# Patient Record
Sex: MALE | Race: WHITE | NOT HISPANIC OR LATINO | Employment: FULL TIME | URBAN - METROPOLITAN AREA
[De-identification: names, ages, dates, MRNs, and addresses within clinical notes are randomized per-mention and may not be internally consistent; named-entity substitution may affect disease eponyms.]

---

## 2018-10-03 ENCOUNTER — HOSPITAL ENCOUNTER (OUTPATIENT)
Dept: OTHER | Facility: HOSPITAL | Age: 48
Setting detail: SPECIMEN
Discharge: HOME | End: 2018-10-03
Attending: UROLOGY | Admitting: UROLOGY

## 2018-10-04 LAB
SPECIMEN SOURCE: NORMAL
STONE ANALYSIS-IMP: NORMAL
STONE COMMENT: NORMAL

## 2019-12-10 ENCOUNTER — LAB (OUTPATIENT)
Dept: LAB | Facility: HOSPITAL | Age: 49
End: 2019-12-10

## 2019-12-10 ENCOUNTER — TRANSCRIBE ORDERS (OUTPATIENT)
Dept: ADMINISTRATIVE | Facility: HOSPITAL | Age: 49
End: 2019-12-10

## 2019-12-10 ENCOUNTER — HOSPITAL ENCOUNTER (OUTPATIENT)
Dept: CARDIOLOGY | Facility: HOSPITAL | Age: 49
Discharge: HOME | End: 2019-12-10
Admitting: UROLOGY

## 2019-12-10 DIAGNOSIS — N20.1 LEFT URETERAL CALCULUS: ICD-10-CM

## 2019-12-10 DIAGNOSIS — N20.1 LEFT URETERAL CALCULUS: Primary | ICD-10-CM

## 2019-12-10 LAB
ANION GAP SERPL CALCULATED.3IONS-SCNC: 11.2 MMOL/L (ref 5–15)
BASOPHILS # BLD AUTO: 0.07 10*3/MM3 (ref 0–0.2)
BASOPHILS NFR BLD AUTO: 0.8 % (ref 0–1.5)
BUN BLD-MCNC: 12 MG/DL (ref 6–20)
BUN/CREAT SERPL: 14 (ref 7–25)
CALCIUM SPEC-SCNC: 9.6 MG/DL (ref 8.6–10.5)
CHLORIDE SERPL-SCNC: 104 MMOL/L (ref 98–107)
CO2 SERPL-SCNC: 25.8 MMOL/L (ref 22–29)
CREAT BLD-MCNC: 0.86 MG/DL (ref 0.76–1.27)
DEPRECATED RDW RBC AUTO: 44.1 FL (ref 37–54)
EOSINOPHIL # BLD AUTO: 0.26 10*3/MM3 (ref 0–0.4)
EOSINOPHIL NFR BLD AUTO: 3.1 % (ref 0.3–6.2)
ERYTHROCYTE [DISTWIDTH] IN BLOOD BY AUTOMATED COUNT: 12.5 % (ref 12.3–15.4)
GFR SERPL CREATININE-BSD FRML MDRD: 95 ML/MIN/1.73
GLUCOSE BLD-MCNC: 87 MG/DL (ref 65–99)
HCT VFR BLD AUTO: 47 % (ref 37.5–51)
HGB BLD-MCNC: 16.8 G/DL (ref 13–17.7)
IMM GRANULOCYTES # BLD AUTO: 0.02 10*3/MM3 (ref 0–0.05)
IMM GRANULOCYTES NFR BLD AUTO: 0.2 % (ref 0–0.5)
LYMPHOCYTES # BLD AUTO: 3.11 10*3/MM3 (ref 0.7–3.1)
LYMPHOCYTES NFR BLD AUTO: 36.8 % (ref 19.6–45.3)
MCH RBC QN AUTO: 34.4 PG (ref 26.6–33)
MCHC RBC AUTO-ENTMCNC: 35.7 G/DL (ref 31.5–35.7)
MCV RBC AUTO: 96.3 FL (ref 79–97)
MONOCYTES # BLD AUTO: 0.73 10*3/MM3 (ref 0.1–0.9)
MONOCYTES NFR BLD AUTO: 8.6 % (ref 5–12)
NEUTROPHILS # BLD AUTO: 4.26 10*3/MM3 (ref 1.7–7)
NEUTROPHILS NFR BLD AUTO: 50.5 % (ref 42.7–76)
NRBC BLD AUTO-RTO: 0 /100 WBC (ref 0–0.2)
PLATELET # BLD AUTO: 283 10*3/MM3 (ref 140–450)
PMV BLD AUTO: 10.5 FL (ref 6–12)
POTASSIUM BLD-SCNC: 4.4 MMOL/L (ref 3.5–5.2)
RBC # BLD AUTO: 4.88 10*6/MM3 (ref 4.14–5.8)
SODIUM BLD-SCNC: 141 MMOL/L (ref 136–145)
WBC NRBC COR # BLD: 8.45 10*3/MM3 (ref 3.4–10.8)

## 2019-12-10 PROCEDURE — 85025 COMPLETE CBC W/AUTO DIFF WBC: CPT

## 2019-12-10 PROCEDURE — 36415 COLL VENOUS BLD VENIPUNCTURE: CPT

## 2019-12-10 PROCEDURE — 80048 BASIC METABOLIC PNL TOTAL CA: CPT

## 2019-12-10 PROCEDURE — 93005 ELECTROCARDIOGRAM TRACING: CPT | Performed by: UROLOGY

## 2019-12-11 ENCOUNTER — LAB REQUISITION (OUTPATIENT)
Dept: LAB | Facility: HOSPITAL | Age: 49
End: 2019-12-11

## 2019-12-11 DIAGNOSIS — N20.0 CALCULUS OF KIDNEY: ICD-10-CM

## 2019-12-11 PROCEDURE — 82360 CALCULUS ASSAY QUANT: CPT

## 2019-12-15 PROCEDURE — 93010 ELECTROCARDIOGRAM REPORT: CPT | Performed by: INTERNAL MEDICINE

## 2019-12-20 LAB
CA PHOS CRY STONE QL IR: 15 %
COD CRY STONE QL IR: 30 %
COLOR STONE: NORMAL
COM CRY STONE QL IR: 55 %
COMPN STONE: NORMAL
Lab: NORMAL
Lab: NORMAL
NIDUS STONE QL: NORMAL
PATH REPORT.COMMENTS IMP SPEC: NORMAL
SIZE STONE: NORMAL MM
SURFACE CRYSTALS: NORMAL
WT STONE: 9 MG

## 2020-02-03 ENCOUNTER — TRANSCRIBE ORDERS (OUTPATIENT)
Dept: ADMINISTRATIVE | Facility: HOSPITAL | Age: 50
End: 2020-02-03

## 2020-02-03 ENCOUNTER — HOSPITAL ENCOUNTER (OUTPATIENT)
Dept: GENERAL RADIOLOGY | Facility: HOSPITAL | Age: 50
Discharge: HOME | End: 2020-02-03
Admitting: UROLOGY

## 2020-02-03 DIAGNOSIS — N20.1 CALCULUS OF URETER: ICD-10-CM

## 2020-02-03 DIAGNOSIS — N20.1 CALCULUS OF URETER: Primary | ICD-10-CM

## 2020-02-03 PROCEDURE — 74018 RADEX ABDOMEN 1 VIEW: CPT

## 2020-02-05 PROCEDURE — 82365 CALCULUS SPECTROSCOPY: CPT

## 2020-02-06 ENCOUNTER — LAB REQUISITION (OUTPATIENT)
Dept: LAB | Facility: HOSPITAL | Age: 50
End: 2020-02-06

## 2020-02-06 DIAGNOSIS — N20.0 CALCULUS OF KIDNEY: ICD-10-CM

## 2020-02-20 LAB
COD CRY STONE QL IR: 20 %
COLOR STONE: NORMAL
COM CRY STONE QL IR: 70 %
COMPN STONE: NORMAL
HYDROXYAPATITE: 10 %
LABORATORY COMMENT REPORT: NORMAL
Lab: NORMAL
Lab: NORMAL
PHOTO: NORMAL
SIZE STONE: NORMAL MM
SPECIMEN SOURCE: NORMAL
WT STONE: 37.7 MG

## 2020-06-09 ENCOUNTER — OFFICE VISIT (OUTPATIENT)
Dept: FAMILY MEDICINE CLINIC | Facility: CLINIC | Age: 50
End: 2020-06-09

## 2020-06-09 VITALS
WEIGHT: 190.6 LBS | SYSTOLIC BLOOD PRESSURE: 121 MMHG | BODY MASS INDEX: 27.29 KG/M2 | OXYGEN SATURATION: 98 % | DIASTOLIC BLOOD PRESSURE: 82 MMHG | HEART RATE: 74 BPM | HEIGHT: 70 IN | TEMPERATURE: 98.6 F | RESPIRATION RATE: 16 BRPM

## 2020-06-09 DIAGNOSIS — Z71.6 ENCOUNTER FOR SMOKING CESSATION COUNSELING: ICD-10-CM

## 2020-06-09 DIAGNOSIS — E78.5 HYPERLIPIDEMIA, UNSPECIFIED HYPERLIPIDEMIA TYPE: ICD-10-CM

## 2020-06-09 DIAGNOSIS — Z86.73 STATUS POST CVA: Primary | ICD-10-CM

## 2020-06-09 PROCEDURE — 99203 OFFICE O/P NEW LOW 30 MIN: CPT | Performed by: NURSE PRACTITIONER

## 2020-06-09 RX ORDER — ASPIRIN 325 MG
325 TABLET ORAL DAILY
COMMUNITY
Start: 2020-05-31 | End: 2020-10-28

## 2020-06-09 RX ORDER — FLUTICASONE PROPIONATE 50 MCG
1 SPRAY, SUSPENSION (ML) NASAL DAILY
COMMUNITY
Start: 2020-06-01

## 2020-06-09 RX ORDER — ATORVASTATIN CALCIUM 80 MG/1
80 TABLET, FILM COATED ORAL NIGHTLY
COMMUNITY
Start: 2020-06-02 | End: 2020-09-03

## 2020-06-09 NOTE — PROGRESS NOTES
"Subjective   Jourdan Lebron is a 50 y.o. male presents for   Chief Complaint   Patient presents with   • Annual Exam   • Cerebrovascular Accident     recent        Health Maintenance Due   Topic Date Due   • TDAP/TD VACCINES (1 - Tdap) 04/21/1983   • PNEUMOCOCCAL VACCINE (19-64 MEDIUM RISK) (1 of 1 - PPSV23) 04/21/1991   • HEPATITIS C SCREENING  06/09/2020   • LIPID PANEL  06/09/2020   • COLONOSCOPY  06/09/2020       History of Present Illness   Pt present for annual exam, and to reestablish care.  Pt treated at [facility] for cva 5/31/20.   Pt returned to hospital 6/8/20 due to feeling lightheaded and was kept overnight for loop recorder implant.  He has appt to /u Monday with stroke team to determine possible cause of clot and further treatment plan.  Pt currently trying to quit smoking, but reports he feels very stressed at this time as he is awaiting results of tests.  Pt also currently off work.  Vitals:    06/09/20 1413 06/09/20 1422   BP: 129/80 121/82   BP Location: Right arm Left arm   Patient Position: Sitting Sitting   Cuff Size: Adult Adult   Pulse: 70 74   Resp: 16    Temp: 98.6 °F (37 °C)    TempSrc: Infrared    SpO2: 98%    Weight: 86.5 kg (190 lb 9.6 oz)    Height: 179 cm (70.47\")      Body mass index is 26.98 kg/m².    Current Outpatient Medications on File Prior to Visit   Medication Sig Dispense Refill   • aspirin 325 MG tablet Take 325 mg by mouth Daily.     • atorvastatin (LIPITOR) 80 MG tablet Take 80 mg by mouth Every Night.     • cyanocobalamin (VITAMIN B-12) 1000 MCG tablet Take 1,000 mcg by mouth Daily.     • fluticasone (Flonase Allergy Relief) 50 MCG/ACT nasal spray 1 spray into the nostril(s) as directed by provider Daily.     • [DISCONTINUED] Carbinoxamine Maleate 6 MG tablet Take 1 tablet by mouth Every 6 (Six) Hours As Needed.  5   • [DISCONTINUED] fluticasone (FLONASE) 50 MCG/ACT nasal spray 2 sprays by Each Nare route Daily.  12   • [DISCONTINUED] methylPREDNISolone (MEDROL, " TAVIA) 4 MG tablet Take as directed on package instructions. 21 each 0     No current facility-administered medications on file prior to visit.        The following portions of the patient's history were reviewed and updated as appropriate: allergies, current medications, past family history, past medical history, past social history, past surgical history and problem list.    Review of Systems   Constitutional: Negative for chills and fever.   HENT: Negative for sinus pressure and sore throat.    Eyes: Negative for blurred vision.   Respiratory: Negative for cough and shortness of breath.    Cardiovascular: Negative for chest pain.        Loop recorder placed 6/8/20   Gastrointestinal: Negative for abdominal pain.   Endocrine: Negative.    Genitourinary: Negative.    Musculoskeletal: Negative for arthralgias and joint swelling.   Skin: Negative for color change.   Allergic/Immunologic: Negative.    Neurological: Negative for dizziness.   Psychiatric/Behavioral: Negative for behavioral problems.       Objective   Physical Exam   Constitutional: He is oriented to person, place, and time. He appears well-developed and well-nourished.   HENT:   Head: Normocephalic and atraumatic.   Right Ear: External ear normal.   Left Ear: External ear normal.   Nose: Nose normal.   Mouth/Throat: Oropharynx is clear and moist.   Eyes: Pupils are equal, round, and reactive to light. Conjunctivae and EOM are normal.   Neck: Normal range of motion. Neck supple. No thyromegaly present.   Cardiovascular: Normal rate, regular rhythm, normal heart sounds and intact distal pulses.   Pulmonary/Chest: Effort normal and breath sounds normal.   Abdominal: Soft. Bowel sounds are normal.   Musculoskeletal: Normal range of motion.   Lymphadenopathy:     He has no cervical adenopathy.   Neurological: He is alert and oriented to person, place, and time. No sensory deficit. Coordination normal.   Skin: Skin is warm and dry.   Psychiatric: He has a  normal mood and affect. His behavior is normal. Judgment and thought content normal.   Nursing note and vitals reviewed.    PHQ-9 Total Score: 1    Assessment/Plan   Jourdan was seen today for annual exam and cerebrovascular accident.    Diagnoses and all orders for this visit:    Status post CVA  Comments:  continue daily asa and lipitor    Encounter for smoking cessation counseling  Comments:  discussed avaialble options including patches, gum, and chantix. pt has patches at this time, but is not using. he will continue to cut down method at this time    Hyperlipidemia, unspecified hyperlipidemia type  Comments:  continue lipitor        There are no Patient Instructions on file for this visit.

## 2020-06-30 ENCOUNTER — TRANSITIONAL CARE MANAGEMENT TELEPHONE ENCOUNTER (OUTPATIENT)
Dept: FAMILY MEDICINE CLINIC | Facility: CLINIC | Age: 50
End: 2020-06-30

## 2020-09-03 ENCOUNTER — OFFICE VISIT (OUTPATIENT)
Dept: FAMILY MEDICINE CLINIC | Facility: CLINIC | Age: 50
End: 2020-09-03

## 2020-09-03 VITALS
BODY MASS INDEX: 23.7 KG/M2 | HEIGHT: 73 IN | RESPIRATION RATE: 18 BRPM | WEIGHT: 178.8 LBS | DIASTOLIC BLOOD PRESSURE: 73 MMHG | HEART RATE: 62 BPM | SYSTOLIC BLOOD PRESSURE: 110 MMHG | OXYGEN SATURATION: 96 % | TEMPERATURE: 98.4 F

## 2020-09-03 DIAGNOSIS — Z12.11 SCREENING FOR COLON CANCER: ICD-10-CM

## 2020-09-03 DIAGNOSIS — Z11.59 ENCOUNTER FOR HEPATITIS C VIRUS SCREENING TEST FOR HIGH RISK PATIENT: ICD-10-CM

## 2020-09-03 DIAGNOSIS — Z00.01 ENCOUNTER FOR GENERAL ADULT MEDICAL EXAMINATION WITH ABNORMAL FINDINGS: Primary | ICD-10-CM

## 2020-09-03 DIAGNOSIS — Z86.73 STATUS POST CVA: ICD-10-CM

## 2020-09-03 DIAGNOSIS — R53.82 CHRONIC FATIGUE: ICD-10-CM

## 2020-09-03 DIAGNOSIS — Z91.89 ENCOUNTER FOR HEPATITIS C VIRUS SCREENING TEST FOR HIGH RISK PATIENT: ICD-10-CM

## 2020-09-03 DIAGNOSIS — R63.4 WEIGHT LOSS, UNINTENTIONAL: ICD-10-CM

## 2020-09-03 DIAGNOSIS — F32.A DEPRESSION, UNSPECIFIED DEPRESSION TYPE: ICD-10-CM

## 2020-09-03 LAB
25(OH)D3 SERPL-MCNC: 38.9 NG/ML (ref 30–100)
ALBUMIN SERPL-MCNC: 4.2 G/DL (ref 3.5–5.2)
ALBUMIN/GLOB SERPL: 1.7 G/DL
ALP SERPL-CCNC: 72 U/L (ref 39–117)
ALT SERPL W P-5'-P-CCNC: 21 U/L (ref 1–41)
ANION GAP SERPL CALCULATED.3IONS-SCNC: 9.7 MMOL/L (ref 5–15)
AST SERPL-CCNC: 17 U/L (ref 1–40)
BASOPHILS # BLD AUTO: 0.05 10*3/MM3 (ref 0–0.2)
BASOPHILS NFR BLD AUTO: 0.7 % (ref 0–1.5)
BILIRUB SERPL-MCNC: 0.4 MG/DL (ref 0–1.2)
BUN SERPL-MCNC: 8 MG/DL (ref 6–20)
BUN/CREAT SERPL: 9.4 (ref 7–25)
CALCIUM SPEC-SCNC: 9.4 MG/DL (ref 8.6–10.5)
CHLORIDE SERPL-SCNC: 105 MMOL/L (ref 98–107)
CHOLEST SERPL-MCNC: 202 MG/DL (ref 0–200)
CO2 SERPL-SCNC: 25.3 MMOL/L (ref 22–29)
CREAT SERPL-MCNC: 0.85 MG/DL (ref 0.76–1.27)
DEPRECATED RDW RBC AUTO: 46.8 FL (ref 37–54)
EOSINOPHIL # BLD AUTO: 0.19 10*3/MM3 (ref 0–0.4)
EOSINOPHIL NFR BLD AUTO: 2.8 % (ref 0.3–6.2)
ERYTHROCYTE [DISTWIDTH] IN BLOOD BY AUTOMATED COUNT: 13.1 % (ref 12.3–15.4)
ERYTHROCYTE [SEDIMENTATION RATE] IN BLOOD: 2 MM/HR (ref 0–15)
GFR SERPL CREATININE-BSD FRML MDRD: 96 ML/MIN/1.73
GLOBULIN UR ELPH-MCNC: 2.5 GM/DL
GLUCOSE SERPL-MCNC: 88 MG/DL (ref 65–99)
HCT VFR BLD AUTO: 48.9 % (ref 37.5–51)
HCV AB SER DONR QL: NORMAL
HDLC SERPL-MCNC: 51 MG/DL (ref 40–60)
HGB BLD-MCNC: 16.9 G/DL (ref 13–17.7)
IMM GRANULOCYTES # BLD AUTO: 0.01 10*3/MM3 (ref 0–0.05)
IMM GRANULOCYTES NFR BLD AUTO: 0.1 % (ref 0–0.5)
LDLC SERPL CALC-MCNC: 123 MG/DL (ref 0–100)
LDLC/HDLC SERPL: 2.41 {RATIO}
LYMPHOCYTES # BLD AUTO: 1.94 10*3/MM3 (ref 0.7–3.1)
LYMPHOCYTES NFR BLD AUTO: 28.9 % (ref 19.6–45.3)
MCH RBC QN AUTO: 33.5 PG (ref 26.6–33)
MCHC RBC AUTO-ENTMCNC: 34.6 G/DL (ref 31.5–35.7)
MCV RBC AUTO: 97 FL (ref 79–97)
MONOCYTES # BLD AUTO: 0.59 10*3/MM3 (ref 0.1–0.9)
MONOCYTES NFR BLD AUTO: 8.8 % (ref 5–12)
NEUTROPHILS NFR BLD AUTO: 3.93 10*3/MM3 (ref 1.7–7)
NEUTROPHILS NFR BLD AUTO: 58.7 % (ref 42.7–76)
NRBC BLD AUTO-RTO: 0 /100 WBC (ref 0–0.2)
PLATELET # BLD AUTO: 286 10*3/MM3 (ref 140–450)
PMV BLD AUTO: 10.2 FL (ref 6–12)
POTASSIUM SERPL-SCNC: 4.1 MMOL/L (ref 3.5–5.2)
PROT SERPL-MCNC: 6.7 G/DL (ref 6–8.5)
RBC # BLD AUTO: 5.04 10*6/MM3 (ref 4.14–5.8)
SODIUM SERPL-SCNC: 140 MMOL/L (ref 136–145)
TRIGL SERPL-MCNC: 140 MG/DL (ref 0–150)
TSH SERPL DL<=0.05 MIU/L-ACNC: 1.66 UIU/ML (ref 0.27–4.2)
VIT B12 BLD-MCNC: 1223 PG/ML (ref 211–946)
VLDLC SERPL-MCNC: 28 MG/DL (ref 5–40)
WBC # BLD AUTO: 6.71 10*3/MM3 (ref 3.4–10.8)

## 2020-09-03 PROCEDURE — 82607 VITAMIN B-12: CPT | Performed by: NURSE PRACTITIONER

## 2020-09-03 PROCEDURE — 85025 COMPLETE CBC W/AUTO DIFF WBC: CPT | Performed by: NURSE PRACTITIONER

## 2020-09-03 PROCEDURE — 82306 VITAMIN D 25 HYDROXY: CPT | Performed by: NURSE PRACTITIONER

## 2020-09-03 PROCEDURE — 80053 COMPREHEN METABOLIC PANEL: CPT | Performed by: NURSE PRACTITIONER

## 2020-09-03 PROCEDURE — 86803 HEPATITIS C AB TEST: CPT | Performed by: NURSE PRACTITIONER

## 2020-09-03 PROCEDURE — 80061 LIPID PANEL: CPT | Performed by: NURSE PRACTITIONER

## 2020-09-03 PROCEDURE — 85652 RBC SED RATE AUTOMATED: CPT | Performed by: NURSE PRACTITIONER

## 2020-09-03 PROCEDURE — 84443 ASSAY THYROID STIM HORMONE: CPT | Performed by: NURSE PRACTITIONER

## 2020-09-03 PROCEDURE — 99214 OFFICE O/P EST MOD 30 MIN: CPT | Performed by: NURSE PRACTITIONER

## 2020-09-03 RX ORDER — CLOPIDOGREL BISULFATE 75 MG/1
75 TABLET ORAL DAILY
COMMUNITY
Start: 2020-05-31 | End: 2020-09-14 | Stop reason: SDUPTHER

## 2020-09-03 NOTE — PROGRESS NOTES
"Subjective   Jourdan Lebron is a 50 y.o. male presents for   Chief Complaint   Patient presents with   • Hyperlipidemia     pt is fasting       Health Maintenance Due   Topic Date Due   • TDAP/TD VACCINES (1 - Tdap) 04/21/1983   • PNEUMOCOCCAL VACCINE (19-64 MEDIUM RISK) (1 of 1 - PPSV23) 04/21/1991   • HEPATITIS C SCREENING  06/09/2020   • LIPID PANEL  06/09/2020   • COLONOSCOPY  06/09/2020   • INFLUENZA VACCINE  08/01/2020       History of Present Illness   Pt present to follow up hyperlipidemia.  He reports he has taken lipitor in the past and developed muscle aches, and cardiology switched to zetia.  He states muscle aches have continued and he stopped it approximately 1 weeks ago.  He also reports weight loss of approximately 20 lbs, no energy, muscle weakness, and depression.  Neurology prescribed lexapro, but he felt it increased his anxiety.  Depressed due to ongoing fatigue and achiness, and inability to be as active as he had been in the past. He reports he is easily angered and that is typically not his personality.  He states he lost sense of taste last week and reported sx to his neurologist and tested negative for COVID-19 last week. He reports intermittent nausea and a sensation of lump in his throat at times, but  attributed it to sinus drainage.  He states his bowel habits has also changed but feels that is related to lack of appetite/intake.    Vitals:    09/03/20 0811 09/03/20 0812   BP: 108/73 110/73   BP Location: Left arm Right arm   Patient Position: Sitting Sitting   Cuff Size: Adult Adult   Pulse: 65 62   Resp: 18    Temp: 98.4 °F (36.9 °C)    TempSrc: Temporal    SpO2: 96%    Weight: 81.1 kg (178 lb 12.8 oz)    Height: 185.4 cm (73\")      Body mass index is 23.59 kg/m².    Current Outpatient Medications on File Prior to Visit   Medication Sig Dispense Refill   • aspirin 325 MG tablet Take 325 mg by mouth Daily.     • clopidogrel (PLAVIX) 75 MG tablet Take 75 mg by mouth Daily.     • " cyanocobalamin (VITAMIN B-12) 1000 MCG tablet Take 1,000 mcg by mouth Daily.     • fluticasone (Flonase Allergy Relief) 50 MCG/ACT nasal spray 1 spray into the nostril(s) as directed by provider Daily.     • [DISCONTINUED] atorvastatin (LIPITOR) 80 MG tablet Take 80 mg by mouth Every Night.       No current facility-administered medications on file prior to visit.        The following portions of the patient's history were reviewed and updated as appropriate: allergies, current medications, past family history, past medical history, past social history, past surgical history and problem list.    Review of Systems   Constitutional: Positive for activity change, fatigue and unexpected weight loss.   HENT: Positive for postnasal drip.         Loss of taste, negative COVID-19 test last week   Eyes: Negative.    Respiratory: Negative.    Cardiovascular: Negative for leg swelling.   Gastrointestinal: Positive for nausea (intermittent).        No appetite, sensation of lump in throat occassionally   Endocrine: Positive for cold intolerance.   Genitourinary: Negative.    Musculoskeletal: Positive for myalgias and neck pain.   Skin: Negative.    Allergic/Immunologic: Negative.    Neurological: Positive for dizziness and weakness.   Hematological: Negative.    Psychiatric/Behavioral: Negative.        Objective   Physical Exam   Constitutional: He is oriented to person, place, and time. He appears well-developed and well-nourished.   HENT:   Head: Normocephalic and atraumatic.   Right Ear: External ear normal.   Left Ear: External ear normal.   Nose: Nose normal.   Mouth/Throat: Oropharynx is clear and moist.   Eyes: Pupils are equal, round, and reactive to light. Conjunctivae and EOM are normal.   Neck: Normal range of motion. Neck supple. No thyromegaly present.   Cardiovascular: Normal rate, regular rhythm, normal heart sounds and intact distal pulses.   Pulmonary/Chest: Effort normal and breath sounds normal.   Abdominal:  Soft. Bowel sounds are normal.   Musculoskeletal: Normal range of motion.   Lymphadenopathy:     He has no cervical adenopathy.   Neurological: He is alert and oriented to person, place, and time. He displays normal reflexes. Coordination normal.   Skin: Skin is warm and dry.   Psychiatric: He has a normal mood and affect. His behavior is normal. Judgment and thought content normal.   Nursing note and vitals reviewed.    PHQ-9 Total Score:      Assessment/Plan   Jourdan was seen today for hyperlipidemia.    Diagnoses and all orders for this visit:    Encounter for general adult medical examination with abnormal findings  -     CBC Auto Differential  -     Comprehensive Metabolic Panel  -     Lipid Panel  -     TSH  -     Vitamin B12  -     Vitamin D 25 Hydroxy    Weight loss, unintentional  Comments:  will evaluate labs, colonoscopy encouraged.     Chronic fatigue    Screening for colon cancer  -     Ambulatory Referral For Screening Colonoscopy    Encounter for hepatitis C virus screening test for high risk patient  -     Hepatitis C Antibody    Depression, unspecified depression type  Comments:  likely r/t cva and ongoing weakness/fatigue.  did not tolerate lexapro. counseling encouraged.  TSH ordered        Patient Instructions   Colorectal Cancer Screening    Colorectal cancer screening is a group of tests that are used to check for colorectal cancer before symptoms develop. Colorectal refers to the colon and rectum. The colon and rectum are located at the end of the digestive tract and carry bowel movements out of the body.  Who should have screening?  All adults starting at age 50 until age 75 should have screening. Your health care provider may recommend screening at age 45. You will have tests every 1-10 years, depending on your results and the type of screening test.  You may have screening tests starting at an earlier age, or more frequently than other people, if you have any of the following risk  factors:  · A personal or family history of colorectal cancer or abnormal growths (polyps).  · Inflammatory bowel disease, such as ulcerative colitis or Crohn's disease.  · A history of having radiation treatment to the abdomen or pelvic area for cancer.  · Colorectal cancer symptoms, such as changes in bowel habits or blood in your stool.  · A type of colon cancer syndrome that is passed from parent to child (hereditary), such as:  ? Sandhu syndrome.  ? Familial adenomatous polyposis.  ? Turcot syndrome.  ? Peutz-Jeghers syndrome.  Screening recommendations for adults who are 75-85 years old vary depending on health.  How is screening done?  There are several types of colorectal screening tests. You may have one or more of the following:  · Guaiac-based fecal occult blood testing. For this test, a stool (feces) sample is checked for hidden (occult) blood, which could be a sign of colorectal cancer.  · Fecal immunochemical test (FIT). For this test, a stool sample is checked for blood, which could be a sign of colorectal cancer.  · Stool DNA test. For this test, a stool sample is checked for blood and changes in DNA that could lead to colorectal cancer.  · Sigmoidoscopy. During this test, a thin, flexible tube with a camera on the end (sigmoidoscope) is used to examine the rectum and the lower colon.  · Colonoscopy. During this test, a long, flexible tube with a camera on the end (colonoscope) is used to examine the entire colon and rectum. With a colonoscopy, it is possible to take a sample of tissue (biopsy) and remove small polyps during the test.  · Virtual colonoscopy. Instead of a colonoscope, this type of colonoscopy uses X-rays (CT scan) and computers to produce images of the colon and rectum.  What are the benefits of screening?  Screening reduces your risk for colorectal cancer and can help identify cancer at an early stage, when the cancer can be removed or treated more easily. It is common for polyps to  form in the lining of the colon, especially as you age. These polyps may be cancerous or become cancerous over time. Screening can identify these polyps.  What are the risks of screening?  Each screening test may have different risks.  · Stool sample tests have fewer risks than other types of screening tests. However, you may need more tests to confirm results from a stool sample test.  · Screening tests that involve X-rays expose you to low levels of radiation, which may slightly increase your cancer risk. The benefit of detecting cancer outweighs the slight increase in risk.  · Screening tests such as sigmoidoscopy and colonoscopy may place you at risk for bleeding, intestinal damage, infection, or a reaction to medicines given during the exam.  Talk with your health care provider to understand your risk for colorectal cancer and to make a screening plan that is right for you.  Questions to ask your health care provider  · When should I start colorectal cancer screening?  · What is my risk for colorectal cancer?  · How often do I need screening?  · Which screening tests do I need?  · How do I get my test results?  · What do my results mean?  Where to find more information  Learn more about colorectal cancer screening from:  · The American Cancer Society: www.cancer.org  · The National Cancer [place]: www.cancer.gov  Summary  · Colorectal cancer screening is a group of tests used to check for colorectal cancer before symptoms develop.  · Screening reduces your risk for colorectal cancer and can help identify cancer at an early stage, when the cancer can be removed or treated more easily.  · All adults starting at age 50 until age 75 should have screening. Your health care provider may recommend screening at age 45.  · You may have screening tests starting at an earlier age, or more frequently than other people, if you have certain risk factors.  · Talk with your health care provider to understand your risk for  colorectal cancer and to make a screening plan that is right for you.  This information is not intended to replace advice given to you by your health care provider. Make sure you discuss any questions you have with your health care provider.  Document Released: 06/07/2011 Document Revised: 04/08/2020 Document Reviewed: 09/19/2018  Elsevier Patient Education © 2020 [employer]           Answers for HPI/ROS submitted by the patient on 8/27/2020   What is the primary reason for your visit?: Other  Please describe your symptoms.: Follow up on stroke and cholesterol  Have you had these symptoms before?: No  How long have you been having these symptoms?: Greater than 2 weeks    Answers for HPI/ROS submitted by the patient on 8/27/2020   What is the primary reason for your visit?: Other  Please describe your symptoms.: Follow up on stroke and cholesterol  Have you had these symptoms before?: No  How long have you been having these symptoms?: Greater than 2 weeks

## 2020-09-04 ENCOUNTER — TELEPHONE (OUTPATIENT)
Dept: FAMILY MEDICINE CLINIC | Facility: CLINIC | Age: 50
End: 2020-09-04

## 2020-09-04 NOTE — TELEPHONE ENCOUNTER
Spoke with pt regarding his labs. Stated understood and will fill out green packet for colonoscopy.

## 2020-09-04 NOTE — PROGRESS NOTES
He recently stopped statins due to muscle pain and fatigue.  Has also had 20lb unintentional weight loss.  Labs don't really point to a cause for that, but I encouraged a colonoscopy and provided paperwork at his appt yesterday.

## 2020-09-04 NOTE — PROGRESS NOTES
B12 is elevated so he can decrease otc dose.  Also cholesterol and bad cholesterol are both elevated so advise he watch sat fats and increase walking and we'll recheck with next labs and if isn't bringing down he should consider a statin to lower cardiovascular risks

## 2020-09-10 ENCOUNTER — TELEPHONE (OUTPATIENT)
Dept: FAMILY MEDICINE CLINIC | Facility: CLINIC | Age: 50
End: 2020-09-10

## 2020-09-10 NOTE — TELEPHONE ENCOUNTER
Spoke to Lisa. She will call pt, cancel todays appt and give him phone number for [place].  only does these at the [place] Location

## 2020-09-10 NOTE — TELEPHONE ENCOUNTER
PT CALLED AND IS ASKING IF THIS IS FOR DISABILITY? FUNCTION TEST    [id number] - Ambulatory Referral to Physical Therapy Evaluate and treat ([facility]     PHONE [phone]

## 2020-09-14 RX ORDER — CLOPIDOGREL BISULFATE 75 MG/1
75 TABLET ORAL DAILY
Qty: 90 TABLET | Refills: 1 | Status: SHIPPED | OUTPATIENT
Start: 2020-09-14 | End: 2020-12-13

## 2020-09-23 ENCOUNTER — OFFICE VISIT (OUTPATIENT)
Dept: FAMILY MEDICINE CLINIC | Facility: CLINIC | Age: 50
End: 2020-09-23

## 2020-09-23 VITALS
DIASTOLIC BLOOD PRESSURE: 75 MMHG | WEIGHT: 179 LBS | BODY MASS INDEX: 23.72 KG/M2 | TEMPERATURE: 98.3 F | HEART RATE: 82 BPM | HEIGHT: 73 IN | OXYGEN SATURATION: 97 % | SYSTOLIC BLOOD PRESSURE: 116 MMHG | RESPIRATION RATE: 18 BRPM

## 2020-09-23 DIAGNOSIS — R53.1 WEAKNESS: ICD-10-CM

## 2020-09-23 DIAGNOSIS — F41.9 ANXIETY: Primary | ICD-10-CM

## 2020-09-23 PROCEDURE — 99213 OFFICE O/P EST LOW 20 MIN: CPT | Performed by: NURSE PRACTITIONER

## 2020-09-23 RX ORDER — BUSPIRONE HYDROCHLORIDE 5 MG/1
5 TABLET ORAL 3 TIMES DAILY
Qty: 90 TABLET | Refills: 3 | Status: SHIPPED | OUTPATIENT
Start: 2020-09-23 | End: 2021-03-16

## 2020-09-23 NOTE — PROGRESS NOTES
"Subjective  Answers for HPI/ROS submitted by the patient on 9/21/2020   What is the primary reason for your visit?: Other  Please describe your symptoms.: Need to discuss coordination of care  Have you had these symptoms before?: No  How long have you been having these symptoms?: 1-4 days    Jourdan Lebron is a 50 y.o. male presents for   Chief Complaint   Patient presents with   • Anxiety   • Hyperlipidemia     discuss chol meds.       Health Maintenance Due   Topic Date Due   • COLONOSCOPY  1972   • Pneumococcal Vaccine 0-64 (1 of 1 - PPSV23) 04/21/1978   • TDAP/TD VACCINES (1 - Tdap) 04/21/1991   • INFLUENZA VACCINE  08/01/2020       History of Present Illness   Pt present to discuss uncontrolled anxiety, return to work status and medications to control elevated cholesterol.  He states he has tried multiple statins and zetia and unable to tolerate any of them due to muscle pain.  Pt also reports anxiety level is very high at this time, increased irritability and mild depression.  He denies SI or HI. He states he has taken lexapro in the past but experienced side effects.  He has tried to manage symptoms on his own for the past few months, but states he would like to try a different medication at this time.  Pt denies counseling in the past.  He also started physical therapy this week at [facility] and reports increased muscle pain due to deconditioning over the past 4 months since his stroke.    Vitals:    09/23/20 1010 09/23/20 1011   BP: 116/76 116/75   BP Location: Right arm Left arm   Patient Position: Sitting Sitting   Cuff Size: Adult Adult   Pulse: 78 82   Resp: 18    Temp: 98.3 °F (36.8 °C)    TempSrc: Temporal    SpO2: 97%    Weight: 81.2 kg (179 lb)    Height: 185.4 cm (73\")      Body mass index is 23.62 kg/m².    Current Outpatient Medications on File Prior to Visit   Medication Sig Dispense Refill   • aspirin 325 MG tablet Take 325 mg by mouth Daily.     • clopidogrel (PLAVIX) 75 MG tablet Take 1 tablet " by mouth Daily for 90 days. 90 tablet 1   • cyanocobalamin (VITAMIN B-12) 1000 MCG tablet Take 500 mcg by mouth Every Other Day.     • fluticasone (Flonase Allergy Relief) 50 MCG/ACT nasal spray 1 spray into the nostril(s) as directed by provider Daily.       No current facility-administered medications on file prior to visit.        The following portions of the patient's history were reviewed and updated as appropriate: allergies, current medications, past family history, past medical history, past social history, past surgical history and problem list.    Review of Systems   Constitutional: Negative for chills and fever.   HENT: Negative for sinus pressure and sore throat.    Eyes: Negative for blurred vision.   Respiratory: Negative for cough and shortness of breath.    Cardiovascular: Negative for chest pain.   Gastrointestinal: Negative for abdominal pain.   Endocrine: Negative.    Genitourinary: Negative.    Musculoskeletal: Positive for myalgias. Negative for arthralgias and joint swelling.        Muscle weakness   Skin: Negative for color change.   Allergic/Immunologic: Negative.    Neurological: Negative for dizziness.   Psychiatric/Behavioral: Positive for depressed mood and stress. Negative for behavioral problems. The patient is nervous/anxious.        Objective   Physical Exam  Vitals signs and nursing note reviewed. Exam conducted with a chaperone present.   Constitutional:       Appearance: Normal appearance. He is well-developed and normal weight.   HENT:      Head: Normocephalic and atraumatic.      Right Ear: External ear normal.      Left Ear: External ear normal.      Nose: Nose normal.   Eyes:      Extraocular Movements: Extraocular movements intact.      Conjunctiva/sclera: Conjunctivae normal.      Pupils: Pupils are equal, round, and reactive to light.   Neck:      Musculoskeletal: Normal range of motion.   Cardiovascular:      Rate and Rhythm: Normal rate.   Pulmonary:      Effort:  Pulmonary effort is normal.   Abdominal:      General: Abdomen is flat.   Musculoskeletal: Normal range of motion.   Skin:     General: Skin is warm and dry.   Neurological:      General: No focal deficit present.      Mental Status: He is alert and oriented to person, place, and time.   Psychiatric:         Attention and Perception: Attention normal.         Mood and Affect: Mood is anxious (slightly).         Speech: Speech normal.         Behavior: Behavior normal. Behavior is cooperative.         Thought Content: Thought content normal.         Judgment: Judgment normal.       PHQ-9 Total Score:      Assessment/Plan   Jourdan was seen today for anxiety and hyperlipidemia.    Diagnoses and all orders for this visit:    Anxiety  Comments:  list of counselors given and counseling encouraged.  call for worsening of sx, SI or HI  Orders:  -     busPIRone (BUSPAR) 5 MG tablet; Take 1 tablet by mouth 3 (Three) Times a Day for 30 days.    Weakness  Comments:  note to remain off work until completion of PT due to nature of job and high risk for injury if not in adequate physical condition        Patient Instructions   Managing Anxiety, Adult  After being diagnosed with an anxiety disorder, you may be relieved to know why you have felt or behaved a certain way. You may also feel overwhelmed about the treatment ahead and what it will mean for your life. With care and support, you can manage this condition and recover from it.  How to manage lifestyle changes  Managing stress and anxiety    Stress is your body's reaction to life changes and events, both good and bad. Most stress will last just a few hours, but stress can be ongoing and can lead to more than just stress. Although stress can play a major role in anxiety, it is not the same as anxiety. Stress is usually caused by something external, such as a deadline, test, or competition. Stress normally passes after the triggering event has ended.   Anxiety is caused by  something internal, such as imagining a terrible outcome or worrying that something will go wrong that will devastate you. Anxiety often does not go away even after the triggering event is over, and it can become long-term (chronic) worry. It is important to understand the differences between stress and anxiety and to manage your stress effectively so that it does not lead to an anxious response.  Talk with your health care provider or a counselor to learn more about reducing anxiety and stress. He or she may suggest tension reduction techniques, such as:  · Music therapy. This can include creating or listening to music that you enjoy and that inspires you.  · Mindfulness-based meditation. This involves being aware of your normal breaths while not trying to control your breathing. It can be done while sitting or walking.  · Centering prayer. This involves focusing on a word, phrase, or sacred image that means something to you and brings you peace.  · Deep breathing. To do this, expand your stomach and inhale slowly through your nose. Hold your breath for 3-5 seconds. Then exhale slowly, letting your stomach muscles relax.  · Self-talk. This involves identifying thought patterns that lead to anxiety reactions and changing those patterns.  · Muscle relaxation. This involves tensing muscles and then relaxing them.  Choose a tension reduction technique that suits your lifestyle and personality. These techniques take time and practice. Set aside 5-15 minutes a day to do them. Therapists can offer counseling and training in these techniques. The training to help with anxiety may be covered by some insurance plans. Other things you can do to manage stress and anxiety include:  · Keeping a stress/anxiety diary. This can help you learn what triggers your reaction and then learn ways to manage your response.  · Thinking about how you react to certain situations. You may not be able to control everything, but you can control  your response.  · Making time for activities that help you relax and not feeling guilty about spending your time in this way.  · Visual imagery and yoga can help you stay calm and relax.    Medicines  Medicines can help ease symptoms. Medicines for anxiety include:  · Anti-anxiety drugs.  · Antidepressants.  Medicines are often used as a primary treatment for anxiety disorder. Medicines will be prescribed by a health care provider. When used together, medicines, psychotherapy, and tension reduction techniques may be the most effective treatment.  Relationships  Relationships can play a big part in helping you recover. Try to spend more time connecting with trusted friends and family members. Consider going to couples counseling, taking family education classes, or going to family therapy. Therapy can help you and others better understand your condition.  How to recognize changes in your anxiety  Everyone responds differently to treatment for anxiety. Recovery from anxiety happens when symptoms decrease and stop interfering with your daily activities at home or work. This may mean that you will start to:  · Have better concentration and focus. Worry will interfere less in your daily thinking.  · Sleep better.  · Be less irritable.  · Have more energy.  · Have improved memory.  It is important to recognize when your condition is getting worse. Contact your health care provider if your symptoms interfere with home or work and you feel like your condition is not improving.  Follow these instructions at home:  Activity  · Exercise. Most adults should do the following:  ? Exercise for at least 150 minutes each week. The exercise should increase your heart rate and make you sweat (moderate-intensity exercise).  ? Strengthening exercises at least twice a week.  · Get the right amount and quality of sleep. Most adults need 7-9 hours of sleep each night.  Lifestyle    · Eat a healthy diet that includes plenty of vegetables,  fruits, whole grains, low-fat dairy products, and lean protein. Do not eat a lot of foods that are high in solid fats, added sugars, or salt.  · Make choices that simplify your life.  · Do not use any products that contain nicotine or tobacco, such as cigarettes, e-cigarettes, and chewing tobacco. If you need help quitting, ask your health care provider.  · Avoid caffeine, alcohol, and certain over-the-counter cold medicines. These may make you feel worse. Ask your pharmacist which medicines to avoid.  General instructions  · Take over-the-counter and prescription medicines only as told by your health care provider.  · Keep all follow-up visits as told by your health care provider. This is important.  Where to find support  You can get help and support from these sources:  · Self-help groups.  · Online and community organizations.  · A trusted spiritual leader.  · Couples counseling.  · Family education classes.  · Family therapy.  Where to find more information  You may find that joining a support group helps you deal with your anxiety. The following sources can help you locate counselors or support groups near you:  · Mental Health [place]: www.mentalhealth[place]ica.net  · Anxiety and Depression Association of [place] (ADAA): www.adaa.org  · National [place] [employer] (SHANDA): www.shanda.org  Contact a health care provider if you:  · Have a hard time staying focused or finishing daily tasks.  · Spend many hours a day feeling worried about everyday life.  · Become exhausted by worry.  · Start to have headaches, feel tense, or have nausea.  · Urinate more than normal.  · Have diarrhea.  Get help right away if you have:  · A racing heart and shortness of breath.  · Thoughts of hurting yourself or others.  If you ever feel like you may hurt yourself or others, or have thoughts about taking your own life, get help right away. You can go to your nearest emergency department or call:  · Your local emergency services  (911 in the [place]  · A suicide crisis helpline, such as the National Suicide Prevention Lifeline at 1-219.376.5777. This is open 24 hours a day.  Summary  · Taking steps to learn and use tension reduction techniques can help calm you and help prevent triggering an anxiety reaction.  · When used together, medicines, psychotherapy, and tension reduction techniques may be the most effective treatment.  · Family, friends, and partners can play a big part in helping you recover from an anxiety disorder.  This information is not intended to replace advice given to you by your health care provider. Make sure you discuss any questions you have with your health care provider.  Document Released: 12/12/2017 Document Revised: 05/19/2020 Document Reviewed: 05/19/2020  Elsevier Patient Education © 2020 [employer]

## 2020-09-23 NOTE — PATIENT INSTRUCTIONS

## 2020-09-29 ENCOUNTER — HOSPITAL ENCOUNTER (EMERGENCY)
Facility: HOSPITAL | Age: 50
Discharge: HOME | End: 2020-09-29
Attending: EMERGENCY MEDICINE | Admitting: EMERGENCY MEDICINE

## 2020-09-29 ENCOUNTER — APPOINTMENT (OUTPATIENT)
Dept: MRI IMAGING | Facility: HOSPITAL | Age: 50
End: 2020-09-29

## 2020-09-29 ENCOUNTER — TELEPHONE (OUTPATIENT)
Dept: FAMILY MEDICINE CLINIC | Facility: CLINIC | Age: 50
End: 2020-09-29

## 2020-09-29 VITALS
HEIGHT: 71 IN | RESPIRATION RATE: 18 BRPM | TEMPERATURE: 98.1 F | BODY MASS INDEX: 24.85 KG/M2 | HEART RATE: 70 BPM | SYSTOLIC BLOOD PRESSURE: 117 MMHG | DIASTOLIC BLOOD PRESSURE: 70 MMHG | WEIGHT: 177.47 LBS | OXYGEN SATURATION: 100 %

## 2020-09-29 DIAGNOSIS — R42 DIZZY: Primary | ICD-10-CM

## 2020-09-29 LAB
ALBUMIN SERPL-MCNC: 4.2 G/DL (ref 3.5–5.2)
ALBUMIN/GLOB SERPL: 1.8 G/DL
ALP SERPL-CCNC: 74 U/L (ref 39–117)
ALT SERPL W P-5'-P-CCNC: 15 U/L (ref 1–41)
ANION GAP SERPL CALCULATED.3IONS-SCNC: 8 MMOL/L (ref 5–15)
AST SERPL-CCNC: 12 U/L (ref 1–40)
BASOPHILS # BLD AUTO: 0 10*3/MM3 (ref 0–0.2)
BASOPHILS NFR BLD AUTO: 0.1 % (ref 0–1.5)
BILIRUB SERPL-MCNC: 0.2 MG/DL (ref 0–1.2)
BUN SERPL-MCNC: 7 MG/DL (ref 6–20)
BUN SERPL-MCNC: NORMAL MG/DL
BUN/CREAT SERPL: NORMAL
CALCIUM SPEC-SCNC: 9.1 MG/DL (ref 8.6–10.5)
CHLORIDE SERPL-SCNC: 104 MMOL/L (ref 98–107)
CO2 SERPL-SCNC: 28 MMOL/L (ref 22–29)
CREAT SERPL-MCNC: 1 MG/DL (ref 0.76–1.27)
DEPRECATED RDW RBC AUTO: 48.6 FL (ref 37–54)
EOSINOPHIL # BLD AUTO: 0.3 10*3/MM3 (ref 0–0.4)
EOSINOPHIL NFR BLD AUTO: 3.1 % (ref 0.3–6.2)
ERYTHROCYTE [DISTWIDTH] IN BLOOD BY AUTOMATED COUNT: 14.1 % (ref 12.3–15.4)
GFR SERPL CREATININE-BSD FRML MDRD: 80 ML/MIN/1.73
GLOBULIN UR ELPH-MCNC: 2.4 GM/DL
GLUCOSE SERPL-MCNC: 88 MG/DL (ref 65–99)
HCT VFR BLD AUTO: 47.7 % (ref 37.5–51)
HGB BLD-MCNC: 16.2 G/DL (ref 13–17.7)
LYMPHOCYTES # BLD AUTO: 2.4 10*3/MM3 (ref 0.7–3.1)
LYMPHOCYTES NFR BLD AUTO: 28.7 % (ref 19.6–45.3)
MCH RBC QN AUTO: 33.7 PG (ref 26.6–33)
MCHC RBC AUTO-ENTMCNC: 33.9 G/DL (ref 31.5–35.7)
MCV RBC AUTO: 99.4 FL (ref 79–97)
MONOCYTES # BLD AUTO: 0.6 10*3/MM3 (ref 0.1–0.9)
MONOCYTES NFR BLD AUTO: 7.7 % (ref 5–12)
NEUTROPHILS NFR BLD AUTO: 5 10*3/MM3 (ref 1.7–7)
NEUTROPHILS NFR BLD AUTO: 60.4 % (ref 42.7–76)
NRBC BLD AUTO-RTO: 0.1 /100 WBC (ref 0–0.2)
PLATELET # BLD AUTO: 277 10*3/MM3 (ref 140–450)
PMV BLD AUTO: 7.8 FL (ref 6–12)
POTASSIUM SERPL-SCNC: 4.3 MMOL/L (ref 3.5–5.2)
PROT SERPL-MCNC: 6.6 G/DL (ref 6–8.5)
RBC # BLD AUTO: 4.8 10*6/MM3 (ref 4.14–5.8)
SODIUM SERPL-SCNC: 140 MMOL/L (ref 136–145)
TROPONIN T SERPL-MCNC: <0.01 NG/ML (ref 0–0.03)
WBC # BLD AUTO: 8.3 10*3/MM3 (ref 3.4–10.8)
WHOLE BLOOD HOLD SPECIMEN: NORMAL

## 2020-09-29 PROCEDURE — 84484 ASSAY OF TROPONIN QUANT: CPT | Performed by: EMERGENCY MEDICINE

## 2020-09-29 PROCEDURE — 70551 MRI BRAIN STEM W/O DYE: CPT

## 2020-09-29 PROCEDURE — 93005 ELECTROCARDIOGRAM TRACING: CPT | Performed by: EMERGENCY MEDICINE

## 2020-09-29 PROCEDURE — 85025 COMPLETE CBC W/AUTO DIFF WBC: CPT | Performed by: EMERGENCY MEDICINE

## 2020-09-29 PROCEDURE — 99284 EMERGENCY DEPT VISIT MOD MDM: CPT

## 2020-09-29 PROCEDURE — 80053 COMPREHEN METABOLIC PANEL: CPT | Performed by: EMERGENCY MEDICINE

## 2020-09-29 RX ORDER — SODIUM CHLORIDE 0.9 % (FLUSH) 0.9 %
10 SYRINGE (ML) INJECTION AS NEEDED
Status: DISCONTINUED | OUTPATIENT
Start: 2020-09-29 | End: 2020-09-29 | Stop reason: HOSPADM

## 2020-09-29 RX ADMIN — SODIUM CHLORIDE 1000 ML: 0.9 INJECTION, SOLUTION INTRAVENOUS at 15:26

## 2020-09-29 NOTE — TELEPHONE ENCOUNTER
Jayme with [facility] called stated pt was having light headedness and neck mobility issues with the stretches. Thinks it might be a possible blood flow restriction. Informed dr. gray states pt should go to hospital to get testing instead of coming here and waiting for approval through ins. Informed jayme and she will tell pt.

## 2020-09-29 NOTE — TELEPHONE ENCOUNTER
ALFREDA CALLED BACK ASKING WHAT TESTING HE IS TO ASK FOR.   I SPOKE TO TRISHA ABOUT THIS PHONE NOTE AND HE IS TO GO TO ER TO BE EVALUATED BY THE ER TO SEE WHAT STEPS TO TAKE NEXT.

## 2020-10-28 ENCOUNTER — OFFICE VISIT (OUTPATIENT)
Dept: FAMILY MEDICINE CLINIC | Facility: CLINIC | Age: 50
End: 2020-10-28

## 2020-10-28 VITALS
WEIGHT: 179 LBS | RESPIRATION RATE: 16 BRPM | TEMPERATURE: 98 F | HEIGHT: 71 IN | HEART RATE: 75 BPM | BODY MASS INDEX: 25.06 KG/M2 | OXYGEN SATURATION: 94 % | DIASTOLIC BLOOD PRESSURE: 74 MMHG | SYSTOLIC BLOOD PRESSURE: 108 MMHG

## 2020-10-28 DIAGNOSIS — Z86.73 STATUS POST CVA: ICD-10-CM

## 2020-10-28 DIAGNOSIS — F41.9 ANXIETY: Primary | ICD-10-CM

## 2020-10-28 DIAGNOSIS — Z23 NEED FOR TDAP VACCINATION: ICD-10-CM

## 2020-10-28 PROCEDURE — 90471 IMMUNIZATION ADMIN: CPT | Performed by: NURSE PRACTITIONER

## 2020-10-28 PROCEDURE — 90715 TDAP VACCINE 7 YRS/> IM: CPT | Performed by: NURSE PRACTITIONER

## 2020-10-28 PROCEDURE — 99213 OFFICE O/P EST LOW 20 MIN: CPT | Performed by: NURSE PRACTITIONER

## 2020-10-28 RX ORDER — ASPIRIN 81 MG/1
81 TABLET, CHEWABLE ORAL DAILY
COMMUNITY

## 2020-10-28 NOTE — PROGRESS NOTES
"Subjective  Answers for HPI/ROS submitted by the patient on 10/27/2020   What is the primary reason for your visit?: Other  Please describe your symptoms.: Follow up on physical therapy  Have you had these symptoms before?: Yes  How long have you been having these symptoms?: Greater than 2 weeks    Jourdan Lebron is a 50 y.o. male presents for   Chief Complaint   Patient presents with   • Anxiety       Health Maintenance Due   Topic Date Due   • COLONOSCOPY  1972   • Pneumococcal Vaccine 0-64 (1 of 1 - PPSV23) 04/21/1978       History of Present Illness   Pt present to follow up for anxiety.  He states he is feeling better since starting physical therapy and taking buspar prn.  He states he is occasionally experiencing dizzy spells, but are improving.  He states he continues to have limited taste and appetite is poor but is drinking meal replacement drinks to keep protein intake up.    Vitals:    10/28/20 0807 10/28/20 0811   BP: 117/79 108/74   BP Location: Right arm Left arm   Patient Position: Sitting Sitting   Cuff Size: Adult Adult   Pulse: 78 75   Resp: 16    Temp: 98 °F (36.7 °C)    TempSrc: Infrared    SpO2: 94%    Weight: 81.2 kg (179 lb)    Height: 180.3 cm (71\")      Body mass index is 24.97 kg/m².    Current Outpatient Medications on File Prior to Visit   Medication Sig Dispense Refill   • aspirin 81 MG chewable tablet Chew 81 mg Daily.     • busPIRone (BUSPAR) 5 MG tablet Take 1 tablet by mouth 3 (Three) Times a Day for 30 days. 90 tablet 3   • clopidogrel (PLAVIX) 75 MG tablet Take 1 tablet by mouth Daily for 90 days. 90 tablet 1   • cyanocobalamin (VITAMIN B-12) 1000 MCG tablet Take 500 mcg by mouth Every Other Day.     • fluticasone (Flonase Allergy Relief) 50 MCG/ACT nasal spray 1 spray into the nostril(s) as directed by provider Daily.     • [DISCONTINUED] aspirin 325 MG tablet Take 325 mg by mouth Daily.       No current facility-administered medications on file prior to visit.        The " following portions of the patient's history were reviewed and updated as appropriate: allergies, current medications, past family history, past medical history, past social history, past surgical history and problem list.    Review of Systems   Constitutional: Negative for chills and fever.   HENT: Negative for sinus pressure and sore throat.    Eyes: Negative for blurred vision.   Respiratory: Negative for cough and shortness of breath.    Cardiovascular: Negative for chest pain.   Gastrointestinal: Negative for abdominal pain.   Endocrine: Negative.    Genitourinary: Negative.    Musculoskeletal: Negative for arthralgias and joint swelling.   Skin: Negative for color change.   Allergic/Immunologic: Negative.    Neurological: Positive for dizziness.   Hematological: Negative.    Psychiatric/Behavioral: Negative for behavioral problems.       Objective   Physical Exam  Vitals signs and nursing note reviewed.   Constitutional:       Appearance: Normal appearance. He is well-developed.   HENT:      Head: Normocephalic and atraumatic.      Right Ear: Tympanic membrane, ear canal and external ear normal.      Left Ear: Tympanic membrane, ear canal and external ear normal.      Nose: Nose normal.   Eyes:      Extraocular Movements: Extraocular movements intact.      Pupils: Pupils are equal, round, and reactive to light.   Neck:      Musculoskeletal: Normal range of motion and neck supple.   Cardiovascular:      Rate and Rhythm: Normal rate and regular rhythm.      Heart sounds: Normal heart sounds.   Pulmonary:      Effort: Pulmonary effort is normal.      Breath sounds: Normal breath sounds.   Abdominal:      General: Bowel sounds are normal.      Palpations: Abdomen is soft.   Musculoskeletal: Normal range of motion.   Skin:     General: Skin is warm and dry.   Neurological:      General: No focal deficit present.      Mental Status: He is alert and oriented to person, place, and time.   Psychiatric:         Mood and  Affect: Mood normal.         Behavior: Behavior normal.         Thought Content: Thought content normal.       PHQ-9 Total Score:      Assessment/Plan   Diagnoses and all orders for this visit:    1. Anxiety (Primary)  Comments:  continue buspar, call for worsening or any problems.     2. Need for Tdap vaccination  -     Tdap Vaccine Greater Than or Equal To 6yo IM    3. Status post CVA  Comments:  continue to follow up with neurology for ongoing dizziness or new symptoms        There are no Patient Instructions on file for this visit.

## 2020-11-05 ENCOUNTER — HOSPITAL ENCOUNTER (EMERGENCY)
Facility: HOSPITAL | Age: 50
Discharge: HOME | End: 2020-11-05
Attending: EMERGENCY MEDICINE | Admitting: EMERGENCY MEDICINE

## 2020-11-05 VITALS
BODY MASS INDEX: 24.84 KG/M2 | HEIGHT: 70 IN | SYSTOLIC BLOOD PRESSURE: 106 MMHG | RESPIRATION RATE: 16 BRPM | DIASTOLIC BLOOD PRESSURE: 72 MMHG | WEIGHT: 173.5 LBS | TEMPERATURE: 98.1 F | OXYGEN SATURATION: 99 % | HEART RATE: 76 BPM

## 2020-11-05 DIAGNOSIS — R55 SYNCOPE, UNSPECIFIED SYNCOPE TYPE: Primary | ICD-10-CM

## 2020-11-05 LAB
ALBUMIN SERPL-MCNC: 4.4 G/DL (ref 3.5–5.2)
ALBUMIN/GLOB SERPL: 2.4 G/DL
ALP SERPL-CCNC: 69 U/L (ref 39–117)
ALT SERPL W P-5'-P-CCNC: 14 U/L (ref 1–41)
ANION GAP SERPL CALCULATED.3IONS-SCNC: 10 MMOL/L (ref 5–15)
AST SERPL-CCNC: 11 U/L (ref 1–40)
BASOPHILS # BLD AUTO: 0 10*3/MM3 (ref 0–0.2)
BASOPHILS NFR BLD AUTO: 0.5 % (ref 0–1.5)
BILIRUB SERPL-MCNC: 0.2 MG/DL (ref 0–1.2)
BILIRUB UR QL STRIP: NEGATIVE
BUN SERPL-MCNC: 10 MG/DL (ref 6–20)
BUN/CREAT SERPL: 11.5 (ref 7–25)
CALCIUM SPEC-SCNC: 9.5 MG/DL (ref 8.6–10.5)
CHLORIDE SERPL-SCNC: 103 MMOL/L (ref 98–107)
CLARITY UR: CLEAR
CO2 SERPL-SCNC: 27 MMOL/L (ref 22–29)
COLOR UR: YELLOW
CREAT SERPL-MCNC: 0.87 MG/DL (ref 0.76–1.27)
DEPRECATED RDW RBC AUTO: 49 FL (ref 37–54)
EOSINOPHIL # BLD AUTO: 0.2 10*3/MM3 (ref 0–0.4)
EOSINOPHIL NFR BLD AUTO: 1.5 % (ref 0.3–6.2)
ERYTHROCYTE [DISTWIDTH] IN BLOOD BY AUTOMATED COUNT: 14.3 % (ref 12.3–15.4)
GFR SERPL CREATININE-BSD FRML MDRD: 94 ML/MIN/1.73
GLOBULIN UR ELPH-MCNC: 1.8 GM/DL
GLUCOSE SERPL-MCNC: 87 MG/DL (ref 65–99)
GLUCOSE UR STRIP-MCNC: NEGATIVE MG/DL
HCT VFR BLD AUTO: 50 % (ref 37.5–51)
HGB BLD-MCNC: 17 G/DL (ref 13–17.7)
HGB UR QL STRIP.AUTO: NEGATIVE
HOLD SPECIMEN: NORMAL
KETONES UR QL STRIP: ABNORMAL
LEUKOCYTE ESTERASE UR QL STRIP.AUTO: NEGATIVE
LYMPHOCYTES # BLD AUTO: 1.7 10*3/MM3 (ref 0.7–3.1)
LYMPHOCYTES NFR BLD AUTO: 16.7 % (ref 19.6–45.3)
MAGNESIUM SERPL-MCNC: 2.1 MG/DL (ref 1.6–2.6)
MCH RBC QN AUTO: 33.4 PG (ref 26.6–33)
MCHC RBC AUTO-ENTMCNC: 34 G/DL (ref 31.5–35.7)
MCV RBC AUTO: 98.2 FL (ref 79–97)
MONOCYTES # BLD AUTO: 0.7 10*3/MM3 (ref 0.1–0.9)
MONOCYTES NFR BLD AUTO: 7 % (ref 5–12)
NEUTROPHILS NFR BLD AUTO: 7.7 10*3/MM3 (ref 1.7–7)
NEUTROPHILS NFR BLD AUTO: 74.3 % (ref 42.7–76)
NITRITE UR QL STRIP: NEGATIVE
NRBC BLD AUTO-RTO: 0 /100 WBC (ref 0–0.2)
PH UR STRIP.AUTO: 6.5 [PH] (ref 5–8)
PLATELET # BLD AUTO: 269 10*3/MM3 (ref 140–450)
PMV BLD AUTO: 7.7 FL (ref 6–12)
POTASSIUM SERPL-SCNC: 4.3 MMOL/L (ref 3.5–5.2)
PROT SERPL-MCNC: 6.2 G/DL (ref 6–8.5)
PROT UR QL STRIP: NEGATIVE
RBC # BLD AUTO: 5.09 10*6/MM3 (ref 4.14–5.8)
SARS-COV-2 RNA PNL SPEC NAA+PROBE: NOT DETECTED
SODIUM SERPL-SCNC: 140 MMOL/L (ref 136–145)
SP GR UR STRIP: 1.01 (ref 1–1.03)
TROPONIN T SERPL-MCNC: <0.01 NG/ML (ref 0–0.03)
TSH SERPL DL<=0.05 MIU/L-ACNC: 1.46 UIU/ML (ref 0.27–4.2)
UROBILINOGEN UR QL STRIP: ABNORMAL
WBC # BLD AUTO: 10.4 10*3/MM3 (ref 3.4–10.8)
WHOLE BLOOD HOLD SPECIMEN: NORMAL

## 2020-11-05 PROCEDURE — 85025 COMPLETE CBC W/AUTO DIFF WBC: CPT | Performed by: EMERGENCY MEDICINE

## 2020-11-05 PROCEDURE — 84484 ASSAY OF TROPONIN QUANT: CPT | Performed by: EMERGENCY MEDICINE

## 2020-11-05 PROCEDURE — C9803 HOPD COVID-19 SPEC COLLECT: HCPCS

## 2020-11-05 PROCEDURE — 93005 ELECTROCARDIOGRAM TRACING: CPT | Performed by: EMERGENCY MEDICINE

## 2020-11-05 PROCEDURE — 83735 ASSAY OF MAGNESIUM: CPT | Performed by: EMERGENCY MEDICINE

## 2020-11-05 PROCEDURE — 87635 SARS-COV-2 COVID-19 AMP PRB: CPT | Performed by: EMERGENCY MEDICINE

## 2020-11-05 PROCEDURE — 99283 EMERGENCY DEPT VISIT LOW MDM: CPT

## 2020-11-05 PROCEDURE — 80053 COMPREHEN METABOLIC PANEL: CPT | Performed by: EMERGENCY MEDICINE

## 2020-11-05 PROCEDURE — 81003 URINALYSIS AUTO W/O SCOPE: CPT | Performed by: EMERGENCY MEDICINE

## 2020-11-05 PROCEDURE — 84443 ASSAY THYROID STIM HORMONE: CPT | Performed by: EMERGENCY MEDICINE

## 2020-11-05 RX ORDER — SODIUM CHLORIDE 0.9 % (FLUSH) 0.9 %
10 SYRINGE (ML) INJECTION AS NEEDED
Status: DISCONTINUED | OUTPATIENT
Start: 2020-11-05 | End: 2020-11-05 | Stop reason: HOSPADM

## 2020-11-05 RX ADMIN — SODIUM CHLORIDE 1000 ML: 0.9 INJECTION, SOLUTION INTRAVENOUS at 10:27

## 2020-11-05 NOTE — DISCHARGE INSTRUCTIONS
Return for recurrence of symptoms chest pain neck arm jaw pain shortness of breath fever vomiting increasing bleeding vomiting blood severe pain or any other new or worse problems or concerns.  Please follow-up with the GI doctors for endoscopy as directed within the next few weeks.

## 2020-11-05 NOTE — ED PROVIDER NOTES
Subjective   Complaint lightheaded passed out dark stool    History of present illness 50-year-old male who has had a previous stroke with TPA and intervention several months ago who states that over the last week he has been having some lightheaded spells he states that actually he passed out briefly on Monday there was no seizure activity no injury but he states that he feels somewhat lightheaded there is no dizzy spinning sensation no headache or vision change speech difficulty or paralysis.  No chest pain neck arm or jaw pain.  The patient states he had a little bit of abdominal cramping and has had some dark stool as well.  He denies any recent change in medications no foreign travels.  Normal appetite.  No one at home with similar illness no recent long car ride pain or immobilization or foreign travels.  Symptoms come and go nothing makes it better or worse moderate degree over the last week          Review of Systems   Constitutional: Negative for chills and fever.   HENT: Negative for congestion and sinus pressure.    Eyes: Negative for photophobia and visual disturbance.   Respiratory: Negative for chest tightness and shortness of breath.    Cardiovascular: Negative for chest pain and leg swelling.   Gastrointestinal: Negative for abdominal pain and vomiting.   Endocrine: Negative for cold intolerance and heat intolerance.   Genitourinary: Negative for difficulty urinating and dysuria.   Musculoskeletal: Positive for arthralgias. Negative for back pain.   Skin: Negative for color change and pallor.   Neurological: Positive for light-headedness. Negative for dizziness, facial asymmetry and speech difficulty.   Psychiatric/Behavioral: Negative for agitation and behavioral problems.       Past Medical History:   Diagnosis Date   • Hyperlipidemia    • Kidney stones    • Stroke (CMS/[facility])        Allergies   Allergen Reactions   • Cephalosporins Anaphylaxis     Throat swelling   • Dye  [Contrast Dye] Hives   •  Sulfa Antibiotics Hives       Past Surgical History:   Procedure Laterality Date   • KIDNEY STONE SURGERY         Family History   Problem Relation Age of Onset   • Arthritis Mother    • Hypertension Mother    • Cancer Father         abdomen   • Arthritis Father    • Hypertension Father        Social History     Socioeconomic History   • Marital status:      Spouse name: Not on file   • Number of children: Not on file   • Years of education: Not on file   • Highest education level: Not on file   Tobacco Use   • Smoking status: Current Some Day Smoker     Types: Cigars   • Smokeless tobacco: Never Used   Substance and Sexual Activity   • Alcohol use: Yes     Frequency: Monthly or less     Drinks per session: 5 or 6     Binge frequency: Monthly     Comment: weekends   • Drug use: Never   • Sexual activity: Yes     Partners: Female     Birth control/protection: None     Prior to Admission medications    Medication Sig Start Date End Date Taking? Authorizing Provider   aspirin 81 MG chewable tablet Chew 81 mg Daily.    Vinnie Jim MD   busPIRone (BUSPAR) 5 MG tablet Take 1 tablet by mouth 3 (Three) Times a Day for 30 days. 9/23/20 10/28/20  Gisele Bowers APRN   clopidogrel (PLAVIX) 75 MG tablet Take 1 tablet by mouth Daily for 90 days. 9/14/20 12/13/20  Gisele Bowers APRN   cyanocobalamin (VITAMIN B-12) 1000 MCG tablet Take 500 mcg by mouth Every Other Day. 5/31/20   Vinnie Jim MD   fluticasone (Flonase Allergy Relief) 50 MCG/ACT nasal spray 1 spray into the nostril(s) as directed by provider Daily. 6/1/20   Vinnie Jim MD           Objective   Physical Exam  50-year-old male awake alert no acute distress HEENT extraocular muscles intact pupils equal round reactive mouth is clear neck is supple no adenopathy no JVD no bruits no photophobia discs are sharp lungs clear no retractions heart regular without murmur abdomen soft nontender good bowel sounds no pulsatile masses or  bruits rectal exam no masses or tenderness brown stool heme-negative extremities pulses are equal throughout upper extremities no edema cords or Homans' sign or evidence of DVT.  Patient is awake alert orientated x4 no facial asymmetry normal speech no drift the arms or legs normal finger-to-nose there is no focal deficits.  Procedures           ED Course      Results for orders placed or performed during the hospital encounter of 11/05/20   COVID-19, ABBOTT IN-HOUSE,NP Swab (NO TRANSPORT MEDIA) 2 HR TAT - Swab, Nasopharynx    Specimen: Nasopharynx; Swab   Result Value Ref Range    COVID19 Not Detected Not Detected - Ref. Range   Comprehensive Metabolic Panel    Specimen: Blood   Result Value Ref Range    Glucose 87 65 - 99 mg/dL    BUN 10 6 - 20 mg/dL    Creatinine 0.87 0.76 - 1.27 mg/dL    Sodium 140 136 - 145 mmol/L    Potassium 4.3 3.5 - 5.2 mmol/L    Chloride 103 98 - 107 mmol/L    CO2 27.0 22.0 - 29.0 mmol/L    Calcium 9.5 8.6 - 10.5 mg/dL    Total Protein 6.2 6.0 - 8.5 g/dL    Albumin 4.40 3.50 - 5.20 g/dL    ALT (SGPT) 14 1 - 41 U/L    AST (SGOT) 11 1 - 40 U/L    Alkaline Phosphatase 69 39 - 117 U/L    Total Bilirubin 0.2 0.0 - 1.2 mg/dL    eGFR Non African Amer 94 >60 mL/min/1.73    Globulin 1.8 gm/dL    A/G Ratio 2.4 g/dL    BUN/Creatinine Ratio 11.5 7.0 - 25.0    Anion Gap 10.0 5.0 - 15.0 mmol/L   Urinalysis With Culture If Indicated - Urine, Clean Catch    Specimen: Urine, Clean Catch   Result Value Ref Range    Color, UA Yellow Yellow, Straw    Appearance, UA Clear Clear    pH, UA 6.5 5.0 - 8.0    Specific Gravity, UA 1.007 1.005 - 1.030    Glucose, UA Negative Negative    Ketones, UA Trace (A) Negative    Bilirubin, UA Negative Negative    Blood, UA Negative Negative    Protein, UA Negative Negative    Leuk Esterase, UA Negative Negative    Nitrite, UA Negative Negative    Urobilinogen, UA 0.2 E.U./dL 0.2 - 1.0 E.U./dL   Troponin    Specimen: Blood   Result Value Ref Range    Troponin T <0.010 0.000 -  0.030 ng/mL   Magnesium    Specimen: Blood   Result Value Ref Range    Magnesium 2.1 1.6 - 2.6 mg/dL   TSH    Specimen: Blood   Result Value Ref Range    TSH 1.460 0.270 - 4.200 uIU/mL   CBC Auto Differential    Specimen: Blood   Result Value Ref Range    WBC 10.40 3.40 - 10.80 10*3/mm3    RBC 5.09 4.14 - 5.80 10*6/mm3    Hemoglobin 17.0 13.0 - 17.7 g/dL    Hematocrit 50.0 37.5 - 51.0 %    MCV 98.2 (H) 79.0 - 97.0 fL    MCH 33.4 (H) 26.6 - 33.0 pg    MCHC 34.0 31.5 - 35.7 g/dL    RDW 14.3 12.3 - 15.4 %    RDW-SD 49.0 37.0 - 54.0 fl    MPV 7.7 6.0 - 12.0 fL    Platelets 269 140 - 450 10*3/mm3    Neutrophil % 74.3 42.7 - 76.0 %    Lymphocyte % 16.7 (L) 19.6 - 45.3 %    Monocyte % 7.0 5.0 - 12.0 %    Eosinophil % 1.5 0.3 - 6.2 %    Basophil % 0.5 0.0 - 1.5 %    Neutrophils, Absolute 7.70 (H) 1.70 - 7.00 10*3/mm3    Lymphocytes, Absolute 1.70 0.70 - 3.10 10*3/mm3    Monocytes, Absolute 0.70 0.10 - 0.90 10*3/mm3    Eosinophils, Absolute 0.20 0.00 - 0.40 10*3/mm3    Basophils, Absolute 0.00 0.00 - 0.20 10*3/mm3    nRBC 0.0 0.0 - 0.2 /100 WBC   ECG 12 Lead   Result Value Ref Range    QT Interval 411 ms   Light Blue Top   Result Value Ref Range    Extra Tube hold for add-on    Gold Top - SST   Result Value Ref Range    Extra Tube Hold for add-ons.      No radiology results for the last day  Medications   sodium chloride 0.9 % bolus 1,000 mL (0 mL Intravenous Stopped 11/5/20 1241)     EKG my interpretation normal sinus rhythm rate of 65 right bundle branch block QTC of 423 no change from previous EKG abnormal                                       MDM  Number of Diagnoses or Management Options  Syncope, unspecified syncope type:   Diagnosis management comments: Medical decision making.  Patient had IV established given a liter saline placed on the monitor and had the above exam evaluation.  EKG showed no acute findings COVID-19 was negative CBC electrolytes unremarkable troponin negative troponin was negative magnesium TSH  within normal limits.  The patient had a rectal exam here which showed brown stool and it was heme-negative at this point is been stable I see no evidence of an acute stroke no evidence of cardiac ischemia DVT or pulmonary embolism.  We talked about those findings.  We talked about what to return for and he will be discharged home for outpatient management and stressed importance of follow-up also stressed the importance to go get a colonoscopy and those referrals have been made for him in the past and he has the paperwork at home.  Verbal and written instructions provided       Amount and/or Complexity of Data Reviewed  Clinical lab tests: reviewed  Tests in the medicine section of CPT®: reviewed        Final diagnoses:   Syncope, unspecified syncope type            Geovany Browne MD  11/05/20 1537

## 2020-11-06 LAB — QT INTERVAL: 411 MS

## 2020-12-02 ENCOUNTER — OFFICE VISIT (OUTPATIENT)
Dept: FAMILY MEDICINE CLINIC | Facility: CLINIC | Age: 50
End: 2020-12-02

## 2020-12-02 VITALS
TEMPERATURE: 97.8 F | HEART RATE: 78 BPM | RESPIRATION RATE: 16 BRPM | WEIGHT: 174.2 LBS | OXYGEN SATURATION: 96 % | BODY MASS INDEX: 24.94 KG/M2 | HEIGHT: 70 IN | DIASTOLIC BLOOD PRESSURE: 78 MMHG | SYSTOLIC BLOOD PRESSURE: 117 MMHG

## 2020-12-02 DIAGNOSIS — R53.1 WEAKNESS: ICD-10-CM

## 2020-12-02 DIAGNOSIS — Z86.73 STATUS POST CVA: Primary | ICD-10-CM

## 2020-12-02 PROCEDURE — 99213 OFFICE O/P EST LOW 20 MIN: CPT | Performed by: NURSE PRACTITIONER

## 2020-12-02 NOTE — PROGRESS NOTES
"Subjective  Answers for HPI/ROS submitted by the patient on 12/2/2020   What is the primary reason for your visit?: Other  Please describe your symptoms.: Paperwork  Have you had these symptoms before?: No  How long have you been having these symptoms?: Greater than 2 weeks    Jourdan Lebron is a 50 y.o. male presents for   Chief Complaint   Patient presents with   • Numbness     in face and fingers   • Fatigue     not fasting        Health Maintenance Due   Topic Date Due   • COLONOSCOPY  1972   • Pneumococcal Vaccine 0-64 (1 of 1 - PPSV23) 04/21/1978       History of Present Illness   Pt present for follow up fatigue and numbness left side of face and fingers.  He states the facial numbness has been intermittent x 1-2 weeks and denies other associated symptoms.  He states he was seen in the ER 11/5/20 for lightheadedness, but no problems were found.  He has been in physical therapy for several weeks and states numbness in fingers has been intermittent for a while.  Pt also has seen a neurosurgeon due to degeneration and narrowing of his cervical spine, but does not want to have surgery at this time.   He has also seen oncology at [facility] due to an adrenal mass and was recommended to have it removed in February after follow up with neurology in January.  He has recently returned to work and is doing well with current job restrictions.  He brought [facility] paperwork today for continued job restrictions until his full strength returns.    Vitals:    12/02/20 0839 12/02/20 0842   BP: 117/77 117/78   BP Location: Left arm Right arm   Patient Position: Sitting Sitting   Cuff Size: Adult Adult   Pulse: 81 78   Resp: 16    Temp: 97.8 °F (36.6 °C)    TempSrc: Infrared    SpO2: 96%    Weight: 79 kg (174 lb 3.2 oz)    Height: 177.8 cm (70\")      Body mass index is 25 kg/m².    Current Outpatient Medications on File Prior to Visit   Medication Sig Dispense Refill   • aspirin 81 MG chewable tablet Chew 81 mg Daily.     • " clopidogrel (PLAVIX) 75 MG tablet Take 1 tablet by mouth Daily for 90 days. 90 tablet 1   • cyanocobalamin (VITAMIN B-12) 1000 MCG tablet Take 500 mcg by mouth Every Other Day.     • fluticasone (Flonase Allergy Relief) 50 MCG/ACT nasal spray 1 spray into the nostril(s) as directed by provider Daily.     • busPIRone (BUSPAR) 5 MG tablet Take 1 tablet by mouth 3 (Three) Times a Day for 30 days. 90 tablet 3     No current facility-administered medications on file prior to visit.        The following portions of the patient's history were reviewed and updated as appropriate: allergies, current medications, past family history, past medical history, past social history, past surgical history, and problem list.    Review of Systems   Constitutional: Negative for chills and fever.   HENT: Negative for sinus pressure and sore throat.    Eyes: Negative for blurred vision.   Respiratory: Negative for cough and shortness of breath.    Cardiovascular: Negative for chest pain.   Gastrointestinal: Negative for abdominal pain.   Endocrine: Negative.    Genitourinary: Negative.    Musculoskeletal: Negative for arthralgias and joint swelling.   Skin: Negative for color change.   Allergic/Immunologic: Negative.    Neurological: Positive for numbness. Negative for dizziness.   Hematological: Negative.    Psychiatric/Behavioral: Negative for behavioral problems.       Objective   Physical Exam  Vitals signs and nursing note reviewed.   Constitutional:       Appearance: Normal appearance. He is well-developed and normal weight.   HENT:      Head: Normocephalic and atraumatic.      Right Ear: Tympanic membrane, ear canal and external ear normal.      Left Ear: Tympanic membrane, ear canal and external ear normal.      Nose: Nose normal.   Eyes:      Extraocular Movements: Extraocular movements intact.      Conjunctiva/sclera: Conjunctivae normal.      Pupils: Pupils are equal, round, and reactive to light.   Neck:      Musculoskeletal:  Normal range of motion and neck supple.   Cardiovascular:      Rate and Rhythm: Normal rate and regular rhythm.      Heart sounds: Normal heart sounds.   Pulmonary:      Effort: Pulmonary effort is normal.      Breath sounds: Normal breath sounds.   Abdominal:      General: Bowel sounds are normal.      Palpations: Abdomen is soft.   Musculoskeletal: Normal range of motion.   Skin:     General: Skin is warm and dry.   Neurological:      General: No focal deficit present.      Mental Status: He is alert and oriented to person, place, and time.   Psychiatric:         Mood and Affect: Mood normal.         Behavior: Behavior normal.         Thought Content: Thought content normal.         Judgment: Judgment normal.       PHQ-9 Total Score:      Assessment/Plan   Diagnoses and all orders for this visit:    1. Status post CVA (Primary)  Comments:  follow up with neurology for ongoing numbness    2. Weakness  Comments:  continue physical therapy as indicated, call if problems returning to work under current 25 lb restriction.          There are no Patient Instructions on file for this visit.

## 2021-01-26 PROBLEM — N20.0 CALCULUS OF KIDNEY: Status: ACTIVE | Noted: 2021-01-26

## 2021-01-26 PROBLEM — J30.2 SEASONAL ALLERGIES: Status: ACTIVE | Noted: 2021-01-26

## 2021-01-26 PROBLEM — I63.9 CEREBROVASCULAR ACCIDENT (CVA): Status: ACTIVE | Noted: 2021-01-26

## 2021-01-26 PROBLEM — E27.8 ADRENAL MASS: Status: ACTIVE | Noted: 2021-01-26

## 2021-01-26 NOTE — PATIENT INSTRUCTIONS
Health Maintenance Due   Topic Date Due   • COLONOSCOPY  1970   • Pneumococcal Vaccine 0-64 (1 of 1 - PPSV23) 04/21/1978     Call Dr. Velez and see if wants to repeat MRI since ventral cord impinged and still weak LUE or is it ok to return to Pt.     Staff here calling Dr. Cedeño to get appointment for you.    Patient to call Dr. Lennon explain hasn't seen Lemmnel yet and does he want 24 hour urine.    Not to return to [facility] yest.    Call if would consider behavioral health.

## 2021-01-28 ENCOUNTER — OFFICE VISIT (OUTPATIENT)
Dept: FAMILY MEDICINE CLINIC | Facility: CLINIC | Age: 51
End: 2021-01-28

## 2021-01-28 VITALS
WEIGHT: 176.8 LBS | SYSTOLIC BLOOD PRESSURE: 117 MMHG | TEMPERATURE: 98 F | DIASTOLIC BLOOD PRESSURE: 80 MMHG | BODY MASS INDEX: 25.31 KG/M2 | HEIGHT: 70 IN | RESPIRATION RATE: 16 BRPM | HEART RATE: 70 BPM | OXYGEN SATURATION: 98 %

## 2021-01-28 DIAGNOSIS — F41.1 ANXIETY STATE: ICD-10-CM

## 2021-01-28 DIAGNOSIS — F17.200 TOBACCO DEPENDENCE SYNDROME: ICD-10-CM

## 2021-01-28 DIAGNOSIS — E27.8 ADRENAL MASS (HCC): ICD-10-CM

## 2021-01-28 DIAGNOSIS — E78.5 HYPERLIPIDEMIA, UNSPECIFIED HYPERLIPIDEMIA TYPE: ICD-10-CM

## 2021-01-28 DIAGNOSIS — R94.31 ABNORMAL ELECTROCARDIOGRAM: ICD-10-CM

## 2021-01-28 DIAGNOSIS — N20.0 CALCULUS OF KIDNEY: ICD-10-CM

## 2021-01-28 DIAGNOSIS — I63.9 CEREBROVASCULAR ACCIDENT (CVA), UNSPECIFIED MECHANISM (HCC): ICD-10-CM

## 2021-01-28 DIAGNOSIS — Z12.11 SCREENING FOR COLON CANCER: Primary | ICD-10-CM

## 2021-01-28 DIAGNOSIS — R59.9 ENLARGED LYMPH NODES: ICD-10-CM

## 2021-01-28 DIAGNOSIS — M48.02 CERVICAL STENOSIS OF SPINAL CANAL: ICD-10-CM

## 2021-01-28 DIAGNOSIS — Z13.1 SCREENING FOR DIABETES MELLITUS: ICD-10-CM

## 2021-01-28 DIAGNOSIS — M54.12 CERVICAL RADICULOPATHY: ICD-10-CM

## 2021-01-28 PROCEDURE — 80061 LIPID PANEL: CPT | Performed by: PREVENTIVE MEDICINE

## 2021-01-28 PROCEDURE — 83735 ASSAY OF MAGNESIUM: CPT | Performed by: PREVENTIVE MEDICINE

## 2021-01-28 PROCEDURE — 82607 VITAMIN B-12: CPT | Performed by: PREVENTIVE MEDICINE

## 2021-01-28 PROCEDURE — 85025 COMPLETE CBC W/AUTO DIFF WBC: CPT | Performed by: PREVENTIVE MEDICINE

## 2021-01-28 PROCEDURE — 84550 ASSAY OF BLOOD/URIC ACID: CPT | Performed by: PREVENTIVE MEDICINE

## 2021-01-28 PROCEDURE — 80053 COMPREHEN METABOLIC PANEL: CPT | Performed by: PREVENTIVE MEDICINE

## 2021-01-28 PROCEDURE — 90732 PPSV23 VACC 2 YRS+ SUBQ/IM: CPT | Performed by: PREVENTIVE MEDICINE

## 2021-01-28 PROCEDURE — 84443 ASSAY THYROID STIM HORMONE: CPT | Performed by: PREVENTIVE MEDICINE

## 2021-01-28 PROCEDURE — 90471 IMMUNIZATION ADMIN: CPT | Performed by: PREVENTIVE MEDICINE

## 2021-01-28 PROCEDURE — 99214 OFFICE O/P EST MOD 30 MIN: CPT | Performed by: PREVENTIVE MEDICINE

## 2021-01-28 RX ORDER — CLOPIDOGREL BISULFATE 75 MG/1
75 TABLET ORAL DAILY
COMMUNITY
Start: 2021-01-11 | End: 2021-03-16

## 2021-01-28 NOTE — PROGRESS NOTES
"Subjective   Jourdan Lebron is a 50 y.o. male presents for   Chief Complaint   Patient presents with   • Hyperlipidemia     fasting    • Anxiety     Patient presents for follow-up on CVA, cervical stenosis, cervical radiculopathy left, and left adrenal adenoma.  Patient states that he has been doing restricted duty but has been working outside of restrictions and we have advised him that he needs to stick with a 20 pound weight lifting push pull restriction until he is followed up by the neurosurgeon.  He is still having neck pain and still having symptoms of weakness into his left upper extremity.  Patient sustained a stroke in May 2020 and was to get follow-up with Dr. Sánchez from [facility] in January.  He has been unable to get an appointment with her and has been unable to follow-up with the neurosurgeon.  Patient will have to be off the Plavix before the left adrenal mass can be biopsied or removed.  Patient has lost 25 pounds and he is uncertain if this is due to the adrenal tumor multiple labs and scans have revealed no other cause for his weight loss.  Patient has resumed smoking and we have cautioned him to stop.  We have advised that he does follow-up with the oncologist at the [facility] as well and that presently we cannot explain his weight loss.  Health Maintenance Due   Topic Date Due   • COLONOSCOPY  1970       History of Present Illness     Vitals:    01/28/21 0807 01/28/21 0812   BP: 119/80 117/80   BP Location: Right arm Left arm   Patient Position: Sitting Sitting   Cuff Size: Adult Adult   Pulse: 71 70   Resp: 16    Temp: 98 °F (36.7 °C)    TempSrc: Infrared    SpO2: 98%    Weight: 80.2 kg (176 lb 12.8 oz)    Height: 177.8 cm (70\")      Body mass index is 25.37 kg/m².    Current Outpatient Medications on File Prior to Visit   Medication Sig Dispense Refill   • aspirin 81 MG chewable tablet Chew 81 mg Daily.     • busPIRone (BUSPAR) 5 MG tablet Take 1 tablet by mouth 3 (Three) Times a Day for 30 " days. (Patient taking differently: Take 5 mg by mouth 3 (Three) Times a Day. As needed) 90 tablet 3   • clopidogrel (PLAVIX) 75 MG tablet Take 75 mg by mouth Daily.     • cyanocobalamin (VITAMIN B-12) 1000 MCG tablet Take 500 mcg by mouth Daily.     • fluticasone (Flonase Allergy Relief) 50 MCG/ACT nasal spray 1 spray into the nostril(s) as directed by provider Daily.       No current facility-administered medications on file prior to visit.        The following portions of the patient's history were reviewed and updated as appropriate: allergies, current medications, past family history, past medical history, past social history, past surgical history and problem list.    Review of Systems   Constitutional: Positive for unexpected weight loss.   HENT: Negative.  Negative for sinus pressure and sore throat.    Eyes: Negative.    Respiratory: Negative.  Negative for cough.    Cardiovascular: Negative.    Gastrointestinal: Negative.    Endocrine: Negative.    Genitourinary: Negative.    Musculoskeletal: Positive for arthralgias, neck pain and neck stiffness.   Skin: Negative.    Allergic/Immunologic: Positive for environmental allergies.   Neurological: Negative.    Hematological: Negative.    Psychiatric/Behavioral: The patient is nervous/anxious.        Objective   Physical Exam  Vitals signs reviewed.   Constitutional:       General: He is not in acute distress.     Appearance: Normal appearance. He is well-developed. He is not ill-appearing or toxic-appearing.   HENT:      Head: Normocephalic and atraumatic.      Right Ear: Tympanic membrane, ear canal and external ear normal.      Left Ear: Tympanic membrane, ear canal and external ear normal.      Nose: Nose normal.   Eyes:      Extraocular Movements: Extraocular movements intact.      Conjunctiva/sclera: Conjunctivae normal.      Pupils: Pupils are equal, round, and reactive to light.   Neck:      Musculoskeletal: Neck supple. Muscular tenderness present.       Comments: Pain at EROM but Spurling's negative  Cardiovascular:      Rate and Rhythm: Normal rate and regular rhythm.      Heart sounds: Normal heart sounds.   Pulmonary:      Effort: Pulmonary effort is normal.      Breath sounds: Normal breath sounds.   Abdominal:      General: Bowel sounds are normal. There is no distension.      Palpations: Abdomen is soft. There is no mass.      Tenderness: There is no abdominal tenderness.   Musculoskeletal: Normal range of motion.   Lymphadenopathy:      Cervical: No cervical adenopathy.   Skin:     General: Skin is warm.   Neurological:      Mental Status: He is alert and oriented to person, place, and time.      Motor: Weakness present.      Comments: LUE weaknessslight   Psychiatric:         Mood and Affect: Mood normal.         Behavior: Behavior normal.       PHQ-9 Total Score: 0    Assessment/Plan   Diagnoses and all orders for this visit:    1. Screening for colon cancer (Primary)  -     Cologuard - Stool, Per Rectum; Future    2. Tobacco dependence syndrome  Comments:  Advise to stop.  Will call when ready to stop    3. Hyperlipidemia, unspecified hyperlipidemia type  Comments:  trying to eat last sat fats  Orders:  -     Lipid Panel    4. Enlarged lymph nodes  Comments:  Lymph nodes have resolved.  They were anterior cervical and are gone presently  Orders:  -     CBC Auto Differential    5. Cerebrovascular accident (CVA), unspecified mechanism (CMS/HCC)  Comments:  Needs 6 month F/U Dr. Shearer    6. Calculus of kidney  Comments:  No recent stone  Orders:  -     Uric Acid    7. Adrenal mass (CMS/HCC)  Comments:  Needs biopsy.  Patient has to be off Plavix prior to biopsy.  L involved.  Permission is to come from Dr. Bills who is unreachable.    8. Abnormal electrocardiogram  Comments:  Cardiac work-up after the stroke revealed no problems.    9. Screening for diabetes mellitus  -     Comprehensive Metabolic Panel    10. Anxiety state  Comments:  Will decide if wants  to see behavioral health.  No SI or HI  Orders:  -     TSH  -     Vitamin B12  -     Magnesium    11. Cervical radiculopathy  Comments:  Still left shoulder pain and left upper extremity weakness.  Advised to follow-up with Dr. Boothe neurosurgeon.  Patient is to set his appointment up.    12. Cervical stenosis of spinal canal  Comments:  Patient was advised to follow-up with Dr. Boothe neurosurgeon.  I will start PT once I get the okay from the neurosurgeon.    Other orders  -     Cancel: Ambulatory Referral For Screening Colonoscopy  -     Pneumococcal Polysaccharide Vaccine 23-Valent Greater Than or Equal To 1yo Subcutaneous / IM        Patient Instructions     Health Maintenance Due   Topic Date Due   • COLONOSCOPY  1972   • Pneumococcal Vaccine 0-64 (1 of 1 - PPSV23) 04/21/1978     Call Dr. Velez and see if wants to repeat MRI since ventral cord impinged and still weak LUE or is it ok to return to Pt.     Staff here calling Dr. Cedeño to get appointment for you.    Patient to call Dr. Lennon explain hasn't seen Lemmnel yet and does he want 24 hour urine.    Not to return to [facility] yest.    Call if would consider behavioral health.

## 2021-01-29 ENCOUNTER — TELEPHONE (OUTPATIENT)
Dept: FAMILY MEDICINE CLINIC | Facility: CLINIC | Age: 51
End: 2021-01-29

## 2021-01-29 LAB
ALBUMIN SERPL-MCNC: 4.4 G/DL (ref 3.5–5.2)
ALBUMIN/GLOB SERPL: 1.8 G/DL
ALP SERPL-CCNC: 68 U/L (ref 39–117)
ALT SERPL W P-5'-P-CCNC: 17 U/L (ref 1–41)
ANION GAP SERPL CALCULATED.3IONS-SCNC: 10.7 MMOL/L (ref 5–15)
AST SERPL-CCNC: 23 U/L (ref 1–40)
BASOPHILS # BLD AUTO: 0.05 10*3/MM3 (ref 0–0.2)
BASOPHILS NFR BLD AUTO: 0.7 % (ref 0–1.5)
BILIRUB SERPL-MCNC: 0.4 MG/DL (ref 0–1.2)
BUN SERPL-MCNC: 7 MG/DL (ref 6–20)
BUN/CREAT SERPL: 7.7 (ref 7–25)
CALCIUM SPEC-SCNC: 9.8 MG/DL (ref 8.6–10.5)
CHLORIDE SERPL-SCNC: 102 MMOL/L (ref 98–107)
CHOLEST SERPL-MCNC: 209 MG/DL (ref 0–200)
CO2 SERPL-SCNC: 25.3 MMOL/L (ref 22–29)
CREAT SERPL-MCNC: 0.91 MG/DL (ref 0.76–1.27)
DEPRECATED RDW RBC AUTO: 46.6 FL (ref 37–54)
EOSINOPHIL # BLD AUTO: 0.18 10*3/MM3 (ref 0–0.4)
EOSINOPHIL NFR BLD AUTO: 2.4 % (ref 0.3–6.2)
ERYTHROCYTE [DISTWIDTH] IN BLOOD BY AUTOMATED COUNT: 13.1 % (ref 12.3–15.4)
GFR SERPL CREATININE-BSD FRML MDRD: 89 ML/MIN/1.73
GLOBULIN UR ELPH-MCNC: 2.5 GM/DL
GLUCOSE SERPL-MCNC: 82 MG/DL (ref 65–99)
HCT VFR BLD AUTO: 49.1 % (ref 37.5–51)
HDLC SERPL-MCNC: 55 MG/DL (ref 40–60)
HGB BLD-MCNC: 17.2 G/DL (ref 13–17.7)
IMM GRANULOCYTES # BLD AUTO: 0.03 10*3/MM3 (ref 0–0.05)
IMM GRANULOCYTES NFR BLD AUTO: 0.4 % (ref 0–0.5)
LDLC SERPL CALC-MCNC: 134 MG/DL (ref 0–100)
LDLC/HDLC SERPL: 2.4 {RATIO}
LYMPHOCYTES # BLD AUTO: 2.57 10*3/MM3 (ref 0.7–3.1)
LYMPHOCYTES NFR BLD AUTO: 33.8 % (ref 19.6–45.3)
MAGNESIUM SERPL-MCNC: 2.4 MG/DL (ref 1.6–2.6)
MCH RBC QN AUTO: 33.7 PG (ref 26.6–33)
MCHC RBC AUTO-ENTMCNC: 35 G/DL (ref 31.5–35.7)
MCV RBC AUTO: 96.3 FL (ref 79–97)
MONOCYTES # BLD AUTO: 0.59 10*3/MM3 (ref 0.1–0.9)
MONOCYTES NFR BLD AUTO: 7.8 % (ref 5–12)
NEUTROPHILS NFR BLD AUTO: 4.18 10*3/MM3 (ref 1.7–7)
NEUTROPHILS NFR BLD AUTO: 54.9 % (ref 42.7–76)
NRBC BLD AUTO-RTO: 0 /100 WBC (ref 0–0.2)
PLATELET # BLD AUTO: 303 10*3/MM3 (ref 140–450)
PMV BLD AUTO: 10.1 FL (ref 6–12)
POTASSIUM SERPL-SCNC: 4.3 MMOL/L (ref 3.5–5.2)
PROT SERPL-MCNC: 6.9 G/DL (ref 6–8.5)
RBC # BLD AUTO: 5.1 10*6/MM3 (ref 4.14–5.8)
SODIUM SERPL-SCNC: 138 MMOL/L (ref 136–145)
TRIGL SERPL-MCNC: 110 MG/DL (ref 0–150)
TSH SERPL DL<=0.05 MIU/L-ACNC: 1.42 UIU/ML (ref 0.27–4.2)
URATE SERPL-MCNC: 5.3 MG/DL (ref 3.4–7)
VIT B12 BLD-MCNC: 1387 PG/ML (ref 211–946)
VLDLC SERPL-MCNC: 20 MG/DL (ref 5–40)
WBC # BLD AUTO: 7.6 10*3/MM3 (ref 3.4–10.8)

## 2021-01-29 NOTE — TELEPHONE ENCOUNTER
HUB TO READ     MY CHART MESSAGE    Total and bad cholesterol are elevated and with history of CVA should consider statin-where should we sent?  Also watch sat fats and increase walking.  B12 is elevated so can cut otc dose a day or two/week

## 2021-01-29 NOTE — PROGRESS NOTES
Total and bad cholesterol are elevated and with history of CVA should consider statin-where should we sent?  Also watch sat fats and increase walking.  B12 is elevated so can cut otc dose a day or two/week

## 2021-02-01 ENCOUNTER — TELEPHONE (OUTPATIENT)
Dept: FAMILY MEDICINE CLINIC | Facility: CLINIC | Age: 51
End: 2021-02-01

## 2021-02-01 NOTE — TELEPHONE ENCOUNTER
Please call Dr. Cedeño at [facility] and get recheck appointment asap as he was to see her in january and needs to know if still needs Plavix and if can come off for short while for adrenal biopsy    Dr. Cedeño office called back. He is to stop Plavix 4 days prior to procedure and take a baby aspirin instead., then resume plavix and dc baby aspirin 1 day after. pt was notified.

## 2021-02-22 ENCOUNTER — TRANSITIONAL CARE MANAGEMENT TELEPHONE ENCOUNTER (OUTPATIENT)
Dept: FAMILY MEDICINE CLINIC | Facility: CLINIC | Age: 51
End: 2021-02-22

## 2021-02-26 ENCOUNTER — OFFICE VISIT (OUTPATIENT)
Dept: FAMILY MEDICINE CLINIC | Facility: CLINIC | Age: 51
End: 2021-02-26

## 2021-02-26 ENCOUNTER — TELEPHONE (OUTPATIENT)
Dept: FAMILY MEDICINE CLINIC | Facility: CLINIC | Age: 51
End: 2021-02-26

## 2021-02-26 VITALS
SYSTOLIC BLOOD PRESSURE: 111 MMHG | HEART RATE: 87 BPM | TEMPERATURE: 97.3 F | WEIGHT: 167 LBS | HEIGHT: 70 IN | BODY MASS INDEX: 23.91 KG/M2 | OXYGEN SATURATION: 96 % | DIASTOLIC BLOOD PRESSURE: 76 MMHG

## 2021-02-26 DIAGNOSIS — E78.5 HYPERLIPIDEMIA, UNSPECIFIED HYPERLIPIDEMIA TYPE: ICD-10-CM

## 2021-02-26 DIAGNOSIS — E27.8 ADRENAL MASS (HCC): Primary | ICD-10-CM

## 2021-02-26 DIAGNOSIS — M48.02 CERVICAL STENOSIS OF SPINAL CANAL: ICD-10-CM

## 2021-02-26 DIAGNOSIS — I63.9 CEREBROVASCULAR ACCIDENT (CVA), UNSPECIFIED MECHANISM (HCC): ICD-10-CM

## 2021-02-26 PROCEDURE — 99214 OFFICE O/P EST MOD 30 MIN: CPT | Performed by: PREVENTIVE MEDICINE

## 2021-02-26 RX ORDER — OXYCODONE HYDROCHLORIDE 5 MG/1
5 TABLET ORAL EVERY 6 HOURS PRN
COMMUNITY
Start: 2021-02-21 | End: 2021-03-10

## 2021-02-26 RX ORDER — GABAPENTIN 300 MG/1
300 CAPSULE ORAL EVERY 8 HOURS
COMMUNITY
Start: 2021-02-21 | End: 2021-03-10

## 2021-02-26 NOTE — PROGRESS NOTES
"Subjective   oJurdan Lebron is a 50 y.o. male presents for   Chief Complaint   Patient presents with   • Transitional Care Management     adrenal mass left       Health Maintenance Due   Topic Date Due   • COLONOSCOPY  Never done       50-year-old white male presents with his wife for follow-up of removal of the left adrenal mass at [facility].  Patient just found out that the mass was not malignant.  We will follow up with the oncologist on March 1.  Patient had a video visit with the neurosurgeons and additional x-ray imaging is being obtained.  Patient is unable to tolerate statins and would like us to see if we can get Repatha approved.  Patient is unable to contact Dr. Sandoval for follow-up.  Patient would like to be removed from Plavix and placed on if necessary a different blood thinner due to the fact that he feels as though this causes him brain fog.  We will try to contact Dr. Funez's office to see if she will be following her we need to have him follow-up with a different neurologist.  No fever or chills.  Patient is urinating and defecating okay eating well does seem to have some swelling on the left flank.  Patient is to follow-up with operating surgeon.  Finally-patient's Cholesterol is still not controlled-we talked with patient and wife for 5 minutes about injectable meds to lower cholesterol and they agreed so Repatha was ordered-insurance rejected but did approve Praulent-order placed       Vitals:    02/26/21 1249 02/26/21 1254   BP: 101/68 111/76   BP Location: Left arm Right arm   Patient Position: Sitting Sitting   Cuff Size: Adult Adult   Pulse: 87    Temp: 97.3 °F (36.3 °C)    TempSrc: Temporal    SpO2: 96%    Weight: 75.8 kg (167 lb)    Height: 177.8 cm (70\")      Body mass index is 23.96 kg/m².    Current Outpatient Medications on File Prior to Visit   Medication Sig Dispense Refill   • aspirin 81 MG chewable tablet Chew 81 mg Daily.     • fluticasone (Flonase Allergy Relief) 50 MCG/ACT nasal " spray 1 spray into the nostril(s) as directed by provider Daily.       No current facility-administered medications on file prior to visit.       The following portions of the patient's history were reviewed and updated as appropriate: allergies, current medications, past family history, past medical history, past social history, past surgical history and problem list.    Review of Systems   Genitourinary: Positive for flank pain.   Musculoskeletal: Positive for neck pain.   Neurological: Positive for memory problem.       Objective   Physical Exam  Pulmonary:      Effort: Pulmonary effort is normal.      Comments: Sounds decreased bilaterally  Abdominal:      General: Abdomen is flat. There is no distension.      Palpations: Abdomen is soft. There is no mass.      Tenderness: There is abdominal tenderness. There is no left CVA tenderness, guarding or rebound.   Skin:     Comments: Lap portals are healing without any obvious signs of infection there was some slight fullness in the left flank no mass or tenderness was palpated.       PHQ-9 Total Score:      Assessment/Plan   Diagnoses and all orders for this visit:    1. Adrenal mass (CMS/HCC) (Primary)  Comments:  Mass was removed from the left side and they just got worried that it was not felt to be malignant.    2. Cerebrovascular accident (CVA), unspecified mechanism (CMS/HCC)  Comments:  Patient feels as though the Plavix is causing him brain fog.  It does seem better when he was off the medicine and returned when he restarted neurology.    3. Cervical stenosis of spinal canal  Comments:  Video visit with the nurse practitioner for the neurosurgeon additional x-rays ordered.    4. Hyperlipidemia, unspecified hyperlipidemia type  Comments:  Patient is unable to tolerate statins.  We will see if insurance will approve Repatha.  Claudette was approved and prescribed    Other orders  -     Discontinue: Evolocumab (REPATHA) solution auto-injector SureClick injection;  "Inject 1 mL under the skin into the appropriate area as directed Every 14 (Fourteen) Days.  Dispense: 1 mL; Refill: 12  -     Discontinue: Evolocumab (REPATHA) solution prefilled syringe injection; Inject 1 mL under the skin into the appropriate area as directed Every 14 (Fourteen) Days.  Dispense: 1 mL; Refill: 12  -     Discontinue: Alirocumab 75 MG/ML solution prefilled syringe; Inject 75 mg under the skin into the appropriate area as directed Every 14 (Fourteen) Days.  Dispense: 2.24 mL; Refill: 6        Patient Instructions     Health Maintenance Due   Topic Date Due   • COLONOSCOPY  1970     Staff to callDr. Cedeño's office for guidance on Blood thinner and followup.  Does she want to see within next month or does she have suggestion for F/U re: blood thinner-which seems to cause \"brain Fog\" with patient.    Patient to call back one week re:blood thinner if we haave not called         "

## 2021-02-26 NOTE — TELEPHONE ENCOUNTER
Please call Dr. Cedeño's office and see if she will see him in followup or should i refer to another neurologist to handle problems he is having with brain fog caused by anticoagulation med. Would she consider switching to another and how long would she treat, since we're not sure what caused his stroke      I CALLED DR. HERNADEZ OFFICE -[phone]. THE MEDICAL ASSISTANT IS OUT TODAY. I SPOKE TO ANA. SHE WILL GET WITH DR. HERNADEZ AND CALL US BACK.

## 2021-02-26 NOTE — PATIENT INSTRUCTIONS
"Health Maintenance Due   Topic Date Due   • COLONOSCOPY  1970     Staff to callDr. Cedeño's office for guidance on Blood thinner and followup.  Does she want to see within next month or does she have suggestion for F/U re: blood thinner-which seems to cause \"brain Fog\" with patient.    Patient to call back one week re:blood thinner if we haave not called  "

## 2021-03-01 NOTE — TELEPHONE ENCOUNTER
[facility] was where we sent the Repatha and they sent a letter stating that they dont carry it and try the retail pharmacy.

## 2021-03-04 ENCOUNTER — TELEPHONE (OUTPATIENT)
Dept: FAMILY MEDICINE CLINIC | Facility: CLINIC | Age: 51
End: 2021-03-04

## 2021-03-04 NOTE — TELEPHONE ENCOUNTER
Patient has felt bad last two days so he didn't take plavix today and he feels good today.   Dr Bradford office called him and tried to do a telehealth the same day however he had another DRS appt that day and was able. They have not called him back to reschedule and address's the plavix. I called and left a message at Dr Bradford office.    I spoke to Roopa at Dr Bradford office. 3/5/21. They will contact pt today.    YES please call in the Plaluent

## 2021-03-06 ENCOUNTER — APPOINTMENT (OUTPATIENT)
Dept: CT IMAGING | Facility: HOSPITAL | Age: 51
End: 2021-03-06

## 2021-03-06 ENCOUNTER — HOSPITAL ENCOUNTER (EMERGENCY)
Facility: HOSPITAL | Age: 51
Discharge: HOME | End: 2021-03-06
Attending: EMERGENCY MEDICINE | Admitting: EMERGENCY MEDICINE

## 2021-03-06 VITALS
WEIGHT: 164.02 LBS | SYSTOLIC BLOOD PRESSURE: 112 MMHG | OXYGEN SATURATION: 99 % | DIASTOLIC BLOOD PRESSURE: 71 MMHG | RESPIRATION RATE: 16 BRPM | TEMPERATURE: 98 F | HEART RATE: 69 BPM | HEIGHT: 71 IN | BODY MASS INDEX: 22.96 KG/M2

## 2021-03-06 DIAGNOSIS — R53.1 WEAKNESS: ICD-10-CM

## 2021-03-06 DIAGNOSIS — R42 DIZZINESS: Primary | ICD-10-CM

## 2021-03-06 LAB
ANION GAP SERPL CALCULATED.3IONS-SCNC: 13 MMOL/L (ref 5–15)
BASOPHILS # BLD AUTO: 0.1 10*3/MM3 (ref 0–0.2)
BASOPHILS NFR BLD AUTO: 1.3 % (ref 0–1.5)
BUN SERPL-MCNC: 10 MG/DL (ref 6–20)
BUN/CREAT SERPL: 11.6 (ref 7–25)
CALCIUM SPEC-SCNC: 9.5 MG/DL (ref 8.6–10.5)
CHLORIDE SERPL-SCNC: 101 MMOL/L (ref 98–107)
CO2 SERPL-SCNC: 24 MMOL/L (ref 22–29)
CREAT SERPL-MCNC: 0.86 MG/DL (ref 0.76–1.27)
DEPRECATED RDW RBC AUTO: 46.4 FL (ref 37–54)
EOSINOPHIL # BLD AUTO: 0.4 10*3/MM3 (ref 0–0.4)
EOSINOPHIL NFR BLD AUTO: 3.5 % (ref 0.3–6.2)
ERYTHROCYTE [DISTWIDTH] IN BLOOD BY AUTOMATED COUNT: 13.7 % (ref 12.3–15.4)
GFR SERPL CREATININE-BSD FRML MDRD: 95 ML/MIN/1.73
GLUCOSE SERPL-MCNC: 89 MG/DL (ref 65–99)
HCT VFR BLD AUTO: 47.8 % (ref 37.5–51)
HGB BLD-MCNC: 17 G/DL (ref 13–17.7)
HOLD SPECIMEN: NORMAL
LYMPHOCYTES # BLD AUTO: 2.7 10*3/MM3 (ref 0.7–3.1)
LYMPHOCYTES NFR BLD AUTO: 24.5 % (ref 19.6–45.3)
MCH RBC QN AUTO: 34.3 PG (ref 26.6–33)
MCHC RBC AUTO-ENTMCNC: 35.5 G/DL (ref 31.5–35.7)
MCV RBC AUTO: 96.6 FL (ref 79–97)
MONOCYTES # BLD AUTO: 0.6 10*3/MM3 (ref 0.1–0.9)
MONOCYTES NFR BLD AUTO: 5.2 % (ref 5–12)
NEUTROPHILS NFR BLD AUTO: 65.5 % (ref 42.7–76)
NEUTROPHILS NFR BLD AUTO: 7.1 10*3/MM3 (ref 1.7–7)
NRBC BLD AUTO-RTO: 0.1 /100 WBC (ref 0–0.2)
PLATELET # BLD AUTO: 461 10*3/MM3 (ref 140–450)
PMV BLD AUTO: 6.9 FL (ref 6–12)
POTASSIUM SERPL-SCNC: 4.3 MMOL/L (ref 3.5–5.2)
RBC # BLD AUTO: 4.95 10*6/MM3 (ref 4.14–5.8)
SODIUM SERPL-SCNC: 138 MMOL/L (ref 136–145)
WBC # BLD AUTO: 10.9 10*3/MM3 (ref 3.4–10.8)

## 2021-03-06 PROCEDURE — 93005 ELECTROCARDIOGRAM TRACING: CPT | Performed by: EMERGENCY MEDICINE

## 2021-03-06 PROCEDURE — 80048 BASIC METABOLIC PNL TOTAL CA: CPT | Performed by: EMERGENCY MEDICINE

## 2021-03-06 PROCEDURE — 70450 CT HEAD/BRAIN W/O DYE: CPT

## 2021-03-06 PROCEDURE — 99283 EMERGENCY DEPT VISIT LOW MDM: CPT

## 2021-03-06 PROCEDURE — 85025 COMPLETE CBC W/AUTO DIFF WBC: CPT | Performed by: EMERGENCY MEDICINE

## 2021-03-06 RX ORDER — MECLIZINE HYDROCHLORIDE 25 MG/1
25 TABLET ORAL 3 TIMES DAILY PRN
Qty: 21 TABLET | Refills: 0 | Status: SHIPPED | OUTPATIENT
Start: 2021-03-06 | End: 2021-03-16

## 2021-03-06 RX ORDER — SODIUM CHLORIDE 0.9 % (FLUSH) 0.9 %
10 SYRINGE (ML) INJECTION AS NEEDED
Status: DISCONTINUED | OUTPATIENT
Start: 2021-03-06 | End: 2021-03-06 | Stop reason: HOSPADM

## 2021-03-06 NOTE — ED PROVIDER NOTES
Subjective   Is a 50-year-old male complaining of 1 week history of weakness and dizziness.  He states an adrenalectomy approximately 2 weeks ago and was doing well till 1 week ago.  He denies cough fever vomit diarrhea or other associated complaints.  The weakness and dizziness are persistent and mild to moderate in intensity.          Review of Systems  Negative for headache earache throat cough fever chest pain shortness of breath abdominal pain vomiting diarrhea dysuria is weight loss or other complaint.  Past Medical History:   Diagnosis Date   • Hyperlipidemia    • Kidney stone    • Stroke (CMS/HCC)        Allergies   Allergen Reactions   • Cephalosporins Anaphylaxis     Throat swelling   • Dye  [Contrast Dye] Hives   • Sulfa Antibiotics Hives   • Statins Myalgia     Myalgia and fatique       Past Surgical History:   Procedure Laterality Date   • KIDNEY STONE SURGERY         Family History   Problem Relation Age of Onset   • Arthritis Mother    • Hypertension Mother    • Cancer Father         abdomen   • Arthritis Father    • Hypertension Father        Social History     Socioeconomic History   • Marital status:      Spouse name: Not on file   • Number of children: Not on file   • Years of education: Not on file   • Highest education level: Not on file   Tobacco Use   • Smoking status: Current Some Day Smoker     Types: Cigars   • Smokeless tobacco: Never Used   Substance and Sexual Activity   • Alcohol use: Yes     Comment: weekends   • Drug use: Never   • Sexual activity: Yes     Partners: Female     Birth control/protection: None           Objective   Physical Exam  Neurologic exam is nonfocal.  HEENT exam shows TMs to be clear.  Oropharynx clear and moist but sclera nonicteric.  Neck has no adenopathy JVD or bruits.  Lungs are clear.  Heart has regular rate rhythm without murmur rub or gallop.  Chest is nontender.  Abdomen is soft nontender.  Extremity exam is no cyanosis or edema.  Procedures      My EKG interpretation shows normal sinus rhythm with right bundle branch block unchanged from previous tracing.      ED Course      Results for orders placed or performed during the hospital encounter of 03/06/21   Basic Metabolic Panel    Specimen: Blood   Result Value Ref Range    Glucose 89 65 - 99 mg/dL    BUN 10 6 - 20 mg/dL    Creatinine 0.86 0.76 - 1.27 mg/dL    Sodium 138 136 - 145 mmol/L    Potassium 4.3 3.5 - 5.2 mmol/L    Chloride 101 98 - 107 mmol/L    CO2 24.0 22.0 - 29.0 mmol/L    Calcium 9.5 8.6 - 10.5 mg/dL    eGFR Non African Amer 95 >60 mL/min/1.73    BUN/Creatinine Ratio 11.6 7.0 - 25.0    Anion Gap 13.0 5.0 - 15.0 mmol/L   CBC Auto Differential    Specimen: Blood   Result Value Ref Range    WBC 10.90 (H) 3.40 - 10.80 10*3/mm3    RBC 4.95 4.14 - 5.80 10*6/mm3    Hemoglobin 17.0 13.0 - 17.7 g/dL    Hematocrit 47.8 37.5 - 51.0 %    MCV 96.6 79.0 - 97.0 fL    MCH 34.3 (H) 26.6 - 33.0 pg    MCHC 35.5 31.5 - 35.7 g/dL    RDW 13.7 12.3 - 15.4 %    RDW-SD 46.4 37.0 - 54.0 fl    MPV 6.9 6.0 - 12.0 fL    Platelets 461 (H) 140 - 450 10*3/mm3    Neutrophil % 65.5 42.7 - 76.0 %    Lymphocyte % 24.5 19.6 - 45.3 %    Monocyte % 5.2 5.0 - 12.0 %    Eosinophil % 3.5 0.3 - 6.2 %    Basophil % 1.3 0.0 - 1.5 %    Neutrophils, Absolute 7.10 (H) 1.70 - 7.00 10*3/mm3    Lymphocytes, Absolute 2.70 0.70 - 3.10 10*3/mm3    Monocytes, Absolute 0.60 0.10 - 0.90 10*3/mm3    Eosinophils, Absolute 0.40 0.00 - 0.40 10*3/mm3    Basophils, Absolute 0.10 0.00 - 0.20 10*3/mm3    nRBC 0.1 0.0 - 0.2 /100 WBC   ECG 12 Lead   Result Value Ref Range    QT Interval 432 ms   Gold Top - SST   Result Value Ref Range    Extra Tube Hold for add-ons.      CT Head Without Contrast    Result Date: 3/6/2021  No acute intracranial abnormality.  Electronically Signed By-Arsalan Sullivan MD On:3/6/2021 1:35 PM This report was finalized on [id number] by  Arsalan Sullivan MD.                                         MDM  Number of Diagnoses or  Management Options  Diagnosis management comments: She has a benign physical exam.  There are no focal neurologic deficits.  There is no evidence of metabolic abnormality.  There is no intracranial abnormality as well.  Patient will be discharged with a prescription for Antivert.  He will follow with MD for further outpatient evaluation as needed.       Amount and/or Complexity of Data Reviewed  Clinical lab tests: reviewed  Tests in the radiology section of CPT®: reviewed  Tests in the medicine section of CPT®: reviewed    Risk of Complications, Morbidity, and/or Mortality  Presenting problems: high  Diagnostic procedures: high  Management options: high    Patient Progress  Patient progress: stable      Final diagnoses:   Dizziness   Weakness            Stanislav Gonzalez MD  03/06/21 9543

## 2021-03-07 ENCOUNTER — TELEPHONE (OUTPATIENT)
Dept: FAMILY MEDICINE CLINIC | Facility: CLINIC | Age: 51
End: 2021-03-07

## 2021-03-07 LAB — QT INTERVAL: 432 MS

## 2021-03-07 NOTE — TELEPHONE ENCOUNTER
Reviewed ER notes and advise that we recheck next week.  Any fever or dysuria?  Labs looked ok but WBC was elevated as were platelets.

## 2021-03-09 ENCOUNTER — TELEPHONE (OUTPATIENT)
Dept: FAMILY MEDICINE CLINIC | Facility: CLINIC | Age: 51
End: 2021-03-09

## 2021-03-10 ENCOUNTER — OFFICE VISIT (OUTPATIENT)
Dept: FAMILY MEDICINE CLINIC | Facility: CLINIC | Age: 51
End: 2021-03-10

## 2021-03-10 ENCOUNTER — TELEPHONE (OUTPATIENT)
Dept: FAMILY MEDICINE CLINIC | Facility: CLINIC | Age: 51
End: 2021-03-10

## 2021-03-10 VITALS
SYSTOLIC BLOOD PRESSURE: 109 MMHG | DIASTOLIC BLOOD PRESSURE: 76 MMHG | TEMPERATURE: 97.8 F | HEIGHT: 71 IN | BODY MASS INDEX: 22.48 KG/M2 | OXYGEN SATURATION: 95 % | HEART RATE: 102 BPM | WEIGHT: 160.6 LBS

## 2021-03-10 DIAGNOSIS — R74.01 ELEVATED TRANSAMINASE LEVEL: ICD-10-CM

## 2021-03-10 DIAGNOSIS — D35.02 BENIGN NEOPLASM OF CORTEX OF LEFT ADRENAL GLAND: ICD-10-CM

## 2021-03-10 DIAGNOSIS — R53.83 FATIGUE, UNSPECIFIED TYPE: Primary | ICD-10-CM

## 2021-03-10 DIAGNOSIS — R19.5 DARK STOOLS: ICD-10-CM

## 2021-03-10 DIAGNOSIS — R43.0 LOSS OF PERCEPTION FOR SMELL: ICD-10-CM

## 2021-03-10 DIAGNOSIS — I63.412 CEREBROVASCULAR ACCIDENT (CVA) DUE TO EMBOLISM OF LEFT MIDDLE CEREBRAL ARTERY (HCC): ICD-10-CM

## 2021-03-10 DIAGNOSIS — R63.4 WEIGHT LOSS: ICD-10-CM

## 2021-03-10 LAB
BASOPHILS # BLD AUTO: 0.08 10*3/MM3 (ref 0–0.2)
BASOPHILS NFR BLD AUTO: 0.7 % (ref 0–1.5)
BILIRUB BLD-MCNC: NEGATIVE MG/DL
CLARITY, POC: ABNORMAL
COLOR UR: YELLOW
CRP SERPL-MCNC: <0.3 MG/DL (ref 0–0.5)
DEPRECATED RDW RBC AUTO: 44.9 FL (ref 37–54)
EOSINOPHIL # BLD AUTO: 0.43 10*3/MM3 (ref 0–0.4)
EOSINOPHIL NFR BLD AUTO: 3.9 % (ref 0.3–6.2)
ERYTHROCYTE [DISTWIDTH] IN BLOOD BY AUTOMATED COUNT: 12.6 % (ref 12.3–15.4)
ERYTHROCYTE [SEDIMENTATION RATE] IN BLOOD: 35 MM/HR (ref 0–15)
GLUCOSE UR STRIP-MCNC: NEGATIVE MG/DL
HCT VFR BLD AUTO: 49.5 % (ref 37.5–51)
HGB BLD-MCNC: 16.9 G/DL (ref 13–17.7)
IMM GRANULOCYTES # BLD AUTO: 0.05 10*3/MM3 (ref 0–0.05)
IMM GRANULOCYTES NFR BLD AUTO: 0.4 % (ref 0–0.5)
KETONES UR QL: NEGATIVE
LEUKOCYTE EST, POC: NEGATIVE
LYMPHOCYTES # BLD AUTO: 3.11 10*3/MM3 (ref 0.7–3.1)
LYMPHOCYTES NFR BLD AUTO: 27.9 % (ref 19.6–45.3)
MCH RBC QN AUTO: 33.3 PG (ref 26.6–33)
MCHC RBC AUTO-ENTMCNC: 34.1 G/DL (ref 31.5–35.7)
MCV RBC AUTO: 97.6 FL (ref 79–97)
MONOCYTES # BLD AUTO: 0.77 10*3/MM3 (ref 0.1–0.9)
MONOCYTES NFR BLD AUTO: 6.9 % (ref 5–12)
NEUTROPHILS NFR BLD AUTO: 6.72 10*3/MM3 (ref 1.7–7)
NEUTROPHILS NFR BLD AUTO: 60.2 % (ref 42.7–76)
NITRITE UR-MCNC: NEGATIVE MG/ML
NRBC BLD AUTO-RTO: 0 /100 WBC (ref 0–0.2)
PH UR: 6 [PH] (ref 5–8)
PLATELET # BLD AUTO: 472 10*3/MM3 (ref 140–450)
PMV BLD AUTO: 9.4 FL (ref 6–12)
PROT UR STRIP-MCNC: ABNORMAL MG/DL
RBC # BLD AUTO: 5.07 10*6/MM3 (ref 4.14–5.8)
RBC # UR STRIP: NEGATIVE /UL
SP GR UR: 1.03 (ref 1–1.03)
TSH SERPL DL<=0.05 MIU/L-ACNC: 1.27 UIU/ML (ref 0.27–4.2)
UROBILINOGEN UR QL: NORMAL
VIT B12 BLD-MCNC: 1583 PG/ML (ref 211–946)
WBC # BLD AUTO: 11.16 10*3/MM3 (ref 3.4–10.8)

## 2021-03-10 PROCEDURE — U0003 INFECTIOUS AGENT DETECTION BY NUCLEIC ACID (DNA OR RNA); SEVERE ACUTE RESPIRATORY SYNDROME CORONAVIRUS 2 (SARS-COV-2) (CORONAVIRUS DISEASE [COVID-19]), AMPLIFIED PROBE TECHNIQUE, MAKING USE OF HIGH THROUGHPUT TECHNOLOGIES AS DESCRIBED BY CMS-2020-01-R: HCPCS | Performed by: PREVENTIVE MEDICINE

## 2021-03-10 PROCEDURE — 99214 OFFICE O/P EST MOD 30 MIN: CPT | Performed by: PREVENTIVE MEDICINE

## 2021-03-10 PROCEDURE — 86140 C-REACTIVE PROTEIN: CPT | Performed by: PREVENTIVE MEDICINE

## 2021-03-10 PROCEDURE — 86308 HETEROPHILE ANTIBODY SCREEN: CPT | Performed by: PREVENTIVE MEDICINE

## 2021-03-10 PROCEDURE — 81003 URINALYSIS AUTO W/O SCOPE: CPT | Performed by: PREVENTIVE MEDICINE

## 2021-03-10 PROCEDURE — 84443 ASSAY THYROID STIM HORMONE: CPT | Performed by: PREVENTIVE MEDICINE

## 2021-03-10 PROCEDURE — 85652 RBC SED RATE AUTOMATED: CPT | Performed by: PREVENTIVE MEDICINE

## 2021-03-10 PROCEDURE — 85025 COMPLETE CBC W/AUTO DIFF WBC: CPT | Performed by: PREVENTIVE MEDICINE

## 2021-03-10 PROCEDURE — 82607 VITAMIN B-12: CPT | Performed by: PREVENTIVE MEDICINE

## 2021-03-10 NOTE — TELEPHONE ENCOUNTER
HUB TO READ  Reviewed ER notes and advise that we recheck next week.  Any fever or dysuria?  Labs looked ok but WBC was elevated as were platelets.

## 2021-03-10 NOTE — TELEPHONE ENCOUNTER
Contacted Dr. Cedeño's office for Jourdan to try and get him an appointment. He states that he text her yesterday about an appt, but still no call today. I spoke to Lidia at [facility] office. She states Dr. Bradford assistant is no longer there and only Dr. Cedeño can make appts for her. I told her the patient text her yesterday, but hasn't heard anything. She said to just wait, if she told him she would take of it, she will contact them later.

## 2021-03-10 NOTE — PATIENT INSTRUCTIONS
Stop Plavix. Continue aspirin unless dark stool-then stop as well.    We will call you about Dr. Renee visit if we get.  Call us if you get.    Send labs from last week.    Stop Smoking    ER if dark stools again.    Increase fluids to decrease dizziness

## 2021-03-10 NOTE — PROGRESS NOTES
"Subjective   Jourdan Lebron is a 50 y.o. male presents for   Chief Complaint   Patient presents with   • Fatigue     muscle aches, dark stool       Health Maintenance Due   Topic Date Due   • COLONOSCOPY  Never done       50-year-old white male comes in today at the request of his wife who says that he is lethargic.  Patient sustained a left adrenal benign tumor removal 3 weeks ago.  After a week he restarted the Plavix and since then he has noticed an increase in weakness.  He states that the Plavix causes him to lose his sense of taste and smell and makes his muscles feel weak.  Went to the emergency room over the weekend for weakness normal labs normal CT of the head and electrocardiogram normal basic metabolic profile and urine.  Patient does not feel any better in approximately 40 minutes was spent with he and his wife to review his symptoms.  He is a smoker not ready to stop smoking did have a negative CT of his abdomen and chest in February 2021 has never had an EGD or colonoscopy complains of dark tarry stools twice since he has been in the emergency room.  Hemoglobin at that time was 17.  He also had some labs run at another physician's office last week with normal cortisol levels admits to no heartburn slight left-sided headaches intermittently no appetite.  No known Covid exposure but was in the hospital 3 weeks ago.  Some mild increase in sinus congestion that is clear in nature.  Mucous membranes were moist but he was somewhat hypovolemic and orthostatic.  Records were reviewed from [facility] from the emergency room at [facility] from his adrenalectomy surgery neurosurgery and stroke.       Vitals:    03/10/21 1426 03/10/21 1427   BP: 113/80 109/76   BP Location: Right arm Right arm   Patient Position: Sitting Sitting   Cuff Size: Adult Adult   Pulse: 102    Temp: 97.8 °F (36.6 °C)    SpO2: 95%    Weight: 72.8 kg (160 lb 9.6 oz)    Height: 180.3 cm (70.98\")      Body mass index is 22.41 kg/m².    Current " Outpatient Medications on File Prior to Visit   Medication Sig Dispense Refill   • Alirocumab 75 MG/ML solution prefilled syringe Inject 75 mg under the skin into the appropriate area as directed Every 14 (Fourteen) Days. 2.24 mL 6   • aspirin 81 MG chewable tablet Chew 81 mg Daily.     • fluticasone (Flonase Allergy Relief) 50 MCG/ACT nasal spray 1 spray into the nostril(s) as directed by provider Daily.       No current facility-administered medications on file prior to visit.       The following portions of the patient's history were reviewed and updated as appropriate: allergies, current medications, past family history, past medical history, past social history, past surgical history and problem list.    Review of Systems   Constitutional: Positive for activity change, appetite change, fatigue and unexpected weight loss. Negative for fever.   HENT: Positive for congestion, postnasal drip, rhinorrhea and sinus pressure. Negative for sore throat.         Loss of taste and smell   Eyes: Positive for blurred vision.        Blurred vision is intermittent since the stroke   Respiratory: Negative.  Negative for cough.    Cardiovascular: Negative.    Gastrointestinal: Positive for abdominal pain.        Dark stools no change in diet or ingestion of Pepto   Endocrine: Negative.    Genitourinary: Negative.  Negative for dysuria.   Musculoskeletal: Positive for neck pain and neck stiffness. Negative for arthralgias.   Skin: Negative.    Allergic/Immunologic: Positive for environmental allergies.   Neurological: Positive for dizziness, weakness and headache.   Hematological: Negative.    Psychiatric/Behavioral: Negative.        Objective   Physical Exam  Vitals reviewed.   Constitutional:       General: He is not in acute distress.     Appearance: Normal appearance. He is well-developed. He is not ill-appearing or toxic-appearing.   HENT:      Head: Normocephalic and atraumatic.      Nose: Nose normal.   Eyes:       Extraocular Movements: Extraocular movements intact.      Conjunctiva/sclera: Conjunctivae normal.      Pupils: Pupils are equal, round, and reactive to light.   Neck:      Comments: Spurling's negative mild pain at endrange  Cardiovascular:      Rate and Rhythm: Regular rhythm. Tachycardia present.   Pulmonary:      Effort: Pulmonary effort is normal.      Comments: Decreased bilateral breath sounds.  Abdominal:      General: Abdomen is flat. Bowel sounds are normal. There is no distension.      Palpations: Abdomen is soft. There is no mass.      Tenderness: There is abdominal tenderness. There is no right CVA tenderness, left CVA tenderness or guarding.      Comments: Mild left lower quadrant tenderness no masses guarding or rebounding   Musculoskeletal:         General: Tenderness present. No swelling, deformity or signs of injury. Normal range of motion.      Cervical back: Normal range of motion.      Right lower leg: No edema.      Left lower leg: No edema.   Lymphadenopathy:      Cervical: No cervical adenopathy.   Skin:     General: Skin is warm and dry.      Findings: No rash.   Neurological:      General: No focal deficit present.      Mental Status: He is alert and oriented to person, place, and time. Mental status is at baseline.      Cranial Nerves: No cranial nerve deficit.      Motor: No weakness.      Gait: Gait normal.      Comments: Tandem Romberg and drift were all normal   Psychiatric:         Mood and Affect: Mood normal.         Behavior: Behavior normal.       PHQ-9 Total Score:      Assessment/Plan   Diagnoses and all orders for this visit:    1. Fatigue, unspecified type (Primary)  Comments:  Fatigue started about a month after his stroke.  Continues to this day but worse the last 2 weeks.  Weakness upon standing and sitting straight.  Orders:  -     CBC Auto Differential  -     C-reactive Protein; Future  -     Sedimentation Rate; Future  -     TSH  -     Vitamin B12  -     C-reactive  Protein  -     Sedimentation Rate  -     POCT urinalysis dipstick, automated  -     Ambulatory Referral to Hematology / Oncology  -     COVID-19,[facility] ROUTINE, NP/OP SWAB IN TRANSPORT MEDIA OR ESWAB 72 HR TAT - Swab, Nasopharynx    2. 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  Benign.  Cortisol level normal at an outlying office discharge surgeon no fever chills much weight loss.  Orders:  -     Ambulatory Referral to Endocrinology    3. Dark stools  Comments:  Episode of dark stool 1 and 3 days ago.  No gross visible blood and no history of heartburn or stomach cramping.  Some left lower abdominal discomfort intermitt  Orders:  -     Ambulatory referral for Screening EGD  -     Ambulatory Referral For Screening Colonoscopy    4. Weight loss  Comments:  30 pound weight loss in the last 9 months.  Smoker with a negative CT of the chest and abdomen approximately a month ago  Orders:  -     Ambulatory Referral to Hematology / Oncology    5. Cerebrovascular accident (CVA) due to embolism of left middle cerebral artery (CMS/HCC)  Comments:  Embolic stroke to the left middle cerebral artery with extraction 5/2020 no cause for the embolism discovered loop recorder still implanted negative family hist  Orders:  -     Ambulatory Referral to Hematology / Oncology    6. Loss of perception for smell  Comments:  Loss while on Plavix-returns when off-since stroke    7. Elevated transaminase level  -     Mononucleosis Screen        Patient Instructions   Stop Plavix. Continue aspirin unless dark stool-then stop as well.    We will call you about Dr. Renee visit if we get.  Call us if you get.    Send labs from last week.    Stop Smoking    ER if dark stools again.    Increase fluids to decrease dizziness

## 2021-03-11 ENCOUNTER — TELEPHONE (OUTPATIENT)
Dept: FAMILY MEDICINE CLINIC | Facility: CLINIC | Age: 51
End: 2021-03-11

## 2021-03-11 DIAGNOSIS — R74.01 ELEVATED TRANSAMINASE LEVEL: Primary | ICD-10-CM

## 2021-03-11 LAB
HETEROPH AB SER QL LA: NEGATIVE
SARS-COV-2 RNA RESP QL NAA+PROBE: NOT DETECTED

## 2021-03-11 NOTE — TELEPHONE ENCOUNTER
I called Dr. Cedeño office again this morning. I left a VM that we are trying to get pt scheduled right away. I called the patient to let him know as well.

## 2021-03-11 NOTE — PROGRESS NOTES
WBC and platelets are [id number] and 472 respectively.  Sed rate is also up slightly at 35.  Keep an eye on fever and we'll let you know when Covid test comes back(although most with Covid have normal WBC).  B12 is high so if taking by mouth, decrease the dose.  Followup as we have planned unless get fever or other signs of infection-let me know. Also on the labs you sent and CMP I last did one of liver enzymes was elevated.  Limit Tylenol and NSAIDS and alcohol if drinks.  Will add acute hepatitis and mono tests to labs if I can and if not will repeat two weeks.

## 2021-03-11 NOTE — TELEPHONE ENCOUNTER
HUB TO READ  WBC and platelets are [id number] and 472 respectively.  Sed rate is also up slightly at 35.  Keep an eye on fever and we'll let you know when Covid test comes back(although most with Covid have normal WBC).  B12 is high so if taking by mouth, decrease the dose.  Followup as we have planned unless get fever or other signs of infection-let me know. Also on the labs you sent and CMP I last did one of liver enzymes was elevated.  Limit Tylenol and NSAIDS and alcohol if drinks.  Will add acute hepatitis and mono tests to labs if I can and if not will repeat two weeks.

## 2021-03-12 NOTE — TELEPHONE ENCOUNTER
Spoke to Britany -  at Dr. Cedeño office. She reached back out this afternoon and pt is scheduled to see Dr. Cedeño on Monday. She states Pt is aware.

## 2021-03-15 ENCOUNTER — TELEPHONE (OUTPATIENT)
Dept: FAMILY MEDICINE CLINIC | Facility: CLINIC | Age: 51
End: 2021-03-15

## 2021-03-15 NOTE — TELEPHONE ENCOUNTER
Caller: Jourdan Lebron    Relationship to patient: Self    Best call back number: [phone] ([phone]    Patient is needing: PATIENT CALLING IN REGARDS TO BEING REFERRED TO ENDOCRINOLOGIST PATIENT IS STATING OFFICE CALLED TO SCHEDULE APPOINTMENT AND DOES NOT HAVE ANYTHING UNTIL June PATIENT STATES THAT IS WAY TO LONG AND IS CALLING WANTING TO KNOW IF DR FLORES HAS ANY OTHER SUGGESTIONS        PLEASE ADVISE    PATIENT REQUESTING CALLBACK

## 2021-03-15 NOTE — TELEPHONE ENCOUNTER
Could staff please call yaron and see if he could be placed on cancellation list or worked in sooner?

## 2021-03-16 ENCOUNTER — APPOINTMENT (OUTPATIENT)
Dept: GENERAL RADIOLOGY | Facility: HOSPITAL | Age: 51
End: 2021-03-16

## 2021-03-16 ENCOUNTER — TELEPHONE (OUTPATIENT)
Dept: FAMILY MEDICINE CLINIC | Facility: CLINIC | Age: 51
End: 2021-03-16

## 2021-03-16 ENCOUNTER — HOSPITAL ENCOUNTER (OUTPATIENT)
Facility: HOSPITAL | Age: 51
Setting detail: OBSERVATION
Discharge: HOME | End: 2021-03-18
Attending: EMERGENCY MEDICINE | Admitting: EMERGENCY MEDICINE

## 2021-03-16 DIAGNOSIS — R55 NEAR SYNCOPE: Primary | ICD-10-CM

## 2021-03-16 PROBLEM — Z86.73 HISTORY OF CVA (CEREBROVASCULAR ACCIDENT): Chronic | Status: ACTIVE | Noted: 2021-01-26

## 2021-03-16 PROBLEM — E89.6 HISTORY OF TOTAL ADRENALECTOMY: Status: ACTIVE | Noted: 2021-02-19

## 2021-03-16 PROBLEM — R53.1 WEAKNESS: Status: ACTIVE | Noted: 2021-03-16

## 2021-03-16 LAB
ANION GAP SERPL CALCULATED.3IONS-SCNC: 9 MMOL/L (ref 5–15)
BASOPHILS # BLD AUTO: 0.1 10*3/MM3 (ref 0–0.2)
BASOPHILS NFR BLD AUTO: 1 % (ref 0–1.5)
BUN SERPL-MCNC: 10 MG/DL (ref 6–20)
BUN/CREAT SERPL: 8.4 (ref 7–25)
CALCIUM SPEC-SCNC: 9.3 MG/DL (ref 8.6–10.5)
CHLORIDE SERPL-SCNC: 103 MMOL/L (ref 98–107)
CO2 SERPL-SCNC: 25 MMOL/L (ref 22–29)
CREAT SERPL-MCNC: 1.19 MG/DL (ref 0.76–1.27)
DEPRECATED RDW RBC AUTO: 44.6 FL (ref 37–54)
EOSINOPHIL # BLD AUTO: 0.2 10*3/MM3 (ref 0–0.4)
EOSINOPHIL NFR BLD AUTO: 2 % (ref 0.3–6.2)
ERYTHROCYTE [DISTWIDTH] IN BLOOD BY AUTOMATED COUNT: 13.2 % (ref 12.3–15.4)
GFR SERPL CREATININE-BSD FRML MDRD: 65 ML/MIN/1.73
GLUCOSE SERPL-MCNC: 86 MG/DL (ref 65–99)
HCT VFR BLD AUTO: 45.8 % (ref 37.5–51)
HGB BLD-MCNC: 15.5 G/DL (ref 13–17.7)
LYMPHOCYTES # BLD AUTO: 2.3 10*3/MM3 (ref 0.7–3.1)
LYMPHOCYTES NFR BLD AUTO: 18.2 % (ref 19.6–45.3)
MCH RBC QN AUTO: 33.3 PG (ref 26.6–33)
MCHC RBC AUTO-ENTMCNC: 33.8 G/DL (ref 31.5–35.7)
MCV RBC AUTO: 98.5 FL (ref 79–97)
MONOCYTES # BLD AUTO: 0.8 10*3/MM3 (ref 0.1–0.9)
MONOCYTES NFR BLD AUTO: 6.5 % (ref 5–12)
NEUTROPHILS NFR BLD AUTO: 72.3 % (ref 42.7–76)
NEUTROPHILS NFR BLD AUTO: 9.1 10*3/MM3 (ref 1.7–7)
NRBC BLD AUTO-RTO: 0 /100 WBC (ref 0–0.2)
PLATELET # BLD AUTO: 311 10*3/MM3 (ref 140–450)
PMV BLD AUTO: 7.8 FL (ref 6–12)
POTASSIUM SERPL-SCNC: 4.4 MMOL/L (ref 3.5–5.2)
RBC # BLD AUTO: 4.65 10*6/MM3 (ref 4.14–5.8)
SODIUM SERPL-SCNC: 137 MMOL/L (ref 136–145)
TROPONIN T SERPL-MCNC: <0.01 NG/ML (ref 0–0.03)
WBC # BLD AUTO: 12.5 10*3/MM3 (ref 3.4–10.8)
WHOLE BLOOD HOLD SPECIMEN: NORMAL

## 2021-03-16 PROCEDURE — 71045 X-RAY EXAM CHEST 1 VIEW: CPT

## 2021-03-16 PROCEDURE — 80048 BASIC METABOLIC PNL TOTAL CA: CPT | Performed by: EMERGENCY MEDICINE

## 2021-03-16 PROCEDURE — 84484 ASSAY OF TROPONIN QUANT: CPT | Performed by: EMERGENCY MEDICINE

## 2021-03-16 PROCEDURE — 99284 EMERGENCY DEPT VISIT MOD MDM: CPT

## 2021-03-16 PROCEDURE — 93005 ELECTROCARDIOGRAM TRACING: CPT | Performed by: EMERGENCY MEDICINE

## 2021-03-16 PROCEDURE — G0378 HOSPITAL OBSERVATION PER HR: HCPCS

## 2021-03-16 PROCEDURE — C9803 HOPD COVID-19 SPEC COLLECT: HCPCS

## 2021-03-16 PROCEDURE — 85025 COMPLETE CBC W/AUTO DIFF WBC: CPT | Performed by: EMERGENCY MEDICINE

## 2021-03-16 PROCEDURE — U0004 COV-19 TEST NON-CDC HGH THRU: HCPCS | Performed by: EMERGENCY MEDICINE

## 2021-03-16 PROCEDURE — 99219 PR INITIAL OBSERVATION CARE/DAY 50 MINUTES: CPT | Performed by: NURSE PRACTITIONER

## 2021-03-16 RX ORDER — ACETAMINOPHEN 325 MG/1
650 TABLET ORAL EVERY 4 HOURS PRN
Status: DISCONTINUED | OUTPATIENT
Start: 2021-03-16 | End: 2021-03-18 | Stop reason: HOSPADM

## 2021-03-16 RX ORDER — ALUMINA, MAGNESIA, AND SIMETHICONE 2400; 2400; 240 MG/30ML; MG/30ML; MG/30ML
15 SUSPENSION ORAL EVERY 6 HOURS PRN
Status: DISCONTINUED | OUTPATIENT
Start: 2021-03-16 | End: 2021-03-18 | Stop reason: HOSPADM

## 2021-03-16 RX ORDER — SODIUM CHLORIDE 0.9 % (FLUSH) 0.9 %
10 SYRINGE (ML) INJECTION AS NEEDED
Status: DISCONTINUED | OUTPATIENT
Start: 2021-03-16 | End: 2021-03-18 | Stop reason: HOSPADM

## 2021-03-16 RX ORDER — ASPIRIN 81 MG/1
81 TABLET, CHEWABLE ORAL DAILY
Status: DISCONTINUED | OUTPATIENT
Start: 2021-03-16 | End: 2021-03-18 | Stop reason: HOSPADM

## 2021-03-16 RX ORDER — ACETAMINOPHEN 160 MG/5ML
650 SOLUTION ORAL EVERY 4 HOURS PRN
Status: DISCONTINUED | OUTPATIENT
Start: 2021-03-16 | End: 2021-03-18 | Stop reason: HOSPADM

## 2021-03-16 RX ORDER — ONDANSETRON 4 MG/1
4 TABLET, FILM COATED ORAL EVERY 6 HOURS PRN
Status: DISCONTINUED | OUTPATIENT
Start: 2021-03-16 | End: 2021-03-18 | Stop reason: HOSPADM

## 2021-03-16 RX ORDER — ONDANSETRON 2 MG/ML
4 INJECTION INTRAMUSCULAR; INTRAVENOUS EVERY 6 HOURS PRN
Status: DISCONTINUED | OUTPATIENT
Start: 2021-03-16 | End: 2021-03-18 | Stop reason: HOSPADM

## 2021-03-16 RX ORDER — CHOLECALCIFEROL (VITAMIN D3) 125 MCG
5 CAPSULE ORAL NIGHTLY PRN
Status: DISCONTINUED | OUTPATIENT
Start: 2021-03-16 | End: 2021-03-18 | Stop reason: HOSPADM

## 2021-03-16 RX ORDER — ACETAMINOPHEN 650 MG/1
650 SUPPOSITORY RECTAL EVERY 4 HOURS PRN
Status: DISCONTINUED | OUTPATIENT
Start: 2021-03-16 | End: 2021-03-18 | Stop reason: HOSPADM

## 2021-03-16 RX ORDER — SODIUM CHLORIDE 0.9 % (FLUSH) 0.9 %
10 SYRINGE (ML) INJECTION EVERY 12 HOURS SCHEDULED
Status: DISCONTINUED | OUTPATIENT
Start: 2021-03-16 | End: 2021-03-18 | Stop reason: HOSPADM

## 2021-03-16 RX ORDER — ASPIRIN 81 MG/1
81 TABLET, CHEWABLE ORAL DAILY
Status: DISCONTINUED | OUTPATIENT
Start: 2021-03-17 | End: 2021-03-16

## 2021-03-16 RX ADMIN — ASPIRIN 81 MG: 81 TABLET, CHEWABLE ORAL at 20:41

## 2021-03-16 NOTE — ED NOTES
Pt reports having an L adenomectomy on 2/19. Now pt reports he keeps feeling weak and lightheaded. Pt has not seen an endocrinologist yet he has an apt in June. Pt reports that his bp has been fluctuating. Pt reports his baseline bp is in the 120s but it has gone down to to 90s at times. Pt reports nausea and shaking. Pt reports eating and calling 911 to check sugar. Pt reports he no longer takes the Gabapentin and hydrocodone he was prescribed. Pt reports he has a loop recorder.     Lupe Field, RN  03/16/21 1326

## 2021-03-16 NOTE — TELEPHONE ENCOUNTER
PATIENTS WIFE WANTED TO LET YOU KNOW THAT PATIENT HAD AN EPISODE WHERE HE WAS SHAKING, SWEATING AND WAS NAUSEATED. SHE IS TAKING HIM TO ER AT [facility].

## 2021-03-16 NOTE — ED PROVIDER NOTES
Subjective   Chief complaint: Dizziness    50-year-old male presents with dizziness and fatigue.  Patient states he had an adrenalectomy on February 19.  He states he has not felt right since then.  He was seen here 10 days ago for similar symptoms.  Patient states today he had an episode where he became shaky and lightheaded and thought he might pass out.  By the time EMS got there he was feeling much better.  He states he is not currently feeling dizzy but he just feels weak and fatigued.  He denies any chest pain or shortness of breath.  He has had no numbness or weakness.  He denies any alleviating or exacerbating factors.      History provided by:  Patient      Review of Systems   Constitutional: Positive for fatigue. Negative for fever.   HENT: Negative for congestion and sore throat.    Eyes: Negative for redness.   Respiratory: Negative for cough and shortness of breath.    Cardiovascular: Negative for chest pain.   Gastrointestinal: Negative for abdominal pain, diarrhea and vomiting.   Genitourinary: Negative for dysuria.   Musculoskeletal: Negative for back pain.   Skin: Negative for rash.   Neurological: Positive for dizziness and weakness. Negative for headaches.   Psychiatric/Behavioral: Negative for confusion.       Past Medical History:   Diagnosis Date   • Hyperlipidemia    • Kidney stone    • Stroke (CMS/HCC)        Allergies   Allergen Reactions   • Cephalosporins Anaphylaxis     Throat swelling   • Dye  [Contrast Dye] Hives   • Sulfa Antibiotics Hives   • Statins Myalgia     Myalgia and fatique       Past Surgical History:   Procedure Laterality Date   • KIDNEY STONE SURGERY         Family History   Problem Relation Age of Onset   • Arthritis Mother    • Hypertension Mother    • Cancer Father         abdomen   • Arthritis Father    • Hypertension Father        Social History     Socioeconomic History   • Marital status:      Spouse name: Not on file   • Number of children: Not on file   •  "Years of education: Not on file   • Highest education level: Not on file   Tobacco Use   • Smoking status: Current Some Day Smoker     Packs/day: 4.00     Years: 30.00     Pack years: 120.00     Types: Cigars   • Smokeless tobacco: Never Used   Substance and Sexual Activity   • Alcohol use: Yes     Comment: weekends   • Drug use: Never   • Sexual activity: Yes     Partners: Female     Birth control/protection: None       /69   Pulse 74   Temp 97.8 °F (36.6 °C) (Oral)   Resp 20   Ht 177.8 cm (70\")   Wt 73.6 kg (162 lb 4.1 oz)   SpO2 100%   BMI 23.28 kg/m²       Objective   Physical Exam  Vitals and nursing note reviewed.   Constitutional:       Appearance: He is well-developed.   HENT:      Head: Normocephalic and atraumatic.   Eyes:      Extraocular Movements: Extraocular movements intact.      Pupils: Pupils are equal, round, and reactive to light.   Cardiovascular:      Rate and Rhythm: Normal rate and regular rhythm.      Heart sounds: Normal heart sounds.   Pulmonary:      Effort: Pulmonary effort is normal. No respiratory distress.      Breath sounds: Normal breath sounds.   Abdominal:      General: Abdomen is flat. Bowel sounds are normal.      Palpations: Abdomen is soft.      Tenderness: There is no abdominal tenderness.   Skin:     General: Skin is warm and dry.   Neurological:      General: No focal deficit present.      Mental Status: He is alert and oriented to person, place, and time.         Procedures           ED Course      My interpretation of EKG shows sinus bradycardia, rate of 55, right bundle branch block, unchanged from previous     Results for orders placed or performed during the hospital encounter of 03/16/21   Basic Metabolic Panel    Specimen: Blood   Result Value Ref Range    Glucose 86 65 - 99 mg/dL    BUN 10 6 - 20 mg/dL    Creatinine 1.19 0.76 - 1.27 mg/dL    Sodium 137 136 - 145 mmol/L    Potassium 4.4 3.5 - 5.2 mmol/L    Chloride 103 98 - 107 mmol/L    CO2 25.0 22.0 - " 29.0 mmol/L    Calcium 9.3 8.6 - 10.5 mg/dL    eGFR Non African Amer 65 >60 mL/min/1.73    BUN/Creatinine Ratio 8.4 7.0 - 25.0    Anion Gap 9.0 5.0 - 15.0 mmol/L   Troponin    Specimen: Blood   Result Value Ref Range    Troponin T <0.010 0.000 - 0.030 ng/mL   CBC Auto Differential    Specimen: Blood   Result Value Ref Range    WBC 12.50 (H) 3.40 - 10.80 10*3/mm3    RBC 4.65 4.14 - 5.80 10*6/mm3    Hemoglobin 15.5 13.0 - 17.7 g/dL    Hematocrit 45.8 37.5 - 51.0 %    MCV 98.5 (H) 79.0 - 97.0 fL    MCH 33.3 (H) 26.6 - 33.0 pg    MCHC 33.8 31.5 - 35.7 g/dL    RDW 13.2 12.3 - 15.4 %    RDW-SD 44.6 37.0 - 54.0 fl    MPV 7.8 6.0 - 12.0 fL    Platelets 311 140 - 450 10*3/mm3    Neutrophil % 72.3 42.7 - 76.0 %    Lymphocyte % 18.2 (L) 19.6 - 45.3 %    Monocyte % 6.5 5.0 - 12.0 %    Eosinophil % 2.0 0.3 - 6.2 %    Basophil % 1.0 0.0 - 1.5 %    Neutrophils, Absolute 9.10 (H) 1.70 - 7.00 10*3/mm3    Lymphocytes, Absolute 2.30 0.70 - 3.10 10*3/mm3    Monocytes, Absolute 0.80 0.10 - 0.90 10*3/mm3    Eosinophils, Absolute 0.20 0.00 - 0.40 10*3/mm3    Basophils, Absolute 0.10 0.00 - 0.20 10*3/mm3    nRBC 0.0 0.0 - 0.2 /100 WBC   ECG 12 Lead   Result Value Ref Range    QT Interval 424 ms   Light Blue Top   Result Value Ref Range    Extra Tube hold for add-on      XR Chest 1 View    Result Date: 3/16/2021  No acute cardiopulmonary process.  Electronically Signed By-Stanislav Howard MD On:3/16/2021 3:13 PM This report was finalized on [id number] by  Stanislav Howard MD.                                    MDM   Patient had the above evaluation.  Results were discussed with the patient.  Patient continues to feel poorly.  He states he still feels lightheaded and fatigued.  Chest x-ray shows no acute disease.  EKG shows no acute ischemia.  Troponin is negative.  CBC and BMP are fairly unremarkable.  Patient will be admitted for further evaluation of his near syncope.  I discussed with the nurse practitioner on-call for the hospitalist who  agreed to admit.      Final diagnoses:   Near syncope       ED Disposition  ED Disposition     ED Disposition Condition Comment    Decision to Admit  Level of Care: Telemetry [5]   Diagnosis: Near syncope [770469]   Admitting Physician: SHAHRAM STEPHENS [[id number]]            No follow-up provider specified.       Medication List      No changes were made to your prescriptions during this visit.          Lorenzo Perez MD  03/16/21 9557

## 2021-03-16 NOTE — H&P
[facility] Medicine Services      Patient Name: Jourdan Lebron  : 1970  MRN: [id number]  Primary Care Physician: Justa Castano MD  Date of admission: 3/16/2021    Patient Care Team:  Justa Castano MD as PCP - General (Family Medicine)  Luz Sepulveda PA-C as Physician Assistant (Neurosurgery)  Gonzalo Cruz MD as Consulting Physician (Surgical Oncology)          Subjective   History Present Illness     Chief Complaint:   Chief Complaint   Patient presents with   • Syncope     pt states he got shakey, felt like he was going to pass out, called 911. States felt better by the time they got there, pt had adrenalectomy on  and has not felt rigth since then.          Mr. Lebron is a 50 y.o. male with PMH of Left MCA stroke status post tPA and thrombectomy 2020 and benign neoplasm of left adrenal gland status post adrenalectomy 2021 by Dr. Lennon at [facility]. He stated he has become progressively weaker since his adrenalectomy. This morning he became dizzy, lightheaded, and started shaking. He tried to eat to see if his symptoms would improve. He stated he felt very weak, like his legs were heavy, nauseous, diaphoretic, and felt like he might pass out. He called EMS and by the time they arrived he felt a little better but still weak. His SBP was 120s. His wife states the patient's blood pressure has been 90s/60s since his surgery. He is lethargic every morning when he wakes up and then slowly wakes up throughout the day. His symptoms seem to be worse in the morning. He stated his neurologist approved he could stop taking plavix because he recently had black colored stool. His black stool has resolved but his weakness, dizziness, lethargy have been persistent. He denied any chest pain, palpitations, or shortness of breath. He decided to seek ER treatment 3/16/21. His PCP referred him to endocrinology but he stated the earliest appointment was . The  patient was seen at [facility], [place], operated by [facility] ER on 3/6/21 and had negative CT head at that time. Pt has no residual deficits from his prior stroke.     In the ER the patient had negative CXR, WBC 12.5. EKG showed sinus bradycardia rate 55. SBP ranging . He was admitted for further evaluation of near syncope and weakness.     Review of Systems   HENT: Negative.    Eyes: Negative.    Cardiovascular: Positive for near-syncope.   Respiratory: Negative.    Endocrine: Negative.    Hematologic/Lymphatic: Negative.    Skin: Negative.    Musculoskeletal: Negative.    Gastrointestinal: Negative.    Genitourinary: Negative.    Neurological: Positive for dizziness, light-headedness, tremors and weakness.   Psychiatric/Behavioral: Negative.    Allergic/Immunologic: Negative.    All other systems reviewed and are negative.          Personal History     Past Medical History:   Past Medical History:   Diagnosis Date   • Hyperlipidemia    • Kidney stone    • Stroke (CMS/HCC)        Surgical History:      Past Surgical History:   Procedure Laterality Date   • KIDNEY STONE SURGERY             Family History: family history includes Arthritis in his father and mother; Cancer in his father; Hypertension in his father and mother.     Social History:  reports that he has been smoking cigars. He has a 120.00 pack-year smoking history. He has never used smokeless tobacco. He reports current alcohol use. He reports that he does not use drugs.      Medications:  Prior to Admission medications    Medication Sig Start Date End Date Taking? Authorizing Provider   Alirocumab 75 MG/ML solution prefilled syringe Inject 75 mg under the skin into the appropriate area as directed Every 14 (Fourteen) Days. 3/6/21   Justa Castano MD   aspirin 81 MG chewable tablet Chew 81 mg Daily.    Provider, MD Vinnie   busPIRone (BUSPAR) 5 MG tablet Take 1 tablet by mouth 3 (Three) Times a Day for 30 days.  Patient taking differently: Take 5 mg by mouth 3  (Three) Times a Day. As needed 9/23/20 1/28/21  Gisele Bowers APRN   clopidogrel (PLAVIX) 75 MG tablet Take 75 mg by mouth Daily. 1/11/21   Vinnie Jim MD   cyanocobalamin (VITAMIN B-12) 1000 MCG tablet Take 500 mcg by mouth Every Other Day. 5/31/20   Vinnie Jim MD   fluticasone (Flonase Allergy Relief) 50 MCG/ACT nasal spray 1 spray into the nostril(s) as directed by provider Daily. 6/1/20   Vinnie Jim MD   meclizine (ANTIVERT) 25 MG tablet Take 1 tablet by mouth 3 (Three) Times a Day As Needed for Dizziness. 3/6/21   Stanislav Gonzalez MD       Allergies:    Allergies   Allergen Reactions   • Cephalosporins Anaphylaxis     Throat swelling   • Dye  [Contrast Dye] Hives   • Sulfa Antibiotics Hives   • Statins Myalgia     Myalgia and fatique       Objective   Objective     Vital Signs  Temp:  [97.8 °F (36.6 °C)] 97.8 °F (36.6 °C)  Heart Rate:  [57-84] 77  Resp:  [20] 20  BP: ()/(40-76) 109/72  SpO2:  [98 %-100 %] 99 %  on   ;      Body mass index is 23.28 kg/m².    Physical Exam  Vitals and nursing note reviewed.   Constitutional:       Appearance: Normal appearance. He is normal weight.   HENT:      Head: Normocephalic.   Eyes:      Pupils: Pupils are equal, round, and reactive to light.   Cardiovascular:      Rate and Rhythm: Normal rate and regular rhythm.      Pulses: Normal pulses.      Heart sounds: Normal heart sounds.   Pulmonary:      Effort: Pulmonary effort is normal.      Breath sounds: Normal breath sounds.   Abdominal:      General: Bowel sounds are normal.      Palpations: Abdomen is soft.   Musculoskeletal:         General: Normal range of motion.      Cervical back: Normal range of motion.   Skin:     General: Skin is warm.   Neurological:      Mental Status: He is alert and oriented to person, place, and time.      Cranial Nerves: No cranial nerve deficit.   Psychiatric:         Mood and Affect: Mood normal.         Behavior: Behavior normal.         Results  Review:  I have personally reviewed most recent cardiac tracings, lab results and radiology images and interpretations and agree with findings    Results from last 7 days   Lab Units 03/16/21  1349   WBC 10*3/mm3 12.50*   HEMOGLOBIN g/dL 15.5   HEMATOCRIT % 45.8   PLATELETS 10*3/mm3 311     Results from last 7 days   Lab Units 03/16/21  1349   SODIUM mmol/L 137   POTASSIUM mmol/L 4.4   CHLORIDE mmol/L 103   CO2 mmol/L 25.0   BUN mg/dL 10   CREATININE mg/dL 1.19   GLUCOSE mg/dL 86   CALCIUM mg/dL 9.3   TROPONIN T ng/mL <0.010     Estimated Creatinine Clearance: 79 mL/min (by C-G formula based on SCr of 1.19 mg/dL).  Brief Urine Lab Results  (Last result in the past 365 days)      Color   Clarity   Blood   Leuk Est   Nitrite   Protein   CREAT   Urine HCG        03/10/21 1626 Yellow Cloudy Negative Negative Negative 100 mg/dL               Microbiology Results (last 10 days)     Procedure Component Value - Date/Time    COVID-19,[facility] ROUTINE, NP/OP SWAB IN TRANSPORT MEDIA OR ESWAB 72 HR TAT - Swab, Nasopharynx [[id number]] Collected: 03/10/21 1614    Lab Status: Final result Specimen: Swab from Nasopharynx Updated: 03/11/21 1310     SARS-CoV-2, SOCO Not Detected     Comment: This nucleic acid amplification test was developed and its performance  characteristics determined by [facility]. Nucleic acid  amplification tests include RT-PCR and TMA. This test has not been  FDA cleared or approved. This test has been authorized by FDA under  an Emergency Use Authorization (EUA). This test is only authorized  for the duration of time the declaration that circumstances exist  justifying the authorization of the emergency use of in vitro  diagnostic tests for detection of SARS-CoV-2 virus and/or diagnosis  of COVID-19 infection under section 564(b)(1) of the Act, 21 U.S.C.  360bbb-3(b) (1), unless the authorization is terminated or revoked  sooner.  When diagnostic testing is negative, the possibility of a  false  negative result should be considered in the context of a patient's  recent exposures and the presence of clinical signs and symptoms  consistent with COVID-19. An individual without symptoms of COVID-19  and who is not shedding SARS-CoV-2 virus would expect to have a  negative (not detected) result in this assay.       Narrative:      Performed at:  01 - [facility]  [address] [facility] [place]  [id number]  : Briana Dejesus MD, Phone:  [phone]          ECG/EMG Results (most recent)     Procedure Component Value Units Date/Time    ECG 12 Lead [[id number]] Collected: 03/16/21 1327     Updated: 03/16/21 1329     QT Interval 424 ms     Narrative:      HEART RATE= 55  bpm  RR Interval= 1096  ms  AL Interval= 166  ms  P Horizontal Axis= -5  deg  P Front Axis= 58  deg  QRSD Interval= 148  ms  QT Interval= 424  ms  QRS Axis= 83  deg  T Wave Axis= 40  deg  - ABNORMAL ECG -  Sinus bradycardia  Right bundle branch block  Electronically Signed By:   Date and Time of Study: 2021-03-16 13:27:23                  XR Chest 1 View    Result Date: 3/16/2021  No acute cardiopulmonary process.  Electronically Signed By-Stanislav Howard MD On:3/16/2021 3:13 PM This report was finalized on [id number] by  Stanislav Howard MD.        Estimated Creatinine Clearance: 79 mL/min (by C-G formula based on SCr of 1.19 mg/dL).    Assessment/Plan   Assessment/Plan       Active Hospital Problems    Diagnosis  POA   • **Near syncope [R55]  Yes     Priority: High   • Weakness [R53.1]  Yes     Priority: High   • History of total adrenalectomy (CMS/HCC) [E89.6]  Not Applicable   • History of CVA (cerebrovascular accident) [Z86.73]  Not Applicable   • Adrenal mass (CMS/HCC) [E27.8]  Yes   • Hyperlipidemia [E78.5]  Yes      Resolved Hospital Problems   No resolved problems to display.       Near syncope, weakness  -recent negative CT head on 3/6/21. No lateralizing deficits on exam  -CXR negative  -WBC 12.5  -pt with  recent adrenalectomy 2/19/21. Progressive weakness that is worse in the morning   -check orthostatic vitals, ACTH, cortisol, TSH  -consult endocrinology    Benign neoplasm of left adrenal gland status post adrenalectomy 2/19/2021 by Dr. Lennon at [facility]    Left MCA stroke s/p tPA and thrombectomy 6/2/2020  -cont home aspirin, stopped plavix recently per patient's neurologist's recommendations  -intolerance to statins, supposed to start praluent injections      VTE Prophylaxis - SCDs    CODE STATUS:    Code Status and Medical Interventions:   Ordered at: 03/16/21 6888     Level Of Support Discussed With:    Patient     Code Status:    CPR     Medical Interventions (Level of Support Prior to Arrest):    Full       This patient has been examined wearing appropriate Personal Protective Equipment. 03/16/21      I discussed the patient's findings and my recommendations with patient.      Signature: Electronically signed by RUBINA Montesinos, 03/16/21, 6:15 PM EDT.    [facility] Hospitalist Team

## 2021-03-17 LAB
ANION GAP SERPL CALCULATED.3IONS-SCNC: 9 MMOL/L (ref 5–15)
BASOPHILS # BLD AUTO: 0.1 10*3/MM3 (ref 0–0.2)
BASOPHILS NFR BLD AUTO: 1.2 % (ref 0–1.5)
BUN SERPL-MCNC: 12 MG/DL (ref 6–20)
BUN/CREAT SERPL: 14.8 (ref 7–25)
CALCIUM SPEC-SCNC: 9.7 MG/DL (ref 8.6–10.5)
CHLORIDE SERPL-SCNC: 105 MMOL/L (ref 98–107)
CO2 SERPL-SCNC: 26 MMOL/L (ref 22–29)
CORTIS SERPL-MCNC: 12.49 MCG/DL
CREAT SERPL-MCNC: 0.81 MG/DL (ref 0.76–1.27)
DEPRECATED RDW RBC AUTO: 45.9 FL (ref 37–54)
EOSINOPHIL # BLD AUTO: 0.3 10*3/MM3 (ref 0–0.4)
EOSINOPHIL NFR BLD AUTO: 4.3 % (ref 0.3–6.2)
ERYTHROCYTE [DISTWIDTH] IN BLOOD BY AUTOMATED COUNT: 13.6 % (ref 12.3–15.4)
GFR SERPL CREATININE-BSD FRML MDRD: 102 ML/MIN/1.73
GLUCOSE BLDC GLUCOMTR-MCNC: 145 MG/DL (ref 70–105)
GLUCOSE SERPL-MCNC: 82 MG/DL (ref 65–99)
HCT VFR BLD AUTO: 43.2 % (ref 37.5–51)
HGB BLD-MCNC: 14.5 G/DL (ref 13–17.7)
LYMPHOCYTES # BLD AUTO: 2.9 10*3/MM3 (ref 0.7–3.1)
LYMPHOCYTES NFR BLD AUTO: 40.5 % (ref 19.6–45.3)
MCH RBC QN AUTO: 32.7 PG (ref 26.6–33)
MCHC RBC AUTO-ENTMCNC: 33.5 G/DL (ref 31.5–35.7)
MCV RBC AUTO: 97.5 FL (ref 79–97)
MONOCYTES # BLD AUTO: 0.7 10*3/MM3 (ref 0.1–0.9)
MONOCYTES NFR BLD AUTO: 9.3 % (ref 5–12)
NEUTROPHILS NFR BLD AUTO: 3.2 10*3/MM3 (ref 1.7–7)
NEUTROPHILS NFR BLD AUTO: 44.7 % (ref 42.7–76)
NRBC BLD AUTO-RTO: 0.1 /100 WBC (ref 0–0.2)
PLATELET # BLD AUTO: 289 10*3/MM3 (ref 140–450)
PMV BLD AUTO: 7.7 FL (ref 6–12)
POTASSIUM SERPL-SCNC: 4.3 MMOL/L (ref 3.5–5.2)
RBC # BLD AUTO: 4.43 10*6/MM3 (ref 4.14–5.8)
SARS-COV-2 ORF1AB RESP QL NAA+PROBE: NOT DETECTED
SODIUM SERPL-SCNC: 140 MMOL/L (ref 136–145)
TSH SERPL DL<=0.05 MIU/L-ACNC: 1.36 UIU/ML (ref 0.27–4.2)
WBC # BLD AUTO: 7.2 10*3/MM3 (ref 3.4–10.8)

## 2021-03-17 PROCEDURE — 82627 DEHYDROEPIANDROSTERONE: CPT | Performed by: INTERNAL MEDICINE

## 2021-03-17 PROCEDURE — 99244 OFF/OP CNSLTJ NEW/EST MOD 40: CPT | Performed by: INTERNAL MEDICINE

## 2021-03-17 PROCEDURE — 25010000002 ONDANSETRON PER 1 MG: Performed by: NURSE PRACTITIONER

## 2021-03-17 PROCEDURE — 82024 ASSAY OF ACTH: CPT | Performed by: INTERNAL MEDICINE

## 2021-03-17 PROCEDURE — 82533 TOTAL CORTISOL: CPT | Performed by: INTERNAL MEDICINE

## 2021-03-17 PROCEDURE — 99225 PR SBSQ OBSERVATION CARE/DAY 25 MINUTES: CPT | Performed by: INTERNAL MEDICINE

## 2021-03-17 PROCEDURE — 80048 BASIC METABOLIC PNL TOTAL CA: CPT | Performed by: NURSE PRACTITIONER

## 2021-03-17 PROCEDURE — 84443 ASSAY THYROID STIM HORMONE: CPT | Performed by: NURSE PRACTITIONER

## 2021-03-17 PROCEDURE — 85025 COMPLETE CBC W/AUTO DIFF WBC: CPT | Performed by: NURSE PRACTITIONER

## 2021-03-17 PROCEDURE — 96374 THER/PROPH/DIAG INJ IV PUSH: CPT

## 2021-03-17 PROCEDURE — 82962 GLUCOSE BLOOD TEST: CPT

## 2021-03-17 PROCEDURE — G0378 HOSPITAL OBSERVATION PER HR: HCPCS

## 2021-03-17 RX ORDER — COSYNTROPIN 0.25 MG/ML
0.25 INJECTION, POWDER, FOR SOLUTION INTRAMUSCULAR; INTRAVENOUS ONCE
Status: COMPLETED | OUTPATIENT
Start: 2021-03-18 | End: 2021-03-18

## 2021-03-17 RX ADMIN — Medication 10 ML: at 20:21

## 2021-03-17 RX ADMIN — ASPIRIN 81 MG: 81 TABLET, CHEWABLE ORAL at 20:21

## 2021-03-17 RX ADMIN — ONDANSETRON HYDROCHLORIDE 4 MG: 2 SOLUTION INTRAMUSCULAR; INTRAVENOUS at 09:45

## 2021-03-17 RX ADMIN — Medication 10 ML: at 08:09

## 2021-03-17 NOTE — PROGRESS NOTES
[facility] Medicine Services Daily Progress Note      Hospitalist Team  LOS 0 days      Patient Care Team:  Justa Castano MD as PCP - General (Family Medicine)  Luz Sepulveda PA-C as Physician Assistant (Neurosurgery)  Gonzalo Cruz MD as Consulting Physician (Surgical Oncology)    Patient Location: 110/1      Subjective   Subjective     Chief Complaint / Subjective  Chief Complaint   Patient presents with   • Syncope     pt states he got shakey, felt like he was going to pass out, called 911. States felt better by the time they got there, pt had adrenalectomy on 2/19 and has not felt rigth since then.          Brief Synopsis of Hospital Course/HPI    50 y.o. male with PMH of Left MCA stroke status post tPA and thrombectomy 6/2/2020 and benign neoplasm of left adrenal gland status post adrenalectomy 2/19/2021 by Dr. Lennon at [facility]. He stated he has become progressively weaker since his adrenalectomy. This morning he became dizzy, lightheaded, and started shaking. He tried to eat to see if his symptoms would improve. He stated he felt very weak, like his legs were heavy, nauseous, diaphoretic, and felt like he might pass out. He called EMS and by the time they arrived he felt a little better but still weak. His SBP was 120s. His wife states the patient's blood pressure has been 90s/60s since his surgery. He is lethargic every morning when he wakes up and then slowly wakes up throughout the day. His symptoms seem to be worse in the morning. He stated his neurologist approved he could stop taking plavix because he recently had black colored stool. His black stool has resolved but his weakness, dizziness, lethargy have been persistent. He denied any chest pain, palpitations, or shortness of breath. He decided to seek ER treatment 3/16/21. His PCP referred him to endocrinology but he stated the earliest appointment was June. The patient was seen at [facility] ER on 3/6/21  "and had negative CT head at that time. Pt has no residual deficits from his prior stroke.      In the ER the patient had negative CXR, WBC 12.5. EKG showed sinus bradycardia rate 55. SBP ranging . He was admitted for further evaluation of near syncope and weakness.     Date::    3/17/2021: Patient seen and examined this morning.  Still having generalized weakness and dizziness at times.  No syncope reported.  States his symptoms started around 4 to 5 days after his surgery for the left adrenalectomy.  He did follow-up with the surgeon 10 days postop and was doing fine at that time.  He did have melanotic stool when he restarted his aspirin and Plavix, this has resolved now.  He is only on aspirin now per his neurologist. He was to f/u with Endo but has not.     Denies any f/c/n/v/d, CP, SOB, dysuria, or palps.      Review of Systems   All other systems reviewed and are negative.        Objective   Objective      Vital Signs  Temp:  [97.8 °F (36.6 °C)-98.4 °F (36.9 °C)] 98.2 °F (36.8 °C)  Heart Rate:  [57-84] 61  Resp:  [14-20] 14  BP: ()/(40-76) 92/57  Oxygen Therapy  SpO2: 97 %  Device (Oxygen Therapy): room air  Flowsheet Rows      First Filed Value   Admission Height  177.8 cm (70\") Documented at 03/16/2021 1318   Admission Weight  73.6 kg (162 lb 4.1 oz) Documented at 03/16/2021 1318        Intake & Output (last 3 days)       03/14 0701 - 03/15 0700 03/15 0701 - 03/16 0700 03/16 0701 - 03/17 0700 03/17 0701 - 03/18 0700    P.O.    320    Total Intake(mL/kg)    320 (4.5)    Urine (mL/kg/hr)   225     Total Output   225     Net   -225 +320                Lines, Drains & Airways    Active LDAs     Name:   Placement date:   Placement time:   Site:   Days:    Peripheral IV 03/16/21 1348 Right Forearm   03/16/21    1348    Forearm   less than 1                  Physical Exam:    General: Awake, alert, NAD  Eyes: PERRL, EOMI, conjunctive are clear  Cardiovascular: Regular rate and rhythm, no " murmurs  Respiratory: Clear to auscultation bilaterally, no wheezing or rales, unlabored breathing  Abdomen: Soft, nontender, positive bowel sounds, no guarding  Neurologic: A&O, CN grossly intact, moves all extremities spontaneously  Musculoskeletal: Normal range of motion, no deformities  Skin: Warm, dry, intact           Wounds (last 24 hours)      LDA Wound     Row Name 03/17/21 0705             Wound 02/19/21 1200 Left lower abdomen Incision    Wound - Properties Group Placement Date: 02/19/21  -SC Placement Time: 1200  -SC Side: Left  -SC Orientation: lower  -SC Location: abdomen  -SC Primary Wound Type: Incision  -SC, 6 surgical laparoscopic entry points  Wound Outcome: Healed  -SC    Dressing Appearance  open to air  -CH      Closure  Liquid skin adhesive;Open to air  -CH      Base  blanchable;dry  -CH      Periwound  intact;dry;warm  -CH      Periwound Temperature  warm  -CH      Periwound Skin Turgor  firm  -CH      Drainage Amount  none  -CH      Dressing Care  open to air  -CH      Periwound Care  dry periwound area maintained  -CH      Retired Wound - Properties Group Date first assessed: 02/19/21  -SC Time first assessed: 1200  -SC Side: Left  -SC Location: abdomen  -SC Primary Wound Type: Incision  -SC, 6 surgical laparoscopic entry points  Wound Outcome: Healed  -SC      User Key  (r) = Recorded By, (t) = Taken By, (c) = Cosigned By    Initials Name Provider Type    Sean Livingston, RN Registered Nurse     Shae Herr RN Registered Nurse          Procedures:              Results Review:     I reviewed the patient's new clinical results.      Lab Results (last 24 hours)     Procedure Component Value Units Date/Time    POC Glucose Once [[id number]]  (Abnormal) Collected: 03/17/21 0924    Specimen: Blood Updated: 03/17/21 0925     Glucose 145 mg/dL      Comment: Serial Number: [id number]:  [id number]       TSH [[id number]]  (Normal) Collected: 03/17/21 0809    Specimen: Blood  Updated: 03/17/21 0850     TSH 1.360 uIU/mL     Basic Metabolic Panel [[id number]]  (Normal) Collected: 03/17/21 0809    Specimen: Blood Updated: 03/17/21 0843     Glucose 82 mg/dL      BUN 12 mg/dL      Creatinine 0.81 mg/dL      Sodium 140 mmol/L      Potassium 4.3 mmol/L      Chloride 105 mmol/L      CO2 26.0 mmol/L      Calcium 9.7 mg/dL      eGFR Non African Amer 102 mL/min/1.73      BUN/Creatinine Ratio 14.8     Anion Gap 9.0 mmol/L     Narrative:      GFR Normal >60  Chronic Kidney Disease <60  Kidney Failure <15      CBC Auto Differential [[id number]]  (Abnormal) Collected: 03/17/21 0809    Specimen: Blood Updated: 03/17/21 0822     WBC 7.20 10*3/mm3      RBC 4.43 10*6/mm3      Hemoglobin 14.5 g/dL      Hematocrit 43.2 %      MCV 97.5 fL      MCH 32.7 pg      MCHC 33.5 g/dL      RDW 13.6 %      RDW-SD 45.9 fl      MPV 7.7 fL      Platelets 289 10*3/mm3      Neutrophil % 44.7 %      Lymphocyte % 40.5 %      Monocyte % 9.3 %      Eosinophil % 4.3 %      Basophil % 1.2 %      Neutrophils, Absolute 3.20 10*3/mm3      Lymphocytes, Absolute 2.90 10*3/mm3      Monocytes, Absolute 0.70 10*3/mm3      Eosinophils, Absolute 0.30 10*3/mm3      Basophils, Absolute 0.10 10*3/mm3      nRBC 0.1 /100 WBC     ACTH [[id number]] Collected: 03/17/21 0809    Specimen: Blood Updated: 03/17/21 0817    Cortisol [[id number]] Collected: 03/17/21 0809    Specimen: Blood Updated: 03/17/21 0817    COVID PRE-OP / PRE-PROCEDURE SCREENING ORDER (NO ISOLATION) - Swab, Nasopharynx [[id number]]  (Normal) Collected: 03/16/21 1653    Specimen: Swab from Nasopharynx Updated: 03/17/21 0006    Narrative:      The following orders were created for panel order COVID PRE-OP / PRE-PROCEDURE SCREENING ORDER (NO ISOLATION) - Swab, Nasopharynx.  Procedure                               Abnormality         Status                     ---------                               -----------         ------                     COVID-19,MARTHA KELLOGG...[[id number]]   Normal              Final result                 Please view results for these tests on the individual orders.    COVID-19,APTIMA PANTHER,[facility] IN-HOUSE, NP/OP SWAB IN UTM/VTM/SALINE TRANSPORT MEDIA,24 HR TAT - Swab, Nasopharynx [[id number]]  (Normal) Collected: 03/16/21 1653    Specimen: Swab from Nasopharynx Updated: 03/17/21 0006     COVID19 Not Detected    Narrative:      Fact sheet for providers: https://www.fda.gov/media/019113/download     Fact sheet for patients: https://www.fda.gov/media/879913/download    Test performed by RT PCR.    Extra Tubes [[id number]] Collected: 03/16/21 1349    Specimen: Blood, Venous Line Updated: 03/16/21 1500    Narrative:      The following orders were created for panel order Extra Tubes.  Procedure                               Abnormality         Status                     ---------                               -----------         ------                     Light Blue Top[[id number]]                                   Final result                 Please view results for these tests on the individual orders.    Light Blue Top [[id number]] Collected: 03/16/21 1349    Specimen: Blood Updated: 03/16/21 1500     Extra Tube hold for add-on     Comment: Auto resulted       Basic Metabolic Panel [[id number]]  (Normal) Collected: 03/16/21 1349    Specimen: Blood Updated: 03/16/21 1418     Glucose 86 mg/dL      BUN 10 mg/dL      Creatinine 1.19 mg/dL      Sodium 137 mmol/L      Potassium 4.4 mmol/L      Comment: Slight hemolysis detected by analyzer. Results may be affected.        Chloride 103 mmol/L      CO2 25.0 mmol/L      Calcium 9.3 mg/dL      eGFR Non African Amer 65 mL/min/1.73      BUN/Creatinine Ratio 8.4     Anion Gap 9.0 mmol/L     Narrative:      GFR Normal >60  Chronic Kidney Disease <60  Kidney Failure <15      Troponin [[id number]]  (Normal) Collected: 03/16/21 1349    Specimen: Blood Updated: 03/16/21 1418     Troponin T <0.010 ng/mL     Narrative:      Troponin T Reference  Range:  <= 0.03 ng/mL-   Negative for AMI  >0.03 ng/mL-     Abnormal for myocardial necrosis.  Clinicians would have to utilize clinical acumen, EKG, Troponin and serial changes to determine if it is an Acute Myocardial Infarction or myocardial injury due to an underlying chronic condition.       Results may be falsely decreased if patient taking Biotin.          No results found for: HGBA1C            No results found for: LIPASE  Lab Results   Component Value Date    CHOL 209 (H) 01/28/2021    TRIG 110 01/28/2021    HDL 55 01/28/2021     (H) 01/28/2021       No results found for: INTRAOP, PREDX, FINALDX, COMDX    Microbiology Results (last 10 days)     Procedure Component Value - Date/Time    COVID PRE-OP / PRE-PROCEDURE SCREENING ORDER (NO ISOLATION) - Swab, Nasopharynx [[id number]]  (Normal) Collected: 03/16/21 1653    Lab Status: Final result Specimen: Swab from Nasopharynx Updated: 03/17/21 0006    Narrative:      The following orders were created for panel order COVID PRE-OP / PRE-PROCEDURE SCREENING ORDER (NO ISOLATION) - Swab, Nasopharynx.  Procedure                               Abnormality         Status                     ---------                               -----------         ------                     COVID-19,APTIMA PANTHER,...[[id number]]  Normal              Final result                 Please view results for these tests on the individual orders.    COVID-19,APTIMA PANTHER,[facility] IN-HOUSE, NP/OP SWAB IN UTM/VTM/SALINE TRANSPORT MEDIA,24 HR TAT - Swab, Nasopharynx [[id number]]  (Normal) Collected: 03/16/21 1653    Lab Status: Final result Specimen: Swab from Nasopharynx Updated: 03/17/21 0006     COVID19 Not Detected    Narrative:      Fact sheet for providers: https://www.fda.gov/media/863321/download     Fact sheet for patients: https://www.fda.gov/media/633287/download    Test performed by RT PCR.    COVID-19,[facility] ROUTINE, NP/OP SWAB IN TRANSPORT MEDIA OR ESWAB 72 HR TAT - Swab,  Nasopharynx [[id number]] Collected: 03/10/21 1614    Lab Status: Final result Specimen: Swab from Nasopharynx Updated: 03/11/21 1310     SARS-CoV-2, SOCO Not Detected     Comment: This nucleic acid amplification test was developed and its performance  characteristics determined by [employer]. Nucleic acid  amplification tests include RT-PCR and TMA. This test has not been  FDA cleared or approved. This test has been authorized by FDA under  an Emergency Use Authorization (EUA). This test is only authorized  for the duration of time the declaration that circumstances exist  justifying the authorization of the emergency use of in vitro  diagnostic tests for detection of SARS-CoV-2 virus and/or diagnosis  of COVID-19 infection under section 564(b)(1) of the Act, 21 U.S.C.  360bbb-3(b) (1), unless the authorization is terminated or revoked  sooner.  When diagnostic testing is negative, the possibility of a false  negative result should be considered in the context of a patient's  recent exposures and the presence of clinical signs and symptoms  consistent with COVID-19. An individual without symptoms of COVID-19  and who is not shedding SARS-CoV-2 virus would expect to have a  negative (not detected) result in this assay.       Narrative:      Performed at:  [address] [place] [address]  [address] [facility]  [id number]  : Briana Dejesus MD, Phone:  [phone]          ECG/EMG Results (most recent)     Procedure Component Value Units Date/Time    ECG 12 Lead [[id number]] Collected: 03/16/21 1327     Updated: 03/16/21 1329     QT Interval 424 ms     Narrative:      HEART RATE= 55  bpm  RR Interval= 1096  ms  CA Interval= 166  ms  P Horizontal Axis= -5  deg  P Front Axis= 58  deg  QRSD Interval= 148  ms  QT Interval= 424  ms  QRS Axis= 83  deg  T Wave Axis= 40  deg  - ABNORMAL ECG -  Sinus bradycardia  Right bundle branch block  Electronically Signed By:   Date and Time of Study: 2021-03-16  13:27:23                  XR Chest 1 View    Result Date: 3/16/2021  No acute cardiopulmonary process.  Electronically Signed By-Stanislav Howadr MD On:3/16/2021 3:13 PM This report was finalized on [id number] by  Stanislav Howard MD.          Xrays, labs reviewed personally by physician.    Medication Review:   I have reviewed the patient's current medication list      Scheduled Meds  aspirin, 81 mg, Oral, Daily  sodium chloride, 10 mL, Intravenous, Q12H        Meds Infusions       Meds PRN  •  acetaminophen **OR** acetaminophen **OR** acetaminophen  •  aluminum-magnesium hydroxide-simethicone  •  melatonin  •  ondansetron **OR** ondansetron  •  [COMPLETED] Insert peripheral IV **AND** sodium chloride  •  sodium chloride        Assessment/Plan   Assessment/Plan     Active Hospital Problems    Diagnosis  POA   • **Near syncope [R55]  Yes   • Weakness [R53.1]  Yes   • History of total adrenalectomy (CMS/[facility]) [E89.6]  Not Applicable   • History of CVA (cerebrovascular accident) [Z86.73]  Not Applicable   • Adrenal mass (CMS/[facility]) [E27.8]  Yes   • Hyperlipidemia [E78.5]  Yes      Resolved Hospital Problems   No resolved problems to display.       MEDICAL DECISION MAKING COMPLEXITY BY PROBLEM:     Near syncope, weakness with history of of recent left adrenalectomy  -s/p left adrenalectomy on 2/19 at [facility], has been symptomatic since then basically  -recent negative CT head on 3/6/21. No lateralizing deficits on exam  -Symptoms progressing, unlikely to have adrenal crisis at this time but could be leading to chronic adrenal insufficiency  -Baseline a.m. cortisol and ACTH ordered  -Consider starting hydrocortisone or prednisone  -Endocrine consulted     Left MCA stroke s/p tPA and thrombectomy 6/2/2020  -cont home aspirin, stopped plavix recently per patient's neurologist's recommendations  -intolerance to statins, supposed to start praluent injections        VTE Prophylaxis - SCDs    VTE Prophylaxis -   Mechanical Order  History:      Ordered        03/16/21 1753  Place Sequential Compression Device  Once         03/16/21 1753  Maintain Sequential Compression Device  Continuous                 Pharmalogical Order History:     None            Code Status -   Code Status and Medical Interventions:   Ordered at: 03/16/21 1753     Level Of Support Discussed With:    Patient     Code Status:    CPR     Medical Interventions (Level of Support Prior to Arrest):    Full       This patient has been examined wearing appropriate Personal Protective Equipment on 03/17/21        Discharge Planning  home        Electronically signed by Valerie Katz DO, 03/17/21, 10:14 EDT.  [facility] [place] Hospitalist Team

## 2021-03-17 NOTE — PLAN OF CARE
Problem: Adult Inpatient Plan of Care  Goal: Plan of Care Review  Outcome: Ongoing, Progressing  Flowsheets (Taken 3/17/2021 0148)  Progress: no change  Plan of Care Reviewed With: patient  Outcome Summary: Patient awaiting endocrine consult, VSS will continue to monitor   Goal Outcome Evaluation:  Plan of Care Reviewed With: patient  Progress: no change  Outcome Summary: Patient awaiting endocrine consult, VSS will continue to monitor

## 2021-03-17 NOTE — PROGRESS NOTES
Discharge Planning Assessment  [facility]     Patient Name: Jourdan Lebron  MRN: [id number]  Today's Date: 3/17/2021    Admit Date: 3/16/2021    Discharge Needs Assessment     Row Name 03/17/21 1348       Living Environment    Lives With  spouse    Current Living Arrangements  home/apartment/condo    Primary Care Provided by  self    Able to Return to Prior Arrangements  yes       Resource/Environmental Concerns    Resource/Environmental Concerns  none    Transportation Concerns  car, none       Transition Planning    Patient/Family Anticipates Transition to  home with family    Patient/Family Anticipated Services at Transition  none    Transportation Anticipated  car, drives self;family or friend will provide Spouse [place] transport to home at [place]       Discharge Needs Assessment    Equipment Currently Used at Home  none    Concerns to be Addressed  no discharge needs identified    Anticipated Changes Related to Illness  none    Equipment Needed After Discharge  none        Discharge Plan     Row Name 03/17/21 1350       Plan    Plan  Routine d/c to home      Plan Comments  Spoke to patient and spouse in room with mask and goggles maintaining 6 ft for less than 15min.  Patient is I with ADL's and drives. PCP is Eyad and pharmacy is [facility] in [place]. DC Barriers - Monitor B/P,  Leukocytosis,  Endocrinology Consult.        Continued Care and Services - Admitted Since 3/16/2021        Demographic Summary     Row Name 03/17/21 1347       General Information    Admission Type  observation    Arrived From  emergency department    Referral Source  admission list    Reason for Consult  discharge planning    Preferred Language  English        Functional Status     Row Name 03/17/21 1348       Functional Status, IADL    Medications  independent    Meal Preparation  independent    Housekeeping  independent    Laundry  independent    Shopping  independent       Mental Status    General Appearance WDL   WDL       Mental Status Summary    Recent Changes in Mental Status/Cognitive Functioning  no changes        Shira Ibrahim RN, CM  Office Phone [phone]  Cell [phone]

## 2021-03-17 NOTE — CONSULTS
Inpatient Endocrine Consult  Consult requested by hospitalist team for possible adrenal insufficiency  Patient Care Team:  Justa Castano MD as PCP - General (Family Medicine)  Luz Sepulveda PA-C as Physician Assistant (Neurosurgery)  Gonzalo Cruz MD as Consulting Physician (Surgical Oncology)    Chief Complaint: Possible adrenal insufficiency    HPI: 50-year-old male with previous diagnosis of for hyperlipidemia, kidney stone, stroke which was diagnosed in May 31 of 2020.  He was started on Plavix.  He subsequently had an incidental finding of left renal mass in June 2020.  It was about 6 cm in size.  He was referred to [facility], the rule out pheochromocytoma and he underwent surgery in February 19, 2021 by Dr. Contreras Lennon.  Patient tells me that he was losing weight even before the surgery but postop about 5 days postop he started feeling lethargic with low blood pressure appetite went down he has lost about 8 pounds since then.  Prior to surgery lost about 30 pounds of weight.  On the day of admission he started feeling shaking and nausea and could not take it anymore so he came to the emergency room for evaluation.  He has been having some dizziness and lightheadedness.  He was not started on any steroids post adrenalectomy.     Data reviewed: Serum cortisol was 12.4    Past Medical History:   Diagnosis Date   • Hyperlipidemia    • Kidney stone    • Stroke (CMS/[facility])        Social History     Socioeconomic History   • Marital status:      Spouse name: Not on file   • Number of children: Not on file   • Years of education: Not on file   • Highest education level: Not on file   Tobacco Use   • Smoking status: Current Some Day Smoker     Packs/day: 4.00     Years: 30.00     Pack years: 120.00     Types: Cigars   • Smokeless tobacco: Never Used   Substance and Sexual Activity   • Alcohol use: Yes     Comment: weekends   • Drug use: Never   • Sexual activity: Yes      Partners: Female     Birth control/protection: None       Family History   Problem Relation Age of Onset   • Arthritis Mother    • Hypertension Mother    • Cancer Father         abdomen   • Arthritis Father    • Hypertension Father        Allergies   Allergen Reactions   • Cephalosporins Anaphylaxis     Throat swelling   • Dye  [Contrast Dye] Hives   • Sulfa Antibiotics Hives   • Statins Myalgia     Myalgia and fatique       ROS:   Constitutional:  Admit fatigue, tiredness.  Admit weight loss, lightheadedness, dizziness  Eyes:  Denies change in visual acuity   HENT:  Denies nasal congestion or sore throat   Respiratory: denies cough, shortness of breath.   Cardiovascular:  denies chest pain, edema   GI:  Denies abdominal pain, nausea, vomiting.   :  Denies Polyuria and Polydipsia  Musculoskeletal:  Denies back pain or joint pain   Integument:  Denies dry skin, rash   Neurologic:  Denies headache, focal weakness or sensory changes   Endocrine:  Denies polyuria or polydipsia   Psychiatric:  Denies depression or anxiety      Vitals:    03/17/21 1811   BP: 116/76   Pulse: 71   Resp: 15   Temp: 98.1 °F (36.7 °C)   SpO2: 100%      Body mass index is 22.46 kg/m².     Physical Exam:  GEN: NAD, conversant  EYES: EOMI, PERRL, no conjunctival erythema  NECK: no thyromegaly, full ROM   CV: RRR, no murmurs/rubs/gallops, no peripheral edema  LUNG: CTAB, no wheezes/rales/ronchi  SKIN: no rashes, no acanthosis  MSK: no deformities, full ROM of all extremities  NEURO: no tremors, DTR normal  PSYCH: AOX3, appropriate mood, affect normal      Results Review:     I reviewed the patient's new clinical results.    Lab Results   Component Value Date    GLUCOSE 82 03/17/2021    BUN 12 03/17/2021    CREATININE 0.81 03/17/2021    EGFRIFNONA 102 03/17/2021    BCR 14.8 03/17/2021    K 4.3 03/17/2021    CO2 26.0 03/17/2021    CALCIUM 9.7 03/17/2021    ALBUMIN 4.40 01/28/2021    LABIL2 1.3 09/25/2018    AST 23 01/28/2021    ALT 17 01/28/2021        No results found for: HGBA1C  Lab Results   Component Value Date    CREATININE 0.81 03/17/2021     Results from last 7 days   Lab Units 03/17/21  0924   GLUCOSE mg/dL 145*       Medication Review: Reviewed.       Current Facility-Administered Medications:   •  acetaminophen (TYLENOL) tablet 650 mg, 650 mg, Oral, Q4H PRN **OR** acetaminophen (TYLENOL) 160 MG/5ML solution 650 mg, 650 mg, Oral, Q4H PRN **OR** acetaminophen (TYLENOL) suppository 650 mg, 650 mg, Rectal, Q4H PRN, AchterbergSukhwinderle, APRN  •  aluminum-magnesium hydroxide-simethicone (MAALOX MAX) 400-400-40 MG/5ML suspension 15 mL, 15 mL, Oral, Q6H PRN, AchterbergSukhwinderle, APRN  •  aspirin chewable tablet 81 mg, 81 mg, Oral, Daily, Katz, Jalaram, DO, 81 mg at 03/16/21 2041  •  melatonin tablet 5 mg, 5 mg, Oral, Nightly PRN, AchterSukhwinder alexle, APRN  •  ondansetron (ZOFRAN) tablet 4 mg, 4 mg, Oral, Q6H PRN **OR** ondansetron (ZOFRAN) injection 4 mg, 4 mg, Intravenous, Q6H PRN, NavneetterSukhwinder alexle, APRN, 4 mg at 03/17/21 0945  •  [COMPLETED] Insert peripheral IV, , , Once **AND** sodium chloride 0.9 % flush 10 mL, 10 mL, Intravenous, PRN, Lorenzo Perez MD  •  sodium chloride 0.9 % flush 10 mL, 10 mL, Intravenous, Q12H, Achterabi, Jyoti, APRN, 10 mL at 03/17/21 0809  •  sodium chloride 0.9 % flush 10 mL, 10 mL, Intravenous, PRN, NavneetterSukhwinder alexle, APRN    Assessment/Plan   Dizziness/weight loss: Rule out adrenal insufficiency, 50-year-old male with left adrenalectomy done on February 19, 2021.  5 days postop start having lethargy with low blood pressure and weight loss, will check ACTH stimulation test as well as aldosterone and renin level and DHEA-sulfate level.  We will check orthostatic vitals.  I explained this in detail to him and his wife.  All questions were answered.    Thank you very much for the consultation.             Julia Blake MD FACE.

## 2021-03-17 NOTE — PLAN OF CARE
Goal Outcome Evaluation:  Plan of Care Reviewed With: patient, spouse  Progress: no change   Await endocrine consult   EOAE (evoked otoacoustic emission)

## 2021-03-17 NOTE — NURSING NOTE
Dr Katz calls to ask about lab draw, I told him waiting on med for ACTH. He says no cosentropin needs to be given, need just a baseline test, not a stimulation test, phlebotomist paged to come draw labs

## 2021-03-18 ENCOUNTER — READMISSION MANAGEMENT (OUTPATIENT)
Dept: CALL CENTER | Facility: HOSPITAL | Age: 51
End: 2021-03-18

## 2021-03-18 VITALS
BODY MASS INDEX: 22.35 KG/M2 | TEMPERATURE: 98.5 F | RESPIRATION RATE: 16 BRPM | SYSTOLIC BLOOD PRESSURE: 106 MMHG | HEART RATE: 62 BPM | DIASTOLIC BLOOD PRESSURE: 68 MMHG | OXYGEN SATURATION: 99 % | HEIGHT: 70 IN | WEIGHT: 156.09 LBS

## 2021-03-18 LAB
ACTH PLAS-MCNC: 73.7 PG/ML (ref 7.2–63.3)
CORTIS SERPL-MCNC: 13.32 MCG/DL
CORTIS SERPL-MCNC: 15.49 MCG/DL
CORTIS SERPL-MCNC: 7.4 MCG/DL
QT INTERVAL: 424 MS

## 2021-03-18 PROCEDURE — 82088 ASSAY OF ALDOSTERONE: CPT | Performed by: INTERNAL MEDICINE

## 2021-03-18 PROCEDURE — 82024 ASSAY OF ACTH: CPT | Performed by: INTERNAL MEDICINE

## 2021-03-18 PROCEDURE — 84244 ASSAY OF RENIN: CPT | Performed by: INTERNAL MEDICINE

## 2021-03-18 PROCEDURE — 82533 TOTAL CORTISOL: CPT | Performed by: INTERNAL MEDICINE

## 2021-03-18 PROCEDURE — 96375 TX/PRO/DX INJ NEW DRUG ADDON: CPT

## 2021-03-18 PROCEDURE — 99217 PR OBSERVATION CARE DISCHARGE MANAGEMENT: CPT | Performed by: INTERNAL MEDICINE

## 2021-03-18 PROCEDURE — 25010000002 HYDROCORTISONE SODIUM SUCCINATE 100 MG RECONSTITUTED SOLUTION: Performed by: INTERNAL MEDICINE

## 2021-03-18 PROCEDURE — 99213 OFFICE O/P EST LOW 20 MIN: CPT | Performed by: INTERNAL MEDICINE

## 2021-03-18 PROCEDURE — G0378 HOSPITAL OBSERVATION PER HR: HCPCS

## 2021-03-18 PROCEDURE — 25010000002 COSYNTROPIN PER 0.25 MG: Performed by: INTERNAL MEDICINE

## 2021-03-18 RX ORDER — HYDROCORTISONE 10 MG/1
10 TABLET ORAL
Status: DISCONTINUED | OUTPATIENT
Start: 2021-03-18 | End: 2021-03-18 | Stop reason: HOSPADM

## 2021-03-18 RX ORDER — HYDROCORTISONE 10 MG/1
10 TABLET ORAL
Qty: 90 TABLET | Refills: 0 | Status: SHIPPED | OUTPATIENT
Start: 2021-03-18 | End: 2021-03-31 | Stop reason: SDUPTHER

## 2021-03-18 RX ADMIN — Medication 10 ML: at 08:12

## 2021-03-18 RX ADMIN — HYDROCORTISONE SODIUM SUCCINATE 50 MG: 100 INJECTION, POWDER, FOR SOLUTION INTRAMUSCULAR; INTRAVENOUS at 13:02

## 2021-03-18 RX ADMIN — COSYNTROPIN 0.25 MG: 0.25 INJECTION, POWDER, LYOPHILIZED, FOR SOLUTION INTRAMUSCULAR; INTRAVENOUS at 06:04

## 2021-03-18 NOTE — PROGRESS NOTES
Case Management Discharge Note                Selected Continued Care - Discharged on 3/18/2021 Admission date: 3/16/2021 - Discharge disposition: Home or Self Care     Final Discharge Disposition Code: 01 - home or self-care

## 2021-03-18 NOTE — DISCHARGE SUMMARY
[facility] Medicine Services  DISCHARGE SUMMARY        Prepared For PCP:  Justa Castano MD    Patient Name: Jourdan Lebron  : 1970  MRN: [id number]      Date of Admission:   3/16/2021    Date of Discharge:  3/18/2021    Length of stay:  LOS: 0 days     Hospital Course     Presenting Problem:   Near syncope [R55]      Active Hospital Problems    Diagnosis  POA   • **Near syncope [R55]  Yes   • Weakness [R53.1]  Yes   • History of total adrenalectomy (CMS/[facility]) [E89.6]  Not Applicable   • History of CVA (cerebrovascular accident) [Z86.73]  Not Applicable   • Adrenal mass (CMS/[facility]) [E27.8]  Yes   • Hyperlipidemia [E78.5]  Yes      Resolved Hospital Problems   No resolved problems to display.           Hospital Course:  Jourdan Lebron is a 50 y.o. male with PMH of Left MCA stroke status post tPA and thrombectomy 2020 and benign neoplasm of left adrenal gland status post adrenalectomy 2021 by Dr. Lennon at [facility]. He stated he has become progressively weaker since his adrenalectomy. He became dizzy, lightheaded, and started shaking. He tried to eat to see if his symptoms would improve. He stated he felt very weak, like his legs were heavy, nauseous, diaphoretic, and felt like he might pass out. He called EMS and by the time they arrived he felt a little better but still weak. His SBP was 120s. His wife states the patient's blood pressure has been 90s/60s since his surgery. He is lethargic every morning when he wakes up and then slowly wakes up throughout the day. His symptoms seem to be worse in the morning. He stated his neurologist approved he could stop taking plavix because he recently had black colored stool. His black stool has resolved but his weakness, dizziness, lethargy have been persistent.  Patient's vital signs are stable but blood pressure on lower side, orthostatics negative.  Endocrinology was consulted for concern for primary adrenal insufficiency.  Labs  consistent with possible adrenal insufficiency per endocrinology.  Patient started on hydrocortisone and he has clinically done well since admission.  He is okay to discharge per endocrinology.  Patient is stable to discharge home today with follow-up with PCP and endocrinology as an outpatient.          Recommendation for Outpatient Providers:       Follow-up with PCP and endocrinology      Reasons For Change In Medications and Indications for New Medications:    Hydrocortisone added    Day of Discharge     HPI:   Patient seen and examined the day of discharge.  He feels better today, his dizziness has significantly improved.  Labs showing concern for primary adrenal insufficiency, started on hydrocortisone per endocrinology.  Okay to discharge with outpatient follow-up per endocrinology.    Vital Signs:   Temp:  [97.8 °F (36.6 °C)-98.5 °F (36.9 °C)] 98.5 °F (36.9 °C)  Heart Rate:  [54-80] 62  Resp:  [15-16] 16  BP: ()/(63-76) 106/68     Physical Exam:  General: Awake, alert, NAD  Eyes: PERRL, EOMI, conjunctive are clear  Cardiovascular: Regular rate and rhythm, no murmurs  Respiratory: Clear to auscultation bilaterally, no wheezing or rales, unlabored breathing  Abdomen: Soft, nontender, positive bowel sounds, no guarding  Neurologic: A&O, CN grossly intact, moves all extremities spontaneously  Musculoskeletal: Normal range of motion, no deformities  Skin: Warm, dry, intact      Pertinent  and/or Most Recent Results     Results from last 7 days   Lab Units 03/17/21  0809 03/16/21  1349   WBC 10*3/mm3 7.20 12.50*   HEMOGLOBIN g/dL 14.5 15.5   HEMATOCRIT % 43.2 45.8   PLATELETS 10*3/mm3 289 311   SODIUM mmol/L 140 137   POTASSIUM mmol/L 4.3 4.4   CHLORIDE mmol/L 105 103   CO2 mmol/L 26.0 25.0   BUN mg/dL 12 10   CREATININE mg/dL 0.81 1.19   GLUCOSE mg/dL 82 86   CALCIUM mg/dL 9.7 9.3           Invalid input(s): PROT, LABALBU        Invalid input(s): TG, LDLCALC, LDLREALC  Results from last 7 days   Lab Units  03/18/21  0703 03/18/21  0635 03/18/21  0601 03/17/21  0809 03/16/21  1349   TSH uIU/mL  --   --   --  1.360  --    CORTISOL mcg/dL 15.49 13.32 7.40 12.49  --    TROPONIN T ng/mL  --   --   --   --  <0.010       Brief Urine Lab Results  (Last result in the past 365 days)      Color   Clarity   Blood   Leuk Est   Nitrite   Protein   CREAT   Urine HCG        03/10/21 1626 Yellow Cloudy Negative Negative Negative 100 mg/dL               Microbiology Results Abnormal     Procedure Component Value - Date/Time    COVID PRE-OP / PRE-PROCEDURE SCREENING ORDER (NO ISOLATION) - Swab, Nasopharynx [[id number]]  (Normal) Collected: 03/16/21 1653    Lab Status: Final result Specimen: Swab from Nasopharynx Updated: 03/17/21 0006    Narrative:      The following orders were created for panel order COVID PRE-OP / PRE-PROCEDURE SCREENING ORDER (NO ISOLATION) - Swab, Nasopharynx.  Procedure                               Abnormality         Status                     ---------                               -----------         ------                     COVID-19,APTIMA PANTHER,...[[id number]]  Normal              Final result                 Please view results for these tests on the individual orders.    COVID-19,APTIMA PANTHER,[facility] IN-HOUSE, NP/OP SWAB IN UTM/VTM/SALINE TRANSPORT MEDIA,24 HR TAT - Swab, Nasopharynx [[id number]]  (Normal) Collected: 03/16/21 1653    Lab Status: Final result Specimen: Swab from Nasopharynx Updated: 03/17/21 0006     COVID19 Not Detected    Narrative:      Fact sheet for providers: https://www.fda.gov/media/040825/download     Fact sheet for patients: https://www.fda.gov/media/772476/download    Test performed by RT PCR.          CT Head Without Contrast    Result Date: 3/6/2021  Impression: No acute intracranial abnormality.  Electronically Signed By-Arsalan Sullivan MD On:3/6/2021 1:35 PM This report was finalized on [id number] by  Arsalan Sullivan MD.    XR Chest 1 View    Result Date:  3/16/2021  Impression: No acute cardiopulmonary process.  Electronically Signed By-Stanislav Howard MD On:3/16/2021 3:13 PM This report was finalized on [id number] by  Stanislav Howard MD.                          Test Results Pending at Discharge  Pending Labs     Order Current Status    ACTH In process    ACTH In process    DHEA-Sulfate In process    Renin Activity & Aldosterone In process            Procedures Performed           Consults:   Consults     Date and Time Order Name Status Description    3/16/2021  3:59 PM Inpatient Endocrinology Consult      3/16/2021  3:40 PM Hospitalist (on-call MD unless specified) Completed             Discharge Details        Discharge Medications      New Medications      Instructions Start Date   hydrocortisone 10 MG tablet  Commonly known as: CORTEF   10 mg, Oral, 3 Times Daily With Meals         Continue These Medications      Instructions Start Date   Alirocumab 75 MG/ML solution prefilled syringe   75 mg, Subcutaneous, Every 14 Days      aspirin 81 MG chewable tablet   81 mg, Oral, Daily      Flonase Allergy Relief 50 MCG/ACT nasal spray  Generic drug: fluticasone   1 spray, Nasal, Daily             Allergies   Allergen Reactions   • Cephalosporins Anaphylaxis     Throat swelling   • Dye  [Contrast Dye] Hives   • Sulfa Antibiotics Hives   • Statins Myalgia     Myalgia and fatique         Discharge Disposition:  Home or Self Care    Diet:  Hospital:  Diet Order   Procedures   • Diet Regular         Discharge Activity:         CODE STATUS:    Code Status and Medical Interventions:   Ordered at: 03/16/21 1360     Level Of Support Discussed With:    Patient     Code Status:    CPR     Medical Interventions (Level of Support Prior to Arrest):    Full         Follow-up Appointments  Future Appointments   Date Time Provider Department Center   4/29/2021  8:15 AM Justa Castano MD [facility]             Condition on Discharge:      Stable      This patient has been  examined wearing appropriate Personal Protective Equipment on 03/18/21      Electronically signed by Valerie Katz DO, 03/18/21, 12:48 PM EDT.      Time: I spent  31  minutes on this discharge activity which included face-to-face encounter with the patient/reviewing the data in the system/coordination of the care with the nursing staff as well as consultants/documentation/entering orders.

## 2021-03-18 NOTE — PROGRESS NOTES
Daily Progress Note    Patient Care Team:  Justa Castano MD as PCP - General (Family Medicine)  Luz Sepulveda PA-C as Physician Assistant (Neurosurgery)  Gonzalo Cruz MD as Consulting Physician (Surgical Oncology)    Chief Complaint: Follow-up dizziness  HPI: Patient seen and examined today.  Underwent left adrenalectomy few weeks ago.  Postop has developed lethargy fatigue weight loss.  ACTH stimulation test this morning done to rule out adrenal insufficiency, peak cortisol was around 15 which did confirm that he does have cortisol deficiency.  Will be started on hydrocortisone.    ROS:   Constitutional:   Admit fatigue, tiredness.    Respiratory: denies cough, shortness of breath.   Cardiovascular:  denies chest pain, edema   GI:  Denies abdominal pain, nausea, vomiting.    Neurologic:  Denies headache, focal weakness or sensory changes   Endocrine:  Denies polyuria or polydipsia   Psychiatric:  Denies depression or anxiety       Vitals:    03/18/21 1027   BP: 106/68   Pulse: 62   Resp: 16   Temp: 98.5 °F (36.9 °C)   SpO2: 99%     Body mass index is 22.4 kg/m².    Physical Exam:  GEN: NAD, conversant  EYES: EOMI, PERRL, no conjunctival erythema  NECK: no thyromegaly, full ROM   CV: RRR, no murmurs/rubs/gallops, no peripheral edema  LUNG: CTAB, no wheezes/rales/ronchi  SKIN: no rashes, no acanthosis  MSK: no deformities, full ROM of all extremities  NEURO: no tremors, DTR normal  PSYCH: AOX3, appropriate mood, affect normal      Results Review:     I reviewed the patient's new clinical results.    Glucose   Date Value Ref Range Status   03/17/2021 82 65 - 99 mg/dL Final     Sodium   Date Value Ref Range Status   03/17/2021 140 136 - 145 mmol/L Final     Potassium   Date Value Ref Range Status   03/17/2021 4.3 3.5 - 5.2 mmol/L Final     CO2   Date Value Ref Range Status   03/17/2021 26.0 22.0 - 29.0 mmol/L Final     Chloride   Date Value Ref Range Status   03/17/2021 105 98 - 107 mmol/L Final      Anion Gap   Date Value Ref Range Status   03/17/2021 9.0 5.0 - 15.0 mmol/L Final     Creatinine   Date Value Ref Range Status   03/17/2021 0.81 0.76 - 1.27 mg/dL Final     BUN   Date Value Ref Range Status   03/17/2021 12 6 - 20 mg/dL Final     BUN/Creatinine Ratio   Date Value Ref Range Status   03/17/2021 14.8 7.0 - 25.0 Final     Calcium   Date Value Ref Range Status   03/17/2021 9.7 8.6 - 10.5 mg/dL Final     eGFR Non  Amer   Date Value Ref Range Status   03/17/2021 102 >60 mL/min/1.73 Final     No results found for: HGBA1C  No results found for: GLUF, MICROALBUR  Results from last 7 days   Lab Units 03/17/21  0924   GLUCOSE mg/dL 145*     Results for ALFREDA EVANS (MRN [id number]) as of 3/18/2021 12:48   Ref. Range 3/10/2021 15:34 3/17/2021 08:09 3/18/2021 06:01 3/18/2021 06:35 3/18/2021 07:03   Cortisol Latest Ref Range:   mcg/dL  12.49 7.40 13.32 15.49   TSH Baseline Latest Ref Range: 0.270 - 4.200 uIU/mL 1.270 1.360              Medication Review: Reviewed.     aspirin, 81 mg, Oral, Daily  hydrocortisone, 10 mg, Oral, TID With Meals  hydrocortisone sodium succinate, 50 mg, Intravenous, Once  sodium chloride, 10 mL, Intravenous, Q12H          Assessment/Plan   Glucocorticoid deficiency: His peak cortisol was around 15, and his clinical picture is consistent with a cortisol deficiency.  I explained this to him and his wife in detail.  He will benefit from hydrocortisone replacement.  I will give him 1 dose of 50 mg IV x1 and followed by hydrocortisone 10 mg p.o. 3 times daily which he can be discharged on.  I explained to him and his wife in detail the importance of hydrocortisone replacement as well as sick day rules with regards to acute sickness.  He verbalized understanding.  His aldosterone, renin and DHEA-sulfate are pending.  Recommend follow-up in the office in about 2 to 4 weeks.    Okay to be discharged from endocrine perspective after hydrocortisone infusion is complete and make sure he  gets a prescription of hydrocortisone 10 mg p.o. 3 times daily.             Julia Blake MD. FACE

## 2021-03-18 NOTE — PLAN OF CARE
Problem: Adult Inpatient Plan of Care  Goal: Plan of Care Review  Outcome: Ongoing, Progressing  Flowsheets (Taken 3/18/2021 0412)  Plan of Care Reviewed With: patient  Outcome Summary: monitoring vital signs. will be doing cortisol test this am, resting quietly, nocomplaints  Goal: Patient-Specific Goal (Individualized)  Outcome: Ongoing, Progressing  Goal: Absence of Hospital-Acquired Illness or Injury  Outcome: Ongoing, Progressing  Intervention: Identify and Manage Fall Risk  Recent Flowsheet Documentation  Taken 3/18/2021 0100 by Kenia Baron RN  Safety Promotion/Fall Prevention:   safety round/check completed   assistive device/personal items within reach  Goal: Optimal Comfort and Wellbeing  Outcome: Ongoing, Progressing  Goal: Readiness for Transition of Care  Outcome: Ongoing, Progressing   Goal Outcome Evaluation:  Plan of Care Reviewed With: patient     Outcome Summary: monitoring vital signs. will be doing cortisol test this am, resting quietly, nocomplaints

## 2021-03-19 ENCOUNTER — TRANSITIONAL CARE MANAGEMENT TELEPHONE ENCOUNTER (OUTPATIENT)
Dept: CALL CENTER | Facility: HOSPITAL | Age: 51
End: 2021-03-19

## 2021-03-19 LAB
ACTH PLAS-MCNC: 32.1 PG/ML (ref 7.2–63.3)
DHEA-S SERPL-MCNC: 83.7 UG/DL (ref 71.6–375.4)

## 2021-03-19 NOTE — OUTREACH NOTE
Call Center TCM Note      Responses   [facility] patient discharged from?  [facility]   Does the patient have one of the following disease processes/diagnoses(primary or secondary)?  Other   TCM attempt successful?  Yes   Call start time  1456   Call end time  1500   Discharge diagnosis  Near syncope   Is patient permission given to speak with other caregiver?  No   Meds reviewed with patient/caregiver?  Yes   Is the patient having any side effects they believe may be caused by any medication additions or changes?  No   Does the patient have all medications ordered at discharge?  Yes   Is the patient taking all medications as directed (includes completed medication regime)?  Yes   Does the patient have a primary care provider?   Yes   Does the patient have an appointment with their PCP within 7 days of discharge?  Greater than 7 days   Comments regarding PCP  PCP Dr Justa Castano. Hospital follow up appt in place for 3/26/21  1130am   Nursing Interventions  Verified appointment date/time/provider   Has the patient kept scheduled appointments due by today?  N/A   Comments  Patient also has appt in place for Dr Julia Blake for 3/23/21   Has home health visited the patient within 72 hours of discharge?  N/A   Psychosocial issues?  No   Did the patient receive a copy of their discharge instructions?  Yes   Nursing interventions  Reviewed instructions with patient   What is the patient's perception of their health status since discharge?  Improving   Is the patient/caregiver able to teach back signs and symptoms related to disease process for when to call PCP?  Yes   Is the patient/caregiver able to teach back the hierarchy of who to call/visit for symptoms/problems? PCP, Specialist, Home health nurse, Urgent Care, ED, 911  Yes   TCM call completed?  Yes          Lisa Waite RN    3/19/2021, 15:00 EDT

## 2021-03-19 NOTE — OUTREACH NOTE
Prep Survey      Responses   [facility] facility patient discharged from?  [facility]   Is LACE score < 7 ?  Yes   Emergency Room discharge w/ pulse ox?  No   Eligibility  TCM   Hospital  [facility]   Date of Admission  03/16/21   Date of Discharge  03/18/21   Discharge Disposition  Home or Self Care   Discharge diagnosis  Near syncope   Does the patient have one of the following disease processes/diagnoses(primary or secondary)?  Other   Does the patient have Home health ordered?  No   Is there a DME ordered?  No   Prep survey completed?  Yes          Bev Segura RN

## 2021-03-22 ENCOUNTER — HOSPITAL ENCOUNTER (EMERGENCY)
Facility: HOSPITAL | Age: 51
Discharge: HOME | End: 2021-03-23

## 2021-03-22 DIAGNOSIS — R31.9 HEMATURIA, UNSPECIFIED TYPE: ICD-10-CM

## 2021-03-22 DIAGNOSIS — R10.84 GENERALIZED ABDOMINAL PAIN: Primary | ICD-10-CM

## 2021-03-22 LAB
ALBUMIN SERPL-MCNC: 3.6 G/DL (ref 3.5–5.2)
ALBUMIN/GLOB SERPL: 1.3 G/DL
ALP SERPL-CCNC: 63 U/L (ref 39–117)
ALT SERPL W P-5'-P-CCNC: 18 U/L (ref 1–41)
ANION GAP SERPL CALCULATED.3IONS-SCNC: 10 MMOL/L (ref 5–15)
AST SERPL-CCNC: 13 U/L (ref 1–40)
BACTERIA UR QL AUTO: ABNORMAL /HPF
BASOPHILS # BLD AUTO: 0.2 10*3/MM3 (ref 0–0.2)
BASOPHILS NFR BLD AUTO: 1.3 % (ref 0–1.5)
BILIRUB SERPL-MCNC: <0.2 MG/DL (ref 0–1.2)
BILIRUB UR QL STRIP: NEGATIVE
BUN SERPL-MCNC: 14 MG/DL (ref 6–20)
BUN/CREAT SERPL: 17.7 (ref 7–25)
CALCIUM SPEC-SCNC: 9.5 MG/DL (ref 8.6–10.5)
CHLORIDE SERPL-SCNC: 103 MMOL/L (ref 98–107)
CLARITY UR: CLEAR
CO2 SERPL-SCNC: 25 MMOL/L (ref 22–29)
COLOR UR: YELLOW
CREAT SERPL-MCNC: 0.79 MG/DL (ref 0.76–1.27)
DEPRECATED RDW RBC AUTO: 45.9 FL (ref 37–54)
EOSINOPHIL # BLD AUTO: 0.3 10*3/MM3 (ref 0–0.4)
EOSINOPHIL NFR BLD AUTO: 2.9 % (ref 0.3–6.2)
ERYTHROCYTE [DISTWIDTH] IN BLOOD BY AUTOMATED COUNT: 13.5 % (ref 12.3–15.4)
GFR SERPL CREATININE-BSD FRML MDRD: 105 ML/MIN/1.73
GLOBULIN UR ELPH-MCNC: 2.7 GM/DL
GLUCOSE SERPL-MCNC: 90 MG/DL (ref 65–99)
GLUCOSE UR STRIP-MCNC: NEGATIVE MG/DL
HCT VFR BLD AUTO: 41.1 % (ref 37.5–51)
HGB BLD-MCNC: 14.2 G/DL (ref 13–17.7)
HGB UR QL STRIP.AUTO: ABNORMAL
HYALINE CASTS UR QL AUTO: ABNORMAL /LPF
KETONES UR QL STRIP: NEGATIVE
LEUKOCYTE ESTERASE UR QL STRIP.AUTO: NEGATIVE
LIPASE SERPL-CCNC: 28 U/L (ref 13–60)
LYMPHOCYTES # BLD AUTO: 3.6 10*3/MM3 (ref 0.7–3.1)
LYMPHOCYTES NFR BLD AUTO: 29.8 % (ref 19.6–45.3)
MCH RBC QN AUTO: 33.8 PG (ref 26.6–33)
MCHC RBC AUTO-ENTMCNC: 34.6 G/DL (ref 31.5–35.7)
MCV RBC AUTO: 97.8 FL (ref 79–97)
MONOCYTES # BLD AUTO: 0.8 10*3/MM3 (ref 0.1–0.9)
MONOCYTES NFR BLD AUTO: 6.8 % (ref 5–12)
NEUTROPHILS NFR BLD AUTO: 59.2 % (ref 42.7–76)
NEUTROPHILS NFR BLD AUTO: 7.1 10*3/MM3 (ref 1.7–7)
NITRITE UR QL STRIP: NEGATIVE
NRBC BLD AUTO-RTO: 0.1 /100 WBC (ref 0–0.2)
PH UR STRIP.AUTO: 7 [PH] (ref 5–8)
PLATELET # BLD AUTO: 224 10*3/MM3 (ref 140–450)
PMV BLD AUTO: 7.9 FL (ref 6–12)
POTASSIUM SERPL-SCNC: 4.2 MMOL/L (ref 3.5–5.2)
PROT SERPL-MCNC: 6.3 G/DL (ref 6–8.5)
PROT UR QL STRIP: NEGATIVE
RBC # BLD AUTO: 4.2 10*6/MM3 (ref 4.14–5.8)
RBC # UR: ABNORMAL /HPF
REF LAB TEST METHOD: ABNORMAL
SODIUM SERPL-SCNC: 138 MMOL/L (ref 136–145)
SP GR UR STRIP: 1.01 (ref 1–1.03)
SQUAMOUS #/AREA URNS HPF: ABNORMAL /HPF
UROBILINOGEN UR QL STRIP: ABNORMAL
WBC # BLD AUTO: 11.9 10*3/MM3 (ref 3.4–10.8)
WBC UR QL AUTO: ABNORMAL /HPF

## 2021-03-22 PROCEDURE — 81001 URINALYSIS AUTO W/SCOPE: CPT | Performed by: NURSE PRACTITIONER

## 2021-03-22 PROCEDURE — 96361 HYDRATE IV INFUSION ADD-ON: CPT

## 2021-03-22 PROCEDURE — 96374 THER/PROPH/DIAG INJ IV PUSH: CPT

## 2021-03-22 PROCEDURE — 83690 ASSAY OF LIPASE: CPT | Performed by: NURSE PRACTITIONER

## 2021-03-22 PROCEDURE — 80053 COMPREHEN METABOLIC PANEL: CPT | Performed by: NURSE PRACTITIONER

## 2021-03-22 PROCEDURE — 25010000002 KETOROLAC TROMETHAMINE PER 15 MG: Performed by: NURSE PRACTITIONER

## 2021-03-22 PROCEDURE — 85025 COMPLETE CBC W/AUTO DIFF WBC: CPT | Performed by: NURSE PRACTITIONER

## 2021-03-22 PROCEDURE — 99284 EMERGENCY DEPT VISIT MOD MDM: CPT

## 2021-03-22 RX ORDER — SODIUM CHLORIDE 0.9 % (FLUSH) 0.9 %
10 SYRINGE (ML) INJECTION AS NEEDED
Status: DISCONTINUED | OUTPATIENT
Start: 2021-03-22 | End: 2021-03-23 | Stop reason: HOSPADM

## 2021-03-22 RX ORDER — MORPHINE SULFATE 4 MG/ML
4 INJECTION, SOLUTION INTRAMUSCULAR; INTRAVENOUS ONCE
Status: DISCONTINUED | OUTPATIENT
Start: 2021-03-22 | End: 2021-03-23 | Stop reason: HOSPADM

## 2021-03-22 RX ORDER — ONDANSETRON 2 MG/ML
4 INJECTION INTRAMUSCULAR; INTRAVENOUS ONCE
Status: DISCONTINUED | OUTPATIENT
Start: 2021-03-22 | End: 2021-03-23 | Stop reason: HOSPADM

## 2021-03-22 RX ORDER — KETOROLAC TROMETHAMINE 30 MG/ML
30 INJECTION, SOLUTION INTRAMUSCULAR; INTRAVENOUS ONCE
Status: COMPLETED | OUTPATIENT
Start: 2021-03-22 | End: 2021-03-22

## 2021-03-22 RX ADMIN — SODIUM CHLORIDE 1000 ML: 9 INJECTION, SOLUTION INTRAVENOUS at 22:54

## 2021-03-22 RX ADMIN — KETOROLAC TROMETHAMINE 30 MG: 30 INJECTION, SOLUTION INTRAMUSCULAR at 22:51

## 2021-03-23 ENCOUNTER — LAB (OUTPATIENT)
Dept: LAB | Facility: HOSPITAL | Age: 51
End: 2021-03-23

## 2021-03-23 ENCOUNTER — OFFICE VISIT (OUTPATIENT)
Dept: ENDOCRINOLOGY | Facility: CLINIC | Age: 51
End: 2021-03-23

## 2021-03-23 ENCOUNTER — APPOINTMENT (OUTPATIENT)
Dept: CT IMAGING | Facility: HOSPITAL | Age: 51
End: 2021-03-23

## 2021-03-23 VITALS
HEART RATE: 64 BPM | HEIGHT: 70 IN | DIASTOLIC BLOOD PRESSURE: 69 MMHG | WEIGHT: 163.6 LBS | RESPIRATION RATE: 16 BRPM | TEMPERATURE: 97.8 F | BODY MASS INDEX: 23.42 KG/M2 | OXYGEN SATURATION: 99 % | SYSTOLIC BLOOD PRESSURE: 104 MMHG

## 2021-03-23 VITALS
BODY MASS INDEX: 24.34 KG/M2 | SYSTOLIC BLOOD PRESSURE: 105 MMHG | HEIGHT: 70 IN | OXYGEN SATURATION: 98 % | WEIGHT: 170 LBS | HEART RATE: 71 BPM | DIASTOLIC BLOOD PRESSURE: 68 MMHG | TEMPERATURE: 97.3 F

## 2021-03-23 DIAGNOSIS — E27.49 GLUCOCORTICOID DEFICIENCY (HCC): Primary | ICD-10-CM

## 2021-03-23 DIAGNOSIS — E27.49 GLUCOCORTICOID DEFICIENCY (HCC): ICD-10-CM

## 2021-03-23 DIAGNOSIS — R74.01 ELEVATED TRANSAMINASE LEVEL: ICD-10-CM

## 2021-03-23 DIAGNOSIS — R68.82 LOW LIBIDO: ICD-10-CM

## 2021-03-23 PROBLEM — M54.2 NECK PAIN: Status: ACTIVE | Noted: 2020-07-29

## 2021-03-23 PROBLEM — D49.7 NEOPLASM OF ADRENAL GLAND: Status: ACTIVE | Noted: 2020-08-25

## 2021-03-23 PROBLEM — I63.9 ISCHEMIC STROKE: Status: ACTIVE | Noted: 2020-06-15

## 2021-03-23 PROBLEM — M47.812 ARTHROPATHY OF CERVICAL FACET JOINT: Status: ACTIVE | Noted: 2021-02-22

## 2021-03-23 PROBLEM — M54.6 THORACIC BACK PAIN: Status: ACTIVE | Noted: 2021-02-22

## 2021-03-23 PROBLEM — E53.8 VITAMIN B12 DEFICIENCY: Status: ACTIVE | Noted: 2020-06-15

## 2021-03-23 PROBLEM — N20.0 RENAL STONE: Status: ACTIVE | Noted: 2020-08-25

## 2021-03-23 PROBLEM — M48.02 SPINAL STENOSIS OF CERVICAL REGION: Status: ACTIVE | Noted: 2021-01-28

## 2021-03-23 PROBLEM — I63.9 EMBOLIC STROKE: Status: ACTIVE | Noted: 2020-06-15

## 2021-03-23 PROBLEM — M25.519 SHOULDER PAIN: Status: ACTIVE | Noted: 2020-07-29

## 2021-03-23 PROBLEM — J30.2 SEASONAL ALLERGIES: Status: ACTIVE | Noted: 2021-01-26

## 2021-03-23 PROBLEM — J30.2 SEASONAL ALLERGIC RHINITIS: Status: ACTIVE | Noted: 2020-08-25

## 2021-03-23 PROBLEM — E78.00 HYPERCHOLESTEROLEMIA: Status: ACTIVE | Noted: 2020-08-25

## 2021-03-23 PROBLEM — F41.9 ANXIETY: Status: ACTIVE | Noted: 2020-06-15

## 2021-03-23 PROBLEM — I63.50 CEREBRAL ARTERY OCCLUSION WITH CEREBRAL INFARCTION: Status: ACTIVE | Noted: 2020-06-15

## 2021-03-23 PROBLEM — M79.602 PAIN IN LEFT ARM: Status: ACTIVE | Noted: 2020-07-29

## 2021-03-23 LAB
ALBUMIN SERPL-MCNC: 4 G/DL (ref 3.5–5.2)
ALBUMIN/GLOB SERPL: 1.8 G/DL
ALP SERPL-CCNC: 61 U/L (ref 39–117)
ALT SERPL W P-5'-P-CCNC: 21 U/L (ref 1–41)
ANION GAP SERPL CALCULATED.3IONS-SCNC: 8.9 MMOL/L (ref 5–15)
AST SERPL-CCNC: 16 U/L (ref 1–40)
BASOPHILS # BLD AUTO: 0.04 10*3/MM3 (ref 0–0.2)
BASOPHILS NFR BLD AUTO: 0.4 % (ref 0–1.5)
BILIRUB SERPL-MCNC: 0.2 MG/DL (ref 0–1.2)
BUN SERPL-MCNC: 11 MG/DL (ref 6–20)
BUN/CREAT SERPL: 12.8 (ref 7–25)
CALCIUM SPEC-SCNC: 9.2 MG/DL (ref 8.6–10.5)
CHLORIDE SERPL-SCNC: 104 MMOL/L (ref 98–107)
CO2 SERPL-SCNC: 27.1 MMOL/L (ref 22–29)
CREAT SERPL-MCNC: 0.86 MG/DL (ref 0.76–1.27)
DEPRECATED RDW RBC AUTO: 44.7 FL (ref 37–54)
EOSINOPHIL # BLD AUTO: 0.35 10*3/MM3 (ref 0–0.4)
EOSINOPHIL NFR BLD AUTO: 3.8 % (ref 0.3–6.2)
ERYTHROCYTE [DISTWIDTH] IN BLOOD BY AUTOMATED COUNT: 12.4 % (ref 12.3–15.4)
ERYTHROCYTE [SEDIMENTATION RATE] IN BLOOD: 10 MM/HR (ref 0–15)
FSH SERPL-ACNC: 3.68 MIU/ML
GFR SERPL CREATININE-BSD FRML MDRD: 95 ML/MIN/1.73
GLOBULIN UR ELPH-MCNC: 2.2 GM/DL
GLUCOSE SERPL-MCNC: 78 MG/DL (ref 65–99)
HAV IGM SERPL QL IA: NORMAL
HBV CORE IGM SERPL QL IA: NORMAL
HBV SURFACE AG SERPL QL IA: NORMAL
HCT VFR BLD AUTO: 42.3 % (ref 37.5–51)
HCV AB SER DONR QL: NORMAL
HGB BLD-MCNC: 14.8 G/DL (ref 13–17.7)
IMM GRANULOCYTES # BLD AUTO: 0.01 10*3/MM3 (ref 0–0.05)
IMM GRANULOCYTES NFR BLD AUTO: 0.1 % (ref 0–0.5)
LH SERPL-ACNC: 4.2 MIU/ML
LYMPHOCYTES # BLD AUTO: 2.58 10*3/MM3 (ref 0.7–3.1)
LYMPHOCYTES NFR BLD AUTO: 28.1 % (ref 19.6–45.3)
MCH RBC QN AUTO: 34.1 PG (ref 26.6–33)
MCHC RBC AUTO-ENTMCNC: 35 G/DL (ref 31.5–35.7)
MCV RBC AUTO: 97.5 FL (ref 79–97)
MONOCYTES # BLD AUTO: 0.9 10*3/MM3 (ref 0.1–0.9)
MONOCYTES NFR BLD AUTO: 9.8 % (ref 5–12)
NEUTROPHILS NFR BLD AUTO: 5.29 10*3/MM3 (ref 1.7–7)
NEUTROPHILS NFR BLD AUTO: 57.8 % (ref 42.7–76)
NRBC BLD AUTO-RTO: 0 /100 WBC (ref 0–0.2)
PLATELET # BLD AUTO: 247 10*3/MM3 (ref 140–450)
PMV BLD AUTO: 10.4 FL (ref 6–12)
POTASSIUM SERPL-SCNC: 4.3 MMOL/L (ref 3.5–5.2)
PROT SERPL-MCNC: 6.2 G/DL (ref 6–8.5)
RBC # BLD AUTO: 4.34 10*6/MM3 (ref 4.14–5.8)
SODIUM SERPL-SCNC: 140 MMOL/L (ref 136–145)
WBC # BLD AUTO: 9.17 10*3/MM3 (ref 3.4–10.8)

## 2021-03-23 PROCEDURE — 85652 RBC SED RATE AUTOMATED: CPT

## 2021-03-23 PROCEDURE — 84402 ASSAY OF FREE TESTOSTERONE: CPT

## 2021-03-23 PROCEDURE — 99214 OFFICE O/P EST MOD 30 MIN: CPT | Performed by: INTERNAL MEDICINE

## 2021-03-23 PROCEDURE — 84403 ASSAY OF TOTAL TESTOSTERONE: CPT

## 2021-03-23 PROCEDURE — 80074 ACUTE HEPATITIS PANEL: CPT

## 2021-03-23 PROCEDURE — 80053 COMPREHEN METABOLIC PANEL: CPT

## 2021-03-23 PROCEDURE — 82088 ASSAY OF ALDOSTERONE: CPT

## 2021-03-23 PROCEDURE — 74176 CT ABD & PELVIS W/O CONTRAST: CPT

## 2021-03-23 PROCEDURE — 83002 ASSAY OF GONADOTROPIN (LH): CPT

## 2021-03-23 PROCEDURE — 83001 ASSAY OF GONADOTROPIN (FSH): CPT

## 2021-03-23 PROCEDURE — 36415 COLL VENOUS BLD VENIPUNCTURE: CPT

## 2021-03-23 PROCEDURE — 85025 COMPLETE CBC W/AUTO DIFF WBC: CPT

## 2021-03-23 PROCEDURE — 84244 ASSAY OF RENIN: CPT

## 2021-03-23 RX ORDER — OXYCODONE HYDROCHLORIDE 5 MG/1
5 TABLET ORAL EVERY 6 HOURS PRN
COMMUNITY
Start: 2021-02-21 | End: 2021-03-23

## 2021-03-23 RX ORDER — GABAPENTIN 300 MG/1
CAPSULE ORAL
COMMUNITY
Start: 2021-02-21 | End: 2021-03-23

## 2021-03-23 RX ORDER — EZETIMIBE 10 MG/1
TABLET ORAL
COMMUNITY
Start: 2020-11-17 | End: 2021-03-23

## 2021-03-23 RX ORDER — BUSPIRONE HYDROCHLORIDE 5 MG/1
TABLET ORAL
COMMUNITY
Start: 2020-09-24 | End: 2021-03-23

## 2021-03-23 RX ORDER — FLUDROCORTISONE ACETATE 0.1 MG/1
TABLET ORAL
Qty: 30 TABLET | Refills: 6 | Status: SHIPPED | OUTPATIENT
Start: 2021-03-23 | End: 2021-05-14 | Stop reason: SDUPTHER

## 2021-03-23 NOTE — ED PROVIDER NOTES
Subjective   Patient is a 50-year-old gentleman who comes in with a strong history of kidney stones he states that he has had left flank pain and hematuria as well as some fatigue over the last 24 to 48 hours.  He states the pain became increasingly worse today and brought him to the emergency room.  He states he is also had some fatigue and his wife reports that he has had some what she described as confusion today but was more slower to respond and hard to get his words out the patient reports that he was just in pain and found it uncomfortable to talk.  The patient does have a strong large medical history with a adrenal carcinoma and adrenal removal.-He is being followed by Dr. Blake endocrinologist.    Patient rates his pain a 7/10.  He states it is a dull aching pain that is constant in nature.  He states that it radiates into his left flank.  He does not have any testicular pain he has had no fever no chills no urinary symptoms.          Review of Systems   Constitutional: Negative for chills, fatigue and fever.   HENT: Negative for congestion, tinnitus and trouble swallowing.    Eyes: Negative for photophobia, discharge and redness.   Respiratory: Negative for cough and shortness of breath.    Cardiovascular: Negative for chest pain and palpitations.   Gastrointestinal: Negative for abdominal pain, diarrhea, nausea and vomiting.   Genitourinary: Positive for flank pain. Negative for dysuria, frequency and urgency.   Musculoskeletal: Negative for back pain, joint swelling and myalgias.   Skin: Negative for rash.   Neurological: Negative for dizziness and headaches.   Psychiatric/Behavioral: Positive for confusion.   All other systems reviewed and are negative.      Past Medical History:   Diagnosis Date   • Hyperlipidemia    • Kidney stone    • Stroke (CMS/HCC)        Allergies   Allergen Reactions   • Cephalosporins Anaphylaxis     Throat swelling   • Dye  [Contrast Dye] Hives   • Sulfa Antibiotics Hives   •  Statins Myalgia     Myalgia and fatique       Past Surgical History:   Procedure Laterality Date   • KIDNEY STONE SURGERY         Family History   Problem Relation Age of Onset   • Arthritis Mother    • Hypertension Mother    • Cancer Father         abdomen   • Arthritis Father    • Hypertension Father        Social History     Socioeconomic History   • Marital status:      Spouse name: Not on file   • Number of children: Not on file   • Years of education: Not on file   • Highest education level: Not on file   Tobacco Use   • Smoking status: Current Some Day Smoker     Packs/day: 4.00     Years: 30.00     Pack years: 120.00     Types: Cigars   • Smokeless tobacco: Never Used   Substance and Sexual Activity   • Alcohol use: Yes     Comment: weekends   • Drug use: Never   • Sexual activity: Yes     Partners: Female     Birth control/protection: None           Objective   Physical Exam  Vitals reviewed.   Constitutional:       Appearance: He is well-developed.   HENT:      Head: Normocephalic and atraumatic.      Mouth/Throat:      Mouth: Mucous membranes are moist.      Pharynx: Oropharynx is clear.   Eyes:      Conjunctiva/sclera: Conjunctivae normal.      Pupils: Pupils are equal, round, and reactive to light.   Cardiovascular:      Rate and Rhythm: Normal rate and regular rhythm.      Heart sounds: Normal heart sounds.   Pulmonary:      Effort: Pulmonary effort is normal.      Breath sounds: Normal breath sounds.   Abdominal:      General: Bowel sounds are normal.      Palpations: Abdomen is soft.      Tenderness: There is abdominal tenderness. There is right CVA tenderness.       Musculoskeletal:         General: Normal range of motion.      Cervical back: Normal range of motion and neck supple.   Skin:     General: Skin is warm and dry.      Capillary Refill: Capillary refill takes less than 2 seconds.   Neurological:      General: No focal deficit present.      Mental Status: He is alert and oriented to  "person, place, and time.      GCS: GCS eye subscore is 4. GCS verbal subscore is 5. GCS motor subscore is 6.      Cranial Nerves: Cranial nerves are intact.      Sensory: Sensation is intact.      Motor: Motor function is intact.   Psychiatric:         Attention and Perception: Attention normal.         Mood and Affect: Mood normal.         Speech: Speech normal.         Behavior: Behavior normal.         Thought Content: Thought content normal.         Cognition and Memory: Cognition and memory normal.         Judgment: Judgment normal.         Procedures           ED Course      BP 95/63   Pulse 61   Temp 97.8 °F (36.6 °C) (Oral)   Resp 16   Ht 177.8 cm (70\")   Wt 74.2 kg (163 lb 9.6 oz)   SpO2 99%   BMI 23.47 kg/m²   Labs Reviewed   URINALYSIS W/ MICROSCOPIC IF INDICATED (NO CULTURE) - Abnormal; Notable for the following components:       Result Value    Blood, UA Large (3+) (*)     All other components within normal limits   CBC WITH AUTO DIFFERENTIAL - Abnormal; Notable for the following components:    WBC 11.90 (*)     MCV 97.8 (*)     MCH 33.8 (*)     Neutrophils, Absolute 7.10 (*)     Lymphocytes, Absolute 3.60 (*)     All other components within normal limits   URINALYSIS, MICROSCOPIC ONLY - Abnormal; Notable for the following components:    RBC, UA Too Numerous to Count (*)     WBC, UA 0-2 (*)     All other components within normal limits   LIPASE - Normal   COMPREHENSIVE METABOLIC PANEL    Narrative:     GFR Normal >60  Chronic Kidney Disease <60  Kidney Failure <15     CBC AND DIFFERENTIAL    Narrative:     The following orders were created for panel order CBC & Differential.  Procedure                               Abnormality         Status                     ---------                               -----------         ------                     CBC Auto Differential[[id number]]        Abnormal            Final result                 Please view results for these tests on the individual orders. "     Medications   sodium chloride 0.9 % flush 10 mL (has no administration in time range)   Morphine sulfate (PF) injection 4 mg (4 mg Intravenous Not Given 3/22/21 2249)   ondansetron (ZOFRAN) injection 4 mg (4 mg Intravenous Not Given 3/22/21 2251)   sodium chloride 0.9 % bolus 1,000 mL (1,000 mL Intravenous New Bag 3/22/21 2254)   ketorolac (TORADOL) injection 30 mg (30 mg Intravenous Given 3/22/21 2251)     CT Abdomen Pelvis Without Contrast   Final Result   Impression:    1. Trace amount of edema and fluid in the left suprarenal space is compatible with recent left adrenal surgery. No significant collection to suggest hematoma or abscess. No residual mass.   2. Multiple nonobstructing bilateral intrarenal calculi. No obstructing stones or hydronephrosis.      Electronically signed by:  Ritesh Cao M.D.     3/22/2021 11:06 PM                                             MDM  Number of Diagnoses or Management Options  Generalized abdominal pain  Hematuria, unspecified type  Diagnosis management comments: Patient had IV established and blood work was obtained.  Patient was found to have a large amount of blood in his urine and he was given pain medication and CT was obtained.  White count was found to be within normal limits.  Orthostatic blood pressures were performed and patient was found to not be orthostatic.    Patient CT found to have some mild fluid around the adrenal surgery site and there is no sign of hematoma or infection abscess.    Patient states his pain is much better at this time.  He states that he will follow-up with his primary care provider and has an appointment with endocrinologist at 8 AM in the morning.  He was told that he has several kidney stones still within the kidney itself.  And that he should follow-up with primary care or urology for further evaluation and management.         Amount and/or Complexity of Data Reviewed  Clinical lab tests: reviewed  Tests in the radiology section of  CPT®: reviewed    Risk of Complications, Morbidity, and/or Mortality  Presenting problems: minimal  Diagnostic procedures: low  Management options: low    Patient Progress  Patient progress: improved      Final diagnoses:   Generalized abdominal pain   Hematuria, unspecified type       ED Disposition  ED Disposition     ED Disposition Condition Comment    Discharge Stable           Justa Castano MD  [address] [place] [address] [place] [facility]  [phone]    In 3 days  As needed         Medication List      No changes were made to your prescriptions during this visit.          Dorothy Lion, APRN  03/23/21 0127

## 2021-03-23 NOTE — PATIENT INSTRUCTIONS
Please stop smoking  Please start fludrocortisone 0.1 mg tablets, take half a tablet twice a day  Continue hydrocortisone 10 mg p.o. 3 times daily  Check renin aldosterone level today  CMP before follow-up.

## 2021-03-23 NOTE — DISCHARGE INSTRUCTIONS
Push clear liquids    Continue home medications  Follow-up with Dr. Parra and Dr. Espinoza as scheduled    Return if worse

## 2021-03-23 NOTE — PROGRESS NOTES
Endocrine Progress Note Outpatient     Patient Care Team:  Justa Castano MD as PCP - General (Family Medicine)  Luz Sepulveda PA-C as Physician Assistant (Neurosurgery)  Gonzalo Cruz MD as Consulting Physician (Surgical Oncology)    Chief Complaint: Follow-up glucocorticoid deficiency          HPI: 50-year-old male with prior history of a stroke, kidney stones apparently was found to have a left adrenal incidentaloma last year around June 2020.  He subsequently underwent surgery in February 2021 at [facility].  Postop within 2 weeks he developed fatigue and tiredness and lethargy and weight loss was admitted to [facility] last week and was found to have cortisol deficiency with ACTH stimulation test.  He was started on hydrocortisone 10 mg 3 times daily.  He initially felt better but since then he feels like he still having some fatigue and tiredness.  Will have dizziness, his appetite has improved his weight has gone up by maybe couple pounds since he got out of the hospital last week still have some mild nausea but no vomiting.  He is accompanied today with his wife.    Data reviewed: His DHEA-sulfate level was normal at 83, TSH 1.36, his renin aldosterone is pending an ACTH stimulation test showed a peak cortisol of 15.  These were done in the hospital.    Past Medical History:   Diagnosis Date   • Hyperlipidemia    • Kidney stone    • Stroke (CMS/[facility])        Social History     Socioeconomic History   • Marital status:      Spouse name: Not on file   • Number of children: Not on file   • Years of education: Not on file   • Highest education level: Not on file   Tobacco Use   • Smoking status: Current Some Day Smoker     Packs/day: 4.00     Years: 30.00     Pack years: 120.00     Types: Cigars   • Smokeless tobacco: Never Used   Vaping Use   • Vaping Use: Never used   Substance and Sexual Activity   • Alcohol use: Yes     Comment: weekends   • Drug use: Never   •  Sexual activity: Yes     Partners: Female     Birth control/protection: None       Family History   Problem Relation Age of Onset   • Arthritis Mother    • Hypertension Mother    • Cancer Father         abdomen   • Arthritis Father    • Hypertension Father    • Cancer Brother         esophageal    • Heart disease Brother        Allergies   Allergen Reactions   • Cephalosporins Anaphylaxis     Throat swelling   • Dye  [Contrast Dye] Hives   • Sulfa Antibiotics Hives   • Iodinated Diagnostic Agents Unknown - Low Severity   • Statins Myalgia     Myalgia and fatique   • Sulfate Unknown - Low Severity       ROS:   Constitutional:  Admit fatigue, tiredness.    Eyes:  Denies change in visual acuity   HENT:  Denies nasal congestion or sore throat   Respiratory: denies cough, shortness of breath.   Cardiovascular:  denies chest pain, edema   GI:  Denies abdominal pain, admit nausea, no vomiting.   Musculoskeletal:  Denies back pain or joint pain   Integument:  Denies dry skin and rash   Neurologic:  Denies headache, focal weakness or sensory changes   Endocrine:  Denies polyuria or polydipsia   Psychiatric:  Denies depression,  admit  or anxiety      Vitals:    03/23/21 0811   BP: 105/68   Pulse: 71   Temp: 97.3 °F (36.3 °C)   SpO2: 98%   Orthostatic vitals:  Length: Pulse was 62/min with blood pressure 102/65  Sitting: Pulse was 71/min blood pressure 102/68  Standing: Pulse was 78/min with a blood pressure 98/65.    Body mass index is 24.39 kg/m².     Physical Exam:  GEN: NAD, conversant  EYES: EOMI, PERRL, no conjunctival erythema  NECK: no thyromegaly, full ROM   CV: RRR, no murmurs/rubs/gallops, no peripheral edema  LUNG: CTAB, no wheezes/rales/ronchi  SKIN: no rashes, no acanthosis  MSK: no deformities, full ROM of all extremities  NEURO: no tremors, DTR normal  PSYCH: AOX3, appropriate mood, affect normal      Results Review:     I reviewed the patient's new clinical results.      Lab Results   Component Value Date     GLUCOSE 90 03/22/2021    BUN 14 03/22/2021    CREATININE 0.79 03/22/2021    EGFRIFNONA 105 03/22/2021    BCR 17.7 03/22/2021    K 4.2 03/22/2021    CO2 25.0 03/22/2021    CALCIUM 9.5 03/22/2021    ALBUMIN 3.60 03/22/2021    LABIL2 1.3 09/25/2018    AST 13 03/22/2021    ALT 18 03/22/2021    CHOL 209 (H) 01/28/2021    TRIG 110 01/28/2021     (H) 01/28/2021    HDL 55 01/28/2021     Lab Results   Component Value Date    TSH 1.360 03/17/2021         Medication Review: Reviewed.       Current Outpatient Medications:   •  aspirin 81 MG chewable tablet, Chew 81 mg Daily., Disp: , Rfl:   •  fluticasone (Flonase Allergy Relief) 50 MCG/ACT nasal spray, 1 spray into the nostril(s) as directed by provider Daily., Disp: , Rfl:   •  hydrocortisone (CORTEF) 10 MG tablet, Take 1 tablet by mouth 3 (Three) Times a Day With Meals., Disp: 90 tablet, Rfl: 0  No current facility-administered medications for this visit.      Assessment/Plan   Glucocorticoid deficiency: Currently on hydrocortisone replacement with 10 mg 3 times daily.  It seems like it may have helped him a little bit but he still have some residual symptoms like fatigue and some dizziness.  His renal aldosterone is pending, I will go ahead and redraw renin aldosterone again today and then put him on fludrocortisone 0.05 mg twice a day.  His DHEA-sulfate was normal.  We will follow CMP.    Excessive sweating with fatigue: Does admit some low libido and sexual dysfunction: We will check total and free testosterone level with LH and FSH today.  Advised to take vitamin B12 supplementation.            Julia Blake MD FACE.

## 2021-03-23 NOTE — ED NOTES
Pt c/o generalized fatigue since Saturday as well as left sided flank pain and hematuria. Hx of kidney stones. Pt had adrenal gland taken out about a month ago, supposed to follow up with endocrinologist this week.     Claribel Ramirez, RN  03/22/21 3960

## 2021-03-24 ENCOUNTER — TELEPHONE (OUTPATIENT)
Dept: FAMILY MEDICINE CLINIC | Facility: CLINIC | Age: 51
End: 2021-03-24

## 2021-03-25 PROBLEM — E27.8 ADRENAL MASS: Status: RESOLVED | Noted: 2021-01-26 | Resolved: 2021-03-25

## 2021-03-25 PROBLEM — E27.49 GLUCOCORTICOID DEFICIENCY: Status: ACTIVE | Noted: 2021-03-25

## 2021-03-26 ENCOUNTER — OFFICE VISIT (OUTPATIENT)
Dept: FAMILY MEDICINE CLINIC | Facility: CLINIC | Age: 51
End: 2021-03-26

## 2021-03-26 VITALS
HEART RATE: 78 BPM | TEMPERATURE: 98.2 F | BODY MASS INDEX: 23.39 KG/M2 | SYSTOLIC BLOOD PRESSURE: 107 MMHG | OXYGEN SATURATION: 97 % | HEIGHT: 70 IN | WEIGHT: 163.4 LBS | DIASTOLIC BLOOD PRESSURE: 71 MMHG

## 2021-03-26 DIAGNOSIS — E27.49 GLUCOCORTICOID DEFICIENCY (HCC): ICD-10-CM

## 2021-03-26 DIAGNOSIS — Z86.73 HISTORY OF CVA (CEREBROVASCULAR ACCIDENT): Chronic | ICD-10-CM

## 2021-03-26 DIAGNOSIS — N20.0 CALCULUS OF KIDNEY: Primary | ICD-10-CM

## 2021-03-26 PROCEDURE — 99213 OFFICE O/P EST LOW 20 MIN: CPT | Performed by: PREVENTIVE MEDICINE

## 2021-03-26 NOTE — PROGRESS NOTES
"Subjective   Jourdan Lebron is a 50 y.o. male presents for   Chief Complaint   Patient presents with   • Adrenal Mass     Hospital F/U- adrenal insuff.       Health Maintenance Due   Topic Date Due   • COLONOSCOPY  Never done       Follow-up from ER visit for 50-year-old white male who felt as though he was having flank pain and did seem to pass his kidney stone he has not noticed any more blood in his urine.  Is still having dizziness lightheadedness and fatigue from his from his glucocorticoid low level.  Patient did stop his Plavix at the direction of his neurologist and is finally starting to get his appetite back.  Patient will wait till he has been on his new glucocorticoids for 1 week before he reconsult Endo to see if he needs any further change in medication.       Vitals:    03/26/21 1132 03/26/21 1133 03/26/21 1134   BP: 108/72 120/75 107/71   BP Location: Left arm Right arm Right arm   Patient Position: Sitting Sitting Standing   Cuff Size: Adult Adult Adult   Pulse: 78     Temp: 98.2 °F (36.8 °C)     TempSrc: Temporal     SpO2: 97%     Weight: 74.1 kg (163 lb 6.4 oz)     Height: 177.8 cm (70\")       Body mass index is 23.45 kg/m².    Current Outpatient Medications on File Prior to Visit   Medication Sig Dispense Refill   • aspirin 81 MG chewable tablet Chew 81 mg Daily.     • fludrocortisone 0.1 MG tablet Take half a tablet twice a day. 30 tablet 6   • fluticasone (Flonase Allergy Relief) 50 MCG/ACT nasal spray 1 spray into the nostril(s) as directed by provider Daily.     • hydrocortisone (CORTEF) 10 MG tablet Take 1 tablet by mouth 3 (Three) Times a Day With Meals. 90 tablet 0     No current facility-administered medications on file prior to visit.       The following portions of the patient's history were reviewed and updated as appropriate: allergies, current medications, past family history, past medical history, past social history, past surgical history and problem list.    Review of Systems "   Constitutional: Positive for activity change, appetite change and fatigue. Negative for fever.   Gastrointestinal: Positive for diarrhea.   Genitourinary: Positive for flank pain and hematuria.   Neurological: Positive for light-headedness and memory problem.   Psychiatric/Behavioral: Positive for sleep disturbance.       Objective   Physical Exam  Vitals reviewed.   Constitutional:       Appearance: Normal appearance.   HENT:      Head: Normocephalic and atraumatic.   Eyes:      Pupils: Pupils are equal, round, and reactive to light.   Cardiovascular:      Rate and Rhythm: Normal rate and regular rhythm.   Pulmonary:      Effort: Pulmonary effort is normal.      Comments: Decreased breath sounds bilaterally  Abdominal:      General: Abdomen is flat. Bowel sounds are normal.      Tenderness: There is abdominal tenderness. There is no guarding or rebound.      Hernia: No hernia is present.   Neurological:      General: No focal deficit present.      Mental Status: He is alert and oriented to person, place, and time.   Psychiatric:         Mood and Affect: Mood normal.         Behavior: Behavior normal.       PHQ-9 Total Score:      Assessment/Plan   Diagnoses and all orders for this visit:    1. Calculus of kidney (Primary)  Comments:  ER visit for left flank pain thought to have kidney stones has passed the stone he believes.  If returns will follow up with urology.    2. Glucocorticoid deficiency (CMS/HCC)  Comments:  Still having difficulty with dizziness lightheadedness and fatigue will wait till after has been on medicines for a week at that consult endocrinologist    3. History of CVA (cerebrovascular accident)  Comments:  Neurologist advised that patient is okay to come off of Plavix.  He is beginning to get his appetite back.        Patient Instructions   Patient to call  and see how soon he should be improved.

## 2021-03-27 LAB
TESTOST FREE SERPL-MCNC: 2.8 PG/ML (ref 6.8–21.5)
TESTOST SERPL-MCNC: 481 NG/DL (ref 264–916)

## 2021-03-29 ENCOUNTER — TELEPHONE (OUTPATIENT)
Dept: FAMILY MEDICINE CLINIC | Facility: CLINIC | Age: 51
End: 2021-03-29

## 2021-03-29 LAB
ALDOST SERPL-MCNC: 4.1 NG/DL (ref 0–30)
RENIN PLAS-CCNC: 1.64 NG/ML/HR (ref 0.17–5.38)

## 2021-03-29 NOTE — TELEPHONE ENCOUNTER
----- Message from Justa Castano MD sent at 3/29/2021  9:17 AM EDT -----  Free testosterone low-total normal-cn followup with Dr. Blake or I can refer to urology-let me know

## 2021-03-30 DIAGNOSIS — R61 GENERALIZED HYPERHIDROSIS: ICD-10-CM

## 2021-03-30 DIAGNOSIS — R68.82 LOW LIBIDO: Primary | ICD-10-CM

## 2021-03-30 LAB — RENIN PLAS-CCNC: 1.04 NG/ML/HR (ref 0.17–5.38)

## 2021-03-31 ENCOUNTER — LAB (OUTPATIENT)
Dept: LAB | Facility: HOSPITAL | Age: 51
End: 2021-03-31

## 2021-03-31 DIAGNOSIS — R61 GENERALIZED HYPERHIDROSIS: ICD-10-CM

## 2021-03-31 DIAGNOSIS — R68.82 LOW LIBIDO: ICD-10-CM

## 2021-03-31 LAB
ALDOST SERPL-MCNC: 1.2 NG/DL (ref 0–30)
PROLACTIN SERPL-MCNC: 11.7 NG/ML (ref 4.04–15.2)

## 2021-03-31 PROCEDURE — 84402 ASSAY OF FREE TESTOSTERONE: CPT

## 2021-03-31 PROCEDURE — 36415 COLL VENOUS BLD VENIPUNCTURE: CPT

## 2021-03-31 PROCEDURE — 84146 ASSAY OF PROLACTIN: CPT

## 2021-03-31 PROCEDURE — 84270 ASSAY OF SEX HORMONE GLOBUL: CPT

## 2021-03-31 PROCEDURE — 84403 ASSAY OF TOTAL TESTOSTERONE: CPT

## 2021-03-31 RX ORDER — HYDROCORTISONE 10 MG/1
10 TABLET ORAL
Qty: 270 TABLET | Refills: 2 | Status: SHIPPED | OUTPATIENT
Start: 2021-03-31 | End: 2021-10-14

## 2021-04-01 LAB — SHBG SERPL-SCNC: 54.8 NMOL/L (ref 16.5–55.9)

## 2021-04-03 LAB
TESTOST FREE SERPL-MCNC: 4.9 PG/ML (ref 6.8–21.5)
TESTOST SERPL-MCNC: 555 NG/DL (ref 264–916)

## 2021-04-05 DIAGNOSIS — E23.0 HYPOGONADOTROPIC HYPOGONADISM (HCC): Primary | ICD-10-CM

## 2021-04-05 DIAGNOSIS — Z12.5 SPECIAL SCREENING FOR MALIGNANT NEOPLASM OF PROSTATE: ICD-10-CM

## 2021-04-06 ENCOUNTER — LAB (OUTPATIENT)
Dept: LAB | Facility: HOSPITAL | Age: 51
End: 2021-04-06

## 2021-04-06 DIAGNOSIS — E23.0 HYPOGONADOTROPIC HYPOGONADISM (HCC): ICD-10-CM

## 2021-04-06 DIAGNOSIS — Z12.5 SPECIAL SCREENING FOR MALIGNANT NEOPLASM OF PROSTATE: ICD-10-CM

## 2021-04-06 LAB — PSA SERPL-MCNC: 0.16 NG/ML (ref 0–4)

## 2021-04-06 PROCEDURE — G0103 PSA SCREENING: HCPCS

## 2021-04-06 PROCEDURE — 36415 COLL VENOUS BLD VENIPUNCTURE: CPT

## 2021-04-15 NOTE — PATIENT INSTRUCTIONS
Health Maintenance Due   Topic Date Due   • COLONOSCOPY  Never done   • COVID-19 Vaccine (1) Never done     Ask neurologist about cognition rehab and MRI or Pituitary with and without contrast.

## 2021-04-16 ENCOUNTER — OFFICE VISIT (OUTPATIENT)
Dept: FAMILY MEDICINE CLINIC | Facility: CLINIC | Age: 51
End: 2021-04-16

## 2021-04-16 ENCOUNTER — TELEPHONE (OUTPATIENT)
Dept: FAMILY MEDICINE CLINIC | Facility: CLINIC | Age: 51
End: 2021-04-16

## 2021-04-16 VITALS
TEMPERATURE: 99.5 F | SYSTOLIC BLOOD PRESSURE: 102 MMHG | DIASTOLIC BLOOD PRESSURE: 71 MMHG | HEIGHT: 70 IN | BODY MASS INDEX: 24.77 KG/M2 | HEART RATE: 73 BPM | WEIGHT: 173 LBS | OXYGEN SATURATION: 95 %

## 2021-04-16 DIAGNOSIS — R53.1 WEAKNESS: ICD-10-CM

## 2021-04-16 DIAGNOSIS — F17.200 TOBACCO DEPENDENCE SYNDROME: ICD-10-CM

## 2021-04-16 DIAGNOSIS — E27.49 GLUCOCORTICOID DEFICIENCY (HCC): ICD-10-CM

## 2021-04-16 DIAGNOSIS — E78.5 HYPERLIPIDEMIA, UNSPECIFIED HYPERLIPIDEMIA TYPE: ICD-10-CM

## 2021-04-16 DIAGNOSIS — R59.9 ENLARGED LYMPH NODES: ICD-10-CM

## 2021-04-16 DIAGNOSIS — R94.31 ABNORMAL ELECTROCARDIOGRAM: ICD-10-CM

## 2021-04-16 DIAGNOSIS — Z86.73 HISTORY OF CVA (CEREBROVASCULAR ACCIDENT): Primary | Chronic | ICD-10-CM

## 2021-04-16 LAB
CHOLEST SERPL-MCNC: 188 MG/DL (ref 0–200)
CRP SERPL-MCNC: <0.3 MG/DL (ref 0–0.5)
ERYTHROCYTE [SEDIMENTATION RATE] IN BLOOD: 11 MM/HR (ref 0–15)
HDLC SERPL-MCNC: 60 MG/DL (ref 40–60)
LDLC SERPL CALC-MCNC: 115 MG/DL (ref 0–100)
LDLC/HDLC SERPL: 1.9 {RATIO}
TRIGL SERPL-MCNC: 70 MG/DL (ref 0–150)
VIT B12 BLD-MCNC: 1083 PG/ML (ref 211–946)
VLDLC SERPL-MCNC: 13 MG/DL (ref 5–40)

## 2021-04-16 PROCEDURE — 99213 OFFICE O/P EST LOW 20 MIN: CPT | Performed by: PREVENTIVE MEDICINE

## 2021-04-16 PROCEDURE — 86140 C-REACTIVE PROTEIN: CPT | Performed by: PREVENTIVE MEDICINE

## 2021-04-16 PROCEDURE — 82607 VITAMIN B-12: CPT | Performed by: PREVENTIVE MEDICINE

## 2021-04-16 PROCEDURE — 80061 LIPID PANEL: CPT | Performed by: PREVENTIVE MEDICINE

## 2021-04-16 PROCEDURE — 85652 RBC SED RATE AUTOMATED: CPT | Performed by: PREVENTIVE MEDICINE

## 2021-04-16 NOTE — PROGRESS NOTES
"Subjective   Jourdan Lebron is a 50 y.o. male presents for   Chief Complaint   Patient presents with   • Flank Pain     Follow up       Health Maintenance Due   Topic Date Due   • COLONOSCOPY  Never done   • COVID-19 Vaccine (1) Never done       50-year-old white male presents today for 2-week follow-up on glucocorticoid replacement therapy prior CVA and feelings of weakness.  He has been trying to get back on a regular schedule eating at certain times and exercising and does feel as though he has made improvements he will have 1 or 2 good days and then has 3 or 4 bad days.  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  Endocrinologist is trying to order an MRI with and without contrast for the pituitary.  This is so far not been approved by the insurance.  We have advised that he speak to the neurologist when he sees her about helping to get this approved.  Patient states that he will not be allowed to return to work with restrictions are on a shortened day.  He is still not resting well at night may have a day or 2 where it is better but then 3 or 4 days where it is not.  We again offered physical therapy and presently he feels as though he can rehabilitate on his own doing yard work and other household chores.  Concern is that he may  Covid if he goes to rehab.    Flank Pain  Associated symptoms include weakness. Pertinent negatives include no chest pain.        Vitals:    04/16/21 1017 04/16/21 1018 04/16/21 1019   BP: 100/67 92/62 102/71   BP Location: Left arm Right arm Right arm   Patient Position: Sitting Sitting Standing   Cuff Size: Adult Adult Adult   Pulse: 73     Temp: 99.5 °F (37.5 °C)     TempSrc: Temporal     SpO2: 95%     Weight: 78.5 kg (173 lb)     Height: 177.8 cm (70\")       Body mass index is 24.82 kg/m².    Current Outpatient Medications on File Prior to Visit   Medication Sig Dispense Refill   • " aspirin 81 MG chewable tablet Chew 81 mg Daily.     • fludrocortisone 0.1 MG tablet Take half a tablet twice a day. 30 tablet 6   • fluticasone (Flonase Allergy Relief) 50 MCG/ACT nasal spray 1 spray into the nostril(s) as directed by provider Daily.     • hydrocortisone (CORTEF) 10 MG tablet Take 1 tablet by mouth 3 (Three) Times a Day With Meals. 270 tablet 2     No current facility-administered medications on file prior to visit.       The following portions of the patient's history were reviewed and updated as appropriate: allergies, current medications, past family history, past medical history, past social history, past surgical history and problem list.    Review of Systems   Constitutional: Positive for chills and fatigue.        Appetite and weight are improving   Respiratory: Negative for shortness of breath.    Cardiovascular: Negative for chest pain.   Genitourinary: Positive for flank pain.   Musculoskeletal: Positive for arthralgias and myalgias.   Neurological: Positive for weakness, headache, memory problem and confusion.   Psychiatric/Behavioral: Positive for sleep disturbance, depressed mood and stress.       Objective   Physical Exam  Vitals reviewed.   Constitutional:       General: He is not in acute distress.     Appearance: Normal appearance. He is well-developed. He is not ill-appearing or toxic-appearing.   HENT:      Head: Normocephalic and atraumatic.      Right Ear: Tympanic membrane, ear canal and external ear normal.      Left Ear: Tympanic membrane, ear canal and external ear normal.      Nose: Nose normal.   Eyes:      Extraocular Movements: Extraocular movements intact.      Conjunctiva/sclera: Conjunctivae normal.      Pupils: Pupils are equal, round, and reactive to light.   Cardiovascular:      Rate and Rhythm: Normal rate and regular rhythm.      Heart sounds: Normal heart sounds.   Pulmonary:      Effort: Pulmonary effort is normal.      Comments: Decreased breath sounds  bilaterally  Abdominal:      General: Bowel sounds are normal. There is no distension.      Palpations: Abdomen is soft. There is no mass.      Tenderness: There is no abdominal tenderness.   Musculoskeletal:         General: Normal range of motion.      Cervical back: Neck supple.   Skin:     General: Skin is warm.   Neurological:      General: No focal deficit present.      Mental Status: He is alert and oriented to person, place, and time.   Psychiatric:         Mood and Affect: Mood normal.         Behavior: Behavior normal.       PHQ-9 Total Score:      Assessment/Plan   Diagnoses and all orders for this visit:    1. History of CVA (cerebrovascular accident) (Primary)  Comments:  Patient states that he is still having trouble with memory and mental processing.  He will see neurologist May 10.    2. Abnormal electrocardiogram  Comments:  Patient will be following up with his cardiologist in June loop recorder has not sent any messages of malfunction of his heart    3. Tobacco dependence syndrome  Comments:  Patient has been advised to continue off cigarettes    4. Enlarged lymph nodes  -     Sedimentation Rate; Future  -     C-reactive Protein; Future  -     Sedimentation Rate  -     C-reactive Protein    5. Glucocorticoid deficiency (CMS/HCC)  Comments:  Patient still has some days that he shakes and feels poorly.    6. Hyperlipidemia, unspecified hyperlipidemia type  Comments:  Recheck lipids today  Orders:  -     Lipid Panel    7. Weakness  Comments:  Patient will be following up with Dr. Blake in June.  Orders:  -     Vitamin B12        Patient Instructions     Health Maintenance Due   Topic Date Due   • COLONOSCOPY  Never done   • COVID-19 Vaccine (1) Never done     Ask neurologist about cognition rehab and MRI or Pituitary with and without contrast.

## 2021-04-18 NOTE — PROGRESS NOTES
Cholesterol is 115 putting him at excess risk for heart attack new stroke or peripheral vascular disease I would advise that he consider starting his cholesterol medicine now.  B12 is still high so I would cut out taking any additional another day per week.  So the labs look good

## 2021-04-19 ENCOUNTER — TELEPHONE (OUTPATIENT)
Dept: FAMILY MEDICINE CLINIC | Facility: CLINIC | Age: 51
End: 2021-04-19

## 2021-04-19 NOTE — TELEPHONE ENCOUNTER
HUB TO READ  Cholesterol is 115 putting him at excess risk for heart attack new stroke or peripheral vascular disease I would advise that he consider starting his cholesterol medicine now.  B12 is still high so I would cut out taking any additional another day per week.  So the labs look good

## 2021-04-20 ENCOUNTER — TELEPHONE (OUTPATIENT)
Dept: ENDOCRINOLOGY | Facility: CLINIC | Age: 51
End: 2021-04-20

## 2021-04-20 NOTE — TELEPHONE ENCOUNTER
Called pt with appt date and time for MRI Pituitary scheduled 4/30 @ 12:30 at [facility].Pt is to arrive at 1200 for registration and take ins cards, ID and a current med list with dosages. Pt is to be NPO for 2 hour before appt. Order faxed and facility address and contact info given.   PA approved. Auth# [id number].   Valid 4/7/21-5/621      Pt states that he has contrast dye allergy and has had to be premedicated with prednisone and ibuprofen for previous imaging. Advised pt that CT and MRI contrast are different but pt cannot recall if he is allergic to one or both. Please advise.

## 2021-04-26 ENCOUNTER — TELEPHONE (OUTPATIENT)
Dept: ENDOCRINOLOGY | Facility: CLINIC | Age: 51
End: 2021-04-26

## 2021-04-26 NOTE — TELEPHONE ENCOUNTER
Pt is scheduled for MRI with and without contrast on 4/30/21. Pt told [facility] he reacts to contrast medium. Per Epic, patient does have that listed as an allergy. Should we change the MRI to only without contrast or pre-medicate the patient? According to our records, he does get hives from contrast.

## 2021-04-27 NOTE — TELEPHONE ENCOUNTER
Pt had already spoken to [facility], he will be pre-medicated per their protocol in order to obtain MRI with contrast.

## 2021-04-27 NOTE — TELEPHONE ENCOUNTER
[facility] should have a protocol.  I am okay with MRI without contrast but I think he will need contrast for pituitary.  I will let the radiologist decide on that.  Please find out from the [facility] if there protocol for this kind of allergy.  If not then we can do MRI without contrast.

## 2021-05-04 DIAGNOSIS — E78.2 MIXED HYPERLIPIDEMIA: Primary | ICD-10-CM

## 2021-05-04 DIAGNOSIS — N20.0 CALCULUS OF KIDNEY: ICD-10-CM

## 2021-05-04 DIAGNOSIS — E27.49 GLUCOCORTICOID DEFICIENCY (HCC): ICD-10-CM

## 2021-05-04 DIAGNOSIS — E89.6 HISTORY OF TOTAL ADRENALECTOMY (HCC): ICD-10-CM

## 2021-05-04 DIAGNOSIS — E53.8 VITAMIN B12 DEFICIENCY: ICD-10-CM

## 2021-05-07 ENCOUNTER — LAB (OUTPATIENT)
Dept: LAB | Facility: HOSPITAL | Age: 51
End: 2021-05-07

## 2021-05-07 DIAGNOSIS — E53.8 VITAMIN B12 DEFICIENCY: ICD-10-CM

## 2021-05-07 DIAGNOSIS — E78.2 MIXED HYPERLIPIDEMIA: ICD-10-CM

## 2021-05-07 DIAGNOSIS — E27.49 GLUCOCORTICOID DEFICIENCY (HCC): ICD-10-CM

## 2021-05-07 DIAGNOSIS — E89.6 HISTORY OF TOTAL ADRENALECTOMY (HCC): ICD-10-CM

## 2021-05-07 DIAGNOSIS — N20.0 CALCULUS OF KIDNEY: ICD-10-CM

## 2021-05-07 LAB
ALBUMIN SERPL-MCNC: 4.1 G/DL (ref 3.5–5.2)
ALBUMIN/GLOB SERPL: 1.9 G/DL
ALP SERPL-CCNC: 64 U/L (ref 39–117)
ALT SERPL W P-5'-P-CCNC: 11 U/L (ref 1–41)
ANION GAP SERPL CALCULATED.3IONS-SCNC: 8.2 MMOL/L (ref 5–15)
AST SERPL-CCNC: 13 U/L (ref 1–40)
BILIRUB SERPL-MCNC: 0.2 MG/DL (ref 0–1.2)
BUN SERPL-MCNC: 11 MG/DL (ref 6–20)
BUN/CREAT SERPL: 12 (ref 7–25)
CALCIUM SPEC-SCNC: 9.3 MG/DL (ref 8.6–10.5)
CHLORIDE SERPL-SCNC: 105 MMOL/L (ref 98–107)
CO2 SERPL-SCNC: 28.8 MMOL/L (ref 22–29)
CREAT SERPL-MCNC: 0.92 MG/DL (ref 0.76–1.27)
GFR SERPL CREATININE-BSD FRML MDRD: 87 ML/MIN/1.73
GLOBULIN UR ELPH-MCNC: 2.2 GM/DL
GLUCOSE SERPL-MCNC: 79 MG/DL (ref 65–99)
POTASSIUM SERPL-SCNC: 4.1 MMOL/L (ref 3.5–5.2)
PROT SERPL-MCNC: 6.3 G/DL (ref 6–8.5)
SODIUM SERPL-SCNC: 142 MMOL/L (ref 136–145)

## 2021-05-07 PROCEDURE — 80053 COMPREHEN METABOLIC PANEL: CPT

## 2021-05-07 PROCEDURE — 36415 COLL VENOUS BLD VENIPUNCTURE: CPT

## 2021-05-13 NOTE — PATIENT INSTRUCTIONS
Health Maintenance Due   Topic Date Due   • COLORECTAL CANCER SCREENING  Never done   • COVID-19 Vaccine (1) Never done

## 2021-05-14 ENCOUNTER — OFFICE VISIT (OUTPATIENT)
Dept: ENDOCRINOLOGY | Facility: CLINIC | Age: 51
End: 2021-05-14

## 2021-05-14 ENCOUNTER — OFFICE VISIT (OUTPATIENT)
Dept: FAMILY MEDICINE CLINIC | Facility: CLINIC | Age: 51
End: 2021-05-14

## 2021-05-14 VITALS
DIASTOLIC BLOOD PRESSURE: 70 MMHG | BODY MASS INDEX: 24.34 KG/M2 | TEMPERATURE: 97.1 F | WEIGHT: 170 LBS | HEIGHT: 70 IN | HEART RATE: 78 BPM | OXYGEN SATURATION: 98 % | SYSTOLIC BLOOD PRESSURE: 125 MMHG

## 2021-05-14 VITALS
HEART RATE: 73 BPM | DIASTOLIC BLOOD PRESSURE: 76 MMHG | SYSTOLIC BLOOD PRESSURE: 123 MMHG | TEMPERATURE: 98.1 F | HEIGHT: 70 IN | BODY MASS INDEX: 24.17 KG/M2 | WEIGHT: 168.8 LBS | OXYGEN SATURATION: 96 %

## 2021-05-14 DIAGNOSIS — Z12.11 SCREENING FOR COLON CANCER: ICD-10-CM

## 2021-05-14 DIAGNOSIS — F41.1 ANXIETY STATE: ICD-10-CM

## 2021-05-14 DIAGNOSIS — Z86.73 HISTORY OF CVA (CEREBROVASCULAR ACCIDENT): Primary | Chronic | ICD-10-CM

## 2021-05-14 DIAGNOSIS — E27.49 GLUCOCORTICOID DEFICIENCY (HCC): Primary | ICD-10-CM

## 2021-05-14 DIAGNOSIS — E78.5 HYPERLIPIDEMIA, UNSPECIFIED HYPERLIPIDEMIA TYPE: ICD-10-CM

## 2021-05-14 DIAGNOSIS — E23.0 HYPOGONADOTROPIC HYPOGONADISM (HCC): ICD-10-CM

## 2021-05-14 DIAGNOSIS — E27.49 GLUCOCORTICOID DEFICIENCY (HCC): ICD-10-CM

## 2021-05-14 PROBLEM — Z98.890 OTHER SPECIFIED POSTPROCEDURAL STATES: Status: ACTIVE | Noted: 2021-02-19

## 2021-05-14 PROCEDURE — 99213 OFFICE O/P EST LOW 20 MIN: CPT | Performed by: PREVENTIVE MEDICINE

## 2021-05-14 PROCEDURE — 99214 OFFICE O/P EST MOD 30 MIN: CPT | Performed by: INTERNAL MEDICINE

## 2021-05-14 RX ORDER — BUSPIRONE HYDROCHLORIDE 5 MG/1
5 TABLET ORAL 3 TIMES DAILY
COMMUNITY
Start: 2021-05-13 | End: 2021-07-08

## 2021-05-14 RX ORDER — FLUDROCORTISONE ACETATE 0.1 MG/1
TABLET ORAL
Qty: 30 TABLET | Refills: 6 | Status: SHIPPED | OUTPATIENT
Start: 2021-05-14 | End: 2021-07-08 | Stop reason: SDUPTHER

## 2021-05-14 NOTE — PATIENT INSTRUCTIONS
Please stop smoking  Continue current management  Follow-up in 6 months with CMP  If you decide to do testosterone replacement therapy after consultation with your neurologist and if she agrees with that and let me know.

## 2021-05-14 NOTE — PROGRESS NOTES
Subjective   Jourdan Lebron is a 51 y.o. male presents for   Chief Complaint   Patient presents with   • Fatigue     4 week f/u- fasting   • Cerebrovascular Accident       Health Maintenance Due   Topic Date Due   • COLORECTAL CANCER SCREENING  Never done   • COVID-19 Vaccine (1) Never done       51-year-old white male presents today for follow-up on stroke adrenalectomy memory problems and problems with mood.  Patient states that he seems to be getting worse rather than better with the steroid treatment.  He saw his neurologist at [facility] and she feels as though he is worse as well.  She is arranging for him to have an EEG to rule out any sorts of seizure disorder that is causing him lack of ability to concentrate.  I have suggested that he follow-up with a behavioral health as well and he has agreed to do so.  He is seen the endocrinologist later on this afternoon to decide if hormonal replacement is right for him.  He has not returned to work and does not feel as though it is safe to do so because he does have to drive at work and does have to at times work at [employer].  Patient is still having trouble with tremors feeling hot and cold fatigue and still having difficulty with erectile dysfunction.  Patient is concerned that there is something really wrong with him and that we are just not being able to find it.  He is concerned that if he goes to a supercenter like [place] or [facility] that he must have something really wrong but we have encouraged him to do so if he still was not feeling right.  Weight has stabilized.  Does at times have headaches.  No change in his vision.  Has not had the Covid vaccination as of yet due to the fact that he does not want to aggravate any of his symptoms which are pretty well uncontrolled presently.    Fatigue  Associated symptoms include chills, fatigue and weakness.   Cerebrovascular Accident  Associated symptoms include chills, fatigue and weakness.        Vitals:    05/14/21  "1015 05/14/21 1016   BP: 125/81 123/76   BP Location: Left arm Right arm   Patient Position: Sitting Sitting   Cuff Size: Adult Adult   Pulse: 73    Temp: 98.1 °F (36.7 °C)    TempSrc: Oral    SpO2: 96%    Weight: 76.6 kg (168 lb 12.8 oz)    Height: 177.8 cm (70\")      Body mass index is 24.22 kg/m².    Current Outpatient Medications on File Prior to Visit   Medication Sig Dispense Refill   • aspirin 81 MG chewable tablet Chew 81 mg Daily.     • fluticasone (Flonase Allergy Relief) 50 MCG/ACT nasal spray 1 spray into the nostril(s) as directed by provider Daily.     • hydrocortisone (CORTEF) 10 MG tablet Take 1 tablet by mouth 3 (Three) Times a Day With Meals. 270 tablet 2   • [DISCONTINUED] fludrocortisone 0.1 MG tablet Take half a tablet twice a day. 30 tablet 6     No current facility-administered medications on file prior to visit.       The following portions of the patient's history were reviewed and updated as appropriate: allergies, current medications, past family history, past medical history, past social history, past surgical history and problem list.    Review of Systems   Constitutional: Positive for activity change, chills and fatigue.   Genitourinary: Positive for erectile dysfunction.   Neurological: Positive for speech difficulty, weakness, light-headedness, headache, memory problem and confusion.   Psychiatric/Behavioral: Positive for agitation, dysphoric mood, sleep disturbance and stress. Negative for hallucinations. The patient is nervous/anxious.        Objective   Physical Exam  Vitals reviewed.   Constitutional:       General: He is not in acute distress.     Appearance: Normal appearance. He is well-developed. He is not ill-appearing or toxic-appearing.   HENT:      Head: Normocephalic and atraumatic.      Right Ear: Tympanic membrane, ear canal and external ear normal.      Left Ear: Tympanic membrane, ear canal and external ear normal.      Nose: Nose normal.   Eyes:      Extraocular " Movements: Extraocular movements intact.      Conjunctiva/sclera: Conjunctivae normal.      Pupils: Pupils are equal, round, and reactive to light.   Cardiovascular:      Rate and Rhythm: Normal rate and regular rhythm.      Heart sounds: Normal heart sounds.   Pulmonary:      Effort: Pulmonary effort is normal.      Comments: Decreased breath sounds bilaterally  Abdominal:      General: Bowel sounds are normal. There is no distension.      Palpations: Abdomen is soft. There is no mass.      Tenderness: There is no abdominal tenderness.   Musculoskeletal:         General: Normal range of motion.      Cervical back: Neck supple.   Skin:     General: Skin is warm.   Neurological:      General: No focal deficit present.      Mental Status: He is alert and oriented to person, place, and time.   Psychiatric:         Mood and Affect: Mood normal.         Behavior: Behavior normal.       PHQ-9 Total Score:      Assessment/Plan   Diagnoses and all orders for this visit:    1. History of CVA (cerebrovascular accident) (Primary)  Comments:  Patient saw neurologist earlier in the week.  She is suggesting possible seizure disorder.  She is also arranging for neuropsychiatric evaluation.    2. Screening for colon cancer  Comments:  Again had advised to do Cologuard or should get colonoscopy.    3. Glucocorticoid deficiency (CMS/HCC)  Comments:  Following up with endocrinologist later on today.  Patient still has episodes of feeling shaky, angry, and memory loss.    4. Anxiety state  Comments:  Patient feels as though he is anxious due to the fact that no one can tell him what is going on with his medical condition.  No HI or SI  Orders:  -     Ambulatory Referral to Behavioral Health    5. Hyperlipidemia, unspecified hyperlipidemia type  Comments:  Repeat 2 weeks.  Patient trying to eat less saturated fats.  Orders:  -     Lipid Panel; Future        Patient Instructions     Health Maintenance Due   Topic Date Due   • COLORECTAL  CANCER SCREENING  Never done   • COVID-19 Vaccine (1) Never done

## 2021-05-14 NOTE — PROGRESS NOTES
Endocrine Progress Note Outpatient     Patient Care Team:  Justa Castano MD as PCP - General (Family Medicine)  Luz Sepulveda PA-C as Physician Assistant (Neurosurgery)  Gonzalo Cruz MD as Consulting Physician (Surgical Oncology)    Chief Complaint: Follow-up glucocorticoid deficiency    HPI: 51-year-old male with prior history of a stroke, kidney stones apparently was found to have a left adrenal incidentaloma last year around June 2020.  He subsequently underwent surgery in February 2021 at [facility].  Postop within 2 weeks he developed fatigue and tiredness and lethargy and weight loss was admitted to [facility] last week and was found to have cortisol deficiency with ACTH stimulation test.  He was started on hydrocortisone 10 mg 3 times daily.  He felt better initially but then still continued to have issues with some dizziness so we put him on Florinef 0.05 mg twice a day which has helped him and his blood pressure is fine now but he still having some fatigue and tiredness with some mood swings and sweating and hot and cold feelings.    He is following with a neurologist at [facility] and there is some thought that he may be having some microseizures and he is going to be worked up on that.        Past Medical History:   Diagnosis Date   • Brain fog    • Hyperlipidemia    • Tiredness        Social History     Socioeconomic History   • Marital status:      Spouse name: Not on file   • Number of children: Not on file   • Years of education: Not on file   • Highest education level: Not on file   Tobacco Use   • Smoking status: Current Some Day Smoker     Packs/day: 2.00     Years: 30.00     Pack years: 60.00     Types: Cigars   • Smokeless tobacco: Never Used   • Tobacco comment: 2 packs= 4 cigars a day   Vaping Use   • Vaping Use: Never used   Substance and Sexual Activity   • Alcohol use: Not Currently     Comment: not drank in months   • Drug use:  Never   • Sexual activity: Yes     Partners: Female     Birth control/protection: None       Family History   Problem Relation Age of Onset   • Arthritis Mother    • Hypertension Mother    • Cancer Father         abdomen   • Arthritis Father    • Hypertension Father    • Cancer Brother         esophageal    • Heart disease Brother        Allergies   Allergen Reactions   • Cephalosporins Anaphylaxis     Throat swelling   • Dye  [Contrast Dye] Hives   • Sulfa Antibiotics Hives   • Iodinated Diagnostic Agents Unknown - Low Severity   • Statins Myalgia     Myalgia and fatique   • Sulfate Unknown - Low Severity       ROS:   Constitutional:  Admit fatigue, tiredness.    Eyes:  Denies change in visual acuity   HENT:  Denies nasal congestion or sore throat   Respiratory: denies cough, shortness of breath.   Cardiovascular:  denies chest pain, edema   GI:  Denies abdominal pain, admit nausea, no vomiting.   Musculoskeletal:  Denies back pain or joint pain   Integument:  Denies dry skin and rash   Neurologic:  Denies headache, focal weakness or sensory changes   Endocrine:  Denies polyuria or polydipsia   Psychiatric:  Denies depression,  admit  or anxiety      Vitals:    05/14/21 1305   BP: 125/70   Pulse: 78   Temp: 97.1 °F (36.2 °C)   SpO2: 98%   Orthostatic vitals:  Length: Pulse was 62/min with blood pressure 102/65  Sitting: Pulse was 71/min blood pressure 102/68  Standing: Pulse was 78/min with a blood pressure 98/65.    Body mass index is 24.39 kg/m².     Physical Exam:  GEN: NAD, conversant  EYES: EOMI, PERRL, no conjunctival erythema  NECK: no thyromegaly, full ROM   CV: RRR, no murmurs/rubs/gallops, no peripheral edema  LUNG: CTAB, no wheezes/rales/ronchi  SKIN: no rashes, no acanthosis  MSK: no deformities, full ROM of all extremities  NEURO: no tremors, DTR normal  PSYCH: AOX3, appropriate mood, affect normal      Results Review:     I reviewed the patient's new clinical results.      Lab Results   Component  Value Date    GLUCOSE 79 05/07/2021    BUN 11 05/07/2021    CREATININE 0.92 05/07/2021    EGFRIFNONA 87 05/07/2021    BCR 12.0 05/07/2021    K 4.1 05/07/2021    CO2 28.8 05/07/2021    CALCIUM 9.3 05/07/2021    ALBUMIN 4.10 05/07/2021    LABIL2 1.3 09/25/2018    AST 13 05/07/2021    ALT 11 05/07/2021    CHOL 188 04/16/2021    TRIG 70 04/16/2021     (H) 04/16/2021    HDL 60 04/16/2021     Lab Results   Component Value Date    TSH 1.360 03/17/2021     Renin Direct Assay (03/23/2021 09:16)   Aldosterone (03/23/2021 09:16)   Testosterone, Free, Total (03/23/2021 09:16)   Follicle Stimulating Hormone (03/23/2021 09:16)   Luteinizing Hormone (03/23/2021 09:16)   Testosterone, Free, Total (03/31/2021 07:45)   Prolactin (03/31/2021 07:45)   Sex Horm Binding Globulin (03/31/2021 07:45)   DHEA-Sulfate (03/17/2021 21:36)   SCANNED - IMAGING (04/30/2021)         Medication Review: Reviewed.       Current Outpatient Medications:   •  aspirin 81 MG chewable tablet, Chew 81 mg Daily., Disp: , Rfl:   •  busPIRone (BUSPAR) 5 MG tablet, Take 5 mg by mouth 3 (Three) Times a Day. PRN, Disp: , Rfl:   •  fluticasone (Flonase Allergy Relief) 50 MCG/ACT nasal spray, 1 spray into the nostril(s) as directed by provider Daily., Disp: , Rfl:   •  hydrocortisone (CORTEF) 10 MG tablet, Take 1 tablet by mouth 3 (Three) Times a Day With Meals., Disp: 270 tablet, Rfl: 2      Assessment/Plan   Glucocorticoid deficiency: Currently on hydrocortisone replacement with 10 mg 3 times daily and Florief 0.05 mg po BID. we will continue current medications with regards to hydrocortisone and Florinef as it has helped his blood pressure.    Hypogonadotropic hypogonadism: He does have low free testosterone on 2 different occasion with normal total testosterone with normal LH and FSH.  Pituitary MRI did not show any pituitary tumor.  We did talk about testosterone replacement therapy with side effects including but not limited to increased risk for CAD,  stroke, DVT, PE as well as BPH.  He does have history of a stroke.  I spoke with his neurologist on the phone while  patient and his wife was present and because of the increased risk for stroke she was hesitant to start testosterone replacement therapy so we decided to hold off on that and she is going to do an EEG and then make further recommendations.  Patient and wife agrees with the plan.            Julia Blake MD FACE.

## 2021-05-20 ENCOUNTER — OFFICE VISIT (OUTPATIENT)
Dept: PSYCHIATRY | Facility: CLINIC | Age: 51
End: 2021-05-20

## 2021-05-20 DIAGNOSIS — F06.30 MOOD DISORDER DUE TO A GENERAL MEDICAL CONDITION: Primary | Chronic | ICD-10-CM

## 2021-05-20 DIAGNOSIS — F06.4 ANXIETY DISORDER DUE TO GENERAL MEDICAL CONDITION: Chronic | ICD-10-CM

## 2021-05-20 PROCEDURE — 90792 PSYCH DIAG EVAL W/MED SRVCS: CPT | Performed by: PSYCHIATRY & NEUROLOGY

## 2021-05-20 RX ORDER — ALPRAZOLAM 0.25 MG/1
0.25 TABLET ORAL 3 TIMES DAILY PRN
Qty: 90 TABLET | Refills: 1 | Status: SHIPPED | OUTPATIENT
Start: 2021-05-20 | End: 2021-07-23 | Stop reason: SDUPTHER

## 2021-05-20 NOTE — PATIENT INSTRUCTIONS
"[email or web address]   Generalized Anxiety Disorder, Adult  Generalized anxiety disorder (JOSE MARIA) is a mental health condition. Unlike normal worries, anxiety related to JOSE MARIA is not triggered by a specific event. These worries do not fade or get better with time. JOSE MARIA interferes with relationships, work, and school.  JOSE MARIA symptoms can vary from mild to severe. People with severe JOSE MARIA can have intense waves of anxiety with physical symptoms that are similar to panic attacks.  What are the causes?  The exact cause of JOSE MARIA is not known, but the following are believed to have an impact:  · Differences in natural brain chemicals.  · Genes passed down from parents to children.  · Differences in the way threats are perceived.  · Development during childhood.  · Personality.  What increases the risk?  The following factors may make you more likely to develop this condition:  · Being female.  · Having a family history of anxiety disorders.  · Being very shy.  · Experiencing very stressful life events, such as the death of a loved one.  · Having a very stressful family environment.  What are the signs or symptoms?  People with JOSE MARIA often worry excessively about many things in their lives, such as their health and family. Symptoms may also include:  · Mental and emotional symptoms:  ? Worrying excessively about natural disasters.  ? Fear of being late.  ? Difficulty concentrating.  ? Fears that others are judging your performance.  · Physical symptoms:  ? Fatigue.  ? Headaches, muscle tension, muscle twitches, trembling, or feeling shaky.  ? Feeling like your heart is pounding or beating very fast.  ? Feeling out of breath or like you cannot take a deep breath.  ? Having trouble falling asleep or staying asleep, or experiencing restlessness.  ? Sweating.  ? Nausea, diarrhea, or irritable bowel syndrome (IBS).  · Behavioral symptoms:  ? Experiencing erratic moods or irritability.  ? Avoidance of new situations.  ? Avoidance of " people.  ? Extreme difficulty making decisions.  How is this diagnosed?  This condition is diagnosed based on your symptoms and medical history. You will also have a physical exam. Your health care provider may perform tests to rule out other possible causes of your symptoms.  To be diagnosed with JOSE MARIA, a person must have anxiety that:  · Is out of his or her control.  · Affects several different aspects of his or her life, such as work and relationships.  · Causes distress that makes him or her unable to take part in normal activities.  · Includes at least three symptoms of JOSE MARIA, such as restlessness, fatigue, trouble concentrating, irritability, muscle tension, or sleep problems.  Before your health care provider can confirm a diagnosis of JOSE MARIA, these symptoms must be present more days than they are not, and they must last for 6 months or longer.  How is this treated?  This condition may be treated with:  · Medicine. Antidepressant medicine is usually prescribed for long-term daily control. Anti-anxiety medicines may be added in severe cases, especially when panic attacks occur.  · Talk therapy (psychotherapy). Certain types of talk therapy can be helpful in treating JOSE MARIA by providing support, education, and guidance. Options include:  ? Cognitive behavioral therapy (CBT). People learn coping skills and self-calming techniques to ease their physical symptoms. They learn to identify unrealistic thoughts and behaviors and to replace them with more appropriate thoughts and behaviors.  ? Acceptance and commitment therapy (ACT). This treatment teaches people how to be mindful as a way to cope with unwanted thoughts and feelings.  ? Biofeedback. This process trains you to manage your body's response (physiological response) through breathing techniques and relaxation methods. You will work with a therapist while machines are used to monitor your physical symptoms.  · Stress management techniques. These include yoga,  meditation, and exercise.  A mental health specialist can help determine which treatment is best for you. Some people see improvement with one type of therapy. However, other people require a combination of therapies.  Follow these instructions at home:  Lifestyle  · Maintain a consistent routine and schedule.  · Anticipate stressful situations. Create a plan, and allow extra time to work with your plan.  · Practice stress management or self-calming techniques that you have learned from your therapist or your health care provider.  General instructions  · Take over-the-counter and prescription medicines only as told by your health care provider.  · Understand that you are likely to have setbacks. Accept this and be kind to yourself as you persist to take better care of yourself.  · Recognize and accept your accomplishments, even if you  them as small.  · Keep all follow-up visits as told by your health care provider. This is important.  Contact a health care provider if:  · Your symptoms do not get better.  · Your symptoms get worse.  · You have signs of depression, such as:  ? A persistently sad or irritable mood.  ? Loss of enjoyment in activities that used to bring you candie.  ? Change in weight or eating.  ? Changes in sleeping habits.  ? Avoiding friends or family members.  ? Loss of energy for normal tasks.  ? Feelings of guilt or worthlessness.  Get help right away if:  · You have serious thoughts about hurting yourself or others.  If you ever feel like you may hurt yourself or others, or have thoughts about taking your own life, get help right away. Go to your nearest emergency department or:  · Call your local emergency services (531 in the [place]  · Call a suicide crisis helpline, such as the National Suicide Prevention Lifeline at 1-592.964.5538. This is open 24 hours a day in the [place]  · Text the Crisis Text Line at 918804 (in the [place]  Summary  · Generalized anxiety disorder (JOSE MARIA) is a mental  health condition that involves worry that is not triggered by a specific event.  · People with JOSE MARIA often worry excessively about many things in their lives, such as their health and family.  · JOSE MARIA may cause symptoms such as restlessness, trouble concentrating, sleep problems, frequent sweating, nausea, diarrhea, headaches, and trembling or muscle twitching.  · A mental health specialist can help determine which treatment is best for you. Some people see improvement with one type of therapy. However, other people require a combination of therapies.  This information is not intended to replace advice given to you by your health care provider. Make sure you discuss any questions you have with your health care provider.  Document Revised: 10/07/2020 Document Reviewed: 10/07/2020  [employer] Patient Education © 2021 [employer]

## 2021-05-20 NOTE — PROGRESS NOTES
"Subjective   Jourdan Lebron is a 51 y.o. 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, left middle cerebral artery , the pt is R handed    he had troubles talking , had muscle weakness   Oct 2020 - went back to work light duty , still had issues completing his tasks   Feb 2021- adrenal gland removed and that caused more issues, more medical w/u and ER visits, weight loss   Now on steroid replacement which is also not completely controlled     Today the pt c/o decreased concentration, increased irritability , mood is unpredictable now, anxious, depression is rated as 8/10, denied SI/HI,more days when depressed, sometimes waking up having no energy, the pt has different life style - completely changed, can not do what he used to enjoy in the past .  His life is revolving medical appts   Sleep - poor , not restorative , hydrocortisone makes everything worse  Main concern is memory and concentration - much worse after adrenalectomy   Denied AVH/SI/HI     The following portions of the patient's history were reviewed and updated as appropriate: allergies, current medications, past family history, past medical history, past social history, past surgical history and problem list.    PAST PSYCHIATRIC HISTORY  Axis I  Affective/Bipolar Disorder, Anxiety/Panic Disorder  Axis II  Denied     PAST OUTPATIENT TREATMENT  Diagnosis treated:  Anxiety, depression   Treatment Type:  Medication Management  Prior Psychiatric Medications:  lexapro - not effective  Buspirone - side effects   Support Groups:  None   Sequelae Of Mental Disorder:  job disruption, emotional distress          Interval History  Deteriorated    Side Effects  On no psych meds now       Past Medical History:  Past Medical History:   Diagnosis Date   • Brain fog    • " Hyperlipidemia    • Tiredness        Social History:  Social History     Socioeconomic History   • Marital status:      Spouse name: Not on file   • Number of children: Not on file   • Years of education: Not on file   • Highest education level: Not on file   Tobacco Use   • Smoking status: Current Some Day Smoker     Packs/day: 2.00     Years: 30.00     Pack years: 60.00     Types: Cigars   • Smokeless tobacco: Never Used   • Tobacco comment: 2 packs= 4 cigars a day   Vaping Use   • Vaping Use: Never used   Substance and Sexual Activity   • Alcohol use: Not Currently     Comment: not drank in months   • Drug use: Never   • Sexual activity: Yes     Partners: Female     Birth control/protection: None       Family History:  Family History   Problem Relation Age of Onset   • Arthritis Mother    • Hypertension Mother    • Cancer Father         abdomen   • Arthritis Father    • Hypertension Father    • Cancer Brother         esophageal    • Heart disease Brother        Past Surgical History:  Past Surgical History:   Procedure Laterality Date   • ADRENALECTOMY     • KIDNEY STONE SURGERY     • THROMBECTOMY         Problem List:  Patient Active Problem List   Diagnosis   • Abnormal electrocardiogram   • Anxiety state   • Calculus of kidney   • History of cerebrovascular accident   • Enlarged lymph nodes   • Family history of malignant neoplasm   • Generalized hyperhidrosis   • Hyperlipidemia   • Seasonal allergies   • Tobacco dependence syndrome   • Cervical radiculopathy   • Cervical stenosis of spinal canal   • Near syncope   • Weakness   • History of total adrenalectomy (CMS/HCC)   • Arthropathy of cervical facet joint   • Neck pain   • Pain in left arm   • Shoulder pain   • Seasonal allergic rhinitis   • Thoracic back pain   • Vitamin B12 deficiency   • Electrocardiogram abnormal   • Spinal stenosis of cervical region   • Reduced libido   • Glucocorticoid deficiency (CMS/HCC)   • Other specified postprocedural  states   • Hypogonadotropic hypogonadism (CMS/HCC)   • Mood disorder due to a general medical condition   • Anxiety disorder due to general medical condition       Allergy:   Allergies   Allergen Reactions   • Cephalosporins Anaphylaxis     Throat swelling   • Dye  [Contrast Dye] Hives   • Sulfa Antibiotics Hives   • Iodinated Diagnostic Agents Unknown - Low Severity   • Statins Myalgia     Myalgia and fatique   • Sulfate Unknown - Low Severity        Discontinued Medications:  There are no discontinued medications.    Current Medications:   Current Outpatient Medications   Medication Sig Dispense Refill   • ALPRAZolam (Xanax) 0.25 MG tablet Take 1 tablet by mouth 3 (Three) Times a Day As Needed for Anxiety. 90 tablet 1   • aspirin 81 MG chewable tablet Chew 81 mg Daily.     • busPIRone (BUSPAR) 5 MG tablet Take 5 mg by mouth 3 (Three) Times a Day. PRN     • fludrocortisone 0.1 MG tablet Take half a tablet twice a day. 30 tablet 6   • fluticasone (Flonase Allergy Relief) 50 MCG/ACT nasal spray 1 spray into the nostril(s) as directed by provider Daily.     • hydrocortisone (CORTEF) 10 MG tablet Take 1 tablet by mouth 3 (Three) Times a Day With Meals. 270 tablet 2     No current facility-administered medications for this visit.         Psychological ROS: positive for - anxiety, concentration difficulties, depression, irritability and memory difficulties      Physical Exam:   There were no vitals taken for this visit.    Mental Status Exam:   Hygiene:   good  Cooperation:  Cooperative  Eye Contact:  Good  Psychomotor Behavior:  Appropriate  Affect:  Appropriate  Mood: fluctates  Hopelessness: Denies  Speech:  Normal  Thought Process:  Goal directed and Linear  Thought Content:  Normal and Mood congruent  Suicidal:  None  Homicidal:  None  Hallucinations:  None  Delusion:  None  Memory:  Deficits  Orientation:  Person, Place, Time and Situation  Reliability:  good  Insight:  Good  Judgement:  Good  Impulse Control:   Fair  Physical/Medical Issues:  Yes         PHQ-9 Depression Screening  Little interest or pleasure in doing things? 2   Feeling down, depressed, or hopeless? 2   Trouble falling or staying asleep, or sleeping too much? 3   Feeling tired or having little energy? 2   Poor appetite or overeating? 1   Feeling bad about yourself - or that you are a failure or have let yourself or your family down? 3   Trouble concentrating on things, such as reading the newspaper or watching television? 3   Moving or speaking so slowly that other people could have noticed? Or the opposite - being so fidgety or restless that you have been moving around a lot more than usual? 0   Thoughts that you would be better off dead, or of hurting yourself in some way? 0   PHQ-9 Total Score 16   If you checked off any problems, how difficult have these problems made it for you to do your work, take care of things at home, or get along with other people? Extremely dIfficult           Current every day smoker 3-10 mintues spent counseling Has reduced Tobbacos use    I advised Jourdan of the risks of tobacco use.     Lab Results:   Lab on 05/07/2021   Component Date Value Ref Range Status   • Glucose 05/07/2021 79  65 - 99 mg/dL Final   • BUN 05/07/2021 11  6 - 20 mg/dL Final   • Creatinine 05/07/2021 0.92  0.76 - 1.27 mg/dL Final   • Sodium 05/07/2021 142  136 - 145 mmol/L Final   • Potassium 05/07/2021 4.1  3.5 - 5.2 mmol/L Final   • Chloride 05/07/2021 105  98 - 107 mmol/L Final   • CO2 05/07/2021 28.8  22.0 - 29.0 mmol/L Final   • Calcium 05/07/2021 9.3  8.6 - 10.5 mg/dL Final   • Total Protein 05/07/2021 6.3  6.0 - 8.5 g/dL Final   • Albumin 05/07/2021 4.10  3.50 - 5.20 g/dL Final   • ALT (SGPT) 05/07/2021 11  1 - 41 U/L Final   • AST (SGOT) 05/07/2021 13  1 - 40 U/L Final   • Alkaline Phosphatase 05/07/2021 64  39 - 117 U/L Final   • Total Bilirubin 05/07/2021 0.2  0.0 - 1.2 mg/dL Final   • eGFR Non  Amer 05/07/2021 87  >60 mL/min/1.73 Final    • Globulin 05/07/2021 2.2  gm/dL Final   • A/G Ratio 05/07/2021 1.9  g/dL Final   • BUN/Creatinine Ratio 05/07/2021 12.0  7.0 - 25.0 Final   • Anion Gap 05/07/2021 8.2  5.0 - 15.0 mmol/L Final   Office Visit on 04/16/2021   Component Date Value Ref Range Status   • Total Cholesterol 04/16/2021 188  0 - 200 mg/dL Final   • Triglycerides 04/16/2021 70  0 - 150 mg/dL Final   • HDL Cholesterol 04/16/2021 60  40 - 60 mg/dL Final   • LDL Cholesterol  04/16/2021 115* 0 - 100 mg/dL Final   • VLDL Cholesterol 04/16/2021 13  5 - 40 mg/dL Final   • LDL/HDL Ratio 04/16/2021 1.90   Final   • Vitamin B-12 04/16/2021 1,083* 211 - 946 pg/mL Final   • Sed Rate 04/16/2021 11  0 - 15 mm/hr Final   • C-Reactive Protein 04/16/2021 <0.30  0.00 - 0.50 mg/dL Final   Lab on 04/06/2021   Component Date Value Ref Range Status   • PSA 04/06/2021 0.163  0.000 - 4.000 ng/mL Final   Lab on 03/31/2021   Component Date Value Ref Range Status   • Testosterone, Total 03/31/2021 555  264 - 916 ng/dL Final    Adult male reference interval is based on a population of  healthy nonobese males (BMI <30) between 19 and 39 years old.  Rachel et.al. JCEM 2017,102;5091-0245. PMID: [id number].   • Testosterone, Free 03/31/2021 4.9* 6.8 - 21.5 pg/mL Final   • Prolactin 03/31/2021 11.70  4.04 - 15.20 ng/mL Final   • Sex Hormone Binding Globulin 03/31/2021 54.8  16.5 - 55.9 nmol/L Final   Lab on 03/23/2021   Component Date Value Ref Range Status   • Hepatitis B Surface Ag 03/23/2021 Non-Reactive  Non-Reactive Final   • Hep A IgM 03/23/2021 Non-Reactive  Non-Reactive Final   • Hep B C IgM 03/23/2021 Non-Reactive  Non-Reactive Final   • Hepatitis C Ab 03/23/2021 Non-Reactive  Non-Reactive Final   • Glucose 03/23/2021 78  65 - 99 mg/dL Final   • BUN 03/23/2021 11  6 - 20 mg/dL Final   • Creatinine 03/23/2021 0.86  0.76 - 1.27 mg/dL Final   • Sodium 03/23/2021 140  136 - 145 mmol/L Final   • Potassium 03/23/2021 4.3  3.5 - 5.2 mmol/L Final   • Chloride 03/23/2021  104  98 - 107 mmol/L Final   • CO2 03/23/2021 27.1  22.0 - 29.0 mmol/L Final   • Calcium 03/23/2021 9.2  8.6 - 10.5 mg/dL Final   • Total Protein 03/23/2021 6.2  6.0 - 8.5 g/dL Final   • Albumin 03/23/2021 4.00  3.50 - 5.20 g/dL Final   • ALT (SGPT) 03/23/2021 21  1 - 41 U/L Final   • AST (SGOT) 03/23/2021 16  1 - 40 U/L Final   • Alkaline Phosphatase 03/23/2021 61  39 - 117 U/L Final   • Total Bilirubin 03/23/2021 0.2  0.0 - 1.2 mg/dL Final   • eGFR Non African Amer 03/23/2021 95  >60 mL/min/1.73 Final   • Globulin 03/23/2021 2.2  gm/dL Final   • A/G Ratio 03/23/2021 1.8  g/dL Final   • BUN/Creatinine Ratio 03/23/2021 12.8  7.0 - 25.0 Final   • Anion Gap 03/23/2021 8.9  5.0 - 15.0 mmol/L Final   • WBC 03/23/2021 9.17  3.40 - 10.80 10*3/mm3 Final   • RBC 03/23/2021 4.34  4.14 - 5.80 10*6/mm3 Final   • Hemoglobin 03/23/2021 14.8  13.0 - 17.7 g/dL Final   • Hematocrit 03/23/2021 42.3  37.5 - 51.0 % Final   • MCV 03/23/2021 97.5* 79.0 - 97.0 fL Final   • MCH 03/23/2021 34.1* 26.6 - 33.0 pg Final   • MCHC 03/23/2021 35.0  31.5 - 35.7 g/dL Final   • RDW 03/23/2021 12.4  12.3 - 15.4 % Final   • RDW-SD 03/23/2021 44.7  37.0 - 54.0 fl Final   • MPV 03/23/2021 10.4  6.0 - 12.0 fL Final   • Platelets 03/23/2021 247  140 - 450 10*3/mm3 Final   • Neutrophil % 03/23/2021 57.8  42.7 - 76.0 % Final   • Lymphocyte % 03/23/2021 28.1  19.6 - 45.3 % Final   • Monocyte % 03/23/2021 9.8  5.0 - 12.0 % Final   • Eosinophil % 03/23/2021 3.8  0.3 - 6.2 % Final   • Basophil % 03/23/2021 0.4  0.0 - 1.5 % Final   • Immature Grans % 03/23/2021 0.1  0.0 - 0.5 % Final   • Neutrophils, Absolute 03/23/2021 5.29  1.70 - 7.00 10*3/mm3 Final   • Lymphocytes, Absolute 03/23/2021 2.58  0.70 - 3.10 10*3/mm3 Final   • Monocytes, Absolute 03/23/2021 0.90  0.10 - 0.90 10*3/mm3 Final   • Eosinophils, Absolute 03/23/2021 0.35  0.00 - 0.40 10*3/mm3 Final   • Basophils, Absolute 03/23/2021 0.04  0.00 - 0.20 10*3/mm3 Final   • Immature Grans, Absolute 03/23/2021  0.01  0.00 - 0.05 10*3/mm3 Final   • nRBC 03/23/2021 0.0  0.0 - 0.2 /100 WBC Final   • Sed Rate 03/23/2021 10  0 - 15 mm/hr Final   • Renin Activity 03/23/2021 1.044  0.167 - 5.380 ng/mL/hr Final   • Aldosterone 03/23/2021 1.2  0.0 - 30.0 ng/dL Final   • FSH 03/23/2021 3.68  mIU/mL Final   • Testosterone, Total 03/23/2021 481  264 - 916 ng/dL Final    Adult male reference interval is based on a population of  healthy nonobese males (BMI <30) between 19 and 39 years old.  Rachel et.al. JCEM 2017,102;1276-3895. PMID: [id number].   • Testosterone, Free 03/23/2021 2.8* 6.8 - 21.5 pg/mL Final   • LH 03/23/2021 4.20  mIU/mL Final   Admission on 03/22/2021, Discharged on 03/23/2021   Component Date Value Ref Range Status   • Glucose 03/22/2021 90  65 - 99 mg/dL Final   • BUN 03/22/2021 14  6 - 20 mg/dL Final   • Creatinine 03/22/2021 0.79  0.76 - 1.27 mg/dL Final   • Sodium 03/22/2021 138  136 - 145 mmol/L Final   • Potassium 03/22/2021 4.2  3.5 - 5.2 mmol/L Final   • Chloride 03/22/2021 103  98 - 107 mmol/L Final   • CO2 03/22/2021 25.0  22.0 - 29.0 mmol/L Final   • Calcium 03/22/2021 9.5  8.6 - 10.5 mg/dL Final   • Total Protein 03/22/2021 6.3  6.0 - 8.5 g/dL Final   • Albumin 03/22/2021 3.60  3.50 - 5.20 g/dL Final   • ALT (SGPT) 03/22/2021 18  1 - 41 U/L Final   • AST (SGOT) 03/22/2021 13  1 - 40 U/L Final   • Alkaline Phosphatase 03/22/2021 63  39 - 117 U/L Final   • Total Bilirubin 03/22/2021 <0.2  0.0 - 1.2 mg/dL Final   • eGFR Non African Amer 03/22/2021 105  >60 mL/min/1.73 Final   • Globulin 03/22/2021 2.7  gm/dL Final   • A/G Ratio 03/22/2021 1.3  g/dL Final   • BUN/Creatinine Ratio 03/22/2021 17.7  7.0 - 25.0 Final   • Anion Gap 03/22/2021 10.0  5.0 - 15.0 mmol/L Final   • Lipase 03/22/2021 28  13 - 60 U/L Final   • Color, UA 03/22/2021 Yellow  Yellow, Straw Final   • Appearance, UA 03/22/2021 Clear  Clear Final   • pH, UA 03/22/2021 7.0  5.0 - 8.0 Final   • Specific Gravity, UA 03/22/2021 1.007  1.005 - 1.030  Final   • Glucose, UA 03/22/2021 Negative  Negative Final   • Ketones, UA 03/22/2021 Negative  Negative Final   • Bilirubin, UA 03/22/2021 Negative  Negative Final   • Blood, UA 03/22/2021 Large (3+)* Negative Final   • Protein, UA 03/22/2021 Negative  Negative Final   • Leuk Esterase, UA 03/22/2021 Negative  Negative Final   • Nitrite, UA 03/22/2021 Negative  Negative Final   • Urobilinogen, UA 03/22/2021 0.2 E.U./dL  0.2 - 1.0 E.U./dL Final   • WBC 03/22/2021 11.90* 3.40 - 10.80 10*3/mm3 Final   • RBC 03/22/2021 4.20  4.14 - 5.80 10*6/mm3 Final   • Hemoglobin 03/22/2021 14.2  13.0 - 17.7 g/dL Final   • Hematocrit 03/22/2021 41.1  37.5 - 51.0 % Final   • MCV 03/22/2021 97.8* 79.0 - 97.0 fL Final   • MCH 03/22/2021 33.8* 26.6 - 33.0 pg Final   • MCHC 03/22/2021 34.6  31.5 - 35.7 g/dL Final   • RDW 03/22/2021 13.5  12.3 - 15.4 % Final   • RDW-SD 03/22/2021 45.9  37.0 - 54.0 fl Final   • MPV 03/22/2021 7.9  6.0 - 12.0 fL Final   • Platelets 03/22/2021 224  140 - 450 10*3/mm3 Final   • Neutrophil % 03/22/2021 59.2  42.7 - 76.0 % Final   • Lymphocyte % 03/22/2021 29.8  19.6 - 45.3 % Final   • Monocyte % 03/22/2021 6.8  5.0 - 12.0 % Final   • Eosinophil % 03/22/2021 2.9  0.3 - 6.2 % Final   • Basophil % 03/22/2021 1.3  0.0 - 1.5 % Final   • Neutrophils, Absolute 03/22/2021 7.10* 1.70 - 7.00 10*3/mm3 Final   • Lymphocytes, Absolute 03/22/2021 3.60* 0.70 - 3.10 10*3/mm3 Final   • Monocytes, Absolute 03/22/2021 0.80  0.10 - 0.90 10*3/mm3 Final   • Eosinophils, Absolute 03/22/2021 0.30  0.00 - 0.40 10*3/mm3 Final   • Basophils, Absolute 03/22/2021 0.20  0.00 - 0.20 10*3/mm3 Final   • nRBC 03/22/2021 0.1  0.0 - 0.2 /100 WBC Final   • RBC, UA 03/22/2021 Too Numerous to Count* None Seen /HPF Final   • WBC, UA 03/22/2021 0-2* None Seen /HPF Final   • Bacteria, UA 03/22/2021 None Seen  None Seen /HPF Final   • Squamous Epithelial Cells, UA 03/22/2021 None Seen  None Seen, 0-2 /HPF Final   • Hyaline Casts, UA 03/22/2021 3-6  None  Seen /LPF Final   • Methodology 03/22/2021 Automated Microscopy   Final   Admission on 03/16/2021, Discharged on 03/18/2021   Component Date Value Ref Range Status   • QT Interval 03/16/2021 424  ms Final   • Glucose 03/16/2021 86  65 - 99 mg/dL Final   • BUN 03/16/2021 10  6 - 20 mg/dL Final   • Creatinine 03/16/2021 1.19  0.76 - 1.27 mg/dL Final   • Sodium 03/16/2021 137  136 - 145 mmol/L Final   • Potassium 03/16/2021 4.4  3.5 - 5.2 mmol/L Final    Slight hemolysis detected by analyzer. Results may be affected.   • Chloride 03/16/2021 103  98 - 107 mmol/L Final   • CO2 03/16/2021 25.0  22.0 - 29.0 mmol/L Final   • Calcium 03/16/2021 9.3  8.6 - 10.5 mg/dL Final   • eGFR Non  Amer 03/16/2021 65  >60 mL/min/1.73 Final   • BUN/Creatinine Ratio 03/16/2021 8.4  7.0 - 25.0 Final   • Anion Gap 03/16/2021 9.0  5.0 - 15.0 mmol/L Final   • Troponin T 03/16/2021 <0.010  0.000 - 0.030 ng/mL Final   • WBC 03/16/2021 12.50* 3.40 - 10.80 10*3/mm3 Final   • RBC 03/16/2021 4.65  4.14 - 5.80 10*6/mm3 Final   • Hemoglobin 03/16/2021 15.5  13.0 - 17.7 g/dL Final   • Hematocrit 03/16/2021 45.8  37.5 - 51.0 % Final   • MCV 03/16/2021 98.5* 79.0 - 97.0 fL Final   • MCH 03/16/2021 33.3* 26.6 - 33.0 pg Final   • MCHC 03/16/2021 33.8  31.5 - 35.7 g/dL Final   • RDW 03/16/2021 13.2  12.3 - 15.4 % Final   • RDW-SD 03/16/2021 44.6  37.0 - 54.0 fl Final   • MPV 03/16/2021 7.8  6.0 - 12.0 fL Final   • Platelets 03/16/2021 311  140 - 450 10*3/mm3 Final   • Neutrophil % 03/16/2021 72.3  42.7 - 76.0 % Final   • Lymphocyte % 03/16/2021 18.2* 19.6 - 45.3 % Final   • Monocyte % 03/16/2021 6.5  5.0 - 12.0 % Final   • Eosinophil % 03/16/2021 2.0  0.3 - 6.2 % Final   • Basophil % 03/16/2021 1.0  0.0 - 1.5 % Final   • Neutrophils, Absolute 03/16/2021 9.10* 1.70 - 7.00 10*3/mm3 Final   • Lymphocytes, Absolute 03/16/2021 2.30  0.70 - 3.10 10*3/mm3 Final   • Monocytes, Absolute 03/16/2021 0.80  0.10 - 0.90 10*3/mm3 Final   • Eosinophils, Absolute  03/16/2021 0.20  0.00 - 0.40 10*3/mm3 Final   • Basophils, Absolute 03/16/2021 0.10  0.00 - 0.20 10*3/mm3 Final   • nRBC 03/16/2021 0.0  0.0 - 0.2 /100 WBC Final   • Extra Tube 03/16/2021 hold for add-on   Final    Auto resulted   • COVID19 03/16/2021 Not Detected  Not Detected - Ref. Range Final   • Cortisol 03/17/2021 12.49    mcg/dL Final   • ACTH 03/17/2021 73.7* 7.2 - 63.3 pg/mL Final    ACTH reference interval for samples collected between 7 and 10 AM.   • Glucose 03/17/2021 82  65 - 99 mg/dL Final   • BUN 03/17/2021 12  6 - 20 mg/dL Final   • Creatinine 03/17/2021 0.81  0.76 - 1.27 mg/dL Final   • Sodium 03/17/2021 140  136 - 145 mmol/L Final   • Potassium 03/17/2021 4.3  3.5 - 5.2 mmol/L Final   • Chloride 03/17/2021 105  98 - 107 mmol/L Final   • CO2 03/17/2021 26.0  22.0 - 29.0 mmol/L Final   • Calcium 03/17/2021 9.7  8.6 - 10.5 mg/dL Final   • eGFR Non African Amer 03/17/2021 102  >60 mL/min/1.73 Final   • BUN/Creatinine Ratio 03/17/2021 14.8  7.0 - 25.0 Final   • Anion Gap 03/17/2021 9.0  5.0 - 15.0 mmol/L Final   • WBC 03/17/2021 7.20  3.40 - 10.80 10*3/mm3 Final   • RBC 03/17/2021 4.43  4.14 - 5.80 10*6/mm3 Final   • Hemoglobin 03/17/2021 14.5  13.0 - 17.7 g/dL Final   • Hematocrit 03/17/2021 43.2  37.5 - 51.0 % Final   • MCV 03/17/2021 97.5* 79.0 - 97.0 fL Final   • MCH 03/17/2021 32.7  26.6 - 33.0 pg Final   • MCHC 03/17/2021 33.5  31.5 - 35.7 g/dL Final   • RDW 03/17/2021 13.6  12.3 - 15.4 % Final   • RDW-SD 03/17/2021 45.9  37.0 - 54.0 fl Final   • MPV 03/17/2021 7.7  6.0 - 12.0 fL Final   • Platelets 03/17/2021 289  140 - 450 10*3/mm3 Final   • Neutrophil % 03/17/2021 44.7  42.7 - 76.0 % Final   • Lymphocyte % 03/17/2021 40.5  19.6 - 45.3 % Final   • Monocyte % 03/17/2021 9.3  5.0 - 12.0 % Final   • Eosinophil % 03/17/2021 4.3  0.3 - 6.2 % Final   • Basophil % 03/17/2021 1.2  0.0 - 1.5 % Final   • Neutrophils, Absolute 03/17/2021 3.20  1.70 - 7.00 10*3/mm3 Final   • Lymphocytes, Absolute  03/17/2021 2.90  0.70 - 3.10 10*3/mm3 Final   • Monocytes, Absolute 03/17/2021 0.70  0.10 - 0.90 10*3/mm3 Final   • Eosinophils, Absolute 03/17/2021 0.30  0.00 - 0.40 10*3/mm3 Final   • Basophils, Absolute 03/17/2021 0.10  0.00 - 0.20 10*3/mm3 Final   • nRBC 03/17/2021 0.1  0.0 - 0.2 /100 WBC Final   • TSH 03/17/2021 1.360  0.270 - 4.200 uIU/mL Final   • Glucose 03/17/2021 145* 70 - 105 mg/dL Final    Serial Number: [id number]:  [id number]   • Renin Activity 03/18/2021 1.639  0.167 - 5.380 ng/mL/hr Final   • Aldosterone 03/18/2021 4.1  0.0 - 30.0 ng/dL Final    **Effective April 5, 2021, [id number] Renin Activity and Aldosterone**    will be made non-orderable. [facility] offers [id number] Aldosterone/    Renin Ratio.   • DHEA-Sulfate 03/17/2021 83.7  71.6 - 375.4 ug/dL Final   • ACTH 03/18/2021 32.1  7.2 - 63.3 pg/mL Final    ACTH reference interval for samples collected between 7 and 10 AM.   • Cortisol 03/18/2021 7.40    mcg/dL Final   • Cortisol 03/18/2021 15.49    mcg/dL Final   • Cortisol 03/18/2021 13.32    mcg/dL Final   Office Visit on 03/10/2021   Component Date Value Ref Range Status   • WBC 03/10/2021 11.16* 3.40 - 10.80 10*3/mm3 Final   • RBC 03/10/2021 5.07  4.14 - 5.80 10*6/mm3 Final   • Hemoglobin 03/10/2021 16.9  13.0 - 17.7 g/dL Final   • Hematocrit 03/10/2021 49.5  37.5 - 51.0 % Final   • MCV 03/10/2021 97.6* 79.0 - 97.0 fL Final   • MCH 03/10/2021 33.3* 26.6 - 33.0 pg Final   • MCHC 03/10/2021 34.1  31.5 - 35.7 g/dL Final   • RDW 03/10/2021 12.6  12.3 - 15.4 % Final   • RDW-SD 03/10/2021 44.9  37.0 - 54.0 fl Final   • MPV 03/10/2021 9.4  6.0 - 12.0 fL Final   • Platelets 03/10/2021 472* 140 - 450 10*3/mm3 Final   • Neutrophil % 03/10/2021 60.2  42.7 - 76.0 % Final   • Lymphocyte % 03/10/2021 27.9  19.6 - 45.3 % Final   • Monocyte % 03/10/2021 6.9  5.0 - 12.0 % Final   • Eosinophil % 03/10/2021 3.9  0.3 - 6.2 % Final   • Basophil % 03/10/2021 0.7  0.0 - 1.5 % Final   • Immature Grans % 03/10/2021 0.4   0.0 - 0.5 % Final   • Neutrophils, Absolute 03/10/2021 6.72  1.70 - 7.00 10*3/mm3 Final   • Lymphocytes, Absolute 03/10/2021 3.11* 0.70 - 3.10 10*3/mm3 Final   • Monocytes, Absolute 03/10/2021 0.77  0.10 - 0.90 10*3/mm3 Final   • Eosinophils, Absolute 03/10/2021 0.43* 0.00 - 0.40 10*3/mm3 Final   • Basophils, Absolute 03/10/2021 0.08  0.00 - 0.20 10*3/mm3 Final   • Immature Grans, Absolute 03/10/2021 0.05  0.00 - 0.05 10*3/mm3 Final   • nRBC 03/10/2021 0.0  0.0 - 0.2 /100 WBC Final   • TSH 03/10/2021 1.270  0.270 - 4.200 uIU/mL Final   • Vitamin B-12 03/10/2021 1,583* 211 - 946 pg/mL Final   • C-Reactive Protein 03/10/2021 <0.30  0.00 - 0.50 mg/dL Final   • Sed Rate 03/10/2021 35* 0 - 15 mm/hr Final   • Color 03/10/2021 Yellow  Yellow, Straw, Dark Yellow, Nely Final   • Clarity, UA 03/10/2021 Cloudy* Clear Final   • Specific Gravity  03/10/2021 1.030  1.005 - 1.030 Final   • pH, Urine 03/10/2021 6.0  5.0 - 8.0 Final   • Leukocytes 03/10/2021 Negative  Negative Final   • Nitrite, UA 03/10/2021 Negative  Negative Final   • Protein, POC 03/10/2021 100 mg/dL* Negative mg/dL Final   • Glucose, UA 03/10/2021 Negative  Negative, 1000 mg/dL (3+) mg/dL Final   • Ketones, UA 03/10/2021 Negative  Negative Final   • Urobilinogen, UA 03/10/2021 Normal  Normal Final   • Bilirubin 03/10/2021 Negative  Negative Final   • Blood, UA 03/10/2021 Negative  Negative Final   • SARS-CoV-2, [facility] 03/10/2021 Not Detected  Not Detected Final    Comment: This nucleic acid amplification test was developed and its performance  characteristics determined by [facility]. Nucleic acid  amplification tests include RT-PCR and TMA. This test has not been  FDA cleared or approved. This test has been authorized by FDA under  an Emergency Use Authorization (EUA). This test is only authorized  for the duration of time the declaration that circumstances exist  justifying the authorization of the emergency use of in vitro  diagnostic tests for  detection of SARS-CoV-2 virus and/or diagnosis  of COVID-19 infection under section 564(b)(1) of the Act, 21 U.S.C.  360bbb-3(b) (1), unless the authorization is terminated or revoked  sooner.  When diagnostic testing is negative, the possibility of a false  negative result should be considered in the context of a patient's  recent exposures and the presence of clinical signs and symptoms  consistent with COVID-19. An individual without symptoms of COVID-19  and who is not shedding SARS-CoV-2 virus                            would expect to have a  negative (not detected) result in this assay.   • [employer] 03/10/2021 Negative  Negative Final   Admission on 03/06/2021, Discharged on 03/06/2021   Component Date Value Ref Range Status   • Glucose 03/06/2021 89  65 - 99 mg/dL Final   • BUN 03/06/2021 10  6 - 20 mg/dL Final   • Creatinine 03/06/2021 0.86  0.76 - 1.27 mg/dL Final   • Sodium 03/06/2021 138  136 - 145 mmol/L Final   • Potassium 03/06/2021 4.3  3.5 - 5.2 mmol/L Final   • Chloride 03/06/2021 101  98 - 107 mmol/L Final   • CO2 03/06/2021 24.0  22.0 - 29.0 mmol/L Final   • Calcium 03/06/2021 9.5  8.6 - 10.5 mg/dL Final   • eGFR Non African Amer 03/06/2021 95  >60 mL/min/1.73 Final   • BUN/Creatinine Ratio 03/06/2021 11.6  7.0 - 25.0 Final   • Anion Gap 03/06/2021 13.0  5.0 - 15.0 mmol/L Final   • QT Interval 03/06/2021 432  ms Final   • WBC 03/06/2021 10.90* 3.40 - 10.80 10*3/mm3 Final   • RBC 03/06/2021 4.95  4.14 - 5.80 10*6/mm3 Final   • Hemoglobin 03/06/2021 17.0  13.0 - 17.7 g/dL Final   • Hematocrit 03/06/2021 47.8  37.5 - 51.0 % Final   • MCV 03/06/2021 96.6  79.0 - 97.0 fL Final   • MCH 03/06/2021 34.3* 26.6 - 33.0 pg Final   • MCHC 03/06/2021 35.5  31.5 - 35.7 g/dL Final   • RDW 03/06/2021 13.7  12.3 - 15.4 % Final   • RDW-SD 03/06/2021 46.4  37.0 - 54.0 fl Final   • MPV 03/06/2021 6.9  6.0 - 12.0 fL Final   • Platelets 03/06/2021 461* 140 - 450 10*3/mm3 Final   • Neutrophil % 03/06/2021 65.5  42.7 -  76.0 % Final   • Lymphocyte % 03/06/2021 24.5  19.6 - 45.3 % Final   • Monocyte % 03/06/2021 5.2  5.0 - 12.0 % Final   • Eosinophil % 03/06/2021 3.5  0.3 - 6.2 % Final   • Basophil % 03/06/2021 1.3  0.0 - 1.5 % Final   • Neutrophils, Absolute 03/06/2021 7.10* 1.70 - 7.00 10*3/mm3 Final   • Lymphocytes, Absolute 03/06/2021 2.70  0.70 - 3.10 10*3/mm3 Final   • Monocytes, Absolute 03/06/2021 0.60  0.10 - 0.90 10*3/mm3 Final   • Eosinophils, Absolute 03/06/2021 0.40  0.00 - 0.40 10*3/mm3 Final   • Basophils, Absolute 03/06/2021 0.10  0.00 - 0.20 10*3/mm3 Final   • nRBC 03/06/2021 0.1  0.0 - 0.2 /100 WBC Final   • Extra Tube 03/06/2021 Hold for add-ons.   Final    Auto resulted.       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 Mood disorder due to a general medical condition  F06.30 293.83   2. Anxiety disorder due to general medical condition  F06.4 293.84       TREATMENT PLAN/GOALS: Continue supportive psychotherapy efforts and medications as indicated. Treatment and medication options discussed during today's visit. Patient ackowledged and verbally consented to continue with current treatment plan and was educated on the importance of compliance with treatment and follow-up appointments.    MEDICATION ISSUES:  INSPECT reviewed as expected - gabapentin and hydrocodone     1.  Mood d/o 2ry to medical condition (CVA and glucocorticoid deficiency) - the  Pt has very complicated medical issues, still a lot of uncertainty about the causes , mood - not regulated, did not respond well to SSRI and Buspar, the pt has memory /concentration issues that make him feels more depressed with low self esteem, he will benefit from neuro cognitive rehab at [facility] or [place], he will ask  Dr Cedeño ( neurologist) if she has any concerns about stimulants or aricept/namenda     SSRI also can interfere with coagulation profile   The pt will benefit from psychotherapy to validate his emotions    2. Anxiety d/o 2ry to medical condition - start  Low dose of xanax 0. 25 mg po BID     PHQ scored 16 - moderately severe depression     Discussed medication options and treatment plan of prescribed medication as well as the risks, benefits, and side effects including potential falls, possible impaired driving and metabolic adversities among others. Patient is agreeable to call the office with any worsening of symptoms or onset of side effects. Patient is agreeable to call 911 or go to the nearest ER should he/she begin having SI/HI. No medication side effects or related complaints today.     MEDS ORDERED DURING VISIT:  New Medications Ordered This Visit   Medications   • ALPRAZolam (Xanax) 0.25 MG tablet     Sig: Take 1 tablet by mouth 3 (Three) Times a Day As Needed for Anxiety.     Dispense:  90 tablet     Refill:  1       Return in about 2 months (around 7/20/2021).         This document has been electronically signed by Clair Lyman MD  May 20, 2021 16:12 EDT    EMR Dragon transcription disclaimer:  Some of this encounter note is an electronic transcription translation of spoken language to printed text. The electronic translation of spoken language may permit erroneous, or at times, nonsensical words or phrases to be inadvertently transcribed; Although I have reviewed the note for such errors some may still  exist.

## 2021-06-01 ENCOUNTER — CLINICAL SUPPORT (OUTPATIENT)
Dept: FAMILY MEDICINE CLINIC | Facility: CLINIC | Age: 51
End: 2021-06-01

## 2021-06-01 DIAGNOSIS — E78.5 HYPERLIPIDEMIA, UNSPECIFIED HYPERLIPIDEMIA TYPE: ICD-10-CM

## 2021-06-01 LAB
CHOLEST SERPL-MCNC: 170 MG/DL (ref 0–200)
HDLC SERPL-MCNC: 54 MG/DL (ref 40–60)
LDLC SERPL CALC-MCNC: 103 MG/DL (ref 0–100)
LDLC/HDLC SERPL: 1.89 {RATIO}
TRIGL SERPL-MCNC: 70 MG/DL (ref 0–150)
VLDLC SERPL-MCNC: 13 MG/DL (ref 5–40)

## 2021-06-01 PROCEDURE — 36415 COLL VENOUS BLD VENIPUNCTURE: CPT | Performed by: PREVENTIVE MEDICINE

## 2021-06-01 PROCEDURE — 80061 LIPID PANEL: CPT | Performed by: PREVENTIVE MEDICINE

## 2021-06-01 NOTE — PROGRESS NOTES
Venipuncture Blood Specimen Collection  Venipuncture performed in left arm by Kenia Panchal MA with good hemostasis. Patient tolerated the procedure well without complications.   06/01/21   EVANS Olivas MD

## 2021-06-07 ENCOUNTER — TELEPHONE (OUTPATIENT)
Dept: ENDOCRINOLOGY | Facility: CLINIC | Age: 51
End: 2021-06-07

## 2021-06-07 NOTE — TELEPHONE ENCOUNTER
Dr Garcias Neurologist called and asked do you feel she can use Provigil for this pt, They use all the time for post stroke and fatigue pt.

## 2021-06-08 ENCOUNTER — TELEPHONE (OUTPATIENT)
Dept: ENDOCRINOLOGY | Facility: CLINIC | Age: 51
End: 2021-06-08

## 2021-06-11 ENCOUNTER — OFFICE VISIT (OUTPATIENT)
Dept: FAMILY MEDICINE CLINIC | Facility: CLINIC | Age: 51
End: 2021-06-11

## 2021-06-11 VITALS
SYSTOLIC BLOOD PRESSURE: 123 MMHG | WEIGHT: 169 LBS | DIASTOLIC BLOOD PRESSURE: 85 MMHG | BODY MASS INDEX: 24.2 KG/M2 | HEIGHT: 70 IN | TEMPERATURE: 98.9 F | HEART RATE: 72 BPM | OXYGEN SATURATION: 96 %

## 2021-06-11 DIAGNOSIS — Z00.01 ENCOUNTER FOR ANNUAL GENERAL MEDICAL EXAMINATION WITH ABNORMAL FINDINGS IN ADULT: Primary | ICD-10-CM

## 2021-06-11 DIAGNOSIS — E78.5 HYPERLIPIDEMIA, UNSPECIFIED HYPERLIPIDEMIA TYPE: ICD-10-CM

## 2021-06-11 DIAGNOSIS — R68.83 CHILLS (WITHOUT FEVER): ICD-10-CM

## 2021-06-11 DIAGNOSIS — Z86.73 HISTORY OF CEREBROVASCULAR ACCIDENT: ICD-10-CM

## 2021-06-11 DIAGNOSIS — R53.83 FATIGUE, UNSPECIFIED TYPE: ICD-10-CM

## 2021-06-11 DIAGNOSIS — D75.89 MACROCYTOSIS WITHOUT ANEMIA: ICD-10-CM

## 2021-06-11 DIAGNOSIS — E27.49 GLUCOCORTICOID DEFICIENCY (HCC): ICD-10-CM

## 2021-06-11 DIAGNOSIS — F06.30 MOOD DISORDER DUE TO A GENERAL MEDICAL CONDITION: Chronic | ICD-10-CM

## 2021-06-11 LAB
ACANTHOCYTES BLD QL SMEAR: NORMAL
ALBUMIN SERPL-MCNC: 4.3 G/DL (ref 3.5–5.2)
ALBUMIN/GLOB SERPL: 1.8 G/DL
ALP SERPL-CCNC: 63 U/L (ref 39–117)
ALT SERPL W P-5'-P-CCNC: 16 U/L (ref 1–41)
ANION GAP SERPL CALCULATED.3IONS-SCNC: 7.5 MMOL/L (ref 5–15)
AST SERPL-CCNC: 12 U/L (ref 1–40)
BASOPHILS # BLD MANUAL: 0.08 10*3/MM3 (ref 0–0.2)
BASOPHILS NFR BLD AUTO: 1 % (ref 0–1.5)
BILIRUB SERPL-MCNC: 0.2 MG/DL (ref 0–1.2)
BUN SERPL-MCNC: 13 MG/DL (ref 6–20)
BUN/CREAT SERPL: 14.3 (ref 7–25)
CALCIUM SPEC-SCNC: 9.4 MG/DL (ref 8.6–10.5)
CHLORIDE SERPL-SCNC: 105 MMOL/L (ref 98–107)
CO2 SERPL-SCNC: 27.5 MMOL/L (ref 22–29)
CREAT SERPL-MCNC: 0.91 MG/DL (ref 0.76–1.27)
DEPRECATED RDW RBC AUTO: 47.5 FL (ref 37–54)
EOSINOPHIL # BLD MANUAL: 0.23 10*3/MM3 (ref 0–0.4)
EOSINOPHIL NFR BLD MANUAL: 3 % (ref 0.3–6.2)
ERYTHROCYTE [DISTWIDTH] IN BLOOD BY AUTOMATED COUNT: 13.1 % (ref 12.3–15.4)
FOLATE SERPL-MCNC: 7.32 NG/ML (ref 4.78–24.2)
GFR SERPL CREATININE-BSD FRML MDRD: 89 ML/MIN/1.73
GGT SERPL-CCNC: 26 U/L (ref 8–61)
GLOBULIN UR ELPH-MCNC: 2.4 GM/DL
GLUCOSE SERPL-MCNC: 78 MG/DL (ref 65–99)
HCT VFR BLD AUTO: 51.3 % (ref 37.5–51)
HGB BLD-MCNC: 17.4 G/DL (ref 13–17.7)
LYMPHOCYTES # BLD MANUAL: 2.43 10*3/MM3 (ref 0.7–3.1)
LYMPHOCYTES NFR BLD MANUAL: 31.3 % (ref 19.6–45.3)
LYMPHOCYTES NFR BLD MANUAL: 8.1 % (ref 5–12)
MCH RBC QN AUTO: 33.5 PG (ref 26.6–33)
MCHC RBC AUTO-ENTMCNC: 33.9 G/DL (ref 31.5–35.7)
MCV RBC AUTO: 98.8 FL (ref 79–97)
MONOCYTES # BLD AUTO: 0.63 10*3/MM3 (ref 0.1–0.9)
NEUTROPHILS # BLD AUTO: 4.47 10*3/MM3 (ref 1.7–7)
NEUTROPHILS NFR BLD MANUAL: 57.6 % (ref 42.7–76)
PLAT MORPH BLD: NORMAL
PLATELET # BLD AUTO: 264 10*3/MM3 (ref 140–450)
PMV BLD AUTO: 11.2 FL (ref 6–12)
POIKILOCYTOSIS BLD QL SMEAR: NORMAL
POTASSIUM SERPL-SCNC: 4.3 MMOL/L (ref 3.5–5.2)
PROT SERPL-MCNC: 6.7 G/DL (ref 6–8.5)
RBC # BLD AUTO: 5.19 10*6/MM3 (ref 4.14–5.8)
SODIUM SERPL-SCNC: 140 MMOL/L (ref 136–145)
TSH SERPL DL<=0.05 MIU/L-ACNC: 1.84 UIU/ML (ref 0.27–4.2)
VIT B12 BLD-MCNC: 1487 PG/ML (ref 211–946)
WBC # BLD AUTO: 7.76 10*3/MM3 (ref 3.4–10.8)
WBC MORPH BLD: NORMAL

## 2021-06-11 PROCEDURE — 99396 PREV VISIT EST AGE 40-64: CPT | Performed by: PREVENTIVE MEDICINE

## 2021-06-11 PROCEDURE — 80053 COMPREHEN METABOLIC PANEL: CPT | Performed by: PREVENTIVE MEDICINE

## 2021-06-11 PROCEDURE — 85025 COMPLETE CBC W/AUTO DIFF WBC: CPT | Performed by: PREVENTIVE MEDICINE

## 2021-06-11 PROCEDURE — 82977 ASSAY OF GGT: CPT | Performed by: PREVENTIVE MEDICINE

## 2021-06-11 PROCEDURE — 84443 ASSAY THYROID STIM HORMONE: CPT | Performed by: PREVENTIVE MEDICINE

## 2021-06-11 PROCEDURE — 82746 ASSAY OF FOLIC ACID SERUM: CPT | Performed by: PREVENTIVE MEDICINE

## 2021-06-11 PROCEDURE — 85007 BL SMEAR W/DIFF WBC COUNT: CPT | Performed by: PREVENTIVE MEDICINE

## 2021-06-11 PROCEDURE — 82607 VITAMIN B-12: CPT | Performed by: PREVENTIVE MEDICINE

## 2021-06-11 PROCEDURE — 99213 OFFICE O/P EST LOW 20 MIN: CPT | Performed by: PREVENTIVE MEDICINE

## 2021-06-11 NOTE — PROGRESS NOTES
"Subjective   Jourdan Lebron is a 51 y.o. male presents for   Chief Complaint   Patient presents with   • Annual Exam       Health Maintenance Due   Topic Date Due   • COLORECTAL CANCER SCREENING  Never done   • COVID-19 Vaccine (1) Never done   • ANNUAL PHYSICAL  06/10/2021       51-year-old white male presents with his wife for his annual wellness exam.  He has been advised to wear sunscreen and seatbelt.  He has undergone EEG which was reported as normal and he is still awaiting cognitive testing.  He still feels fatigued and weak and does not feel like he can return to work at his job along with the brain fog that he has had.  We have advised that he should start some formal physical therapy to see if we can increase his stamina.  He has finally agreed to do so.  We will check some fatigue labs today as well.  Testosterone was not begun due to recent stroke.  Patient is seeing behavioral health was started on alprazolam which does seem to be helping his anxiety.  He is uncertain as the job will still be open for him so we will advise he does speak to somebody about disability.  Patient has not spoken with neurologist about the Covid vaccination he will do so today.       Vitals:    06/11/21 0846 06/11/21 0848   BP: 127/81 123/85   BP Location: Right arm Left arm   Patient Position: Sitting Sitting   Cuff Size: Adult Adult   Pulse: 72    Temp: 98.9 °F (37.2 °C)    SpO2: 96%    Weight: 76.7 kg (169 lb)    Height: 177.8 cm (70\")      Body mass index is 24.25 kg/m².    Current Outpatient Medications on File Prior to Visit   Medication Sig Dispense Refill   • ALPRAZolam (Xanax) 0.25 MG tablet Take 1 tablet by mouth 3 (Three) Times a Day As Needed for Anxiety. 90 tablet 1   • aspirin 81 MG chewable tablet Chew 81 mg Daily.     • fludrocortisone 0.1 MG tablet Take half a tablet twice a day. 30 tablet 6   • fluticasone (Flonase Allergy Relief) 50 MCG/ACT nasal spray 1 spray into the nostril(s) as directed by provider " Daily.     • hydrocortisone (CORTEF) 10 MG tablet Take 1 tablet by mouth 3 (Three) Times a Day With Meals. 270 tablet 2   • busPIRone (BUSPAR) 5 MG tablet Take 5 mg by mouth 3 (Three) Times a Day. PRN       No current facility-administered medications on file prior to visit.       The following portions of the patient's history were reviewed and updated as appropriate: allergies, current medications, past family history, past medical history, past social history, past surgical history and problem list.    Review of Systems   Constitutional: Positive for fatigue.   HENT: Negative.  Negative for sinus pressure and sore throat.    Eyes: Negative.    Respiratory: Positive for shortness of breath. Negative for cough.    Cardiovascular: Negative.    Gastrointestinal: Negative.    Endocrine: Positive for cold intolerance.   Genitourinary: Positive for erectile dysfunction.   Musculoskeletal: Negative.    Skin: Negative.    Allergic/Immunologic: Positive for environmental allergies.   Neurological: Positive for weakness, memory problem and confusion. Negative for seizures.   Hematological: Negative.    Psychiatric/Behavioral: Positive for depressed mood and stress. The patient is nervous/anxious.        Objective   Physical Exam  Vitals reviewed.   Constitutional:       General: He is not in acute distress.     Appearance: Normal appearance. He is well-developed. He is not ill-appearing or toxic-appearing.   HENT:      Head: Normocephalic and atraumatic.      Right Ear: Tympanic membrane, ear canal and external ear normal.      Left Ear: Tympanic membrane, ear canal and external ear normal.      Nose: Nose normal.   Eyes:      Extraocular Movements: Extraocular movements intact.      Conjunctiva/sclera: Conjunctivae normal.      Pupils: Pupils are equal, round, and reactive to light.   Cardiovascular:      Rate and Rhythm: Normal rate and regular rhythm.      Heart sounds: Normal heart sounds.   Pulmonary:      Effort:  Pulmonary effort is normal.      Comments: Decreased breath sounds bilaterally  Abdominal:      General: Bowel sounds are normal. There is no distension.      Palpations: Abdomen is soft. There is no mass.      Tenderness: There is no abdominal tenderness.   Musculoskeletal:         General: Normal range of motion.      Cervical back: Neck supple.   Skin:     General: Skin is warm.   Neurological:      General: No focal deficit present.      Mental Status: He is alert and oriented to person, place, and time.   Psychiatric:         Mood and Affect: Mood normal.         Behavior: Behavior normal.       PHQ-9 Total Score:      Assessment/Plan   Diagnoses and all orders for this visit:    1. Encounter for annual general medical examination with abnormal findings in adult (Primary)  Comments:  Patient has been advised to wear seatbelt and sunscreen.  Orders:  -     CBC Auto Differential    2. History of cerebrovascular accident  Comments:  Patient underwent EEG and there were no microseizures taking place.  He is awaiting neurocognitive assessment.    3. Glucocorticoid deficiency (CMS/[facility])  Comments:  Endocrinology did not change any medications it was agreed that he would not start testosterone due to stroke within a year.  Orders:  -     Comprehensive Metabolic Panel    4. Hyperlipidemia, unspecified hyperlipidemia type  Comments:  Trying to eat less saturated fats    5. Mood disorder due to a general medical condition  Comments:  Patient follows up with behavioral health and has been started on some Xanax.  This does seem to help his anxiety.    6. Macrocytosis without anemia  -     Vitamin B12  -     Folate  -     Gamma GT; Future  -     Gamma GT    7. Chills (without fever)  Comments:  Thyroid will be rechecked again today.  Orders:  -     TSH    8. Fatigue, unspecified type  Comments:  Sed rate C-reactive protein will be checked as well.  Orders:  -     Ambulatory Referral to Physical Therapy Evaluate and  treat        Patient Instructions     Health Maintenance Due   Topic Date Due   • COLORECTAL CANCER SCREENING  Never done   • COVID-19 Vaccine (1) Never done   • ANNUAL PHYSICAL  06/10/2021     Start PT.

## 2021-06-13 ENCOUNTER — TELEPHONE (OUTPATIENT)
Dept: FAMILY MEDICINE CLINIC | Facility: CLINIC | Age: 51
End: 2021-06-13

## 2021-06-13 NOTE — TELEPHONE ENCOUNTER
HUB TO READ  ----- Message from Justa Castano MD sent at 6/13/2021 11:12 AM EDT -----  RBC's remain slightly darker and larger but besides elevated B12-that is only abnormality discovered with labs.  Can decrease oral dose of B12 if he is taking and let us know how PT is doing and if gets neurocognitive evaluation-be sure they send to us as well.

## 2021-06-13 NOTE — PROGRESS NOTES
RBC's remain slightly darker and larger but besides elevated B12-that is only abnormality discovered with labs.  Can decrease oral dose of B12 if he is taking and let us know how PT is doing and if gets neurocognitive evaluation-be sure they send to us as well.

## 2021-06-22 ENCOUNTER — TELEPHONE (OUTPATIENT)
Dept: FAMILY MEDICINE CLINIC | Facility: CLINIC | Age: 51
End: 2021-06-22

## 2021-06-22 NOTE — TELEPHONE ENCOUNTER
Hub to read  ----- Message from Justa Castano MD sent at 6/21/2021  4:49 PM EDT -----  Make sure Jourdan followed up with Dr. Najera due to kidney stones.

## 2021-06-23 ENCOUNTER — TELEPHONE (OUTPATIENT)
Dept: ENDOCRINOLOGY | Facility: CLINIC | Age: 51
End: 2021-06-23

## 2021-06-23 NOTE — TELEPHONE ENCOUNTER
Pt experiencing weight loss, fatigue, frequent urination but feels dehydrated, trouble regulating body temp, tingling in both hands, blurry vision, pt also feels difficulty in swallowing. He wants a sooner appointment but we do not have any available. Pt aware that Dr. Blake is on vacation and he will receive a response next week. Pt acknowledged.

## 2021-07-01 ENCOUNTER — APPOINTMENT (OUTPATIENT)
Dept: GENERAL RADIOLOGY | Facility: HOSPITAL | Age: 51
End: 2021-07-01

## 2021-07-01 ENCOUNTER — HOSPITAL ENCOUNTER (EMERGENCY)
Facility: HOSPITAL | Age: 51
Discharge: HOME | End: 2021-07-02
Attending: EMERGENCY MEDICINE | Admitting: EMERGENCY MEDICINE

## 2021-07-01 DIAGNOSIS — K11.20 SIALOADENITIS: Primary | ICD-10-CM

## 2021-07-01 PROCEDURE — 70360 X-RAY EXAM OF NECK: CPT

## 2021-07-01 PROCEDURE — 99282 EMERGENCY DEPT VISIT SF MDM: CPT

## 2021-07-02 VITALS
RESPIRATION RATE: 14 BRPM | TEMPERATURE: 97.8 F | WEIGHT: 169.75 LBS | HEIGHT: 71 IN | SYSTOLIC BLOOD PRESSURE: 112 MMHG | OXYGEN SATURATION: 98 % | DIASTOLIC BLOOD PRESSURE: 76 MMHG | BODY MASS INDEX: 23.77 KG/M2 | HEART RATE: 62 BPM

## 2021-07-02 NOTE — DISCHARGE INSTRUCTIONS
If symptoms recur, try sucking on sour candy.  Return immediately for any significant increase in swelling, difficulty swallowing, pain, fever or other concerns.  Otherwise may follow-up with ENT.

## 2021-07-02 NOTE — ED PROVIDER NOTES
Subjective   51-year-old male complains of intermittent throat swelling and difficulty swallowing for the past 2 days.  Patient and wife report submandibular swelling moderate, earlier that has completely resolved.  Patient feels better.  Patient has intermittent mild painful swallowing but no cough congestion or fever or sick contacts.          Review of Systems   Constitutional: Negative.    HENT:        As per HPI   Respiratory: Negative for cough.        Past Medical History:   Diagnosis Date   • Brain fog    • Hyperlipidemia    • Tiredness        Allergies   Allergen Reactions   • Cephalosporins Anaphylaxis     Throat swelling   • Dye  [Contrast Dye] Hives   • Sulfa Antibiotics Hives   • Iodinated Diagnostic Agents Unknown - Low Severity   • Statins Myalgia     Myalgia and fatique   • Sulfate Unknown - Low Severity       Past Surgical History:   Procedure Laterality Date   • ADRENALECTOMY     • KIDNEY STONE SURGERY     • THROMBECTOMY         Family History   Problem Relation Age of Onset   • Arthritis Mother    • Hypertension Mother    • Cancer Father         abdomen   • Arthritis Father    • Hypertension Father    • Cancer Brother         esophageal    • Heart disease Brother        Social History     Socioeconomic History   • Marital status:      Spouse name: Not on file   • Number of children: Not on file   • Years of education: Not on file   • Highest education level: Not on file   Tobacco Use   • Smoking status: Current Some Day Smoker     Packs/day: 2.00     Years: 30.00     Pack years: 60.00     Types: Cigars   • Smokeless tobacco: Never Used   • Tobacco comment: 2 packs= 4 cigars a day   Vaping Use   • Vaping Use: Never used   Substance and Sexual Activity   • Alcohol use: Not Currently     Comment: not drank in months   • Drug use: Never   • Sexual activity: Yes     Partners: Female     Birth control/protection: None           Objective   Physical Exam  Constitutional:       Appearance: He is  well-developed.   HENT:      Head: Normocephalic and atraumatic.      Mouth/Throat:      Mouth: Mucous membranes are moist.      Pharynx: Oropharynx is clear. Uvula midline. No pharyngeal swelling or oropharyngeal exudate.      Comments: No significant erythema or lesions appreciated  Eyes:      Conjunctiva/sclera: Conjunctivae normal.      Pupils: Pupils are equal, round, and reactive to light.   Pulmonary:      Effort: Pulmonary effort is normal.   Musculoskeletal:      Cervical back: Normal range of motion and neck supple.   Lymphadenopathy:      Cervical: No cervical adenopathy.   Skin:     General: Skin is warm and dry.      Capillary Refill: Capillary refill takes less than 2 seconds.   Neurological:      General: No focal deficit present.      Mental Status: He is alert and oriented to person, place, and time.   Psychiatric:         Mood and Affect: Mood normal.         Behavior: Behavior normal.         Procedures           ED Course                                           MDM  Number of Diagnoses or Management Options  Sialoadenitis  Diagnosis management comments: Sialoadenitis is at the top of my differential.  Patient and wife understand it is difficult to confirm this, unremarkable soft tissue lateral neck.  Patient will follow up with ENT, is having no symptoms currently.       Amount and/or Complexity of Data Reviewed  Independent visualization of images, tracings, or specimens: yes        Final diagnoses:   Sialoadenitis       ED Disposition  ED Disposition     ED Disposition Condition Comment    Discharge Stable           [facility]  [address]  [facility] [address]  [phone]             Medication List      No changes were made to your prescriptions during this visit.          Geovany Gregg MD  07/02/21 0036

## 2021-07-02 NOTE — ED NOTES
Pt states that yesterday he noted some swelling around his neck. He states that he felt like he was having trouble swallowing. He states that he started to get anxious feeling because he felt like he couldn't breath.     Wife is bedside. No distress, SOA, or drooling noted at this time. Call light within reach. Will continue to monitor     Tania Owen LPN  07/01/21 [id number]

## 2021-07-08 ENCOUNTER — OFFICE VISIT (OUTPATIENT)
Dept: ENDOCRINOLOGY | Facility: CLINIC | Age: 51
End: 2021-07-08

## 2021-07-08 VITALS
DIASTOLIC BLOOD PRESSURE: 80 MMHG | HEART RATE: 76 BPM | BODY MASS INDEX: 23.38 KG/M2 | HEIGHT: 71 IN | WEIGHT: 167 LBS | OXYGEN SATURATION: 98 % | SYSTOLIC BLOOD PRESSURE: 130 MMHG

## 2021-07-08 DIAGNOSIS — R53.83 OTHER FATIGUE: ICD-10-CM

## 2021-07-08 DIAGNOSIS — E23.0 HYPOGONADOTROPIC HYPOGONADISM (HCC): ICD-10-CM

## 2021-07-08 DIAGNOSIS — D75.1 ERYTHROCYTOSIS: ICD-10-CM

## 2021-07-08 DIAGNOSIS — E27.49 GLUCOCORTICOID DEFICIENCY (HCC): Primary | ICD-10-CM

## 2021-07-08 DIAGNOSIS — D75.89 MACROCYTOSIS: ICD-10-CM

## 2021-07-08 PROCEDURE — 99214 OFFICE O/P EST MOD 30 MIN: CPT | Performed by: INTERNAL MEDICINE

## 2021-07-08 RX ORDER — FLUDROCORTISONE ACETATE 0.1 MG/1
TABLET ORAL
Qty: 60 TABLET | Refills: 6 | Status: SHIPPED | OUTPATIENT
Start: 2021-07-08 | End: 2022-01-14

## 2021-07-08 NOTE — PROGRESS NOTES
Endocrine Progress Note Outpatient     Patient Care Team:  Justa Castano MD as PCP - General (Family Medicine)  Luz Sepulveda PA-C as Physician Assistant (Neurosurgery)  Gonzalo Cruz MD as Consulting Physician (Surgical Oncology)    Chief Complaint: Follow-up glucocorticoid deficiency: Is accompanied with his wife today.    HPI: 51-year-old male with prior history of a stroke, kidney stones apparently was found to have a left adrenal incidentaloma last year around June 2020.  He subsequently underwent surgery in February 2021 at [facility].  Postop within 2 weeks he developed fatigue and tiredness and lethargy and weight loss was admitted to [facility] last week and was found to have cortisol deficiency with ACTH stimulation test.  He was started on hydrocortisone 10 mg 3 times daily.  His dizziness have improved but he continues to have issues with fatigue and tiredness he also have some tingling in the head he has some difficulty in temperature regulation.  His weight is a stable.  He does have low libido with erectile dysfunction he also have mood swings excessive sweating.  He denies any body odor.  Also have blurred vision.    He is following with a neurologist at [facility] and there is some thought that he may be having some microseizures and he is going to be worked up on that.  He has undergone EEG and it was normal.  There was a thought about trying Provigil but that has not been done yet.        Past Medical History:   Diagnosis Date   • Brain fog    • Hand tingling    • Hyperlipidemia    • Tiredness        Social History     Socioeconomic History   • Marital status:      Spouse name: Not on file   • Number of children: Not on file   • Years of education: Not on file   • Highest education level: Not on file   Tobacco Use   • Smoking status: Current Some Day Smoker     Packs/day: 2.00     Years: 30.00     Pack years: 60.00     Types: Cigars   •  Smokeless tobacco: Never Used   • Tobacco comment: 2 packs= 4 cigars a day   Vaping Use   • Vaping Use: Never used   Substance and Sexual Activity   • Alcohol use: Not Currently     Comment: not drank in months   • Drug use: Never   • Sexual activity: Yes     Partners: Female     Birth control/protection: None       Family History   Problem Relation Age of Onset   • Arthritis Mother    • Hypertension Mother    • Cancer Father         abdomen   • Arthritis Father    • Hypertension Father    • Cancer Brother         esophageal    • Heart disease Brother        Allergies   Allergen Reactions   • Cephalosporins Anaphylaxis     Throat swelling   • Dye  [Contrast Dye] Hives   • Sulfa Antibiotics Hives   • Iodinated Diagnostic Agents Unknown - Low Severity   • Statins Myalgia     Myalgia and fatique   • Sulfate Unknown - Low Severity       ROS:   Constitutional:  Admit fatigue, tiredness.    Eyes:  Denies change in visual acuity   HENT:  Denies nasal congestion or sore throat   Respiratory: denies cough, shortness of breath.   Cardiovascular:  denies chest pain, edema   GI:  Denies abdominal pain, admit nausea, no vomiting.   Musculoskeletal:  Denies back pain or joint pain   Integument:  Denies dry skin and rash   Neurologic:  Admit headache, denies focal weakness or sensory changes   Endocrine:   Admit polyuria  Psychiatric:  Denies depression,  admit  or anxiety      Vitals:    07/08/21 1425   BP: 130/80   Pulse: 76   SpO2: 98%   Orthostatic vitals:  Laying pulse was 64 per parent with blood pressure 100/62  Sitting pulse was 70/min with blood pressure 110/70  Standing pulse was 80/min with blood pressure 105/70.      Body mass index is 23.29 kg/m².     Physical Exam:  GEN: NAD, conversant  EYES: EOMI, PERRL, no conjunctival erythema  NECK: no thyromegaly, full ROM   CV: RRR, no murmurs/rubs/gallops, no peripheral edema  LUNG: CTAB, no wheezes/rales/ronchi  SKIN: no rashes, no acanthosis  MSK: no deformities, full ROM  of all extremities  NEURO: no tremors, DTR normal  PSYCH: AOX3, appropriate mood, affect normal      Results Review:     I reviewed the patient's new clinical results.      Lab Results   Component Value Date    GLUCOSE 78 06/11/2021    BUN 13 06/11/2021    CREATININE 0.91 06/11/2021    EGFRIFNONA 89 06/11/2021    BCR 14.3 06/11/2021    K 4.3 06/11/2021    CO2 27.5 06/11/2021    CALCIUM 9.4 06/11/2021    ALBUMIN 4.30 06/11/2021    LABIL2 1.3 09/25/2018    AST 12 06/11/2021    ALT 16 06/11/2021    CHOL 170 06/01/2021    TRIG 70 06/01/2021     (H) 06/01/2021    HDL 54 06/01/2021     Lab Results   Component Value Date    TSH 1.840 06/11/2021   Comprehensive Metabolic Panel (05/07/2021 08:32)   Lipid Panel (06/01/2021 08:04)   CBC Auto Differential (06/11/2021 09:23)   Vitamin B12 (06/11/2021 09:23)   Folate (06/11/2021 09:23)   TSH (06/11/2021 09:23)   Comprehensive Metabolic Panel (06/11/2021 09:23)     Renin Direct Assay (03/23/2021 09:16)   Aldosterone (03/23/2021 09:16)   Testosterone, Free, Total (03/23/2021 09:16)   Follicle Stimulating Hormone (03/23/2021 09:16)   Luteinizing Hormone (03/23/2021 09:16)   Testosterone, Free, Total (03/31/2021 07:45)   Prolactin (03/31/2021 07:45)   Sex Horm Binding Globulin (03/31/2021 07:45)   DHEA-Sulfate (03/17/2021 21:36)   SCANNED - IMAGING (04/30/2021)         Medication Review: Reviewed.       Current Outpatient Medications:   •  ALPRAZolam (Xanax) 0.25 MG tablet, Take 1 tablet by mouth 3 (Three) Times a Day As Needed for Anxiety., Disp: 90 tablet, Rfl: 1  •  aspirin 81 MG chewable tablet, Chew 81 mg Daily., Disp: , Rfl:   •  fludrocortisone 0.1 MG tablet, Take half a tablet twice a day., Disp: 30 tablet, Rfl: 6  •  fluticasone (Flonase Allergy Relief) 50 MCG/ACT nasal spray, 1 spray into the nostril(s) as directed by provider Daily., Disp: , Rfl:   •  hydrocortisone (CORTEF) 10 MG tablet, Take 1 tablet by mouth 3 (Three) Times a Day With Meals., Disp: 270 tablet,  Rfl: 2      Assessment/Plan   Glucocorticoid deficiency: Currently on hydrocortisone replacement with 10 mg 3 times daily and Florief 0.05 mg po BID. he continues to have fatigue and tiredness, at this time we have decided to change hydrocortisone to 15 mg in the morning, 10 mg in the afternoon and 5 in the evening as he struggles with energy in the morning but feels fairly well in the evening.  Also will check DHEA-sulfate and add DHEA 10 mg p.o. daily.  We will continue Florinef but will increase the dose to 0.1 mg twice a day.  Advised to watch for swelling in the legs and blood pressure and also decrease salt intake.    Excessive fatigue and tiredness: At this time I will recheck TSH with free T4, free T3, IGF-I level.  Also will check DHEA-sulfate level.  He has done neurocognitive evaluation and waiting for the final report and will be following up with his neurologist once done.    Erythrocytosis with macrocytosis: Recheck CBC, his folate and B12 level was normal.  Recommend hematology evaluation, he has seen Dr. Danielle at [facility] in the past, recommend to arrange a follow-up.    Hypogonadotropic hypogonadism: He does have low free testosterone on 2 different occasion with normal total testosterone with normal LH and FSH.  Pituitary MRI did not show any pituitary tumor.  We did talk about testosterone replacement therapy with side effects including but not limited to increased risk for CAD, stroke, DVT, PE as well as BPH.  He does have history of a stroke.  I spoke with his neurologist on the phone while  patient and his wife was present and because of the increased risk for stroke she was hesitant to start testosterone replacement therapy so we decided to hold off on that and she is going to do an EEG and then make further recommendations.  Patient and wife agrees with the plan.                Julia Blake MD FACE.

## 2021-07-08 NOTE — PATIENT INSTRUCTIONS
Please stop smoking  Please change hydrocortisone to 15 mg in the morning, 10 mg in the afternoon and 5 mg in the evening  Change fludrocortisone 2.1 mg p.o. twice a day  Start DHEA 10 mg once a day after getting the labs done  Get your labs done fasting tomorrow morning and make sure they are drawn before 10 AM  Please arrange follow-up with Dr. Danielle at [facility] to evaluate high hematocrit and a high MCV

## 2021-07-12 ENCOUNTER — LAB (OUTPATIENT)
Dept: LAB | Facility: HOSPITAL | Age: 51
End: 2021-07-12

## 2021-07-12 DIAGNOSIS — E23.0 HYPOGONADOTROPIC HYPOGONADISM (HCC): ICD-10-CM

## 2021-07-12 DIAGNOSIS — R53.83 OTHER FATIGUE: ICD-10-CM

## 2021-07-12 DIAGNOSIS — E27.49 GLUCOCORTICOID DEFICIENCY (HCC): ICD-10-CM

## 2021-07-12 DIAGNOSIS — D75.89 MACROCYTOSIS: ICD-10-CM

## 2021-07-12 LAB
BASOPHILS # BLD AUTO: 0.05 10*3/MM3 (ref 0–0.2)
BASOPHILS NFR BLD AUTO: 0.6 % (ref 0–1.5)
DEPRECATED RDW RBC AUTO: 44.3 FL (ref 37–54)
EOSINOPHIL # BLD AUTO: 0.23 10*3/MM3 (ref 0–0.4)
EOSINOPHIL NFR BLD AUTO: 2.7 % (ref 0.3–6.2)
ERYTHROCYTE [DISTWIDTH] IN BLOOD BY AUTOMATED COUNT: 12.4 % (ref 12.3–15.4)
FSH SERPL-ACNC: 6.77 MIU/ML
HCT VFR BLD AUTO: 50 % (ref 37.5–51)
HGB BLD-MCNC: 17.3 G/DL (ref 13–17.7)
IMM GRANULOCYTES # BLD AUTO: 0.02 10*3/MM3 (ref 0–0.05)
IMM GRANULOCYTES NFR BLD AUTO: 0.2 % (ref 0–0.5)
LH SERPL-ACNC: 10.3 MIU/ML
LYMPHOCYTES # BLD AUTO: 2.51 10*3/MM3 (ref 0.7–3.1)
LYMPHOCYTES NFR BLD AUTO: 30 % (ref 19.6–45.3)
MCH RBC QN AUTO: 33.8 PG (ref 26.6–33)
MCHC RBC AUTO-ENTMCNC: 34.6 G/DL (ref 31.5–35.7)
MCV RBC AUTO: 97.7 FL (ref 79–97)
MONOCYTES # BLD AUTO: 0.7 10*3/MM3 (ref 0.1–0.9)
MONOCYTES NFR BLD AUTO: 8.4 % (ref 5–12)
NEUTROPHILS NFR BLD AUTO: 4.86 10*3/MM3 (ref 1.7–7)
NEUTROPHILS NFR BLD AUTO: 58.1 % (ref 42.7–76)
NRBC BLD AUTO-RTO: 0 /100 WBC (ref 0–0.2)
PLATELET # BLD AUTO: 234 10*3/MM3 (ref 140–450)
PMV BLD AUTO: 10.7 FL (ref 6–12)
RBC # BLD AUTO: 5.12 10*6/MM3 (ref 4.14–5.8)
T3FREE SERPL-MCNC: 3.46 PG/ML (ref 2–4.4)
T4 FREE SERPL-MCNC: 1.32 NG/DL (ref 0.93–1.7)
TSH SERPL DL<=0.05 MIU/L-ACNC: 2.31 UIU/ML (ref 0.27–4.2)
WBC # BLD AUTO: 8.37 10*3/MM3 (ref 3.4–10.8)

## 2021-07-12 PROCEDURE — 84403 ASSAY OF TOTAL TESTOSTERONE: CPT

## 2021-07-12 PROCEDURE — 84402 ASSAY OF FREE TESTOSTERONE: CPT

## 2021-07-12 PROCEDURE — 36415 COLL VENOUS BLD VENIPUNCTURE: CPT

## 2021-07-12 PROCEDURE — 85025 COMPLETE CBC W/AUTO DIFF WBC: CPT

## 2021-07-12 PROCEDURE — 82627 DEHYDROEPIANDROSTERONE: CPT

## 2021-07-12 PROCEDURE — 84443 ASSAY THYROID STIM HORMONE: CPT

## 2021-07-12 PROCEDURE — 84481 FREE ASSAY (FT-3): CPT

## 2021-07-12 PROCEDURE — 84305 ASSAY OF SOMATOMEDIN: CPT

## 2021-07-12 PROCEDURE — 84439 ASSAY OF FREE THYROXINE: CPT

## 2021-07-12 PROCEDURE — 83002 ASSAY OF GONADOTROPIN (LH): CPT

## 2021-07-12 PROCEDURE — 83001 ASSAY OF GONADOTROPIN (FSH): CPT

## 2021-07-13 ENCOUNTER — TREATMENT (OUTPATIENT)
Dept: PHYSICAL THERAPY | Facility: CLINIC | Age: 51
End: 2021-07-13

## 2021-07-13 DIAGNOSIS — R53.83 OTHER FATIGUE: Primary | ICD-10-CM

## 2021-07-13 LAB — DHEA-S SERPL-MCNC: 93.8 UG/DL (ref 71.6–375.4)

## 2021-07-13 PROCEDURE — 97163 PT EVAL HIGH COMPLEX 45 MIN: CPT | Performed by: PHYSICAL THERAPIST

## 2021-07-13 NOTE — PROGRESS NOTES
Physical Therapy Initial Evaluation and Plan of Care    Patient: Jourdan Lebron   : 1970  Diagnosis/ICD-10 Code:  Other fatigue [R53.83]  Referring practitioner: Justa Castano MD    Subjective Evaluation    History of Present Illness  Mechanism of injury: History continued:  Reports felt better since beginning adrenal hormone replacement but still with persistent fatigue.     HR: 65 BPM at rest  O2 saturation: 95 %  BP: 116/80 mmHG    Subjective comment: Patient is a 51 year old male who presents with a diagnosis of fatigue.  He reports progressive fatigue with some prior to stroke in May 2020 with increased fatigue especially after adrenalectomy in 2021.  He reports no residual deficits with stroke that he knows of but has felt worse since adrenalectomy.  He reports fatigue with activity but often is not worse until 1-2 days later but can have sensation of increased temperature at times after activity.     Patient Occupation: farmhopping Quality of life: good    Pain  Current pain ratin  At best pain ratin  At worst pain ratin  Pain location: Muscular ache.             Objective          Functional Assessment     Comments   strength:  R - 85 lbs.   L - 80 lbs.       Five Time Sit to Stand: 16.14 seconds   Single leg balance: R 7 seconds L 10 seconds   3 minute walk test:  655 ft  - O2 98% HR 72 BPM          Assessment & Plan     Assessment  Impairments: abnormal or restricted ROM, activity intolerance, impaired balance, impaired physical strength and lacks appropriate home exercise program  Assessment details: Presents with limits in UE strength per  strength, functional LE strength per FTSST, balance, gait endurance.  Educated on slow progression of activity per fatigue with regression if fatigue is excessive or persists past 24 hrs.   He would benefit from skilled therapy to address the above and restore to the highest level of prior function.  Prognosis:  good  Functional Limitations: carrying objects, lifting, pulling and pushing  Goals  Plan Goals: ST. To tolerate progression of graded exercise w/o excessive fatigue post activity over 2 weeks.   2. Independent and compliant with HEP over 2 weeks.   3. To tolerate progression of aerobic activity on seated stepper or recumbent bike to 10 minutes consecutive or greater w/o excessive fatigue over 2 weeks.     LT. 3 minute walk test distance improved 15% or greater over 8 weeks or less without excessive fatigue.  2.  strength improved 10-15 lbs or more over 8 weeks to allow improved safety and ability for return to work.   3. Five Time Sit to Stand time improved to 10 seconds or less by discharge to demonstrate improved LE strength.     Plan  Therapy options: will be seen for skilled physical therapy services  Planned modality interventions: As appropriate   Planned therapy interventions: abdominal trunk stabilization, balance/weight-bearing training, neuromuscular re-education, functional ROM exercises, home exercise program, therapeutic activities and strengthening  Frequency: 2x week  Duration in visits: 16  Treatment plan discussed with: patient             Un-Timed:  High Eval                       45     Mins  [id number]      Timed Treatment:   0   mins   Total Treatment:     45   mins    PT SIGNATURE: Jay Pacheco PT, DPT       DATE TREATMENT INITIATED: 2021    Initial Certification  Certification Period: 10/11/2021  I certify that the therapy services are furnished while this patient is under my care.  The services outlined above are required by this patient, and will be reviewed every 90 days.     PHYSICIAN: Justa Castano MD      DATE:     Please sign and return via fax to [phone].. Thank you, [facility].

## 2021-07-14 LAB — IGF-I SERPL-MCNC: 243 NG/ML (ref 81–263)

## 2021-07-16 ENCOUNTER — APPOINTMENT (OUTPATIENT)
Dept: RESPIRATORY THERAPY | Facility: HOSPITAL | Age: 51
End: 2021-07-16

## 2021-07-16 ENCOUNTER — HOSPITAL ENCOUNTER (EMERGENCY)
Facility: HOSPITAL | Age: 51
Discharge: HOME | End: 2021-07-16
Admitting: EMERGENCY MEDICINE

## 2021-07-16 ENCOUNTER — APPOINTMENT (OUTPATIENT)
Dept: GENERAL RADIOLOGY | Facility: HOSPITAL | Age: 51
End: 2021-07-16

## 2021-07-16 ENCOUNTER — APPOINTMENT (OUTPATIENT)
Dept: CT IMAGING | Facility: HOSPITAL | Age: 51
End: 2021-07-16

## 2021-07-16 VITALS
RESPIRATION RATE: 14 BRPM | HEIGHT: 71 IN | WEIGHT: 167.33 LBS | TEMPERATURE: 97.9 F | BODY MASS INDEX: 23.43 KG/M2 | SYSTOLIC BLOOD PRESSURE: 120 MMHG | DIASTOLIC BLOOD PRESSURE: 70 MMHG | OXYGEN SATURATION: 98 % | HEART RATE: 63 BPM

## 2021-07-16 DIAGNOSIS — R55 NEAR SYNCOPE: Primary | ICD-10-CM

## 2021-07-16 DIAGNOSIS — R11.0 NAUSEA: ICD-10-CM

## 2021-07-16 LAB
ALBUMIN SERPL-MCNC: 3.7 G/DL (ref 3.5–5.2)
ALBUMIN/GLOB SERPL: 1.4 G/DL
ALP SERPL-CCNC: 63 U/L (ref 39–117)
ALT SERPL W P-5'-P-CCNC: 13 U/L (ref 1–41)
AMPHET+METHAMPHET UR QL: NEGATIVE
ANION GAP SERPL CALCULATED.3IONS-SCNC: 9 MMOL/L (ref 5–15)
AST SERPL-CCNC: 13 U/L (ref 1–40)
BARBITURATES UR QL SCN: NEGATIVE
BASOPHILS # BLD AUTO: 0.1 10*3/MM3 (ref 0–0.2)
BASOPHILS NFR BLD AUTO: 1.1 % (ref 0–1.5)
BENZODIAZ UR QL SCN: NEGATIVE
BILIRUB SERPL-MCNC: 0.3 MG/DL (ref 0–1.2)
BILIRUB UR QL STRIP: NEGATIVE
BUN SERPL-MCNC: 11 MG/DL (ref 6–20)
BUN/CREAT SERPL: 11 (ref 7–25)
CALCIUM SPEC-SCNC: 9 MG/DL (ref 8.6–10.5)
CANNABINOIDS SERPL QL: POSITIVE
CHLORIDE SERPL-SCNC: 104 MMOL/L (ref 98–107)
CLARITY UR: CLEAR
CO2 SERPL-SCNC: 27 MMOL/L (ref 22–29)
COCAINE UR QL: NEGATIVE
COLOR UR: YELLOW
CORTIS SERPL-MCNC: 11.99 MCG/DL
CREAT SERPL-MCNC: 1 MG/DL (ref 0.76–1.27)
DEPRECATED RDW RBC AUTO: 47.3 FL (ref 37–54)
EOSINOPHIL # BLD AUTO: 0.2 10*3/MM3 (ref 0–0.4)
EOSINOPHIL NFR BLD AUTO: 2.5 % (ref 0.3–6.2)
ERYTHROCYTE [DISTWIDTH] IN BLOOD BY AUTOMATED COUNT: 14 % (ref 12.3–15.4)
GFR SERPL CREATININE-BSD FRML MDRD: 79 ML/MIN/1.73
GLOBULIN UR ELPH-MCNC: 2.7 GM/DL
GLUCOSE SERPL-MCNC: 80 MG/DL (ref 65–99)
GLUCOSE UR STRIP-MCNC: NEGATIVE MG/DL
HCT VFR BLD AUTO: 48.8 % (ref 37.5–51)
HGB BLD-MCNC: 16.8 G/DL (ref 13–17.7)
HGB UR QL STRIP.AUTO: NEGATIVE
KETONES UR QL STRIP: NEGATIVE
LEUKOCYTE ESTERASE UR QL STRIP.AUTO: NEGATIVE
LYMPHOCYTES # BLD AUTO: 2.5 10*3/MM3 (ref 0.7–3.1)
LYMPHOCYTES NFR BLD AUTO: 30.3 % (ref 19.6–45.3)
MAGNESIUM SERPL-MCNC: 2.1 MG/DL (ref 1.6–2.6)
MCH RBC QN AUTO: 33.7 PG (ref 26.6–33)
MCHC RBC AUTO-ENTMCNC: 34.3 G/DL (ref 31.5–35.7)
MCV RBC AUTO: 98 FL (ref 79–97)
METHADONE UR QL SCN: NEGATIVE
MONOCYTES # BLD AUTO: 0.7 10*3/MM3 (ref 0.1–0.9)
MONOCYTES NFR BLD AUTO: 8.8 % (ref 5–12)
NEUTROPHILS NFR BLD AUTO: 4.8 10*3/MM3 (ref 1.7–7)
NEUTROPHILS NFR BLD AUTO: 57.3 % (ref 42.7–76)
NITRITE UR QL STRIP: NEGATIVE
NRBC BLD AUTO-RTO: 0 /100 WBC (ref 0–0.2)
NT-PROBNP SERPL-MCNC: 82.4 PG/ML (ref 0–450)
OPIATES UR QL: NEGATIVE
OXYCODONE UR QL SCN: NEGATIVE
PH UR STRIP.AUTO: 7 [PH] (ref 5–8)
PLATELET # BLD AUTO: 226 10*3/MM3 (ref 140–450)
PMV BLD AUTO: 8.1 FL (ref 6–12)
POTASSIUM SERPL-SCNC: 4.4 MMOL/L (ref 3.5–5.2)
PROT SERPL-MCNC: 6.4 G/DL (ref 6–8.5)
PROT UR QL STRIP: NEGATIVE
RBC # BLD AUTO: 4.98 10*6/MM3 (ref 4.14–5.8)
SODIUM SERPL-SCNC: 140 MMOL/L (ref 136–145)
SP GR UR STRIP: <=1.005 (ref 1–1.03)
TESTOST FREE SERPL-MCNC: 7.4 PG/ML (ref 6.8–21.5)
TESTOST SERPL-MCNC: 785 NG/DL (ref 264–916)
TROPONIN T SERPL-MCNC: <0.01 NG/ML (ref 0–0.03)
TSH SERPL DL<=0.05 MIU/L-ACNC: 1.02 UIU/ML (ref 0.27–4.2)
UROBILINOGEN UR QL STRIP: NORMAL
WBC # BLD AUTO: 8.3 10*3/MM3 (ref 3.4–10.8)

## 2021-07-16 PROCEDURE — 84443 ASSAY THYROID STIM HORMONE: CPT | Performed by: PHYSICIAN ASSISTANT

## 2021-07-16 PROCEDURE — 80053 COMPREHEN METABOLIC PANEL: CPT | Performed by: PHYSICIAN ASSISTANT

## 2021-07-16 PROCEDURE — 36415 COLL VENOUS BLD VENIPUNCTURE: CPT

## 2021-07-16 PROCEDURE — 81003 URINALYSIS AUTO W/O SCOPE: CPT | Performed by: PHYSICIAN ASSISTANT

## 2021-07-16 PROCEDURE — 70450 CT HEAD/BRAIN W/O DYE: CPT

## 2021-07-16 PROCEDURE — 93005 ELECTROCARDIOGRAM TRACING: CPT | Performed by: PHYSICIAN ASSISTANT

## 2021-07-16 PROCEDURE — 93225 XTRNL ECG REC<48 HRS REC: CPT

## 2021-07-16 PROCEDURE — 85025 COMPLETE CBC W/AUTO DIFF WBC: CPT | Performed by: PHYSICIAN ASSISTANT

## 2021-07-16 PROCEDURE — 80307 DRUG TEST PRSMV CHEM ANLYZR: CPT | Performed by: PHYSICIAN ASSISTANT

## 2021-07-16 PROCEDURE — 71045 X-RAY EXAM CHEST 1 VIEW: CPT

## 2021-07-16 PROCEDURE — 84484 ASSAY OF TROPONIN QUANT: CPT | Performed by: PHYSICIAN ASSISTANT

## 2021-07-16 PROCEDURE — 83880 ASSAY OF NATRIURETIC PEPTIDE: CPT | Performed by: PHYSICIAN ASSISTANT

## 2021-07-16 PROCEDURE — 82024 ASSAY OF ACTH: CPT | Performed by: PHYSICIAN ASSISTANT

## 2021-07-16 PROCEDURE — 83735 ASSAY OF MAGNESIUM: CPT | Performed by: PHYSICIAN ASSISTANT

## 2021-07-16 PROCEDURE — 82533 TOTAL CORTISOL: CPT | Performed by: PHYSICIAN ASSISTANT

## 2021-07-16 PROCEDURE — 99284 EMERGENCY DEPT VISIT MOD MDM: CPT

## 2021-07-16 RX ORDER — ONDANSETRON 4 MG/1
4 TABLET, ORALLY DISINTEGRATING ORAL EVERY 8 HOURS PRN
Qty: 20 TABLET | Refills: 0 | Status: SHIPPED | OUTPATIENT
Start: 2021-07-16 | End: 2022-01-14

## 2021-07-16 RX ORDER — SODIUM CHLORIDE 0.9 % (FLUSH) 0.9 %
10 SYRINGE (ML) INJECTION AS NEEDED
Status: DISCONTINUED | OUTPATIENT
Start: 2021-07-16 | End: 2021-07-16 | Stop reason: HOSPADM

## 2021-07-16 NOTE — ED PROVIDER NOTES
Subjective   Patient is a 51-year-old male who presents to the ED with complaints of generalized fatigue over the past several months.  Patient states this morning he felt very lethargic had chills and nausea he states he did have some lightheadedness like he was going to pass out with some chest tightness and shortness of breath he states that lasted for about 45 minutes and has since resolved.  He does report a generalized headache now denies thunderclap or worst headache of his life.  Patient states he has had these symptoms intermittently and February after he had  adrenalectomy on 2/19/21.  Patient states he has been hospitalized for similar symptoms back in March. Does currently see Dr Blake with endocrinology.  Patient denies any current chest tightness or shortness of breath.  Does report some associated nausea and intermittent chills at this time.  No one-sided numbness weakness slurred speech or facial droop.  Denies any fever, abdominal pain, vomiting, diarrhea constipation, urinary symptoms or dysuria or hematuria.  No recent travel or known sick contacts.  Patient does have a history of Left MCA stroke status post tPA and thrombectomy 6/2/2020 and benign neoplasm of left adrenal gland status post adrenalectomy 2/19/2021.           Review of Systems   Constitutional: Positive for chills, diaphoresis and fatigue. Negative for activity change, appetite change, fever and unexpected weight change.   Eyes: Negative for photophobia and visual disturbance.   Respiratory: Positive for chest tightness and shortness of breath. Negative for apnea, cough, choking, wheezing and stridor.    Cardiovascular: Negative.    Gastrointestinal: Positive for nausea. Negative for abdominal distention, abdominal pain, constipation, diarrhea and vomiting.   Genitourinary: Negative.    Musculoskeletal: Negative for back pain, neck pain and neck stiffness.   Skin: Negative.    Neurological: Positive for light-headedness and  headaches. Negative for dizziness, tremors, seizures, syncope, facial asymmetry, speech difficulty, weakness and numbness.   Hematological: Negative.        Past Medical History:   Diagnosis Date   • Brain fog    • Hand tingling    • Hyperlipidemia    • Tiredness        Allergies   Allergen Reactions   • Cephalosporins Anaphylaxis     Throat swelling   • Dye  [Contrast Dye] Hives   • Sulfa Antibiotics Hives   • Iodinated Diagnostic Agents Unknown - Low Severity   • Statins Myalgia     Myalgia and fatique   • Sulfate Unknown - Low Severity       Past Surgical History:   Procedure Laterality Date   • ADRENALECTOMY     • KIDNEY STONE SURGERY     • THROMBECTOMY         Family History   Problem Relation Age of Onset   • Arthritis Mother    • Hypertension Mother    • Cancer Father         abdomen   • Arthritis Father    • Hypertension Father    • Cancer Brother         esophageal    • Heart disease Brother        Social History     Socioeconomic History   • Marital status:      Spouse name: Not on file   • Number of children: Not on file   • Years of education: Not on file   • Highest education level: Not on file   Tobacco Use   • Smoking status: Current Some Day Smoker     Packs/day: 2.00     Years: 30.00     Pack years: 60.00     Types: Cigars   • Smokeless tobacco: Never Used   • Tobacco comment: 2 packs= 4 cigars a day   Vaping Use   • Vaping Use: Never used   Substance and Sexual Activity   • Alcohol use: Not Currently     Comment: not drank in months   • Drug use: Never   • Sexual activity: Yes     Partners: Female     Birth control/protection: None           Objective   Physical Exam  Vitals and nursing note reviewed.   Constitutional:       General: He is not in acute distress.     Appearance: He is well-developed. He is not ill-appearing, toxic-appearing or diaphoretic.   HENT:      Head: Normocephalic and atraumatic.      Mouth/Throat:      Mouth: Mucous membranes are moist.      Pharynx: Oropharynx is  "clear.   Eyes:      General: No scleral icterus.     Extraocular Movements: Extraocular movements intact.      Pupils: Pupils are equal, round, and reactive to light.   Cardiovascular:      Rate and Rhythm: Normal rate and regular rhythm.      Pulses: Normal pulses.      Heart sounds: No murmur heard.   No friction rub. No gallop.    Pulmonary:      Effort: Pulmonary effort is normal. No tachypnea or accessory muscle usage.      Breath sounds: Normal breath sounds. No stridor. No decreased breath sounds, wheezing, rhonchi or rales.   Chest:      Chest wall: No mass, deformity, tenderness or crepitus.   Abdominal:      General: Bowel sounds are normal. There is no distension.      Palpations: Abdomen is soft. There is no hepatomegaly, splenomegaly or mass.      Tenderness: There is no abdominal tenderness. There is no guarding or rebound.   Musculoskeletal:      Cervical back: Normal range of motion and neck supple.   Skin:     General: Skin is warm.      Capillary Refill: Capillary refill takes less than 2 seconds.      Findings: No rash.   Neurological:      Mental Status: He is alert and oriented to person, place, and time.      GCS: GCS eye subscore is 4. GCS verbal subscore is 5. GCS motor subscore is 6.   Psychiatric:         Mood and Affect: Mood normal.         Behavior: Behavior normal.         Procedures           ED Course      /70   Pulse 63   Temp 97.9 °F (36.6 °C)   Resp 14   Ht 180.3 cm (71\")   Wt 75.9 kg (167 lb 5.3 oz)   SpO2 98%   BMI 23.34 kg/m²   Medications   sodium chloride 0.9 % flush 10 mL (has no administration in time range)     Labs Reviewed   URINE DRUG SCREEN - Abnormal; Notable for the following components:       Result Value    THC, Screen, Urine Positive (*)     All other components within normal limits    Narrative:     Negative Thresholds Per Drugs Screened:    Amphetamines                 500 ng/ml  Barbiturates                 200 ng/ml  Benzodiazepines              " 100 ng/ml  Cocaine                      300 ng/ml  Methadone                    300 ng/ml  Opiates                      300 ng/ml  Oxycodone                    100 ng/ml  THC                           50 ng/ml    The Normal Value for all drugs tested is negative. This report includes final unconfirmed screening results to be used for medical treatment purposes only. Unconfirmed results must not be used for non-medical purposes such as employment or legal testing. Clinical consideration should be applied to any drug of abuse test, particularly when unconfirmed results are used.          All urine drugs of abuse requests without chain of custody are for medical screening purposes only.  False positives are possible.     CBC WITH AUTO DIFFERENTIAL - Abnormal; Notable for the following components:    MCV 98.0 (*)     MCH 33.7 (*)     All other components within normal limits   TSH - Normal   TROPONIN (IN-HOUSE) - Normal    Narrative:     Troponin T Reference Range:  <= 0.03 ng/mL-   Negative for AMI  >0.03 ng/mL-     Abnormal for myocardial necrosis.  Clinicians would have to utilize clinical acumen, EKG, Troponin and serial changes to determine if it is an Acute Myocardial Infarction or myocardial injury due to an underlying chronic condition.       Results may be falsely decreased if patient taking Biotin.     URINALYSIS W/ MICROSCOPIC IF INDICATED (NO CULTURE) - Normal    Narrative:     Urine microscopic not indicated.   MAGNESIUM - Normal   BNP (IN-HOUSE) - Normal    Narrative:     Among patients with dyspnea, NT-proBNP is highly sensitive for the detection of acute congestive heart failure. In addition NT-proBNP of <300 pg/ml effectively rules out acute congestive heart failure with 99% negative predictive value.    Results may be falsely decreased if patient taking Biotin.     CORTISOL    Narrative:     Cortisol Reference Ranges:    Cortisol 6AM - 10AM Range: 6.02-18.40 mcg/dl  Cortisol 4PM - 8PM Range: 2.68-10.50  mcg/dl      Results may be falsely increased if patient taking Biotin.     COMPREHENSIVE METABOLIC PANEL    Narrative:     GFR Normal >60  Chronic Kidney Disease <60  Kidney Failure <15     ACTH   CBC AND DIFFERENTIAL    Narrative:     The following orders were created for panel order CBC & Differential.  Procedure                               Abnormality         Status                     ---------                               -----------         ------                     CBC Auto Differential[[id number]]        Abnormal            Final result                 Please view results for these tests on the individual orders.     CT Head Without Contrast    Result Date: 7/16/2021  No acute intracranial abnormality. No change from previous study.  Electronically Signed By-Jayme Bardales DO On:7/16/2021 1:56 PM This report was finalized on [id number] by  Jayme Bardales DO.    XR Chest 1 View    Result Date: 7/16/2021  No acute chest finding.  Electronically Signed By-Eden Bundy MD On:7/16/2021 12:09 PM This report was finalized on [id number] by  Eden Bundy MD.                                         MDM  Number of Diagnoses or Management Options  Nausea  Near syncope  Diagnosis management comments: Chart Review:  Comorbidity: As per past medical history  ECG: Interpreted by myself shows sinus bradycardia rate 58 probable stage enlargement right bundle branch block previous EKG reviewed from 3/16/2021  Labs: Cortisol 11.99 TSH 1.02 proBNP 82.4 magnesium 2.1 ACTH pending upon discharge CMP unremarkable CBC shows WBC 8.3 hemoglobin 16.8 hematocrit 48.8 platelets 226 troponin within normal limits urine drug screen positive for THC otherwise unremarkable urinalysis unremarkable for UTI  Imaging: Was interpreted by physician and reviewed by myself:  CT Head Without Contrast  Result Date: 7/16/2021  No acute intracranial abnormality. No change from previous study.  Electronically Signed By-Jayme Bardales DO  On:7/16/2021 1:56 PM This report was finalized on [id number] by  Jayme Bardales DO.    XR Chest 1 View  Result Date: 7/16/2021  No acute chest finding.  Electronically Signed By-Eden Bundy MD On:7/16/2021 12:09 PM This report was finalized on [id number] by  Eden Bundy MD.    Disposition/Treatment:  Appropriate PPE was worn during exam and throughout all encounters with the patient.  IV was placed and labs were obtained patient was afebrile and appeared nontoxic showed no acute cranial focal neuro deficits EKG showed no signs of acute STEMI troponin was within normal limits.  Orthostatics were negative patient does have a loop recorder.  Chest x-ray showed no acute findings.  There are no signs of severe infection or electrolyte abnormality.  Cortisol level was within normal limits ACTH pending.  CT of head showed no acute abnormalities as above.  Patient's symptoms are chronic in nature as they have been ongoing since February.  Patient did recently have a hospitalization for similar symptoms back in March was cleared by neurology and endocrinology.  Did discuss case with on-call endocrinologist Dr. Rutherford who was in agreement that patient can be discharged from their standpoint follow-up with Dr Blake on an outpatient basis.  Patient was also discussed with attending who was in agreement for discharge.  Patient continues to deny any lightheadedness or dizziness throughout his ED stay along with any chest pain or shortness of breath lab results and findings were discussed with the patient and family at bedside who voiced understanding of discharge instructions along with signs and symptoms requiring return to the ED.  Upon discharged patient was in stable condition with followup for a revaluation.  Patient was given Zofran for his nausea.       Amount and/or Complexity of Data Reviewed  Clinical lab tests: reviewed  Tests in the radiology section of CPT®: reviewed  Tests in the medicine section  of CPT®: reviewed        Final diagnoses:   Near syncope   Nausea       ED Disposition  ED Disposition     ED Disposition Condition Comment    Discharge Stable           Justa Castano MD  [address] [facility] [place] [address] [phone]    Schedule an appointment as soon as possible for a visit in 3 days      [facility] EMERGENCY DEPARTMENT  [address] [facility] [address] [phone]  Go to   If symptoms worsen    Julia Blake MD  [address] [facility] [place] [id number] [phone]    Call   As needed         Medication List      New Prescriptions    ondansetron ODT 4 MG disintegrating tablet  Commonly known as: ZOFRAN-ODT  Place 1 tablet on the tongue Every 8 (Eight) Hours As Needed for Nausea.           Where to Get Your Medications      These medications were sent to [id number] - [place], [place] - [address] - [phone] PH - [phone] FX  [address] [place] [address]    Phone: [phone]   · ondansetron ODT 4 MG disintegrating tablet          Chadwick Alatorre PA  07/16/21 1606       Chadwick Alatorre PA  07/16/21 1609

## 2021-07-16 NOTE — DISCHARGE INSTRUCTIONS
Follow-up with your primary care provider in 3-5 days.  If you do not have a primary care provider call [phone] for help in finding one, or you may follow up with [facility] at [phone].    Follow-up with your endocrinologist and cardiologist for further evaluation and management of your numerous syncopal episodes.    Drink plenty of clear fluids.  Take Zofran as needed for nausea.    Return to ED for any new or worsening symptoms

## 2021-07-18 LAB — QT INTERVAL: 456 MS

## 2021-07-19 ENCOUNTER — TREATMENT (OUTPATIENT)
Dept: PHYSICAL THERAPY | Facility: CLINIC | Age: 51
End: 2021-07-19

## 2021-07-19 ENCOUNTER — TELEPHONE (OUTPATIENT)
Dept: FAMILY MEDICINE CLINIC | Facility: CLINIC | Age: 51
End: 2021-07-19

## 2021-07-19 DIAGNOSIS — R53.83 OTHER FATIGUE: Primary | ICD-10-CM

## 2021-07-19 LAB — ACTH PLAS-MCNC: 3.2 PG/ML (ref 7.2–63.3)

## 2021-07-19 PROCEDURE — 97110 THERAPEUTIC EXERCISES: CPT | Performed by: PHYSICAL THERAPIST

## 2021-07-19 NOTE — PROGRESS NOTES
Please let patient know that ACTH was low on spoit specimen and to followup with Dr. Blake.  Also Stephanie screen positive.

## 2021-07-19 NOTE — PROGRESS NOTES
Physical Therapy Daily Progress Note  Visit: 2    Jourdan Lebron reports: went to ER Friday per anxiety but feeling better today.     Subjective       Objective   See Exercise, Manual, and Modality Logs for complete treatment.       Assessment/Plan     Mild fatigue with aerobic and LE strength activity. Limited total exercise time per reports he often cannot tell if he has done excessive activity until several days later.     Plan:    Continue current         Timed:           Therapeutic Exercise:    28     mins  [id number];         Timed Treatment:   28   mins   Total Treatment:     28   mins  Jay Pacheco, PT, DPT  Physical Therapist

## 2021-07-19 NOTE — TELEPHONE ENCOUNTER
HUB TO READ  ----- Message from Justa Castano MD sent at 7/19/2021  1:00 PM EDT -----  Please let patient know that ACTH was low on spoit specimen and to followup with Dr. Blake.  Also Stephanie screen positive.

## 2021-07-20 ENCOUNTER — TELEPHONE (OUTPATIENT)
Dept: ENDOCRINOLOGY | Facility: CLINIC | Age: 51
End: 2021-07-20

## 2021-07-20 ENCOUNTER — PATIENT MESSAGE (OUTPATIENT)
Dept: ENDOCRINOLOGY | Facility: CLINIC | Age: 51
End: 2021-07-20

## 2021-07-20 NOTE — TELEPHONE ENCOUNTER
Pt went to the ER on 7/16/21, the ER physician ordered an ACTH level, as well as some other labs. The ACTH level was low, patient wants to know if that could have caused SOB and lethargy with cold chills.

## 2021-07-22 ENCOUNTER — TREATMENT (OUTPATIENT)
Dept: PHYSICAL THERAPY | Facility: CLINIC | Age: 51
End: 2021-07-22

## 2021-07-22 DIAGNOSIS — R53.83 OTHER FATIGUE: Primary | ICD-10-CM

## 2021-07-22 PROCEDURE — 97110 THERAPEUTIC EXERCISES: CPT | Performed by: PHYSICAL THERAPIST

## 2021-07-22 NOTE — PROGRESS NOTES
Physical Therapy Daily Progress Note  Visit: 3    Jourdan Lebron reports: did well last visit w/o excessive fatigue and has been slowly doing more at home to build his strength.     Subjective       Objective   See Exercise, Manual, and Modality Logs for complete treatment.       Assessment/Plan     Good tolerance to progression of LE strength/endurance exercise with mild fatigue at the end of visit.     Plan:    Continue current       Timed:             Therapeutic Exercise:    35     mins  [id number];         Timed Treatment:   35   mins   Total Treatment:     35   mins  Jay Pacheco, PT, DPT  Physical Therapist

## 2021-07-23 ENCOUNTER — OFFICE VISIT (OUTPATIENT)
Dept: PSYCHIATRY | Facility: CLINIC | Age: 51
End: 2021-07-23

## 2021-07-23 DIAGNOSIS — F06.4 ANXIETY DISORDER DUE TO GENERAL MEDICAL CONDITION: Chronic | ICD-10-CM

## 2021-07-23 DIAGNOSIS — F06.30 MOOD DISORDER DUE TO A GENERAL MEDICAL CONDITION: Primary | Chronic | ICD-10-CM

## 2021-07-23 PROCEDURE — 99214 OFFICE O/P EST MOD 30 MIN: CPT | Performed by: PSYCHIATRY & NEUROLOGY

## 2021-07-23 RX ORDER — SERTRALINE HYDROCHLORIDE 25 MG/1
25 TABLET, FILM COATED ORAL DAILY
Qty: 30 TABLET | Refills: 2 | Status: SHIPPED | OUTPATIENT
Start: 2021-07-23 | End: 2021-08-25 | Stop reason: SDUPTHER

## 2021-07-23 RX ORDER — ALPRAZOLAM 0.25 MG/1
0.25 TABLET ORAL 3 TIMES DAILY PRN
Qty: 90 TABLET | Refills: 2 | Status: SHIPPED | OUTPATIENT
Start: 2021-07-23 | End: 2021-08-25 | Stop reason: SDUPTHER

## 2021-07-23 NOTE — PATIENT INSTRUCTIONS
Managing Anxiety, Adult  After being diagnosed with an anxiety disorder, you may be relieved to know why you have felt or behaved a certain way. You may also feel overwhelmed about the treatment ahead and what it will mean for your life. With care and support, you can manage this condition and recover from it.  How to manage lifestyle changes  Managing stress and anxiety    Stress is your body's reaction to life changes and events, both good and bad. Most stress will last just a few hours, but stress can be ongoing and can lead to more than just stress. Although stress can play a major role in anxiety, it is not the same as anxiety. Stress is usually caused by something external, such as a deadline, test, or competition. Stress normally passes after the triggering event has ended.   Anxiety is caused by something internal, such as imagining a terrible outcome or worrying that something will go wrong that will devastate you. Anxiety often does not go away even after the triggering event is over, and it can become long-term (chronic) worry. It is important to understand the differences between stress and anxiety and to manage your stress effectively so that it does not lead to an anxious response.  Talk with your health care provider or a counselor to learn more about reducing anxiety and stress. He or she may suggest tension reduction techniques, such as:  · Music therapy. This can include creating or listening to music that you enjoy and that inspires you.  · Mindfulness-based meditation. This involves being aware of your normal breaths while not trying to control your breathing. It can be done while sitting or walking.  · Centering prayer. This involves focusing on a word, phrase, or sacred image that means something to you and brings you peace.  · Deep breathing. To do this, expand your stomach and inhale slowly through your nose. Hold your breath for 3-5 seconds. Then exhale slowly, letting your stomach muscles  relax.  · Self-talk. This involves identifying thought patterns that lead to anxiety reactions and changing those patterns.  · Muscle relaxation. This involves tensing muscles and then relaxing them.  Choose a tension reduction technique that suits your lifestyle and personality. These techniques take time and practice. Set aside 5-15 minutes a day to do them. Therapists can offer counseling and training in these techniques. The training to help with anxiety may be covered by some insurance plans. Other things you can do to manage stress and anxiety include:  · Keeping a stress/anxiety diary. This can help you learn what triggers your reaction and then learn ways to manage your response.  · Thinking about how you react to certain situations. You may not be able to control everything, but you can control your response.  · Making time for activities that help you relax and not feeling guilty about spending your time in this way.  · Visual imagery and yoga can help you stay calm and relax.    Medicines  Medicines can help ease symptoms. Medicines for anxiety include:  · Anti-anxiety drugs.  · Antidepressants.  Medicines are often used as a primary treatment for anxiety disorder. Medicines will be prescribed by a health care provider. When used together, medicines, psychotherapy, and tension reduction techniques may be the most effective treatment.  Relationships  Relationships can play a big part in helping you recover. Try to spend more time connecting with trusted friends and family members. Consider going to couples counseling, taking family education classes, or going to family therapy. Therapy can help you and others better understand your condition.  How to recognize changes in your anxiety  Everyone responds differently to treatment for anxiety. Recovery from anxiety happens when symptoms decrease and stop interfering with your daily activities at home or work. This may mean that you will start to:  · Have  better concentration and focus. Worry will interfere less in your daily thinking.  · Sleep better.  · Be less irritable.  · Have more energy.  · Have improved memory.  It is important to recognize when your condition is getting worse. Contact your health care provider if your symptoms interfere with home or work and you feel like your condition is not improving.  Follow these instructions at home:  Activity  · Exercise. Most adults should do the following:  ? Exercise for at least 150 minutes each week. The exercise should increase your heart rate and make you sweat (moderate-intensity exercise).  ? Strengthening exercises at least twice a week.  · Get the right amount and quality of sleep. Most adults need 7-9 hours of sleep each night.  Lifestyle    · Eat a healthy diet that includes plenty of vegetables, fruits, whole grains, low-fat dairy products, and lean protein. Do not eat a lot of foods that are high in solid fats, added sugars, or salt.  · Make choices that simplify your life.  · Do not use any products that contain nicotine or tobacco, such as cigarettes, e-cigarettes, and chewing tobacco. If you need help quitting, ask your health care provider.  · Avoid caffeine, alcohol, and certain over-the-counter cold medicines. These may make you feel worse. Ask your pharmacist which medicines to avoid.  General instructions  · Take over-the-counter and prescription medicines only as told by your health care provider.  · Keep all follow-up visits as told by your health care provider. This is important.  Where to find support  You can get help and support from these sources:  · Self-help groups.  · Online and community organizations.  · A trusted spiritual leader.  · Couples counseling.  · Family education classes.  · Family therapy.  Where to find more information  You may find that joining a support group helps you deal with your anxiety. The following sources can help you locate counselors or support groups near  you:  · Mental Health [place]: www.mentalhealthamerica.net  · Anxiety and Depression Association of [place] (ADAA): www.adaa.org  · National [place] [employer]ness (SHANDA): www.shanda.org  Contact a health care provider if you:  · Have a hard time staying focused or finishing daily tasks.  · Spend many hours a day feeling worried about everyday life.  · Become exhausted by worry.  · Start to have headaches, feel tense, or have nausea.  · Urinate more than normal.  · Have diarrhea.  Get help right away if you have:  · A racing heart and shortness of breath.  · Thoughts of hurting yourself or others.  If you ever feel like you may hurt yourself or others, or have thoughts about taking your own life, get help right away. You can go to your nearest emergency department or call:  · Your local emergency services (911 in the [place]  · A suicide crisis helpline, such as the National Suicide Prevention Lifeline at 1-737.212.2102. This is open 24 hours a day.  Summary  · Taking steps to learn and use tension reduction techniques can help calm you and help prevent triggering an anxiety reaction.  · When used together, medicines, psychotherapy, and tension reduction techniques may be the most effective treatment.  · Family, friends, and partners can play a big part in helping you recover from an anxiety disorder.  This information is not intended to replace advice given to you by your health care provider. Make sure you discuss any questions you have with your health care provider.  Document Revised: 05/19/2020 Document Reviewed: 05/19/2020  Elsevier Patient Education © 2021 [employer]

## 2021-07-23 NOTE — PROGRESS NOTES
Subjective   Jourdan Lebron is a 51 y.o. 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, left middle cerebral artery , the pt is R handed    he had troubles talking , had muscle weakness   Oct 2020 - went back to work light duty , still had issues completing his tasks   Feb 2021- adrenal gland removed and that caused more issues, more medical w/u and ER visits, weight loss   Now on steroid replacement which is also not completely controlled     Today the pt reported feeling depressed, he had neurocognitive test done, dsd with depression, anxiety   The pt c/o decreased concentration, increased irritability ,   mood is on low side, still  anxious, uncertainty about future, about his job and health   His life is revolving medical appts   Sleep - poor , not restorative , hydrocortisone makes everything worse  Main concern is memory and concentration - much worse after adrenalectomy   Denied AVH/SI/HI     The following portions of the patient's history were reviewed and updated as appropriate: allergies, current medications, past family history, past medical history, past social history, past surgical history and problem list.    PAST PSYCHIATRIC HISTORY  Axis I  Affective/Bipolar Disorder, Anxiety/Panic Disorder  Axis II  Denied     PAST OUTPATIENT TREATMENT  Diagnosis treated:  Anxiety, depression   Treatment Type:  Medication Management  Prior Psychiatric Medications:  lexapro - not effective  Buspirone - side effects   Support Groups:  None   Sequelae Of Mental Disorder:  job disruption, emotional distress          Interval History  Deteriorated    Side Effects  Denied       Past Medical History:  Past Medical History:   Diagnosis Date   • Brain fog    • Hand tingling    • Hyperlipidemia    • Tiredness        Social History:  Social History     Socioeconomic History   • Marital status:       Spouse name: Not on file   • Number of children: Not on file   • Years of education: Not on file   • Highest education level: Not on file   Tobacco Use   • Smoking status: Current Some Day Smoker     Packs/day: 2.00     Years: 30.00     Pack years: 60.00     Types: Cigars   • Smokeless tobacco: Never Used   • Tobacco comment: 2 packs= 4 cigars a day   Vaping Use   • Vaping Use: Never used   Substance and Sexual Activity   • Alcohol use: Not Currently     Comment: not drank in months   • Drug use: Never   • Sexual activity: Yes     Partners: Female     Birth control/protection: None       Family History:  Family History   Problem Relation Age of Onset   • Arthritis Mother    • Hypertension Mother    • Cancer Father         abdomen   • Arthritis Father    • Hypertension Father    • Cancer Brother         esophageal    • Heart disease Brother        Past Surgical History:  Past Surgical History:   Procedure Laterality Date   • ADRENALECTOMY     • KIDNEY STONE SURGERY     • THROMBECTOMY         Problem List:  Patient Active Problem List   Diagnosis   • Abnormal electrocardiogram   • Anxiety state   • Calculus of kidney   • History of cerebrovascular accident   • Family history of malignant neoplasm   • Generalized hyperhidrosis   • Hyperlipidemia   • Seasonal allergies   • Tobacco dependence syndrome   • Cervical radiculopathy   • Cervical stenosis of spinal canal   • Near syncope   • Other fatigue   • History of total adrenalectomy (CMS/HCC)   • Arthropathy of cervical facet joint   • Neck pain   • Pain in left arm   • Shoulder pain   • Seasonal allergic rhinitis   • Thoracic back pain   • Vitamin B12 deficiency   • Electrocardiogram abnormal   • Spinal stenosis of cervical region   • Reduced libido   • Glucocorticoid deficiency (CMS/HCC)   • Other specified postprocedural states   • Hypogonadotropic hypogonadism (CMS/HCC)   • Mood disorder due to a general medical condition   • Anxiety disorder due to  general medical condition   • Macrocytosis   • Erythrocytosis       Allergy:   Allergies   Allergen Reactions   • Cephalosporins Anaphylaxis     Throat swelling   • Dye  [Contrast Dye] Hives   • Sulfa Antibiotics Hives   • Iodinated Diagnostic Agents Unknown - Low Severity   • Statins Myalgia     Myalgia and fatique   • Sulfate Unknown - Low Severity        Discontinued Medications:  Medications Discontinued During This Encounter   Medication Reason   • ALPRAZolam (Xanax) 0.25 MG tablet Reorder       Current Medications:   Current Outpatient Medications   Medication Sig Dispense Refill   • ALPRAZolam (Xanax) 0.25 MG tablet Take 1 tablet by mouth 3 (Three) Times a Day As Needed for Anxiety. 90 tablet 2   • aspirin 81 MG chewable tablet Chew 81 mg Daily.     • DHEA 10 MG capsule Take 1 tablet p.o. daily. 90 capsule 4   • fludrocortisone 0.1 MG tablet Take 1 tablet twice a day. 60 tablet 6   • fluticasone (Flonase Allergy Relief) 50 MCG/ACT nasal spray 1 spray into the nostril(s) as directed by provider Daily.     • hydrocortisone (CORTEF) 10 MG tablet Take 1 tablet by mouth 3 (Three) Times a Day With Meals. 270 tablet 2   • ondansetron ODT (ZOFRAN-ODT) 4 MG disintegrating tablet Place 1 tablet on the tongue Every 8 (Eight) Hours As Needed for Nausea. 20 tablet 0   • sertraline (Zoloft) 25 MG tablet Take 1 tablet by mouth Daily. 30 tablet 2     No current facility-administered medications for this visit.         Psychological ROS: positive for - anxiety, concentration, depression,  irritability and memory difficulties  Negative for AVH/SI/HI, delusions       Physical Exam:   There were no vitals taken for this visit.    Mental Status Exam:   Hygiene:   good  Cooperation:  Cooperative  Eye Contact:  Good  Psychomotor Behavior:  Appropriate  Affect:  Appropriate  Mood: depressed and anxious  Hopelessness: Denies  Speech:  Normal  Thought Process:  Goal directed and Linear  Thought Content:  Normal and Mood  congruent  Suicidal:  None  Homicidal:  None  Hallucinations:  None  Delusion:  None  Memory:  Deficits  Orientation:  Person, Place, Time and Situation  Reliability:  good  Insight:  Good  Judgement:  Good  Impulse Control:  Fair  Physical/Medical Issues:  Yes     MSE from 5/20/2021 reviewed and accepted with changes     PHQ-9 Depression Screening  Little interest or pleasure in doing things? 2   Feeling down, depressed, or hopeless? 2   Trouble falling or staying asleep, or sleeping too much? 1   Feeling tired or having little energy? 2   Poor appetite or overeating? 0   Feeling bad about yourself - or that you are a failure or have let yourself or your family down? 3   Trouble concentrating on things, such as reading the newspaper or watching television? 3   Moving or speaking so slowly that other people could have noticed? Or the opposite - being so fidgety or restless that you have been moving around a lot more than usual? 0   Thoughts that you would be better off dead, or of hurting yourself in some way? 0   PHQ-9 Total Score 13   If you checked off any problems, how difficult have these problems made it for you to do your work, take care of things at home, or get along with other people? Very difficult           Current every day smoker 3-10 mintues spent counseling Has reduced Tobbacos use    I advised Jourdan of the risks of tobacco use.     Lab Results:   Admission on 07/16/2021, Discharged on 07/16/2021   Component Date Value Ref Range Status   • Cortisol 07/16/2021 11.99    mcg/dL Final   • ACTH 07/16/2021 3.2* 7.2 - 63.3 pg/mL Final    ACTH reference interval for samples collected between 7 and 10 AM.   • TSH 07/16/2021 1.020  0.270 - 4.200 uIU/mL Final   • Glucose 07/16/2021 80  65 - 99 mg/dL Final   • BUN 07/16/2021 11  6 - 20 mg/dL Final   • Creatinine 07/16/2021 1.00  0.76 - 1.27 mg/dL Final   • Sodium 07/16/2021 140  136 - 145 mmol/L Final   • Potassium 07/16/2021 4.4  3.5 - 5.2 mmol/L Final   •  Chloride 07/16/2021 104  98 - 107 mmol/L Final   • CO2 07/16/2021 27.0  22.0 - 29.0 mmol/L Final   • Calcium 07/16/2021 9.0  8.6 - 10.5 mg/dL Final   • Total Protein 07/16/2021 6.4  6.0 - 8.5 g/dL Final   • Albumin 07/16/2021 3.70  3.50 - 5.20 g/dL Final   • ALT (SGPT) 07/16/2021 13  1 - 41 U/L Final   • AST (SGOT) 07/16/2021 13  1 - 40 U/L Final   • Alkaline Phosphatase 07/16/2021 63  39 - 117 U/L Final   • Total Bilirubin 07/16/2021 0.3  0.0 - 1.2 mg/dL Final   • eGFR Non  Amer 07/16/2021 79  >60 mL/min/1.73 Final   • Globulin 07/16/2021 2.7  gm/dL Final   • A/G Ratio 07/16/2021 1.4  g/dL Final   • BUN/Creatinine Ratio 07/16/2021 11.0  7.0 - 25.0 Final   • Anion Gap 07/16/2021 9.0  5.0 - 15.0 mmol/L Final   • QT Interval 07/16/2021 456  ms Final   • Troponin T 07/16/2021 <0.010  0.000 - 0.030 ng/mL Final   • Color, UA 07/16/2021 Yellow  Yellow, Straw Final   • Appearance, UA 07/16/2021 Clear  Clear Final   • pH, UA 07/16/2021 7.0  5.0 - 8.0 Final   • Specific Gravity, UA 07/16/2021 <=1.005  1.005 - 1.030 Final   • Glucose, UA 07/16/2021 Negative  Negative Final   • Ketones, UA 07/16/2021 Negative  Negative Final   • Bilirubin, UA 07/16/2021 Negative  Negative Final   • Blood, UA 07/16/2021 Negative  Negative Final   • Protein, UA 07/16/2021 Negative  Negative Final   • Leuk Esterase, UA 07/16/2021 Negative  Negative Final   • Nitrite, UA 07/16/2021 Negative  Negative Final   • Urobilinogen, UA 07/16/2021 0.2 E.U./dL  0.2 - 1.0 E.U./dL Final   • Amphet/Methamphet, Screen 07/16/2021 Negative  Negative Final   • Barbiturates Screen, Urine 07/16/2021 Negative  Negative Final   • Benzodiazepine Screen, Urine 07/16/2021 Negative  Negative Final   • Cocaine Screen, Urine 07/16/2021 Negative  Negative Final   • Opiate Screen 07/16/2021 Negative  Negative Final   • THC, Screen, Urine 07/16/2021 Positive* Negative Final   • Methadone Screen, Urine 07/16/2021 Negative  Negative Final   • Oxycodone Screen, Urine  07/16/2021 Negative  Negative Final   • Magnesium 07/16/2021 2.1  1.6 - 2.6 mg/dL Final   • WBC 07/16/2021 8.30  3.40 - 10.80 10*3/mm3 Final   • RBC 07/16/2021 4.98  4.14 - 5.80 10*6/mm3 Final   • Hemoglobin 07/16/2021 16.8  13.0 - 17.7 g/dL Final   • Hematocrit 07/16/2021 48.8  37.5 - 51.0 % Final   • MCV 07/16/2021 98.0* 79.0 - 97.0 fL Final   • MCH 07/16/2021 33.7* 26.6 - 33.0 pg Final   • MCHC 07/16/2021 34.3  31.5 - 35.7 g/dL Final   • RDW 07/16/2021 14.0  12.3 - 15.4 % Final   • RDW-SD 07/16/2021 47.3  37.0 - 54.0 fl Final   • MPV 07/16/2021 8.1  6.0 - 12.0 fL Final   • Platelets 07/16/2021 226  140 - 450 10*3/mm3 Final   • Neutrophil % 07/16/2021 57.3  42.7 - 76.0 % Final   • Lymphocyte % 07/16/2021 30.3  19.6 - 45.3 % Final   • Monocyte % 07/16/2021 8.8  5.0 - 12.0 % Final   • Eosinophil % 07/16/2021 2.5  0.3 - 6.2 % Final   • Basophil % 07/16/2021 1.1  0.0 - 1.5 % Final   • Neutrophils, Absolute 07/16/2021 4.80  1.70 - 7.00 10*3/mm3 Final   • Lymphocytes, Absolute 07/16/2021 2.50  0.70 - 3.10 10*3/mm3 Final   • Monocytes, Absolute 07/16/2021 0.70  0.10 - 0.90 10*3/mm3 Final   • Eosinophils, Absolute 07/16/2021 0.20  0.00 - 0.40 10*3/mm3 Final   • Basophils, Absolute 07/16/2021 0.10  0.00 - 0.20 10*3/mm3 Final   • nRBC 07/16/2021 0.0  0.0 - 0.2 /100 WBC Final   • proBNP 07/16/2021 82.4  0.0 - 450.0 pg/mL Final   Lab on 07/12/2021   Component Date Value Ref Range Status   • Testosterone, Total 07/12/2021 785  264 - 916 ng/dL Final    Adult male reference interval is based on a population of  healthy nonobese males (BMI <30) between 19 and 39 years old.  Rachel et.al. JCEM 2017,102;[id number]. PMID: [id number].   • Testosterone, Free 07/12/2021 7.4  6.8 - 21.5 pg/mL Final   • FSH 07/12/2021 6.77  mIU/mL Final   • LH 07/12/2021 10.30  mIU/mL Final   • TSH 07/12/2021 2.310  0.270 - 4.200 uIU/mL Final   • Insulin-Like Growth Factor-1 07/12/2021 243  81 - 263 ng/mL Final   • Free T4 07/12/2021 1.32  0.93 - 1.70  ng/dL Final   • T3, Free 07/12/2021 3.46  2.00 - 4.40 pg/mL Final   • DHEA-Sulfate 07/12/2021 93.8  71.6 - 375.4 ug/dL Final   • WBC 07/12/2021 8.37  3.40 - 10.80 10*3/mm3 Final   • RBC 07/12/2021 5.12  4.14 - 5.80 10*6/mm3 Final   • Hemoglobin 07/12/2021 17.3  13.0 - 17.7 g/dL Final   • Hematocrit 07/12/2021 50.0  37.5 - 51.0 % Final   • MCV 07/12/2021 97.7* 79.0 - 97.0 fL Final   • MCH 07/12/2021 33.8* 26.6 - 33.0 pg Final   • MCHC 07/12/2021 34.6  31.5 - 35.7 g/dL Final   • RDW 07/12/2021 12.4  12.3 - 15.4 % Final   • RDW-SD 07/12/2021 44.3  37.0 - 54.0 fl Final   • MPV 07/12/2021 10.7  6.0 - 12.0 fL Final   • Platelets 07/12/2021 234  140 - 450 10*3/mm3 Final   • Neutrophil % 07/12/2021 58.1  42.7 - 76.0 % Final   • Lymphocyte % 07/12/2021 30.0  19.6 - 45.3 % Final   • Monocyte % 07/12/2021 8.4  5.0 - 12.0 % Final   • Eosinophil % 07/12/2021 2.7  0.3 - 6.2 % Final   • Basophil % 07/12/2021 0.6  0.0 - 1.5 % Final   • Immature Grans % 07/12/2021 0.2  0.0 - 0.5 % Final   • Neutrophils, Absolute 07/12/2021 4.86  1.70 - 7.00 10*3/mm3 Final   • Lymphocytes, Absolute 07/12/2021 2.51  0.70 - 3.10 10*3/mm3 Final   • Monocytes, Absolute 07/12/2021 0.70  0.10 - 0.90 10*3/mm3 Final   • Eosinophils, Absolute 07/12/2021 0.23  0.00 - 0.40 10*3/mm3 Final   • Basophils, Absolute 07/12/2021 0.05  0.00 - 0.20 10*3/mm3 Final   • Immature Grans, Absolute 07/12/2021 0.02  0.00 - 0.05 10*3/mm3 Final   • nRBC 07/12/2021 0.0  0.0 - 0.2 /100 WBC Final   Office Visit on 06/11/2021   Component Date Value Ref Range Status   • WBC 06/11/2021 7.76  3.40 - 10.80 10*3/mm3 Final   • RBC 06/11/2021 5.19  4.14 - 5.80 10*6/mm3 Final   • Hemoglobin 06/11/2021 17.4  13.0 - 17.7 g/dL Final   • Hematocrit 06/11/2021 51.3* 37.5 - 51.0 % Final   • MCV 06/11/2021 98.8* 79.0 - 97.0 fL Final   • MCH 06/11/2021 33.5* 26.6 - 33.0 pg Final   • MCHC 06/11/2021 33.9  31.5 - 35.7 g/dL Final   • RDW 06/11/2021 13.1  12.3 - 15.4 % Final   • RDW-SD 06/11/2021 47.5   37.0 - 54.0 fl Final   • MPV 06/11/2021 11.2  6.0 - 12.0 fL Final   • Platelets 06/11/2021 264  140 - 450 10*3/mm3 Final   • Vitamin B-12 06/11/2021 1,487* 211 - 946 pg/mL Final   • Folate 06/11/2021 7.32  4.78 - 24.20 ng/mL Final   • TSH 06/11/2021 1.840  0.270 - 4.200 uIU/mL Final   • Glucose 06/11/2021 78  65 - 99 mg/dL Final   • BUN 06/11/2021 13  6 - 20 mg/dL Final   • Creatinine 06/11/2021 0.91  0.76 - 1.27 mg/dL Final   • Sodium 06/11/2021 140  136 - 145 mmol/L Final   • Potassium 06/11/2021 4.3  3.5 - 5.2 mmol/L Final   • Chloride 06/11/2021 105  98 - 107 mmol/L Final   • CO2 06/11/2021 27.5  22.0 - 29.0 mmol/L Final   • Calcium 06/11/2021 9.4  8.6 - 10.5 mg/dL Final   • Total Protein 06/11/2021 6.7  6.0 - 8.5 g/dL Final   • Albumin 06/11/2021 4.30  3.50 - 5.20 g/dL Final   • ALT (SGPT) 06/11/2021 16  1 - 41 U/L Final   • AST (SGOT) 06/11/2021 12  1 - 40 U/L Final   • Alkaline Phosphatase 06/11/2021 63  39 - 117 U/L Final   • Total Bilirubin 06/11/2021 0.2  0.0 - 1.2 mg/dL Final   • eGFR Non  Amer 06/11/2021 89  >60 mL/min/1.73 Final   • Globulin 06/11/2021 2.4  gm/dL Final   • A/G Ratio 06/11/2021 1.8  g/dL Final   • BUN/Creatinine Ratio 06/11/2021 14.3  7.0 - 25.0 Final   • Anion Gap 06/11/2021 7.5  5.0 - 15.0 mmol/L Final   • GGT 06/11/2021 26  8 - 61 U/L Final   • Neutrophil % 06/11/2021 57.6  42.7 - 76.0 % Final   • Lymphocyte % 06/11/2021 31.3  19.6 - 45.3 % Final   • Monocyte % 06/11/2021 8.1  5.0 - 12.0 % Final   • Eosinophil % 06/11/2021 3.0  0.3 - 6.2 % Final   • Basophil % 06/11/2021 1.0  0.0 - 1.5 % Final   • Neutrophils Absolute 06/11/2021 4.47  1.70 - 7.00 10*3/mm3 Final   • Lymphocytes Absolute 06/11/2021 2.43  0.70 - 3.10 10*3/mm3 Final   • Monocytes Absolute 06/11/2021 0.63  0.10 - 0.90 10*3/mm3 Final   • Eosinophils Absolute 06/11/2021 0.23  0.00 - 0.40 10*3/mm3 Final   • Basophils Absolute 06/11/2021 0.08  0.00 - 0.20 10*3/mm3 Final   • Acanthocytes 06/11/2021 Slight/1+  None Seen  Final   • Poikilocytes 06/11/2021 Slight/1+  None Seen Final   • WBC Morphology 06/11/2021 Normal  Normal Final   • Platelet Morphology 06/11/2021 Normal  Normal Final   Clinical Support on 06/01/2021   Component Date Value Ref Range Status   • Total Cholesterol 06/01/2021 170  0 - 200 mg/dL Final   • Triglycerides 06/01/2021 70  0 - 150 mg/dL Final   • HDL Cholesterol 06/01/2021 54  40 - 60 mg/dL Final   • LDL Cholesterol  06/01/2021 103* 0 - 100 mg/dL Final   • VLDL Cholesterol 06/01/2021 13  5 - 40 mg/dL Final   • LDL/HDL Ratio 06/01/2021 1.89   Final   Lab on 05/07/2021   Component Date Value Ref Range Status   • Glucose 05/07/2021 79  65 - 99 mg/dL Final   • BUN 05/07/2021 11  6 - 20 mg/dL Final   • Creatinine 05/07/2021 0.92  0.76 - 1.27 mg/dL Final   • Sodium 05/07/2021 142  136 - 145 mmol/L Final   • Potassium 05/07/2021 4.1  3.5 - 5.2 mmol/L Final   • Chloride 05/07/2021 105  98 - 107 mmol/L Final   • CO2 05/07/2021 28.8  22.0 - 29.0 mmol/L Final   • Calcium 05/07/2021 9.3  8.6 - 10.5 mg/dL Final   • Total Protein 05/07/2021 6.3  6.0 - 8.5 g/dL Final   • Albumin 05/07/2021 4.10  3.50 - 5.20 g/dL Final   • ALT (SGPT) 05/07/2021 11  1 - 41 U/L Final   • AST (SGOT) 05/07/2021 13  1 - 40 U/L Final   • Alkaline Phosphatase 05/07/2021 64  39 - 117 U/L Final   • Total Bilirubin 05/07/2021 0.2  0.0 - 1.2 mg/dL Final   • eGFR Non  Amer 05/07/2021 87  >60 mL/min/1.73 Final   • Globulin 05/07/2021 2.2  gm/dL Final   • A/G Ratio 05/07/2021 1.9  g/dL Final   • BUN/Creatinine Ratio 05/07/2021 12.0  7.0 - 25.0 Final   • Anion Gap 05/07/2021 8.2  5.0 - 15.0 mmol/L Final       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)    Relevant Medications    sertraline (Zoloft) 25 MG tablet    ALPRAZolam (Xanax) 0.25 MG tablet    Anxiety disorder due to general medical condition (Chronic)    Relevant Medications    sertraline  (Zoloft) 25 MG tablet    ALPRAZolam (Xanax) 0.25 MG tablet      Diagnoses       Codes Comments    Mood disorder due to a general medical condition    -  Primary ICD-10-CM: F06.30  ICD-9-CM: 293.83     Anxiety disorder due to general medical condition     ICD-10-CM: F06.4  ICD-9-CM: 293.84       medical condition : glucocorticoid  deficiency , s/p CVA (Left Middle cerebral artery )     Visit Diagnoses:    ICD-10-CM ICD-9-CM   1. Mood disorder due to a general medical condition  F06.30 293.83   2. Anxiety disorder due to general medical condition  F06.4 293.84       TREATMENT PLAN/GOALS: Continue supportive psychotherapy efforts and medications as indicated. Treatment and medication options discussed during today's visit. Patient ackowledged and verbally consented to continue with current treatment plan and was educated on the importance of compliance with treatment and follow-up appointments.    MEDICATION ISSUES:  INSPECT reviewed as expected - gabapentin and hydrocodone , 6/28/2021 xanax     1. Mood d/o 2ry to medical condition (CVA and glucocorticoid deficiency) - the  Pt has very complicated medical issues, still a lot of uncertainty about the causes , mood - not regulated, did not respond well to SSRI and Buspar, the pt has memory /concentration issues that make him feels more depressed with low self esteem, the pt had neuropsych eval done at [facility] with depression and anxiety , he agreed to try another antidepressant , start zoloft , consider trintellix      SSRI also can interfere with coagulation profile - monitor   The pt will benefit from psychotherapy to validate his emotions    genesight was offered, the pt declined at present time     2. Anxiety d/o 2ry to medical condition -  Cont  Low dose of xanax 0. 25 mg po BID     PHQ scored 13 - moderate  depression     Discussed medication options and treatment plan of prescribed medication as well as the risks, benefits, and side effects including  potential falls, possible impaired driving and metabolic adversities among others. Patient is agreeable to call the office with any worsening of symptoms or onset of side effects. Patient is agreeable to call 911 or go to the nearest ER should he/she begin having SI/HI. No medication side effects or related complaints today.     MEDS ORDERED DURING VISIT:  New Medications Ordered This Visit   Medications   • sertraline (Zoloft) 25 MG tablet     Sig: Take 1 tablet by mouth Daily.     Dispense:  30 tablet     Refill:  2   • ALPRAZolam (Xanax) 0.25 MG tablet     Sig: Take 1 tablet by mouth 3 (Three) Times a Day As Needed for Anxiety.     Dispense:  90 tablet     Refill:  2       Return in about 3 months (around 10/23/2021).         This document has been electronically signed by Clair Lyman MD  July 23, 2021 09:41 EDT    EMR Dragon transcription disclaimer:  Some of this encounter note is an electronic transcription translation of spoken language to printed text. The electronic translation of spoken language may permit erroneous, or at times, nonsensical words or phrases to be inadvertently transcribed; Although I have reviewed the note for such errors some may still exist.

## 2021-07-27 ENCOUNTER — TREATMENT (OUTPATIENT)
Dept: PHYSICAL THERAPY | Facility: CLINIC | Age: 51
End: 2021-07-27

## 2021-07-27 DIAGNOSIS — R53.83 OTHER FATIGUE: Primary | ICD-10-CM

## 2021-07-27 PROCEDURE — 97110 THERAPEUTIC EXERCISES: CPT | Performed by: PHYSICAL THERAPIST

## 2021-07-27 NOTE — PROGRESS NOTES
Physical Therapy Daily Progress Note  Visit: 4    Jourdan Lebron reports: no new issues - did well after last visit.     Subjective       Objective     ST. To tolerate progression of graded exercise w/o excessive fatigue post activity over 2 weeks. - Met  2. Independent and compliant with HEP over 2 weeks. - Met  3. To tolerate progression of aerobic activity on seated stepper or recumbent bike to 10 minutes consecutive or greater w/o excessive fatigue over 2 weeks. - Met  See Exercise, Manual, and Modality Logs for complete treatment.       Assessment/Plan    Good tolerance to progression of UE/LE strengthening and light aerobic activity without undue fatigue.            Timed:             Therapeutic Exercise:    42     mins  [id number];         Timed Treatment:   42   mins   Total Treatment:     42   mins  Jay Pacheco, PT, DPT  Physical Therapist

## 2021-07-29 ENCOUNTER — TREATMENT (OUTPATIENT)
Dept: PHYSICAL THERAPY | Facility: CLINIC | Age: 51
End: 2021-07-29

## 2021-07-29 DIAGNOSIS — R53.83 OTHER FATIGUE: Primary | ICD-10-CM

## 2021-07-29 PROCEDURE — 97110 THERAPEUTIC EXERCISES: CPT | Performed by: PHYSICAL THERAPIST

## 2021-07-29 NOTE — PROGRESS NOTES
Physical Therapy Daily Progress Note  Visit: 5    Jourdan Lebron reports: felt good after last PT visit but overworked himself at home and was very fatigued.  Reports feeling better today.     Subjective       Objective   See Exercise, Manual, and Modality Logs for complete treatment.       Assessment/Plan     Gradual improvement in activity tolerance and improving LE/UE strength.  Educated on limit to total activity at home + rehab per tolerance to limits excessive fatigue.     Plan:    Continue current           Timed:             Therapeutic Exercise:    43     mins  [id number];         Timed Treatment:   43   mins   Total Treatment:     43   mins  Jay Pacheco, PT, DPT  Physical Therapist

## 2021-08-05 ENCOUNTER — TREATMENT (OUTPATIENT)
Dept: PHYSICAL THERAPY | Facility: CLINIC | Age: 51
End: 2021-08-05

## 2021-08-05 DIAGNOSIS — R53.83 OTHER FATIGUE: Primary | ICD-10-CM

## 2021-08-05 PROCEDURE — 97110 THERAPEUTIC EXERCISES: CPT | Performed by: PHYSICAL THERAPIST

## 2021-08-05 NOTE — PROGRESS NOTES
Physical Therapy Daily Progress Note      Patient: Jourdan Lebron   : 1970  Diagnosis/ICD-10 Code:  Other fatigue [R53.83]  Referring practitioner: Justa Castano MD  Date of Initial Visit: Type: THERAPY  Noted: 2021  Today's Date: 2021  Patient seen for 6 sessions             Subjective Tolerating exercises well    Objective   See Exercise, Manual, and Modality Logs for complete treatment.       Assessment/Plan  Good tolerance with progression of strengthening exercises.    Progress per Plan of Care           Timed:              Therapeutic Exercise:    45     mins  [id number];         Timed Treatment:   45   mins   Total Treatment:     45   mins    Ovidio Chang PTA  Physical Therapist Assistant

## 2021-08-09 ENCOUNTER — TREATMENT (OUTPATIENT)
Dept: PHYSICAL THERAPY | Facility: CLINIC | Age: 51
End: 2021-08-09

## 2021-08-09 DIAGNOSIS — R53.83 OTHER FATIGUE: Primary | ICD-10-CM

## 2021-08-09 PROCEDURE — 97110 THERAPEUTIC EXERCISES: CPT | Performed by: PHYSICAL THERAPIST

## 2021-08-09 NOTE — PROGRESS NOTES
Physical Therapy Daily Progress Note    VISIT#: 7    Subjective   Jourdan Lebron reports that he is tired this morning , but doing well overall      Objective     See Exercise, Manual, and Modality Logs for complete treatment.       Assessment/Plan   Pt continues to progress global strengthening and activity tolerance with decreased rest break required throughout the session    Plan  Progress per Plan of Care and Progress strengthening /stabilization /functional activity            Timed:         Manual Therapy:         mins  79437;     Therapeutic Exercise:    41     mins  66194;     Neuromuscular Tacos:        mins  67502;    Therapeutic Activity:          mins  06489;     Gait Training:           mins  34209;     Ultrasound:          mins  97944;    Ionto                                   mins   90153  Self Care                            mins   33729    Un-Timed:  Electrical Stimulation:         mins  27950 ( );  Dry Needling          mins self-pay  Traction         mins 29923  Low Eval          Mins  74521  Mod Eval          Mins  39067  High Eval                           Mins  80318  Re-Eval                               mins  57228    Timed Treatment:   41   mins   Total Treatment:     41   mins    Shelia Watkins PT, DPT  Physical Therapist

## 2021-08-11 ENCOUNTER — TREATMENT (OUTPATIENT)
Dept: PHYSICAL THERAPY | Facility: CLINIC | Age: 51
End: 2021-08-11

## 2021-08-11 DIAGNOSIS — R53.83 OTHER FATIGUE: Primary | ICD-10-CM

## 2021-08-11 PROCEDURE — 97110 THERAPEUTIC EXERCISES: CPT | Performed by: PHYSICAL THERAPIST

## 2021-08-11 PROCEDURE — 97530 THERAPEUTIC ACTIVITIES: CPT | Performed by: PHYSICAL THERAPIST

## 2021-08-11 NOTE — PROGRESS NOTES
Physical Therapy Daily Progress Note    VISIT#: 8    Subjective   Jourdan Lebron reports that he did well following his previous session with minimal fatigue noted today.       Objective     See Exercise, Manual, and Modality Logs for complete treatment.         Assessment/Plan   Pt continues to progress global strengthening and activity tolerance with minimal to no increase in pain.     Plan  Progress per Plan of Care and Progress strengthening /stabilization /functional activity            Timed:         Manual Therapy:         mins  42094;     Therapeutic Exercise:    15     mins  65559;     Neuromuscular Tacos:        mins  00137;    Therapeutic Activity:     30     mins  97159;     Gait Training:           mins  16555;     Ultrasound:          mins  69718;    Ionto                                   mins   02129  Self Care                            mins   80087    Un-Timed:  Electrical Stimulation:         mins  88937 ( );  Dry Needling          mins self-pay  Traction          mins 29337  Low Eval          Mins  01832  Mod Eval          Mins  86831  High Eval                            Mins  21157  Re-Eval                               mins  41991    Timed Treatment:   45   mins   Total Treatment:     45   mins    Shelia Watkins PT, DPT  Physical Therapist

## 2021-08-13 ENCOUNTER — OFFICE VISIT (OUTPATIENT)
Dept: FAMILY MEDICINE CLINIC | Facility: CLINIC | Age: 51
End: 2021-08-13

## 2021-08-13 VITALS
HEART RATE: 67 BPM | WEIGHT: 173 LBS | OXYGEN SATURATION: 97 % | HEIGHT: 71 IN | TEMPERATURE: 97.8 F | BODY MASS INDEX: 24.22 KG/M2 | DIASTOLIC BLOOD PRESSURE: 82 MMHG | SYSTOLIC BLOOD PRESSURE: 135 MMHG

## 2021-08-13 DIAGNOSIS — M48.02 CERVICAL STENOSIS OF SPINAL CANAL: ICD-10-CM

## 2021-08-13 DIAGNOSIS — E27.49 GLUCOCORTICOID DEFICIENCY (HCC): ICD-10-CM

## 2021-08-13 DIAGNOSIS — E89.6 HISTORY OF TOTAL ADRENALECTOMY (HCC): ICD-10-CM

## 2021-08-13 DIAGNOSIS — Z86.73 HISTORY OF CEREBROVASCULAR ACCIDENT: Primary | ICD-10-CM

## 2021-08-13 DIAGNOSIS — F17.200 TOBACCO DEPENDENCE SYNDROME: ICD-10-CM

## 2021-08-13 PROCEDURE — 99214 OFFICE O/P EST MOD 30 MIN: CPT | Performed by: PREVENTIVE MEDICINE

## 2021-08-13 NOTE — PROGRESS NOTES
Subjective   Jourdan Lebron is a 51 y.o. male presents for   Chief Complaint   Patient presents with   • Cerebrovascular Accident     2 mo f/u- non fasting     Patient is generally feeling improved.  He has had an ultimatum to return back to work by September 10 or face long-term disability.  Patient is trying to get over his anxiety and Dr. Faisal gracia and the neurologist from [facility] are helping make that possible.  He is still having some difficulty sleeping has not returned to smoking.  Strength is improving and he does feel as though there is a possibility he will be able to return to full duty work neuropsych evaluation was completed and reviewed by the psychiatrist and the neurologist.  He has been having some more difficulty with Dr. Blake and he is going to seek a second opinion as far as his glucocorticoid replacement goes and that will not be scheduled for several months.  According to the patient PT seems to be going very well.  Patient was not complaining of brain fog and does feel as though some of this may have actually just been anxiety and with the medicines he is taking from psychiatry this seems to be clearing.  Patient knows that he needs to be safe when he returns back to work. I spent 35 minutes caring for Jourdan on this date of service. This time includes time spent by me in the following activities: preparing for the visit, reviewing tests, performing a medically appropriate examination and/or evaluation, counseling and educating the patient/family/caregiver, documenting information in the medical record and care coordination      Health Maintenance Due   Topic Date Due   • COLORECTAL CANCER SCREENING  Never done   • COVID-19 Vaccine (1) Never done       History of Present Illness     Vitals:    08/13/21 0800 08/13/21 0802   BP: 135/82 135/82   BP Location: Right arm Left arm   Patient Position: Sitting Sitting   Cuff Size: Adult Adult   Pulse: 67    Temp: 97.8 °F (36.6 °C)    TempSrc: Temporal   "  SpO2: 97%    Weight: 78.5 kg (173 lb)    Height: 180.3 cm (70.98\")      Body mass index is 24.14 kg/m².    Current Outpatient Medications on File Prior to Visit   Medication Sig Dispense Refill   • ALPRAZolam (Xanax) 0.25 MG tablet Take 1 tablet by mouth 3 (Three) Times a Day As Needed for Anxiety. 90 tablet 2   • aspirin 81 MG chewable tablet Chew 81 mg Daily.     • DHEA 10 MG capsule Take 1 tablet p.o. daily. 90 capsule 4   • fludrocortisone 0.1 MG tablet Take 1 tablet twice a day. 60 tablet 6   • fluticasone (Flonase Allergy Relief) 50 MCG/ACT nasal spray 1 spray into the nostril(s) as directed by provider Daily.     • hydrocortisone (CORTEF) 10 MG tablet Take 1 tablet by mouth 3 (Three) Times a Day With Meals. 270 tablet 2   • sertraline (Zoloft) 25 MG tablet Take 1 tablet by mouth Daily. 30 tablet 2   • ondansetron ODT (ZOFRAN-ODT) 4 MG disintegrating tablet Place 1 tablet on the tongue Every 8 (Eight) Hours As Needed for Nausea. 20 tablet 0     No current facility-administered medications on file prior to visit.       The following portions of the patient's history were reviewed and updated as appropriate: allergies, current medications, past family history, past medical history, past social history, past surgical history and problem list.    Review of Systems   Constitutional: Positive for fatigue.   Eyes: Positive for visual disturbance.   Genitourinary: Positive for erectile dysfunction.   Musculoskeletal: Positive for arthralgias and myalgias.   Neurological: Positive for weakness, light-headedness, headache and memory problem.   Psychiatric/Behavioral: Positive for sleep disturbance and stress. Negative for suicidal ideas. The patient is nervous/anxious.        Objective   Physical Exam  Vitals reviewed.   Constitutional:       General: He is not in acute distress.     Appearance: Normal appearance. He is well-developed. He is not ill-appearing or toxic-appearing.   HENT:      Head: Normocephalic and " atraumatic.      Right Ear: Tympanic membrane, ear canal and external ear normal.      Left Ear: Tympanic membrane, ear canal and external ear normal.      Nose: Nose normal.   Eyes:      Extraocular Movements: Extraocular movements intact.      Conjunctiva/sclera: Conjunctivae normal.      Pupils: Pupils are equal, round, and reactive to light.   Cardiovascular:      Rate and Rhythm: Normal rate and regular rhythm.      Heart sounds: Normal heart sounds.   Pulmonary:      Effort: Pulmonary effort is normal.      Breath sounds: Normal breath sounds.   Abdominal:      General: Bowel sounds are normal. There is no distension.      Palpations: Abdomen is soft. There is no mass.      Tenderness: There is no abdominal tenderness.   Musculoskeletal:         General: Normal range of motion.      Cervical back: Neck supple.   Skin:     General: Skin is warm.   Neurological:      General: No focal deficit present.      Mental Status: He is alert and oriented to person, place, and time.   Psychiatric:         Mood and Affect: Mood normal.         Behavior: Behavior normal.       PHQ-9 Total Score:      Assessment/Plan   Diagnoses and all orders for this visit:    1. History of cerebrovascular accident (Primary)  Comments:  PT seems to be going well patient is still having some difficulty sleeping several more therapies approved increasing his stamina    2. Tobacco dependence syndrome  Comments:  Patient still not smoking    3. Cervical stenosis of spinal canal  Comments:  No increase in arm leg bowel or bladder symptoms    4. History of total adrenalectomy (CMS/HCC)  Comments:  Surgery site is healed well    5. Glucocorticoid deficiency (CMS/HCC)  Comments:  Patient getting a second opinion by endocrinologist.        Patient Instructions   Patient to check with endocrinology about Covid vaccination.

## 2021-08-16 ENCOUNTER — TREATMENT (OUTPATIENT)
Dept: PHYSICAL THERAPY | Facility: CLINIC | Age: 51
End: 2021-08-16

## 2021-08-16 DIAGNOSIS — R53.83 OTHER FATIGUE: Primary | ICD-10-CM

## 2021-08-16 PROCEDURE — 97110 THERAPEUTIC EXERCISES: CPT | Performed by: PHYSICAL THERAPIST

## 2021-08-16 PROCEDURE — 97530 THERAPEUTIC ACTIVITIES: CPT | Performed by: PHYSICAL THERAPIST

## 2021-08-16 NOTE — PROGRESS NOTES
Physical Therapy Daily Progress Note  Visit: 9    Jourdan Lebron reports: he is gradually able to do more activity without excessive fatigue.  Reports still has afternoon drop off in energy in afternoon.    Subjective       Objective    3 minute walk test - 780 FT      See Exercise, Manual, and Modality Logs for complete treatment.       Assessment/Plan     Improve endurance and tolerance to activity w/o undue fatigue.     Plan:    Continue current         Timed:           Therapeutic Exercise:    25     mins  93679;     Therapeutic Activity:     20     mins  48801;         Timed Treatment:   45   mins   Total Treatment:     45   mins  Jay Pacheco PT, DPT  Physical Therapist

## 2021-08-19 ENCOUNTER — TREATMENT (OUTPATIENT)
Dept: PHYSICAL THERAPY | Facility: CLINIC | Age: 51
End: 2021-08-19

## 2021-08-19 DIAGNOSIS — R53.83 OTHER FATIGUE: Primary | ICD-10-CM

## 2021-08-19 PROCEDURE — 97110 THERAPEUTIC EXERCISES: CPT | Performed by: PHYSICAL THERAPIST

## 2021-08-19 NOTE — PROGRESS NOTES
Re-Assessment / Progress Note    Patient: Jourdan Lebron   : 1970  Diagnosis/ICD-10 Code:  Other fatigue [R53.83]  Referring practitioner: Justa Castano MD  Date of Initial Visit: Episode Type: THERAPY  Noted: 2021    Today's Date: 2021  Patient seen for 10 sessions.      Subjective:   Jourdan Lebron reports: he can tell improvement in his strength/endurance from continued PT.  Reports still has significant fatigue around 2:30 each afternoon but is able to manage with rest.   Subjective Questionnaire: Functional tests used FTSST and 3 minute walk used to measure improvement.   Clinical Progress: improved  Home Program Compliance: Yes  Treatment has included: therapeutic exercise, neuromuscular re-education and therapeutic activity    Subjective   Objective          Functional Assessment     Comments  Five Time Sit to Stand - 11.11 seconds  Single leg balance - R/L 15 seconds + w/o UE support  3 minute walk test - 780 ft.       Assessment/Plan  Progress toward previous goals: All met or progressed towards    Goals    Short-term goals (STG):     1. To tolerate progression of graded exercise w/o excessive fatigue post activity over 2 weeks. - Met  2. Independent and compliant with HEP over 2 weeks. - Met  3. To tolerate progression of aerobic activity on seated stepper or recumbent bike to 10 minutes consecutive or greater w/o excessive fatigue over 2 weeks. - Met    Long-term goals (LTG):     1. 3 minute walk test distance improved 15% or greater over 8 weeks or less without excessive fatigue. - Met   2.  strength improved 10-15 lbs or more over 8 weeks to allow improved safety and ability for return to work. - Progressing towards  3. Five Time Sit to Stand time improved to 10 seconds or less by discharge to demonstrate improved LE strength. - Progressing towards        Recommendations: Continue with recommendations to continue current plan of care to progress towards current goals  Timeframe: 3  weeks  Prognosis to achieve goals: good    PT Signature: Jay Pacheco, PT, DPT          Based upon review of the patient's progress and continued therapy plan, it is my medical opinion that Jourdan Lebron should continue physical therapy treatment at [facility]  [address] [place] [address] [address] [place] [id number] [phone].        Timed:             Therapeutic Exercise:    43     mins  21896;         Timed Treatment:   43   mins   Total Treatment:     43   mins

## 2021-08-23 ENCOUNTER — TREATMENT (OUTPATIENT)
Dept: PHYSICAL THERAPY | Facility: CLINIC | Age: 51
End: 2021-08-23

## 2021-08-23 DIAGNOSIS — R53.83 OTHER FATIGUE: Primary | ICD-10-CM

## 2021-08-23 PROCEDURE — 97112 NEUROMUSCULAR REEDUCATION: CPT | Performed by: PHYSICAL THERAPIST

## 2021-08-23 PROCEDURE — 97110 THERAPEUTIC EXERCISES: CPT | Performed by: PHYSICAL THERAPIST

## 2021-08-23 NOTE — PROGRESS NOTES
Physical Therapy Daily Progress Note  Visit: 11    Jourdan Lebron reports: no new issues today.     Subjective       Objective   See Exercise, Manual, and Modality Logs for complete treatment.       Assessment/Plan     Improving UE/LE strength and activity tolerance w/o excessive fatigue either post session or later in the day.     Plan:    Continue current - progress towards prior level of function to allow return to work.            Timed:             Therapeutic Exercise:    31     mins  61593;     Therapeutic Activity:     12     mins  37659;            Timed Treatment:   43   mins   Total Treatment:     43   mins  Jay Pacheco PT, DPT  Physical Therapist

## 2021-08-25 ENCOUNTER — OFFICE VISIT (OUTPATIENT)
Dept: PSYCHIATRY | Facility: CLINIC | Age: 51
End: 2021-08-25

## 2021-08-25 DIAGNOSIS — F06.4 ANXIETY DISORDER DUE TO GENERAL MEDICAL CONDITION: Chronic | ICD-10-CM

## 2021-08-25 DIAGNOSIS — F06.30 MOOD DISORDER DUE TO A GENERAL MEDICAL CONDITION: Primary | Chronic | ICD-10-CM

## 2021-08-25 PROCEDURE — 99214 OFFICE O/P EST MOD 30 MIN: CPT | Performed by: PSYCHIATRY & NEUROLOGY

## 2021-08-25 RX ORDER — ALPRAZOLAM 0.25 MG/1
0.25 TABLET ORAL 3 TIMES DAILY PRN
Qty: 90 TABLET | Refills: 2 | Status: SHIPPED | OUTPATIENT
Start: 2021-08-25 | End: 2021-10-26 | Stop reason: SDUPTHER

## 2021-08-25 NOTE — PROGRESS NOTES
Subjective   Jourdan Lebron is a 51 y.o. 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, left middle cerebral artery , the pt is R handed    he had troubles talking , had muscle weakness   Oct 2020 - went back to work light duty , still had issues completing his tasks   Feb 2021- adrenal gland removed and that caused more issues, more medical w/u and ER visits, weight loss   Now on steroid replacement which is also not completely controlled     The pt was started on zoloft and xanax, noticed some improvement,   Today the pt reported still feeling depressed, but all his sxs are triggered by somatic sensations   The pt c/o decreased concentration, increased irritability ,   The pt has significant drop of E level in the middle of the day     Sleep - poor , not restorative , hydrocortisone makes everything worse,   Main concern is memory and concentration - much worse after adrenalectomy   Denied AVH/SI/HI     The following portions of the patient's history were reviewed and updated as appropriate: allergies, current medications, past family history, past medical history, past social history, past surgical history and problem list.    PAST PSYCHIATRIC HISTORY  Axis I  Affective/Bipolar Disorder, Anxiety/Panic Disorder  Axis II  Denied     PAST OUTPATIENT TREATMENT  Diagnosis treated:  Anxiety, depression   Treatment Type:  Medication Management  Prior Psychiatric Medications:  lexapro - not effective  Buspirone - side effects   Support Groups:  None   Sequelae Of Mental Disorder:  job disruption, emotional distress          Interval History  Some improvement     Side Effects  Denied       Past Medical History:  Past Medical History:   Diagnosis Date   • Brain fog    • Hand tingling    • Hyperlipidemia    • Tiredness        Social History:  Social History     Socioeconomic History    • Marital status:      Spouse name: Not on file   • Number of children: Not on file   • Years of education: Not on file   • Highest education level: Not on file   Tobacco Use   • Smoking status: Current Some Day Smoker     Packs/day: 2.00     Years: 30.00     Pack years: 60.00     Types: Cigars   • Smokeless tobacco: Never Used   • Tobacco comment: 2 packs= 4 cigars a day   Vaping Use   • Vaping Use: Never used   Substance and Sexual Activity   • Alcohol use: Not Currently     Comment: not drank in months   • Drug use: Never   • Sexual activity: Yes     Partners: Female     Birth control/protection: None       Family History:  Family History   Problem Relation Age of Onset   • Arthritis Mother    • Hypertension Mother    • Cancer Father         abdomen   • Arthritis Father    • Hypertension Father    • Cancer Brother         esophageal    • Heart disease Brother        Past Surgical History:  Past Surgical History:   Procedure Laterality Date   • ADRENALECTOMY     • KIDNEY STONE SURGERY     • THROMBECTOMY         Problem List:  Patient Active Problem List   Diagnosis   • Abnormal electrocardiogram   • Calculus of kidney   • History of cerebrovascular accident   • Family history of malignant neoplasm   • Generalized hyperhidrosis   • Hyperlipidemia   • Seasonal allergies   • Tobacco dependence syndrome   • Cervical radiculopathy   • Cervical stenosis of spinal canal   • Near syncope   • Other fatigue   • History of total adrenalectomy (CMS/HCC)   • Arthropathy of cervical facet joint   • Neck pain   • Pain in left arm   • Shoulder pain   • Seasonal allergic rhinitis   • Thoracic back pain   • Vitamin B12 deficiency   • Electrocardiogram abnormal   • Spinal stenosis of cervical region   • Reduced libido   • Glucocorticoid deficiency (CMS/HCC)   • Other specified postprocedural states   • Hypogonadotropic hypogonadism (CMS/HCC)   • Mood disorder due to a general medical condition   • Anxiety disorder due to  general medical condition   • Macrocytosis   • Erythrocytosis       Allergy:   Allergies   Allergen Reactions   • Cephalosporins Anaphylaxis     Throat swelling   • Dye  [Contrast Dye] Hives   • Sulfa Antibiotics Hives   • Iodinated Diagnostic Agents Unknown - Low Severity   • Statins Myalgia     Myalgia and fatique   • Sulfate Unknown - Low Severity        Discontinued Medications:  Medications Discontinued During This Encounter   Medication Reason   • sertraline (Zoloft) 25 MG tablet Reorder   • ALPRAZolam (Xanax) 0.25 MG tablet Reorder       Current Medications:   Current Outpatient Medications   Medication Sig Dispense Refill   • ALPRAZolam (Xanax) 0.25 MG tablet Take 1 tablet by mouth 3 (Three) Times a Day As Needed for Anxiety. 90 tablet 2   • aspirin 81 MG chewable tablet Chew 81 mg Daily.     • DHEA 10 MG capsule Take 1 tablet p.o. daily. 90 capsule 4   • fludrocortisone 0.1 MG tablet Take 1 tablet twice a day. 60 tablet 6   • fluticasone (Flonase Allergy Relief) 50 MCG/ACT nasal spray 1 spray into the nostril(s) as directed by provider Daily.     • hydrocortisone (CORTEF) 10 MG tablet Take 1 tablet by mouth 3 (Three) Times a Day With Meals. 270 tablet 2   • ondansetron ODT (ZOFRAN-ODT) 4 MG disintegrating tablet Place 1 tablet on the tongue Every 8 (Eight) Hours As Needed for Nausea. 20 tablet 0   • sertraline (Zoloft) 50 MG tablet Take 1 tablet by mouth Daily. 30 tablet 0     No current facility-administered medications for this visit.         Psychological ROS: positive for - anxiety, concentration, depression,  irritability and memory difficulties  Negative for AVH/SI/HI, delusions       Physical Exam:   There were no vitals taken for this visit.    Mental Status Exam:   Hygiene:   good  Cooperation:  Cooperative  Eye Contact:  Good  Psychomotor Behavior:  Appropriate  Affect:  Appropriate  Mood: anxious and fluctates  Hopelessness: Denies  Speech:  Normal  Thought Process:  Goal directed and  Linear  Thought Content:  Normal and Mood congruent  Suicidal:  None  Homicidal:  None  Hallucinations:  None  Delusion:  None  Memory:  Deficits  Orientation:  Person, Place, Time and Situation  Reliability:  good  Insight:  Good  Judgement:  Good  Impulse Control:  Fair  Physical/Medical Issues:  Yes         MSE from 7/23/2021 reviewed and accepted with changes     PHQ-9 Depression Screening  Little interest or pleasure in doing things? 2   Feeling down, depressed, or hopeless? 2   Trouble falling or staying asleep, or sleeping too much? 1   Feeling tired or having little energy? 3   Poor appetite or overeating? 1   Feeling bad about yourself - or that you are a failure or have let yourself or your family down? 2   Trouble concentrating on things, such as reading the newspaper or watching television? 1   Moving or speaking so slowly that other people could have noticed? Or the opposite - being so fidgety or restless that you have been moving around a lot more than usual? 0   Thoughts that you would be better off dead, or of hurting yourself in some way? 0   PHQ-9 Total Score 12   If you checked off any problems, how difficult have these problems made it for you to do your work, take care of things at home, or get along with other people? Very difficult           Current every day smoker 3-10 mintues spent counseling Has reduced Tobbacos use    I advised Jourdan of the risks of tobacco use.     Lab Results:   Admission on 07/16/2021, Discharged on 07/16/2021   Component Date Value Ref Range Status   • Cortisol 07/16/2021 11.99    mcg/dL Final   • ACTH 07/16/2021 3.2* 7.2 - 63.3 pg/mL Final    ACTH reference interval for samples collected between 7 and 10 AM.   • TSH 07/16/2021 1.020  0.270 - 4.200 uIU/mL Final   • Glucose 07/16/2021 80  65 - 99 mg/dL Final   • BUN 07/16/2021 11  6 - 20 mg/dL Final   • Creatinine 07/16/2021 1.00  0.76 - 1.27 mg/dL Final   • Sodium 07/16/2021 140  136 - 145 mmol/L Final   • Potassium  07/16/2021 4.4  3.5 - 5.2 mmol/L Final   • Chloride 07/16/2021 104  98 - 107 mmol/L Final   • CO2 07/16/2021 27.0  22.0 - 29.0 mmol/L Final   • Calcium 07/16/2021 9.0  8.6 - 10.5 mg/dL Final   • Total Protein 07/16/2021 6.4  6.0 - 8.5 g/dL Final   • Albumin 07/16/2021 3.70  3.50 - 5.20 g/dL Final   • ALT (SGPT) 07/16/2021 13  1 - 41 U/L Final   • AST (SGOT) 07/16/2021 13  1 - 40 U/L Final   • Alkaline Phosphatase 07/16/2021 63  39 - 117 U/L Final   • Total Bilirubin 07/16/2021 0.3  0.0 - 1.2 mg/dL Final   • eGFR Non  Amer 07/16/2021 79  >60 mL/min/1.73 Final   • Globulin 07/16/2021 2.7  gm/dL Final   • A/G Ratio 07/16/2021 1.4  g/dL Final   • BUN/Creatinine Ratio 07/16/2021 11.0  7.0 - 25.0 Final   • Anion Gap 07/16/2021 9.0  5.0 - 15.0 mmol/L Final   • QT Interval 07/16/2021 456  ms Final   • Troponin T 07/16/2021 <0.010  0.000 - 0.030 ng/mL Final   • Color, UA 07/16/2021 Yellow  Yellow, Straw Final   • Appearance, UA 07/16/2021 Clear  Clear Final   • pH, UA 07/16/2021 7.0  5.0 - 8.0 Final   • Specific Gravity, UA 07/16/2021 <=1.005  1.005 - 1.030 Final   • Glucose, UA 07/16/2021 Negative  Negative Final   • Ketones, UA 07/16/2021 Negative  Negative Final   • Bilirubin, UA 07/16/2021 Negative  Negative Final   • Blood, UA 07/16/2021 Negative  Negative Final   • Protein, UA 07/16/2021 Negative  Negative Final   • Leuk Esterase, UA 07/16/2021 Negative  Negative Final   • Nitrite, UA 07/16/2021 Negative  Negative Final   • Urobilinogen, UA 07/16/2021 0.2 E.U./dL  0.2 - 1.0 E.U./dL Final   • Amphet/Methamphet, Screen 07/16/2021 Negative  Negative Final   • Barbiturates Screen, Urine 07/16/2021 Negative  Negative Final   • Benzodiazepine Screen, Urine 07/16/2021 Negative  Negative Final   • Cocaine Screen, Urine 07/16/2021 Negative  Negative Final   • Opiate Screen 07/16/2021 Negative  Negative Final   • THC, Screen, Urine 07/16/2021 Positive* Negative Final   • Methadone Screen, Urine 07/16/2021 Negative   Negative Final   • Oxycodone Screen, Urine 07/16/2021 Negative  Negative Final   • Magnesium 07/16/2021 2.1  1.6 - 2.6 mg/dL Final   • WBC 07/16/2021 8.30  3.40 - 10.80 10*3/mm3 Final   • RBC 07/16/2021 4.98  4.14 - 5.80 10*6/mm3 Final   • Hemoglobin 07/16/2021 16.8  13.0 - 17.7 g/dL Final   • Hematocrit 07/16/2021 48.8  37.5 - 51.0 % Final   • MCV 07/16/2021 98.0* 79.0 - 97.0 fL Final   • MCH 07/16/2021 33.7* 26.6 - 33.0 pg Final   • MCHC 07/16/2021 34.3  31.5 - 35.7 g/dL Final   • RDW 07/16/2021 14.0  12.3 - 15.4 % Final   • RDW-SD 07/16/2021 47.3  37.0 - 54.0 fl Final   • MPV 07/16/2021 8.1  6.0 - 12.0 fL Final   • Platelets 07/16/2021 226  140 - 450 10*3/mm3 Final   • Neutrophil % 07/16/2021 57.3  42.7 - 76.0 % Final   • Lymphocyte % 07/16/2021 30.3  19.6 - 45.3 % Final   • Monocyte % 07/16/2021 8.8  5.0 - 12.0 % Final   • Eosinophil % 07/16/2021 2.5  0.3 - 6.2 % Final   • Basophil % 07/16/2021 1.1  0.0 - 1.5 % Final   • Neutrophils, Absolute 07/16/2021 4.80  1.70 - 7.00 10*3/mm3 Final   • Lymphocytes, Absolute 07/16/2021 2.50  0.70 - 3.10 10*3/mm3 Final   • Monocytes, Absolute 07/16/2021 0.70  0.10 - 0.90 10*3/mm3 Final   • Eosinophils, Absolute 07/16/2021 0.20  0.00 - 0.40 10*3/mm3 Final   • Basophils, Absolute 07/16/2021 0.10  0.00 - 0.20 10*3/mm3 Final   • nRBC 07/16/2021 0.0  0.0 - 0.2 /100 WBC Final   • proBNP 07/16/2021 82.4  0.0 - 450.0 pg/mL Final   Lab on 07/12/2021   Component Date Value Ref Range Status   • Testosterone, Total 07/12/2021 785  264 - 916 ng/dL Final    Adult male reference interval is based on a population of  healthy nonobese males (BMI <30) between 19 and 39 years old.  Rachel et.al. JCEM 2017,102;[id number]. PMID: [id number].   • Testosterone, Free 07/12/2021 7.4  6.8 - 21.5 pg/mL Final   • FSH 07/12/2021 6.77  mIU/mL Final   • LH 07/12/2021 10.30  mIU/mL Final   • TSH 07/12/2021 2.310  0.270 - 4.200 uIU/mL Final   • Insulin-Like Growth Factor-1 07/12/2021 243  81 - 263 ng/mL Final    • Free T4 07/12/2021 1.32  0.93 - 1.70 ng/dL Final   • T3, Free 07/12/2021 3.46  2.00 - 4.40 pg/mL Final   • DHEA-Sulfate 07/12/2021 93.8  71.6 - 375.4 ug/dL Final   • WBC 07/12/2021 8.37  3.40 - 10.80 10*3/mm3 Final   • RBC 07/12/2021 5.12  4.14 - 5.80 10*6/mm3 Final   • Hemoglobin 07/12/2021 17.3  13.0 - 17.7 g/dL Final   • Hematocrit 07/12/2021 50.0  37.5 - 51.0 % Final   • MCV 07/12/2021 97.7* 79.0 - 97.0 fL Final   • MCH 07/12/2021 33.8* 26.6 - 33.0 pg Final   • MCHC 07/12/2021 34.6  31.5 - 35.7 g/dL Final   • RDW 07/12/2021 12.4  12.3 - 15.4 % Final   • RDW-SD 07/12/2021 44.3  37.0 - 54.0 fl Final   • MPV 07/12/2021 10.7  6.0 - 12.0 fL Final   • Platelets 07/12/2021 234  140 - 450 10*3/mm3 Final   • Neutrophil % 07/12/2021 58.1  42.7 - 76.0 % Final   • Lymphocyte % 07/12/2021 30.0  19.6 - 45.3 % Final   • Monocyte % 07/12/2021 8.4  5.0 - 12.0 % Final   • Eosinophil % 07/12/2021 2.7  0.3 - 6.2 % Final   • Basophil % 07/12/2021 0.6  0.0 - 1.5 % Final   • Immature Grans % 07/12/2021 0.2  0.0 - 0.5 % Final   • Neutrophils, Absolute 07/12/2021 4.86  1.70 - 7.00 10*3/mm3 Final   • Lymphocytes, Absolute 07/12/2021 2.51  0.70 - 3.10 10*3/mm3 Final   • Monocytes, Absolute 07/12/2021 0.70  0.10 - 0.90 10*3/mm3 Final   • Eosinophils, Absolute 07/12/2021 0.23  0.00 - 0.40 10*3/mm3 Final   • Basophils, Absolute 07/12/2021 0.05  0.00 - 0.20 10*3/mm3 Final   • Immature Grans, Absolute 07/12/2021 0.02  0.00 - 0.05 10*3/mm3 Final   • nRBC 07/12/2021 0.0  0.0 - 0.2 /100 WBC Final   Office Visit on 06/11/2021   Component Date Value Ref Range Status   • WBC 06/11/2021 7.76  3.40 - 10.80 10*3/mm3 Final   • RBC 06/11/2021 5.19  4.14 - 5.80 10*6/mm3 Final   • Hemoglobin 06/11/2021 17.4  13.0 - 17.7 g/dL Final   • Hematocrit 06/11/2021 51.3* 37.5 - 51.0 % Final   • MCV 06/11/2021 98.8* 79.0 - 97.0 fL Final   • MCH 06/11/2021 33.5* 26.6 - 33.0 pg Final   • MCHC 06/11/2021 33.9  31.5 - 35.7 g/dL Final   • RDW 06/11/2021 13.1  12.3 -  15.4 % Final   • RDW-SD 06/11/2021 47.5  37.0 - 54.0 fl Final   • MPV 06/11/2021 11.2  6.0 - 12.0 fL Final   • Platelets 06/11/2021 264  140 - 450 10*3/mm3 Final   • Vitamin B-12 06/11/2021 1,487* 211 - 946 pg/mL Final   • Folate 06/11/2021 7.32  4.78 - 24.20 ng/mL Final   • TSH 06/11/2021 1.840  0.270 - 4.200 uIU/mL Final   • Glucose 06/11/2021 78  65 - 99 mg/dL Final   • BUN 06/11/2021 13  6 - 20 mg/dL Final   • Creatinine 06/11/2021 0.91  0.76 - 1.27 mg/dL Final   • Sodium 06/11/2021 140  136 - 145 mmol/L Final   • Potassium 06/11/2021 4.3  3.5 - 5.2 mmol/L Final   • Chloride 06/11/2021 105  98 - 107 mmol/L Final   • CO2 06/11/2021 27.5  22.0 - 29.0 mmol/L Final   • Calcium 06/11/2021 9.4  8.6 - 10.5 mg/dL Final   • Total Protein 06/11/2021 6.7  6.0 - 8.5 g/dL Final   • Albumin 06/11/2021 4.30  3.50 - 5.20 g/dL Final   • ALT (SGPT) 06/11/2021 16  1 - 41 U/L Final   • AST (SGOT) 06/11/2021 12  1 - 40 U/L Final   • Alkaline Phosphatase 06/11/2021 63  39 - 117 U/L Final   • Total Bilirubin 06/11/2021 0.2  0.0 - 1.2 mg/dL Final   • eGFR Non  Amer 06/11/2021 89  >60 mL/min/1.73 Final   • Globulin 06/11/2021 2.4  gm/dL Final   • A/G Ratio 06/11/2021 1.8  g/dL Final   • BUN/Creatinine Ratio 06/11/2021 14.3  7.0 - 25.0 Final   • Anion Gap 06/11/2021 7.5  5.0 - 15.0 mmol/L Final   • GGT 06/11/2021 26  8 - 61 U/L Final   • Neutrophil % 06/11/2021 57.6  42.7 - 76.0 % Final   • Lymphocyte % 06/11/2021 31.3  19.6 - 45.3 % Final   • Monocyte % 06/11/2021 8.1  5.0 - 12.0 % Final   • Eosinophil % 06/11/2021 3.0  0.3 - 6.2 % Final   • Basophil % 06/11/2021 1.0  0.0 - 1.5 % Final   • Neutrophils Absolute 06/11/2021 4.47  1.70 - 7.00 10*3/mm3 Final   • Lymphocytes Absolute 06/11/2021 2.43  0.70 - 3.10 10*3/mm3 Final   • Monocytes Absolute 06/11/2021 0.63  0.10 - 0.90 10*3/mm3 Final   • Eosinophils Absolute 06/11/2021 0.23  0.00 - 0.40 10*3/mm3 Final   • Basophils Absolute 06/11/2021 0.08  0.00 - 0.20 10*3/mm3 Final   •  Acanthocytes 06/11/2021 Slight/1+  None Seen Final   • Poikilocytes 06/11/2021 Slight/1+  None Seen Final   • WBC Morphology 06/11/2021 Normal  Normal Final   • Platelet Morphology 06/11/2021 Normal  Normal Final   Clinical Support on 06/01/2021   Component Date Value Ref Range Status   • Total Cholesterol 06/01/2021 170  0 - 200 mg/dL Final   • Triglycerides 06/01/2021 70  0 - 150 mg/dL Final   • HDL Cholesterol 06/01/2021 54  40 - 60 mg/dL Final   • LDL Cholesterol  06/01/2021 103* 0 - 100 mg/dL Final   • VLDL Cholesterol 06/01/2021 13  5 - 40 mg/dL Final   • LDL/HDL Ratio 06/01/2021 1.89   Final       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)    Relevant Medications    sertraline (Zoloft) 50 MG tablet    ALPRAZolam (Xanax) 0.25 MG tablet    Anxiety disorder due to general medical condition (Chronic)    Relevant Medications    sertraline (Zoloft) 50 MG tablet    ALPRAZolam (Xanax) 0.25 MG tablet      Diagnoses       Codes Comments    Mood disorder due to a general medical condition    -  Primary ICD-10-CM: F06.30  ICD-9-CM: 293.83     Anxiety disorder due to general medical condition     ICD-10-CM: F06.4  ICD-9-CM: 293.84       medical condition : glucocorticoid  deficiency , s/p CVA (Left Middle cerebral artery )     Visit Diagnoses:    ICD-10-CM ICD-9-CM   1. Mood disorder due to a general medical condition  F06.30 293.83   2. Anxiety disorder due to general medical condition  F06.4 293.84       TREATMENT PLAN/GOALS: Continue supportive psychotherapy efforts and medications as indicated. Treatment and medication options discussed during today's visit. Patient ackowledged and verbally consented to continue with current treatment plan and was educated on the importance of compliance with treatment and follow-up appointments.    MEDICATION ISSUES:  INSPECT reviewed as expected - gabapentin and hydrocodone , 7/27/2021  xanax     1. Mood d/o 2ry to medical condition (CVA and glucocorticoid deficiency) - the  Pt has very complicated medical issues, still a lot of uncertainty about the causes , mood - not regulated, did not respond well to SSRI and Buspar, the pt has memory /concentration issues that make him feels more depressed with low self esteem, the pt had neuropsych eval done at [facility] with depression and anxiety   Cont zoloft , increase to 50 mg , the pt was asking about prozac- his mom was on it and was doing well    tx options discussed, agreed to increase zoloft to 50- 100 mg in 1 month since he tolerates it well and zoloft has safe cardiovascular profile   If not effective - will change to prozac   SSRI also can interfere with coagulation profile - monitor   The pt will benefit from psychotherapy to validate his emotions    genesight was offered - still does not want      2. Anxiety d/o 2ry to medical condition -  Cont  Low dose of xanax 0. 25 mg po BID     PHQ scored 12 - moderate  depression     Discussed medication options and treatment plan of prescribed medication as well as the risks, benefits, and side effects including potential falls, possible impaired driving and metabolic adversities among others. Patient is agreeable to call the office with any worsening of symptoms or onset of side effects. Patient is agreeable to call 911 or go to the nearest ER should he/she begin having SI/HI. No medication side effects or related complaints today.     MEDS ORDERED DURING VISIT:  New Medications Ordered This Visit   Medications   • sertraline (Zoloft) 50 MG tablet     Sig: Take 1 tablet by mouth Daily.     Dispense:  30 tablet     Refill:  0   • ALPRAZolam (Xanax) 0.25 MG tablet     Sig: Take 1 tablet by mouth 3 (Three) Times a Day As Needed for Anxiety.     Dispense:  90 tablet     Refill:  2       Return in about 3 months (around 11/25/2021).         This document has been electronically signed by Clair Lyman  MD  August 25, 2021 09:46 EDT    EMR Dragon transcription disclaimer:  Some of this encounter note is an electronic transcription translation of spoken language to printed text. The electronic translation of spoken language may permit erroneous, or at times, nonsensical words or phrases to be inadvertently transcribed; Although I have reviewed the note for such errors some may still exist.

## 2021-08-25 NOTE — PATIENT INSTRUCTIONS
"http://APA.org/depression-guideline\"> https://clinicalkey.com\"> http://point-of-care.[employer]/skills/\"> http://point-of-care.[employer] [facility] [employer].com\">   Managing Depression, Adult  Depression is a mental health condition that affects your thoughts, feelings, and actions. Being diagnosed with depression can bring you relief if you did not know why you have felt or behaved a certain way. It could also leave you feeling overwhelmed with uncertainty about your future. Preparing yourself to manage your symptoms can help you feel more positive about your future.  How to manage lifestyle changes  Managing stress    Stress is your body's reaction to life changes and events, both good and bad. Stress can add to your feelings of depression. Learning to manage your stress can help lessen your feelings of depression.  Try some of the following approaches to reducing your stress (stress reduction techniques):  · Listen to music that you enjoy and that inspires you.  · Try using a meditation gerardo or take a meditation class.  · Develop a practice that helps you connect with your spiritual self. Walk in nature, pray, or go to a place of [place].  · Do some deep breathing. To do this, inhale slowly through your nose. Pause at the top of your inhale for a few seconds and then exhale slowly, letting your muscles relax.  · Practice yoga to help relax and work your muscles.  Choose a stress reduction technique that suits your lifestyle and personality. These techniques take time and practice to develop. Set aside 5-15 minutes a day to do them. Therapists can offer training in these techniques. Other things you can do to manage stress include:  · Keeping a stress diary.  · Knowing your limits and saying no when you think something is too much.  · Paying attention to how you react to certain situations. You may not be able to control everything, but you can change your reaction.  · Adding humor to your life by " watching funny films or TV shows.  · Making time for activities that you enjoy and that relax you.    Medicines  Medicines, such as antidepressants, are often a part of treatment for depression.  · Talk with your pharmacist or health care provider about all the medicines, supplements, and herbal products that you take, their possible side effects, and what medicines and other products are safe to take together.  · Make sure to report any side effects you may have to your health care provider.  Relationships  Your health care provider may suggest family therapy, couples therapy, or individual therapy as part of your treatment.  How to recognize changes  Everyone responds differently to treatment for depression. As you recover from depression, you may start to:  · Have more interest in doing activities.  · Feel less hopeless.  · Have more energy.  · Overeat less often, or have a better appetite.  · Have better mental focus.  It is important to recognize if your depression is not getting better or is getting worse. The symptoms you had in the beginning may return, such as:  · Tiredness (fatigue) or low energy.  · Eating too much or too little.  · Sleeping too much or too little.  · Feeling restless, agitated, or hopeless.  · Trouble focusing or making decisions.  · Unexplained physical complaints.  · Feeling irritable, angry, or aggressive.  If you or your family members notice these symptoms coming back, let your health care provider know right away.  Follow these instructions at home:  Activity    · Try to get some form of exercise each day, such as walking, biking, swimming, or lifting weights.  · Practice stress reduction techniques.  · Engage your mind by taking a class or doing some volunteer work.  Lifestyle  · Get the right amount and quality of sleep.  · Cut down on using caffeine, tobacco, alcohol, and other potentially harmful substances.  · Eat a healthy diet that includes plenty of vegetables, fruits,  whole grains, low-fat dairy products, and lean protein. Do not eat a lot of foods that are high in solid fats, added sugars, or salt (sodium).  General instructions  · Take over-the-counter and prescription medicines only as told by your health care provider.  · Keep all follow-up visits as told by your health care provider. This is important.  Where to find support  Talking to others    Friends and family members can be sources of support and guidance. Talk to trusted friends or family members about your condition. Explain your symptoms to them, and let them know that you are working with a health care provider to treat your depression. Tell friends and family members how they also can be helpful.  Finances  · Find appropriate mental health providers that fit with your financial situation.  · Talk with your health care provider about options to get reduced prices on your medicines.  Where to find more information  You can find support in your area from:  · Anxiety and Depression Association of [place] (ADAA): www.adaa.org  · Mental Health [place]: www.mentalhealth[place].net  · National [place] on Mental Illness: www.rafaela.org  Contact a health care provider if:  · You stop taking your antidepressant medicines, and you have any of these symptoms:  ? Nausea.  ? Headache.  ? Light-headedness.  ? Chills and body aches.  ? Not being able to sleep (insomnia).  · You or your friends and family think your depression is getting worse.  Get help right away if:  · You have thoughts of hurting yourself or others.  If you ever feel like you may hurt yourself or others, or have thoughts about taking your own life, get help right away. Go to your nearest emergency department or:  · Call your local emergency services (905 in the [place]  · Call a suicide crisis helpline, such as the National Suicide Prevention Lifeline at 1-374.812.9865. This is open 24 hours a day in the [place]  · Text the Crisis Text Line at 016934 (in the  [place]  Summary  · If you are diagnosed with depression, preparing yourself to manage your symptoms is a good way to feel positive about your future.  · Work with your health care provider on a management plan that includes stress reduction techniques, medicines (if applicable), therapy, and healthy lifestyle habits.  · Keep talking with your health care provider about how your treatment is working.  · If you have thoughts about taking your own life, call a suicide crisis helpline or text a crisis text line.  This information is not intended to replace advice given to you by your health care provider. Make sure you discuss any questions you have with your health care provider.  Document Revised: 10/28/2020 Document Reviewed: 10/28/2020  Elsevier Patient Education © 2021 Elsevier Inc.

## 2021-08-26 ENCOUNTER — TREATMENT (OUTPATIENT)
Dept: PHYSICAL THERAPY | Facility: CLINIC | Age: 51
End: 2021-08-26

## 2021-08-26 DIAGNOSIS — R53.83 OTHER FATIGUE: Primary | ICD-10-CM

## 2021-08-26 PROCEDURE — 97112 NEUROMUSCULAR REEDUCATION: CPT | Performed by: PHYSICAL THERAPIST

## 2021-08-26 PROCEDURE — 97110 THERAPEUTIC EXERCISES: CPT | Performed by: PHYSICAL THERAPIST

## 2021-08-26 NOTE — PROGRESS NOTES
Physical Therapy Daily Progress Note  Visit: 12    Jourdna Lebron reports: improving - feels he is gaining strength and will likely be able return to work soon.    Subjective       Objective       See Exercise, Manual, and Modality Logs for complete treatment.       Assessment/Plan     Progressing well with improving UE/LE and  strength and quick recovery between sets.     Plan:    Continue current         Timed:             Therapeutic Exercise:    31    mins  81861;     Therapeutic Activity:     13     mins  [id number];         Timed Treatment:   44   mins   Total Treatment:     44   mins  Jay Pacheco PT, DPT  Physical Therapist

## 2021-08-30 ENCOUNTER — TREATMENT (OUTPATIENT)
Dept: PHYSICAL THERAPY | Facility: CLINIC | Age: 51
End: 2021-08-30

## 2021-08-30 DIAGNOSIS — R53.83 OTHER FATIGUE: Primary | ICD-10-CM

## 2021-08-30 PROCEDURE — 97110 THERAPEUTIC EXERCISES: CPT | Performed by: PHYSICAL THERAPIST

## 2021-08-30 NOTE — PROGRESS NOTES
Physical Therapy Daily Progress Note  Visit: 13    Jourdan Lebron reports: fatigued over weekend after receiving COVID-19 vaccine.  Reports feeling better but still somewhat fatigued today.     Subjective       Objective   See Exercise, Manual, and Modality Logs for complete treatment.       Assessment/Plan     Limited activity progression today per fatigue.          Timed:         Therapeutic Exercise:    40     mins  [id number];         Timed Treatment:   40   mins   Total Treatment:     40   mins  Jay Pacheco, PT, DPT  Physical Therapist

## 2021-09-02 ENCOUNTER — TREATMENT (OUTPATIENT)
Dept: PHYSICAL THERAPY | Facility: CLINIC | Age: 51
End: 2021-09-02

## 2021-09-02 DIAGNOSIS — R53.83 OTHER FATIGUE: Primary | ICD-10-CM

## 2021-09-02 PROCEDURE — 97110 THERAPEUTIC EXERCISES: CPT | Performed by: PHYSICAL THERAPIST

## 2021-09-03 NOTE — PROGRESS NOTES
Physical Therapy Daily Progress Note  Visit: 14    Jourdan Lebron reports: less fatigued than at most recent visit after receiving vaccine.     Subjective       Objective   See Exercise, Manual, and Modality Logs for complete treatment.       Assessment/Plan     UE/LE strength and endurance improving w/o pain or excessive fatigue.  Progressing towards ability to return to work.     Plan:    Continue current         Timed:            Therapeutic Exercise:    40     mins  [id number];         Timed Treatment:   40   mins   Total Treatment:     40   mins  Jay Pacheco, PT, DPT  Physical Therapist

## 2021-09-06 NOTE — PATIENT INSTRUCTIONS
Health Maintenance Due   Topic Date Due   • COLORECTAL CANCER SCREENING  Never done   • COVID-19 Vaccine (1) Never done     Patient to ask Dr. sanchez about antidepressant change/switch.    Ask Dr. Blake about emergency doses of steroid.    Ask Dr. Cedeño about Provigil.    OK to give Back to Work note for 9/9/2021

## 2021-09-07 ENCOUNTER — OFFICE VISIT (OUTPATIENT)
Dept: FAMILY MEDICINE CLINIC | Facility: CLINIC | Age: 51
End: 2021-09-07

## 2021-09-07 ENCOUNTER — TELEPHONE (OUTPATIENT)
Dept: PSYCHIATRY | Facility: CLINIC | Age: 51
End: 2021-09-07

## 2021-09-07 VITALS
SYSTOLIC BLOOD PRESSURE: 143 MMHG | HEART RATE: 71 BPM | WEIGHT: 165 LBS | HEIGHT: 71 IN | DIASTOLIC BLOOD PRESSURE: 86 MMHG | OXYGEN SATURATION: 97 % | BODY MASS INDEX: 23.1 KG/M2 | TEMPERATURE: 98.3 F

## 2021-09-07 DIAGNOSIS — F17.200 TOBACCO DEPENDENCE SYNDROME: ICD-10-CM

## 2021-09-07 DIAGNOSIS — M48.02 CERVICAL STENOSIS OF SPINAL CANAL: ICD-10-CM

## 2021-09-07 DIAGNOSIS — N20.0 CALCULUS OF KIDNEY: ICD-10-CM

## 2021-09-07 DIAGNOSIS — E55.9 VITAMIN D DEFICIENCY: ICD-10-CM

## 2021-09-07 DIAGNOSIS — E78.5 HYPERLIPIDEMIA, UNSPECIFIED HYPERLIPIDEMIA TYPE: ICD-10-CM

## 2021-09-07 DIAGNOSIS — F06.30 MOOD DISORDER DUE TO A GENERAL MEDICAL CONDITION: Primary | ICD-10-CM

## 2021-09-07 DIAGNOSIS — Z12.11 SCREENING FOR COLON CANCER: ICD-10-CM

## 2021-09-07 DIAGNOSIS — Z86.73 HISTORY OF CEREBROVASCULAR ACCIDENT: Primary | ICD-10-CM

## 2021-09-07 DIAGNOSIS — F06.4 ANXIETY DISORDER DUE TO GENERAL MEDICAL CONDITION: ICD-10-CM

## 2021-09-07 DIAGNOSIS — E53.8 VITAMIN B12 DEFICIENCY: ICD-10-CM

## 2021-09-07 DIAGNOSIS — R94.31 ELECTROCARDIOGRAM ABNORMAL: ICD-10-CM

## 2021-09-07 DIAGNOSIS — R68.82 REDUCED LIBIDO: ICD-10-CM

## 2021-09-07 DIAGNOSIS — E27.49 GLUCOCORTICOID DEFICIENCY (HCC): ICD-10-CM

## 2021-09-07 LAB
25(OH)D3 SERPL-MCNC: 31.9 NG/ML
ALBUMIN SERPL-MCNC: 4.4 G/DL (ref 3.5–5.2)
ALBUMIN/GLOB SERPL: 1.8 G/DL
ALP SERPL-CCNC: 72 U/L (ref 39–117)
ALT SERPL W P-5'-P-CCNC: 19 U/L (ref 1–41)
ANION GAP SERPL CALCULATED.3IONS-SCNC: 12.3 MMOL/L (ref 5–15)
AST SERPL-CCNC: 15 U/L (ref 1–40)
BASOPHILS # BLD AUTO: 0.05 10*3/MM3 (ref 0–0.2)
BASOPHILS NFR BLD AUTO: 0.6 % (ref 0–1.5)
BILIRUB SERPL-MCNC: 0.4 MG/DL (ref 0–1.2)
BUN SERPL-MCNC: 10 MG/DL (ref 6–20)
BUN/CREAT SERPL: 11.2 (ref 7–25)
CALCIUM SPEC-SCNC: 9.9 MG/DL (ref 8.6–10.5)
CHLORIDE SERPL-SCNC: 105 MMOL/L (ref 98–107)
CHOLEST SERPL-MCNC: 190 MG/DL (ref 0–200)
CO2 SERPL-SCNC: 25.7 MMOL/L (ref 22–29)
CREAT SERPL-MCNC: 0.89 MG/DL (ref 0.76–1.27)
DEPRECATED RDW RBC AUTO: 48.4 FL (ref 37–54)
EOSINOPHIL # BLD AUTO: 0.18 10*3/MM3 (ref 0–0.4)
EOSINOPHIL NFR BLD AUTO: 2 % (ref 0.3–6.2)
ERYTHROCYTE [DISTWIDTH] IN BLOOD BY AUTOMATED COUNT: 13.5 % (ref 12.3–15.4)
GFR SERPL CREATININE-BSD FRML MDRD: 91 ML/MIN/1.73
GLOBULIN UR ELPH-MCNC: 2.5 GM/DL
GLUCOSE SERPL-MCNC: 88 MG/DL (ref 65–99)
HCT VFR BLD AUTO: 51.6 % (ref 37.5–51)
HDLC SERPL-MCNC: 60 MG/DL (ref 40–60)
HGB BLD-MCNC: 17.4 G/DL (ref 13–17.7)
IMM GRANULOCYTES # BLD AUTO: 0.03 10*3/MM3 (ref 0–0.05)
IMM GRANULOCYTES NFR BLD AUTO: 0.3 % (ref 0–0.5)
LDLC SERPL CALC-MCNC: 114 MG/DL (ref 0–100)
LDLC/HDLC SERPL: 1.87 {RATIO}
LYMPHOCYTES # BLD AUTO: 2.03 10*3/MM3 (ref 0.7–3.1)
LYMPHOCYTES NFR BLD AUTO: 23.1 % (ref 19.6–45.3)
MAGNESIUM SERPL-MCNC: 2.2 MG/DL (ref 1.6–2.6)
MCH RBC QN AUTO: 32.6 PG (ref 26.6–33)
MCHC RBC AUTO-ENTMCNC: 33.7 G/DL (ref 31.5–35.7)
MCV RBC AUTO: 96.6 FL (ref 79–97)
MONOCYTES # BLD AUTO: 0.81 10*3/MM3 (ref 0.1–0.9)
MONOCYTES NFR BLD AUTO: 9.2 % (ref 5–12)
NEUTROPHILS NFR BLD AUTO: 5.7 10*3/MM3 (ref 1.7–7)
NEUTROPHILS NFR BLD AUTO: 64.8 % (ref 42.7–76)
NRBC BLD AUTO-RTO: 0 /100 WBC (ref 0–0.2)
PLATELET # BLD AUTO: 209 10*3/MM3 (ref 140–450)
PMV BLD AUTO: 11.3 FL (ref 6–12)
POTASSIUM SERPL-SCNC: 3.5 MMOL/L (ref 3.5–5.2)
PROT SERPL-MCNC: 6.9 G/DL (ref 6–8.5)
RBC # BLD AUTO: 5.34 10*6/MM3 (ref 4.14–5.8)
SODIUM SERPL-SCNC: 143 MMOL/L (ref 136–145)
TRIGL SERPL-MCNC: 88 MG/DL (ref 0–150)
TSH SERPL DL<=0.05 MIU/L-ACNC: 1.34 UIU/ML (ref 0.27–4.2)
URATE SERPL-MCNC: 4.2 MG/DL (ref 3.4–7)
VIT B12 BLD-MCNC: 1080 PG/ML (ref 211–946)
VLDLC SERPL-MCNC: 16 MG/DL (ref 5–40)
WBC # BLD AUTO: 8.8 10*3/MM3 (ref 3.4–10.8)

## 2021-09-07 PROCEDURE — 82607 VITAMIN B-12: CPT | Performed by: PREVENTIVE MEDICINE

## 2021-09-07 PROCEDURE — 99214 OFFICE O/P EST MOD 30 MIN: CPT | Performed by: PREVENTIVE MEDICINE

## 2021-09-07 PROCEDURE — 82306 VITAMIN D 25 HYDROXY: CPT | Performed by: PREVENTIVE MEDICINE

## 2021-09-07 PROCEDURE — 80061 LIPID PANEL: CPT | Performed by: PREVENTIVE MEDICINE

## 2021-09-07 PROCEDURE — 84550 ASSAY OF BLOOD/URIC ACID: CPT | Performed by: PREVENTIVE MEDICINE

## 2021-09-07 PROCEDURE — 80053 COMPREHEN METABOLIC PANEL: CPT | Performed by: PREVENTIVE MEDICINE

## 2021-09-07 PROCEDURE — 83735 ASSAY OF MAGNESIUM: CPT | Performed by: PREVENTIVE MEDICINE

## 2021-09-07 PROCEDURE — 85025 COMPLETE CBC W/AUTO DIFF WBC: CPT | Performed by: PREVENTIVE MEDICINE

## 2021-09-07 PROCEDURE — 84443 ASSAY THYROID STIM HORMONE: CPT | Performed by: PREVENTIVE MEDICINE

## 2021-09-07 NOTE — PROGRESS NOTES
"Subjective   Jourdan Lebron is a 51 y.o. male presents for   Chief Complaint   Patient presents with   • Follow-up     follow up from [facility] would like to return to work    Patient is still having good days and bad days patient is still trying to increase his stamina.  Patient is lifting weights but has not noticed a change in his muscle mass.  It was discussed to start patient on Provigil and on testosterone but neither of these have been done.  Samira Morrow feels as though patient's problem problems could be endocrine.  Neuropsych exam feels as though he does have underlying depression and anxiety.  Patient is trying to get a second endocrinology opinion.  Patient is desirous to return back to work and that he does not feel as though he will be a danger at the job and does feel as though he is not ready to become disabled.  Patient has had his first Covid shot.  Patient does not feel as though the increase in sertraline did help his mood.  We have advised that he does discuss that with Dr. Faisal gracia    Health Maintenance Due   Topic Date Due   • COLORECTAL CANCER SCREENING  Never done       History of Present Illness     Vitals:    09/07/21 1027 09/07/21 1029   BP: 150/80 143/86   BP Location: Left arm Right arm   Patient Position: Sitting Sitting   Cuff Size: Adult Adult   Pulse: 71    Temp: 98.3 °F (36.8 °C)    TempSrc: Skin    SpO2: 97%    Weight: 74.8 kg (165 lb)    Height: 180.3 cm (71\")      Body mass index is 23.01 kg/m².    Current Outpatient Medications on File Prior to Visit   Medication Sig Dispense Refill   • ALPRAZolam (Xanax) 0.25 MG tablet Take 1 tablet by mouth 3 (Three) Times a Day As Needed for Anxiety. 90 tablet 2   • aspirin 81 MG chewable tablet Chew 81 mg Daily.     • DHEA 10 MG capsule Take 1 tablet p.o. daily. 90 capsule 4   • fludrocortisone 0.1 MG tablet Take 1 tablet twice a day. 60 tablet 6   • fluticasone (Flonase Allergy Relief) 50 MCG/ACT nasal spray 1 spray into the nostril(s) as directed " by provider Daily.     • hydrocortisone (CORTEF) 10 MG tablet Take 1 tablet by mouth 3 (Three) Times a Day With Meals. 270 tablet 2   • ondansetron ODT (ZOFRAN-ODT) 4 MG disintegrating tablet Place 1 tablet on the tongue Every 8 (Eight) Hours As Needed for Nausea. 20 tablet 0   • sertraline (Zoloft) 50 MG tablet Take 1 tablet by mouth Daily. 30 tablet 0     No current facility-administered medications on file prior to visit.       The following portions of the patient's history were reviewed and updated as appropriate: allergies, current medications, past family history, past medical history, past social history, past surgical history and problem list.    Review of Systems   Constitutional: Positive for fatigue.   Neurological: Positive for weakness, light-headedness and memory problem. Negative for seizures.   Psychiatric/Behavioral: Positive for dysphoric mood, sleep disturbance and stress. The patient is nervous/anxious.        Objective   Physical Exam  Vitals reviewed.   Constitutional:       General: He is not in acute distress.     Appearance: Normal appearance. He is well-developed. He is not ill-appearing or toxic-appearing.   HENT:      Head: Normocephalic and atraumatic.      Right Ear: Tympanic membrane, ear canal and external ear normal.      Left Ear: Tympanic membrane, ear canal and external ear normal.      Nose: Nose normal.   Eyes:      Extraocular Movements: Extraocular movements intact.      Conjunctiva/sclera: Conjunctivae normal.      Pupils: Pupils are equal, round, and reactive to light.   Cardiovascular:      Rate and Rhythm: Normal rate and regular rhythm.      Heart sounds: Normal heart sounds.   Pulmonary:      Effort: Pulmonary effort is normal.      Comments: Decreased breath sounds bilaterally  Abdominal:      General: Bowel sounds are normal. There is no distension.      Palpations: Abdomen is soft. There is no mass.      Tenderness: There is no abdominal tenderness.    Musculoskeletal:         General: Normal range of motion.      Cervical back: Neck supple.   Skin:     General: Skin is warm.   Neurological:      General: No focal deficit present.      Mental Status: He is alert and oriented to person, place, and time.   Psychiatric:         Mood and Affect: Mood normal.         Behavior: Behavior normal.       PHQ-9 Total Score:      Assessment/Plan   Diagnoses and all orders for this visit:    1. History of cerebrovascular accident (Primary)  Comments:  Recent evaluation by Dr. [facility] neurologist consideration for Provigil but still not has prescribed.  Orders:  -     CBC Auto Differential  -     Comprehensive Metabolic Panel  -     Magnesium  -     TSH    2. Screening for colon cancer  Comments:  Advised to get colonoscopy.    3. Glucocorticoid deficiency (CMS/HCC)  Comments:  Patient is seeking a second opinion from a different endocrinologist but this will not take place till November.    4. Vitamin B12 deficiency  Comments:  No longer taking vitamin D daily  Orders:  -     Vitamin B12    5. Cervical stenosis of spinal canal  Comments:  Present complaints of neck pain    6. Tobacco dependence syndrome  Comments:  Cessation advised    7. Hyperlipidemia, unspecified hyperlipidemia type  Comments:  Trying to eat less saturated fats  Orders:  -     Lipid Panel    8. Calculus of kidney  Comments:  No recent kidney stone  Orders:  -     Uric Acid    9. Reduced libido  Comments:  Testosterone has not been restarted due to possibly putting at risk for stroke.    10. Electrocardiogram abnormal  Comments:  No admission of chest pain    11. Vitamin D deficiency  Comments:  Not taking vitamin D regularly  Orders:  -     Vitamin D 25 Hydroxy        Patient Instructions     Health Maintenance Due   Topic Date Due   • COLORECTAL CANCER SCREENING  Never done   • COVID-19 Vaccine (1) Never done     Patient to ask Dr. sanchez about antidepressant change/switch.    Ask Dr. Blake about  emergency doses of steroid.    Ask Dr. Cedeño about Provigil.    OK to give Back to Work note for 9/9/2021

## 2021-09-07 NOTE — TELEPHONE ENCOUNTER
Does not think that he is doing any better with the dose increase on Zoloft to 50 mg. He states he is tired all day and feels more depressed .

## 2021-09-07 NOTE — PROGRESS NOTES
Venipuncture Blood Specimen Collection  Venipuncture performed in left by Susanna Blount MA with good hemostasis. Patient tolerated the procedure well without complications.   09/07/21   Susanna Blount MA

## 2021-09-08 ENCOUNTER — TELEPHONE (OUTPATIENT)
Dept: FAMILY MEDICINE CLINIC | Facility: CLINIC | Age: 51
End: 2021-09-08

## 2021-09-08 NOTE — TELEPHONE ENCOUNTER
HUB TO READ    ----- Message from Justa Castano MD sent at 9/8/2021  2:31 PM EDT -----    Bad cholesterol is slightly elevated still-Since can't take statin, watch saturated fats and increase exercise  and consider Zetia which helps to lower cholesterol rbut is not related to statin-let me know.  Vitamin B 12 is still slightly elevated-decrease dose one day/week.

## 2021-09-08 NOTE — PROGRESS NOTES
Bad cholesterol is slightly elevated still-Since can't take statin, watch saturated fats and increase exercise  and consider Zetia which helps to lower cholesterol rbut is not related to statin-let me know.  Vitamin B 12 is still slightly elevated-decrease dose one day/week.

## 2021-09-09 NOTE — TELEPHONE ENCOUNTER
When he was here last time he was thinking about prozac, does he want  To switch from zoloft to prozac ?

## 2021-09-09 NOTE — TELEPHONE ENCOUNTER
Alirocumab 75 MG/ML solution prefilled syringe [[id number]] (Order [id number])    Still has this medication in his refrigerate.  called out on 3/6/21 however patient has never tried yet.   He tried Zetia and made very tired.

## 2021-09-09 NOTE — TELEPHONE ENCOUNTER
Patient may want to consider repatha by injection every 2 weeks since he has had a stroke.  Let me know what he decides.

## 2021-09-09 NOTE — TELEPHONE ENCOUNTER
Hub to read  Patient may want to consider repatha by injection every 2 weeks since he has had a stroke.  Let me know what he decides.

## 2021-09-10 RX ORDER — FLUOXETINE HYDROCHLORIDE 20 MG/1
20 CAPSULE ORAL DAILY
Qty: 30 CAPSULE | Refills: 2 | Status: SHIPPED | OUTPATIENT
Start: 2021-09-10 | End: 2021-10-26

## 2021-10-14 ENCOUNTER — OFFICE VISIT (OUTPATIENT)
Dept: ENDOCRINOLOGY | Facility: CLINIC | Age: 51
End: 2021-10-14

## 2021-10-14 ENCOUNTER — PATIENT MESSAGE (OUTPATIENT)
Dept: ENDOCRINOLOGY | Facility: CLINIC | Age: 51
End: 2021-10-14

## 2021-10-14 VITALS
SYSTOLIC BLOOD PRESSURE: 122 MMHG | OXYGEN SATURATION: 98 % | BODY MASS INDEX: 23.52 KG/M2 | WEIGHT: 168 LBS | HEIGHT: 71 IN | DIASTOLIC BLOOD PRESSURE: 80 MMHG | HEART RATE: 69 BPM | TEMPERATURE: 97.5 F

## 2021-10-14 DIAGNOSIS — E27.49 GLUCOCORTICOID DEFICIENCY (HCC): Primary | ICD-10-CM

## 2021-10-14 DIAGNOSIS — E23.0 HYPOGONADOTROPIC HYPOGONADISM (HCC): ICD-10-CM

## 2021-10-14 PROCEDURE — 99214 OFFICE O/P EST MOD 30 MIN: CPT | Performed by: INTERNAL MEDICINE

## 2021-10-14 RX ORDER — HYDROCORTISONE 10 MG/1
10 TABLET ORAL 3 TIMES DAILY
Qty: 270 TABLET | Refills: 4 | Status: SHIPPED | OUTPATIENT
Start: 2021-10-14 | End: 2021-10-14 | Stop reason: SDUPTHER

## 2021-10-14 RX ORDER — HYDROCORTISONE 10 MG/1
10 TABLET ORAL 3 TIMES DAILY
Qty: 270 TABLET | Refills: 3 | Status: SHIPPED | OUTPATIENT
Start: 2021-10-14 | End: 2021-12-10

## 2021-10-14 NOTE — PROGRESS NOTES
Endocrine Progress Note Outpatient     Patient Care Team:  Justa Castano MD as PCP - General (Family Medicine)  Luz Sepulveda PA-C as Physician Assistant (Neurosurgery)  Gonzalo Cruz MD as Consulting Physician (Surgical Oncology)    Chief Complaint: Follow-up glucocorticoid deficiency: Is accompanied with his wife today.    HPI: 51-year-old male with prior history of a stroke, kidney stones apparently was found to have a left adrenal incidentaloma last year around June 2020.  He subsequently underwent surgery in February 2021 at [facility].  Postop within 2 weeks he developed fatigue and tiredness and lethargy and weight loss was admitted to [facility] last week and was found to have cortisol deficiency with ACTH stimulation test.  He was started on hydrocortisone 10 mg 3 times daily.  His dizziness have improved but he continues to have issues with fatigue and tiredness he also have some tingling in the head he has some difficulty in temperature regulation.  His weight is a stable.  He does have low libido with erectile dysfunction he also have mood swings excessive sweating.  He denies any body odor.  Also have blurred vision.    He is following with a neurologist at [facility] as well.     Past Medical History:   Diagnosis Date   • Brain fog    • Hand tingling    • Hyperlipidemia    • Tiredness        Social History     Socioeconomic History   • Marital status:    Tobacco Use   • Smoking status: Current Some Day Smoker     Packs/day: 2.00     Years: 30.00     Pack years: 60.00     Types: Cigars   • Smokeless tobacco: Never Used   • Tobacco comment: 2 packs= 4 cigars a day   Vaping Use   • Vaping Use: Never used   Substance and Sexual Activity   • Alcohol use: Not Currently     Comment: not drank in months   • Drug use: Never   • Sexual activity: Yes     Partners: Female     Birth control/protection: None       Family History   Problem Relation Age of  Onset   • Arthritis Mother    • Hypertension Mother    • Cancer Father         abdomen   • Arthritis Father    • Hypertension Father    • Cancer Brother         esophageal    • Heart disease Brother        Allergies   Allergen Reactions   • Cephalosporins Anaphylaxis     Throat swelling   • Dye  [Contrast Dye] Hives   • Sulfa Antibiotics Hives   • Zetia [Ezetimibe] Other (See Comments)     Made tired   • Iodinated Diagnostic Agents Unknown - Low Severity   • Statins Myalgia     Myalgia and fatique   • Sulfate Unknown - Low Severity       ROS:   Constitutional:  Admit fatigue, tiredness.    Eyes:  Denies change in visual acuity   HENT:  Denies nasal congestion or sore throat   Respiratory: denies cough, shortness of breath.   Cardiovascular:  denies chest pain, edema   GI:  Denies abdominal pain, admit nausea, no vomiting.   Musculoskeletal:  Denies back pain or joint pain   Integument:  Denies dry skin and rash   Neurologic:  Admit headache, denies focal weakness or sensory changes   Endocrine:   Admit polyuria  Psychiatric:  Denies depression,  admit  or anxiety      Vitals:    10/14/21 0914   BP: 122/80   Pulse: 69   Temp: 97.5 °F (36.4 °C)   SpO2: 98%         Body mass index is 23.43 kg/m².     Physical Exam:  GEN: NAD, conversant  EYES: EOMI, PERRL, no conjunctival erythema  NECK: no thyromegaly, full ROM   CV: RRR, no murmurs/rubs/gallops, no peripheral edema  LUNG: CTAB, no wheezes/rales/ronchi  SKIN: no rashes, no acanthosis  MSK: no deformities, full ROM of all extremities  NEURO: no tremors, DTR normal  PSYCH: AOX3, appropriate mood, affect normal      Results Review:     I reviewed the patient's new clinical results.      Lab Results   Component Value Date    GLUCOSE 88 09/07/2021    BUN 10 09/07/2021    CREATININE 0.89 09/07/2021    EGFRIFNONA 91 09/07/2021    BCR 11.2 09/07/2021    K 3.5 09/07/2021    CO2 25.7 09/07/2021    CALCIUM 9.9 09/07/2021    ALBUMIN 4.40 09/07/2021    LABIL2 1.3 09/25/2018    AST  15 09/07/2021    ALT 19 09/07/2021    CHOL 190 09/07/2021    TRIG 88 09/07/2021     (H) 09/07/2021    HDL 60 09/07/2021     Lab Results   Component Value Date    TSH 1.340 09/07/2021    FREET4 1.32 07/12/2021   Comprehensive Metabolic Panel (05/07/2021 08:32)   Lipid Panel (06/01/2021 08:04)   CBC Auto Differential (06/11/2021 09:23)   Vitamin B12 (06/11/2021 09:23)   Folate (06/11/2021 09:23)   TSH (06/11/2021 09:23)   Comprehensive Metabolic Panel (06/11/2021 09:23)     Renin Direct Assay (03/23/2021 09:16)   Aldosterone (03/23/2021 09:16)   Testosterone, Free, Total (03/23/2021 09:16)   Follicle Stimulating Hormone (03/23/2021 09:16)   Luteinizing Hormone (03/23/2021 09:16)   Testosterone, Free, Total (03/31/2021 07:45)   Prolactin (03/31/2021 07:45)   Sex Horm Binding Globulin (03/31/2021 07:45)   DHEA-Sulfate (03/17/2021 21:36)   SCANNED - IMAGING (04/30/2021)     Vitamin D 25 Hydroxy (09/07/2021 11:09)   CBC & Differential (07/12/2021 07:35)   DHEA-Sulfate (07/12/2021 07:35)   Testosterone, Free, Total (07/12/2021 07:35)   Follicle Stimulating Hormone (07/12/2021 07:35)   Luteinizing Hormone (07/12/2021 07:35)   TSH (07/12/2021 07:35)   Insulin-like Growth Factor (07/12/2021 07:35)   T4, Free (07/12/2021 07:35)   T3, Free (07/12/2021 07:35)       Medication Review: Reviewed.       Current Outpatient Medications:   •  ALPRAZolam (Xanax) 0.25 MG tablet, Take 1 tablet by mouth 3 (Three) Times a Day As Needed for Anxiety., Disp: 90 tablet, Rfl: 2  •  aspirin 81 MG chewable tablet, Chew 81 mg Daily., Disp: , Rfl:   •  DHEA 10 MG capsule, Take 1 tablet p.o. daily., Disp: 90 capsule, Rfl: 4  •  fludrocortisone 0.1 MG tablet, Take 1 tablet twice a day., Disp: 60 tablet, Rfl: 6  •  FLUoxetine (PROzac) 20 MG capsule, Take 1 capsule by mouth Daily., Disp: 30 capsule, Rfl: 2  •  fluticasone (Flonase Allergy Relief) 50 MCG/ACT nasal spray, 1 spray into the nostril(s) as directed by provider Daily., Disp: , Rfl:   •   hydrocortisone (CORTEF) 10 MG tablet, Take 1 tablet by mouth 3 (Three) Times a Day With Meals., Disp: 270 tablet, Rfl: 2  •  ondansetron ODT (ZOFRAN-ODT) 4 MG disintegrating tablet, Place 1 tablet on the tongue Every 8 (Eight) Hours As Needed for Nausea., Disp: 20 tablet, Rfl: 0      Assessment/Plan   Glucocorticoid deficiency: Currently on hydrocortisone 10 mg 3 times daily, he felt like when he switched for a short duration to brand name he noticed a difference so I will change his hydrocortisone to brand-name Cortef 10 mg p.o. 3 times daily and he is currently on Florinef 0.1 mg in the morning and 0.05 mg in the evening along with DHEA 10 mg once a day.  Advised to watch for swelling in the legs and blood pressure and also decrease salt intake.    Excessive fatigue and tiredness: Fair. Work up so far showed  Normal testo, TSH, CBC, CMP.       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    Hypoglycemia: He has noticed some low blood sugars, he is working on his diet, advised to send numbers.  Will follow A1c.                Julia Blake MD FACE.

## 2021-10-26 ENCOUNTER — OFFICE VISIT (OUTPATIENT)
Dept: PSYCHIATRY | Facility: CLINIC | Age: 51
End: 2021-10-26

## 2021-10-26 DIAGNOSIS — F06.4 ANXIETY DISORDER DUE TO GENERAL MEDICAL CONDITION: Chronic | ICD-10-CM

## 2021-10-26 DIAGNOSIS — F06.30 MOOD DISORDER DUE TO A GENERAL MEDICAL CONDITION: Primary | Chronic | ICD-10-CM

## 2021-10-26 PROCEDURE — 99214 OFFICE O/P EST MOD 30 MIN: CPT | Performed by: PSYCHIATRY & NEUROLOGY

## 2021-10-26 RX ORDER — FLUOXETINE HYDROCHLORIDE 40 MG/1
40 CAPSULE ORAL DAILY
Qty: 30 CAPSULE | Refills: 2 | Status: SHIPPED | OUTPATIENT
Start: 2021-10-26 | End: 2021-12-10 | Stop reason: DRUGHIGH

## 2021-10-26 RX ORDER — ALPRAZOLAM 0.25 MG/1
0.25 TABLET ORAL 3 TIMES DAILY PRN
Qty: 90 TABLET | Refills: 2 | Status: SHIPPED | OUTPATIENT
Start: 2021-10-26 | End: 2022-01-14 | Stop reason: SDUPTHER

## 2021-10-26 NOTE — PROGRESS NOTES
Subjective   Jourdan Lebron is a 51 y.o. 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, left middle cerebral artery , the pt is R handed    he had troubles talking , had muscle weakness   Oct 2020 - went back to work light duty , still had issues completing his tasks   Feb 2021- adrenal gland removed and that caused more issues, more medical w/u and ER visits, weight loss   Now on steroid replacement which is also not completely controlled     The pt was started on zoloft - no major changes, it had to be changed to prozac - tolerated well and started feeling less depressed, he is back to work now      The pt c/o decreased concentration, but less irritable   The pt has significant drop of E level after work in the evening      Sleep - poor , not restorative , hydrocortisone makes everything worse,   Main concern is memory and concentration - much worse after adrenalectomy   Denied AVH/SI/HI     The following portions of the patient's history were reviewed and updated as appropriate: allergies, current medications, past family history, past medical history, past social history, past surgical history and problem list.    PAST PSYCHIATRIC HISTORY  Axis I  Affective/Bipolar Disorder, Anxiety/Panic Disorder  Axis II  Denied     PAST OUTPATIENT TREATMENT  Diagnosis treated:  Anxiety, depression   Treatment Type:  Medication Management  Prior Psychiatric Medications:  lexapro - not effective  Buspirone - side effects   Support Groups:  None   Sequelae Of Mental Disorder:  job disruption, emotional distress          Interval History  Some improvement     Side Effects  Denied       Past Medical History:  Past Medical History:   Diagnosis Date   • Brain fog    • Hand tingling    • Hyperlipidemia    • Tiredness        Social  History:  Social History     Socioeconomic History   • Marital status:    Tobacco Use   • Smoking status: Current Some Day Smoker     Packs/day: 2.00     Years: 30.00     Pack years: 60.00     Types: Cigars   • Smokeless tobacco: Never Used   • Tobacco comment: 2 packs= 4 cigars a day   Vaping Use   • Vaping Use: Never used   Substance and Sexual Activity   • Alcohol use: Not Currently     Comment: not drank in months   • Drug use: Never   • Sexual activity: Yes     Partners: Female     Birth control/protection: None       Family History:  Family History   Problem Relation Age of Onset   • Arthritis Mother    • Hypertension Mother    • Cancer Father         abdomen   • Arthritis Father    • Hypertension Father    • Cancer Brother         esophageal    • Heart disease Brother        Past Surgical History:  Past Surgical History:   Procedure Laterality Date   • ADRENALECTOMY     • KIDNEY STONE SURGERY     • THROMBECTOMY         Problem List:  Patient Active Problem List   Diagnosis   • Abnormal electrocardiogram   • Calculus of kidney   • History of cerebrovascular accident   • Family history of malignant neoplasm   • Generalized hyperhidrosis   • Hyperlipidemia   • Seasonal allergies   • Tobacco dependence syndrome   • Cervical radiculopathy   • Cervical stenosis of spinal canal   • Near syncope   • Other fatigue   • History of total adrenalectomy (HCC)   • Arthropathy of cervical facet joint   • Neck pain   • Pain in left arm   • Shoulder pain   • Seasonal allergic rhinitis   • Thoracic back pain   • Vitamin B12 deficiency   • Electrocardiogram abnormal   • Spinal stenosis of cervical region   • Reduced libido   • Glucocorticoid deficiency (HCC)   • Other specified postprocedural states   • Hypogonadotropic hypogonadism (HCC)   • Mood disorder due to a general medical condition   • Anxiety disorder due to general medical condition   • Macrocytosis   • Erythrocytosis       Allergy:   Allergies   Allergen  Reactions   • Cephalosporins Anaphylaxis     Throat swelling   • Dye  [Contrast Dye] Hives   • Sulfa Antibiotics Hives   • Zetia [Ezetimibe] Other (See Comments)     Made tired   • Iodinated Diagnostic Agents Unknown - Low Severity   • Statins Myalgia     Myalgia and fatique   • Sulfate Unknown - Low Severity        Discontinued Medications:  Medications Discontinued During This Encounter   Medication Reason   • ALPRAZolam (Xanax) 0.25 MG tablet Reorder   • FLUoxetine (PROzac) 20 MG capsule        Current Medications:   Current Outpatient Medications   Medication Sig Dispense Refill   • ALPRAZolam (Xanax) 0.25 MG tablet Take 1 tablet by mouth 3 (Three) Times a Day As Needed for Anxiety. 90 tablet 2   • aspirin 81 MG chewable tablet Chew 81 mg Daily.     • Cortef 10 MG tablet Take 1 tablet by mouth 3 (Three) Times a Day. 270 tablet 3   • DHEA 10 MG capsule Take 1 tablet p.o. daily. 90 capsule 4   • fludrocortisone 0.1 MG tablet Take 1 tablet twice a day. 60 tablet 6   • FLUoxetine (PROzac) 40 MG capsule Take 1 capsule by mouth Daily. 30 capsule 2   • fluticasone (Flonase Allergy Relief) 50 MCG/ACT nasal spray 1 spray into the nostril(s) as directed by provider Daily.     • ondansetron ODT (ZOFRAN-ODT) 4 MG disintegrating tablet Place 1 tablet on the tongue Every 8 (Eight) Hours As Needed for Nausea. 20 tablet 0     No current facility-administered medications for this visit.         Psychological ROS: positive for - anxiety, concentration, depression,  irritability and memory difficulties  Negative for AVH/SI/HI, delusions       Physical Exam:   There were no vitals taken for this visit.    Mental Status Exam:   Hygiene:   good  Cooperation:  Cooperative  Eye Contact:  Good  Psychomotor Behavior:  Appropriate  Affect:  Full range  Mood: fluctates  Hopelessness: Denies  Speech:  Normal  Thought Process:  Goal directed and Linear  Thought Content:  Normal and Mood congruent  Suicidal:  None  Homicidal:   None  Hallucinations:  None  Delusion:  None  Memory:  Deficits  Orientation:  Person, Place, Time and Situation  Reliability:  good  Insight:  Good  Judgement:  Good  Impulse Control:  Fair  Physical/Medical Issues:  Yes         MSE from 8/25/2021 reviewed and accepted with changes     PHQ-9 Depression Screening  Little interest or pleasure in doing things? 2   Feeling down, depressed, or hopeless? 2   Trouble falling or staying asleep, or sleeping too much? 1   Feeling tired or having little energy? 2   Poor appetite or overeating? 0   Feeling bad about yourself - or that you are a failure or have let yourself or your family down? 2   Trouble concentrating on things, such as reading the newspaper or watching television? 1   Moving or speaking so slowly that other people could have noticed? Or the opposite - being so fidgety or restless that you have been moving around a lot more than usual? 0   Thoughts that you would be better off dead, or of hurting yourself in some way? 0   PHQ-9 Total Score 10   If you checked off any problems, how difficult have these problems made it for you to do your work, take care of things at home, or get along with other people? Very difficult           Current every day smoker 3-10 mintues spent counseling Has reduced Tobbacos use    I advised Jourdan of the risks of tobacco use.     Lab Results:   Office Visit on 09/07/2021   Component Date Value Ref Range Status   • WBC 09/07/2021 8.80  3.40 - 10.80 10*3/mm3 Final   • RBC 09/07/2021 5.34  4.14 - 5.80 10*6/mm3 Final   • Hemoglobin 09/07/2021 17.4  13.0 - 17.7 g/dL Final   • Hematocrit 09/07/2021 51.6* 37.5 - 51.0 % Final   • MCV 09/07/2021 96.6  79.0 - 97.0 fL Final   • MCH 09/07/2021 32.6  26.6 - 33.0 pg Final   • MCHC 09/07/2021 33.7  31.5 - 35.7 g/dL Final   • RDW 09/07/2021 13.5  12.3 - 15.4 % Final   • RDW-SD 09/07/2021 48.4  37.0 - 54.0 fl Final   • MPV 09/07/2021 11.3  6.0 - 12.0 fL Final   • Platelets 09/07/2021 209  140 - 450  10*3/mm3 Final   • Neutrophil % 09/07/2021 64.8  42.7 - 76.0 % Final   • Lymphocyte % 09/07/2021 23.1  19.6 - 45.3 % Final   • Monocyte % 09/07/2021 9.2  5.0 - 12.0 % Final   • Eosinophil % 09/07/2021 2.0  0.3 - 6.2 % Final   • Basophil % 09/07/2021 0.6  0.0 - 1.5 % Final   • Immature Grans % 09/07/2021 0.3  0.0 - 0.5 % Final   • Neutrophils, Absolute 09/07/2021 5.70  1.70 - 7.00 10*3/mm3 Final   • Lymphocytes, Absolute 09/07/2021 2.03  0.70 - 3.10 10*3/mm3 Final   • Monocytes, Absolute 09/07/2021 0.81  0.10 - 0.90 10*3/mm3 Final   • Eosinophils, Absolute 09/07/2021 0.18  0.00 - 0.40 10*3/mm3 Final   • Basophils, Absolute 09/07/2021 0.05  0.00 - 0.20 10*3/mm3 Final   • Immature Grans, Absolute 09/07/2021 0.03  0.00 - 0.05 10*3/mm3 Final   • nRBC 09/07/2021 0.0  0.0 - 0.2 /100 WBC Final   • Glucose 09/07/2021 88  65 - 99 mg/dL Final   • BUN 09/07/2021 10  6 - 20 mg/dL Final   • Creatinine 09/07/2021 0.89  0.76 - 1.27 mg/dL Final   • Sodium 09/07/2021 143  136 - 145 mmol/L Final   • Potassium 09/07/2021 3.5  3.5 - 5.2 mmol/L Final    Slight hemolysis detected by analyzer. Results may be affected.   • Chloride 09/07/2021 105  98 - 107 mmol/L Final   • CO2 09/07/2021 25.7  22.0 - 29.0 mmol/L Final   • Calcium 09/07/2021 9.9  8.6 - 10.5 mg/dL Final   • Total Protein 09/07/2021 6.9  6.0 - 8.5 g/dL Final   • Albumin 09/07/2021 4.40  3.50 - 5.20 g/dL Final   • ALT (SGPT) 09/07/2021 19  1 - 41 U/L Final   • AST (SGOT) 09/07/2021 15  1 - 40 U/L Final   • Alkaline Phosphatase 09/07/2021 72  39 - 117 U/L Final   • Total Bilirubin 09/07/2021 0.4  0.0 - 1.2 mg/dL Final   • eGFR Non African Amer 09/07/2021 91  >60 mL/min/1.73 Final   • Globulin 09/07/2021 2.5  gm/dL Final   • A/G Ratio 09/07/2021 1.8  g/dL Final   • BUN/Creatinine Ratio 09/07/2021 11.2  7.0 - 25.0 Final   • Anion Gap 09/07/2021 12.3  5.0 - 15.0 mmol/L Final   • Total Cholesterol 09/07/2021 190  0 - 200 mg/dL Final   • Triglycerides 09/07/2021 88  0 - 150 mg/dL  Final   • HDL Cholesterol 09/07/2021 60  40 - 60 mg/dL Final   • LDL Cholesterol  09/07/2021 114* 0 - 100 mg/dL Final   • VLDL Cholesterol 09/07/2021 16  5 - 40 mg/dL Final   • LDL/HDL Ratio 09/07/2021 1.87   Final   • Magnesium 09/07/2021 2.2  1.6 - 2.6 mg/dL Final   • TSH 09/07/2021 1.340  0.270 - 4.200 uIU/mL Final   • Uric Acid 09/07/2021 4.2  3.4 - 7.0 mg/dL Final   • Vitamin B-12 09/07/2021 1,080* 211 - 946 pg/mL Final   • 25 Hydroxy, Vitamin D 09/07/2021 31.9  ng/ml Final       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)    Relevant Medications    FLUoxetine (PROzac) 40 MG capsule    ALPRAZolam (Xanax) 0.25 MG tablet    Anxiety disorder due to general medical condition (Chronic)    Relevant Medications    FLUoxetine (PROzac) 40 MG capsule    ALPRAZolam (Xanax) 0.25 MG tablet      Diagnoses       Codes Comments    Mood disorder due to a general medical condition    -  Primary ICD-10-CM: F06.30  ICD-9-CM: 293.83     Anxiety disorder due to general medical condition     ICD-10-CM: F06.4  ICD-9-CM: 293.84       medical condition : glucocorticoid  deficiency , s/p CVA (Left Middle cerebral artery )     Visit Diagnoses:    ICD-10-CM ICD-9-CM   1. Mood disorder due to a general medical condition  F06.30 293.83   2. Anxiety disorder due to general medical condition  F06.4 293.84       TREATMENT PLAN/GOALS: Continue supportive psychotherapy efforts and medications as indicated. Treatment and medication options discussed during today's visit. Patient ackowledged and verbally consented to continue with current treatment plan and was educated on the importance of compliance with treatment and follow-up appointments.    MEDICATION ISSUES:  INSPECT reviewed as expected - gabapentin and hydrocodone , 9/29/2021 xanax #90    1. Mood d/o 2ry to medical condition (CVA and glucocorticoid deficiency) - the  Pt has very complicated  medical issues, still a lot of uncertainty about the causes , mood - not regulated, did not respond well to SSRI and Buspar, the pt has memory /concentration issues that make him feels more depressed with low self esteem, the pt had neuropsych eval done at [place] [facility] with depression and anxiety      zoloft was d/c - not effective,  Was started on prozac - some improvement , increase to 40 mg ,     The pt will benefit from psychotherapy to validate his emotions, but he started work and not much time left       genesight was offered - still does not want      2. Anxiety d/o 2ry to medical condition -  Cont  Low dose of xanax 0. 25 mg po TID   He has adrenal gland issues , on hydrocortisone that makes him shaky   PHQ scored 10 - moderate  depression     Discussed medication options and treatment plan of prescribed medication as well as the risks, benefits, and side effects including potential falls, possible impaired driving and metabolic adversities among others. Patient is agreeable to call the office with any worsening of symptoms or onset of side effects. Patient is agreeable to call 911 or go to the nearest ER should he/she begin having SI/HI. No medication side effects or related complaints today.     MEDS ORDERED DURING VISIT:  New Medications Ordered This Visit   Medications   • FLUoxetine (PROzac) 40 MG capsule     Sig: Take 1 capsule by mouth Daily.     Dispense:  30 capsule     Refill:  2   • ALPRAZolam (Xanax) 0.25 MG tablet     Sig: Take 1 tablet by mouth 3 (Three) Times a Day As Needed for Anxiety.     Dispense:  90 tablet     Refill:  2       Return in about 3 months (around 1/26/2022).         This document has been electronically signed by Clair Lyman MD  October 26, 2021 08:11 EDT    EMR Dragon transcription disclaimer:  Some of this encounter note is an electronic transcription translation of spoken language to printed text. The electronic translation of spoken language may permit erroneous,  or at times, nonsensical words or phrases to be inadvertently transcribed; Although I have reviewed the note for such errors some may still exist.

## 2021-10-26 NOTE — PATIENT INSTRUCTIONS
Weakness  Weakness is a lack of strength. You may feel weak all over your body (generalized), or you may feel weak in one part of your body (focal). There are many potential causes of weakness. Sometimes, the cause of your weakness may not be known. Some causes of weakness can be serious, so it is important to see your doctor.  Follow these instructions at home:  Activity  · Rest as needed.  · Try to get enough sleep. Most adults need 7-8 hours of sleep each night. Talk to your doctor about how much sleep you need each night.  · Do exercises, such as arm curls and leg raises, for 30 minutes at least 2 days a week or as told by your doctor.  · Think about working with a physical therapist or  to help you get stronger.  General instructions    · Take over-the-counter and prescription medicines only as told by your doctor.  · Eat a healthy, well-balanced diet. This includes:  ? Proteins to build muscles, such as lean meats and fish.  ? Fresh fruits and vegetables.  ? Carbohydrates to boost energy, such as whole grains.  · Drink enough fluid to keep your pee (urine) pale yellow.  · Keep all follow-up visits as told by your doctor. This is important.    Contact a doctor if:  · Your weakness does not get better or it gets worse.  · Your weakness affects your ability to:  ? Think clearly.  ? Do your normal daily activities.  Get help right away if you:  · Have sudden weakness on one side of your face or body.  · Have chest pain.  · Have trouble breathing or shortness of breath.  · Have problems with your vision.  · Have trouble talking or swallowing.  · Have trouble standing or walking.  · Are light-headed.  · Pass out (lose consciousness).  Summary  · Weakness is a lack of strength. You may feel weak all over your body or just in one part of your body.  · There are many potential causes of weakness. Sometimes, the cause of your weakness may not be known.  · Rest as needed, and try to get enough sleep. Most adults  need 7-8 hours of sleep each night.  · Eat a healthy, well-balanced diet.  This information is not intended to replace advice given to you by your health care provider. Make sure you discuss any questions you have with your health care provider.  Document Revised: 07/24/2019 Document Reviewed: 07/24/2019  [employer]vier Patient Education © 2021 [employer]

## 2021-10-29 ENCOUNTER — LAB (OUTPATIENT)
Dept: LAB | Facility: HOSPITAL | Age: 51
End: 2021-10-29

## 2021-10-29 DIAGNOSIS — E27.49 GLUCOCORTICOID DEFICIENCY (HCC): ICD-10-CM

## 2021-10-29 DIAGNOSIS — E23.0 HYPOGONADOTROPIC HYPOGONADISM (HCC): ICD-10-CM

## 2021-10-29 LAB
ALBUMIN SERPL-MCNC: 4.1 G/DL (ref 3.5–5.2)
ALBUMIN/GLOB SERPL: 1.7 G/DL
ALP SERPL-CCNC: 66 U/L (ref 39–117)
ALT SERPL W P-5'-P-CCNC: 12 U/L (ref 1–41)
ANION GAP SERPL CALCULATED.3IONS-SCNC: 8 MMOL/L (ref 5–15)
AST SERPL-CCNC: 11 U/L (ref 1–40)
BASOPHILS # BLD AUTO: 0.05 10*3/MM3 (ref 0–0.2)
BASOPHILS NFR BLD AUTO: 0.7 % (ref 0–1.5)
BILIRUB SERPL-MCNC: 0.3 MG/DL (ref 0–1.2)
BUN SERPL-MCNC: 9 MG/DL (ref 6–20)
BUN/CREAT SERPL: 8.6 (ref 7–25)
CALCIUM SPEC-SCNC: 9.1 MG/DL (ref 8.6–10.5)
CHLORIDE SERPL-SCNC: 105 MMOL/L (ref 98–107)
CO2 SERPL-SCNC: 29 MMOL/L (ref 22–29)
CREAT SERPL-MCNC: 1.05 MG/DL (ref 0.76–1.27)
DEPRECATED RDW RBC AUTO: 48.6 FL (ref 37–54)
EOSINOPHIL # BLD AUTO: 0.23 10*3/MM3 (ref 0–0.4)
EOSINOPHIL NFR BLD AUTO: 3 % (ref 0.3–6.2)
ERYTHROCYTE [DISTWIDTH] IN BLOOD BY AUTOMATED COUNT: 13 % (ref 12.3–15.4)
GFR SERPL CREATININE-BSD FRML MDRD: 75 ML/MIN/1.73
GLOBULIN UR ELPH-MCNC: 2.4 GM/DL
GLUCOSE SERPL-MCNC: 84 MG/DL (ref 65–99)
HBA1C MFR BLD: 5.3 % (ref 3.5–5.6)
HCT VFR BLD AUTO: 53.8 % (ref 37.5–51)
HGB BLD-MCNC: 17.4 G/DL (ref 13–17.7)
IMM GRANULOCYTES # BLD AUTO: 0.02 10*3/MM3 (ref 0–0.05)
IMM GRANULOCYTES NFR BLD AUTO: 0.3 % (ref 0–0.5)
LYMPHOCYTES # BLD AUTO: 1.7 10*3/MM3 (ref 0.7–3.1)
LYMPHOCYTES NFR BLD AUTO: 22.2 % (ref 19.6–45.3)
MCH RBC QN AUTO: 32.6 PG (ref 26.6–33)
MCHC RBC AUTO-ENTMCNC: 32.3 G/DL (ref 31.5–35.7)
MCV RBC AUTO: 100.7 FL (ref 79–97)
MONOCYTES # BLD AUTO: 0.73 10*3/MM3 (ref 0.1–0.9)
MONOCYTES NFR BLD AUTO: 9.5 % (ref 5–12)
NEUTROPHILS NFR BLD AUTO: 4.92 10*3/MM3 (ref 1.7–7)
NEUTROPHILS NFR BLD AUTO: 64.3 % (ref 42.7–76)
NRBC BLD AUTO-RTO: 0 /100 WBC (ref 0–0.2)
PLATELET # BLD AUTO: 222 10*3/MM3 (ref 140–450)
PMV BLD AUTO: 10.4 FL (ref 6–12)
POTASSIUM SERPL-SCNC: 3.6 MMOL/L (ref 3.5–5.2)
PROT SERPL-MCNC: 6.5 G/DL (ref 6–8.5)
RBC # BLD AUTO: 5.34 10*6/MM3 (ref 4.14–5.8)
SODIUM SERPL-SCNC: 142 MMOL/L (ref 136–145)
WBC # BLD AUTO: 7.65 10*3/MM3 (ref 3.4–10.8)

## 2021-10-29 PROCEDURE — 84681 ASSAY OF C-PEPTIDE: CPT

## 2021-10-29 PROCEDURE — 83036 HEMOGLOBIN GLYCOSYLATED A1C: CPT

## 2021-10-29 PROCEDURE — 85025 COMPLETE CBC W/AUTO DIFF WBC: CPT

## 2021-10-29 PROCEDURE — 84403 ASSAY OF TOTAL TESTOSTERONE: CPT

## 2021-10-29 PROCEDURE — 80053 COMPREHEN METABOLIC PANEL: CPT

## 2021-10-29 PROCEDURE — 36415 COLL VENOUS BLD VENIPUNCTURE: CPT

## 2021-10-29 PROCEDURE — 84402 ASSAY OF FREE TESTOSTERONE: CPT

## 2021-10-30 LAB — C PEPTIDE SERPL-MCNC: 2 NG/ML (ref 1.1–4.4)

## 2021-10-31 LAB
TESTOST FREE SERPL-MCNC: 7 PG/ML (ref 6.8–21.5)
TESTOST SERPL-MCNC: 671 NG/DL (ref 264–916)

## 2021-11-03 DIAGNOSIS — D75.1 ERYTHROCYTOSIS: Primary | ICD-10-CM

## 2021-11-11 NOTE — PROGRESS NOTES
"                     HEMATOLOGY ONCOLOGY OUTPATIENT CONSULTATION       Patient name: Jourdan Lebron  : 1970  MRN: [id number]  Primary Care Physician: Justa Castano MD  Referring Physician: Julia Blake MD  Reason For Consult:     No chief complaint on file.    HPI:   History of Present Illness:  Jourdan Lebron is 51 y.o. male who presented to our office on 2021 for consultation regarding elevated hematocrit  On 2021 Mr. Lebron was referred for the investigation of macrocytosis and elevated hematocrit.  He gave a history of a stroke in .  He also describes a long history of anxiety and \"panic attacks\".  Finally he had undergone an adrenalectomy, apparently because of an adrenal adenoma.  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  In spite of that he felt poorly, mainly because of fatigueAnd had several investigations.  For a long time, at least since , he had been noted to have an elevated MCV and MCH.  A clear cause for this had not been identified and generally speaking he was asymptomatic at that time.  In , September and 2021 his blood counts had reported a hematocrit of 51.3, 51.6 and 53.8% respectively.  This was in the setting of a normal high hemoglobin.  He gave a history of prolonged and intense, at times of up to 3 packs of cigarettes per day, cigarette smoking.  Close to the time of the initial visit, he was smoking only cigars but did admit to inhaling the smoke.  He, as well, admitted to dyspnea with some exertion.    Subjective:  • 2021.  Patient presents for initial consultation .  He reports not feeling perfectly well.  For the most part he is fatigued especially at the end of the day.  He continues to work and is able to perform all of his duties.  He has not been eating as well as his appetite is not as good.  However, whereas around the time of the previously described stroke he had been " losing weight, recently he has been stable.  He denies headaches or seizures.  He has not had nausea or vomiting.  He has not experienced oral pain, dysphagia or hematemesis.  He denies as well persistent cough or hemoptysis.  No abdominal pain, diarrhea, melena or hematochezia and no dysuria.  He does have mild peripheral edema.    The following portions of the patient's history were reviewed and updated as appropriate: allergies, current medications, past family history, past medical history, past social history, past surgical history and problem list.    Past Medical History:   Diagnosis Date   • Brain fog    • Hand tingling    • Hyperlipidemia    • Tiredness      Past Surgical History:   Procedure Laterality Date   • ADRENALECTOMY     • KIDNEY STONE SURGERY     • THROMBECTOMY         Current Outpatient Medications:   •  ALPRAZolam (Xanax) 0.25 MG tablet, Take 1 tablet by mouth 3 (Three) Times a Day As Needed for Anxiety., Disp: 90 tablet, Rfl: 2  •  aspirin 81 MG chewable tablet, Chew 81 mg Daily., Disp: , Rfl:   •  Cortef 10 MG tablet, Take 1 tablet by mouth 3 (Three) Times a Day., Disp: 270 tablet, Rfl: 3  •  DHEA 10 MG capsule, Take 1 tablet p.o. daily., Disp: 90 capsule, Rfl: 4  •  fludrocortisone 0.1 MG tablet, Take 1 tablet twice a day., Disp: 60 tablet, Rfl: 6  •  FLUoxetine (PROzac) 40 MG capsule, Take 1 capsule by mouth Daily., Disp: 30 capsule, Rfl: 2  •  fluticasone (Flonase Allergy Relief) 50 MCG/ACT nasal spray, 1 spray into the nostril(s) as directed by provider Daily., Disp: , Rfl:   •  ondansetron ODT (ZOFRAN-ODT) 4 MG disintegrating tablet, Place 1 tablet on the tongue Every 8 (Eight) Hours As Needed for Nausea., Disp: 20 tablet, Rfl: 0    Allergies   Allergen Reactions   • Cephalosporins Anaphylaxis     Throat swelling   • Dye  [Contrast Dye] Hives   • Sulfa Antibiotics Hives   • Zetia [Ezetimibe] Other (See Comments)     Made tired   • Iodinated Diagnostic Agents Unknown - Low Severity   •  Statins Myalgia     Myalgia and fatique   • Sulfate Unknown - Low Severity     Family History   Problem Relation Age of Onset   • Arthritis Mother    • Hypertension Mother    • Cancer Father         abdomen   • Arthritis Father    • Hypertension Father    • Cancer Brother         esophageal    • Heart disease Brother        Cancer-related family history includes Cancer in his brother and father.    Social History     Tobacco Use   • Smoking status: Current Some Day Smoker     Packs/day: 2.00     Years: 30.00     Pack years: 60.00     Types: Cigars   • Smokeless tobacco: Never Used   • Tobacco comment: 2 packs= 4 cigars a day   Vaping Use   • Vaping Use: Never used   Substance Use Topics   • Alcohol use: Not Currently     Comment: not drank in months   • Drug use: Never     Social History     Social History Narrative   • Not on file      ROS:     Review of Systems   Constitutional: Positive for fatigue. Negative for activity change, appetite change, chills, diaphoresis, fever and unexpected weight change.   HENT: Negative for congestion, dental problem, drooling, ear discharge, ear pain, facial swelling, hearing loss, mouth sores, nosebleeds, postnasal drip, rhinorrhea, sinus pressure, sinus pain, sneezing, sore throat, tinnitus, trouble swallowing and voice change.    Eyes: Negative for photophobia, pain, discharge, redness, itching and visual disturbance.   Respiratory: Negative for apnea, cough, choking, chest tightness, shortness of breath, wheezing and stridor.    Cardiovascular: Negative for chest pain, palpitations and leg swelling.   Gastrointestinal: Negative for abdominal distention, abdominal pain, anal bleeding, blood in stool, constipation, diarrhea, nausea, rectal pain and vomiting.   Endocrine: Negative for cold intolerance, heat intolerance, polydipsia and polyuria.   Genitourinary: Negative for decreased urine volume, difficulty urinating, dysuria, flank pain, frequency, genital sores, hematuria and  urgency.   Musculoskeletal: Negative for arthralgias, back pain, gait problem, joint swelling, myalgias, neck pain and neck stiffness.   Skin: Negative for color change, pallor and rash.   Neurological: Negative for dizziness, tremors, seizures, syncope, facial asymmetry, speech difficulty, weakness, light-headedness, numbness and headaches.   Hematological: Negative for adenopathy. Does not bruise/bleed easily.   Psychiatric/Behavioral: Negative for agitation, behavioral problems, confusion, decreased concentration, hallucinations, self-injury, sleep disturbance and suicidal ideas. The patient is nervous/anxious.      Objective:    There were no vitals filed for this visit.  There is no height or weight on file to calculate BMI.  ECOG  (0) Fully active, able to carry on all predisease performance without restriction    Physical Exam:     Physical Exam  Constitutional:       General: He is not in acute distress.     Appearance: Normal appearance. He is not ill-appearing, toxic-appearing or diaphoretic.      Comments: Slender man.  Appears older than the stated age.  No acute distress.  He is conversant and well-oriented.   HENT:      Head: Normocephalic and atraumatic.      Right Ear: External ear normal. There is no impacted cerumen.      Left Ear: External ear normal. There is no impacted cerumen.      Nose: Nose normal. No congestion or rhinorrhea.      Mouth/Throat:      Mouth: Mucous membranes are moist.      Pharynx: Oropharynx is clear. No oropharyngeal exudate or posterior oropharyngeal erythema.      Comments: Oral cavity reveals poor dentition and poor dental hygiene.  No mucosal ulcerations are present.  Eyes:      General: No scleral icterus.        Right eye: No discharge.         Left eye: No discharge.      Conjunctiva/sclera: Conjunctivae normal.      Pupils: Pupils are equal, round, and reactive to light.   Neck:      Vascular: No carotid bruit.   Cardiovascular:      Rate and Rhythm: Normal rate  and regular rhythm.      Pulses: Normal pulses.      Heart sounds: Normal heart sounds. No murmur heard.  No friction rub. No gallop.    Pulmonary:      Effort: No respiratory distress.      Breath sounds: No stridor. No wheezing, rhonchi or rales.      Comments: The breath sounds are significantly diminished, particularly toward the basis.  They are clear however.  Chest:      Chest wall: No tenderness.   Abdominal:      General: Abdomen is flat. Bowel sounds are normal. There is no distension.      Palpations: Abdomen is soft. There is no mass.      Tenderness: There is no abdominal tenderness. There is no right CVA tenderness, left CVA tenderness, guarding or rebound.   Musculoskeletal:         General: No swelling, tenderness, deformity or signs of injury.      Cervical back: No rigidity or tenderness.      Right lower leg: No edema.      Left lower leg: No edema.      Comments: There is clubbing of the fingers in both hands   Lymphadenopathy:      Cervical: No cervical adenopathy.   Skin:     General: Skin is warm and dry.      Coloration: Skin is not jaundiced or pale.      Findings: No bruising, erythema or rash.   Neurological:      General: No focal deficit present.      Mental Status: He is alert and oriented to person, place, and time.      Cranial Nerves: No cranial nerve deficit.      Motor: No weakness.      Coordination: Coordination normal.      Gait: Gait normal.   Psychiatric:         Mood and Affect: Mood normal.         Behavior: Behavior normal.         Thought Content: Thought content normal.         Judgment: Judgment normal.       Lab Results - Last 18 Months   Lab Units 10/29/21  0739 09/07/21  1109 07/16/21  1217   WBC 10*3/mm3 7.65 8.80 8.30   HEMOGLOBIN g/dL 17.4 17.4 16.8   HEMATOCRIT % 53.8* 51.6* 48.8   PLATELETS 10*3/mm3 222 209 226   MCV fL 100.7* 96.6 98.0*     Lab Results - Last 18 Months   Lab Units 10/29/21  0739 09/07/21  1109 07/16/21  1217   SODIUM mmol/L 142 143 140    POTASSIUM mmol/L 3.6 3.5 4.4   CHLORIDE mmol/L 105 105 104   CO2 mmol/L 29.0 25.7 27.0   BUN mg/dL 9 10 11   CREATININE mg/dL 1.05 0.89 1.00   CALCIUM mg/dL 9.1 9.9 9.0   BILIRUBIN mg/dL 0.3 0.4 0.3   ALK PHOS U/L 66 72 63   ALT (SGPT) U/L 12 19 13   AST (SGOT) U/L 11 15 13   GLUCOSE mg/dL 84 88 80     Lab Results   Component Value Date    GLUCOSE 84 10/29/2021    BUN 9 10/29/2021    CREATININE 1.05 10/29/2021    EGFRIFNONA 75 10/29/2021    BCR 8.6 10/29/2021    K 3.6 10/29/2021    CO2 29.0 10/29/2021    CALCIUM 9.1 10/29/2021    ALBUMIN 4.10 10/29/2021    LABIL2 1.3 09/25/2018    AST 11 10/29/2021    ALT 12 10/29/2021       Lab Results   Component Value Date    FOLATE 7.32 06/11/2021     Lab Results   Component Value Date    ZGLNCDDY05 1,080 (H) 09/07/2021     No results found for: SPEP, UPEP  Uric Acid   Date Value Ref Range Status   09/07/2021 4.2 3.4 - 7.0 mg/dL Final     Lab Results   Component Value Date    SEDRATE 11 04/16/2021     Lab Results   Component Value Date    PSA 0.163 04/06/2021     Assessment/Plan     Assessment:  1. I have reviewed all the medical records, including medical notes, laboratory exams and imaging studies.  2.  There is no erythrocytosis on today's blood counts and interestingly the MCV and MCH are both within normal limits.  3.  Mr. Linares complains of fatigue and weakness very likely are multifactorial.  I discussed with him the consequences of his stroke but, especially, the consequences of the very intense and prolonged cigarette smoking that he has had.  The clubbing of the fingers may be related to chronic lung disease.  I do believe that his intense smoking that leads to chronic carbon monoxide intoxication as well as his likely chronic lung disease are the reasons for his symptoms.  The previous stroke, the hypoadrenal is some and his anxiety probably participate as well in the process.  Perhaps the best intervention at this point would be to stop smoking.  I discussed this  with him at length and spent more than 10minutes explaining the consequences of smoking.  Discussed smoking cessation aids as well.  He is interested in stopping smoking and seemed ready to stop.  I will reevaluate the next time he returns.  4.  He is to see me in a few weeks with the results of laboratory exams including folic acid.  He has excess vitamin B12.  He takes a supplement, which I suggested, he could discontinue.  I discussed as well the use of multivitamins as being unnecessary.    Plan:  1. As above      Electronically signed by William Harp RN, 11/11/21, 9:15 AM LAVELL Mueller MD on November 12, 2021 at 12:54 PM

## 2021-11-12 ENCOUNTER — CONSULT (OUTPATIENT)
Dept: ONCOLOGY | Facility: CLINIC | Age: 51
End: 2021-11-12

## 2021-11-12 ENCOUNTER — LAB (OUTPATIENT)
Dept: LAB | Facility: HOSPITAL | Age: 51
End: 2021-11-12

## 2021-11-12 VITALS
WEIGHT: 177.2 LBS | BODY MASS INDEX: 24.81 KG/M2 | HEART RATE: 56 BPM | TEMPERATURE: 97.3 F | DIASTOLIC BLOOD PRESSURE: 84 MMHG | HEIGHT: 71 IN | OXYGEN SATURATION: 98 % | SYSTOLIC BLOOD PRESSURE: 153 MMHG

## 2021-11-12 DIAGNOSIS — D75.1 ERYTHROCYTOSIS: Primary | ICD-10-CM

## 2021-11-12 DIAGNOSIS — D75.1 ERYTHROCYTOSIS: ICD-10-CM

## 2021-11-12 LAB
ALBUMIN SERPL-MCNC: 3.7 G/DL (ref 3.5–5.2)
ALBUMIN/GLOB SERPL: 1.7 G/DL
ALP SERPL-CCNC: 68 U/L (ref 39–117)
ALT SERPL W P-5'-P-CCNC: 12 U/L (ref 1–41)
ANION GAP SERPL CALCULATED.3IONS-SCNC: 10 MMOL/L (ref 5–15)
AST SERPL-CCNC: 12 U/L (ref 1–40)
BASOPHILS # BLD AUTO: 0.11 10*3/MM3 (ref 0–0.2)
BASOPHILS NFR BLD AUTO: 1.1 % (ref 0–1.5)
BILIRUB SERPL-MCNC: 0.3 MG/DL (ref 0–1.2)
BUN SERPL-MCNC: 11 MG/DL (ref 6–20)
BUN/CREAT SERPL: 13.1 (ref 7–25)
CALCIUM SPEC-SCNC: 8.7 MG/DL (ref 8.6–10.5)
CHLORIDE SERPL-SCNC: 104 MMOL/L (ref 98–107)
CO2 SERPL-SCNC: 29 MMOL/L (ref 22–29)
CREAT SERPL-MCNC: 0.84 MG/DL (ref 0.76–1.27)
DEPRECATED RDW RBC AUTO: 53.3 FL (ref 37–54)
EOSINOPHIL # BLD AUTO: 0.31 10*3/MM3 (ref 0–0.4)
EOSINOPHIL NFR BLD AUTO: 3.1 % (ref 0.3–6.2)
ERYTHROCYTE [DISTWIDTH] IN BLOOD BY AUTOMATED COUNT: 15.4 % (ref 12.3–15.4)
FOLATE SERPL-MCNC: 4.11 NG/ML (ref 4.78–24.2)
GFR SERPL CREATININE-BSD FRML MDRD: 97 ML/MIN/1.73
GLOBULIN UR ELPH-MCNC: 2.2 GM/DL
GLUCOSE SERPL-MCNC: 47 MG/DL (ref 65–99)
HCT VFR BLD AUTO: 48.6 % (ref 37.5–51)
HGB BLD-MCNC: 17 G/DL (ref 13–17.7)
LYMPHOCYTES # BLD AUTO: 3.18 10*3/MM3 (ref 0.7–3.1)
LYMPHOCYTES NFR BLD AUTO: 32.1 % (ref 19.6–45.3)
MCH RBC QN AUTO: 33.7 PG (ref 26.6–33)
MCHC RBC AUTO-ENTMCNC: 35 G/DL (ref 31.5–35.7)
MCV RBC AUTO: 96.4 FL (ref 79–97)
MONOCYTES # BLD AUTO: 0.9 10*3/MM3 (ref 0.1–0.9)
MONOCYTES NFR BLD AUTO: 9.1 % (ref 5–12)
NEUTROPHILS NFR BLD AUTO: 5.42 10*3/MM3 (ref 1.7–7)
NEUTROPHILS NFR BLD AUTO: 54.6 % (ref 42.7–76)
PLATELET # BLD AUTO: 191 10*3/MM3 (ref 140–450)
PMV BLD AUTO: 10 FL (ref 6–12)
POTASSIUM SERPL-SCNC: 3.3 MMOL/L (ref 3.5–5.2)
PROT SERPL-MCNC: 5.9 G/DL (ref 6–8.5)
RBC # BLD AUTO: 5.04 10*6/MM3 (ref 4.14–5.8)
SODIUM SERPL-SCNC: 143 MMOL/L (ref 136–145)
WBC # BLD AUTO: 9.92 10*3/MM3 (ref 3.4–10.8)

## 2021-11-12 PROCEDURE — 85025 COMPLETE CBC W/AUTO DIFF WBC: CPT

## 2021-11-12 PROCEDURE — 99204 OFFICE O/P NEW MOD 45 MIN: CPT | Performed by: INTERNAL MEDICINE

## 2021-11-12 PROCEDURE — 82746 ASSAY OF FOLIC ACID SERUM: CPT | Performed by: INTERNAL MEDICINE

## 2021-11-12 PROCEDURE — 80053 COMPREHEN METABOLIC PANEL: CPT | Performed by: INTERNAL MEDICINE

## 2021-11-12 PROCEDURE — 36415 COLL VENOUS BLD VENIPUNCTURE: CPT | Performed by: INTERNAL MEDICINE

## 2021-11-12 NOTE — PROGRESS NOTES
Blood sugar and potassium both low-make sure followup with hematology poncology and if has no potassium at home can send some for a week-let me know

## 2021-11-13 NOTE — PROGRESS NOTES
Potassium wasslightly low-if doesn't have can send small amount.  Blood sugar was low-when is he seeing endocrinology?

## 2021-11-14 ENCOUNTER — TELEPHONE (OUTPATIENT)
Dept: FAMILY MEDICINE CLINIC | Facility: CLINIC | Age: 51
End: 2021-11-14

## 2021-11-14 NOTE — TELEPHONE ENCOUNTER
No, he is getting second opinion sometime thuis week from [facility] in [place]-just don't know when

## 2021-11-16 NOTE — TELEPHONE ENCOUNTER
11/19/21   Anabela Crenshaw MD   Endocrinology   NPI: [id number]   [address] [facility], [place] [id number]

## 2021-12-03 ENCOUNTER — LAB (OUTPATIENT)
Dept: LAB | Facility: HOSPITAL | Age: 51
End: 2021-12-03

## 2021-12-03 ENCOUNTER — TRANSCRIBE ORDERS (OUTPATIENT)
Dept: ADMINISTRATIVE | Facility: HOSPITAL | Age: 51
End: 2021-12-03

## 2021-12-03 DIAGNOSIS — E89.6 HISTORY OF TOTAL ADRENALECTOMY (HCC): ICD-10-CM

## 2021-12-03 DIAGNOSIS — E89.6 HISTORY OF TOTAL ADRENALECTOMY (HCC): Primary | ICD-10-CM

## 2021-12-03 LAB
ALBUMIN SERPL-MCNC: 4.2 G/DL (ref 3.5–5.2)
ALBUMIN/GLOB SERPL: 1.6 G/DL
ALP SERPL-CCNC: 71 U/L (ref 39–117)
ALT SERPL W P-5'-P-CCNC: 29 U/L (ref 1–41)
ANION GAP SERPL CALCULATED.3IONS-SCNC: 9.2 MMOL/L (ref 5–15)
AST SERPL-CCNC: 22 U/L (ref 1–40)
BILIRUB SERPL-MCNC: 0.3 MG/DL (ref 0–1.2)
BUN SERPL-MCNC: 11 MG/DL (ref 6–20)
BUN/CREAT SERPL: 11.3 (ref 7–25)
CALCIUM SPEC-SCNC: 9.5 MG/DL (ref 8.6–10.5)
CHLORIDE SERPL-SCNC: 100 MMOL/L (ref 98–107)
CO2 SERPL-SCNC: 27.8 MMOL/L (ref 22–29)
CREAT SERPL-MCNC: 0.97 MG/DL (ref 0.76–1.27)
GFR SERPL CREATININE-BSD FRML MDRD: 82 ML/MIN/1.73
GLOBULIN UR ELPH-MCNC: 2.7 GM/DL
GLUCOSE SERPL-MCNC: 72 MG/DL (ref 65–99)
POTASSIUM SERPL-SCNC: 4.4 MMOL/L (ref 3.5–5.2)
PROT SERPL-MCNC: 6.9 G/DL (ref 6–8.5)
SODIUM SERPL-SCNC: 137 MMOL/L (ref 136–145)

## 2021-12-03 PROCEDURE — 36415 COLL VENOUS BLD VENIPUNCTURE: CPT

## 2021-12-03 PROCEDURE — 80053 COMPREHEN METABOLIC PANEL: CPT

## 2021-12-10 ENCOUNTER — OFFICE VISIT (OUTPATIENT)
Dept: FAMILY MEDICINE CLINIC | Facility: CLINIC | Age: 51
End: 2021-12-10

## 2021-12-10 VITALS
TEMPERATURE: 98.2 F | DIASTOLIC BLOOD PRESSURE: 62 MMHG | HEART RATE: 71 BPM | WEIGHT: 172.2 LBS | SYSTOLIC BLOOD PRESSURE: 120 MMHG | HEIGHT: 71 IN | BODY MASS INDEX: 24.11 KG/M2 | OXYGEN SATURATION: 96 %

## 2021-12-10 DIAGNOSIS — Z86.73 HISTORY OF CEREBROVASCULAR ACCIDENT: ICD-10-CM

## 2021-12-10 DIAGNOSIS — F17.200 TOBACCO DEPENDENCE SYNDROME: ICD-10-CM

## 2021-12-10 DIAGNOSIS — E89.6: ICD-10-CM

## 2021-12-10 DIAGNOSIS — R68.82 REDUCED LIBIDO: ICD-10-CM

## 2021-12-10 DIAGNOSIS — Z12.11 SCREENING FOR COLON CANCER: ICD-10-CM

## 2021-12-10 DIAGNOSIS — F06.30 MOOD DISORDER DUE TO A GENERAL MEDICAL CONDITION: Chronic | ICD-10-CM

## 2021-12-10 DIAGNOSIS — R55 NEAR SYNCOPE: ICD-10-CM

## 2021-12-10 DIAGNOSIS — E27.49 GLUCOCORTICOID DEFICIENCY (HCC): Primary | ICD-10-CM

## 2021-12-10 DIAGNOSIS — E53.8 VITAMIN B12 DEFICIENCY: ICD-10-CM

## 2021-12-10 DIAGNOSIS — E78.5 HYPERLIPIDEMIA, UNSPECIFIED HYPERLIPIDEMIA TYPE: ICD-10-CM

## 2021-12-10 DIAGNOSIS — R53.83 OTHER FATIGUE: ICD-10-CM

## 2021-12-10 DIAGNOSIS — N20.0 CALCULUS OF KIDNEY: ICD-10-CM

## 2021-12-10 PROCEDURE — 99214 OFFICE O/P EST MOD 30 MIN: CPT | Performed by: PREVENTIVE MEDICINE

## 2021-12-10 RX ORDER — ALIROCUMAB 75 MG/ML
75 INJECTION, SOLUTION SUBCUTANEOUS
Qty: 2.24 ML | Refills: 4 | Status: SHIPPED | OUTPATIENT
Start: 2021-12-10 | End: 2022-05-16

## 2021-12-10 RX ORDER — HYDROCORTISONE 10 MG/1
TABLET ORAL
Qty: 270 TABLET | Refills: 3 | Status: SHIPPED | OUTPATIENT
Start: 2021-12-10 | End: 2021-12-10 | Stop reason: SDUPTHER

## 2021-12-10 RX ORDER — FLUOXETINE HYDROCHLORIDE 20 MG/1
20 CAPSULE ORAL DAILY
COMMUNITY
Start: 2021-12-06 | End: 2022-01-14 | Stop reason: SDUPTHER

## 2021-12-10 NOTE — PATIENT INSTRUCTIONS
Health Maintenance Due   Topic Date Due   • COLORECTAL CANCER SCREENING  Never done   • INFLUENZA VACCINE  08/01/2021     Patient to callneurology and see when they want to see him again.

## 2021-12-10 NOTE — PROGRESS NOTES
"Subjective   Jourdan Lebron is a 51 y.o. male presents for   Chief Complaint   Patient presents with   • Hypertension     f/u   Patient has been able to stay at work still having good days and bad days.  Did have some problems with blood pressure however blood pressure today has been under great control pulse was normal at 71 and patient has had no more syncopes.  Still has some days of fatigue especially around 2 or 3:00 in the afternoon.  Patient did have some issues with mood and we did increase his Prozac to 40 but he felt that that was making him too angry so he has dropped that back to 20 this seems to be controlling things as well.  Patient has had the Covid shots but has not had the booster and is afraid to do so we advised that he discuss that with his endocrinologist.    Health Maintenance Due   Topic Date Due   • COLORECTAL CANCER SCREENING  Never done   • INFLUENZA VACCINE  08/01/2021       History of Present Illness     Vitals:    12/10/21 1535 12/10/21 1537 12/10/21 1637   BP: 146/76 122/84 120/62   BP Location: Right arm Left arm Right leg   Patient Position: Sitting Sitting Sitting   Cuff Size: Adult Adult Adult   Pulse: 93  71   Temp: 98.2 °F (36.8 °C)     TempSrc: Temporal     SpO2: 96%     Weight: 78.1 kg (172 lb 3.2 oz)     Height: 180.3 cm (71\")       Body mass index is 24.02 kg/m².    Current Outpatient Medications on File Prior to Visit   Medication Sig Dispense Refill   • ALPRAZolam (Xanax) 0.25 MG tablet Take 1 tablet by mouth 3 (Three) Times a Day As Needed for Anxiety. 90 tablet 2   • aspirin 81 MG chewable tablet Chew 81 mg Daily.     • DHEA 10 MG capsule Take 1 tablet p.o. daily. 90 capsule 4   • fludrocortisone 0.1 MG tablet Take 1 tablet twice a day. 60 tablet 6   • FLUoxetine (PROzac) 20 MG capsule Take 20 mg by mouth Daily.     • fluticasone (Flonase Allergy Relief) 50 MCG/ACT nasal spray 1 spray into the nostril(s) as directed by provider Daily.     • hydrocortisone sodium succinate " (Solu-CORTEF) 100 MG injection 100 mg as needed for adrenal crises.     • ondansetron ODT (ZOFRAN-ODT) 4 MG disintegrating tablet Place 1 tablet on the tongue Every 8 (Eight) Hours As Needed for Nausea. 20 tablet 0   • [DISCONTINUED] Cortef 10 MG tablet Take 1 tablet by mouth 3 (Three) Times a Day. 270 tablet 3   • [DISCONTINUED] FLUoxetine (PROzac) 40 MG capsule Take 1 capsule by mouth Daily. 30 capsule 2     No current facility-administered medications on file prior to visit.       The following portions of the patient's history were reviewed and updated as appropriate: allergies, current medications, past family history, past medical history, past social history, past surgical history and problem list.    Review of Systems   Constitutional: Positive for fatigue.   Genitourinary: Positive for erectile dysfunction.   Neurological: Positive for weakness and headache.   Psychiatric/Behavioral: Positive for depressed mood.       Objective   Physical Exam  Vitals reviewed.   Constitutional:       General: He is not in acute distress.     Appearance: Normal appearance. He is well-developed. He is not ill-appearing or toxic-appearing.   HENT:      Head: Normocephalic and atraumatic.      Right Ear: Tympanic membrane, ear canal and external ear normal.      Left Ear: Tympanic membrane, ear canal and external ear normal.      Nose: Nose normal.   Eyes:      Extraocular Movements: Extraocular movements intact.      Conjunctiva/sclera: Conjunctivae normal.      Pupils: Pupils are equal, round, and reactive to light.   Cardiovascular:      Rate and Rhythm: Normal rate and regular rhythm.      Heart sounds: Normal heart sounds.   Pulmonary:      Effort: Pulmonary effort is normal.      Comments: Decreased breath sounds bilaterally  Abdominal:      General: Bowel sounds are normal. There is no distension.      Palpations: Abdomen is soft. There is no mass.      Tenderness: There is no abdominal tenderness.   Musculoskeletal:          General: Normal range of motion.      Cervical back: Neck supple.   Skin:     General: Skin is warm.   Neurological:      General: No focal deficit present.      Mental Status: He is alert and oriented to person, place, and time.   Psychiatric:         Mood and Affect: Mood normal.         Behavior: Behavior normal.       PHQ-9 Total Score:      Assessment/Plan   Diagnoses and all orders for this visit:    1. Glucocorticoid deficiency (HCC) (Primary)  Comments:  Patient is taking 20 in the morning 10 at lunchtime and 5 at night and this does seem to be helping to improve his stamina.    2. Screening for colon cancer  Comments:  Will send in papers    3. Vitamin B12 deficiency  Comments:  Takes daily.    4. Tobacco dependence syndrome  Comments:  1 cigar/week cessation advised.    5. Hyperlipidemia, unspecified hyperlipidemia type  Comments:  Trying to eat less sat fats  Orders:  -     Lipid Panel; Future  -     Comprehensive Metabolic Panel; Future    6. History of cerebrovascular accident  Comments:  Advise follow-up with neurology at their request patient will call    7. Calculus of kidney  Comments:  No recent kidney stones    8. Reduced libido  Comments:  Needs sleep study before adds testosterone    9. Postprocedural adrenocortical (-medullary) hypofunction (HCC)    10. Other fatigue  Comments:  Still has fatique-2/3 afternoon  Orders:  -     TSH; Future    11. Near syncope  Comments:  No more passsouts,    12. Mood disorder due to a general medical condition  Comments:  20 mgProzac controlling fairly    Other orders  -     Alirocumab (Praluent) 75 MG/ML solution auto-injector; Inject 1 mL under the skin into the appropriate area as directed Every 14 (Fourteen) Days.  Dispense: 2.24 mL; Refill: 4  -     Discontinue: Cortef 10 MG tablet; 20/15/10 Cortef daily  Dispense: 270 tablet; Refill: 3        Patient Instructions     Health Maintenance Due   Topic Date Due   • COLORECTAL CANCER SCREENING  Never done    • INFLUENZA VACCINE  08/01/2021     Patient to callneurology and see when they want to see him again.

## 2021-12-17 ENCOUNTER — LAB (OUTPATIENT)
Dept: LAB | Facility: HOSPITAL | Age: 51
End: 2021-12-17

## 2021-12-17 ENCOUNTER — TRANSCRIBE ORDERS (OUTPATIENT)
Dept: ADMINISTRATIVE | Facility: HOSPITAL | Age: 51
End: 2021-12-17

## 2021-12-17 DIAGNOSIS — E89.6 HISTORY OF TOTAL ADRENALECTOMY (HCC): ICD-10-CM

## 2021-12-17 DIAGNOSIS — E89.6 HISTORY OF TOTAL ADRENALECTOMY (HCC): Primary | ICD-10-CM

## 2021-12-17 LAB
ALBUMIN SERPL-MCNC: 4 G/DL (ref 3.5–5.2)
ALBUMIN/GLOB SERPL: 1.2 G/DL
ALP SERPL-CCNC: 66 U/L (ref 39–117)
ALT SERPL W P-5'-P-CCNC: 20 U/L (ref 1–41)
ANION GAP SERPL CALCULATED.3IONS-SCNC: 10.6 MMOL/L (ref 5–15)
AST SERPL-CCNC: 15 U/L (ref 1–40)
BILIRUB SERPL-MCNC: 0.3 MG/DL (ref 0–1.2)
BUN SERPL-MCNC: 15 MG/DL (ref 6–20)
BUN/CREAT SERPL: 13.9 (ref 7–25)
CALCIUM SPEC-SCNC: 9.8 MG/DL (ref 8.6–10.5)
CHLORIDE SERPL-SCNC: 104 MMOL/L (ref 98–107)
CO2 SERPL-SCNC: 24.4 MMOL/L (ref 22–29)
CREAT SERPL-MCNC: 1.08 MG/DL (ref 0.76–1.27)
GFR SERPL CREATININE-BSD FRML MDRD: 73 ML/MIN/1.73
GLOBULIN UR ELPH-MCNC: 3.3 GM/DL
GLUCOSE SERPL-MCNC: 78 MG/DL (ref 65–99)
POTASSIUM SERPL-SCNC: 4.4 MMOL/L (ref 3.5–5.2)
PROT SERPL-MCNC: 7.3 G/DL (ref 6–8.5)
SODIUM SERPL-SCNC: 139 MMOL/L (ref 136–145)

## 2021-12-17 PROCEDURE — 36415 COLL VENOUS BLD VENIPUNCTURE: CPT

## 2021-12-17 PROCEDURE — 80053 COMPREHEN METABOLIC PANEL: CPT

## 2022-01-10 NOTE — PROGRESS NOTES
"                     HEMATOLOGY ONCOLOGY OUTPATIENT FOLLOW UP       Patient name: Jourdan Lebron  : 1970  MRN: [id number]  Primary Care Physician: Justa Castano MD  Referring Physician: Justa Castano MD  Reason For Consult:     No chief complaint on file.    HPI:   History of Present Illness:  Jourdan Lebron is 51 y.o. male who presented to our office on 2021 for consultation regarding elevated hematocrit    On 2021 Mr. Lebron was referred for the investigation of macrocytosis and elevated hematocrit.  He gave a history of a stroke in .  He also described a long history of anxiety and \"panic attacks\".  Finally he had undergone an adrenalectomy, apparently because of an adrenal adenoma.  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  In spite of that he felt poorly, mainly because of fatigue and had several investigations.  For a long time, at least since , he had been noted to have an elevated MCV and MCH.  A clear cause for this had not been identified and generally speaking he was asymptomatic at that time.  In , September and 2021 his blood counts had reported a hematocrit of 51.3, 51.6 and 53.8% respectively.  This was in the setting of a normal high hemoglobin.  He gave a history of prolonged and intense, at times of up to 3 packs of cigarettes per day, cigarette smoking.  Close to the time of the initial visit, he was smoking only cigars but did admit to inhaling the smoke.  He, as well, admitted to dyspnea with some exertion.    2022 Mr. Lebron was back in the office for follow-up.  At the time of his initial evaluation his blood count had been found to be within normal limits.  His folic acid was found to be immediately below the normal range of normalcy.  He started taking folic acid replacement.    Subjective:  • 2022 Mr. Lebron is in the office for follow-up.  He reports he has been feeling about " the same as before.  He tends to be fatigued but not as much as he did before.  He is reasonably active and has been working without limitations.  He is smoking about the same amount as he did at the time of the initial visit, which is at least a couple of cigars during the day.  He has not been drinking any alcohol.  He has been eating well and has a good appetite.  His weight has been stable.  He has been afebrile.  He denies chest pains or cough.  He does have some dyspnea with exertion.  No abdominal pain or changes in bowel habits and no dysuria.  No peripheral edema.    The following portions of the patient's history were reviewed and updated as appropriate: allergies, current medications, past family history, past medical history, past social history, past surgical history and problem list.    Past Medical History:   Diagnosis Date   • Brain fog    • Hand tingling    • Hyperlipidemia    • Tiredness      Past Surgical History:   Procedure Laterality Date   • ADRENALECTOMY     • KIDNEY STONE SURGERY     • THROMBECTOMY         Current Outpatient Medications:   •  Alirocumab (Praluent) 75 MG/ML solution auto-injector, Inject 1 mL under the skin into the appropriate area as directed Every 14 (Fourteen) Days., Disp: 2.24 mL, Rfl: 4  •  ALPRAZolam (Xanax) 0.25 MG tablet, Take 1 tablet by mouth 3 (Three) Times a Day As Needed for Anxiety., Disp: 90 tablet, Rfl: 2  •  aspirin 81 MG chewable tablet, Chew 81 mg Daily., Disp: , Rfl:   •  DHEA 10 MG capsule, Take 1 tablet p.o. daily., Disp: 90 capsule, Rfl: 4  •  fludrocortisone 0.1 MG tablet, Take 1 tablet twice a day., Disp: 60 tablet, Rfl: 6  •  FLUoxetine (PROzac) 20 MG capsule, Take 20 mg by mouth Daily., Disp: , Rfl:   •  fluticasone (Flonase Allergy Relief) 50 MCG/ACT nasal spray, 1 spray into the nostril(s) as directed by provider Daily., Disp: , Rfl:   •  hydrocortisone sodium succinate (Solu-CORTEF) 100 MG injection, 100 mg as needed for adrenal crises., Disp: ,  Rfl:   •  ondansetron ODT (ZOFRAN-ODT) 4 MG disintegrating tablet, Place 1 tablet on the tongue Every 8 (Eight) Hours As Needed for Nausea., Disp: 20 tablet, Rfl: 0    Allergies   Allergen Reactions   • Cephalosporins Anaphylaxis     Throat swelling   • Dye  [Contrast Dye] Hives   • Sulfa Antibiotics Hives   • Zetia [Ezetimibe] Other (See Comments)     Made tired   • Iodinated Diagnostic Agents Unknown - Low Severity   • Statins Myalgia     Myalgia and fatique   • Sulfate Unknown - Low Severity     Family History   Problem Relation Age of Onset   • Arthritis Mother    • Hypertension Mother    • Heart failure Mother 73        Cause of death   • Arthritis Father    • Hypertension Father    • Kidney cancer Father 57        Cause of death. Was a smoker   • Heart disease Brother    • Esophageal cancer Brother 59        Cause of death. Has a heavy drinker       Cancer-related family history includes Esophageal cancer (age of onset: 59) in his brother; Kidney cancer (age of onset: 57) in his father.    Social History     Tobacco Use   • Smoking status: Current Every Day Smoker     Packs/day: 2.00     Years: 30.00     Pack years: 60.00     Types: Cigars, Cigarettes   • Smokeless tobacco: Never Used   • Tobacco comment: 2 packs= 4 cigars a day   Vaping Use   • Vaping Use: Never used   Substance Use Topics   • Alcohol use: Not Currently     Alcohol/week: 8.0 standard drinks     Types: 8 Cans of beer per week     Comment: Has now been abstinent for about one year   • Drug use: Never     Social History     Social History Narrative   • Not on file      ROS:     Review of Systems   Constitutional: Positive for fatigue. Negative for activity change, appetite change, chills, diaphoresis, fever and unexpected weight change.   HENT: Negative for congestion, dental problem, drooling, ear discharge, ear pain, facial swelling, hearing loss, mouth sores, nosebleeds, postnasal drip, rhinorrhea, sinus pressure, sinus pain, sneezing, sore  throat, tinnitus, trouble swallowing and voice change.    Eyes: Negative for photophobia, pain, discharge, redness, itching and visual disturbance.   Respiratory: Positive for shortness of breath. Negative for apnea, cough, choking, chest tightness, wheezing and stridor.    Cardiovascular: Negative for chest pain, palpitations and leg swelling.   Gastrointestinal: Negative for abdominal distention, abdominal pain, anal bleeding, blood in stool, constipation, diarrhea, nausea, rectal pain and vomiting.   Endocrine: Negative for cold intolerance, heat intolerance, polydipsia and polyuria.   Genitourinary: Negative for decreased urine volume, difficulty urinating, dysuria, flank pain, frequency, genital sores, hematuria and urgency.   Musculoskeletal: Negative for arthralgias, back pain, gait problem, joint swelling, myalgias, neck pain and neck stiffness.   Skin: Negative for color change, pallor and rash.   Neurological: Negative for dizziness, tremors, seizures, syncope, facial asymmetry, speech difficulty, weakness, light-headedness, numbness and headaches.   Hematological: Negative for adenopathy. Does not bruise/bleed easily.   Psychiatric/Behavioral: Negative for agitation, behavioral problems, confusion, decreased concentration, hallucinations, self-injury, sleep disturbance and suicidal ideas. The patient is nervous/anxious.      Objective:    There were no vitals filed for this visit.  There is no height or weight on file to calculate BMI.  ECOG  (0) Fully active, able to carry on all predisease performance without restriction    Physical Exam:     Physical Exam  Constitutional:       General: He is not in acute distress.     Appearance: Normal appearance. He is not ill-appearing, toxic-appearing or diaphoretic.      Comments: Well built and well oriented.  Appears older than the stated age.  No distress.   HENT:      Head: Normocephalic and atraumatic.      Right Ear: External ear normal. There is no  impacted cerumen.      Left Ear: External ear normal. There is no impacted cerumen.      Nose: Nose normal. No congestion or rhinorrhea.      Mouth/Throat:      Mouth: Mucous membranes are moist.      Pharynx: Oropharynx is clear. No oropharyngeal exudate or posterior oropharyngeal erythema.      Comments: Oral cavity reveals poor dentition and poor dental hygiene.  No mucosal ulcerations are present.  Eyes:      General: No scleral icterus.        Right eye: No discharge.         Left eye: No discharge.      Conjunctiva/sclera: Conjunctivae normal.      Pupils: Pupils are equal, round, and reactive to light.   Neck:      Vascular: No carotid bruit.   Cardiovascular:      Rate and Rhythm: Normal rate and regular rhythm.      Pulses: Normal pulses.      Heart sounds: Normal heart sounds. No murmur heard.  No friction rub. No gallop.    Pulmonary:      Effort: No respiratory distress.      Breath sounds: No stridor. No wheezing, rhonchi or rales.   Chest:      Chest wall: No tenderness.   Abdominal:      General: Abdomen is flat. Bowel sounds are normal. There is no distension.      Palpations: Abdomen is soft. There is no mass.      Tenderness: There is no abdominal tenderness. There is no right CVA tenderness, left CVA tenderness, guarding or rebound.   Musculoskeletal:         General: No swelling, tenderness, deformity or signs of injury.      Cervical back: No rigidity or tenderness.      Right lower leg: No edema.      Left lower leg: No edema.      Comments: There is clubbing of the fingers in both hands   Lymphadenopathy:      Cervical: No cervical adenopathy.   Skin:     General: Skin is warm and dry.      Coloration: Skin is not jaundiced or pale.      Findings: No bruising, erythema or rash.   Neurological:      General: No focal deficit present.      Mental Status: He is alert and oriented to person, place, and time.      Cranial Nerves: No cranial nerve deficit.      Motor: No weakness.      Coordination:  Coordination normal.      Gait: Gait normal.   Psychiatric:         Mood and Affect: Mood normal.         Behavior: Behavior normal.         Thought Content: Thought content normal.         Judgment: Judgment normal.       Lab Results - Last 18 Months   Lab Units 11/12/21  1018 10/29/21  0739 09/07/21  1109   WBC 10*3/mm3 9.92 7.65 8.80   HEMOGLOBIN g/dL 17.0 17.4 17.4   HEMATOCRIT % 48.6 53.8* 51.6*   PLATELETS 10*3/mm3 191 222 209   MCV fL 96.4 100.7* 96.6     Lab Results - Last 18 Months   Lab Units 12/17/21  0837 12/03/21  0843 11/12/21  1145   SODIUM mmol/L 139 137 143   POTASSIUM mmol/L 4.4 4.4 3.3*   CHLORIDE mmol/L 104 100 104   CO2 mmol/L 24.4 27.8 29.0   BUN mg/dL 15 11 11   CREATININE mg/dL 1.08 0.97 0.84   CALCIUM mg/dL 9.8 9.5 8.7   BILIRUBIN mg/dL 0.3 0.3 0.3   ALK PHOS U/L 66 71 68   ALT (SGPT) U/L 20 29 12   AST (SGOT) U/L 15 22 12   GLUCOSE mg/dL 78 72 47*     Lab Results   Component Value Date    GLUCOSE 78 12/17/2021    BUN 15 12/17/2021    CREATININE 1.08 12/17/2021    EGFRIFNONA 73 12/17/2021    BCR 13.9 12/17/2021    K 4.4 12/17/2021    CO2 24.4 12/17/2021    CALCIUM 9.8 12/17/2021    ALBUMIN 4.00 12/17/2021    LABIL2 1.3 09/25/2018    AST 15 12/17/2021    ALT 20 12/17/2021       Lab Results   Component Value Date    FOLATE 4.11 (L) 11/12/2021     Lab Results   Component Value Date    [id number] 1,080 (H) 09/07/2021     No results found for: SPEP, UPEP  Uric Acid   Date Value Ref Range Status   09/07/2021 4.2 3.4 - 7.0 mg/dL Final     Lab Results   Component Value Date    SEDRATE 11 04/16/2021     Lab Results   Component Value Date    PSA 0.163 04/06/2021     Assessment/Plan     Assessment:  1. Erythrocytosis: The problem seems to be resolved at this time.  He persists with macrocytosis at that time by itself does not have an obvious meeting.  Discussed with him the results of the blood counts.  2.  There is no erythrocytosis on today's blood counts and interestingly the MCV and MCH are both  within normal limits.  3.  Tobacco smoker discussed again with him at length.  I believe that this is the source of many of his symptoms.  He is interested in stopping but would like to continue to taper down as he has been doing.  Discussed with him the use of cessation aids but he is not interested at this time.  4.  He will see me in approximately 6 months with new laboratory exams.  If at that time there is no additional abnormality he will continue to follow only with his primary care physician.    Plan:  1. As above

## 2022-01-11 ENCOUNTER — LAB (OUTPATIENT)
Dept: LAB | Facility: HOSPITAL | Age: 52
End: 2022-01-11

## 2022-01-11 ENCOUNTER — TRANSCRIBE ORDERS (OUTPATIENT)
Dept: ADMINISTRATIVE | Facility: HOSPITAL | Age: 52
End: 2022-01-11

## 2022-01-11 DIAGNOSIS — E78.5 HYPERLIPIDEMIA, UNSPECIFIED HYPERLIPIDEMIA TYPE: ICD-10-CM

## 2022-01-11 DIAGNOSIS — E89.6: Primary | ICD-10-CM

## 2022-01-11 DIAGNOSIS — R53.83 OTHER FATIGUE: ICD-10-CM

## 2022-01-11 DIAGNOSIS — E89.6: ICD-10-CM

## 2022-01-11 LAB
ALBUMIN SERPL-MCNC: 4 G/DL (ref 3.5–5.2)
ALBUMIN/GLOB SERPL: 1.7 G/DL
ALP SERPL-CCNC: 60 U/L (ref 39–117)
ALT SERPL W P-5'-P-CCNC: 18 U/L (ref 1–41)
ANION GAP SERPL CALCULATED.3IONS-SCNC: 8.4 MMOL/L (ref 5–15)
AST SERPL-CCNC: 15 U/L (ref 1–40)
BILIRUB SERPL-MCNC: 0.3 MG/DL (ref 0–1.2)
BUN SERPL-MCNC: 13 MG/DL (ref 6–20)
BUN/CREAT SERPL: 12.4 (ref 7–25)
CALCIUM SPEC-SCNC: 9.3 MG/DL (ref 8.6–10.5)
CHLORIDE SERPL-SCNC: 100 MMOL/L (ref 98–107)
CHOLEST SERPL-MCNC: 134 MG/DL (ref 0–200)
CO2 SERPL-SCNC: 29.6 MMOL/L (ref 22–29)
CREAT SERPL-MCNC: 1.05 MG/DL (ref 0.76–1.27)
GFR SERPL CREATININE-BSD FRML MDRD: 75 ML/MIN/1.73
GLOBULIN UR ELPH-MCNC: 2.4 GM/DL
GLUCOSE SERPL-MCNC: 84 MG/DL (ref 65–99)
HDLC SERPL-MCNC: 61 MG/DL (ref 40–60)
LDLC SERPL CALC-MCNC: 56 MG/DL (ref 0–100)
LDLC/HDLC SERPL: 0.91 {RATIO}
POTASSIUM SERPL-SCNC: 4.6 MMOL/L (ref 3.5–5.2)
PROT SERPL-MCNC: 6.4 G/DL (ref 6–8.5)
SODIUM SERPL-SCNC: 138 MMOL/L (ref 136–145)
TRIGL SERPL-MCNC: 87 MG/DL (ref 0–150)
TSH SERPL DL<=0.05 MIU/L-ACNC: 2.51 UIU/ML (ref 0.27–4.2)
VLDLC SERPL-MCNC: 17 MG/DL (ref 5–40)

## 2022-01-11 PROCEDURE — 84443 ASSAY THYROID STIM HORMONE: CPT

## 2022-01-11 PROCEDURE — 80061 LIPID PANEL: CPT

## 2022-01-11 PROCEDURE — 80053 COMPREHEN METABOLIC PANEL: CPT

## 2022-01-14 ENCOUNTER — OFFICE VISIT (OUTPATIENT)
Dept: ONCOLOGY | Facility: CLINIC | Age: 52
End: 2022-01-14

## 2022-01-14 ENCOUNTER — TELEPHONE (OUTPATIENT)
Dept: FAMILY MEDICINE CLINIC | Facility: CLINIC | Age: 52
End: 2022-01-14

## 2022-01-14 ENCOUNTER — OFFICE VISIT (OUTPATIENT)
Dept: PSYCHIATRY | Facility: CLINIC | Age: 52
End: 2022-01-14

## 2022-01-14 ENCOUNTER — LAB (OUTPATIENT)
Dept: LAB | Facility: HOSPITAL | Age: 52
End: 2022-01-14

## 2022-01-14 VITALS
SYSTOLIC BLOOD PRESSURE: 107 MMHG | HEART RATE: 80 BPM | WEIGHT: 175 LBS | HEIGHT: 71 IN | BODY MASS INDEX: 24.5 KG/M2 | RESPIRATION RATE: 20 BRPM | DIASTOLIC BLOOD PRESSURE: 70 MMHG | TEMPERATURE: 96.9 F

## 2022-01-14 DIAGNOSIS — D75.1 ERYTHROCYTOSIS: Primary | ICD-10-CM

## 2022-01-14 DIAGNOSIS — D75.1 ERYTHROCYTOSIS: ICD-10-CM

## 2022-01-14 DIAGNOSIS — F06.30 MOOD DISORDER DUE TO A GENERAL MEDICAL CONDITION: Primary | Chronic | ICD-10-CM

## 2022-01-14 DIAGNOSIS — E78.5 HYPERLIPIDEMIA, UNSPECIFIED HYPERLIPIDEMIA TYPE: Primary | ICD-10-CM

## 2022-01-14 DIAGNOSIS — F06.4 ANXIETY DISORDER DUE TO GENERAL MEDICAL CONDITION: Chronic | ICD-10-CM

## 2022-01-14 LAB
BASOPHILS # BLD AUTO: 0.05 10*3/MM3 (ref 0–0.2)
BASOPHILS NFR BLD AUTO: 0.5 % (ref 0–1.5)
DEPRECATED RDW RBC AUTO: 53 FL (ref 37–54)
EOSINOPHIL # BLD AUTO: 0.32 10*3/MM3 (ref 0–0.4)
EOSINOPHIL NFR BLD AUTO: 3.3 % (ref 0.3–6.2)
ERYTHROCYTE [DISTWIDTH] IN BLOOD BY AUTOMATED COUNT: 15.3 % (ref 12.3–15.4)
HCT VFR BLD AUTO: 49.6 % (ref 37.5–51)
HGB BLD-MCNC: 17.1 G/DL (ref 13–17.7)
HOLD SPECIMEN: NORMAL
HOLD SPECIMEN: NORMAL
LYMPHOCYTES # BLD AUTO: 3.11 10*3/MM3 (ref 0.7–3.1)
LYMPHOCYTES NFR BLD AUTO: 32.2 % (ref 19.6–45.3)
MCH RBC QN AUTO: 33.5 PG (ref 26.6–33)
MCHC RBC AUTO-ENTMCNC: 34.5 G/DL (ref 31.5–35.7)
MCV RBC AUTO: 97.1 FL (ref 79–97)
MONOCYTES # BLD AUTO: 0.91 10*3/MM3 (ref 0.1–0.9)
MONOCYTES NFR BLD AUTO: 9.4 % (ref 5–12)
NEUTROPHILS NFR BLD AUTO: 5.28 10*3/MM3 (ref 1.7–7)
NEUTROPHILS NFR BLD AUTO: 54.6 % (ref 42.7–76)
PLATELET # BLD AUTO: 262 10*3/MM3 (ref 140–450)
PMV BLD AUTO: 9.2 FL (ref 6–12)
RBC # BLD AUTO: 5.11 10*6/MM3 (ref 4.14–5.8)
WBC NRBC COR # BLD: 9.67 10*3/MM3 (ref 3.4–10.8)

## 2022-01-14 PROCEDURE — 99213 OFFICE O/P EST LOW 20 MIN: CPT | Performed by: INTERNAL MEDICINE

## 2022-01-14 PROCEDURE — 85025 COMPLETE CBC W/AUTO DIFF WBC: CPT

## 2022-01-14 PROCEDURE — 36415 COLL VENOUS BLD VENIPUNCTURE: CPT

## 2022-01-14 PROCEDURE — 99214 OFFICE O/P EST MOD 30 MIN: CPT | Performed by: PSYCHIATRY & NEUROLOGY

## 2022-01-14 RX ORDER — HYDROCORTISONE 10 MG/1
TABLET ORAL
COMMUNITY
Start: 2021-12-16 | End: 2022-01-14 | Stop reason: SDUPTHER

## 2022-01-14 RX ORDER — FLUOXETINE HYDROCHLORIDE 20 MG/1
20 CAPSULE ORAL DAILY
Qty: 30 CAPSULE | Refills: 0 | Status: SHIPPED | OUTPATIENT
Start: 2022-01-14 | End: 2022-05-06 | Stop reason: SDUPTHER

## 2022-01-14 RX ORDER — ALPRAZOLAM 0.25 MG/1
0.25 TABLET ORAL 3 TIMES DAILY PRN
Qty: 90 TABLET | Refills: 3 | Status: SHIPPED | OUTPATIENT
Start: 2022-01-14 | End: 2022-05-06 | Stop reason: SDUPTHER

## 2022-01-14 RX ORDER — FLUOXETINE HYDROCHLORIDE 20 MG/1
20 CAPSULE ORAL DAILY
Qty: 90 CAPSULE | Refills: 1 | Status: SHIPPED | OUTPATIENT
Start: 2022-01-14 | End: 2022-01-14 | Stop reason: SDUPTHER

## 2022-01-14 NOTE — PROGRESS NOTES
Subjective   Jourdan Lebron is a 51 y.o. 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, left middle cerebral artery , the pt is R handed    he had troubles talking , had muscle weakness   Oct 2020 - went back to work light duty , still had issues completing his tasks   Feb 2021- adrenal gland removed and that caused more issues, more medical w/u and ER visits, weight loss   Now on steroid replacement which is also not completely controlled     The pt was started on zoloft - no major changes, it had to be changed to prozac - tolerated well and started feeling less depressed, he is back to work now , prozac was increased to 40 mg , however, he did not tolerate, reported increased anxiety      Today the pt reported feeling better , but he is back on prozac 20 mg , depression is still not well controlled, at the same time his hormone level is not controlled either   The pt c/o decreased concentration, decreased memory , still  irritable   The pt has significant drop of E level after work in the evening      Sleep - fragmented  , not restorative , hydrocortisone makes everything worse,   Denied AVH/SI/HI     The following portions of the patient's history were reviewed and updated as appropriate: allergies, current medications, past family history, past medical history, past social history, past surgical history and problem list.    PAST PSYCHIATRIC HISTORY  Axis I  Affective/Bipolar Disorder, Anxiety/Panic Disorder  Axis II  Denied     PAST OUTPATIENT TREATMENT  Diagnosis treated:  Anxiety, depression   Treatment Type:  Medication Management  Prior Psychiatric Medications:  lexapro - not effective  Buspirone - side effects   Support Groups:  None   Sequelae Of Mental Disorder:  job disruption, emotional distress          Interval  History  Some improvement     Side Effects  Denied       Past Medical History:  Past Medical History:   Diagnosis Date   • Brain fog    • Hand tingling    • Hyperlipidemia    • Tiredness        Social History:  Social History     Socioeconomic History   • Marital status:    Tobacco Use   • Smoking status: Current Every Day Smoker     Packs/day: 2.00     Years: 30.00     Pack years: 60.00     Types: Cigars, Cigarettes   • Smokeless tobacco: Never Used   • Tobacco comment: 2 packs= 4 cigars a day   Vaping Use   • Vaping Use: Never used   Substance and Sexual Activity   • Alcohol use: Not Currently     Alcohol/week: 8.0 standard drinks     Types: 8 Cans of beer per week     Comment: Has now been abstinent for about one year   • Drug use: Never   • Sexual activity: Yes     Partners: Female     Birth control/protection: None       Family History:  Family History   Problem Relation Age of Onset   • Arthritis Mother    • Hypertension Mother    • Heart failure Mother 73        Cause of death   • Arthritis Father    • Hypertension Father    • Kidney cancer Father 57        Cause of death. Was a smoker   • Heart disease Brother    • Esophageal cancer Brother 59        Cause of death. Has a heavy drinker       Past Surgical History:  Past Surgical History:   Procedure Laterality Date   • ADRENALECTOMY     • KIDNEY STONE SURGERY     • THROMBECTOMY         Problem List:  Patient Active Problem List   Diagnosis   • Abnormal electrocardiogram   • Calculus of kidney   • History of cerebrovascular accident   • Family history of malignant neoplasm   • Generalized hyperhidrosis   • Hyperlipidemia   • Seasonal allergies   • Tobacco dependence syndrome   • Cervical radiculopathy   • Cervical stenosis of spinal canal   • Near syncope   • Other fatigue   • History of total adrenalectomy (HCC)   • Arthropathy of cervical facet joint   • Neck pain   • Pain in left arm   • Shoulder pain   • Seasonal allergic rhinitis   • Thoracic back  pain   • Vitamin B12 deficiency   • Electrocardiogram abnormal   • Spinal stenosis of cervical region   • Reduced libido   • Hypogonadotropic hypogonadism (HCC)   • Mood disorder due to a general medical condition   • Anxiety disorder due to general medical condition   • Macrocytosis   • Erythrocytosis   • Postprocedural adrenocortical (-medullary) hypofunction (HCC)       Allergy:   Allergies   Allergen Reactions   • Cephalosporins Anaphylaxis     Throat swelling   • Dye  [Contrast Dye] Hives   • Sulfa Antibiotics Hives   • Zetia [Ezetimibe] Other (See Comments)     Made tired   • Iodinated Diagnostic Agents Unknown - Low Severity   • Statins Myalgia     Myalgia and fatique   • Sulfate Unknown - Low Severity        Discontinued Medications:  Medications Discontinued During This Encounter   Medication Reason   • FLUoxetine (PROzac) 20 MG capsule Reorder   • ALPRAZolam (Xanax) 0.25 MG tablet Reorder   • FLUoxetine (PROzac) 20 MG capsule Reorder       Current Medications:   Current Outpatient Medications   Medication Sig Dispense Refill   • Alirocumab (Praluent) 75 MG/ML solution auto-injector Inject 1 mL under the skin into the appropriate area as directed Every 14 (Fourteen) Days. 2.24 mL 4   • ALPRAZolam (Xanax) 0.25 MG tablet Take 1 tablet by mouth 3 (Three) Times a Day As Needed for Anxiety. 90 tablet 3   • aspirin 81 MG chewable tablet Chew 81 mg Daily.     • DHEA 10 MG capsule Take 1 tablet p.o. daily. 90 capsule 4   • fludrocortisone 0.1 MG tablet Take 1 tablet twice a day. 60 tablet 6   • FLUoxetine (PROzac) 20 MG capsule Take 1 capsule by mouth Daily. 30 capsule 0   • fluticasone (Flonase Allergy Relief) 50 MCG/ACT nasal spray 1 spray into the nostril(s) as directed by provider Daily.     • hydrocortisone sodium succinate (Solu-CORTEF) 100 MG injection 100 mg as needed for adrenal crises.     • ondansetron ODT (ZOFRAN-ODT) 4 MG disintegrating tablet Place 1 tablet on the tongue Every 8 (Eight) Hours As  Needed for Nausea. 20 tablet 0     No current facility-administered medications for this visit.         Psychological ROS: positive for - anxiety, concentration, depression,  irritability and memory difficulties  Negative for AVH/SI/HI, delusions       Physical Exam:   There were no vitals taken for this visit.    Mental Status Exam:   Hygiene:   good  Cooperation:  Cooperative  Eye Contact:  Good  Psychomotor Behavior:  Appropriate  Affect:  Full range  Mood: fluctates  Hopelessness: Denies  Speech:  Normal  Thought Process:  Goal directed and Linear  Thought Content:  Normal and Mood congruent  Suicidal:  None  Homicidal:  None  Hallucinations:  None  Delusion:  None  Memory:  Deficits  Orientation:  Person, Place, Time and Situation  Reliability:  good  Insight:  Good  Judgement:  Good  Impulse Control:  Fair  Physical/Medical Issues:  Yes         MSE from 10/26/2021 reviewed and accepted with changes     PHQ-9 Depression Screening  Little interest or pleasure in doing things? 1   Feeling down, depressed, or hopeless? 1   Trouble falling or staying asleep, or sleeping too much? 0   Feeling tired or having little energy? 1   Poor appetite or overeating? 0   Feeling bad about yourself - or that you are a failure or have let yourself or your family down? 1   Trouble concentrating on things, such as reading the newspaper or watching television? 1   Moving or speaking so slowly that other people could have noticed? Or the opposite - being so fidgety or restless that you have been moving around a lot more than usual? 0   Thoughts that you would be better off dead, or of hurting yourself in some way? 0   PHQ-9 Total Score 5   If you checked off any problems, how difficult have these problems made it for you to do your work, take care of things at home, or get along with other people? Very difficult           Current every day smoker 3-10 mintues spent counseling Has reduced Tobbacos use    I advised Jourdan of the risks of  tobacco use.     Lab Results:   Lab on 01/11/2022   Component Date Value Ref Range Status   • Total Cholesterol 01/11/2022 134  0 - 200 mg/dL Final   • Triglycerides 01/11/2022 87  0 - 150 mg/dL Final   • HDL Cholesterol 01/11/2022 61* 40 - 60 mg/dL Final   • LDL Cholesterol  01/11/2022 56  0 - 100 mg/dL Final   • VLDL Cholesterol 01/11/2022 17  5 - 40 mg/dL Final   • LDL/HDL Ratio 01/11/2022 0.91   Final   • Glucose 01/11/2022 84  65 - 99 mg/dL Final   • BUN 01/11/2022 13  6 - 20 mg/dL Final   • Creatinine 01/11/2022 1.05  0.76 - 1.27 mg/dL Final   • Sodium 01/11/2022 138  136 - 145 mmol/L Final   • Potassium 01/11/2022 4.6  3.5 - 5.2 mmol/L Final    Slight hemolysis detected by analyzer. Results may be affected.   • Chloride 01/11/2022 100  98 - 107 mmol/L Final   • CO2 01/11/2022 29.6* 22.0 - 29.0 mmol/L Final   • Calcium 01/11/2022 9.3  8.6 - 10.5 mg/dL Final   • Total Protein 01/11/2022 6.4  6.0 - 8.5 g/dL Final   • Albumin 01/11/2022 4.00  3.50 - 5.20 g/dL Final   • ALT (SGPT) 01/11/2022 18  1 - 41 U/L Final   • AST (SGOT) 01/11/2022 15  1 - 40 U/L Final   • Alkaline Phosphatase 01/11/2022 60  39 - 117 U/L Final   • Total Bilirubin 01/11/2022 0.3  0.0 - 1.2 mg/dL Final   • eGFR Non African Amer 01/11/2022 75  >60 mL/min/1.73 Final   • Globulin 01/11/2022 2.4  gm/dL Final   • A/G Ratio 01/11/2022 1.7  g/dL Final   • BUN/Creatinine Ratio 01/11/2022 12.4  7.0 - 25.0 Final   • Anion Gap 01/11/2022 8.4  5.0 - 15.0 mmol/L Final   • TSH 01/11/2022 2.510  0.270 - 4.200 uIU/mL Final   Lab on 12/17/2021   Component Date Value Ref Range Status   • Glucose 12/17/2021 78  65 - 99 mg/dL Final   • BUN 12/17/2021 15  6 - 20 mg/dL Final   • Creatinine 12/17/2021 1.08  0.76 - 1.27 mg/dL Final   • Sodium 12/17/2021 139  136 - 145 mmol/L Final   • Potassium 12/17/2021 4.4  3.5 - 5.2 mmol/L Final   • Chloride 12/17/2021 104  98 - 107 mmol/L Final   • CO2 12/17/2021 24.4  22.0 - 29.0 mmol/L Final   • Calcium 12/17/2021 9.8  8.6 -  10.5 mg/dL Final   • Total Protein 12/17/2021 7.3  6.0 - 8.5 g/dL Final   • Albumin 12/17/2021 4.00  3.50 - 5.20 g/dL Final   • ALT (SGPT) 12/17/2021 20  1 - 41 U/L Final   • AST (SGOT) 12/17/2021 15  1 - 40 U/L Final   • Alkaline Phosphatase 12/17/2021 66  39 - 117 U/L Final   • Total Bilirubin 12/17/2021 0.3  0.0 - 1.2 mg/dL Final   • eGFR Non  Amer 12/17/2021 73  >60 mL/min/1.73 Final   • Globulin 12/17/2021 3.3  gm/dL Final   • A/G Ratio 12/17/2021 1.2  g/dL Final   • BUN/Creatinine Ratio 12/17/2021 13.9  7.0 - 25.0 Final   • Anion Gap 12/17/2021 10.6  5.0 - 15.0 mmol/L Final   Lab on 12/03/2021   Component Date Value Ref Range Status   • Glucose 12/03/2021 72  65 - 99 mg/dL Final   • BUN 12/03/2021 11  6 - 20 mg/dL Final   • Creatinine 12/03/2021 0.97  0.76 - 1.27 mg/dL Final   • Sodium 12/03/2021 137  136 - 145 mmol/L Final   • Potassium 12/03/2021 4.4  3.5 - 5.2 mmol/L Final    Slight hemolysis detected by analyzer. Results may be affected.   • Chloride 12/03/2021 100  98 - 107 mmol/L Final   • CO2 12/03/2021 27.8  22.0 - 29.0 mmol/L Final   • Calcium 12/03/2021 9.5  8.6 - 10.5 mg/dL Final   • Total Protein 12/03/2021 6.9  6.0 - 8.5 g/dL Final   • Albumin 12/03/2021 4.20  3.50 - 5.20 g/dL Final   • ALT (SGPT) 12/03/2021 29  1 - 41 U/L Final   • AST (SGOT) 12/03/2021 22  1 - 40 U/L Final    Slight hemolysis detected by analyzer. Results may be affected.   • Alkaline Phosphatase 12/03/2021 71  39 - 117 U/L Final   • Total Bilirubin 12/03/2021 0.3  0.0 - 1.2 mg/dL Final   • eGFR Non African Amer 12/03/2021 82  >60 mL/min/1.73 Final   • Globulin 12/03/2021 2.7  gm/dL Final   • A/G Ratio 12/03/2021 1.6  g/dL Final   • BUN/Creatinine Ratio 12/03/2021 11.3  7.0 - 25.0 Final   • Anion Gap 12/03/2021 9.2  5.0 - 15.0 mmol/L Final   Consult on 11/12/2021   Component Date Value Ref Range Status   • Glucose 11/12/2021 47* 65 - 99 mg/dL Final   • BUN 11/12/2021 11  6 - 20 mg/dL Final   • Creatinine 11/12/2021 0.84   0.76 - 1.27 mg/dL Final   • Sodium 11/12/2021 143  136 - 145 mmol/L Final   • Potassium 11/12/2021 3.3* 3.5 - 5.2 mmol/L Final   • Chloride 11/12/2021 104  98 - 107 mmol/L Final   • CO2 11/12/2021 29.0  22.0 - 29.0 mmol/L Final   • Calcium 11/12/2021 8.7  8.6 - 10.5 mg/dL Final   • Total Protein 11/12/2021 5.9* 6.0 - 8.5 g/dL Final   • Albumin 11/12/2021 3.70  3.50 - 5.20 g/dL Final   • ALT (SGPT) 11/12/2021 12  1 - 41 U/L Final   • AST (SGOT) 11/12/2021 12  1 - 40 U/L Final   • Alkaline Phosphatase 11/12/2021 68  39 - 117 U/L Final   • Total Bilirubin 11/12/2021 0.3  0.0 - 1.2 mg/dL Final   • eGFR Non African Amer 11/12/2021 97  >60 mL/min/1.73 Final   • Globulin 11/12/2021 2.2  gm/dL Final   • A/G Ratio 11/12/2021 1.7  g/dL Final   • BUN/Creatinine Ratio 11/12/2021 13.1  7.0 - 25.0 Final   • Anion Gap 11/12/2021 10.0  5.0 - 15.0 mmol/L Final   • Folate 11/12/2021 4.11* 4.78 - 24.20 ng/mL Final   Lab on 11/12/2021   Component Date Value Ref Range Status   • WBC 11/12/2021 9.92  3.40 - 10.80 10*3/mm3 Final   • RBC 11/12/2021 5.04  4.14 - 5.80 10*6/mm3 Final   • Hemoglobin 11/12/2021 17.0  13.0 - 17.7 g/dL Final   • Hematocrit 11/12/2021 48.6  37.5 - 51.0 % Final   • MCV 11/12/2021 96.4  79.0 - 97.0 fL Final   • MCH 11/12/2021 33.7* 26.6 - 33.0 pg Final   • MCHC 11/12/2021 35.0  31.5 - 35.7 g/dL Final   • RDW 11/12/2021 15.4  12.3 - 15.4 % Final   • RDW-SD 11/12/2021 53.3  37.0 - 54.0 fl Final   • MPV 11/12/2021 10.0  6.0 - 12.0 fL Final   • Platelets 11/12/2021 191  140 - 450 10*3/mm3 Final   • Neutrophil % 11/12/2021 54.6  42.7 - 76.0 % Final   • Lymphocyte % 11/12/2021 32.1  19.6 - 45.3 % Final   • Monocyte % 11/12/2021 9.1  5.0 - 12.0 % Final   • Eosinophil % 11/12/2021 3.1  0.3 - 6.2 % Final   • Basophil % 11/12/2021 1.1  0.0 - 1.5 % Final   • Neutrophils, Absolute 11/12/2021 5.42  1.70 - 7.00 10*3/mm3 Final   • Lymphocytes, Absolute 11/12/2021 3.18* 0.70 - 3.10 10*3/mm3 Final   • Monocytes, Absolute 11/12/2021  0.90  0.10 - 0.90 10*3/mm3 Final   • Eosinophils, Absolute 11/12/2021 0.31  0.00 - 0.40 10*3/mm3 Final   • Basophils, Absolute 11/12/2021 0.11  0.00 - 0.20 10*3/mm3 Final   Lab on 10/29/2021   Component Date Value Ref Range Status   • Glucose 10/29/2021 84  65 - 99 mg/dL Final   • BUN 10/29/2021 9  6 - 20 mg/dL Final   • Creatinine 10/29/2021 1.05  0.76 - 1.27 mg/dL Final   • Sodium 10/29/2021 142  136 - 145 mmol/L Final   • Potassium 10/29/2021 3.6  3.5 - 5.2 mmol/L Final   • Chloride 10/29/2021 105  98 - 107 mmol/L Final   • CO2 10/29/2021 29.0  22.0 - 29.0 mmol/L Final   • Calcium 10/29/2021 9.1  8.6 - 10.5 mg/dL Final   • Total Protein 10/29/2021 6.5  6.0 - 8.5 g/dL Final   • Albumin 10/29/2021 4.10  3.50 - 5.20 g/dL Final   • ALT (SGPT) 10/29/2021 12  1 - 41 U/L Final   • AST (SGOT) 10/29/2021 11  1 - 40 U/L Final   • Alkaline Phosphatase 10/29/2021 66  39 - 117 U/L Final   • Total Bilirubin 10/29/2021 0.3  0.0 - 1.2 mg/dL Final   • eGFR Non  Amer 10/29/2021 75  >60 mL/min/1.73 Final   • Globulin 10/29/2021 2.4  gm/dL Final   • A/G Ratio 10/29/2021 1.7  g/dL Final   • BUN/Creatinine Ratio 10/29/2021 8.6  7.0 - 25.0 Final   • Anion Gap 10/29/2021 8.0  5.0 - 15.0 mmol/L Final   • WBC 10/29/2021 7.65  3.40 - 10.80 10*3/mm3 Final   • RBC 10/29/2021 5.34  4.14 - 5.80 10*6/mm3 Final   • Hemoglobin 10/29/2021 17.4  13.0 - 17.7 g/dL Final   • Hematocrit 10/29/2021 53.8* 37.5 - 51.0 % Final   • MCV 10/29/2021 100.7* 79.0 - 97.0 fL Final   • MCH 10/29/2021 32.6  26.6 - 33.0 pg Final   • MCHC 10/29/2021 32.3  31.5 - 35.7 g/dL Final   • RDW 10/29/2021 13.0  12.3 - 15.4 % Final   • RDW-SD 10/29/2021 48.6  37.0 - 54.0 fl Final   • MPV 10/29/2021 10.4  6.0 - 12.0 fL Final   • Platelets 10/29/2021 222  140 - 450 10*3/mm3 Final   • Neutrophil % 10/29/2021 64.3  42.7 - 76.0 % Final   • Lymphocyte % 10/29/2021 22.2  19.6 - 45.3 % Final   • Monocyte % 10/29/2021 9.5  5.0 - 12.0 % Final   • Eosinophil % 10/29/2021 3.0  0.3 -  6.2 % Final   • Basophil % 10/29/2021 0.7  0.0 - 1.5 % Final   • Immature Grans % 10/29/2021 0.3  0.0 - 0.5 % Final   • Neutrophils, Absolute 10/29/2021 4.92  1.70 - 7.00 10*3/mm3 Final   • Lymphocytes, Absolute 10/29/2021 1.70  0.70 - 3.10 10*3/mm3 Final   • Monocytes, Absolute 10/29/2021 0.73  0.10 - 0.90 10*3/mm3 Final   • Eosinophils, Absolute 10/29/2021 0.23  0.00 - 0.40 10*3/mm3 Final   • Basophils, Absolute 10/29/2021 0.05  0.00 - 0.20 10*3/mm3 Final   • Immature Grans, Absolute 10/29/2021 0.02  0.00 - 0.05 10*3/mm3 Final   • nRBC 10/29/2021 0.0  0.0 - 0.2 /100 WBC Final   • Testosterone, Total 10/29/2021 671  264 - 916 ng/dL Final    Adult male reference interval is based on a population of  healthy nonobese males (BMI <30) between 19 and 39 years old.  Rachel, et.al. JCEM 2017,102;0102-0815. PMID: [id number].   • Testosterone, Free 10/29/2021 7.0  6.8 - 21.5 pg/mL Final   • Hemoglobin A1C 10/29/2021 5.3  3.5 - 5.6 % Final   • C-Peptide 10/29/2021 2.0  1.1 - 4.4 ng/mL Final    C-Peptide reference interval is for fasting patients.       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)    Relevant Medications    ALPRAZolam (Xanax) 0.25 MG tablet    FLUoxetine (PROzac) 20 MG capsule    Anxiety disorder due to general medical condition (Chronic)    Relevant Medications    ALPRAZolam (Xanax) 0.25 MG tablet    FLUoxetine (PROzac) 20 MG capsule      Diagnoses       Codes Comments    Mood disorder due to a general medical condition    -  Primary ICD-10-CM: F06.30  ICD-9-CM: 293.83     Anxiety disorder due to general medical condition     ICD-10-CM: F06.4  ICD-9-CM: 293.84       medical condition : glucocorticoid  deficiency , s/p CVA (Left Middle cerebral artery )     Visit Diagnoses:    ICD-10-CM ICD-9-CM   1. Mood disorder due to a general medical condition  F06.30 293.83   2. Anxiety disorder due to general medical  condition  F06.4 293.84       TREATMENT PLAN/GOALS: Continue supportive psychotherapy efforts and medications as indicated. Treatment and medication options discussed during today's visit. Patient ackowledged and verbally consented to continue with current treatment plan and was educated on the importance of compliance with treatment and follow-up appointments.    MEDICATION ISSUES:  INSPECT reviewed as expected - gabapentin and hydrocodone , 11/26/2021 xanax #90    1. Mood d/o 2ry to medical condition (CVA and glucocorticoid deficiency) - the  Pt has very complicated medical issues, still a lot of uncertainty about the causes , mood - not regulated, did not respond well to SSRI and Buspar, the pt has memory /concentration issues that make him feels more depressed with low self esteem, the pt had neuropsych eval done at [facility] with depression and anxiety      decrease prozac back to 20 mg       2. Anxiety d/o 2ry to medical condition -  Cont  Low dose of xanax 0. 25 mg po TID , no changs necessary   He has adrenal gland issues , on hydrocortisone that makes him shaky   PHQ scored 5 - moderate  depression   JOSE MARIA 7 scored 12    Discussed medication options and treatment plan of prescribed medication as well as the risks, benefits, and side effects including potential falls, possible impaired driving and metabolic adversities among others. Patient is agreeable to call the office with any worsening of symptoms or onset of side effects. Patient is agreeable to call 911 or go to the nearest ER should he/she begin having SI/HI. No medication side effects or related complaints today.     MEDS ORDERED DURING VISIT:  New Medications Ordered This Visit   Medications   • ALPRAZolam (Xanax) 0.25 MG tablet     Sig: Take 1 tablet by mouth 3 (Three) Times a Day As Needed for Anxiety.     Dispense:  90 tablet     Refill:  3   • FLUoxetine (PROzac) 20 MG capsule     Sig: Take 1 capsule by mouth Daily.     Dispense:  30 capsule      Refill:  0       Return in about 4 months (around 5/14/2022).         This document has been electronically signed by Clair Lyman MD  January 14, 2022 08:44 EST    EMR Dragon transcription disclaimer:  Some of this encounter note is an electronic transcription translation of spoken language to printed text. The electronic translation of spoken language may permit erroneous, or at times, nonsensical words or phrases to be inadvertently transcribed; Although I have reviewed the note for such errors some may still exist.

## 2022-01-14 NOTE — PATIENT INSTRUCTIONS
"Transient Ischemic Attack  A transient ischemic attack (TIA) is a \"warning stroke\" that causes stroke-like symptoms that then go away quickly. The symptoms of a TIA come on suddenly, and they last less than 24 hours. Unlike a stroke, a TIA does not cause permanent damage to the brain. It is important to know the symptoms of a TIA and what to do. Seek medical care right away, even if your symptoms go away.  Having a TIA is a sign that you are at higher risk for a permanent stroke. Lifestyle changes and medical treatments can help prevent a stroke.  What are the causes?  This condition is caused by a temporary blockage in an artery in the head or neck. The blockage does not allow the brain to get the blood supply it needs and can cause various symptoms. The blockage can be caused by:  · Fatty buildup in an artery in the head or neck (atherosclerosis).  · A blood clot.  · Tearing of an artery (dissection).  · Inflammation of an artery (vasculitis).  Sometimes the cause is not known.  What increases the risk?  Certain factors may make you more likely to develop this condition. Some of these factors are things that you can change, such as:  · Obesity.  · Using products that contain nicotine or tobacco, such as cigarettes and e-cigarettes.  · Taking oral birth control, especially if you also use tobacco.  · Lack of physical activity.  · Excessive use of alcohol.  · Use of drugs, especially cocaine and methamphetamine.  Other risk factors include:  · High blood pressure (hypertension).  · High cholesterol.  · Diabetes mellitus.  · Heart disease (coronary artery disease).  · Atrial fibrillation.  · Being over the age of 60.  · Being male.  · Family history of stroke.  · Previous history of blood clots, stroke, TIA, or heart attack.  · Sickle cell disease.  · Being a woman with a history of preeclampsia.  · Migraine headache.  · Sleep apnea.  · Chronic inflammatory diseases, such as rheumatoid arthritis or lupus.  · Blood " clotting disorders (hypercoagulable state).  What are the signs or symptoms?  Symptoms of this condition are the same as those of a stroke, but they are temporary. The symptoms develop suddenly, and they go away quickly, usually within minutes to hours. Symptoms may include sudden:  · Weakness or numbness in your face, arm, or leg, especially on one side of your body.  · Trouble walking or difficulty moving your arms or legs.  · Trouble speaking, understanding speech, or both (aphasia).  · Vision changes in one or both eyes. These include double vision, blurred vision, or loss of vision.  · Dizziness.  · Confusion.  · Loss of balance or coordination.  · Nausea and vomiting.  · Severe headache with no known cause.  If possible, make note of the exact time that you last felt like your normal self and what time your symptoms started. Tell your health care provider.  How is this diagnosed?  This condition may be diagnosed based on:  · Your symptoms and medical history.  · A physical exam.  · Imaging tests, usually a CT or MRI scan of the brain.  · Blood tests.  You may also have other tests, including:  · Electrocardiogram (ECG).  · Echocardiogram.  · Carotid ultrasound.  · A scan of the brain circulation (CT angiogram or MRI angiogram).  · Continuous heart monitoring.  How is this treated?  The goal of treatment is to reduce the risk for a subsequent stroke. Treatment may include stroke prevention therapies such as:  · Changes to diet or lifestyle to decrease your risk. Lifestyle changes may include exercising and stopping smoking.  · Medicines to thin the blood (antiplatelets or anticoagulants).  · Blood pressure medicines.  · Medicines to reduce cholesterol.  · Treating other health conditions, such as diabetes or atrial fibrillation.  If testing shows that you have narrowing in the arteries to your brain, your health care provider may recommend a procedure, such as:  · Carotid endarterectomy. This is a surgery to  remove the blockage from your artery.  · Carotid angioplasty and stenting. This is a procedure to open or widen an artery in the neck using a metal mesh tube (stent). The stent helps keep the artery open by supporting the artery walls.  Follow these instructions at home:  Medicines  · Take over-the-counter and prescription medicines only as told by your health care provider.  · If you were told to take a medicine to thin your blood, such as aspirin or an anticoagulant, take it exactly as told by your health care provider.  ? Taking too much blood-thinning medicine can cause bleeding.  ? If you do not take enough blood-thinning medicine, you will not have the protection that you need against a stroke and other problems.  Eating and drinking    · Eat 5 or more servings of fruits and vegetables each day.  · Follow instructions from your health care provider about diet. You may need to follow a certain nutrition plan to help manage risk factors for stroke, such as high blood pressure, high cholesterol, diabetes, or obesity. This may include:  ? Eating a low-fat, low-salt diet.  ? Including a lot of fiber in your diet.  ? Limiting the amount of carbohydrates and sugar in your diet.  · Limit alcohol intake to no more than 1 drink a day for nonpregnant women and 2 drinks a day for men. One drink equals 12 oz of beer, 5 oz of wine, or 1½ oz of hard liquor.    General instructions  · Maintain a healthy weight.  · Stay physically active. Try to get at least 30 minutes of exercise on most or all days.  · Find out if you have sleep apnea, and seek treatment if needed.  · Do not use any products that contain nicotine or tobacco, such as cigarettes and e-cigarettes. If you need help quitting, ask your health care provider.  · Do not abuse drugs.  · Keep all follow-up visits as told by your health care provider. This is important.  Where to find more information  · American Stroke Association: www.strokeassociation.org  · National  Stroke Association: www.stroke.org  Get help right away if:  · You have chest pain or an irregular heartbeat.  · You have any symptoms of stroke. The acronym BEFAST is an easy way to remember the main warning signs of stroke.  ? B - Balance problems. Signs include dizziness, sudden trouble walking, or loss of balance.  ? E - Eye problems. This includes trouble seeing or a sudden change in vision.  ? F - Face changes. This includes sudden weakness or numbness of the face, or the face or eyelid drooping to one side.  ? A - Arm weakness or numbness. This happens suddenly and usually on one side of the body.  ? S - Speech problems. This includes trouble speaking or trouble understanding speech.  ? T - Time. Time to call 911 or seek emergency care. Do not wait to see if symptoms will go away. Make note of the time your symptoms started.  · Other signs of stroke may include:  ? A sudden, severe headache with no known cause.  ? Nausea or vomiting.  ? Seizure.  These symptoms may represent a serious problem that is an emergency. Do not wait to see if the symptoms will go away. Get medical help right away. Call your local emergency services (911 in the [place] Do not drive yourself to the hospital.  Summary  · A TIA happens when an artery in the head or neck is blocked, leading to stroke-like symptoms that then go away quickly. The blockage clears before there is any permanent brain damage. A TIA is a medical emergency and requires immediate medical attention.  · Symptoms of this condition are the same as those of a stroke, but they are temporary. The symptoms usually develop suddenly, and they go away quickly, usually within minutes to hours.  · Having a TIA means that you are at high risk of a stroke in the near future.  · Treatment may include medicines to thin the blood as well as medicines, diet changes, and lifestyle changes to manage conditions that increase the risk of another TIA or a stroke.  This information is not  intended to replace advice given to you by your health care provider. Make sure you discuss any questions you have with your health care provider.  Document Revised: 06/26/2020 Document Reviewed: 06/26/2020  Elsevier Patient Education © 2021 [employer]

## 2022-01-14 NOTE — TELEPHONE ENCOUNTER
Hub to read  ----- Message from Justa Castano MD sent at 1/14/2022  1:04 PM EST -----  CBC looks ok-none of the other results came to us-followup with ordering clinician

## 2022-02-24 ENCOUNTER — LAB (OUTPATIENT)
Dept: LAB | Facility: HOSPITAL | Age: 52
End: 2022-02-24

## 2022-02-24 ENCOUNTER — TRANSCRIBE ORDERS (OUTPATIENT)
Dept: ADMINISTRATIVE | Facility: HOSPITAL | Age: 52
End: 2022-02-24

## 2022-02-24 DIAGNOSIS — E89.6 HISTORY OF TOTAL ADRENALECTOMY: ICD-10-CM

## 2022-02-24 DIAGNOSIS — E89.6 HISTORY OF TOTAL ADRENALECTOMY: Primary | ICD-10-CM

## 2022-02-24 LAB
ALBUMIN SERPL-MCNC: 4 G/DL (ref 3.5–5.2)
ALBUMIN/GLOB SERPL: 1.6 G/DL
ALP SERPL-CCNC: 54 U/L (ref 39–117)
ALT SERPL W P-5'-P-CCNC: 22 U/L (ref 1–41)
ANION GAP SERPL CALCULATED.3IONS-SCNC: 8 MMOL/L (ref 5–15)
AST SERPL-CCNC: 18 U/L (ref 1–40)
BILIRUB SERPL-MCNC: 0.3 MG/DL (ref 0–1.2)
BUN SERPL-MCNC: 12 MG/DL (ref 6–20)
BUN/CREAT SERPL: 10.3 (ref 7–25)
CALCIUM SPEC-SCNC: 9.2 MG/DL (ref 8.6–10.5)
CHLORIDE SERPL-SCNC: 105 MMOL/L (ref 98–107)
CO2 SERPL-SCNC: 31 MMOL/L (ref 22–29)
CREAT SERPL-MCNC: 1.16 MG/DL (ref 0.76–1.27)
GFR SERPL CREATININE-BSD FRML MDRD: 67 ML/MIN/1.73
GLOBULIN UR ELPH-MCNC: 2.5 GM/DL
GLUCOSE SERPL-MCNC: 83 MG/DL (ref 65–99)
POTASSIUM SERPL-SCNC: 5.2 MMOL/L (ref 3.5–5.2)
PROT SERPL-MCNC: 6.5 G/DL (ref 6–8.5)
SODIUM SERPL-SCNC: 144 MMOL/L (ref 136–145)

## 2022-02-24 PROCEDURE — 36415 COLL VENOUS BLD VENIPUNCTURE: CPT

## 2022-02-24 PROCEDURE — 82627 DEHYDROEPIANDROSTERONE: CPT

## 2022-02-24 PROCEDURE — 80053 COMPREHEN METABOLIC PANEL: CPT

## 2022-02-24 PROCEDURE — 82024 ASSAY OF ACTH: CPT

## 2022-02-24 PROCEDURE — 84244 ASSAY OF RENIN: CPT

## 2022-02-25 LAB
ACTH PLAS-MCNC: 12.6 PG/ML (ref 7.2–63.3)
DHEA-S SERPL-MCNC: 121 UG/DL (ref 71.6–375.4)

## 2022-03-01 ENCOUNTER — HOSPITAL ENCOUNTER (EMERGENCY)
Facility: HOSPITAL | Age: 52
Discharge: LEFT BEFORE DISCHARGE | End: 2022-03-01

## 2022-03-01 VITALS
OXYGEN SATURATION: 99 % | SYSTOLIC BLOOD PRESSURE: 128 MMHG | HEIGHT: 72 IN | BODY MASS INDEX: 23.83 KG/M2 | DIASTOLIC BLOOD PRESSURE: 71 MMHG | WEIGHT: 175.93 LBS | RESPIRATION RATE: 16 BRPM | TEMPERATURE: 99 F | HEART RATE: 80 BPM

## 2022-03-01 LAB
RENIN PLAS-CCNC: 1.53 NG/ML/HR (ref 0.17–5.38)
S PYO AG THROAT QL: NEGATIVE
SARS-COV-2 RNA PNL SPEC NAA+PROBE: NOT DETECTED

## 2022-03-01 PROCEDURE — 87651 STREP A DNA AMP PROBE: CPT

## 2022-03-01 PROCEDURE — 87635 SARS-COV-2 COVID-19 AMP PRB: CPT

## 2022-03-01 PROCEDURE — 99211 OFF/OP EST MAY X REQ PHY/QHP: CPT

## 2022-03-02 ENCOUNTER — OFFICE VISIT (OUTPATIENT)
Dept: FAMILY MEDICINE CLINIC | Facility: CLINIC | Age: 52
End: 2022-03-02

## 2022-03-02 VITALS
DIASTOLIC BLOOD PRESSURE: 67 MMHG | TEMPERATURE: 97.2 F | SYSTOLIC BLOOD PRESSURE: 100 MMHG | BODY MASS INDEX: 23.3 KG/M2 | HEART RATE: 71 BPM | HEIGHT: 72 IN | OXYGEN SATURATION: 94 % | WEIGHT: 172 LBS

## 2022-03-02 DIAGNOSIS — J02.9 SORE THROAT: Primary | ICD-10-CM

## 2022-03-02 DIAGNOSIS — J02.0 STREP PHARYNGITIS: ICD-10-CM

## 2022-03-02 LAB
EXPIRATION DATE: ABNORMAL
EXPIRATION DATE: NORMAL
FLUAV AG NPH QL: NEGATIVE
FLUBV AG NPH QL: NEGATIVE
INTERNAL CONTROL: ABNORMAL
INTERNAL CONTROL: NORMAL
Lab: ABNORMAL
Lab: NORMAL
S PYO AG THROAT QL: POSITIVE

## 2022-03-02 PROCEDURE — 87804 INFLUENZA ASSAY W/OPTIC: CPT | Performed by: NURSE PRACTITIONER

## 2022-03-02 PROCEDURE — 87880 STREP A ASSAY W/OPTIC: CPT | Performed by: NURSE PRACTITIONER

## 2022-03-02 PROCEDURE — 99213 OFFICE O/P EST LOW 20 MIN: CPT | Performed by: NURSE PRACTITIONER

## 2022-03-02 RX ORDER — HYDROCORTISONE 10 MG/1
2.5 TABLET ORAL
COMMUNITY
Start: 2022-02-18 | End: 2022-12-22

## 2022-03-02 RX ORDER — AZITHROMYCIN 250 MG/1
TABLET, FILM COATED ORAL
Qty: 6 TABLET | Refills: 0 | Status: SHIPPED | OUTPATIENT
Start: 2022-03-02 | End: 2022-03-11

## 2022-03-02 NOTE — PROGRESS NOTES
"Subjective   Jourdan Lebrno is a 51 y.o. male presents for   Chief Complaint   Patient presents with   • Cough   • Hospital Follow Up Visit     Swabbed last night and was negative at [facility]; did not stay at hospital due to long wait   • Sore Throat       Health Maintenance Due   Topic Date Due   • COLORECTAL CANCER SCREENING  Never done   • INFLUENZA VACCINE  08/01/2021   • COVID-19 Vaccine (3 - Booster for Pfizer series) 02/17/2022       History of Present Illness   Pt present with cough and sore throat.  He states the past few days he's been aching and went to urgent care last night, but was told he likely needed more work up and went to ER.  He went to ER and was in a conference room with several other people and felt the wait was going to be too long.  He decided to leave and covid test was done and negative.      Vitals:    03/02/22 1353   BP: 100/67   BP Location: Right arm   Patient Position: Sitting   Cuff Size: Adult   Pulse: 71   Temp: 97.2 °F (36.2 °C)   TempSrc: Temporal   SpO2: 94%   Weight: 78 kg (172 lb)   Height: 182.9 cm (72\")     Body mass index is 23.33 kg/m².    Current Outpatient Medications on File Prior to Visit   Medication Sig Dispense Refill   • Alirocumab (Praluent) 75 MG/ML solution auto-injector Inject 1 mL under the skin into the appropriate area as directed Every 14 (Fourteen) Days. 2.24 mL 4   • ALPRAZolam (Xanax) 0.25 MG tablet Take 1 tablet by mouth 3 (Three) Times a Day As Needed for Anxiety. 90 tablet 3   • aspirin 81 MG chewable tablet Chew 81 mg Daily.     • FLUoxetine (PROzac) 20 MG capsule Take 1 capsule by mouth Daily. 30 capsule 0   • fluticasone (Flonase Allergy Relief) 50 MCG/ACT nasal spray 1 spray into the nostril(s) as directed by provider Daily.     • hydrocortisone (Cortef) 10 MG tablet 20 mg in the morning , 15 mg in the afternoon     • DHEA 10 MG capsule Take 1 tablet p.o. daily. 90 capsule 4   • hydrocortisone sodium succinate (Solu-CORTEF) 100 MG injection 100 mg as " needed for adrenal crises.       No current facility-administered medications on file prior to visit.       The following portions of the patient's history were reviewed and updated as appropriate: allergies, current medications, past family history, past medical history, past social history, past surgical history, and problem list.    Review of Systems   Constitutional: Negative for chills and fever.   HENT: Positive for sore throat. Negative for sinus pressure.    Eyes: Negative for blurred vision.   Respiratory: Positive for cough. Negative for shortness of breath.    Cardiovascular: Negative for chest pain.   Gastrointestinal: Negative for abdominal pain.   Endocrine: Negative.    Genitourinary: Negative.    Musculoskeletal: Negative for arthralgias and joint swelling.   Skin: Negative for color change.   Allergic/Immunologic: Negative.    Neurological: Negative for dizziness.   Hematological: Negative.    Psychiatric/Behavioral: Negative for behavioral problems.       Objective   Physical Exam  Vitals and nursing note reviewed.   Constitutional:       Appearance: Normal appearance. He is well-developed. He is ill-appearing.   HENT:      Head: Normocephalic and atraumatic.      Right Ear: External ear normal.      Left Ear: External ear normal.      Nose: Nose normal.      Mouth/Throat:      Pharynx: Posterior oropharyngeal erythema present.   Eyes:      Extraocular Movements: Extraocular movements intact.      Pupils: Pupils are equal, round, and reactive to light.   Cardiovascular:      Rate and Rhythm: Normal rate and regular rhythm.      Heart sounds: Normal heart sounds.   Pulmonary:      Effort: Pulmonary effort is normal.      Breath sounds: Normal breath sounds.   Abdominal:      General: Bowel sounds are normal.      Palpations: Abdomen is soft.   Musculoskeletal:         General: Normal range of motion.      Cervical back: Normal range of motion and neck supple.   Lymphadenopathy:      Cervical:  Cervical adenopathy present.   Skin:     General: Skin is warm and dry.   Neurological:      General: No focal deficit present.      Mental Status: He is alert and oriented to person, place, and time.   Psychiatric:         Mood and Affect: Mood normal.         Behavior: Behavior normal.       PHQ-9 Total Score:      Assessment/Plan   Diagnoses and all orders for this visit:    1. Sore throat (Primary)  -     POCT rapid strep A  -     POCT Influenza A/B  -     azithromycin (Zithromax Z-Grant) 250 MG tablet; Take 2 tablets by mouth on day 1, then 1 tablet daily on days 2-5  Dispense: 6 tablet; Refill: 0    2. Strep pharyngitis  Comments:  warm salt water gargles, tylenol prn  Orders:  -     azithromycin (Zithromax Z-Grant) 250 MG tablet; Take 2 tablets by mouth on day 1, then 1 tablet daily on days 2-5  Dispense: 6 tablet; Refill: 0        Patient Instructions   Pharyngitis    Pharyngitis is a sore throat (pharynx). This is when there is redness, pain, and swelling in your throat. Most of the time, this condition gets better on its own. In some cases, you may need medicine.  Follow these instructions at home:  · Take over-the-counter and prescription medicines only as told by your doctor.  ? If you were prescribed an antibiotic medicine, take it as told by your doctor. Do not stop taking the antibiotic even if you start to feel better.  ? Do not give children aspirin. Aspirin has been linked to Reye syndrome.  · Drink enough water and fluids to keep your pee (urine) clear or pale yellow.  · Get a lot of rest.  · Rinse your mouth (gargle) with a salt-water mixture 3-4 times a day or as needed. To make a salt-water mixture, completely dissolve ½-1 tsp of salt in 1 cup of warm water.  · If your doctor approves, you may use throat lozenges or sprays to soothe your throat.  Contact a doctor if:  · You have large, tender lumps in your neck.  · You have a rash.  · You cough up green, yellow-brown, or bloody spit.  Get help  right away if:  · You have a stiff neck.  · You drool or cannot swallow liquids.  · You cannot drink or take medicines without throwing up.  · You have very bad pain that does not go away with medicine.  · You have problems breathing, and it is not from a stuffy nose.  · You have new pain and swelling in your knees, ankles, wrists, or elbows.  Summary  · Pharyngitis is a sore throat (pharynx). This is when there is redness, pain, and swelling in your throat.  · If you were prescribed an antibiotic medicine, take it as told by your doctor. Do not stop taking the antibiotic even if you start to feel better.  · Most of the time, pharyngitis gets better on its own. Sometimes, you may need medicine.  This information is not intended to replace advice given to you by your health care provider. Make sure you discuss any questions you have with your health care provider.  Document Revised: 11/30/2018 Document Reviewed: 01/23/2018  Elsevier Patient Education © 2021 [employer] Inc.

## 2022-03-02 NOTE — ED NOTES
Pt states that he is just going to follow up with PCP Justa Parra. Pt dismissed off the board     Tania Owen RN  03/01/22 2054

## 2022-03-11 ENCOUNTER — OFFICE VISIT (OUTPATIENT)
Dept: FAMILY MEDICINE CLINIC | Facility: CLINIC | Age: 52
End: 2022-03-11

## 2022-03-11 VITALS
HEART RATE: 63 BPM | OXYGEN SATURATION: 95 % | SYSTOLIC BLOOD PRESSURE: 103 MMHG | TEMPERATURE: 97.8 F | WEIGHT: 177.8 LBS | HEIGHT: 72 IN | DIASTOLIC BLOOD PRESSURE: 71 MMHG | BODY MASS INDEX: 24.08 KG/M2

## 2022-03-11 DIAGNOSIS — R05.9 COUGH: ICD-10-CM

## 2022-03-11 DIAGNOSIS — E53.8 VITAMIN B12 DEFICIENCY: ICD-10-CM

## 2022-03-11 DIAGNOSIS — E89.6 HISTORY OF TOTAL ADRENALECTOMY: ICD-10-CM

## 2022-03-11 DIAGNOSIS — R68.82 REDUCED LIBIDO: ICD-10-CM

## 2022-03-11 DIAGNOSIS — Z86.73 HISTORY OF CEREBROVASCULAR ACCIDENT: ICD-10-CM

## 2022-03-11 DIAGNOSIS — E78.5 HYPERLIPIDEMIA, UNSPECIFIED HYPERLIPIDEMIA TYPE: Primary | ICD-10-CM

## 2022-03-11 DIAGNOSIS — F17.200 TOBACCO DEPENDENCE SYNDROME: ICD-10-CM

## 2022-03-11 DIAGNOSIS — Z12.11 SCREENING FOR COLON CANCER: ICD-10-CM

## 2022-03-11 DIAGNOSIS — F06.4 ANXIETY DISORDER DUE TO GENERAL MEDICAL CONDITION: Chronic | ICD-10-CM

## 2022-03-11 PROCEDURE — 99214 OFFICE O/P EST MOD 30 MIN: CPT | Performed by: PREVENTIVE MEDICINE

## 2022-03-11 RX ORDER — BENZONATATE 100 MG/1
100 CAPSULE ORAL 3 TIMES DAILY PRN
Qty: 30 CAPSULE | Refills: 0 | Status: SHIPPED | OUTPATIENT
Start: 2022-03-11 | End: 2022-09-16

## 2022-03-11 RX ORDER — DOXYCYCLINE 100 MG/1
100 CAPSULE ORAL 2 TIMES DAILY
Qty: 20 CAPSULE | Refills: 0 | Status: SHIPPED | OUTPATIENT
Start: 2022-03-11 | End: 2022-09-16

## 2022-03-11 NOTE — PATIENT INSTRUCTIONS
Health Maintenance Due   Topic Date Due    COLORECTAL CANCER SCREENING  Never done    INFLUENZA VACCINE  08/01/2021    COVID-19 Vaccine (3 - Booster for Pfizer series) 02/17/2022

## 2022-03-11 NOTE — PROGRESS NOTES
"Subjective   Jourdan Lebron is a 51 y.o. male presents for   Chief Complaint   Patient presents with   • Hyperlipidemia     3 month f/u not fasting     Patient presents today for follow-up of multiple chronic health conditions most of which seem stable.  His new endocrinologist is trying to spike up his adrenal gland so that they are cutting down on the steroids.  He did have a new cough that has been going on for 4 weeks he does continue to smoke cigarettes and we have advised him to stop is producing whitish fluid did have some fever initially but no recent fever and no real shortness of breath.  We will get a chest x-ray and try some doxycycline 100 mg twice daily since 2 weeks ago he was on a azithromycin For strep throat.  Work is going okay.  Health Maintenance Due   Topic Date Due   • COLORECTAL CANCER SCREENING  Never done   • INFLUENZA VACCINE  08/01/2021   • COVID-19 Vaccine (3 - Booster for Pfizer series) 02/17/2022       History of Present Illness     Vitals:    03/11/22 1140 03/11/22 1143   BP: 106/68 103/71   BP Location: Right arm Left arm   Patient Position: Sitting Sitting   Cuff Size: Large Adult Large Adult   Pulse: 63    Temp: 97.8 °F (36.6 °C)    TempSrc: Temporal    SpO2: 95%    Weight: 80.6 kg (177 lb 12.8 oz)    Height: 182.9 cm (72\")      Body mass index is 24.11 kg/m².    Current Outpatient Medications on File Prior to Visit   Medication Sig Dispense Refill   • Alirocumab (Praluent) 75 MG/ML solution auto-injector Inject 1 mL under the skin into the appropriate area as directed Every 14 (Fourteen) Days. 2.24 mL 4   • ALPRAZolam (Xanax) 0.25 MG tablet Take 1 tablet by mouth 3 (Three) Times a Day As Needed for Anxiety. 90 tablet 3   • aspirin 81 MG chewable tablet Chew 81 mg Daily.     • FLUoxetine (PROzac) 20 MG capsule Take 1 capsule by mouth Daily. 30 capsule 0   • fluticasone (FLONASE) 50 MCG/ACT nasal spray 1 spray into the nostril(s) as directed by provider Daily.     • hydrocortisone " (CORTEF) 10 MG tablet 20 mg in the morning , 15 mg in the afternoon     • hydrocortisone sodium succinate (Solu-CORTEF) 100 MG injection 100 mg as needed for adrenal crises.     • [DISCONTINUED] DHEA 10 MG capsule Take 1 tablet p.o. daily. 90 capsule 4   • [DISCONTINUED] azithromycin (Zithromax Z-Grant) 250 MG tablet Take 2 tablets by mouth on day 1, then 1 tablet daily on days 2-5 6 tablet 0     No current facility-administered medications on file prior to visit.       The following portions of the patient's history were reviewed and updated as appropriate: allergies, current medications, past family history, past medical history, past social history, past surgical history and problem list.    Review of Systems   Constitutional: Positive for fatigue.   Respiratory: Positive for cough.        Objective   Physical Exam  Vitals reviewed.   Constitutional:       General: He is not in acute distress.     Appearance: Normal appearance. He is well-developed. He is not ill-appearing or toxic-appearing.   HENT:      Head: Normocephalic and atraumatic.      Right Ear: Tympanic membrane, ear canal and external ear normal.      Left Ear: Tympanic membrane, ear canal and external ear normal.      Nose: Nose normal.   Eyes:      Extraocular Movements: Extraocular movements intact.      Conjunctiva/sclera: Conjunctivae normal.      Pupils: Pupils are equal, round, and reactive to light.   Cardiovascular:      Rate and Rhythm: Normal rate and regular rhythm.      Heart sounds: Normal heart sounds.   Pulmonary:      Effort: Pulmonary effort is normal.      Comments: Decreased breath sounds bilaterally  Abdominal:      General: Bowel sounds are normal. There is no distension.      Palpations: Abdomen is soft. There is no mass.      Tenderness: There is no abdominal tenderness.   Musculoskeletal:         General: Normal range of motion.      Cervical back: Neck supple.   Skin:     General: Skin is warm.   Neurological:      General: No  focal deficit present.      Mental Status: He is alert and oriented to person, place, and time.   Psychiatric:         Mood and Affect: Mood normal.         Behavior: Behavior normal.       PHQ-9 Total Score: 0    Assessment/Plan   Diagnoses and all orders for this visit:    1. Hyperlipidemia, unspecified hyperlipidemia type (Primary)  Comments:  Trying to eat less saturated fats  Orders:  -     Comprehensive Metabolic Panel  -     Lipid Panel    2. Screening for colon cancer  Comments:  Patient will send in the green folder paperwork    3. History of cerebrovascular accident  Comments:  Presently no follow-up planned with neurology    4. History of total adrenalectomy (HCC)  Comments:  Still seeing Endo-stopped steroid and trying to get his adrenal gland to read up.    5. Tobacco dependence syndrome  Comments:  Cut back alot-still smoking,  Cessation advised    6. Reduced libido  Comments:  Not on testoserone    7. Anxiety disorder due to general medical condition  Comments:  No HI or SI we will continue to follow-up with Dr. Chin    8. Vitamin B12 deficiency  Comments:  Takes regularly.  Orders:  -     CBC Auto Differential  -     Vitamin B12    9. Cough  Comments:  Cough for approximately 4 weeks.  Patient is bringing up sputum that is white in color has not had any recent fever but continues to smoke.  Is not having any c  Orders:  -     XR Chest PA & Lateral    Other orders  -     doxycycline (MONODOX) 100 MG capsule; Take 1 capsule by mouth 2 (Two) Times a Day.  Dispense: 20 capsule; Refill: 0  -     benzonatate (Tessalon Perles) 100 MG capsule; Take 1 capsule by mouth 3 (Three) Times a Day As Needed for Cough.  Dispense: 30 capsule; Refill: 0        Patient Instructions     Health Maintenance Due   Topic Date Due   • COLORECTAL CANCER SCREENING  Never done   • INFLUENZA VACCINE  08/01/2021   • COVID-19 Vaccine (3 - Booster for Pfizer series) 02/17/2022

## 2022-03-16 ENCOUNTER — TELEPHONE (OUTPATIENT)
Dept: FAMILY MEDICINE CLINIC | Facility: CLINIC | Age: 52
End: 2022-03-16

## 2022-03-16 ENCOUNTER — LAB (OUTPATIENT)
Dept: LAB | Facility: HOSPITAL | Age: 52
End: 2022-03-16

## 2022-03-16 DIAGNOSIS — D75.1 ERYTHROCYTOSIS: ICD-10-CM

## 2022-03-16 LAB
ALBUMIN SERPL-MCNC: 3.9 G/DL (ref 3.5–5.2)
ALBUMIN/GLOB SERPL: 1.5 G/DL
ALP SERPL-CCNC: 63 U/L (ref 39–117)
ALT SERPL W P-5'-P-CCNC: 24 U/L (ref 1–41)
ANION GAP SERPL CALCULATED.3IONS-SCNC: 5 MMOL/L (ref 5–15)
AST SERPL-CCNC: 16 U/L (ref 1–40)
BASOPHILS # BLD AUTO: 0.07 10*3/MM3 (ref 0–0.2)
BASOPHILS NFR BLD AUTO: 0.8 % (ref 0–1.5)
BILIRUB SERPL-MCNC: 0.4 MG/DL (ref 0–1.2)
BUN SERPL-MCNC: 13 MG/DL (ref 6–20)
BUN/CREAT SERPL: 11.9 (ref 7–25)
CALCIUM SPEC-SCNC: 9.2 MG/DL (ref 8.6–10.5)
CHLORIDE SERPL-SCNC: 102 MMOL/L (ref 98–107)
CHOLEST SERPL-MCNC: 134 MG/DL (ref 0–200)
CO2 SERPL-SCNC: 30 MMOL/L (ref 22–29)
CREAT SERPL-MCNC: 1.09 MG/DL (ref 0.76–1.27)
DEPRECATED RDW RBC AUTO: 46.7 FL (ref 37–54)
EGFRCR SERPLBLD CKD-EPI 2021: 83.2 ML/MIN/1.73
EOSINOPHIL # BLD AUTO: 0.24 10*3/MM3 (ref 0–0.4)
EOSINOPHIL NFR BLD AUTO: 2.6 % (ref 0.3–6.2)
ERYTHROCYTE [DISTWIDTH] IN BLOOD BY AUTOMATED COUNT: 13 % (ref 12.3–15.4)
GLOBULIN UR ELPH-MCNC: 2.6 GM/DL
GLUCOSE SERPL-MCNC: 77 MG/DL (ref 65–99)
HCT VFR BLD AUTO: 50.1 % (ref 37.5–51)
HDLC SERPL-MCNC: 51 MG/DL (ref 40–60)
HGB BLD-MCNC: 17 G/DL (ref 13–17.7)
IMM GRANULOCYTES # BLD AUTO: 0.06 10*3/MM3 (ref 0–0.05)
IMM GRANULOCYTES NFR BLD AUTO: 0.7 % (ref 0–0.5)
LDLC SERPL CALC-MCNC: 55 MG/DL (ref 0–100)
LDLC/HDLC SERPL: 0.97 {RATIO}
LYMPHOCYTES # BLD AUTO: 3.81 10*3/MM3 (ref 0.7–3.1)
LYMPHOCYTES NFR BLD AUTO: 41.6 % (ref 19.6–45.3)
MCH RBC QN AUTO: 33.3 PG (ref 26.6–33)
MCHC RBC AUTO-ENTMCNC: 33.9 G/DL (ref 31.5–35.7)
MCV RBC AUTO: 98 FL (ref 79–97)
MONOCYTES # BLD AUTO: 0.91 10*3/MM3 (ref 0.1–0.9)
MONOCYTES NFR BLD AUTO: 9.9 % (ref 5–12)
NEUTROPHILS NFR BLD AUTO: 4.06 10*3/MM3 (ref 1.7–7)
NEUTROPHILS NFR BLD AUTO: 44.4 % (ref 42.7–76)
NRBC BLD AUTO-RTO: 0 /100 WBC (ref 0–0.2)
PLATELET # BLD AUTO: 343 10*3/MM3 (ref 140–450)
PMV BLD AUTO: 9.4 FL (ref 6–12)
POTASSIUM SERPL-SCNC: 4.7 MMOL/L (ref 3.5–5.2)
PROT SERPL-MCNC: 6.5 G/DL (ref 6–8.5)
RBC # BLD AUTO: 5.11 10*6/MM3 (ref 4.14–5.8)
SODIUM SERPL-SCNC: 137 MMOL/L (ref 136–145)
TRIGL SERPL-MCNC: 168 MG/DL (ref 0–150)
VIT B12 BLD-MCNC: 1310 PG/ML (ref 211–946)
VLDLC SERPL-MCNC: 28 MG/DL (ref 5–40)
WBC NRBC COR # BLD: 9.15 10*3/MM3 (ref 3.4–10.8)

## 2022-03-16 PROCEDURE — 82607 VITAMIN B-12: CPT | Performed by: PREVENTIVE MEDICINE

## 2022-03-16 PROCEDURE — 80061 LIPID PANEL: CPT | Performed by: PREVENTIVE MEDICINE

## 2022-03-16 PROCEDURE — 36415 COLL VENOUS BLD VENIPUNCTURE: CPT

## 2022-03-16 PROCEDURE — 80053 COMPREHEN METABOLIC PANEL: CPT | Performed by: PREVENTIVE MEDICINE

## 2022-03-16 PROCEDURE — 85025 COMPLETE CBC W/AUTO DIFF WBC: CPT

## 2022-03-17 ENCOUNTER — TELEPHONE (OUTPATIENT)
Dept: FAMILY MEDICINE CLINIC | Facility: CLINIC | Age: 52
End: 2022-03-17

## 2022-03-17 NOTE — TELEPHONE ENCOUNTER
----- Message from Justa Castano MD sent at 3/17/2022  8:08 AM EDT -----    HUB TO READ    Vitamin B12 is elevated he can decrease his over-the-counter dose rest the labs look good.

## 2022-03-28 ENCOUNTER — TRANSCRIBE ORDERS (OUTPATIENT)
Dept: ADMINISTRATIVE | Facility: HOSPITAL | Age: 52
End: 2022-03-28

## 2022-03-28 DIAGNOSIS — E27.40 ADRENAL INSUFFICIENCY: Primary | ICD-10-CM

## 2022-03-29 ENCOUNTER — LAB (OUTPATIENT)
Dept: LAB | Facility: HOSPITAL | Age: 52
End: 2022-03-29

## 2022-03-29 DIAGNOSIS — E27.40 ADRENAL INSUFFICIENCY: ICD-10-CM

## 2022-03-29 LAB
ALBUMIN SERPL-MCNC: 3.9 G/DL (ref 3.5–5.2)
ALBUMIN/GLOB SERPL: 1.6 G/DL
ALP SERPL-CCNC: 53 U/L (ref 39–117)
ALT SERPL W P-5'-P-CCNC: 17 U/L (ref 1–41)
ANION GAP SERPL CALCULATED.3IONS-SCNC: 6 MMOL/L (ref 5–15)
AST SERPL-CCNC: 15 U/L (ref 1–40)
BILIRUB SERPL-MCNC: 0.2 MG/DL (ref 0–1.2)
BUN SERPL-MCNC: 8 MG/DL (ref 6–20)
BUN/CREAT SERPL: 7.2 (ref 7–25)
CALCIUM SPEC-SCNC: 9.3 MG/DL (ref 8.6–10.5)
CHLORIDE SERPL-SCNC: 104 MMOL/L (ref 98–107)
CO2 SERPL-SCNC: 31 MMOL/L (ref 22–29)
CREAT SERPL-MCNC: 1.11 MG/DL (ref 0.76–1.27)
EGFRCR SERPLBLD CKD-EPI 2021: 81.4 ML/MIN/1.73
GLOBULIN UR ELPH-MCNC: 2.4 GM/DL
GLUCOSE SERPL-MCNC: 80 MG/DL (ref 65–99)
POTASSIUM SERPL-SCNC: 4.8 MMOL/L (ref 3.5–5.2)
PROT SERPL-MCNC: 6.3 G/DL (ref 6–8.5)
SODIUM SERPL-SCNC: 141 MMOL/L (ref 136–145)
T4 FREE SERPL-MCNC: 1.2 NG/DL (ref 0.93–1.7)
TSH SERPL DL<=0.05 MIU/L-ACNC: 3.38 UIU/ML (ref 0.27–4.2)

## 2022-03-29 PROCEDURE — 84439 ASSAY OF FREE THYROXINE: CPT

## 2022-03-29 PROCEDURE — 82627 DEHYDROEPIANDROSTERONE: CPT

## 2022-03-29 PROCEDURE — 36415 COLL VENOUS BLD VENIPUNCTURE: CPT

## 2022-03-29 PROCEDURE — 80053 COMPREHEN METABOLIC PANEL: CPT

## 2022-03-29 PROCEDURE — 82024 ASSAY OF ACTH: CPT

## 2022-03-29 PROCEDURE — 84443 ASSAY THYROID STIM HORMONE: CPT

## 2022-03-30 LAB
ACTH PLAS-MCNC: 10.2 PG/ML (ref 7.2–63.3)
DHEA-S SERPL-MCNC: 105 UG/DL (ref 71.6–375.4)

## 2022-03-31 ENCOUNTER — TELEPHONE (OUTPATIENT)
Dept: FAMILY MEDICINE CLINIC | Facility: CLINIC | Age: 52
End: 2022-03-31

## 2022-03-31 NOTE — TELEPHONE ENCOUNTER
Dr. Ramírez HUB TO READ:  ----- Message from Justa Castano MD sent at 3/31/2022  7:53 AM EDT -----  Labs look normal- keep up good work

## 2022-05-06 ENCOUNTER — LAB (OUTPATIENT)
Dept: LAB | Facility: HOSPITAL | Age: 52
End: 2022-05-06

## 2022-05-06 ENCOUNTER — TRANSCRIBE ORDERS (OUTPATIENT)
Dept: ADMINISTRATIVE | Facility: HOSPITAL | Age: 52
End: 2022-05-06

## 2022-05-06 ENCOUNTER — OFFICE VISIT (OUTPATIENT)
Dept: PSYCHIATRY | Facility: CLINIC | Age: 52
End: 2022-05-06

## 2022-05-06 DIAGNOSIS — E89.6: Primary | ICD-10-CM

## 2022-05-06 DIAGNOSIS — E89.6: ICD-10-CM

## 2022-05-06 DIAGNOSIS — F06.30 MOOD DISORDER DUE TO A GENERAL MEDICAL CONDITION: Primary | Chronic | ICD-10-CM

## 2022-05-06 DIAGNOSIS — F06.4 ANXIETY DISORDER DUE TO GENERAL MEDICAL CONDITION: Chronic | ICD-10-CM

## 2022-05-06 LAB
ALBUMIN SERPL-MCNC: 3.7 G/DL (ref 3.5–5.2)
ALBUMIN/GLOB SERPL: 1.3 G/DL
ALP SERPL-CCNC: 48 U/L (ref 39–117)
ALT SERPL W P-5'-P-CCNC: 16 U/L (ref 1–41)
ANION GAP SERPL CALCULATED.3IONS-SCNC: 9.7 MMOL/L (ref 5–15)
AST SERPL-CCNC: 15 U/L (ref 1–40)
BILIRUB SERPL-MCNC: 0.2 MG/DL (ref 0–1.2)
BUN SERPL-MCNC: 12 MG/DL (ref 6–20)
BUN/CREAT SERPL: 12.1 (ref 7–25)
CALCIUM SPEC-SCNC: 9.7 MG/DL (ref 8.6–10.5)
CHLORIDE SERPL-SCNC: 103 MMOL/L (ref 98–107)
CO2 SERPL-SCNC: 25.3 MMOL/L (ref 22–29)
CREAT SERPL-MCNC: 0.99 MG/DL (ref 0.76–1.27)
EGFRCR SERPLBLD CKD-EPI 2021: 92.8 ML/MIN/1.73
GLOBULIN UR ELPH-MCNC: 2.9 GM/DL
GLUCOSE SERPL-MCNC: 72 MG/DL (ref 65–99)
POTASSIUM SERPL-SCNC: 4.2 MMOL/L (ref 3.5–5.2)
PROT SERPL-MCNC: 6.6 G/DL (ref 6–8.5)
SODIUM SERPL-SCNC: 138 MMOL/L (ref 136–145)

## 2022-05-06 PROCEDURE — 80053 COMPREHEN METABOLIC PANEL: CPT

## 2022-05-06 PROCEDURE — 36415 COLL VENOUS BLD VENIPUNCTURE: CPT

## 2022-05-06 PROCEDURE — 82024 ASSAY OF ACTH: CPT

## 2022-05-06 PROCEDURE — 99214 OFFICE O/P EST MOD 30 MIN: CPT | Performed by: PSYCHIATRY & NEUROLOGY

## 2022-05-06 PROCEDURE — 84244 ASSAY OF RENIN: CPT

## 2022-05-06 PROCEDURE — 82627 DEHYDROEPIANDROSTERONE: CPT

## 2022-05-06 RX ORDER — FLUOXETINE HYDROCHLORIDE 20 MG/1
20 CAPSULE ORAL DAILY
Qty: 30 CAPSULE | Refills: 3 | Status: SHIPPED | OUTPATIENT
Start: 2022-05-06 | End: 2022-06-22

## 2022-05-06 RX ORDER — ALPRAZOLAM 0.25 MG/1
0.25 TABLET ORAL 3 TIMES DAILY PRN
Qty: 90 TABLET | Refills: 3 | Status: SHIPPED | OUTPATIENT
Start: 2022-05-06 | End: 2022-08-18 | Stop reason: SDUPTHER

## 2022-05-06 NOTE — PROGRESS NOTES
Subjective   Jourdan Lebron is a 52 y.o. 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, left middle cerebral artery , the pt is R handed    he had troubles talking , had muscle weakness   Oct 2020 - went back to work light duty , still had issues completing his tasks   Feb 2021- adrenal gland removed and that caused more issues, more medical w/u and ER visits, weight loss   Now on steroid replacement which is also not completely controlled     The pt was started on zoloft - no major changes, it had to be changed to prozac - tolerated well and started feeling less depressed, he is back to work now , prozac was increased to 40 mg , however, he did not tolerate, reported increased anxiety      Today the pt reported feeling better on prozac 20 mg , depression is rated as 4-5/10 , but feels emotionally flat   The pt c/o decreased concentration, decreased memory  E level is still low - he is off fludrocortisone now      Sleep - fragmented  , not restorative ,   Denied AVH/SI/HI     Anxiety - manageable on xanax    The following portions of the patient's history were reviewed and updated as appropriate: allergies, current medications, past family history, past medical history, past social history, past surgical history and problem list.    PAST PSYCHIATRIC HISTORY  Axis I  Affective/Bipolar Disorder, Anxiety/Panic Disorder  Axis II  Denied     PAST OUTPATIENT TREATMENT  Diagnosis treated:  Anxiety, depression   Treatment Type:  Medication Management  Prior Psychiatric Medications:  lexapro - not effective  Buspirone - side effects   Support Groups:  None   Sequelae Of Mental Disorder:  job disruption, emotional distress          Interval History  No changes     Side Effects  prozac - emotional flattening       Past Medical History:  Past  Medical History:   Diagnosis Date   • Brain fog    • Hand tingling    • Hyperlipidemia    • Tiredness        Social History:  Social History     Socioeconomic History   • Marital status:    Tobacco Use   • Smoking status: Current Every Day Smoker     Packs/day: 1.00     Years: 30.00     Pack years: 30.00     Types: Cigars   • Smokeless tobacco: Never Used   • Tobacco comment: 1 packs= 2 cigars a day; only smokes cigars   Vaping Use   • Vaping Use: Never used   Substance and Sexual Activity   • Alcohol use: Not Currently     Alcohol/week: 8.0 standard drinks     Types: 8 Cans of beer per week     Comment: Has now been abstinent for about one year   • Drug use: Never   • Sexual activity: Yes     Partners: Female     Birth control/protection: None       Family History:  Family History   Problem Relation Age of Onset   • Arthritis Mother    • Hypertension Mother    • Heart failure Mother 73        Cause of death   • Arthritis Father    • Hypertension Father    • Kidney cancer Father 57        Cause of death. Was a smoker   • Heart disease Brother    • Esophageal cancer Brother 59        Cause of death. Has a heavy drinker       Past Surgical History:  Past Surgical History:   Procedure Laterality Date   • ADRENALECTOMY     • KIDNEY STONE SURGERY     • THROMBECTOMY         Problem List:  Patient Active Problem List   Diagnosis   • Abnormal electrocardiogram   • Calculus of kidney   • History of cerebrovascular accident   • Family history of malignant neoplasm   • Generalized hyperhidrosis   • Hyperlipidemia   • Seasonal allergies   • Tobacco dependence syndrome   • Cervical radiculopathy   • Cervical stenosis of spinal canal   • Near syncope   • Other fatigue   • History of total adrenalectomy (HCC)   • Arthropathy of cervical facet joint   • Neck pain   • Pain in left arm   • Shoulder pain   • Seasonal allergic rhinitis   • Thoracic back pain   • Vitamin B12 deficiency   • Electrocardiogram abnormal   • Spinal  stenosis of cervical region   • Reduced libido   • Hypogonadotropic hypogonadism (HCC)   • Mood disorder due to a general medical condition   • Anxiety disorder due to general medical condition   • Macrocytosis   • Erythrocytosis   • Postprocedural adrenocortical (-medullary) hypofunction (HCC)       Allergy:   Allergies   Allergen Reactions   • Cephalosporins Anaphylaxis     Throat swelling   • Dye  [Contrast Dye] Hives   • Sulfa Antibiotics Hives   • Zetia [Ezetimibe] Other (See Comments)     Made tired   • Iodinated Diagnostic Agents Unknown - Low Severity   • Statins Myalgia     Myalgia and fatique   • Sulfate Unknown - Low Severity        Discontinued Medications:  Medications Discontinued During This Encounter   Medication Reason   • ALPRAZolam (Xanax) 0.25 MG tablet Reorder   • FLUoxetine (PROzac) 20 MG capsule Reorder       Current Medications:   Current Outpatient Medications   Medication Sig Dispense Refill   • ALPRAZolam (Xanax) 0.25 MG tablet Take 1 tablet by mouth 3 (Three) Times a Day As Needed for Anxiety. 90 tablet 3   • FLUoxetine (PROzac) 20 MG capsule Take 1 capsule by mouth Daily. 30 capsule 3   • Alirocumab (Praluent) 75 MG/ML solution auto-injector Inject 1 mL under the skin into the appropriate area as directed Every 14 (Fourteen) Days. 2.24 mL 4   • aspirin 81 MG chewable tablet Chew 81 mg Daily.     • benzonatate (Tessalon Perles) 100 MG capsule Take 1 capsule by mouth 3 (Three) Times a Day As Needed for Cough. 30 capsule 0   • doxycycline (MONODOX) 100 MG capsule Take 1 capsule by mouth 2 (Two) Times a Day. 20 capsule 0   • fluticasone (FLONASE) 50 MCG/ACT nasal spray 1 spray into the nostril(s) as directed by provider Daily.     • hydrocortisone (CORTEF) 10 MG tablet 20 mg in the morning , 15 mg in the afternoon     • hydrocortisone sodium succinate (Solu-CORTEF) 100 MG injection 100 mg as needed for adrenal crises.       No current facility-administered medications for this visit.          Psychological ROS: positive for - anxiety, concentration, depression,  irritability and memory difficulties  Negative for AVH/SI/HI, delusions       Physical Exam:   There were no vitals taken for this visit.    Mental Status Exam:   Hygiene:   good  Cooperation:  Cooperative  Eye Contact:  Good  Psychomotor Behavior:  Appropriate  Affect:  Appropriate and Blunted  Mood: fluctates  Hopelessness: Denies  Speech:  Normal  Thought Process:  Goal directed and Linear  Thought Content:  Normal and Mood congruent  Suicidal:  None  Homicidal:  None  Hallucinations:  None  Delusion:  None  Memory:  Deficits  Orientation:  Person, Place, Time and Situation  Reliability:  good  Insight:  Good  Judgement:  Good  Impulse Control:  Fair  Physical/Medical Issues:  Yes         MSE from 1/14/2022  reviewed and accepted with changes   PHQ-9 Depression Screening  Little interest or pleasure in doing things? 2-->more than half the days   Feeling down, depressed, or hopeless? 1-->several days   Trouble falling or staying asleep, or sleeping too much? 1-->several days   Feeling tired or having little energy? 3-->nearly every day   Poor appetite or overeating? 0-->not at all   Feeling bad about yourself - or that you are a failure or have let yourself or your family down? 1-->several days   Trouble concentrating on things, such as reading the newspaper or watching television? 2-->more than half the days   Moving or speaking so slowly that other people could have noticed? Or the opposite - being so fidgety or restless that you have been moving around a lot more than usual? 0-->not at all   Thoughts that you would be better off dead, or of hurting yourself in some way? 0-->not at all   PHQ-9 Total Score 10   If you checked off any problems, how difficult have these problems made it for you to do your work, take care of things at home, or get along with other people? very difficult           Current every day smoker 3-10 mintues  spent counseling Has reduced Tobbacos use    I advised Jourdan of the risks of tobacco use.     Lab Results:   Lab on 03/29/2022   Component Date Value Ref Range Status   • TSH 03/29/2022 3.380  0.270 - 4.200 uIU/mL Final   • Free T4 03/29/2022 1.20  0.93 - 1.70 ng/dL Final   • Glucose 03/29/2022 80  65 - 99 mg/dL Final   • BUN 03/29/2022 8  6 - 20 mg/dL Final   • Creatinine 03/29/2022 1.11  0.76 - 1.27 mg/dL Final   • Sodium 03/29/2022 141  136 - 145 mmol/L Final   • Potassium 03/29/2022 4.8  3.5 - 5.2 mmol/L Final   • Chloride 03/29/2022 104  98 - 107 mmol/L Final   • CO2 03/29/2022 31.0 (A) 22.0 - 29.0 mmol/L Final   • Calcium 03/29/2022 9.3  8.6 - 10.5 mg/dL Final   • Total Protein 03/29/2022 6.3  6.0 - 8.5 g/dL Final   • Albumin 03/29/2022 3.90  3.50 - 5.20 g/dL Final   • ALT (SGPT) 03/29/2022 17  1 - 41 U/L Final   • AST (SGOT) 03/29/2022 15  1 - 40 U/L Final   • Alkaline Phosphatase 03/29/2022 53  39 - 117 U/L Final   • Total Bilirubin 03/29/2022 0.2  0.0 - 1.2 mg/dL Final   • Globulin 03/29/2022 2.4  gm/dL Final   • A/G Ratio 03/29/2022 1.6  g/dL Final   • BUN/Creatinine Ratio 03/29/2022 7.2  7.0 - 25.0 Final   • Anion Gap 03/29/2022 6.0  5.0 - 15.0 mmol/L Final   • eGFR 03/29/2022 81.4  >60.0 mL/min/1.73 Final    National Kidney Foundation and American Society of Nephrology (ASN) Task Force recommended calculation based on the Chronic Kidney Disease Epidemiology Collaboration (CKD-EPI) equation refit without adjustment for race.   • ACTH 03/29/2022 10.2  7.2 - 63.3 pg/mL Final    ACTH reference interval for samples collected between 7 and 10 AM.   • DHEA-Sulfate 03/29/2022 105.0  71.6 - 375.4 ug/dL Final   Lab on 03/16/2022   Component Date Value Ref Range Status   • WBC 03/16/2022 9.15  3.40 - 10.80 10*3/mm3 Final   • RBC 03/16/2022 5.11  4.14 - 5.80 10*6/mm3 Final   • Hemoglobin 03/16/2022 17.0  13.0 - 17.7 g/dL Final   • Hematocrit 03/16/2022 50.1  37.5 - 51.0 % Final   • MCV 03/16/2022 98.0 (A) 79.0 - 97.0  fL Final   • MCH 03/16/2022 33.3 (A) 26.6 - 33.0 pg Final   • MCHC 03/16/2022 33.9  31.5 - 35.7 g/dL Final   • RDW 03/16/2022 13.0  12.3 - 15.4 % Final   • RDW-SD 03/16/2022 46.7  37.0 - 54.0 fl Final   • MPV 03/16/2022 9.4  6.0 - 12.0 fL Final   • Platelets 03/16/2022 343  140 - 450 10*3/mm3 Final   • Neutrophil % 03/16/2022 44.4  42.7 - 76.0 % Final   • Lymphocyte % 03/16/2022 41.6  19.6 - 45.3 % Final   • Monocyte % 03/16/2022 9.9  5.0 - 12.0 % Final   • Eosinophil % 03/16/2022 2.6  0.3 - 6.2 % Final   • Basophil % 03/16/2022 0.8  0.0 - 1.5 % Final   • Immature Grans % 03/16/2022 0.7 (A) 0.0 - 0.5 % Final   • Neutrophils, Absolute 03/16/2022 4.06  1.70 - 7.00 10*3/mm3 Final   • Lymphocytes, Absolute 03/16/2022 3.81 (A) 0.70 - 3.10 10*3/mm3 Final   • Monocytes, Absolute 03/16/2022 0.91 (A) 0.10 - 0.90 10*3/mm3 Final   • Eosinophils, Absolute 03/16/2022 0.24  0.00 - 0.40 10*3/mm3 Final   • Basophils, Absolute 03/16/2022 0.07  0.00 - 0.20 10*3/mm3 Final   • Immature Grans, Absolute 03/16/2022 0.06 (A) 0.00 - 0.05 10*3/mm3 Final   • nRBC 03/16/2022 0.0  0.0 - 0.2 /100 WBC Final   Office Visit on 03/11/2022   Component Date Value Ref Range Status   • Glucose 03/16/2022 77  65 - 99 mg/dL Final   • BUN 03/16/2022 13  6 - 20 mg/dL Final   • Creatinine 03/16/2022 1.09  0.76 - 1.27 mg/dL Final   • Sodium 03/16/2022 137  136 - 145 mmol/L Final   • Potassium 03/16/2022 4.7  3.5 - 5.2 mmol/L Final   • Chloride 03/16/2022 102  98 - 107 mmol/L Final   • CO2 03/16/2022 30.0 (A) 22.0 - 29.0 mmol/L Final   • Calcium 03/16/2022 9.2  8.6 - 10.5 mg/dL Final   • Total Protein 03/16/2022 6.5  6.0 - 8.5 g/dL Final   • Albumin 03/16/2022 3.90  3.50 - 5.20 g/dL Final   • ALT (SGPT) 03/16/2022 24  1 - 41 U/L Final   • AST (SGOT) 03/16/2022 16  1 - 40 U/L Final   • Alkaline Phosphatase 03/16/2022 63  39 - 117 U/L Final   • Total Bilirubin 03/16/2022 0.4  0.0 - 1.2 mg/dL Final   • Globulin 03/16/2022 2.6  gm/dL Final   • A/G Ratio 03/16/2022  1.5  g/dL Final   • BUN/Creatinine Ratio 03/16/2022 11.9  7.0 - 25.0 Final   • Anion Gap 03/16/2022 5.0  5.0 - 15.0 mmol/L Final   • eGFR 03/16/2022 83.2  >60.0 mL/min/1.73 Final    National Kidney Foundation and American Society of Nephrology (ASN) Task Force recommended calculation based on the Chronic Kidney Disease Epidemiology Collaboration (CKD-EPI) equation refit without adjustment for race.   • Total Cholesterol 03/16/2022 134  0 - 200 mg/dL Final   • Triglycerides 03/16/2022 168 (A) 0 - 150 mg/dL Final   • HDL Cholesterol 03/16/2022 51  40 - 60 mg/dL Final   • LDL Cholesterol  03/16/2022 55  0 - 100 mg/dL Final   • VLDL Cholesterol 03/16/2022 28  5 - 40 mg/dL Final   • LDL/HDL Ratio 03/16/2022 0.97   Final   • Vitamin B-12 03/16/2022 1,310 (A) 211 - 946 pg/mL Final   Office Visit on 03/02/2022   Component Date Value Ref Range Status   • Rapid Strep A Screen 03/02/2022 Positive (A) Negative, VALID, INVALID, Not Performed Final   • Internal Control 03/02/2022 Passed  Passed Final   • Lot Number 03/02/2022 [id number]   Final   • Expiration Date 03/02/2022 10/27/2023   Final   • Rapid Influenza A Ag 03/02/2022 Negative  Negative Final   • Rapid Influenza B Ag 03/02/2022 Negative  Negative Final   • Internal Control 03/02/2022 Passed  Passed Final   • Lot Number 03/02/2022 [id number]   Final   • Expiration Date 03/02/2022 5/7/2023   Final   Lab on 02/24/2022   Component Date Value Ref Range Status   • Glucose 02/24/2022 83  65 - 99 mg/dL Final   • BUN 02/24/2022 12  6 - 20 mg/dL Final   • Creatinine 02/24/2022 1.16  0.76 - 1.27 mg/dL Final   • Sodium 02/24/2022 144  136 - 145 mmol/L Final   • Potassium 02/24/2022 5.2  3.5 - 5.2 mmol/L Final    Slight hemolysis detected by analyzer. Results may be affected.   • Chloride 02/24/2022 105  98 - 107 mmol/L Final   • CO2 02/24/2022 31.0 (A) 22.0 - 29.0 mmol/L Final   • Calcium 02/24/2022 9.2  8.6 - 10.5 mg/dL Final   • Total Protein 02/24/2022 6.5  6.0 - 8.5 g/dL Final   •  Albumin 02/24/2022 4.00  3.50 - 5.20 g/dL Final   • ALT (SGPT) 02/24/2022 22  1 - 41 U/L Final   • AST (SGOT) 02/24/2022 18  1 - 40 U/L Final   • Alkaline Phosphatase 02/24/2022 54  39 - 117 U/L Final   • Total Bilirubin 02/24/2022 0.3  0.0 - 1.2 mg/dL Final   • eGFR Non  Amer 02/24/2022 67  >60 mL/min/1.73 Final   • Globulin 02/24/2022 2.5  gm/dL Final   • A/G Ratio 02/24/2022 1.6  g/dL Final   • BUN/Creatinine Ratio 02/24/2022 10.3  7.0 - 25.0 Final   • Anion Gap 02/24/2022 8.0  5.0 - 15.0 mmol/L Final   • ACTH 02/24/2022 12.6  7.2 - 63.3 pg/mL Final    ACTH reference interval for samples collected between 7 and 10 AM.   • DHEA-Sulfate 02/24/2022 121.0  71.6 - 375.4 ug/dL Final   • Renin Activity 02/24/2022 1.529  0.167 - 5.380 ng/mL/hr Final       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)    Relevant Medications    ALPRAZolam (Xanax) 0.25 MG tablet    FLUoxetine (PROzac) 20 MG capsule    Anxiety disorder due to general medical condition (Chronic)    Relevant Medications    ALPRAZolam (Xanax) 0.25 MG tablet    FLUoxetine (PROzac) 20 MG capsule      Diagnoses       Codes Comments    Mood disorder due to a general medical condition    -  Primary ICD-10-CM: F06.30  ICD-9-CM: 293.83     Anxiety disorder due to general medical condition     ICD-10-CM: F06.4  ICD-9-CM: 293.84       medical condition : glucocorticoid  deficiency , s/p CVA (Left Middle cerebral artery )     Visit Diagnoses:    ICD-10-CM ICD-9-CM   1. Mood disorder due to a general medical condition  F06.30 293.83   2. Anxiety disorder due to general medical condition  F06.4 293.84       TREATMENT PLAN/GOALS: Continue supportive psychotherapy efforts and medications as indicated. Treatment and medication options discussed during today's visit. Patient ackowledged and verbally consented to continue with current treatment plan and was educated on the  importance of compliance with treatment and follow-up appointments.    MEDICATION ISSUES:  INSPECT reviewed as expected - gabapentin and hydrocodone , 4/25/2022  xanax #90    1. Mood d/o 2ry to medical condition (CVA and glucocorticoid deficiency) - the  Pt has very complicated medical issues, still a lot of uncertainty about the causes , mood - not regulated, did not respond well to SSRI and Buspar, the pt has memory /concentration issues that make him feels more depressed with low self esteem, the pt had neuropsych eval done at [facility] with depression and anxiety      cont  prozac  20 mg , trials of trintelix 5-10 mg start 5 mg for2 weeks with prozac 20 mg and if tolerates well, increase trintellix to 10 mg and stop prozac       2. Anxiety d/o 2ry to medical condition -  Cont  Low dose of xanax 0. 25 mg po TID , no changs necessary       PHQ scored 5 - moderate  depression   JOSE MARIA 7 scored 12    Discussed medication options and treatment plan of prescribed medication as well as the risks, benefits, and side effects including potential falls, possible impaired driving and metabolic adversities among others. Patient is agreeable to call the office with any worsening of symptoms or onset of side effects. Patient is agreeable to call 911 or go to the nearest ER should he/she begin having SI/HI. No medication side effects or related complaints today.     MEDS ORDERED DURING VISIT:  New Medications Ordered This Visit   Medications   • ALPRAZolam (Xanax) 0.25 MG tablet     Sig: Take 1 tablet by mouth 3 (Three) Times a Day As Needed for Anxiety.     Dispense:  90 tablet     Refill:  3     Please dispense on or after May 21, 2022   • FLUoxetine (PROzac) 20 MG capsule     Sig: Take 1 capsule by mouth Daily.     Dispense:  30 capsule     Refill:  3       Return in about 3 months (around 8/6/2022).         This document has been electronically signed by Clair Lyman MD  May 6, 2022 12:05 EDT    AMADOR Vu  transcription disclaimer:  Some of this encounter note is an electronic transcription translation of spoken language to printed text. The electronic translation of spoken language may permit erroneous, or at times, nonsensical words or phrases to be inadvertently transcribed; Although I have reviewed the note for such errors some may still exist.

## 2022-05-07 LAB
ACTH PLAS-MCNC: 27.6 PG/ML (ref 7.2–63.3)
DHEA-S SERPL-MCNC: 103 UG/DL (ref 71.6–375.4)

## 2022-05-14 LAB — RENIN PLAS-CCNC: 0.97 NG/ML/HR (ref 0.17–5.38)

## 2022-05-16 RX ORDER — ALIROCUMAB 75 MG/ML
INJECTION, SOLUTION SUBCUTANEOUS
Qty: 2 ML | Refills: 6 | Status: SHIPPED | OUTPATIENT
Start: 2022-05-16 | End: 2022-11-29

## 2022-06-13 ENCOUNTER — TRANSCRIBE ORDERS (OUTPATIENT)
Dept: ADMINISTRATIVE | Facility: HOSPITAL | Age: 52
End: 2022-06-13

## 2022-06-13 ENCOUNTER — LAB (OUTPATIENT)
Dept: LAB | Facility: HOSPITAL | Age: 52
End: 2022-06-13

## 2022-06-13 DIAGNOSIS — E89.6 HISTORY OF TOTAL ADRENALECTOMY: ICD-10-CM

## 2022-06-13 DIAGNOSIS — E89.6 HISTORY OF TOTAL ADRENALECTOMY: Primary | ICD-10-CM

## 2022-06-13 LAB
ALBUMIN SERPL-MCNC: 3.9 G/DL (ref 3.5–5.2)
ALBUMIN/GLOB SERPL: 1.8 G/DL
ALP SERPL-CCNC: 56 U/L (ref 39–117)
ALT SERPL W P-5'-P-CCNC: 18 U/L (ref 1–41)
ANION GAP SERPL CALCULATED.3IONS-SCNC: 11 MMOL/L (ref 5–15)
AST SERPL-CCNC: 13 U/L (ref 1–40)
BILIRUB SERPL-MCNC: 0.2 MG/DL (ref 0–1.2)
BUN SERPL-MCNC: 13 MG/DL (ref 6–20)
BUN/CREAT SERPL: 11.8 (ref 7–25)
CALCIUM SPEC-SCNC: 9.2 MG/DL (ref 8.6–10.5)
CHLORIDE SERPL-SCNC: 103 MMOL/L (ref 98–107)
CO2 SERPL-SCNC: 23 MMOL/L (ref 22–29)
CREAT SERPL-MCNC: 1.1 MG/DL (ref 0.76–1.27)
EGFRCR SERPLBLD CKD-EPI 2021: 81.8 ML/MIN/1.73
GLOBULIN UR ELPH-MCNC: 2.2 GM/DL
GLUCOSE SERPL-MCNC: 84 MG/DL (ref 65–99)
POTASSIUM SERPL-SCNC: 4.2 MMOL/L (ref 3.5–5.2)
PROT SERPL-MCNC: 6.1 G/DL (ref 6–8.5)
SODIUM SERPL-SCNC: 137 MMOL/L (ref 136–145)

## 2022-06-13 PROCEDURE — 36415 COLL VENOUS BLD VENIPUNCTURE: CPT

## 2022-06-13 PROCEDURE — 82024 ASSAY OF ACTH: CPT

## 2022-06-13 PROCEDURE — 82627 DEHYDROEPIANDROSTERONE: CPT

## 2022-06-13 PROCEDURE — 80053 COMPREHEN METABOLIC PANEL: CPT

## 2022-06-14 LAB
ACTH PLAS-MCNC: 36.6 PG/ML (ref 7.2–63.3)
DHEA-S SERPL-MCNC: 104 UG/DL (ref 71.6–375.4)

## 2022-06-20 DIAGNOSIS — F06.4 ANXIETY DISORDER DUE TO GENERAL MEDICAL CONDITION: Chronic | ICD-10-CM

## 2022-06-20 DIAGNOSIS — F06.30 MOOD DISORDER DUE TO A GENERAL MEDICAL CONDITION: Chronic | ICD-10-CM

## 2022-06-22 RX ORDER — FLUOXETINE HYDROCHLORIDE 20 MG/1
CAPSULE ORAL
Qty: 90 CAPSULE | Refills: 1 | Status: SHIPPED | OUTPATIENT
Start: 2022-06-22 | End: 2022-08-18 | Stop reason: SINTOL

## 2022-07-11 ENCOUNTER — TELEPHONE (OUTPATIENT)
Dept: PSYCHIATRY | Facility: CLINIC | Age: 52
End: 2022-07-11

## 2022-07-11 DIAGNOSIS — F33.1 MAJOR DEPRESSIVE DISORDER, RECURRENT EPISODE, MODERATE: ICD-10-CM

## 2022-07-11 DIAGNOSIS — F06.30 MOOD DISORDER DUE TO A GENERAL MEDICAL CONDITION: Primary | ICD-10-CM

## 2022-07-11 NOTE — TELEPHONE ENCOUNTER
Pt says he is doing really well on Trintellix samples and he needs a new 90 day supply rx sent to [facility].  He said he is going off of fluoxetine.

## 2022-07-14 NOTE — PROGRESS NOTES
"                     HEMATOLOGY ONCOLOGY OUTPATIENT FOLLOW UP       Patient name: Jourdan Lebron  : 1970  MRN: [id number]  Primary Care Physician: Justa Castano MD  Referring Physician: Justa Castano MD  Reason For Consult:     Chief Complaint   Patient presents with   • Follow-up     Erythrocytosis     HPI:   History of Present Illness:  Jourdan Lebron is 52 y.o. male who presented to our office on 2021 for consultation regarding elevated hematocrit    On 2021 Mr. Lebron was referred for the investigation of macrocytosis and elevated hematocrit.  He gave a history of a stroke in .  He also described a long history of anxiety and \"panic attacks\".  Finally he had undergone an adrenalectomy, apparently because of an adrenal adenoma.  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  In spite of that he felt poorly, mainly because of fatigue and had several investigations.  For a long time, at least since , he had been noted to have an elevated MCV and MCH.  A clear cause for this had not been identified and generally speaking he was asymptomatic at that time.  In , September and 2021 his blood counts had reported a hematocrit of 51.3, 51.6 and 53.8% respectively.  This was in the setting of a normal high hemoglobin.  He gave a history of prolonged and intense, at times of up to 3 packs of cigarettes per day, cigarette smoking.  Close to the time of the initial visit, he was smoking only cigars but did admit to inhaling the smoke.  He, as well, admitted to dyspnea with some exertion.    2022 Mr. Lebron was back in the office for follow-up.  At the time of his initial evaluation his blood count had been found to be within normal limits.  His folic acid was found to be immediately below the normal range of normalcy.  He started taking folic acid replacement.    7/15/2022: Back in the office for follow-up after 6 months.  " Reported he had had some changes to his medications and he was feeling somewhat better.  In particular, he discussed changes to his antidepressant, that seemed to have made a difference.  He also had stopped smoking.  The physical exam was unchanged.  The laboratory exams revealed normal hemoglobin and hematocrit, but he did persist with mild macrocytosis at 97.8 fL.  A clear explanation for this was not identified.  A decision was made to observe as it was unlikely to represent a serious problem.  Subjective:  • 7/15/2022: In the office for follow-up.  Reports that he has been feeling somewhat better.  This following some changes to his medications, particularly his antidepressants.  He continues to work full-time and has had no limitations.  He is also eating well and has maintained a stable weight.  He is afebrile.  He denies chest pains.  He has not as much dyspnea as before but is also not smoking any.  He has not had chest pains or abdominal pain.  He denies constipation or diarrhea.  No melena or hematochezia.  No dysuria or hematuria.    The following portions of the patient's history were reviewed and updated as appropriate: allergies, current medications, past family history, past medical history, past social history, past surgical history and problem list.    Past Medical History:   Diagnosis Date   • Brain fog    • Hand tingling    • Hyperlipidemia    • Tiredness      Past Surgical History:   Procedure Laterality Date   • ADRENALECTOMY     • KIDNEY STONE SURGERY     • THROMBECTOMY         Current Outpatient Medications:   •  ALPRAZolam (Xanax) 0.25 MG tablet, Take 1 tablet by mouth 3 (Three) Times a Day As Needed for Anxiety., Disp: 90 tablet, Rfl: 3  •  aspirin 81 MG chewable tablet, Chew 81 mg Daily., Disp: , Rfl:   •  benzonatate (Tessalon Perles) 100 MG capsule, Take 1 capsule by mouth 3 (Three) Times a Day As Needed for Cough., Disp: 30 capsule, Rfl: 0  •  doxycycline (MONODOX) 100 MG capsule, Take  1 capsule by mouth 2 (Two) Times a Day., Disp: 20 capsule, Rfl: 0  •  FLUoxetine (PROzac) 20 MG capsule, TAKE 1 CAPSULE DAILY, Disp: 90 capsule, Rfl: 1  •  fluticasone (FLONASE) 50 MCG/ACT nasal spray, 1 spray into the nostril(s) as directed by provider Daily., Disp: , Rfl:   •  hydrocortisone (CORTEF) 10 MG tablet, 20 mg in the morning , 15 mg in the afternoon, Disp: , Rfl:   •  hydrocortisone sodium succinate (Solu-CORTEF) 100 MG injection, 100 mg as needed for adrenal crises., Disp: , Rfl:   •  Praluent 75 MG/ML solution auto-injector, INJECT 1 ML UNDER THE SKIN INTO THE APPROPRAITE AREA AS DIRECTED EVERY 14 DAYS, Disp: 2 mL, Rfl: 6  •  Vortioxetine HBr 10 MG tablet, Take 10 mg by mouth Every Morning., Disp: 90 tablet, Rfl: 1    Allergies   Allergen Reactions   • Cephalosporins Anaphylaxis     Throat swelling   • Dye  [Contrast Dye] Hives   • Sulfa Antibiotics Hives   • Zetia [Ezetimibe] Other (See Comments)     Made tired   • Iodinated Diagnostic Agents Unknown - Low Severity   • Statins Myalgia     Myalgia and fatique   • Sulfate Unknown - Low Severity     Family History   Problem Relation Age of Onset   • Arthritis Mother    • Hypertension Mother    • Heart failure Mother 73        Cause of death   • Arthritis Father    • Hypertension Father    • Kidney cancer Father 57        Cause of death. Was a smoker   • Heart disease Brother    • Esophageal cancer Brother 59        Cause of death. Has a heavy drinker       Cancer-related family history includes Esophageal cancer (age of onset: 59) in his brother; Kidney cancer (age of onset: 57) in his father.    Social History     Tobacco Use   • Smoking status: Current Every Day Smoker     Packs/day: 1.00     Years: 30.00     Pack years: 30.00     Types: Cigars   • Smokeless tobacco: Never Used   • Tobacco comment: 1 packs= 2 cigars a day; only smokes cigars   Vaping Use   • Vaping Use: Never used   Substance Use Topics   • Alcohol use: Not Currently     Alcohol/week:  8.0 standard drinks     Types: 8 Cans of beer per week     Comment: Has now been abstinent for about one year   • Drug use: Never     Social History     Social History Narrative   • Not on file      ROS:     Review of Systems   Constitutional: Positive for fatigue. Negative for activity change, appetite change, chills, diaphoresis, fever and unexpected weight change.   HENT: Negative for congestion, dental problem, drooling, ear discharge, ear pain, facial swelling, hearing loss, mouth sores, nosebleeds, postnasal drip, rhinorrhea, sinus pressure, sinus pain, sneezing, sore throat, tinnitus, trouble swallowing and voice change.    Eyes: Negative for photophobia, pain, discharge, redness, itching and visual disturbance.   Respiratory: Negative for apnea, cough, choking, chest tightness, shortness of breath, wheezing and stridor.    Cardiovascular: Negative for chest pain, palpitations and leg swelling.   Gastrointestinal: Negative for abdominal distention, abdominal pain, anal bleeding, blood in stool, constipation, diarrhea, nausea, rectal pain and vomiting.   Endocrine: Negative for cold intolerance, heat intolerance, polydipsia and polyuria.   Genitourinary: Negative for decreased urine volume, difficulty urinating, dysuria, flank pain, frequency, genital sores, hematuria and urgency.   Musculoskeletal: Negative for arthralgias, back pain, gait problem, joint swelling, myalgias, neck pain and neck stiffness.   Skin: Negative for color change, pallor and rash.   Neurological: Negative for dizziness, tremors, seizures, syncope, facial asymmetry, speech difficulty, weakness, light-headedness, numbness and headaches.   Hematological: Negative for adenopathy. Does not bruise/bleed easily.   Psychiatric/Behavioral: Negative for agitation, behavioral problems, confusion, decreased concentration, hallucinations, self-injury, sleep disturbance and suicidal ideas. The patient is nervous/anxious.      Objective:    Vitals:  "   07/15/22 0815   BP: 109/72   Pulse: 63   Resp: 18   Temp: 96.8 °F (36 °C)   SpO2: 98%   Weight: 80.3 kg (177 lb)   Height: 182.9 cm (72\")   PainSc: 0-No pain     Body mass index is 24.01 kg/m².  ECOG  (0) Fully active, able to carry on all predisease performance without restriction    Physical Exam:     Physical Exam  Constitutional:       General: He is not in acute distress.     Appearance: Normal appearance. He is not ill-appearing, toxic-appearing or diaphoretic.      Comments: Slender, well-built.  Seems older than the stated age.  No distress.   HENT:      Head: Normocephalic and atraumatic.      Right Ear: External ear normal. There is no impacted cerumen.      Left Ear: External ear normal. There is no impacted cerumen.      Nose: Nose normal. No congestion or rhinorrhea.      Mouth/Throat:      Mouth: Mucous membranes are moist.      Pharynx: Oropharynx is clear. No oropharyngeal exudate or posterior oropharyngeal erythema.      Comments: Oral cavity reveals poor dentition and poor dental hygiene.  No mucosal ulcerations are present.  Eyes:      General: No scleral icterus.        Right eye: No discharge.         Left eye: No discharge.      Conjunctiva/sclera: Conjunctivae normal.      Pupils: Pupils are equal, round, and reactive to light.   Neck:      Vascular: No carotid bruit.   Cardiovascular:      Rate and Rhythm: Normal rate and regular rhythm.      Pulses: Normal pulses.      Heart sounds: Normal heart sounds. No murmur heard.    No friction rub. No gallop.   Pulmonary:      Effort: No respiratory distress.      Breath sounds: No stridor. No wheezing, rhonchi or rales.      Comments: Breath sounds are diminished bilaterally.  Chest:      Chest wall: No tenderness.   Abdominal:      General: Abdomen is flat. Bowel sounds are normal. There is no distension.      Palpations: Abdomen is soft. There is no mass.      Tenderness: There is no abdominal tenderness. There is no right CVA tenderness, left " CVA tenderness, guarding or rebound.   Musculoskeletal:         General: No swelling, tenderness, deformity or signs of injury.      Cervical back: No rigidity or tenderness.      Right lower leg: No edema.      Left lower leg: No edema.      Comments: There is clubbing of the fingers in both hands   Lymphadenopathy:      Cervical: No cervical adenopathy.   Skin:     General: Skin is warm and dry.      Coloration: Skin is not jaundiced or pale.      Findings: No bruising, erythema or rash.   Neurological:      General: No focal deficit present.      Mental Status: He is alert and oriented to person, place, and time.      Cranial Nerves: No cranial nerve deficit.      Motor: No weakness.      Coordination: Coordination normal.      Gait: Gait normal.   Psychiatric:         Mood and Affect: Mood normal.         Behavior: Behavior normal.         Thought Content: Thought content normal.         Judgment: Judgment normal.     YOSELIN Mueller MD performed a physical exam on 7/15/2022 as documented above    Lab Results - Last 18 Months   Lab Units 07/15/22  0807 03/16/22  0649 01/14/22  1048   WBC 10*3/mm3 7.43 9.15 9.67   HEMOGLOBIN g/dL 16.1 17.0 17.1   HEMATOCRIT % 47.9 50.1 49.6   PLATELETS 10*3/mm3 253 343 262   MCV fL 97.8* 98.0* 97.1*     Lab Results - Last 18 Months   Lab Units 06/13/22  0705 05/06/22  0850 03/29/22  0702   SODIUM mmol/L 137 138 141   POTASSIUM mmol/L 4.2 4.2 4.8   CHLORIDE mmol/L 103 103 104   CO2 mmol/L 23.0 25.3 31.0*   BUN mg/dL 13 12 8   CREATININE mg/dL 1.10 0.99 1.11   CALCIUM mg/dL 9.2 9.7 9.3   BILIRUBIN mg/dL 0.2 0.2 0.2   ALK PHOS U/L 56 48 53   ALT (SGPT) U/L 18 16 17   AST (SGOT) U/L 13 15 15   GLUCOSE mg/dL 84 72 80     Lab Results   Component Value Date    GLUCOSE 84 06/13/2022    BUN 13 06/13/2022    CREATININE 1.10 06/13/2022    EGFRIFNONA 67 02/24/2022    BCR 11.8 06/13/2022    K 4.2 06/13/2022    CO2 23.0 06/13/2022    CALCIUM 9.2 06/13/2022    ALBUMIN 3.90 06/13/2022     LABIL2 1.3 09/25/2018    AST 13 06/13/2022    ALT 18 06/13/2022       Lab Results   Component Value Date    FOLATE 4.11 (L) 11/12/2021     Lab Results   Component Value Date    EVDDGEGK52 1,310 (H) 03/16/2022     No results found for: SPEP, UPEP  Uric Acid   Date Value Ref Range Status   09/07/2021 4.2 3.4 - 7.0 mg/dL Final     Lab Results   Component Value Date    SEDRATE 11 04/16/2021     Lab Results   Component Value Date    PSA 0.163 04/06/2021     Assessment & Plan     Assessment:  1. Erythrocytosis: Resolved.  Likely due to carbon monoxide intoxication in the past.  At this point no need for intervention.  2.  Macrocytosis: Persists.  At this point no obvious cause.  We will continue to follow.  The literature suggests that an expectant approach to generally healthy adults with macrocytosis is appropriate provided that no other hematologic abnormalities are present.  3.  Tobacco smoker: Discussed again with him.  I have encouraged him to remain abstinent.    4.  He is to see me again in a year.  Consider a CT screening of the chest at the time of the next appointment.    Plan:  1. As above    Oliverio Mueller MD on July 15, 2022 at 8:49 AM

## 2022-07-15 ENCOUNTER — OFFICE VISIT (OUTPATIENT)
Dept: ONCOLOGY | Facility: CLINIC | Age: 52
End: 2022-07-15

## 2022-07-15 ENCOUNTER — LAB (OUTPATIENT)
Dept: LAB | Facility: HOSPITAL | Age: 52
End: 2022-07-15

## 2022-07-15 VITALS
WEIGHT: 177 LBS | HEART RATE: 63 BPM | HEIGHT: 72 IN | RESPIRATION RATE: 18 BRPM | SYSTOLIC BLOOD PRESSURE: 109 MMHG | TEMPERATURE: 96.8 F | DIASTOLIC BLOOD PRESSURE: 72 MMHG | OXYGEN SATURATION: 98 % | BODY MASS INDEX: 23.98 KG/M2

## 2022-07-15 DIAGNOSIS — D75.1 ERYTHROCYTOSIS: Primary | ICD-10-CM

## 2022-07-15 LAB
ALBUMIN SERPL-MCNC: 3.9 G/DL (ref 3.5–5.2)
ALBUMIN/GLOB SERPL: 1.6 G/DL
ALP SERPL-CCNC: 57 U/L (ref 39–117)
ALT SERPL W P-5'-P-CCNC: 14 U/L (ref 1–41)
ANION GAP SERPL CALCULATED.3IONS-SCNC: 8 MMOL/L (ref 5–15)
AST SERPL-CCNC: 15 U/L (ref 1–40)
BASOPHILS # BLD AUTO: 0.04 10*3/MM3 (ref 0–0.2)
BASOPHILS NFR BLD AUTO: 0.5 % (ref 0–1.5)
BILIRUB SERPL-MCNC: 0.2 MG/DL (ref 0–1.2)
BUN SERPL-MCNC: 14 MG/DL (ref 6–20)
BUN/CREAT SERPL: 14.4 (ref 7–25)
CALCIUM SPEC-SCNC: 9.1 MG/DL (ref 8.6–10.5)
CHLORIDE SERPL-SCNC: 103 MMOL/L (ref 98–107)
CO2 SERPL-SCNC: 26 MMOL/L (ref 22–29)
CREAT SERPL-MCNC: 0.97 MG/DL (ref 0.76–1.27)
DEPRECATED RDW RBC AUTO: 48.2 FL (ref 37–54)
EGFRCR SERPLBLD CKD-EPI 2021: 95.1 ML/MIN/1.73
EOSINOPHIL # BLD AUTO: 0.23 10*3/MM3 (ref 0–0.4)
EOSINOPHIL NFR BLD AUTO: 3.1 % (ref 0.3–6.2)
ERYTHROCYTE [DISTWIDTH] IN BLOOD BY AUTOMATED COUNT: 13.6 % (ref 12.3–15.4)
GLOBULIN UR ELPH-MCNC: 2.4 GM/DL
GLUCOSE SERPL-MCNC: 88 MG/DL (ref 65–99)
HCT VFR BLD AUTO: 47.9 % (ref 37.5–51)
HGB BLD-MCNC: 16.1 G/DL (ref 13–17.7)
HOLD SPECIMEN: NORMAL
LYMPHOCYTES # BLD AUTO: 2.29 10*3/MM3 (ref 0.7–3.1)
LYMPHOCYTES NFR BLD AUTO: 30.8 % (ref 19.6–45.3)
MCH RBC QN AUTO: 32.9 PG (ref 26.6–33)
MCHC RBC AUTO-ENTMCNC: 33.6 G/DL (ref 31.5–35.7)
MCV RBC AUTO: 97.8 FL (ref 79–97)
MONOCYTES # BLD AUTO: 0.95 10*3/MM3 (ref 0.1–0.9)
MONOCYTES NFR BLD AUTO: 12.8 % (ref 5–12)
NEUTROPHILS NFR BLD AUTO: 3.92 10*3/MM3 (ref 1.7–7)
NEUTROPHILS NFR BLD AUTO: 52.8 % (ref 42.7–76)
PLATELET # BLD AUTO: 253 10*3/MM3 (ref 140–450)
PMV BLD AUTO: 9.4 FL (ref 6–12)
POTASSIUM SERPL-SCNC: 4.6 MMOL/L (ref 3.5–5.2)
PROT SERPL-MCNC: 6.3 G/DL (ref 6–8.5)
RBC # BLD AUTO: 4.9 10*6/MM3 (ref 4.14–5.8)
SODIUM SERPL-SCNC: 137 MMOL/L (ref 136–145)
WBC NRBC COR # BLD: 7.43 10*3/MM3 (ref 3.4–10.8)

## 2022-07-15 PROCEDURE — 99213 OFFICE O/P EST LOW 20 MIN: CPT | Performed by: INTERNAL MEDICINE

## 2022-07-15 PROCEDURE — 85025 COMPLETE CBC W/AUTO DIFF WBC: CPT

## 2022-07-15 PROCEDURE — 80053 COMPREHEN METABOLIC PANEL: CPT

## 2022-07-15 PROCEDURE — 36415 COLL VENOUS BLD VENIPUNCTURE: CPT

## 2022-08-01 ENCOUNTER — TRANSCRIBE ORDERS (OUTPATIENT)
Dept: ADMINISTRATIVE | Facility: HOSPITAL | Age: 52
End: 2022-08-01

## 2022-08-01 ENCOUNTER — LAB (OUTPATIENT)
Dept: LAB | Facility: HOSPITAL | Age: 52
End: 2022-08-01

## 2022-08-01 DIAGNOSIS — E89.6 HISTORY OF TOTAL ADRENALECTOMY: ICD-10-CM

## 2022-08-01 DIAGNOSIS — E89.6 HISTORY OF TOTAL ADRENALECTOMY: Primary | ICD-10-CM

## 2022-08-01 PROCEDURE — 82627 DEHYDROEPIANDROSTERONE: CPT

## 2022-08-01 PROCEDURE — 36415 COLL VENOUS BLD VENIPUNCTURE: CPT

## 2022-08-02 LAB — DHEA-S SERPL-MCNC: 72.4 UG/DL (ref 71.6–375.4)

## 2022-08-18 ENCOUNTER — OFFICE VISIT (OUTPATIENT)
Dept: PSYCHIATRY | Facility: CLINIC | Age: 52
End: 2022-08-18

## 2022-08-18 DIAGNOSIS — F33.1 MAJOR DEPRESSIVE DISORDER, RECURRENT EPISODE, MODERATE: ICD-10-CM

## 2022-08-18 DIAGNOSIS — F06.30 MOOD DISORDER DUE TO A GENERAL MEDICAL CONDITION: Chronic | ICD-10-CM

## 2022-08-18 DIAGNOSIS — Z79.899 ENCOUNTER FOR LONG-TERM (CURRENT) USE OF OTHER MEDICATIONS: ICD-10-CM

## 2022-08-18 DIAGNOSIS — F06.4 ANXIETY DISORDER DUE TO GENERAL MEDICAL CONDITION: Primary | Chronic | ICD-10-CM

## 2022-08-18 PROCEDURE — 99214 OFFICE O/P EST MOD 30 MIN: CPT | Performed by: PSYCHIATRY & NEUROLOGY

## 2022-08-18 RX ORDER — ALPRAZOLAM 0.25 MG/1
0.25 TABLET ORAL 3 TIMES DAILY PRN
Qty: 90 TABLET | Refills: 3 | Status: SHIPPED | OUTPATIENT
Start: 2022-08-18 | End: 2022-12-21 | Stop reason: SDUPTHER

## 2022-08-18 NOTE — PROGRESS NOTES
"Subjective   Jourdan Lebron is a 52 y.o. 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, left middle cerebral artery , the pt is R handed    he had troubles talking , had muscle weakness   Oct 2020 - went back to work light duty , still had issues completing his tasks   Feb 2021- adrenal gland removed and that caused more issues, more medical w/u and ER visits, weight loss   Now on steroid replacement which is also not completely controlled     The pt was started on zoloft - no major changes, it had to be changed to prozac - tolerated well and started feeling less depressed, he is back to work now , prozac was increased to 40 mg , however, he did not tolerate, reported increased anxiety and emotional flattening      Today the pt reported feeling better on trintellix, does not feel \"dull anymore\"     Depression is rated as 2-3/10   The pt c/o decreased concentration, decreased memory  E level is still low - he is off fludrocortisone now      Sleep - fragmented  , not restorative ,   Denied AVH/SI/HI     Anxiety - manageable on xanax    The following portions of the patient's history were reviewed and updated as appropriate: allergies, current medications, past family history, past medical history, past social history, past surgical history and problem list.    PAST PSYCHIATRIC HISTORY  Axis I  Affective/Bipolar Disorder, Anxiety/Panic Disorder  Axis II  Denied     PAST OUTPATIENT TREATMENT  Diagnosis treated:  Anxiety, depression   Treatment Type:  Medication Management  Prior Psychiatric Medications:  lexapro - not effective  Buspirone - side effects   Support Groups:  None   Sequelae Of Mental Disorder:  job disruption, emotional distress          Interval History  Improved, stable      Side Effects  prozac - emotional flattening "       Past Medical History:  Past Medical History:   Diagnosis Date   • Brain fog    • Hand tingling    • Hyperlipidemia    • Tiredness        Social History:  Social History     Socioeconomic History   • Marital status:    Tobacco Use   • Smoking status: Former Smoker     Packs/day: 3.00     Years: 37.00     Pack years: 111.00     Types: Cigars     Start date:      Quit date:      Years since quittin.6   • Smokeless tobacco: Never Used   • Tobacco comment: 1 packs= 2 cigars a day; only smokes cigars   Vaping Use   • Vaping Use: Never used   Substance and Sexual Activity   • Alcohol use: Not Currently     Alcohol/week: 8.0 standard drinks     Types: 8 Cans of beer per week     Comment: Has now been abstinent for about one year   • Drug use: Never   • Sexual activity: Yes     Partners: Female     Birth control/protection: None       Family History:  Family History   Problem Relation Age of Onset   • Arthritis Mother    • Hypertension Mother    • Heart failure Mother 73        Cause of death   • Arthritis Father    • Hypertension Father    • Kidney cancer Father 57        Cause of death. Was a smoker   • Heart disease Brother    • Esophageal cancer Brother 59        Cause of death. Has a heavy drinker       Past Surgical History:  Past Surgical History:   Procedure Laterality Date   • ADRENALECTOMY     • KIDNEY STONE SURGERY     • THROMBECTOMY         Problem List:  Patient Active Problem List   Diagnosis   • Abnormal electrocardiogram   • Calculus of kidney   • History of cerebrovascular accident   • Family history of malignant neoplasm   • Generalized hyperhidrosis   • Hyperlipidemia   • Seasonal allergies   • Tobacco dependence syndrome   • Cervical radiculopathy   • Cervical stenosis of spinal canal   • Near syncope   • Other fatigue   • History of total adrenalectomy (HCC)   • Arthropathy of cervical facet joint   • Neck pain   • Pain in left arm   • Shoulder pain   • Seasonal allergic rhinitis    • Thoracic back pain   • Vitamin B12 deficiency   • Electrocardiogram abnormal   • Spinal stenosis of cervical region   • Reduced libido   • Hypogonadotropic hypogonadism (HCC)   • Mood disorder due to a general medical condition   • Anxiety disorder due to general medical condition   • Macrocytosis   • Erythrocytosis   • Postprocedural adrenocortical (-medullary) hypofunction (HCC)       Allergy:   Allergies   Allergen Reactions   • Cephalosporins Anaphylaxis     Throat swelling   • Dye  [Contrast Dye] Hives   • Sulfa Antibiotics Hives   • Zetia [Ezetimibe] Other (See Comments)     Made tired   • Iodinated Diagnostic Agents Unknown - Low Severity   • Statins Myalgia     Myalgia and fatique   • Sulfate Unknown - Low Severity        Discontinued Medications:  Medications Discontinued During This Encounter   Medication Reason   • FLUoxetine (PROzac) 20 MG capsule Side effects   • ALPRAZolam (Xanax) 0.25 MG tablet Reorder   • Vortioxetine HBr 10 MG tablet Reorder       Current Medications:   Current Outpatient Medications   Medication Sig Dispense Refill   • ALPRAZolam (Xanax) 0.25 MG tablet Take 1 tablet by mouth 3 (Three) Times a Day As Needed for Anxiety. 90 tablet 3   • Vortioxetine HBr 10 MG tablet Take 10 mg by mouth Every Morning. 90 tablet 1   • aspirin 81 MG chewable tablet Chew 81 mg Daily.     • benzonatate (Tessalon Perles) 100 MG capsule Take 1 capsule by mouth 3 (Three) Times a Day As Needed for Cough. 30 capsule 0   • doxycycline (MONODOX) 100 MG capsule Take 1 capsule by mouth 2 (Two) Times a Day. 20 capsule 0   • fluticasone (FLONASE) 50 MCG/ACT nasal spray 1 spray into the nostril(s) as directed by provider Daily.     • hydrocortisone (CORTEF) 10 MG tablet 20 mg in the morning , 15 mg in the afternoon     • hydrocortisone sodium succinate (Solu-CORTEF) 100 MG injection 100 mg as needed for adrenal crises.     • Praluent 75 MG/ML solution auto-injector INJECT 1 ML UNDER THE SKIN INTO THE APPROPRAITE  AREA AS DIRECTED EVERY 14 DAYS 2 mL 6     No current facility-administered medications for this visit.         Psychological ROS: positive for - anxiety, concentration, depression,  irritability and memory difficulties  Negative for AVH/SI/HI, delusions       Physical Exam:   There were no vitals taken for this visit.    Mental Status Exam:   Hygiene:   good  Cooperation:  Cooperative  Eye Contact:  Good  Psychomotor Behavior:  Appropriate  Affect:  Full range and Appropriate  Mood: fluctates  Hopelessness: Denies  Speech:  Normal  Thought Process:  Goal directed and Linear  Thought Content:  Normal and Mood congruent  Suicidal:  None  Homicidal:  None  Hallucinations:  None  Delusion:  None  Memory:  Deficits  Orientation:  Person, Place, Time and Situation  Reliability:  good  Insight:  Good  Judgement:  Good  Impulse Control:  Fair  Physical/Medical Issues:  Yes         MSE from 5/6/2022  reviewed and accepted with changes       PHQ-9 Depression Screening  Little interest or pleasure in doing things? 2-->more than half the days   Feeling down, depressed, or hopeless? 1-->several days   Trouble falling or staying asleep, or sleeping too much? 0-->not at all   Feeling tired or having little energy? 2-->more than half the days   Poor appetite or overeating? 0-->not at all   Feeling bad about yourself - or that you are a failure or have let yourself or your family down? 1-->several days   Trouble concentrating on things, such as reading the newspaper or watching television? 1-->several days   Moving or speaking so slowly that other people could have noticed? Or the opposite - being so fidgety or restless that you have been moving around a lot more than usual? 0-->not at all   Thoughts that you would be better off dead, or of hurting yourself in some way? 0-->not at all   PHQ-9 Total Score 7   If you checked off any problems, how difficult have these problems made it for you to do your work, take care of things at  home, or get along with other people? somewhat difficult           Current every day smoker 3-10 mintues spent counseling Has reduced Tobbacos use    I advised Jourdan of the risks of tobacco use.     Lab Results:   Lab on 08/01/2022   Component Date Value Ref Range Status   • DHEA-Sulfate 08/01/2022 72.4  71.6 - 375.4 ug/dL Final   Lab on 07/15/2022   Component Date Value Ref Range Status   • Glucose 07/15/2022 88  65 - 99 mg/dL Final   • BUN 07/15/2022 14  6 - 20 mg/dL Final   • Creatinine 07/15/2022 0.97  0.76 - 1.27 mg/dL Final   • Sodium 07/15/2022 137  136 - 145 mmol/L Final   • Potassium 07/15/2022 4.6  3.5 - 5.2 mmol/L Final   • Chloride 07/15/2022 103  98 - 107 mmol/L Final   • CO2 07/15/2022 26.0  22.0 - 29.0 mmol/L Final   • Calcium 07/15/2022 9.1  8.6 - 10.5 mg/dL Final   • Total Protein 07/15/2022 6.3  6.0 - 8.5 g/dL Final   • Albumin 07/15/2022 3.90  3.50 - 5.20 g/dL Final   • ALT (SGPT) 07/15/2022 14  1 - 41 U/L Final   • AST (SGOT) 07/15/2022 15  1 - 40 U/L Final   • Alkaline Phosphatase 07/15/2022 57  39 - 117 U/L Final   • Total Bilirubin 07/15/2022 0.2  0.0 - 1.2 mg/dL Final   • Globulin 07/15/2022 2.4  gm/dL Final   • A/G Ratio 07/15/2022 1.6  g/dL Final   • BUN/Creatinine Ratio 07/15/2022 14.4  7.0 - 25.0 Final   • Anion Gap 07/15/2022 8.0  5.0 - 15.0 mmol/L Final   • eGFR 07/15/2022 95.1  >60.0 mL/min/1.73 Final    National Kidney Foundation and American Society of Nephrology (ASN) Task Force recommended calculation based on the Chronic Kidney Disease Epidemiology Collaboration (CKD-EPI) equation refit without adjustment for race.   • WBC 07/15/2022 7.43  3.40 - 10.80 10*3/mm3 Final   • RBC 07/15/2022 4.90  4.14 - 5.80 10*6/mm3 Final   • Hemoglobin 07/15/2022 16.1  13.0 - 17.7 g/dL Final   • Hematocrit 07/15/2022 47.9  37.5 - 51.0 % Final   • MCV 07/15/2022 97.8 (A) 79.0 - 97.0 fL Final   • MCH 07/15/2022 32.9  26.6 - 33.0 pg Final   • MCHC 07/15/2022 33.6  31.5 - 35.7 g/dL Final   • RDW  07/15/2022 13.6  12.3 - 15.4 % Final   • RDW-SD 07/15/2022 48.2  37.0 - 54.0 fl Final   • MPV 07/15/2022 9.4  6.0 - 12.0 fL Final   • Platelets 07/15/2022 253  140 - 450 10*3/mm3 Final   • Neutrophil % 07/15/2022 52.8  42.7 - 76.0 % Final   • Lymphocyte % 07/15/2022 30.8  19.6 - 45.3 % Final   • Monocyte % 07/15/2022 12.8 (A) 5.0 - 12.0 % Final   • Eosinophil % 07/15/2022 3.1  0.3 - 6.2 % Final   • Basophil % 07/15/2022 0.5  0.0 - 1.5 % Final   • Neutrophils, Absolute 07/15/2022 3.92  1.70 - 7.00 10*3/mm3 Final   • Lymphocytes, Absolute 07/15/2022 2.29  0.70 - 3.10 10*3/mm3 Final   • Monocytes, Absolute 07/15/2022 0.95 (A) 0.10 - 0.90 10*3/mm3 Final   • Eosinophils, Absolute 07/15/2022 0.23  0.00 - 0.40 10*3/mm3 Final   • Basophils, Absolute 07/15/2022 0.04  0.00 - 0.20 10*3/mm3 Final   • Extra Tube 07/15/2022 Hold for add-ons.   Final    Auto resulted.   Lab on 06/13/2022   Component Date Value Ref Range Status   • DHEA-Sulfate 06/13/2022 104.0  71.6 - 375.4 ug/dL Final   • ACTH 06/13/2022 36.6  7.2 - 63.3 pg/mL Final    ACTH reference interval for samples collected between 7 and 10 AM.   • Glucose 06/13/2022 84  65 - 99 mg/dL Final   • BUN 06/13/2022 13  6 - 20 mg/dL Final   • Creatinine 06/13/2022 1.10  0.76 - 1.27 mg/dL Final   • Sodium 06/13/2022 137  136 - 145 mmol/L Final   • Potassium 06/13/2022 4.2  3.5 - 5.2 mmol/L Final   • Chloride 06/13/2022 103  98 - 107 mmol/L Final   • CO2 06/13/2022 23.0  22.0 - 29.0 mmol/L Final   • Calcium 06/13/2022 9.2  8.6 - 10.5 mg/dL Final   • Total Protein 06/13/2022 6.1  6.0 - 8.5 g/dL Final   • Albumin 06/13/2022 3.90  3.50 - 5.20 g/dL Final   • ALT (SGPT) 06/13/2022 18  1 - 41 U/L Final   • AST (SGOT) 06/13/2022 13  1 - 40 U/L Final   • Alkaline Phosphatase 06/13/2022 56  39 - 117 U/L Final   • Total Bilirubin 06/13/2022 0.2  0.0 - 1.2 mg/dL Final   • Globulin 06/13/2022 2.2  gm/dL Final   • A/G Ratio 06/13/2022 1.8  g/dL Final   • BUN/Creatinine Ratio 06/13/2022 11.8   7.0 - 25.0 Final   • Anion Gap 06/13/2022 11.0  5.0 - 15.0 mmol/L Final   • eGFR 06/13/2022 81.8  >60.0 mL/min/1.73 Final    National Kidney Foundation and American Society of Nephrology (ASN) Task Force recommended calculation based on the Chronic Kidney Disease Epidemiology Collaboration (CKD-EPI) equation refit without adjustment for race.       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)    Relevant Medications    Vortioxetine HBr 10 MG tablet    ALPRAZolam (Xanax) 0.25 MG tablet    Anxiety disorder due to general medical condition - Primary (Chronic)    Relevant Medications    Vortioxetine HBr 10 MG tablet    ALPRAZolam (Xanax) 0.25 MG tablet    Other Relevant Orders    [facility] Urine Drug Screen -      Other Visit Diagnoses     Major depressive disorder, recurrent episode, moderate (HCC)        Relevant Medications    Vortioxetine HBr 10 MG tablet    ALPRAZolam (Xanax) 0.25 MG tablet    Encounter for long-term (current) use of other medications        Relevant Orders    [facility] Urine Drug Screen -      Diagnoses       Codes Comments    Anxiety disorder due to general medical condition    -  Primary ICD-10-CM: F06.4  ICD-9-CM: 293.84     Mood disorder due to a general medical condition     ICD-10-CM: F06.30  ICD-9-CM: 293.83     Major depressive disorder, recurrent episode, moderate (HCC)     ICD-10-CM: F33.1  ICD-9-CM: 296.32     Encounter for long-term (current) use of other medications     ICD-10-CM: Z79.899  ICD-9-CM: V58.69       medical condition : glucocorticoid  deficiency , s/p CVA (Left Middle cerebral artery )     Visit Diagnoses:    ICD-10-CM ICD-9-CM   1. Anxiety disorder due to general medical condition  F06.4 293.84   2. Mood disorder due to a general medical condition  F06.30 293.83   3. Major depressive disorder, recurrent episode, moderate (HCC)  F33.1 296.32   4. Encounter for long-term (current) use of other  medications  Z79.899 V58.69   correct ds f33.1, z79.899, f06.4     TREATMENT PLAN/GOALS: Continue supportive psychotherapy efforts and medications as indicated. Treatment and medication options discussed during today's visit. Patient ackowledged and verbally consented to continue with current treatment plan and was educated on the importance of compliance with treatment and follow-up appointments.    MEDICATION ISSUES:  INSPECT reviewed as expected - gabapentin and hydrocodone , 7/29/2022  xanax #90    1. Major depressive d/o moderate recurrent - the  Pt has very complicated medical issues, still a lot of uncertainty about the causes , mood - not regulated, did not respond well to SSRI and Buspar, the pt has memory /concentration issues that make him feels more depressed with low self esteem, the pt had neuropsych eval done at [facility] with depression and anxiety      cont    trintelix 10 mg  , tolerates well and noticed improvement       2. Anxiety d/o 2ry to medical condition (CVA and glucocorticoid deficiency)  -  Cont  Low dose of xanax 0. 25 mg po TID , no changs necessary     3. Long term therapeutic drug monitoring - UDS today       PHQ scored 7 - mild  depression   JOSE MARIA 7 scored 4    Discussed medication options and treatment plan of prescribed medication as well as the risks, benefits, and side effects including potential falls, possible impaired driving and metabolic adversities among others. Patient is agreeable to call the office with any worsening of symptoms or onset of side effects. Patient is agreeable to call 911 or go to the nearest ER should he/she begin having SI/HI. No medication side effects or related complaints today.     MEDS ORDERED DURING VISIT:  New Medications Ordered This Visit   Medications   • Vortioxetine HBr 10 MG tablet     Sig: Take 10 mg by mouth Every Morning.     Dispense:  90 tablet     Refill:  1   • ALPRAZolam (Xanax) 0.25 MG tablet     Sig: Take 1 tablet by mouth 3  (Three) Times a Day As Needed for Anxiety.     Dispense:  90 tablet     Refill:  3     Please dispense on or after 08/26/2022       Return in about 4 months (around 12/18/2022).         This document has been electronically signed by Clair Lyman MD  August 18, 2022 09:31 EDT    EMR Dragon transcription disclaimer:  Some of this encounter note is an electronic transcription translation of spoken language to printed text. The electronic translation of spoken language may permit erroneous, or at times, nonsensical words or phrases to be inadvertently transcribed; Although I have reviewed the note for such errors some may still exist.

## 2022-09-01 ENCOUNTER — TELEPHONE (OUTPATIENT)
Dept: PSYCHIATRY | Facility: CLINIC | Age: 52
End: 2022-09-01

## 2022-09-01 DIAGNOSIS — F33.1 MAJOR DEPRESSIVE DISORDER, RECURRENT EPISODE, MODERATE: ICD-10-CM

## 2022-09-01 NOTE — TELEPHONE ENCOUNTER
Pt says he needs his Trintellix to go to [facility] instead for a 90 day supply for insurance coverage.

## 2022-09-12 NOTE — PATIENT INSTRUCTIONS
Health Maintenance Due   Topic Date Due    COLORECTAL CANCER SCREENING  Never done    COVID-19 Vaccine (3 - Booster for Pfizer series) 02/17/2022    ZOSTER VACCINE (1 of 2) Never done    LUNG CANCER SCREENING  04/21/2022    ANNUAL PHYSICAL  06/12/2022    pATIENT WILL CALL IF WANTS ct OF CHEST FOR LUNG CANCER SCREENING.    Advise continue increase fluids like pedialyte till discusses dizziness with Endo    Call 4 weeks about nausea

## 2022-09-15 PROBLEM — E89.6 HISTORY OF TOTAL ADRENALECTOMY: Status: RESOLVED | Noted: 2021-02-19 | Resolved: 2022-09-15

## 2022-09-16 ENCOUNTER — OFFICE VISIT (OUTPATIENT)
Dept: FAMILY MEDICINE CLINIC | Facility: CLINIC | Age: 52
End: 2022-09-16

## 2022-09-16 VITALS
WEIGHT: 185.4 LBS | SYSTOLIC BLOOD PRESSURE: 108 MMHG | BODY MASS INDEX: 25.11 KG/M2 | HEART RATE: 71 BPM | TEMPERATURE: 97.5 F | DIASTOLIC BLOOD PRESSURE: 70 MMHG | HEIGHT: 72 IN | OXYGEN SATURATION: 98 %

## 2022-09-16 DIAGNOSIS — Z00.01 ENCOUNTER FOR ANNUAL GENERAL MEDICAL EXAMINATION WITH ABNORMAL FINDINGS IN ADULT: Primary | ICD-10-CM

## 2022-09-16 DIAGNOSIS — Z86.73 HISTORY OF CEREBROVASCULAR ACCIDENT: ICD-10-CM

## 2022-09-16 DIAGNOSIS — E89.6 POSTPROCEDURAL ADRENOCORTICAL (-MEDULLARY) HYPOFUNCTION: ICD-10-CM

## 2022-09-16 DIAGNOSIS — E78.5 HYPERLIPIDEMIA, UNSPECIFIED HYPERLIPIDEMIA TYPE: ICD-10-CM

## 2022-09-16 DIAGNOSIS — E53.8 VITAMIN B12 DEFICIENCY: ICD-10-CM

## 2022-09-16 DIAGNOSIS — N20.0 CALCULUS OF KIDNEY: ICD-10-CM

## 2022-09-16 DIAGNOSIS — R11.0 NAUSEA: ICD-10-CM

## 2022-09-16 DIAGNOSIS — F17.200 TOBACCO DEPENDENCE SYNDROME: ICD-10-CM

## 2022-09-16 DIAGNOSIS — Z12.2 ENCOUNTER FOR SCREENING FOR LUNG CANCER: ICD-10-CM

## 2022-09-16 DIAGNOSIS — R94.31 ABNORMAL ELECTROCARDIOGRAM: ICD-10-CM

## 2022-09-16 DIAGNOSIS — Z12.11 SCREENING FOR COLON CANCER: ICD-10-CM

## 2022-09-16 LAB
ALBUMIN SERPL-MCNC: 4.2 G/DL (ref 3.5–5.2)
ALBUMIN/GLOB SERPL: 2.2 G/DL
ALP SERPL-CCNC: 51 U/L (ref 39–117)
ALT SERPL W P-5'-P-CCNC: 20 U/L (ref 1–41)
ANION GAP SERPL CALCULATED.3IONS-SCNC: 7 MMOL/L (ref 5–15)
AST SERPL-CCNC: 18 U/L (ref 1–40)
BASOPHILS # BLD AUTO: 0.06 10*3/MM3 (ref 0–0.2)
BASOPHILS NFR BLD AUTO: 0.9 % (ref 0–1.5)
BILIRUB SERPL-MCNC: 0.2 MG/DL (ref 0–1.2)
BUN SERPL-MCNC: 11 MG/DL (ref 6–20)
BUN/CREAT SERPL: 12.2 (ref 7–25)
CALCIUM SPEC-SCNC: 9.4 MG/DL (ref 8.6–10.5)
CHLORIDE SERPL-SCNC: 102 MMOL/L (ref 98–107)
CHOLEST SERPL-MCNC: 131 MG/DL (ref 0–200)
CO2 SERPL-SCNC: 29 MMOL/L (ref 22–29)
CREAT SERPL-MCNC: 0.9 MG/DL (ref 0.76–1.27)
DEPRECATED RDW RBC AUTO: 44 FL (ref 37–54)
EGFRCR SERPLBLD CKD-EPI 2021: 104 ML/MIN/1.73
EOSINOPHIL # BLD AUTO: 0.22 10*3/MM3 (ref 0–0.4)
EOSINOPHIL NFR BLD AUTO: 3.4 % (ref 0.3–6.2)
ERYTHROCYTE [DISTWIDTH] IN BLOOD BY AUTOMATED COUNT: 12.9 % (ref 12.3–15.4)
GLOBULIN UR ELPH-MCNC: 1.9 GM/DL
GLUCOSE SERPL-MCNC: 83 MG/DL (ref 65–99)
HCT VFR BLD AUTO: 46.2 % (ref 37.5–51)
HDLC SERPL-MCNC: 49 MG/DL (ref 40–60)
HGB BLD-MCNC: 15.7 G/DL (ref 13–17.7)
IMM GRANULOCYTES # BLD AUTO: 0.01 10*3/MM3 (ref 0–0.05)
IMM GRANULOCYTES NFR BLD AUTO: 0.2 % (ref 0–0.5)
LDLC SERPL CALC-MCNC: 59 MG/DL (ref 0–100)
LDLC/HDLC SERPL: 1.15 {RATIO}
LYMPHOCYTES # BLD AUTO: 2.29 10*3/MM3 (ref 0.7–3.1)
LYMPHOCYTES NFR BLD AUTO: 35.7 % (ref 19.6–45.3)
MCH RBC QN AUTO: 31.6 PG (ref 26.6–33)
MCHC RBC AUTO-ENTMCNC: 34 G/DL (ref 31.5–35.7)
MCV RBC AUTO: 93 FL (ref 79–97)
MONOCYTES # BLD AUTO: 0.58 10*3/MM3 (ref 0.1–0.9)
MONOCYTES NFR BLD AUTO: 9 % (ref 5–12)
NEUTROPHILS NFR BLD AUTO: 3.26 10*3/MM3 (ref 1.7–7)
NEUTROPHILS NFR BLD AUTO: 50.8 % (ref 42.7–76)
NRBC BLD AUTO-RTO: 0 /100 WBC (ref 0–0.2)
PLATELET # BLD AUTO: 269 10*3/MM3 (ref 140–450)
PMV BLD AUTO: 10.2 FL (ref 6–12)
POTASSIUM SERPL-SCNC: 4.4 MMOL/L (ref 3.5–5.2)
PROT SERPL-MCNC: 6.1 G/DL (ref 6–8.5)
RBC # BLD AUTO: 4.97 10*6/MM3 (ref 4.14–5.8)
SODIUM SERPL-SCNC: 138 MMOL/L (ref 136–145)
TRIGL SERPL-MCNC: 128 MG/DL (ref 0–150)
TSH SERPL DL<=0.05 MIU/L-ACNC: 1.52 UIU/ML (ref 0.27–4.2)
URATE SERPL-MCNC: 4.6 MG/DL (ref 3.4–7)
VLDLC SERPL-MCNC: 23 MG/DL (ref 5–40)
WBC NRBC COR # BLD: 6.42 10*3/MM3 (ref 3.4–10.8)

## 2022-09-16 PROCEDURE — 99214 OFFICE O/P EST MOD 30 MIN: CPT | Performed by: PREVENTIVE MEDICINE

## 2022-09-16 PROCEDURE — 80050 GENERAL HEALTH PANEL: CPT | Performed by: PREVENTIVE MEDICINE

## 2022-09-16 PROCEDURE — 99396 PREV VISIT EST AGE 40-64: CPT | Performed by: PREVENTIVE MEDICINE

## 2022-09-16 PROCEDURE — 84550 ASSAY OF BLOOD/URIC ACID: CPT | Performed by: PREVENTIVE MEDICINE

## 2022-09-16 PROCEDURE — 82607 VITAMIN B-12: CPT | Performed by: PREVENTIVE MEDICINE

## 2022-09-16 PROCEDURE — 80061 LIPID PANEL: CPT | Performed by: PREVENTIVE MEDICINE

## 2022-09-16 RX ORDER — PANTOPRAZOLE SODIUM 40 MG/1
40 TABLET, DELAYED RELEASE ORAL DAILY
Qty: 30 TABLET | Refills: 1 | Status: SHIPPED | OUTPATIENT
Start: 2022-09-16 | End: 2022-11-21 | Stop reason: SDUPTHER

## 2022-09-16 NOTE — PROGRESS NOTES
Venipuncture Blood Specimen Collection  Venipuncture performed in right arm by Lucy Parrish MA with good hemostasis. Patient tolerated the procedure well without complications.   09/16/22   Lucy Parrish MA

## 2022-09-16 NOTE — PROGRESS NOTES
"Subjective   Jourdan Lebron is a 52 y.o. male presents for   Chief Complaint   Patient presents with   • Hyperlipidemia     Annual exam   Patient presents today for his annual wellness exam and has been advised to consider low-dose lung cancer screening pros and cons were discussed also been advised to watch and wear sunscreen and a seatbelt.  Patient states that the endocrinologist has been decreasing the steroid dose and he did noticeSome symptoms of fatigue and also nausea when that occurred nausea does seem to get better at times with eating and times not has not vomited feels almost like a burning sensation we will try PPI and if that fails to improve will refer to [facility] for EGD.  Patient states smoke-free for 1 to 2 months and will do Cologuard for colorectal cancer screening after colonoscopy was discussed.  Patient states that he did have 1 episode of dizziness the day after the steroid doses were changed this lasted for 20 to 30 minutes and we have advised that he talk to the endocrinologist about this and she does not feel as though this is related to his decreased  steroid dose then we should consider EKG repeat and referral to cardiology.  Patient will let us know.  Patient was just switched to Trintellix and does feel as though this is helping his mood.    Health Maintenance Due   Topic Date Due   • COLORECTAL CANCER SCREENING  Never done   • COVID-19 Vaccine (3 - Booster for Pfizer series) 02/17/2022   • ZOSTER VACCINE (1 of 2) Never done   • LUNG CANCER SCREENING  04/21/2022   • ANNUAL PHYSICAL  06/12/2022       History of Present Illness     Vitals:    09/16/22 0828 09/16/22 0829   BP: 105/72 108/70   BP Location: Left arm Right arm   Patient Position: Sitting Sitting   Cuff Size: Adult Adult   Pulse: 71    Temp: 97.5 °F (36.4 °C)    TempSrc: Temporal    SpO2: 98%    Weight: 84.1 kg (185 lb 6.4 oz)    Height: 182.9 cm (72\")      Body mass index is 25.14 kg/m².    Current Outpatient Medications on File " Prior to Visit   Medication Sig Dispense Refill   • ALPRAZolam (Xanax) 0.25 MG tablet Take 1 tablet by mouth 3 (Three) Times a Day As Needed for Anxiety. 90 tablet 3   • aspirin 81 MG chewable tablet Chew 81 mg Daily.     • fluticasone (FLONASE) 50 MCG/ACT nasal spray 1 spray into the nostril(s) as directed by provider Daily.     • hydrocortisone (CORTEF) 10 MG tablet 2.5 mg. IN THE MORNINGS.     • hydrocortisone sodium succinate (Solu-CORTEF) 100 MG injection 100 mg as needed for adrenal crises.     • Praluent 75 MG/ML solution auto-injector INJECT 1 ML UNDER THE SKIN INTO THE APPROPRAITE AREA AS DIRECTED EVERY 14 DAYS 2 mL 6   • Vortioxetine HBr (TRINTELLIX) 10 MG tablet tablet Take 10 mg by mouth Every Morning. 90 tablet 1   • [DISCONTINUED] benzonatate (Tessalon Perles) 100 MG capsule Take 1 capsule by mouth 3 (Three) Times a Day As Needed for Cough. 30 capsule 0   • [DISCONTINUED] doxycycline (MONODOX) 100 MG capsule Take 1 capsule by mouth 2 (Two) Times a Day. 20 capsule 0     No current facility-administered medications on file prior to visit.       The following portions of the patient's history were reviewed and updated as appropriate: allergies, current medications, past family history, past medical history, past social history, past surgical history and problem list.    Review of Systems   Constitutional: Positive for fatigue.   Gastrointestinal: Positive for nausea and indigestion.   Genitourinary: Positive for erectile dysfunction.   Neurological: Positive for dizziness.   Psychiatric/Behavioral: Positive for depressed mood.       Objective   Physical Exam  Vitals reviewed.   Constitutional:       General: He is not in acute distress.     Appearance: Normal appearance. He is well-developed. He is not ill-appearing or toxic-appearing.   HENT:      Head: Normocephalic and atraumatic.      Right Ear: Tympanic membrane, ear canal and external ear normal.      Left Ear: Tympanic membrane, ear canal and  external ear normal.      Nose: Nose normal.      Mouth/Throat:      Mouth: Mucous membranes are moist.      Pharynx: No posterior oropharyngeal erythema.   Eyes:      Extraocular Movements: Extraocular movements intact.      Conjunctiva/sclera: Conjunctivae normal.      Pupils: Pupils are equal, round, and reactive to light.   Cardiovascular:      Rate and Rhythm: Normal rate and regular rhythm.      Heart sounds: Normal heart sounds.   Pulmonary:      Effort: Pulmonary effort is normal.      Comments: Decreased breath sounds bilaterally  Abdominal:      General: Bowel sounds are normal. There is no distension.      Palpations: Abdomen is soft. There is no mass.      Tenderness: There is no abdominal tenderness. There is no right CVA tenderness or left CVA tenderness.   Musculoskeletal:         General: Normal range of motion.      Cervical back: Neck supple.   Skin:     General: Skin is warm.   Neurological:      General: No focal deficit present.      Mental Status: He is alert and oriented to person, place, and time.   Psychiatric:         Mood and Affect: Mood normal.         Behavior: Behavior normal.       PHQ-9 Total Score:      Assessment & Plan   Diagnoses and all orders for this visit:    1. Encounter for annual general medical examination with abnormal findings in adult (Primary)  Comments:  cONSIDER ct CHEST FOR LUNG CANCER SCREENING.  Advise sun screen and seat belt  Orders:  -     CBC Auto Differential    2. Screening for colon cancer  Comments:  Cologuard has been reordered.  Orders:  -     Cologuard - Stool, Per Rectum; Future    3. Encounter for screening for lung cancer  Comments:  Will call if wants    4. Calculus of kidney  Comments:  None recently  Orders:  -     Uric Acid; Future  -     Uric Acid    5. Abnormal electrocardiogram  Comments:  No chest pain-dizziness this week with decreased blood pressure recent med change-will call endo    6. Hyperlipidemia, unspecified hyperlipidemia  type  Comments:  Trying to watch saturated fats  Orders:  -     Lipid Panel; Future  -     Comprehensive Metabolic Panel; Future  -     Lipid Panel  -     Comprehensive Metabolic Panel    7. History of cerebrovascular accident  Comments:  No more stroke symptoms  Orders:  -     TSH    8. Postprocedural adrenocortical (-medullary) hypofunction (HCC)  Comments:  Recent decreased medicine and dizzy spell as above.    9. Tobacco dependence syndrome  Comments:  Still smoke free X1-2 months    10. Vitamin B12 deficiency  Comments:  Takes vitamin D  Orders:  -     Vitamin B12    11. Nausea  Comments:  Since steroid dose is decreased patient has noticed some nausea no right upper quadrant pain or increased belching somewhat better with Rolaids will try PPI    Other orders  -     pantoprazole (PROTONIX) 40 MG EC tablet; Take 1 tablet by mouth Daily.  Dispense: 30 tablet; Refill: 1        Patient Instructions     Health Maintenance Due   Topic Date Due   • COLORECTAL CANCER SCREENING  Never done   • COVID-19 Vaccine (3 - Booster for Pfizer series) 02/17/2022   • ZOSTER VACCINE (1 of 2) Never done   • LUNG CANCER SCREENING  04/21/2022   • ANNUAL PHYSICAL  06/12/2022    pATIENT WILL CALL IF WANTS ct OF CHEST FOR LUNG CANCER SCREENING.    Advise continue increase fluids like pedialyte till discusses dizziness with Endo    Call 4 weeks about nausea

## 2022-09-17 LAB — VIT B12 BLD-MCNC: 868 PG/ML (ref 211–946)

## 2022-09-19 ENCOUNTER — TELEPHONE (OUTPATIENT)
Dept: FAMILY MEDICINE CLINIC | Facility: CLINIC | Age: 52
End: 2022-09-19

## 2022-09-19 NOTE — TELEPHONE ENCOUNTER
HUB TO READ   ----- Message from Justa Castano MD sent at 9/18/2022  9:21 AM EDT -----  Labs look normal- keep up good work

## 2022-10-27 ENCOUNTER — LAB (OUTPATIENT)
Dept: LAB | Facility: HOSPITAL | Age: 52
End: 2022-10-27

## 2022-10-27 ENCOUNTER — TRANSCRIBE ORDERS (OUTPATIENT)
Dept: ADMINISTRATIVE | Facility: HOSPITAL | Age: 52
End: 2022-10-27

## 2022-10-27 DIAGNOSIS — E89.6 HISTORY OF TOTAL ADRENALECTOMY: Primary | ICD-10-CM

## 2022-10-27 DIAGNOSIS — E89.6 HISTORY OF TOTAL ADRENALECTOMY: ICD-10-CM

## 2022-10-27 LAB
ALBUMIN SERPL-MCNC: 4.1 G/DL (ref 3.5–5.2)
ALBUMIN/GLOB SERPL: 2 G/DL
ALP SERPL-CCNC: 57 U/L (ref 39–117)
ALT SERPL W P-5'-P-CCNC: 13 U/L (ref 1–41)
ANION GAP SERPL CALCULATED.3IONS-SCNC: 8.9 MMOL/L (ref 5–15)
AST SERPL-CCNC: 16 U/L (ref 1–40)
BILIRUB SERPL-MCNC: 0.3 MG/DL (ref 0–1.2)
BUN SERPL-MCNC: 11 MG/DL (ref 6–20)
BUN/CREAT SERPL: 11.1 (ref 7–25)
CALCIUM SPEC-SCNC: 9.2 MG/DL (ref 8.6–10.5)
CHLORIDE SERPL-SCNC: 104 MMOL/L (ref 98–107)
CO2 SERPL-SCNC: 27.1 MMOL/L (ref 22–29)
CREAT SERPL-MCNC: 0.99 MG/DL (ref 0.76–1.27)
EGFRCR SERPLBLD CKD-EPI 2021: 92.8 ML/MIN/1.73
GLOBULIN UR ELPH-MCNC: 2.1 GM/DL
GLUCOSE SERPL-MCNC: 76 MG/DL (ref 65–99)
POTASSIUM SERPL-SCNC: 4.7 MMOL/L (ref 3.5–5.2)
PROT SERPL-MCNC: 6.2 G/DL (ref 6–8.5)
SODIUM SERPL-SCNC: 140 MMOL/L (ref 136–145)

## 2022-10-27 PROCEDURE — 82627 DEHYDROEPIANDROSTERONE: CPT

## 2022-10-27 PROCEDURE — 80053 COMPREHEN METABOLIC PANEL: CPT

## 2022-10-27 PROCEDURE — 36415 COLL VENOUS BLD VENIPUNCTURE: CPT

## 2022-10-27 PROCEDURE — 82024 ASSAY OF ACTH: CPT

## 2022-10-28 LAB
ACTH PLAS-MCNC: 28.4 PG/ML (ref 7.2–63.3)
DHEA-S SERPL-MCNC: 95.9 UG/DL (ref 71.6–375.4)

## 2022-11-21 RX ORDER — PANTOPRAZOLE SODIUM 40 MG/1
40 TABLET, DELAYED RELEASE ORAL DAILY
Qty: 30 TABLET | Refills: 0 | Status: SHIPPED | OUTPATIENT
Start: 2022-11-21 | End: 2022-12-07

## 2022-11-29 RX ORDER — ALIROCUMAB 75 MG/ML
INJECTION, SOLUTION SUBCUTANEOUS
Qty: 2 ML | Refills: 6 | Status: SHIPPED | OUTPATIENT
Start: 2022-11-29

## 2022-12-07 RX ORDER — PANTOPRAZOLE SODIUM 40 MG/1
40 TABLET, DELAYED RELEASE ORAL DAILY
Qty: 30 TABLET | Refills: 0 | Status: SHIPPED | OUTPATIENT
Start: 2022-12-07 | End: 2022-12-19 | Stop reason: SDUPTHER

## 2022-12-19 DIAGNOSIS — R11.0 NAUSEA: Primary | ICD-10-CM

## 2022-12-19 RX ORDER — PANTOPRAZOLE SODIUM 40 MG/1
40 TABLET, DELAYED RELEASE ORAL DAILY
Qty: 30 TABLET | Refills: 0 | Status: SHIPPED | OUTPATIENT
Start: 2022-12-19 | End: 2022-12-19 | Stop reason: SDUPTHER

## 2022-12-19 RX ORDER — PANTOPRAZOLE SODIUM 40 MG/1
40 TABLET, DELAYED RELEASE ORAL DAILY
Qty: 90 TABLET | Refills: 0 | Status: SHIPPED | OUTPATIENT
Start: 2022-12-19

## 2022-12-19 NOTE — TELEPHONE ENCOUNTER
Rx Refill Note  Requested Prescriptions     Pending Prescriptions Disp Refills   • pantoprazole (PROTONIX) 40 MG EC tablet 30 tablet 0     Sig: Take 1 tablet by mouth Daily.      Last office visit with prescribing clinician: 9/16/2022   Last telemedicine visit with prescribing clinician: 12/22/2022   Next office visit with prescribing clinician: 12/22/2022                         Would you like a call back once the refill request has been completed: [] Yes [] No    If the office needs to give you a call back, can they leave a voicemail: [] Yes [] No    Annabel Ocampo MA  12/19/22, 09:41 EST

## 2022-12-19 NOTE — PATIENT INSTRUCTIONS
Health Maintenance Due   Topic Date Due    COLORECTAL CANCER SCREENING  Never done    COVID-19 Vaccine (3 - Booster for Pfizer series) 11/12/2021    ZOSTER VACCINE (1 of 2) Never done    LUNG CANCER SCREENING  04/21/2022    INFLUENZA VACCINE  08/01/2022    Patient to call if wants low dose lung cancer screening

## 2022-12-19 NOTE — TELEPHONE ENCOUNTER
----- Message from Jourdan Lebron sent at 12/19/2022  9:19 AM EST -----  Regarding: Pantoprazole  Contact: [phone]  Jourdan had requested a refill on the PAntoprazole.  The insurance would not let him fill at [facility].  Please if possible send a 90 day Rx to [facility].  He did not get the fill at [facility].  Thanks, Claudia Lebron

## 2022-12-21 ENCOUNTER — OFFICE VISIT (OUTPATIENT)
Dept: PSYCHIATRY | Facility: CLINIC | Age: 52
End: 2022-12-21

## 2022-12-21 DIAGNOSIS — F41.1 GENERALIZED ANXIETY DISORDER: Chronic | ICD-10-CM

## 2022-12-21 DIAGNOSIS — F33.1 MAJOR DEPRESSIVE DISORDER, RECURRENT EPISODE, MODERATE: Primary | Chronic | ICD-10-CM

## 2022-12-21 PROCEDURE — 99214 OFFICE O/P EST MOD 30 MIN: CPT | Performed by: PSYCHIATRY & NEUROLOGY

## 2022-12-21 RX ORDER — VILAZODONE HYDROCHLORIDE 20 MG/1
TABLET ORAL
Qty: 30 TABLET | Refills: 3 | Status: SHIPPED | OUTPATIENT
Start: 2022-12-21 | End: 2023-02-08 | Stop reason: SINTOL

## 2022-12-21 RX ORDER — ALPRAZOLAM 0.25 MG/1
0.25 TABLET ORAL 3 TIMES DAILY PRN
Qty: 90 TABLET | Refills: 3 | Status: SHIPPED | OUTPATIENT
Start: 2022-12-21 | End: 2023-12-21

## 2022-12-21 NOTE — PROGRESS NOTES
Subjective   Jourdan Lebron is a 52 y.o. 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, left middle cerebral artery , the pt is R handed    he had troubles talking , had muscle weakness   Oct 2020 - went back to work light duty , still had issues completing his tasks   Feb 2021- adrenal gland removed and that caused more issues, more medical w/u and ER visits, weight loss   Now on steroid replacement which is also not completely controlled     The pt was started on zoloft - no major changes, it had to be changed to prozac - tolerated well and started feeling less depressed, he is back to work now , prozac was increased to 40 mg , however, he did not tolerate, reported increased anxiety and emotional flattening      Today the pt reported feeling more irritable   on trintellix,  Easy to anger,  More depressed last few weeks, Depression is rated as 7-8/10   The pt c/o decreased concentration, decreased memory  E level is still low - he is off fludrocortisone now      Sleep - fragmented  , not restorative ,   Denied AVH/SI/HI     Anxiety - manageable on xanax    The following portions of the patient's history were reviewed and updated as appropriate: allergies, current medications, past family history, past medical history, past social history, past surgical history and problem list.    PAST PSYCHIATRIC HISTORY  Axis I  Affective/Bipolar Disorder, Anxiety/Panic Disorder  Axis II  Denied     PAST OUTPATIENT TREATMENT  Diagnosis treated:  Anxiety, depression   Treatment Type:  Medication Management  Prior Psychiatric Medications:  lexapro - not effective  Buspirone - side effects   prozac - OK  trintellix - increased irritability   Support Groups:  None   Sequelae Of Mental Disorder:  job disruption, emotional distress          Interval  History  Depression, anxiety - worse     Side Effects  trintellix - increased irritability       Past Medical History:  Past Medical History:   Diagnosis Date   • Brain fog    • Hand tingling    • Hyperlipidemia    • Tiredness        Social History:  Social History     Socioeconomic History   • Marital status:    Tobacco Use   • Smoking status: Former     Packs/day: 3.00     Years: 37.00     Pack years: 111.00     Types: Cigars, Cigarettes     Start date:      Quit date:      Years since quittin.9   • Smokeless tobacco: Never   • Tobacco comments:     1 packs= 2 cigars a day; only smokes cigars   Vaping Use   • Vaping Use: Never used   Substance and Sexual Activity   • Alcohol use: Not Currently     Alcohol/week: 8.0 standard drinks     Types: 8 Cans of beer per week     Comment: Has now been abstinent for about one year   • Drug use: Never   • Sexual activity: Yes     Partners: Female     Birth control/protection: None       Family History:  Family History   Problem Relation Age of Onset   • Arthritis Mother    • Hypertension Mother    • Heart failure Mother 73        Cause of death   • Arthritis Father    • Hypertension Father    • Kidney cancer Father 57        Cause of death. Was a smoker   • Heart disease Brother    • Esophageal cancer Brother 59        Cause of death. Has a heavy drinker       Past Surgical History:  Past Surgical History:   Procedure Laterality Date   • ADRENALECTOMY     • KIDNEY STONE SURGERY     • THROMBECTOMY         Problem List:  Patient Active Problem List   Diagnosis   • Abnormal electrocardiogram   • Calculus of kidney   • History of cerebrovascular accident   • Family history of malignant neoplasm   • Generalized hyperhidrosis   • Hyperlipidemia   • Seasonal allergies   • Tobacco dependence syndrome   • Cervical radiculopathy   • Cervical stenosis of spinal canal   • Near syncope   • Other fatigue   • Arthropathy of cervical facet joint   • Neck pain   • Pain in  left arm   • Shoulder pain   • Seasonal allergic rhinitis   • Thoracic back pain   • Vitamin B12 deficiency   • Spinal stenosis of cervical region   • Reduced libido   • Hypogonadotropic hypogonadism (HCC)   • Major depressive disorder, recurrent episode, moderate (HCC)   • Generalized anxiety disorder   • Macrocytosis   • Erythrocytosis   • Postprocedural adrenocortical (-medullary) hypofunction (HCC)   • Nausea       Allergy:   Allergies   Allergen Reactions   • Cephalosporins Anaphylaxis     Throat swelling   • Dye  [Contrast Dye] Hives   • Sulfa Antibiotics Hives   • Zetia [Ezetimibe] Other (See Comments)     Made tired   • Iodinated Diagnostic Agents Unknown - Low Severity   • Statins Myalgia     Myalgia and fatique   • Sulfate Unknown - Low Severity        Discontinued Medications:  Medications Discontinued During This Encounter   Medication Reason   • Vortioxetine HBr (TRINTELLIX) 10 MG tablet tablet *Therapy completed   • ALPRAZolam (Xanax) 0.25 MG tablet Reorder       Current Medications:   Current Outpatient Medications   Medication Sig Dispense Refill   • ALPRAZolam (Xanax) 0.25 MG tablet Take 1 tablet by mouth 3 (Three) Times a Day As Needed for Anxiety. 90 tablet 3   • aspirin 81 MG chewable tablet Chew 81 mg Daily.     • fluticasone (FLONASE) 50 MCG/ACT nasal spray 1 spray into the nostril(s) as directed by provider Daily.     • hydrocortisone (CORTEF) 10 MG tablet 2.5 mg. IN THE MORNINGS.     • hydrocortisone sodium succinate (Solu-CORTEF) 100 MG injection 100 mg as needed for adrenal crises.     • pantoprazole (PROTONIX) 40 MG EC tablet Take 1 tablet by mouth Daily. 90 tablet 0   • Praluent 75 MG/ML solution auto-injector INJECT 1 MG UNDER THE SKIN INTO THE APPROPRIATE AREA AS DIRECTED EVERY 14 DAYS 2 mL 6   • vilazodone (VIIBRYD) 20 MG tablet tablet 0.5 tab po QAM with food for 7 days, then increase to 1 tab po QAM with food 30 tablet 3     No current facility-administered medications for this  visit.         Psychological ROS: positive for - anxiety, concentration, depression,  irritability and memory difficulties  Negative for AVH/SI/HI, delusions       Physical Exam:   There were no vitals taken for this visit.    Mental Status Exam:   Hygiene:   good  Cooperation:  Cooperative  Eye Contact:  Good  Psychomotor Behavior:  Appropriate  Affect:  Full range and Appropriate  Mood: fluctates  Hopelessness: Denies  Speech:  Normal  Thought Process:  Goal directed and Linear  Thought Content:  Normal and Mood congruent  Suicidal:  None  Homicidal:  None  Hallucinations:  None  Delusion:  None  Memory:  Deficits  Orientation:  Person, Place, Time and Situation  Reliability:  good  Insight:  Good  Judgement:  Good  Impulse Control:  Fair  Physical/Medical Issues:  Yes         MSE from 8/18/2022  reviewed and accepted without changes       PHQ-9 Depression Screening  Little interest or pleasure in doing things? 2-->more than half the days   Feeling down, depressed, or hopeless? 2-->more than half the days   Trouble falling or staying asleep, or sleeping too much? 1-->several days   Feeling tired or having little energy? 1-->several days   Poor appetite or overeating? 0-->not at all   Feeling bad about yourself - or that you are a failure or have let yourself or your family down? 3-->nearly every day   Trouble concentrating on things, such as reading the newspaper or watching television? 2-->more than half the days   Moving or speaking so slowly that other people could have noticed? Or the opposite - being so fidgety or restless that you have been moving around a lot more than usual? 0-->not at all   Thoughts that you would be better off dead, or of hurting yourself in some way? 0-->not at all   PHQ-9 Total Score 11   If you checked off any problems, how difficult have these problems made it for you to do your work, take care of things at home, or get along with other people? very difficult           Current every  day smoker 3-10 mintues spent counseling Has reduced Tobbacos use    I advised Jourdan of the risks of tobacco use.     Lab Results:   Lab on 10/27/2022   Component Date Value Ref Range Status   • Glucose 10/27/2022 76  65 - 99 mg/dL Final   • BUN 10/27/2022 11  6 - 20 mg/dL Final   • Creatinine 10/27/2022 0.99  0.76 - 1.27 mg/dL Final   • Sodium 10/27/2022 140  136 - 145 mmol/L Final   • Potassium 10/27/2022 4.7  3.5 - 5.2 mmol/L Final   • Chloride 10/27/2022 104  98 - 107 mmol/L Final   • CO2 10/27/2022 27.1  22.0 - 29.0 mmol/L Final   • Calcium 10/27/2022 9.2  8.6 - 10.5 mg/dL Final   • Total Protein 10/27/2022 6.2  6.0 - 8.5 g/dL Final   • Albumin 10/27/2022 4.10  3.50 - 5.20 g/dL Final   • ALT (SGPT) 10/27/2022 13  1 - 41 U/L Final   • AST (SGOT) 10/27/2022 16  1 - 40 U/L Final   • Alkaline Phosphatase 10/27/2022 57  39 - 117 U/L Final   • Total Bilirubin 10/27/2022 0.3  0.0 - 1.2 mg/dL Final   • Globulin 10/27/2022 2.1  gm/dL Final   • A/G Ratio 10/27/2022 2.0  g/dL Final   • BUN/Creatinine Ratio 10/27/2022 11.1  7.0 - 25.0 Final   • Anion Gap 10/27/2022 8.9  5.0 - 15.0 mmol/L Final   • eGFR 10/27/2022 92.8  >60.0 mL/min/1.73 Final    National Kidney Foundation and American Society of Nephrology (ASN) Task Force recommended calculation based on the Chronic Kidney Disease Epidemiology Collaboration (CKD-EPI) equation refit without adjustment for race.   • ACTH 10/27/2022 28.4  7.2 - 63.3 pg/mL Final    ACTH reference interval for samples collected between 7 and 10 AM.   • DHEA-Sulfate 10/27/2022 95.9  71.6 - 375.4 ug/dL Final       Assessment & Plan   Problems Addressed this Visit        Mental Health    Major depressive disorder, recurrent episode, moderate (HCC) - Primary (Chronic)    Relevant Medications    vilazodone (VIIBRYD) 20 MG tablet tablet    ALPRAZolam (Xanax) 0.25 MG tablet    Generalized anxiety disorder (Chronic)    Relevant Medications    vilazodone (VIIBRYD) 20 MG tablet tablet    ALPRAZolam  (Xanax) 0.25 MG tablet   Diagnoses       Codes Comments    Major depressive disorder, recurrent episode, moderate (HCC)    -  Primary ICD-10-CM: F33.1  ICD-9-CM: 296.32     Generalized anxiety disorder     ICD-10-CM: F41.1  ICD-9-CM: 300.02            Visit Diagnoses:    ICD-10-CM ICD-9-CM   1. Major depressive disorder, recurrent episode, moderate (HCC)  F33.1 296.32   2. Generalized anxiety disorder  F41.1 300.02        TREATMENT PLAN/GOALS: Continue supportive psychotherapy efforts and medications as indicated. Treatment and medication options discussed during today's visit. Patient ackowledged and verbally consented to continue with current treatment plan and was educated on the importance of compliance with treatment and follow-up appointments.    MEDICATION ISSUES:  INSPECT reviewed as expected - gabapentin and hydrocodone , 2022  xanax #90    1. Major depressive d/o moderate recurrent -  did not respond well to SSRI and Buspar, or trintellix   Start viibryd 10-20 mg    the pt had neuropsych eval done at [facility] with depression and anxiety     2. Generalized anxiety d/o -  Cont  Low dose of xanax 0. 25 mg po TID , no changs necessary, psychotherapy was recommended      3. Long term therapeutic drug monitoring - UDS 2022 consistent        PHQ scored 11 - moderate   depression   JOSE MARIA 7 scored 8    Discussed medication options and treatment plan of prescribed medication as well as the risks, benefits, and side effects including potential falls, possible impaired driving and metabolic adversities among others. Patient is agreeable to call the office with any worsening of symptoms or onset of side effects. Patient is agreeable to call 911 or go to the nearest ER should he/she begin having SI/HI. No medication side effects or related complaints today.     MEDS ORDERED DURING VISIT:  New Medications Ordered This Visit   Medications   • vilazodone (VIIBRYD) 20 MG tablet tablet     Si.5 tab po QAM  with food for 7 days, then increase to 1 tab po QAM with food     Dispense:  30 tablet     Refill:  3   • ALPRAZolam (Xanax) 0.25 MG tablet     Sig: Take 1 tablet by mouth 3 (Three) Times a Day As Needed for Anxiety.     Dispense:  90 tablet     Refill:  3     Please dispense on or after December 26 , 2022       Return in about 4 months (around 4/21/2023).         This document has been electronically signed by Clair Lyman MD  December 21, 2022 08:18 EST    EMR Dragon transcription disclaimer:  Some of this encounter note is an electronic transcription translation of spoken language to printed text. The electronic translation of spoken language may permit erroneous, or at times, nonsensical words or phrases to be inadvertently transcribed; Although I have reviewed the note for such errors some may still exist.

## 2022-12-22 ENCOUNTER — OFFICE VISIT (OUTPATIENT)
Dept: FAMILY MEDICINE CLINIC | Facility: CLINIC | Age: 52
End: 2022-12-22

## 2022-12-22 VITALS
DIASTOLIC BLOOD PRESSURE: 75 MMHG | TEMPERATURE: 97.9 F | HEIGHT: 72 IN | WEIGHT: 171.4 LBS | BODY MASS INDEX: 23.22 KG/M2 | HEART RATE: 80 BPM | SYSTOLIC BLOOD PRESSURE: 114 MMHG | OXYGEN SATURATION: 96 %

## 2022-12-22 DIAGNOSIS — E55.9 VITAMIN D DEFICIENCY: ICD-10-CM

## 2022-12-22 DIAGNOSIS — E89.6 POSTPROCEDURAL ADRENOCORTICAL (-MEDULLARY) HYPOFUNCTION: ICD-10-CM

## 2022-12-22 DIAGNOSIS — F33.1 MAJOR DEPRESSIVE DISORDER, RECURRENT EPISODE, MODERATE: Chronic | ICD-10-CM

## 2022-12-22 DIAGNOSIS — N20.0 CALCULUS OF KIDNEY: ICD-10-CM

## 2022-12-22 DIAGNOSIS — R11.0 NAUSEA: ICD-10-CM

## 2022-12-22 DIAGNOSIS — E53.8 VITAMIN B12 DEFICIENCY: ICD-10-CM

## 2022-12-22 DIAGNOSIS — E78.5 HYPERLIPIDEMIA, UNSPECIFIED HYPERLIPIDEMIA TYPE: ICD-10-CM

## 2022-12-22 DIAGNOSIS — R13.10 DYSPHAGIA, UNSPECIFIED TYPE: ICD-10-CM

## 2022-12-22 DIAGNOSIS — R94.31 ABNORMAL ELECTROCARDIOGRAM: ICD-10-CM

## 2022-12-22 DIAGNOSIS — R68.82 REDUCED LIBIDO: ICD-10-CM

## 2022-12-22 DIAGNOSIS — Z12.2 ENCOUNTER FOR SCREENING FOR LUNG CANCER: ICD-10-CM

## 2022-12-22 DIAGNOSIS — Z86.73 HISTORY OF CEREBROVASCULAR ACCIDENT: Primary | ICD-10-CM

## 2022-12-22 DIAGNOSIS — Z87.891 HISTORY OF TOBACCO USE: ICD-10-CM

## 2022-12-22 DIAGNOSIS — Z12.11 SCREENING FOR COLON CANCER: ICD-10-CM

## 2022-12-22 LAB
25(OH)D3 SERPL-MCNC: 67.7 NG/ML (ref 30–100)
ALBUMIN SERPL-MCNC: 4.2 G/DL (ref 3.5–5.2)
ALBUMIN/GLOB SERPL: 1.6 G/DL
ALP SERPL-CCNC: 59 U/L (ref 39–117)
ALT SERPL W P-5'-P-CCNC: 13 U/L (ref 1–41)
ANION GAP SERPL CALCULATED.3IONS-SCNC: 7.5 MMOL/L (ref 5–15)
AST SERPL-CCNC: 18 U/L (ref 1–40)
BASOPHILS # BLD AUTO: 0.06 10*3/MM3 (ref 0–0.2)
BASOPHILS NFR BLD AUTO: 0.8 % (ref 0–1.5)
BILIRUB BLD-MCNC: ABNORMAL MG/DL
BILIRUB SERPL-MCNC: 0.3 MG/DL (ref 0–1.2)
BUN SERPL-MCNC: 14 MG/DL (ref 6–20)
BUN/CREAT SERPL: 15.6 (ref 7–25)
CALCIUM SPEC-SCNC: 9.7 MG/DL (ref 8.6–10.5)
CHLORIDE SERPL-SCNC: 106 MMOL/L (ref 98–107)
CHOLEST SERPL-MCNC: 135 MG/DL (ref 0–200)
CLARITY, POC: CLEAR
CO2 SERPL-SCNC: 26.5 MMOL/L (ref 22–29)
COLOR UR: YELLOW
CREAT SERPL-MCNC: 0.9 MG/DL (ref 0.76–1.27)
DEPRECATED RDW RBC AUTO: 42.6 FL (ref 37–54)
EGFRCR SERPLBLD CKD-EPI 2021: 104 ML/MIN/1.73
EOSINOPHIL # BLD AUTO: 0.12 10*3/MM3 (ref 0–0.4)
EOSINOPHIL NFR BLD AUTO: 1.7 % (ref 0.3–6.2)
ERYTHROCYTE [DISTWIDTH] IN BLOOD BY AUTOMATED COUNT: 12.6 % (ref 12.3–15.4)
EXPIRATION DATE: ABNORMAL
GLOBULIN UR ELPH-MCNC: 2.7 GM/DL
GLUCOSE SERPL-MCNC: 84 MG/DL (ref 65–99)
GLUCOSE UR STRIP-MCNC: NEGATIVE MG/DL
HCT VFR BLD AUTO: 48.3 % (ref 37.5–51)
HDLC SERPL-MCNC: 53 MG/DL (ref 40–60)
HGB BLD-MCNC: 17.1 G/DL (ref 13–17.7)
IMM GRANULOCYTES # BLD AUTO: 0.02 10*3/MM3 (ref 0–0.05)
IMM GRANULOCYTES NFR BLD AUTO: 0.3 % (ref 0–0.5)
KETONES UR QL: NEGATIVE
LDLC SERPL CALC-MCNC: 64 MG/DL (ref 0–100)
LDLC/HDLC SERPL: 1.2 {RATIO}
LEUKOCYTE EST, POC: NEGATIVE
LYMPHOCYTES # BLD AUTO: 2.15 10*3/MM3 (ref 0.7–3.1)
LYMPHOCYTES NFR BLD AUTO: 30 % (ref 19.6–45.3)
Lab: ABNORMAL
MAGNESIUM SERPL-MCNC: 2.6 MG/DL (ref 1.6–2.6)
MCH RBC QN AUTO: 32.3 PG (ref 26.6–33)
MCHC RBC AUTO-ENTMCNC: 35.4 G/DL (ref 31.5–35.7)
MCV RBC AUTO: 91.1 FL (ref 79–97)
MONOCYTES # BLD AUTO: 0.7 10*3/MM3 (ref 0.1–0.9)
MONOCYTES NFR BLD AUTO: 9.8 % (ref 5–12)
NEUTROPHILS NFR BLD AUTO: 4.11 10*3/MM3 (ref 1.7–7)
NEUTROPHILS NFR BLD AUTO: 57.4 % (ref 42.7–76)
NITRITE UR-MCNC: NEGATIVE MG/ML
NRBC BLD AUTO-RTO: 0.1 /100 WBC (ref 0–0.2)
PH UR: 6.5 [PH] (ref 5–8)
PLATELET # BLD AUTO: 280 10*3/MM3 (ref 140–450)
PMV BLD AUTO: 10 FL (ref 6–12)
POTASSIUM SERPL-SCNC: 4.8 MMOL/L (ref 3.5–5.2)
PROT SERPL-MCNC: 6.9 G/DL (ref 6–8.5)
PROT UR STRIP-MCNC: ABNORMAL MG/DL
RBC # BLD AUTO: 5.3 10*6/MM3 (ref 4.14–5.8)
RBC # UR STRIP: NEGATIVE /UL
SODIUM SERPL-SCNC: 140 MMOL/L (ref 136–145)
SP GR UR: 1.02 (ref 1–1.03)
TESTOST SERPL-MCNC: 641 NG/DL (ref 193–740)
TRIGL SERPL-MCNC: 93 MG/DL (ref 0–150)
TSH SERPL DL<=0.05 MIU/L-ACNC: 1.56 UIU/ML (ref 0.27–4.2)
UROBILINOGEN UR QL: ABNORMAL
VIT B12 BLD-MCNC: 1027 PG/ML (ref 211–946)
VLDLC SERPL-MCNC: 18 MG/DL (ref 5–40)
WBC NRBC COR # BLD: 7.16 10*3/MM3 (ref 3.4–10.8)

## 2022-12-22 PROCEDURE — 83735 ASSAY OF MAGNESIUM: CPT | Performed by: PREVENTIVE MEDICINE

## 2022-12-22 PROCEDURE — 82607 VITAMIN B-12: CPT | Performed by: PREVENTIVE MEDICINE

## 2022-12-22 PROCEDURE — 80061 LIPID PANEL: CPT | Performed by: PREVENTIVE MEDICINE

## 2022-12-22 PROCEDURE — 99214 OFFICE O/P EST MOD 30 MIN: CPT | Performed by: PREVENTIVE MEDICINE

## 2022-12-22 PROCEDURE — 82306 VITAMIN D 25 HYDROXY: CPT | Performed by: PREVENTIVE MEDICINE

## 2022-12-22 PROCEDURE — 84403 ASSAY OF TOTAL TESTOSTERONE: CPT | Performed by: PREVENTIVE MEDICINE

## 2022-12-22 PROCEDURE — 81003 URINALYSIS AUTO W/O SCOPE: CPT | Performed by: PREVENTIVE MEDICINE

## 2022-12-22 PROCEDURE — 90471 IMMUNIZATION ADMIN: CPT | Performed by: PREVENTIVE MEDICINE

## 2022-12-22 PROCEDURE — 90686 IIV4 VACC NO PRSV 0.5 ML IM: CPT | Performed by: PREVENTIVE MEDICINE

## 2022-12-22 PROCEDURE — 80050 GENERAL HEALTH PANEL: CPT | Performed by: PREVENTIVE MEDICINE

## 2022-12-22 NOTE — PROGRESS NOTES
"Subjective   Jourdan Lberon is a 52 y.o. male presents for   Chief Complaint   Patient presents with   • Hyperlipidemia     Would like flu shot  No concerns  Patient is fasting.     Patient presents today for follow-up on multiple chronic health conditions most of which are stable.  He has had some new dysphagia and on 3 occasions felt like something was getting stuck in his chest.  He has been off his pantoprazole for the last week and a half and should be getting started back on that today.  He does feel as though the Trintellix was causing stomach bloating and stomach upset so his psychiatrist is transitioning him over to Viibryd which she has not started yet.  Patient finally stopped smoking 3 months ago but does not want a CT of his chest will call if he changes his mind.  Pantoprazole has been sent to his mail-in.  Health Maintenance Due   Topic Date Due   • COLORECTAL CANCER SCREENING  Never done   • COVID-19 Vaccine (3 - Booster for Pfizer series) 11/12/2021   • ZOSTER VACCINE (1 of 2) Never done   • LUNG CANCER SCREENING  04/21/2022       History of Present Illness     Vitals:    12/22/22 0811 12/22/22 0813   BP: 104/71 114/75   BP Location: Right arm Left arm   Patient Position: Sitting Sitting   Cuff Size: Adult Adult   Pulse: 80    Temp: 97.9 °F (36.6 °C)    TempSrc: Temporal    SpO2: 96%    Weight: 77.7 kg (171 lb 6.4 oz)    Height: 182.9 cm (72\")      Body mass index is 23.25 kg/m².    Current Outpatient Medications on File Prior to Visit   Medication Sig Dispense Refill   • ALPRAZolam (Xanax) 0.25 MG tablet Take 1 tablet by mouth 3 (Three) Times a Day As Needed for Anxiety. 90 tablet 3   • aspirin 81 MG chewable tablet Chew 81 mg Daily.     • fluticasone (FLONASE) 50 MCG/ACT nasal spray 1 spray into the nostril(s) as directed by provider Daily.     • pantoprazole (PROTONIX) 40 MG EC tablet Take 1 tablet by mouth Daily. 90 tablet 0   • Praluent 75 MG/ML solution auto-injector INJECT 1 MG UNDER THE SKIN " INTO THE APPROPRIATE AREA AS DIRECTED EVERY 14 DAYS 2 mL 6   • vilazodone (VIIBRYD) 20 MG tablet tablet 0.5 tab po QAM with food for 7 days, then increase to 1 tab po QAM with food 30 tablet 3   • Vortioxetine HBr (Trintellix) 10 MG tablet tablet 10 mg Daily.     • hydrocortisone sodium succinate (Solu-CORTEF) 100 MG injection 100 mg as needed for adrenal crises.     • [DISCONTINUED] hydrocortisone (CORTEF) 10 MG tablet 2.5 mg. IN THE MORNINGS.       No current facility-administered medications on file prior to visit.       The following portions of the patient's history were reviewed and updated as appropriate: allergies, current medications, past family history, past medical history, past social history, past surgical history and problem list.    Review of Systems   Gastrointestinal: Positive for abdominal pain, nausea, GERD and indigestion.        Dysphagia   Genitourinary: Positive for erectile dysfunction.   Psychiatric/Behavioral: Positive for depressed mood.       Objective   Physical Exam  Vitals reviewed.   Constitutional:       General: He is not in acute distress.     Appearance: Normal appearance. He is well-developed. He is not ill-appearing or toxic-appearing.   HENT:      Head: Normocephalic and atraumatic.      Right Ear: Tympanic membrane, ear canal and external ear normal.      Left Ear: Tympanic membrane, ear canal and external ear normal.      Nose: Nose normal.      Mouth/Throat:      Mouth: Mucous membranes are moist.      Pharynx: No posterior oropharyngeal erythema.   Eyes:      Extraocular Movements: Extraocular movements intact.      Conjunctiva/sclera: Conjunctivae normal.      Pupils: Pupils are equal, round, and reactive to light.   Cardiovascular:      Rate and Rhythm: Normal rate and regular rhythm.      Heart sounds: Normal heart sounds.   Pulmonary:      Effort: Pulmonary effort is normal.      Comments: Decreased breath sounds bilaterally  Abdominal:      General: Bowel sounds are  normal. There is no distension.      Palpations: Abdomen is soft. There is no mass.      Tenderness: There is no abdominal tenderness.   Musculoskeletal:      Cervical back: Neck supple.   Skin:     General: Skin is warm.   Neurological:      General: No focal deficit present.      Mental Status: He is alert and oriented to person, place, and time.   Psychiatric:         Mood and Affect: Mood normal.         Behavior: Behavior normal.       PHQ-9 Total Score:      Assessment & Plan   Diagnoses and all orders for this visit:    1. History of cerebrovascular accident (Primary)  Comments:  Patient has been released from the stroke doctors at [facility] has had no weakness dysarthria    2. Screening for colon cancer  Comments:  Green folder was again given to the patient to sign up for colon cancer screening.  Orders:  -     Ambulatory Referral For Screening Colonoscopy    3. Encounter for screening for lung cancer  Comments:  Pros and cons of lung cancer screening were discussed he chose not to be screened but let us know if he changes his mind.    4. Calculus of kidney  Comments:  No side pain or kidney stones noted  Orders:  -     POC Urinalysis Dipstick, Automated    5. Hyperlipidemia, unspecified hyperlipidemia type  Comments:  Patient is watching saturated fats  Orders:  -     Comprehensive Metabolic Panel; Future  -     Lipid Panel; Future  -     Comprehensive Metabolic Panel  -     Lipid Panel    6. History of tobacco use  Comments:  Stopped 3 months ago and doesn't want CT of chest byut will call if changes mind    7. Vitamin B12 deficiency  Comments:  Takes daily  Orders:  -     CBC Auto Differential; Future  -     Vitamin B12; Future  -     CBC Auto Differential  -     Vitamin B12    8. Nausea  Comments:  No vomiting but off Pantoprazole 1-2 weeks.  Feelsw bloasted and abdominal pain with Trintillexand is switched to Vibriid    9. Postprocedural adrenocortical (-medullary) hypofunction (HCC)  Comments:  Patient  is now off of his steroid and seems to be doing well.    10. Abnormal electrocardiogram  Comments:  No chest pressure or pain  Orders:  -     Magnesium; Future  -     TSH; Future  -     Magnesium  -     TSH    11. Reduced libido  Comments:  Switching antidepressants and will see if helps  Orders:  -     Testosterone; Future  -     Testosterone    12. Vitamin D deficiency  Comments:  Not taking and will check level  Orders:  -     Vitamin D,25-Hydroxy; Future  -     Vitamin D,25-Hydroxy    13. Dysphagia, unspecified type  Comments:  Recently felt as though on 3 occasions something gets stuck in his esophagus have advised EGD Green folder given.  Orders:  -     Ambulatory referral for Screening EGD    14. Major depressive disorder, recurrent episode, moderate (HCC)    Other orders  -     FluLaval/Fluzone >6 mos ([id number])        Patient Instructions     Health Maintenance Due   Topic Date Due   • COLORECTAL CANCER SCREENING  Never done   • COVID-19 Vaccine (3 - Booster for Pfizer series) 11/12/2021   • ZOSTER VACCINE (1 of 2) Never done   • LUNG CANCER SCREENING  04/21/2022   • INFLUENZA VACCINE  08/01/2022    Patient to call if wants low dose lung cancer screening

## 2022-12-22 NOTE — PROGRESS NOTES
Venipuncture Blood Specimen Collection  Venipuncture performed in left arm by Annabel Ocampo MA with good hemostasis. Patient tolerated the procedure well without complications.   12/22/22   Annabel Ocampo MA

## 2022-12-23 NOTE — PROGRESS NOTES
Small amount of protein in urine.  Make sure if taking protein supplement that he stops and increase fluids.  Kidney function is normal.  Vitamin B12 is slightly elevated so cut dose one day/week

## 2023-01-27 ENCOUNTER — LAB (OUTPATIENT)
Dept: LAB | Facility: HOSPITAL | Age: 53
End: 2023-01-27
Payer: COMMERCIAL

## 2023-01-27 DIAGNOSIS — E89.6 HISTORY OF TOTAL ADRENALECTOMY: ICD-10-CM

## 2023-01-27 LAB
ALBUMIN SERPL-MCNC: 4.1 G/DL (ref 3.5–5.2)
ALBUMIN/GLOB SERPL: 1.5 G/DL
ALP SERPL-CCNC: 57 U/L (ref 39–117)
ALT SERPL W P-5'-P-CCNC: 11 U/L (ref 1–41)
ANION GAP SERPL CALCULATED.3IONS-SCNC: 8.8 MMOL/L (ref 5–15)
AST SERPL-CCNC: 14 U/L (ref 1–40)
BILIRUB SERPL-MCNC: 0.3 MG/DL (ref 0–1.2)
BUN SERPL-MCNC: 11 MG/DL (ref 6–20)
BUN/CREAT SERPL: 10 (ref 7–25)
CALCIUM SPEC-SCNC: 9.6 MG/DL (ref 8.6–10.5)
CHLORIDE SERPL-SCNC: 105 MMOL/L (ref 98–107)
CO2 SERPL-SCNC: 29.2 MMOL/L (ref 22–29)
CREAT SERPL-MCNC: 1.1 MG/DL (ref 0.76–1.27)
EGFRCR SERPLBLD CKD-EPI 2021: 81.8 ML/MIN/1.73
GLOBULIN UR ELPH-MCNC: 2.7 GM/DL
GLUCOSE SERPL-MCNC: 84 MG/DL (ref 65–99)
POTASSIUM SERPL-SCNC: 4.4 MMOL/L (ref 3.5–5.2)
PROT SERPL-MCNC: 6.8 G/DL (ref 6–8.5)
SODIUM SERPL-SCNC: 143 MMOL/L (ref 136–145)

## 2023-01-27 PROCEDURE — 82627 DEHYDROEPIANDROSTERONE: CPT

## 2023-01-27 PROCEDURE — 36415 COLL VENOUS BLD VENIPUNCTURE: CPT

## 2023-01-27 PROCEDURE — 82024 ASSAY OF ACTH: CPT

## 2023-01-27 PROCEDURE — 80053 COMPREHEN METABOLIC PANEL: CPT

## 2023-01-28 LAB
ACTH PLAS-MCNC: 39.2 PG/ML (ref 7.2–63.3)
DHEA-S SERPL-MCNC: 109 UG/DL (ref 71.6–375.4)

## 2023-02-01 ENCOUNTER — PATIENT MESSAGE (OUTPATIENT)
Dept: FAMILY MEDICINE CLINIC | Facility: CLINIC | Age: 53
End: 2023-02-01
Payer: COMMERCIAL

## 2023-02-07 ENCOUNTER — TELEPHONE (OUTPATIENT)
Dept: PSYCHIATRY | Facility: CLINIC | Age: 53
End: 2023-02-07
Payer: COMMERCIAL

## 2023-02-07 DIAGNOSIS — F33.1 MAJOR DEPRESSIVE DISORDER, RECURRENT EPISODE, MODERATE: Primary | ICD-10-CM

## 2023-02-07 NOTE — TELEPHONE ENCOUNTER
Pt says he has tried to ride out his side effects on Viibryd, but they have only gotten worse.  He is having mood swings, manic episodes, headaches, and stomach issues.

## 2023-02-08 RX ORDER — FLUOXETINE HYDROCHLORIDE 20 MG/1
20 CAPSULE ORAL DAILY
Qty: 90 CAPSULE | Refills: 1 | Status: SHIPPED | OUTPATIENT
Start: 2023-02-08 | End: 2023-04-06 | Stop reason: SDUPTHER

## 2023-02-08 NOTE — TELEPHONE ENCOUNTER
rx for prozac was sent to the pharmacy     Decrease viibryd to 10 mg (0.5 tab) po QAM for 1 week, then d/c

## 2023-04-05 ENCOUNTER — TELEPHONE (OUTPATIENT)
Dept: PSYCHIATRY | Facility: CLINIC | Age: 53
End: 2023-04-05
Payer: COMMERCIAL

## 2023-04-05 DIAGNOSIS — F33.1 MAJOR DEPRESSIVE DISORDER, RECURRENT EPISODE, MODERATE: ICD-10-CM

## 2023-04-06 RX ORDER — FLUOXETINE HYDROCHLORIDE 20 MG/1
20 CAPSULE ORAL DAILY
Qty: 90 CAPSULE | Refills: 1 | Status: SHIPPED | OUTPATIENT
Start: 2023-04-06 | End: 2024-04-05

## 2023-04-20 ENCOUNTER — TELEPHONE (OUTPATIENT)
Dept: FAMILY MEDICINE CLINIC | Facility: CLINIC | Age: 53
End: 2023-04-20
Payer: COMMERCIAL

## 2023-04-20 NOTE — TELEPHONE ENCOUNTER
----- Message from Nayana Mayorga sent at 4/13/2023  9:24 AM EDT -----      ----- Message -----  From: Nayana Mayorga  Sent: 11/30/2022   5:57 PM EDT  To: Nayana Mayorga        ----- Message -----  From: SYSTEM  Sent: 11/26/2022   1:14 AM EST  To: [facility]

## 2023-05-04 DIAGNOSIS — F41.1 GENERALIZED ANXIETY DISORDER: Chronic | ICD-10-CM

## 2023-05-04 DIAGNOSIS — F33.1 MAJOR DEPRESSIVE DISORDER, RECURRENT EPISODE, MODERATE: Chronic | ICD-10-CM

## 2023-05-05 RX ORDER — ALPRAZOLAM 0.25 MG/1
TABLET ORAL
Qty: 90 TABLET | Refills: 0 | Status: SHIPPED | OUTPATIENT
Start: 2023-05-05

## 2023-05-05 NOTE — TELEPHONE ENCOUNTER
Rx Refill Note  Requested Prescriptions     Pending Prescriptions Disp Refills   • ALPRAZolam (XANAX) 0.25 MG tablet [Pharmacy Med Name: ALPRAZOLAM 0.25MG TABLETS] 90 tablet      Sig: TAKE 1 TABLET BY MOUTH THREE TIMES DAILY AS NEEDED FOR ANXIETY      Last office visit with prescribing clinician: 12/21/2022   Last telemedicine visit with prescribing clinician: 5/16/2023   Next office visit with prescribing clinician: 5/16/2023   Office Visit with Clair Lyman MD (12/21/2022)       SCANNED - LABS (08/18/2022)                   Would you like a call back once the refill request has been completed: [] Yes [] No    If the office needs to give you a call back, can they leave a voicemail: [] Yes [] No    Lidia Ojeda MA  05/05/23, 09:24 EDT

## 2023-05-16 ENCOUNTER — OFFICE VISIT (OUTPATIENT)
Dept: PSYCHIATRY | Facility: CLINIC | Age: 53
End: 2023-05-16
Payer: COMMERCIAL

## 2023-05-16 DIAGNOSIS — F33.1 MAJOR DEPRESSIVE DISORDER, RECURRENT EPISODE, MODERATE: Primary | Chronic | ICD-10-CM

## 2023-05-16 DIAGNOSIS — F33.1 MAJOR DEPRESSIVE DISORDER, RECURRENT EPISODE, MODERATE: ICD-10-CM

## 2023-05-16 DIAGNOSIS — F41.1 GENERALIZED ANXIETY DISORDER: Chronic | ICD-10-CM

## 2023-05-16 RX ORDER — ALPRAZOLAM 0.25 MG/1
0.25 TABLET ORAL 3 TIMES DAILY PRN
Qty: 90 TABLET | Refills: 3 | Status: SHIPPED | OUTPATIENT
Start: 2023-05-16

## 2023-05-16 RX ORDER — FLUOXETINE HYDROCHLORIDE 20 MG/1
20 CAPSULE ORAL DAILY
Qty: 90 CAPSULE | Refills: 1 | Status: SHIPPED | OUTPATIENT
Start: 2023-05-16 | End: 2024-05-15

## 2023-05-16 NOTE — PROGRESS NOTES
Subjective   Jourdan Lebron is a 53 y.o. 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, left middle cerebral artery , the pt is R handed    he had troubles talking , had muscle weakness   Oct 2020 - went back to work light duty , still had issues completing his tasks   Feb 2021- adrenal gland removed and that caused more issues, more medical w/u and ER visits, weight loss   Now on steroid replacement which is also not completely controlled     The pt was started on zoloft - no major changes, it had to be changed to prozac - tolerated well and started feeling less depressed, he is back to work now , prozac was increased to 40 mg , however, he did not tolerate, reported increased anxiety and emotional flattening      Today the pt reported feeling more irritable, anxious, still depressed, he had family losses    Depression is rated as 7-8/10   The pt c/o decreased concentration, decreased memory  E level is still low -  hte pt is off steroids now    After work he feels exhausted, not motivated to do anything   Sleep - fragmented  , not restorative ,   Denied AVH/SI/HI     Anxiety - manageable on xanax    Memory - decreased, poor concentration     The following portions of the patient's history were reviewed and updated as appropriate: allergies, current medications, past family history, past medical history, past social history, past surgical history and problem list.    PAST PSYCHIATRIC HISTORY  Axis I  Affective/Bipolar Disorder, Anxiety/Panic Disorder  Axis II  Denied     PAST OUTPATIENT TREATMENT  Diagnosis treated:  Anxiety, depression   Treatment Type:  Medication Management  Prior Psychiatric Medications:  lexapro - not effective  Buspirone - side effects   prozac - OK  trintellix - increased irritability   viibryd - not effective    Support Groups:  None   Sequelae Of Mental Disorder:  job disruption, emotional distress          Interval History  No changes     Side Effects  Denied     Past Medical History:  Past Medical History:   Diagnosis Date   • Brain fog    • Hand tingling    • Hyperlipidemia    • Tiredness        Social History:  Social History     Socioeconomic History   • Marital status:    Tobacco Use   • Smoking status: Former     Packs/day: 3.00     Years: 37.00     Pack years: 111.00     Types: Cigars, Cigarettes     Start date:      Quit date:      Years since quittin.3   • Smokeless tobacco: Never   • Tobacco comments:     1 packs= 2 cigars a day; only smokes cigars   Vaping Use   • Vaping Use: Never used   Substance and Sexual Activity   • Alcohol use: Not Currently     Alcohol/week: 8.0 standard drinks     Types: 8 Cans of beer per week     Comment: Has now been abstinent for about one year   • Drug use: Never   • Sexual activity: Yes     Partners: Female     Birth control/protection: None       Family History:  Family History   Problem Relation Age of Onset   • Arthritis Mother    • Hypertension Mother    • Heart failure Mother 73        Cause of death   • Arthritis Father    • Hypertension Father    • Kidney cancer Father 57        Cause of death. Was a smoker   • Heart disease Brother    • Esophageal cancer Brother 59        Cause of death. Has a heavy drinker       Past Surgical History:  Past Surgical History:   Procedure Laterality Date   • ADRENALECTOMY     • KIDNEY STONE SURGERY     • THROMBECTOMY         Problem List:  Patient Active Problem List   Diagnosis   • Abnormal electrocardiogram   • Calculus of kidney   • History of cerebrovascular accident   • Family history of malignant neoplasm   • Generalized hyperhidrosis   • Hyperlipidemia   • Seasonal allergies   • Tobacco dependence syndrome   • Cervical radiculopathy   • Cervical stenosis of spinal canal   • Near syncope   • Other fatigue   •  Arthropathy of cervical facet joint   • Neck pain   • Pain in left arm   • Shoulder pain   • Seasonal allergic rhinitis   • Thoracic back pain   • Vitamin B12 deficiency   • Spinal stenosis of cervical region   • Reduced libido   • Hypogonadotropic hypogonadism   • Major depressive disorder, recurrent episode, moderate (HCC)   • Generalized anxiety disorder   • Macrocytosis   • Erythrocytosis   • Postprocedural adrenocortical (-medullary) hypofunction   • Nausea       Allergy:   Allergies   Allergen Reactions   • Cephalosporins Anaphylaxis     Throat swelling   • Dye  [Contrast Dye (Echo Or Unknown Ct/Mr)] Hives   • Sulfa Antibiotics Hives   • Zetia [Ezetimibe] Other (See Comments)     Made tired   • Iodinated Contrast Media Unknown - Low Severity   • Statins Myalgia     Myalgia and fatique   • Sulfate Unknown - Low Severity        Discontinued Medications:  Medications Discontinued During This Encounter   Medication Reason   • FLUoxetine (PROzac) 20 MG capsule Reorder   • ALPRAZolam (XANAX) 0.25 MG tablet Reorder       Current Medications:   Current Outpatient Medications   Medication Sig Dispense Refill   • ALPRAZolam (XANAX) 0.25 MG tablet Take 1 tablet by mouth 3 (Three) Times a Day As Needed for Anxiety. for anxiety 90 tablet 3   • FLUoxetine (PROzac) 20 MG capsule Take 1 capsule by mouth Daily. 90 capsule 1   • aspirin 81 MG chewable tablet Chew 81 mg Daily.     • fluticasone (FLONASE) 50 MCG/ACT nasal spray 1 spray into the nostril(s) as directed by provider Daily.     • hydrocortisone sodium succinate (Solu-CORTEF) 100 MG injection 100 mg as needed for adrenal crises.     • pantoprazole (PROTONIX) 40 MG EC tablet Take 1 tablet by mouth Daily. 90 tablet 0   • Praluent 75 MG/ML solution auto-injector INJECT 1 MG UNDER THE SKIN INTO THE APPROPRIATE AREA AS DIRECTED EVERY 14 DAYS 2 mL 6     No current facility-administered medications for this visit.         Psychological ROS: positive for - anxiety,  concentration, depression,  irritability and memory difficulties  Negative for AVH/SI/HI, delusions       Physical Exam:   There were no vitals taken for this visit.    Mental Status Exam:   Hygiene:   good  Cooperation:  Cooperative  Eye Contact:  Good  Psychomotor Behavior:  Appropriate  Affect:  Full range and Appropriate  Mood: fluctates  Hopelessness: Denies  Speech:  Normal  Thought Process:  Goal directed and Linear  Thought Content:  Normal and Mood congruent  Suicidal:  None  Homicidal:  None  Hallucinations:  None  Delusion:  None  Memory:  Deficits  Orientation:  Person, Place, Time and Situation  Reliability:  good  Insight:  Good  Judgement:  Good  Impulse Control:  Fair  Physical/Medical Issues:  Yes         MSE from 12/21/22  reviewed and accepted without changes       PHQ-9 Depression Screening  Little interest or pleasure in doing things? 2-->more than half the days   Feeling down, depressed, or hopeless? 2-->more than half the days   Trouble falling or staying asleep, or sleeping too much? 1-->several days   Feeling tired or having little energy? 2-->more than half the days   Poor appetite or overeating? 0-->not at all   Feeling bad about yourself - or that you are a failure or have let yourself or your family down? 2-->more than half the days   Trouble concentrating on things, such as reading the newspaper or watching television? 0-->not at all   Moving or speaking so slowly that other people could have noticed? Or the opposite - being so fidgety or restless that you have been moving around a lot more than usual? 0-->not at all   Thoughts that you would be better off dead, or of hurting yourself in some way? 0-->not at all   PHQ-9 Total Score 9   If you checked off any problems, how difficult have these problems made it for you to do your work, take care of things at home, or get along with other people? very difficult           Current every day smoker 3-10 mintues spent counseling Has reduced  Tobbacos use - only cigars     I advised Jourdan of the risks of tobacco use.     Lab Results:   No visits with results within 3 Month(s) from this visit.   Latest known visit with results is:   Lab on 01/27/2023   Component Date Value Ref Range Status   • Glucose 01/27/2023 84  65 - 99 mg/dL Final   • BUN 01/27/2023 11  6 - 20 mg/dL Final   • Creatinine 01/27/2023 1.10  0.76 - 1.27 mg/dL Final   • Sodium 01/27/2023 143  136 - 145 mmol/L Final   • Potassium 01/27/2023 4.4  3.5 - 5.2 mmol/L Final   • Chloride 01/27/2023 105  98 - 107 mmol/L Final   • CO2 01/27/2023 29.2 (H)  22.0 - 29.0 mmol/L Final   • Calcium 01/27/2023 9.6  8.6 - 10.5 mg/dL Final   • Total Protein 01/27/2023 6.8  6.0 - 8.5 g/dL Final   • Albumin 01/27/2023 4.1  3.5 - 5.2 g/dL Final   • ALT (SGPT) 01/27/2023 11  1 - 41 U/L Final   • AST (SGOT) 01/27/2023 14  1 - 40 U/L Final   • Alkaline Phosphatase 01/27/2023 57  39 - 117 U/L Final   • Total Bilirubin 01/27/2023 0.3  0.0 - 1.2 mg/dL Final   • Globulin 01/27/2023 2.7  gm/dL Final   • A/G Ratio 01/27/2023 1.5  g/dL Final   • BUN/Creatinine Ratio 01/27/2023 10.0  7.0 - 25.0 Final   • Anion Gap 01/27/2023 8.8  5.0 - 15.0 mmol/L Final   • eGFR 01/27/2023 81.8  >60.0 mL/min/1.73 Final   • ACTH 01/27/2023 39.2  7.2 - 63.3 pg/mL Final    ACTH reference interval for samples collected between 7 and 10 AM.   • DHEA-Sulfate 01/27/2023 109.0  71.6 - 375.4 ug/dL Final       Assessment & Plan   Problems Addressed this Visit        Mental Health    Major depressive disorder, recurrent episode, moderate (HCC) - Primary (Chronic)    Relevant Medications    FLUoxetine (PROzac) 20 MG capsule    ALPRAZolam (XANAX) 0.25 MG tablet    Generalized anxiety disorder (Chronic)    Relevant Medications    FLUoxetine (PROzac) 20 MG capsule    ALPRAZolam (XANAX) 0.25 MG tablet   Diagnoses       Codes Comments    Major depressive disorder, recurrent episode, moderate (HCC)    -  Primary ICD-10-CM: F33.1  ICD-9-CM: 296.32      Generalized anxiety disorder     ICD-10-CM: F41.1  ICD-9-CM: 300.02     Major depressive disorder, recurrent episode, moderate     ICD-10-CM: F33.1  ICD-9-CM: 296.32            Visit Diagnoses:    ICD-10-CM ICD-9-CM   1. Major depressive disorder, recurrent episode, moderate (HCC)  F33.1 296.32   2. Generalized anxiety disorder  F41.1 300.02   3. Major depressive disorder, recurrent episode, moderate  F33.1 296.32        TREATMENT PLAN/GOALS: Continue supportive psychotherapy efforts and medications as indicated. Treatment and medication options discussed during today's visit. Patient ackowledged and verbally consented to continue with current treatment plan and was educated on the importance of compliance with treatment and follow-up appointments.    MEDICATION ISSUES:  INSPECT reviewed as expected - gabapentin and hydrocodone , 5/5/23   xanax #90    1. Major depressive d/o moderate recurrent -  did not respond well to SSRI and Buspar, or trintellix , or iibryd 10-20 mg   prozac on 40 mg - emotional flattening   Add low dose of rexulti 0.5 mg po QAM      the pt had neuropsych eval done at [facility] with depression and anxiety     2. Generalized anxiety d/o -  Cont  Low dose of xanax 0. 25 mg po TID , no changs necessary, psychotherapy was recommended      3. Long term therapeutic drug monitoring - UDS 8/18/2022 consistent    LABORATORY - SCAN - DRUG SCREEN, [facility], 8/18/2022 (08/18/2022)      PHQ scored 9 - moderate   depression   JOSE MARIA 7 scored 11    Discussed medication options and treatment plan of prescribed medication as well as the risks, benefits, and side effects including potential falls, possible impaired driving and metabolic adversities among others. Patient is agreeable to call the office with any worsening of symptoms or onset of side effects. Patient is agreeable to call 911 or go to the nearest ER should he/she begin having SI/HI. No medication side effects or related complaints today.      MEDS ORDERED DURING VISIT:  New Medications Ordered This Visit   Medications   • FLUoxetine (PROzac) 20 MG capsule     Sig: Take 1 capsule by mouth Daily.     Dispense:  90 capsule     Refill:  1   • ALPRAZolam (XANAX) 0.25 MG tablet     Sig: Take 1 tablet by mouth 3 (Three) Times a Day As Needed for Anxiety. for anxiety     Dispense:  90 tablet     Refill:  3     Please dispense on or after Kimberly 3, 2023       Return in about 4 months (around 9/16/2023).         This document has been electronically signed by Clair Lyman MD  May 16, 2023 16:01 EDT    EMR Dragon transcription disclaimer:  Some of this encounter note is an electronic transcription translation of spoken language to printed text. The electronic translation of spoken language may permit erroneous, or at times, nonsensical words or phrases to be inadvertently transcribed; Although I have reviewed the note for such errors some may still exist.

## 2023-05-29 RX ORDER — ALIROCUMAB 75 MG/ML
INJECTION, SOLUTION SUBCUTANEOUS
Qty: 2 ML | Refills: 6 | Status: SHIPPED | OUTPATIENT
Start: 2023-05-29

## 2023-07-24 ENCOUNTER — PRIOR AUTHORIZATION (OUTPATIENT)
Dept: FAMILY MEDICINE CLINIC | Facility: CLINIC | Age: 53
End: 2023-07-24
Payer: COMMERCIAL

## 2023-07-24 NOTE — TELEPHONE ENCOUNTER
72,MR +MUST USE REPATHA. MED NECESSITYEXCEPTION ONLY [id number].NON-FORMULARY DRUG, CONTACT PRESCRIBER*WAG*Non-Form Product- Potential Alternatives are: [id number] - REPATHA SURE INJ 140MG/ML

## 2023-07-24 NOTE — TELEPHONE ENCOUNTER
PA sent 07/24/23 for Praluent 75MG/ML auto-injectors  Key: [id number]  Rx #: [id number]    Form  [employer] Electronic PA Form (2017 [employer])

## 2023-07-28 ENCOUNTER — LAB (OUTPATIENT)
Dept: LAB | Facility: HOSPITAL | Age: 53
End: 2023-07-28
Payer: COMMERCIAL

## 2023-07-28 ENCOUNTER — OFFICE (OUTPATIENT)
Dept: URBAN - METROPOLITAN AREA CLINIC 64 | Facility: CLINIC | Age: 53
End: 2023-07-28

## 2023-07-28 ENCOUNTER — TELEPHONE (OUTPATIENT)
Dept: FAMILY MEDICINE CLINIC | Facility: CLINIC | Age: 53
End: 2023-07-28
Payer: COMMERCIAL

## 2023-07-28 VITALS
DIASTOLIC BLOOD PRESSURE: 85 MMHG | HEART RATE: 72 BPM | HEIGHT: 73 IN | WEIGHT: 175 LBS | SYSTOLIC BLOOD PRESSURE: 121 MMHG

## 2023-07-28 DIAGNOSIS — K21.00 GASTRO-ESOPHAGEAL REFLUX DISEASE WITH ESOPHAGITIS, WITHOUT B: ICD-10-CM

## 2023-07-28 DIAGNOSIS — R63.4 ABNORMAL WEIGHT LOSS: ICD-10-CM

## 2023-07-28 DIAGNOSIS — F33.1 MAJOR DEPRESSIVE DISORDER, RECURRENT EPISODE, MODERATE: Chronic | ICD-10-CM

## 2023-07-28 DIAGNOSIS — E53.8 VITAMIN B12 DEFICIENCY: ICD-10-CM

## 2023-07-28 DIAGNOSIS — K31.84 GASTROPARESIS: ICD-10-CM

## 2023-07-28 DIAGNOSIS — R13.10 DYSPHAGIA, UNSPECIFIED: ICD-10-CM

## 2023-07-28 DIAGNOSIS — R63.0 ANOREXIA: ICD-10-CM

## 2023-07-28 DIAGNOSIS — R11.0 NAUSEA: ICD-10-CM

## 2023-07-28 DIAGNOSIS — E78.5 HYPERLIPIDEMIA, UNSPECIFIED HYPERLIPIDEMIA TYPE: ICD-10-CM

## 2023-07-28 DIAGNOSIS — K22.2 ESOPHAGEAL OBSTRUCTION: ICD-10-CM

## 2023-07-28 DIAGNOSIS — R68.81 EARLY SATIETY: ICD-10-CM

## 2023-07-28 DIAGNOSIS — K27.9 PEPTIC ULCER, SITE UNSPECIFIED, UNSPECIFIED AS ACUTE OR CHRO: ICD-10-CM

## 2023-07-28 LAB
ALBUMIN SERPL-MCNC: 3.7 G/DL (ref 3.5–5.2)
ALBUMIN/GLOB SERPL: 1.5 G/DL
ALP SERPL-CCNC: 55 U/L (ref 39–117)
ALT SERPL W P-5'-P-CCNC: 11 U/L (ref 1–41)
ANION GAP SERPL CALCULATED.3IONS-SCNC: 9.1 MMOL/L (ref 5–15)
AST SERPL-CCNC: 16 U/L (ref 1–40)
BASOPHILS # BLD AUTO: 0.05 10*3/MM3 (ref 0–0.2)
BASOPHILS NFR BLD AUTO: 0.8 % (ref 0–1.5)
BILIRUB SERPL-MCNC: 0.2 MG/DL (ref 0–1.2)
BUN SERPL-MCNC: 12 MG/DL (ref 6–20)
BUN/CREAT SERPL: 11.4 (ref 7–25)
CALCIUM SPEC-SCNC: 9 MG/DL (ref 8.6–10.5)
CHLORIDE SERPL-SCNC: 106 MMOL/L (ref 98–107)
CHOLEST SERPL-MCNC: 118 MG/DL (ref 0–200)
CO2 SERPL-SCNC: 24.9 MMOL/L (ref 22–29)
CREAT SERPL-MCNC: 1.05 MG/DL (ref 0.76–1.27)
DEPRECATED RDW RBC AUTO: 45.1 FL (ref 37–54)
EGFRCR SERPLBLD CKD-EPI 2021: 85.9 ML/MIN/1.73
EOSINOPHIL # BLD AUTO: 0.26 10*3/MM3 (ref 0–0.4)
EOSINOPHIL NFR BLD AUTO: 4.1 % (ref 0.3–6.2)
ERYTHROCYTE [DISTWIDTH] IN BLOOD BY AUTOMATED COUNT: 12.6 % (ref 12.3–15.4)
GLOBULIN UR ELPH-MCNC: 2.5 GM/DL
GLUCOSE SERPL-MCNC: 87 MG/DL (ref 65–99)
HCT VFR BLD AUTO: 47.6 % (ref 37.5–51)
HDLC SERPL-MCNC: 46 MG/DL (ref 40–60)
HGB BLD-MCNC: 16 G/DL (ref 13–17.7)
IMM GRANULOCYTES # BLD AUTO: 0.01 10*3/MM3 (ref 0–0.05)
IMM GRANULOCYTES NFR BLD AUTO: 0.2 % (ref 0–0.5)
LDLC SERPL CALC-MCNC: 54 MG/DL (ref 0–100)
LDLC/HDLC SERPL: 1.16 {RATIO}
LYMPHOCYTES # BLD AUTO: 2.2 10*3/MM3 (ref 0.7–3.1)
LYMPHOCYTES NFR BLD AUTO: 34.3 % (ref 19.6–45.3)
MAGNESIUM SERPL-MCNC: 2.3 MG/DL (ref 1.6–2.6)
MCH RBC QN AUTO: 32.5 PG (ref 26.6–33)
MCHC RBC AUTO-ENTMCNC: 33.6 G/DL (ref 31.5–35.7)
MCV RBC AUTO: 96.6 FL (ref 79–97)
MONOCYTES # BLD AUTO: 0.65 10*3/MM3 (ref 0.1–0.9)
MONOCYTES NFR BLD AUTO: 10.1 % (ref 5–12)
NEUTROPHILS NFR BLD AUTO: 3.24 10*3/MM3 (ref 1.7–7)
NEUTROPHILS NFR BLD AUTO: 50.5 % (ref 42.7–76)
NRBC BLD AUTO-RTO: 0 /100 WBC (ref 0–0.2)
PLATELET # BLD AUTO: 267 10*3/MM3 (ref 140–450)
PMV BLD AUTO: 10.1 FL (ref 6–12)
POTASSIUM SERPL-SCNC: 4.5 MMOL/L (ref 3.5–5.2)
PROT SERPL-MCNC: 6.2 G/DL (ref 6–8.5)
RBC # BLD AUTO: 4.93 10*6/MM3 (ref 4.14–5.8)
SODIUM SERPL-SCNC: 140 MMOL/L (ref 136–145)
TRIGL SERPL-MCNC: 93 MG/DL (ref 0–150)
TSH SERPL DL<=0.05 MIU/L-ACNC: 1.56 UIU/ML (ref 0.27–4.2)
VIT B12 BLD-MCNC: 992 PG/ML (ref 211–946)
VLDLC SERPL-MCNC: 18 MG/DL (ref 5–40)
WBC NRBC COR # BLD: 6.41 10*3/MM3 (ref 3.4–10.8)

## 2023-07-28 PROCEDURE — 83735 ASSAY OF MAGNESIUM: CPT

## 2023-07-28 PROCEDURE — 82607 VITAMIN B-12: CPT

## 2023-07-28 PROCEDURE — 80050 GENERAL HEALTH PANEL: CPT

## 2023-07-28 PROCEDURE — 99214 OFFICE O/P EST MOD 30 MIN: CPT

## 2023-07-28 PROCEDURE — 80061 LIPID PANEL: CPT

## 2023-07-28 NOTE — TELEPHONE ENCOUNTER
HUB TO READ:  ----- Message from Justa Castano MD sent at 7/28/2023 12:21 PM EDT -----  Vitamin B12 level is still elevated so can cut the dose of vitamin B12 a couple more days per week.  Call if any other concerns

## 2023-07-28 NOTE — PROGRESS NOTES
Vitamin B12 level is still elevated so can cut the dose of vitamin B12 a couple more days per week.  Call if any other concerns

## 2023-09-19 ENCOUNTER — OFFICE VISIT (OUTPATIENT)
Dept: PSYCHIATRY | Facility: CLINIC | Age: 53
End: 2023-09-19
Payer: COMMERCIAL

## 2023-09-19 DIAGNOSIS — F33.1 MAJOR DEPRESSIVE DISORDER, RECURRENT EPISODE, MODERATE: Primary | Chronic | ICD-10-CM

## 2023-09-19 DIAGNOSIS — F41.1 GENERALIZED ANXIETY DISORDER: Chronic | ICD-10-CM

## 2023-09-19 RX ORDER — FLUOXETINE HYDROCHLORIDE 20 MG/1
20 CAPSULE ORAL DAILY
Qty: 90 CAPSULE | Refills: 1 | Status: SHIPPED | OUTPATIENT
Start: 2023-09-19 | End: 2023-09-19 | Stop reason: SDUPTHER

## 2023-09-19 RX ORDER — ALPRAZOLAM 0.25 MG/1
0.25 TABLET ORAL 3 TIMES DAILY PRN
Qty: 90 TABLET | Refills: 3 | Status: SHIPPED | OUTPATIENT
Start: 2023-09-19

## 2023-09-19 RX ORDER — FLUOXETINE HYDROCHLORIDE 20 MG/1
20 CAPSULE ORAL DAILY
Qty: 90 CAPSULE | Refills: 1 | Status: SHIPPED | OUTPATIENT
Start: 2023-09-19 | End: 2024-09-18

## 2023-09-19 NOTE — PROGRESS NOTES
Subjective   Jourdan Lebron is a 53 y.o. 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, left middle cerebral artery , the pt is R handed    he had troubles talking , had muscle weakness   Oct 2020 - went back to work light duty , still had issues completing his tasks   Feb 2021- adrenal gland removed and that caused more issues, more medical w/u and ER visits, weight loss   Now on steroid replacement which is also not completely controlled     The pt was started on zoloft - no major changes, it had to be changed to prozac - tolerated well and started feeling less depressed, he is back to work now , prozac was increased to 40 mg , however, he did not tolerate, reported increased anxiety and emotional flattening      Today the pt reported feeling better, less irritable, still has his moments depending on the situation and he noticed he is more irritable when fatigued      Depression is rated as 6-7/10     The pt c/o decreased concentration, decreased memory  E level is still low -  hte pt is off steroids now    After work he feels exhausted, not motivated to do anything   Sleep - fragmented  , not restorative ,   Denied AVH/SI/HI     Anxiety - manageable on xanax    Memory - decreased, poor concentration     The following portions of the patient's history were reviewed and updated as appropriate: allergies, current medications, past family history, past medical history, past social history, past surgical history and problem list.    PAST PSYCHIATRIC HISTORY  Axis I  Affective/Bipolar Disorder, Anxiety/Panic Disorder  Axis II  Denied     PAST OUTPATIENT TREATMENT  Diagnosis treated:  Anxiety, depression   Treatment Type:  Medication Management  Prior Psychiatric Medications:  lexapro - not effective  Buspirone - side effects   prozac -  OK  trintellix - increased irritability   viibryd - not effective   Rexulti - side effects   Support Groups:  None   Sequelae Of Mental Disorder:  job disruption, emotional distress          Interval History  No changes     Side Effects  Denied     Past Medical History:  Past Medical History:   Diagnosis Date    Brain fog     Hand tingling     Hyperlipidemia     Tiredness        Social History:  Social History     Socioeconomic History    Marital status:    Tobacco Use    Smoking status: Former     Packs/day: 3.00     Years: 37.00     Pack years: 111.00     Types: Cigars, Cigarettes     Start date:      Quit date:      Years since quittin.7    Smokeless tobacco: Never    Tobacco comments:     1 packs= 2 cigars a day; only smokes cigars   Vaping Use    Vaping Use: Never used   Substance and Sexual Activity    Alcohol use: Not Currently     Alcohol/week: 8.0 standard drinks     Types: 8 Cans of beer per week     Comment: Has now been abstinent for about one year    Drug use: Never    Sexual activity: Yes     Partners: Female     Birth control/protection: None       Family History:  Family History   Problem Relation Age of Onset    Arthritis Mother     Hypertension Mother     Heart failure Mother 73        Cause of death    Arthritis Father     Hypertension Father     Kidney cancer Father 57        Cause of death. Was a smoker    Heart disease Brother     Esophageal cancer Brother 59        Cause of death. Has a heavy drinker       Past Surgical History:  Past Surgical History:   Procedure Laterality Date    ADRENALECTOMY      KIDNEY STONE SURGERY      THROMBECTOMY         Problem List:  Patient Active Problem List   Diagnosis    Abnormal electrocardiogram    Calculus of kidney    History of cerebrovascular accident    Family history of malignant neoplasm    Generalized hyperhidrosis    Hyperlipidemia    Seasonal allergies    History of tobacco abuse    Cervical radiculopathy    Cervical stenosis of  spinal canal    Near syncope    Other fatigue    Arthropathy of cervical facet joint    Neck pain    Pain in left arm    Shoulder pain    Seasonal allergic rhinitis    Thoracic back pain    Vitamin B12 deficiency    Spinal stenosis of cervical region    Reduced libido    Hypogonadotropic hypogonadism    Major depressive disorder, recurrent episode, moderate    Generalized anxiety disorder    Macrocytosis    Erythrocytosis    Postprocedural adrenocortical (-medullary) hypofunction    Nausea       Allergy:   Allergies   Allergen Reactions    Cephalosporins Anaphylaxis     Throat swelling    Dye  [Contrast Dye (Echo Or Unknown Ct/Mr)] Hives    Sulfa Antibiotics Hives    Zetia [Ezetimibe] Other (See Comments)     Made tired    Iodinated Contrast Media Unknown - Low Severity    Statins Myalgia     Myalgia and fatique    Sulfate Unknown - Low Severity        Discontinued Medications:  Medications Discontinued During This Encounter   Medication Reason    FLUoxetine (PROzac) 20 MG capsule Reorder    ALPRAZolam (XANAX) 0.25 MG tablet Reorder    FLUoxetine (PROzac) 20 MG capsule Reorder         Current Medications:   Current Outpatient Medications   Medication Sig Dispense Refill    ALPRAZolam (XANAX) 0.25 MG tablet Take 1 tablet by mouth 3 (Three) Times a Day As Needed for Anxiety. for anxiety 90 tablet 3    FLUoxetine (PROzac) 20 MG capsule Take 1 capsule by mouth Daily. 90 capsule 1    aspirin 81 MG chewable tablet Chew 1 tablet Daily.      esomeprazole (nexIUM) 40 MG capsule Take 1 capsule by mouth Every Morning Before Breakfast. 90 capsule 1    Evolocumab (REPATHA) solution prefilled syringe injection Inject 1 mL under the skin into the appropriate area as directed Every 14 (Fourteen) Days. 6 mL 3    fluticasone (FLONASE) 50 MCG/ACT nasal spray 1 spray into the nostril(s) as directed by provider Daily.       No current facility-administered medications for this visit.         Psychological ROS: positive for - anxiety,  concentration, depression,  irritability and memory difficulties  Negative for AVH/SI/HI, delusions       Physical Exam:   There were no vitals taken for this visit.    Mental Status Exam:   Hygiene:   good  Cooperation:  Cooperative  Eye Contact:  Good  Psychomotor Behavior:  Appropriate  Affect:  Full range and Appropriate  Mood: fluctates  Hopelessness: Denies  Speech:  Normal  Thought Process:  Goal directed and Linear  Thought Content:  Normal and Mood congruent  Suicidal:  None  Homicidal:  None  Hallucinations:  None  Delusion:  None  Memory:  Deficits  Orientation:  Person, Place, Time and Situation  Reliability:  good  Insight:  Good  Judgement:  Good  Impulse Control:  Fair  Physical/Medical Issues:  Yes          MSE from 5/16/23   reviewed and accepted without changes       PHQ-9 Depression Screening  Little interest or pleasure in doing things? 1-->several days   Feeling down, depressed, or hopeless? 1-->several days   Trouble falling or staying asleep, or sleeping too much? 2-->more than half the days   Feeling tired or having little energy? 1-->several days   Poor appetite or overeating? 0-->not at all   Feeling bad about yourself - or that you are a failure or have let yourself or your family down? 1-->several days   Trouble concentrating on things, such as reading the newspaper or watching television? 2-->more than half the days   Moving or speaking so slowly that other people could have noticed? Or the opposite - being so fidgety or restless that you have been moving around a lot more than usual? 0-->not at all   Thoughts that you would be better off dead, or of hurting yourself in some way? 0-->not at all   PHQ-9 Total Score 8   If you checked off any problems, how difficult have these problems made it for you to do your work, take care of things at home, or get along with other people? very difficult           Current every day smoker 3-10 mintues spent counseling Has reduced Tobbacos use - only  cigars     I advised Jourdan of the risks of tobacco use.     Lab Results:   Lab on 07/28/2023   Component Date Value Ref Range Status    WBC 07/28/2023 6.41  3.40 - 10.80 10*3/mm3 Final    RBC 07/28/2023 4.93  4.14 - 5.80 10*6/mm3 Final    Hemoglobin 07/28/2023 16.0  13.0 - 17.7 g/dL Final    Hematocrit 07/28/2023 47.6  37.5 - 51.0 % Final    MCV 07/28/2023 96.6  79.0 - 97.0 fL Final    MCH 07/28/2023 32.5  26.6 - 33.0 pg Final    MCHC 07/28/2023 33.6  31.5 - 35.7 g/dL Final    RDW 07/28/2023 12.6  12.3 - 15.4 % Final    RDW-SD 07/28/2023 45.1  37.0 - 54.0 fl Final    MPV 07/28/2023 10.1  6.0 - 12.0 fL Final    Platelets 07/28/2023 267  140 - 450 10*3/mm3 Final    Neutrophil % 07/28/2023 50.5  42.7 - 76.0 % Final    Lymphocyte % 07/28/2023 34.3  19.6 - 45.3 % Final    Monocyte % 07/28/2023 10.1  5.0 - 12.0 % Final    Eosinophil % 07/28/2023 4.1  0.3 - 6.2 % Final    Basophil % 07/28/2023 0.8  0.0 - 1.5 % Final    Immature Grans % 07/28/2023 0.2  0.0 - 0.5 % Final    Neutrophils, Absolute 07/28/2023 3.24  1.70 - 7.00 10*3/mm3 Final    Lymphocytes, Absolute 07/28/2023 2.20  0.70 - 3.10 10*3/mm3 Final    Monocytes, Absolute 07/28/2023 0.65  0.10 - 0.90 10*3/mm3 Final    Eosinophils, Absolute 07/28/2023 0.26  0.00 - 0.40 10*3/mm3 Final    Basophils, Absolute 07/28/2023 0.05  0.00 - 0.20 10*3/mm3 Final    Immature Grans, Absolute 07/28/2023 0.01  0.00 - 0.05 10*3/mm3 Final    nRBC 07/28/2023 0.0  0.0 - 0.2 /100 WBC Final    Glucose 07/28/2023 87  65 - 99 mg/dL Final    BUN 07/28/2023 12  6 - 20 mg/dL Final    Creatinine 07/28/2023 1.05  0.76 - 1.27 mg/dL Final    Sodium 07/28/2023 140  136 - 145 mmol/L Final    Potassium 07/28/2023 4.5  3.5 - 5.2 mmol/L Final    Slight hemolysis detected by analyzer. Results may be affected.    Chloride 07/28/2023 106  98 - 107 mmol/L Final    CO2 07/28/2023 24.9  22.0 - 29.0 mmol/L Final    Calcium 07/28/2023 9.0  8.6 - 10.5 mg/dL Final    Total Protein 07/28/2023 6.2  6.0 - 8.5 g/dL  Final    Albumin 07/28/2023 3.7  3.5 - 5.2 g/dL Final    ALT (SGPT) 07/28/2023 11  1 - 41 U/L Final    AST (SGOT) 07/28/2023 16  1 - 40 U/L Final    Slight hemolysis detected by analyzer. Results may be affected.    Alkaline Phosphatase 07/28/2023 55  39 - 117 U/L Final    Total Bilirubin 07/28/2023 0.2  0.0 - 1.2 mg/dL Final    Globulin 07/28/2023 2.5  gm/dL Final    A/G Ratio 07/28/2023 1.5  g/dL Final    BUN/Creatinine Ratio 07/28/2023 11.4  7.0 - 25.0 Final    Anion Gap 07/28/2023 9.1  5.0 - 15.0 mmol/L Final    eGFR 07/28/2023 85.9  >60.0 mL/min/1.73 Final    Total Cholesterol 07/28/2023 118  0 - 200 mg/dL Final    Triglycerides 07/28/2023 93  0 - 150 mg/dL Final    HDL Cholesterol 07/28/2023 46  40 - 60 mg/dL Final    LDL Cholesterol  07/28/2023 54  0 - 100 mg/dL Final    VLDL Cholesterol 07/28/2023 18  5 - 40 mg/dL Final    LDL/HDL Ratio 07/28/2023 1.16   Final    Magnesium 07/28/2023 2.3  1.6 - 2.6 mg/dL Final    Vitamin B-12 07/28/2023 992 (H)  211 - 946 pg/mL Final    TSH 07/28/2023 1.560  0.270 - 4.200 uIU/mL Final       Assessment & Plan   Problems Addressed this Visit          Mental Health    Major depressive disorder, recurrent episode, moderate - Primary (Chronic)    Relevant Medications    ALPRAZolam (XANAX) 0.25 MG tablet    FLUoxetine (PROzac) 20 MG capsule    Generalized anxiety disorder (Chronic)    Relevant Medications    ALPRAZolam (XANAX) 0.25 MG tablet    FLUoxetine (PROzac) 20 MG capsule     Diagnoses         Codes Comments    Major depressive disorder, recurrent episode, moderate    -  Primary ICD-10-CM: F33.1  ICD-9-CM: 296.32     Generalized anxiety disorder     ICD-10-CM: F41.1  ICD-9-CM: 300.02              Visit Diagnoses:    ICD-10-CM ICD-9-CM   1. Major depressive disorder, recurrent episode, moderate  F33.1 296.32   2. Generalized anxiety disorder  F41.1 300.02          TREATMENT PLAN/GOALS: Continue supportive psychotherapy efforts and medications as indicated. Treatment and  medication options discussed during today's visit. Patient ackowledged and verbally consented to continue with current treatment plan and was educated on the importance of compliance with treatment and follow-up appointments.    MEDICATION ISSUES:  INSPECT reviewed as expected - gabapentin and hydrocodone , 7/27/23   xanax #90    1. Major depressive d/o moderate recurrent -  did not respond well to SSRI and Buspar, or trintellix , or viibryd 10-20 mg   prozac on 20 mg - the pt had emotional flattening on 40 mg         the pt had neuropsych eval done at [facility] with depression and anxiety     2. Generalized anxiety d/o -  Cont  Low dose of xanax 0. 25 mg po TID , no changs necessary, tolerates well , psychotherapy was recommended      3. Long term therapeutic drug monitoring - UDS 8/18/2022 consistent    LABORATORY - SCAN - DRUG SCREEN, [facility], 8/18/2022 (08/18/2022)      PHQ scored 8 - moderate   depression   JOSE MARIA 7 scored 6    Discussed medication options and treatment plan of prescribed medication as well as the risks, benefits, and side effects including potential falls, possible impaired driving and metabolic adversities among others. Patient is agreeable to call the office with any worsening of symptoms or onset of side effects. Patient is agreeable to call 911 or go to the nearest ER should he/she begin having SI/HI. No medication side effects or related complaints today.     MEDS ORDERED DURING VISIT:  New Medications Ordered This Visit   Medications    ALPRAZolam (XANAX) 0.25 MG tablet     Sig: Take 1 tablet by mouth 3 (Three) Times a Day As Needed for Anxiety. for anxiety     Dispense:  90 tablet     Refill:  3    FLUoxetine (PROzac) 20 MG capsule     Sig: Take 1 capsule by mouth Daily.     Dispense:  90 capsule     Refill:  1       Return in about 6 months (around 3/19/2024).         This document has been electronically signed by Clair Lyman MD  September 19, 2023 16:01 EDT    EMR  Dragon transcription disclaimer:  Some of this encounter note is an electronic transcription translation of spoken language to printed text. The electronic translation of spoken language may permit erroneous, or at times, nonsensical words or phrases to be inadvertently transcribed; Although I have reviewed the note for such errors some may still exist.

## 2023-10-02 ENCOUNTER — ON CAMPUS - OUTPATIENT (OUTPATIENT)
Dept: URBAN - METROPOLITAN AREA HOSPITAL 2 | Facility: HOSPITAL | Age: 53
End: 2023-10-02
Payer: COMMERCIAL

## 2023-10-02 ENCOUNTER — OFFICE (OUTPATIENT)
Dept: URBAN - METROPOLITAN AREA PATHOLOGY 4 | Facility: PATHOLOGY | Age: 53
End: 2023-10-02

## 2023-10-02 VITALS
RESPIRATION RATE: 16 BRPM | HEART RATE: 84 BPM | SYSTOLIC BLOOD PRESSURE: 80 MMHG | HEART RATE: 73 BPM | RESPIRATION RATE: 18 BRPM | DIASTOLIC BLOOD PRESSURE: 65 MMHG | HEART RATE: 80 BPM | WEIGHT: 162 LBS | OXYGEN SATURATION: 99 % | SYSTOLIC BLOOD PRESSURE: 117 MMHG | DIASTOLIC BLOOD PRESSURE: 61 MMHG | SYSTOLIC BLOOD PRESSURE: 98 MMHG | HEART RATE: 74 BPM | DIASTOLIC BLOOD PRESSURE: 73 MMHG | DIASTOLIC BLOOD PRESSURE: 83 MMHG | HEART RATE: 76 BPM | SYSTOLIC BLOOD PRESSURE: 111 MMHG | SYSTOLIC BLOOD PRESSURE: 115 MMHG | DIASTOLIC BLOOD PRESSURE: 66 MMHG | HEART RATE: 72 BPM | SYSTOLIC BLOOD PRESSURE: 76 MMHG | OXYGEN SATURATION: 100 % | RESPIRATION RATE: 22 BRPM | SYSTOLIC BLOOD PRESSURE: 82 MMHG | HEART RATE: 69 BPM | DIASTOLIC BLOOD PRESSURE: 71 MMHG | DIASTOLIC BLOOD PRESSURE: 32 MMHG | HEART RATE: 71 BPM | SYSTOLIC BLOOD PRESSURE: 108 MMHG | DIASTOLIC BLOOD PRESSURE: 68 MMHG | DIASTOLIC BLOOD PRESSURE: 80 MMHG | DIASTOLIC BLOOD PRESSURE: 58 MMHG | HEART RATE: 68 BPM | HEART RATE: 83 BPM | SYSTOLIC BLOOD PRESSURE: 92 MMHG | SYSTOLIC BLOOD PRESSURE: 88 MMHG | TEMPERATURE: 98 F | HEART RATE: 77 BPM

## 2023-10-02 DIAGNOSIS — C15.9 MALIGNANT NEOPLASM OF ESOPHAGUS, UNSPECIFIED: ICD-10-CM

## 2023-10-02 DIAGNOSIS — R11.0 NAUSEA: ICD-10-CM

## 2023-10-02 DIAGNOSIS — D12.3 BENIGN NEOPLASM OF TRANSVERSE COLON: ICD-10-CM

## 2023-10-02 DIAGNOSIS — Z12.11 ENCOUNTER FOR SCREENING FOR MALIGNANT NEOPLASM OF COLON: ICD-10-CM

## 2023-10-02 DIAGNOSIS — D12.0 BENIGN NEOPLASM OF CECUM: ICD-10-CM

## 2023-10-02 DIAGNOSIS — D12.2 BENIGN NEOPLASM OF ASCENDING COLON: ICD-10-CM

## 2023-10-02 DIAGNOSIS — R13.10 DYSPHAGIA, UNSPECIFIED: ICD-10-CM

## 2023-10-02 DIAGNOSIS — D12.4 BENIGN NEOPLASM OF DESCENDING COLON: ICD-10-CM

## 2023-10-02 DIAGNOSIS — K57.30 DIVERTICULOSIS OF LARGE INTESTINE WITHOUT PERFORATION OR ABS: ICD-10-CM

## 2023-10-02 PROBLEM — D37.8 NEOPLASM OF UNCERTAIN BEHAVIOR OF OTHER SPECIFIED DIGESTIVE: Status: ACTIVE | Noted: 2023-10-02

## 2023-10-02 PROBLEM — K63.5 POLYP OF COLON: Status: ACTIVE | Noted: 2023-10-02

## 2023-10-02 LAB
GI HISTOLOGY: A. SELECT: (no result)
GI HISTOLOGY: B. UNSPECIFIED: (no result)
GI HISTOLOGY: C. UNSPECIFIED: (no result)
GI HISTOLOGY: D. UNSPECIFIED: (no result)
GI HISTOLOGY: E. UNSPECIFIED: (no result)
GI HISTOLOGY: PDF REPORT: (no result)

## 2023-10-02 PROCEDURE — 45385 COLONOSCOPY W/LESION REMOVAL: CPT | Mod: 33 | Performed by: INTERNAL MEDICINE

## 2023-10-02 PROCEDURE — 43239 EGD BIOPSY SINGLE/MULTIPLE: CPT | Performed by: INTERNAL MEDICINE

## 2023-10-02 PROCEDURE — 88305 TISSUE EXAM BY PATHOLOGIST: CPT | Performed by: INTERNAL MEDICINE

## 2023-10-02 RX ORDER — PANTOPRAZOLE SODIUM 40 MG/1
40 TABLET, DELAYED RELEASE ORAL
Qty: 90 | Refills: 3 | Status: ACTIVE
Start: 2023-10-02

## 2023-10-02 NOTE — SERVICEHPINOTES
CRISTOFER RHOADES  is a  53  male   who presents today for a  EGD-Colonoscopy   for   the indications listed below. The updated Patient Profile was reviewed prior to the procedure, in conjunction with the Physical Exam, including medical conditions, surgical procedures, medications, allergies, family history and social history. See Physical Exam time stamp below for date and time of HPI completion.Pre-operatively, I reviewed the indication(s) for the procedure, the risks of the procedure [including but not limited to: unexpected bleeding possibly requiring hospitalization and/or unplanned repeat procedures, perforation possibly requiring surgical treatment, missed lesions and complications of sedation/MAC (also explained by anesthesia staff)]. I have evaluated the patient for risks associated with the planned anesthesia and the procedure to be performed and find the patient an acceptable candidate for IV sedation.Multiple opportunities were provided for any questions or concerns, and all questions were answered satisfactorily before any anesthesia was administered. We will proceed with the planned procedure.br

## 2023-10-04 ENCOUNTER — TELEPHONE (OUTPATIENT)
Dept: ONCOLOGY | Facility: CLINIC | Age: 53
End: 2023-10-04
Payer: COMMERCIAL

## 2023-10-04 NOTE — TELEPHONE ENCOUNTER
Spoke with patient to schedule appt from referral. Patient is a current Dr. Mueller patient. Dr. Mueller will be out of the office until 10/23/2023. Offered to have patient see Dr. Osuna while Dr. Mueller is out. Patient declined and scheduled appt for 10/26 with Dr. Mueller, he stated that he would call back if he decided he needed to be seen sooner after speaking with his wife.

## 2023-10-06 DIAGNOSIS — K22.89 ESOPHAGEAL MASS: Primary | ICD-10-CM

## 2023-10-06 NOTE — TELEPHONE ENCOUNTER
Return call to Dr. Garcias patient's neurologist and message left to call back.  I left my cell phone number on the voicemail.   color consistent with ethnicity/race

## 2023-10-06 NOTE — PROGRESS NOTES
PET SCAN ORDER PLACED PER DR. BISWAS. PATIENT TO HAVE PET SCAN PRIOR TO APPT WITH DR. BISWAS ON 10/26/23.

## 2023-10-06 NOTE — PROGRESS NOTES
"                     HEMATOLOGY ONCOLOGY OUTPATIENT FOLLOW UP       Patient name: Jourdan Lebron  : 1970  MRN: [id number]  Primary Care Physician: Justa Castano MD  Referring Physician: Justa Castano MD  Reason For Consult:     No chief complaint on file.    HPI:   History of Present Illness:  Jourdan Lebron is 53 y.o. male who presented to our office on 2021 for consultation regarding elevated hematocrit    On 2021 Mr. Lebron was referred for the investigation of macrocytosis and elevated hematocrit.  He gave a history of a stroke in .  He also described a long history of anxiety and \"panic attacks\".  Finally he had undergone an adrenalectomy, apparently because of an adrenal adenoma.  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  In spite of that he felt poorly, mainly because of fatigue and had several investigations.  For a long time, at least since , he had been noted to have an elevated MCV and MCH.  A clear cause for this had not been identified and generally speaking he was asymptomatic at that time.  In , September and 2021 his blood counts had reported a hematocrit of 51.3, 51.6 and 53.8% respectively.  This was in the setting of a normal high hemoglobin.  He gave a history of prolonged and intense, at times of up to 3 packs of cigarettes per day, cigarette smoking.  Close to the time of the initial visit, he was smoking only cigars but did admit to inhaling the smoke.  He, as well, admitted to dyspnea with some exertion.    2022 Mr. Lebron was back in the office for follow-up.  At the time of his initial evaluation his blood count had been found to be within normal limits.  His folic acid was found to be immediately below the normal range of normalcy.  He started taking folic acid replacement.    7/15/2022: Back in the office for follow-up after 6 months.  Reported he had had some changes to his " medications and he was feeling somewhat better.  In particular, he discussed changes to his antidepressant, that seemed to have made a difference.  He also had stopped smoking.  The physical exam was unchanged.  The laboratory exams revealed normal hemoglobin and hematocrit, but he did persist with mild macrocytosis at 97.8 fL.  A clear explanation for this was not identified.  A decision was made to observe as it was unlikely to represent a serious problem.    July-August 2023: patient reported having progressive Dysphagia limiting his oral intake and associated with 10lb weight loss.     10/2/23: EGD & Colonoscopy  EGD:  Esophagus: Protruding lesion, an infiltrative fungating and ulcerated circumferential 6 cm mass of malignant appearance was found in the lower third of the esophagus.  The mass caused a partial obstruction.  The scope breast traversed the lesion.  From 36 to 46 cm from and seizures.  Cold forceps biopsies was performed.  Stomach: Normal stomach  Duodenum: Normal duodenum  COLONOSCOPY:  Mild diverticulosis of the sigmoid colon.  Multiple polyps involving cecum, ascending colon, transverse colon, descending colon and sigmoid colon.    Pathology:  #1 lower third of esophagus 36-42 cm, mass, biopsy:Invasive moderate to poorly differentiated adenocarcinoma exhibiting occasional signet ring cells.  #2 transverse colon polyp: Fragments of tubular adenoma  #3.  Cecum polyp: Focal glandular changes suggestive of sessile serrated adenoma.  #4.  Ascending colon polyp: Fragments of tubular adenoma and sessile serrated polyp  #5.  Descending colon polyp: Fragments of tubular adenoma.      Subjective:  10/10/23: The patient presented today for Evaluation after recent diagnosis of Esophageal Adenocarcinoma today (as above). He appears to be In good spirits overall despite his cancer diagnosis and having ongoing dysphagia and weight loss. He denied any other symptoms except as above including but not limited to  weakness,fatigue, nausea/vomiting, hematemesis, cough/Shortness of breath, Abdominal pain, any GI/ symptoms.    The following portions of the patient's history were reviewed and updated as appropriate: allergies, current medications, past family history, past medical history, past social history, past surgical history and problem list.    Past Medical History:   Diagnosis Date    Brain fog     Hand tingling     Hyperlipidemia     Tiredness      Past Surgical History:   Procedure Laterality Date    ADRENALECTOMY      KIDNEY STONE SURGERY      THROMBECTOMY         Current Outpatient Medications:     ALPRAZolam (XANAX) 0.25 MG tablet, Take 1 tablet by mouth 3 (Three) Times a Day As Needed for Anxiety. for anxiety, Disp: 90 tablet, Rfl: 3    aspirin 81 MG chewable tablet, Chew 1 tablet Daily., Disp: , Rfl:     esomeprazole (nexIUM) 40 MG capsule, Take 1 capsule by mouth Every Morning Before Breakfast., Disp: 90 capsule, Rfl: 1    Evolocumab (REPATHA) solution prefilled syringe injection, Inject 1 mL under the skin into the appropriate area as directed Every 14 (Fourteen) Days., Disp: 6 mL, Rfl: 3    FLUoxetine (PROzac) 20 MG capsule, Take 1 capsule by mouth Daily., Disp: 90 capsule, Rfl: 1    fluticasone (FLONASE) 50 MCG/ACT nasal spray, 1 spray into the nostril(s) as directed by provider Daily., Disp: , Rfl:     Allergies   Allergen Reactions    Cephalosporins Anaphylaxis     Throat swelling    Dye  [Contrast Dye (Echo Or Unknown Ct/Mr)] Hives    Sulfa Antibiotics Hives    Zetia [Ezetimibe] Other (See Comments)     Made tired    Iodinated Contrast Media Unknown - Low Severity    Statins Myalgia     Myalgia and fatique    Sulfate Unknown - Low Severity     Family History   Problem Relation Age of Onset    Arthritis Mother     Hypertension Mother     Heart failure Mother 73        Cause of death    Arthritis Father     Hypertension Father     Kidney cancer Father 57        Cause of death. Was a smoker    Heart disease  "Brother     Esophageal cancer Brother 59        Cause of death. Has a heavy drinker       Cancer-related family history includes Esophageal cancer (age of onset: 59) in his brother; Kidney cancer (age of onset: 57) in his father.    Social History     Tobacco Use    Smoking status: Former     Packs/day: 3.00     Years: 37.00     Pack years: 111.00     Types: Cigars, Cigarettes     Start date:      Quit date:      Years since quittin.7    Smokeless tobacco: Never    Tobacco comments:     1 packs= 2 cigars a day; only smokes cigars   Vaping Use    Vaping Use: Never used   Substance Use Topics    Alcohol use: Not Currently     Alcohol/week: 8.0 standard drinks     Types: 8 Cans of beer per week     Comment: Has now been abstinent for about one year    Drug use: Never     Social History     Social History Narrative    Not on file      ROS:     Review of Systems   Constitutional: Negative.    HENT: Negative.     Eyes: Negative.    Respiratory:  Positive for choking.    Cardiovascular: Negative.    Gastrointestinal: Negative.         Difficulty swallowing, Solids>Liquids   Endocrine: Negative.    Genitourinary: Negative.    Musculoskeletal: Negative.    Skin: Negative.    Allergic/Immunologic: Negative.    Neurological: Negative.    Hematological: Negative.    Psychiatric/Behavioral: Negative.       Objective:    Vitals:    10/10/23 1322   BP: 102/66   Pulse: 74   SpO2: 98%   Weight: 76.9 kg (169 lb 9.6 oz)   Height: 182.9 cm (72\")   PainSc: 0-No pain     Body mass index is 23 kg/mý.  ECOG  (0) Fully active, able to carry on all predisease performance without restriction    Physical Exam:     Physical Exam  Constitutional:       Appearance: Normal appearance. He is normal weight.   HENT:      Head: Normocephalic and atraumatic.      Right Ear: External ear normal.      Left Ear: External ear normal.      Nose: Nose normal.      Mouth/Throat:      Mouth: Mucous membranes are moist.      Pharynx: Oropharynx is " clear.   Eyes:      Extraocular Movements: Extraocular movements intact.      Conjunctiva/sclera: Conjunctivae normal.      Pupils: Pupils are equal, round, and reactive to light.   Cardiovascular:      Rate and Rhythm: Normal rate.      Pulses: Normal pulses.   Pulmonary:      Effort: Pulmonary effort is normal.      Breath sounds: Normal breath sounds.   Abdominal:      General: Abdomen is flat. Bowel sounds are normal.      Palpations: Abdomen is soft.   Musculoskeletal:         General: Normal range of motion.      Cervical back: Normal range of motion.   Skin:     General: Skin is warm.   Neurological:      Mental Status: He is alert.   Psychiatric:         Mood and Affect: Mood normal.         Behavior: Behavior normal.         Thought Content: Thought content normal.         Judgment: Judgment normal.         Lab Results - Last 18 Months   Lab Units 07/28/23  0625 12/22/22  0855 09/16/22  0917   WBC 10*3/mm3 6.41 7.16 6.42   HEMOGLOBIN g/dL 16.0 17.1 15.7   HEMATOCRIT % 47.6 48.3 46.2   PLATELETS 10*3/mm3 267 280 269   MCV fL 96.6 91.1 93.0       Lab Results - Last 18 Months   Lab Units 07/28/23  0625 01/27/23  0703 12/22/22  0855   SODIUM mmol/L 140 143 140   POTASSIUM mmol/L 4.5 4.4 4.8   CHLORIDE mmol/L 106 105 106   CO2 mmol/L 24.9 29.2* 26.5   BUN mg/dL 12 11 14   CREATININE mg/dL 1.05 1.10 0.90   CALCIUM mg/dL 9.0 9.6 9.7   BILIRUBIN mg/dL 0.2 0.3 0.3   ALK PHOS U/L 55 57 59   ALT (SGPT) U/L 11 11 13   AST (SGOT) U/L 16 14 18   GLUCOSE mg/dL 87 84 84       Lab Results   Component Value Date    GLUCOSE 87 07/28/2023    BUN 12 07/28/2023    CREATININE 1.05 07/28/2023    EGFRIFNONA 67 02/24/2022    BCR 11.4 07/28/2023    K 4.5 07/28/2023    CO2 24.9 07/28/2023    CALCIUM 9.0 07/28/2023    ALBUMIN 3.7 07/28/2023    LABIL2 1.3 09/25/2018    AST 16 07/28/2023    ALT 11 07/28/2023       Lab Results   Component Value Date    FOLATE 4.11 (L) 11/12/2021     Lab Results   Component Value Date    YNMSXESM93 992 (H)  07/28/2023     Uric Acid   Date Value Ref Range Status   09/16/2022 4.6 3.4 - 7.0 mg/dL Final     Lab Results   Component Value Date    SEDRATE 11 04/16/2021     Lab Results   Component Value Date    PSA 0.163 04/06/2021       Assessment & Plan :    Assessment & Plan:    Esophageal Adenocarcinoma:  -[facility] endoscopy report as above, noted to have Adenocarcinoma involving lower 1/3 of esophagus with occasional Signet ring Cells on pathology.   -patient was referred for PET CT for staging evaluation, awaiting imaging at this time.  -We discussed the pathology findings, overall disease prognosis based on tumor staging, need for further diagnostic workup/imaging for staging and available treatment options with the patient.  -We will plan to obtain PET CT and Will refer the patient for EUS, a diagnostic laparoscopy can be considered if PET CT is reported negative for metastatic disease.  -Treatment options for localized/locoregional include Esophagectomy with or without perioperative/Adjuvant ChemoRT or chemotherapy or Definitive ChemoRT as alternative to surgical Resection.  -Will Request MMR testing on the biopsy specimen.  -The case will be discussed in Multidisciplinary conference for further treatment planning.    2. Nutrition: patient reported decreased dietary intake presently associated with approximately 10lb weight loss. We discussed with him about potential need for alternative modes of nutrition (PEG Tube placement/TPN) while undergoing treatment. Encouraged to use Protein supplements frequently (BID or TID) to supplement nutrition. Will request dietary evaluation on follow up.  -to obtain baseline labs including iron, B12, Folate levels on follow up.    3. Genetics: Will refer for genetic testing in view of signet Ring features of tumor histology to rule out CDH1 gene mutaiton ( HDGC syndrome) or other Heritable Genetic mutations associated with GI malignancies.    4. Erythrocytosis: Resolved.  Likely due to  carbon monoxide intoxication in the past.     5.  Former smoker: Advised to continue practicing abstinence.    Follow up In 2 weeks, sooner as needed.

## 2023-10-10 ENCOUNTER — OFFICE VISIT (OUTPATIENT)
Dept: ONCOLOGY | Facility: CLINIC | Age: 53
End: 2023-10-10
Payer: COMMERCIAL

## 2023-10-10 VITALS
HEIGHT: 72 IN | OXYGEN SATURATION: 98 % | DIASTOLIC BLOOD PRESSURE: 66 MMHG | SYSTOLIC BLOOD PRESSURE: 102 MMHG | HEART RATE: 74 BPM | WEIGHT: 169.6 LBS | BODY MASS INDEX: 22.97 KG/M2

## 2023-10-10 DIAGNOSIS — K22.89 ESOPHAGEAL MASS: Primary | ICD-10-CM

## 2023-10-10 RX ORDER — PANTOPRAZOLE SODIUM 40 MG/1
TABLET, DELAYED RELEASE ORAL
COMMUNITY
Start: 2023-10-02

## 2023-10-13 ENCOUNTER — TELEPHONE (OUTPATIENT)
Dept: ONCOLOGY | Facility: CLINIC | Age: 53
End: 2023-10-13
Payer: COMMERCIAL

## 2023-10-13 NOTE — TELEPHONE ENCOUNTER
PER DR. KATZ, DUE TO PENDING EGD, OKAY TO SEE DR. BISWAS ON 11/03.  SW PATIENT, RESCHEDULED LAB, FOLLOW-UP AND DIETICIAN COUNSELING FOR 11/03.

## 2023-10-13 NOTE — TELEPHONE ENCOUNTER
Caller: Alfreda Lebron    Relationship: Self    Best call back number: [phone]    What is the best time to reach you: ANY    Who are you requesting to speak with (clinical staff, provider,  specific staff member): CLINICAL     What was the call regarding: ALFREDA IS CALLING STATES THAT THE GASTRO OFFICE  CALLED TO SET UP HIS EGD U/S FOR 10-27 @ 10:00    BUT HE HAS AN APPOINTMENT WITH DR HOWARD THAT MORNING  AT 8:40    ALFREDA THOUGHT THAT MAYBE DR BISWAS WANTED HIS RESULTS PRIOR TO SEEING HIM    SHOULD HIS APPOINTMENT WITH DR BISWAS BE RESCHEDULED     PLEASE ADVISE

## 2023-10-16 ENCOUNTER — DOCUMENTATION (OUTPATIENT)
Dept: ONCOLOGY | Facility: CLINIC | Age: 53
End: 2023-10-16
Payer: COMMERCIAL

## 2023-10-16 ENCOUNTER — TELEPHONE (OUTPATIENT)
Dept: ONCOLOGY | Facility: CLINIC | Age: 53
End: 2023-10-16
Payer: COMMERCIAL

## 2023-10-16 DIAGNOSIS — K22.89 ESOPHAGEAL MASS: Primary | ICD-10-CM

## 2023-10-16 NOTE — TELEPHONE ENCOUNTER
Caller: TIGRE MILLER [facility]        Best call back number: [phone]    What was the call regarding: ORDER NEEDS TO BE SIGNED IN EPIC

## 2023-10-16 NOTE — TELEPHONE ENCOUNTER
PET SCAN ORDER SWITCH TO RUBINA KENDRICK DUE TO DR. BISWAS BEING OUT OF OFFICE THEREFORE UNABLE TO SIGN THE PET SCAN ORDER.

## 2023-10-20 ENCOUNTER — HOSPITAL ENCOUNTER (OUTPATIENT)
Dept: PET IMAGING | Facility: HOSPITAL | Age: 53
Discharge: HOME | End: 2023-10-20
Payer: COMMERCIAL

## 2023-10-20 DIAGNOSIS — K22.89 ESOPHAGEAL MASS: ICD-10-CM

## 2023-10-20 PROCEDURE — 78815 PET IMAGE W/CT SKULL-THIGH: CPT

## 2023-10-20 PROCEDURE — 0 FLUDEOXYGLUCOSE F18 SOLUTION: Performed by: NURSE PRACTITIONER

## 2023-10-20 PROCEDURE — 82948 REAGENT STRIP/BLOOD GLUCOSE: CPT

## 2023-10-20 PROCEDURE — A9552 F18 FDG: HCPCS | Performed by: NURSE PRACTITIONER

## 2023-10-20 RX ADMIN — FLUDEOXYGLUCOSE F18 1 DOSE: 300 INJECTION INTRAVENOUS at 15:00

## 2023-10-21 LAB — GLUCOSE BLDC GLUCOMTR-MCNC: 86 MG/DL (ref 70–105)

## 2023-10-27 ENCOUNTER — HOSPITAL ENCOUNTER (OUTPATIENT)
Facility: HOSPITAL | Age: 53
Setting detail: HOSPITAL OUTPATIENT SURGERY
Discharge: HOME | End: 2023-10-27
Attending: INTERNAL MEDICINE | Admitting: INTERNAL MEDICINE
Payer: COMMERCIAL

## 2023-10-27 ENCOUNTER — ON CAMPUS - OUTPATIENT (OUTPATIENT)
Dept: URBAN - METROPOLITAN AREA HOSPITAL 85 | Facility: HOSPITAL | Age: 53
End: 2023-10-27

## 2023-10-27 ENCOUNTER — ANESTHESIA (OUTPATIENT)
Dept: GASTROENTEROLOGY | Facility: HOSPITAL | Age: 53
End: 2023-10-27
Payer: COMMERCIAL

## 2023-10-27 ENCOUNTER — ANESTHESIA EVENT (OUTPATIENT)
Dept: GASTROENTEROLOGY | Facility: HOSPITAL | Age: 53
End: 2023-10-27
Payer: COMMERCIAL

## 2023-10-27 VITALS
WEIGHT: 163.8 LBS | SYSTOLIC BLOOD PRESSURE: 101 MMHG | TEMPERATURE: 98.1 F | HEIGHT: 72 IN | HEART RATE: 65 BPM | OXYGEN SATURATION: 94 % | RESPIRATION RATE: 11 BRPM | BODY MASS INDEX: 22.19 KG/M2 | DIASTOLIC BLOOD PRESSURE: 64 MMHG

## 2023-10-27 DIAGNOSIS — C80.1 ADENOCARCINOMA: ICD-10-CM

## 2023-10-27 DIAGNOSIS — K44.9 DIAPHRAGMATIC HERNIA WITHOUT OBSTRUCTION OR GANGRENE: ICD-10-CM

## 2023-10-27 DIAGNOSIS — C77.9 SECONDARY AND UNSPECIFIED MALIGNANT NEOPLASM OF LYMPH NODE,: ICD-10-CM

## 2023-10-27 PROCEDURE — 43242 EGD US FINE NEEDLE BX/ASPIR: CPT | Performed by: INTERNAL MEDICINE

## 2023-10-27 PROCEDURE — 88172 CYTP DX EVAL FNA 1ST EA SITE: CPT | Performed by: INTERNAL MEDICINE

## 2023-10-27 PROCEDURE — 25010000002 LEVOFLOXACIN PER 250 MG: Performed by: INTERNAL MEDICINE

## 2023-10-27 PROCEDURE — 25010000002 FENTANYL CITRATE (PF) 100 MCG/2ML SOLUTION: Performed by: NURSE ANESTHETIST, CERTIFIED REGISTERED

## 2023-10-27 PROCEDURE — 88173 CYTOPATH EVAL FNA REPORT: CPT | Performed by: INTERNAL MEDICINE

## 2023-10-27 PROCEDURE — 25010000002 PROPOFOL 200 MG/20ML EMULSION: Performed by: NURSE ANESTHETIST, CERTIFIED REGISTERED

## 2023-10-27 PROCEDURE — 25010000002 METRONIDAZOLE 500 MG/100ML SOLUTION: Performed by: INTERNAL MEDICINE

## 2023-10-27 PROCEDURE — 88177 CYTP FNA EVAL EA ADDL: CPT | Performed by: INTERNAL MEDICINE

## 2023-10-27 PROCEDURE — 25810000003 SODIUM CHLORIDE 0.9 % SOLUTION: Performed by: NURSE ANESTHETIST, CERTIFIED REGISTERED

## 2023-10-27 RX ORDER — PROPOFOL 10 MG/ML
INJECTION, EMULSION INTRAVENOUS AS NEEDED
Status: DISCONTINUED | OUTPATIENT
Start: 2023-10-27 | End: 2023-10-27 | Stop reason: SURG

## 2023-10-27 RX ORDER — HYDRALAZINE HYDROCHLORIDE 20 MG/ML
5 INJECTION INTRAMUSCULAR; INTRAVENOUS
Status: DISCONTINUED | OUTPATIENT
Start: 2023-10-27 | End: 2023-10-27 | Stop reason: HOSPADM

## 2023-10-27 RX ORDER — ACETAMINOPHEN 325 MG/1
650 TABLET ORAL ONCE AS NEEDED
Status: DISCONTINUED | OUTPATIENT
Start: 2023-10-27 | End: 2023-10-27 | Stop reason: HOSPADM

## 2023-10-27 RX ORDER — LEVOFLOXACIN 5 MG/ML
500 INJECTION, SOLUTION INTRAVENOUS ONCE
Status: COMPLETED | OUTPATIENT
Start: 2023-10-27 | End: 2023-10-27

## 2023-10-27 RX ORDER — LABETALOL HYDROCHLORIDE 5 MG/ML
5 INJECTION, SOLUTION INTRAVENOUS
Status: DISCONTINUED | OUTPATIENT
Start: 2023-10-27 | End: 2023-10-27 | Stop reason: HOSPADM

## 2023-10-27 RX ORDER — FENTANYL CITRATE 50 UG/ML
INJECTION, SOLUTION INTRAMUSCULAR; INTRAVENOUS AS NEEDED
Status: DISCONTINUED | OUTPATIENT
Start: 2023-10-27 | End: 2023-10-27 | Stop reason: SURG

## 2023-10-27 RX ORDER — IPRATROPIUM BROMIDE AND ALBUTEROL SULFATE 2.5; .5 MG/3ML; MG/3ML
3 SOLUTION RESPIRATORY (INHALATION) ONCE AS NEEDED
Status: DISCONTINUED | OUTPATIENT
Start: 2023-10-27 | End: 2023-10-27 | Stop reason: HOSPADM

## 2023-10-27 RX ORDER — EPHEDRINE SULFATE 5 MG/ML
5 INJECTION INTRAVENOUS ONCE AS NEEDED
Status: DISCONTINUED | OUTPATIENT
Start: 2023-10-27 | End: 2023-10-27 | Stop reason: HOSPADM

## 2023-10-27 RX ORDER — SODIUM CHLORIDE 9 MG/ML
INJECTION, SOLUTION INTRAVENOUS CONTINUOUS PRN
Status: DISCONTINUED | OUTPATIENT
Start: 2023-10-27 | End: 2023-10-27 | Stop reason: SURG

## 2023-10-27 RX ORDER — LEVOFLOXACIN 5 MG/ML
250 INJECTION, SOLUTION INTRAVENOUS ONCE
Status: DISCONTINUED | OUTPATIENT
Start: 2023-10-27 | End: 2023-10-27

## 2023-10-27 RX ORDER — LEVOFLOXACIN 5 MG/ML
INJECTION, SOLUTION INTRAVENOUS
Status: DISCONTINUED
Start: 2023-10-27 | End: 2023-10-27 | Stop reason: HOSPADM

## 2023-10-27 RX ORDER — LIDOCAINE HYDROCHLORIDE 20 MG/ML
INJECTION, SOLUTION EPIDURAL; INFILTRATION; INTRACAUDAL; PERINEURAL AS NEEDED
Status: DISCONTINUED | OUTPATIENT
Start: 2023-10-27 | End: 2023-10-27 | Stop reason: SURG

## 2023-10-27 RX ORDER — METRONIDAZOLE 500 MG/100ML
INJECTION, SOLUTION INTRAVENOUS
Status: DISCONTINUED
Start: 2023-10-27 | End: 2023-10-27 | Stop reason: HOSPADM

## 2023-10-27 RX ORDER — ONDANSETRON 2 MG/ML
4 INJECTION INTRAMUSCULAR; INTRAVENOUS ONCE AS NEEDED
Status: DISCONTINUED | OUTPATIENT
Start: 2023-10-27 | End: 2023-10-27 | Stop reason: HOSPADM

## 2023-10-27 RX ORDER — METRONIDAZOLE 500 MG/100ML
500 INJECTION, SOLUTION INTRAVENOUS ONCE
Status: COMPLETED | OUTPATIENT
Start: 2023-10-27 | End: 2023-10-27

## 2023-10-27 RX ADMIN — PROPOFOL 200 MCG/KG/MIN: 10 INJECTION, EMULSION INTRAVENOUS at 11:55

## 2023-10-27 RX ADMIN — LIDOCAINE HYDROCHLORIDE 100 MG: 20 INJECTION, SOLUTION EPIDURAL; INFILTRATION; INTRACAUDAL; PERINEURAL at 11:55

## 2023-10-27 RX ADMIN — FENTANYL CITRATE 50 MCG: 50 INJECTION, SOLUTION INTRAMUSCULAR; INTRAVENOUS at 11:53

## 2023-10-27 RX ADMIN — LEVOFLOXACIN 500 MG: 500 INJECTION, SOLUTION INTRAVENOUS at 12:50

## 2023-10-27 RX ADMIN — SODIUM CHLORIDE: 9 INJECTION, SOLUTION INTRAVENOUS at 11:44

## 2023-10-27 RX ADMIN — METRONIDAZOLE 500 MG: 500 INJECTION, SOLUTION INTRAVENOUS at 12:39

## 2023-10-27 NOTE — ANESTHESIA PREPROCEDURE EVALUATION
Anesthesia Evaluation     Patient summary reviewed and Nursing notes reviewed   no history of anesthetic complications:   NPO Solid Status: > 8 hours  NPO Liquid Status: > 8 hours           Airway   Mallampati: II  TM distance: >3 FB  Neck ROM: full  No difficulty expected  Dental - normal exam     Pulmonary - normal exam   (+) a smoker Former,  Cardiovascular - normal exam    (+) hyperlipidemia      Neuro/Psych  (+) CVA, numbness, psychiatric history Anxiety and Depression  GI/Hepatic/Renal/Endo    (+) GERD, renal disease- stones    Musculoskeletal     (+) neck pain  Abdominal  - normal exam   Substance History - negative use     OB/GYN negative ob/gyn ROS         Other      history of cancer                      Anesthesia Plan    ASA 2     general     intravenous induction     Anesthetic plan, risks, benefits, and alternatives have been provided, discussed and informed consent has been obtained with: patient.    Plan discussed with CRNA.        CODE STATUS:

## 2023-10-27 NOTE — OP NOTE
ESOPHAGOGASTRODUODENOSCOPY WITH ULTRASOUND AND FINE NEEDLE ASPIRATION Procedure Report    Patient Name:  Jourdan Lebron  YOB: 1970    Date of Surgery:  10/27/2023     Pre-Op Diagnosis:  Adenocarcinoma [C80.1]         Procedure/CPT® Codes:      Procedure(s):  ENDOSCOPIC ULTRASOUND WITH STAGING AND FINE NEEDLE ASPIRATION X2 AREAS    Staff:  Surgeon(s):  Joselyn Savage MD      Anesthesia: Monitored Anesthesia Care    Description of Procedure:  A description of the procedure as well as risks, benefits and alternative methods were explained to the patient who voiced understanding and signed the corresponding consent form. A physical exam was performed and vital signs were monitored throughout the procedure.    An upper GI endoscope was placed into the mouth and proceeded through the esophagus, stomach and second portion of the duodenum without difficulty. The scope was then retroflexed and the fundus was visualized. The procedure was not difficult and there were no immediate complications.  Subsequently radial and later Pentax linear scope was advanced under direct realization.  Findings as detailed below.    Findings  EGD findings  Ulcerated fungating mass was noted starting from 36 to 42 cm and subsequently from 42-44 was a hiatal hernia.  Biopsy has been consistent with esophageal moderate to poorly differentiated adenocarcinoma.  Normal gastric duodenal mucosa.    EUS finding  Scanning at the level of the mass from 36 to 42 cm did show significant mucosal thickening as well as mass penetrating through the muscularis propria without involving adventitia.  At least 3 lymph nodes were seen.  Tumor seen around the mass area at the measures around 5.8 and 7.3 mm.  There were hypodense.  Another cluster of lymph nodes noticed right around gastrohepatic area, this measures 8.7 and 7.8 mm.  There were hypodense.  We performed 2-3 passes using 25-gauge needle and initial cytology showed lymphocytic cells without  any malignant cells.  This third lymph node was also sample which also came back benign.  However 2 lymph nodes which are around the imager vicinity of the mass the measures 5 and 6 mm are highly concerning for possibility of malignancy.  Patient tolerated procedure very well no major complication were noticed.      Impression:  1.  Close to 6 cm ulcerated mass extending from 36 to 42 cm previous biopsy consistent poorly differentiated adenocarcinoma.  This mass is definitely touching/penetrating through muscularis propria without involving adventitia consistent with T2 lesion.  2 small lymph nodes noticed immediate vicinity to this mass measuring  5 and 6 mm there were round hypodense highly concerning for metastatic lymph node.  Another sets of lymph node noticed around the gastrohepatic GE junction area close to 7.8 and 8.7 mm initial cytology formed these lymph nodes showed benign lymphocytic tissue without any malignant cells.  Overall this lesion is consistent with T2N1.    Recommendations:  Follow-up with oncology as well as thoracic surgery as scheduled.  Continue with the PPI.  Follow the GI clinic as scheduled.      Joselyn Savage MD     Date: 10/27/2023    Time: 12:37 EDT

## 2023-10-27 NOTE — H&P
Patient Care Team:  Justa Castano MD as PCP - General (Family Medicine)  Luz Sepulveda PA-C as Physician Assistant (Neurosurgery)  Gonzalo Cruz MD as Consulting Physician (Surgical Oncology)  Anabela Crenshaw MD as Consulting Physician (Endocrinology)  Oliverio Mueller MD as Consulting Physician (Hematology and Oncology)  Nidhi Hollins APRN as Nurse Practitioner (Nurse Practitioner)      Subjective .     History of present illness:    Jourdan Lebron is a 53 y.o. male who presents today for Procedure(s):  ENDOSCOPIC ULTRASOUND WITH STAGING AND FINE NEEDLE ASPIRATION X2 AREAS for the indications listed below.     The updated Patient Profile was reviewed prior to the procedure, in conjunction with the Physical Exam, including medical conditions, surgical procedures, medications, allergies, family history and social history.     Pre-operatively, I reviewed the indication(s) for the procedure, the risks of the procedure [including but not limited to: unexpected bleeding possibly requiring hospitalization and/or unplanned repeat procedures, perforation possibly requiring surgical treatment, missed lesions and complications of sedation/MAC (also explained by anesthesia staff)].     I have evaluated the patient for risks associated with the planned anesthesia and the procedure to be performed and find the patient an acceptable candidate for IV sedation.    Multiple opportunities were provided for any questions or concerns, and all questions were answered satisfactorily before any anesthesia was administered. We will proceed with the planned procedure.      ASSESSMENT/PLAN:  53 years old male with esophageal adenocarcinoma here for staging  EGD  EUS      Past Medical History:  Past Medical History:   Diagnosis Date    Brain fog     Esophageal cancer     GERD (gastroesophageal reflux disease)     Hand tingling     Hyperlipidemia     Stroke 05/30/2020    Tiredness        Past Surgical History:  Past  Surgical History:   Procedure Laterality Date    ADRENALECTOMY      KIDNEY STONE SURGERY      THROMBECTOMY         Social History:  Social History     Tobacco Use    Smoking status: Former     Packs/day: 3.00     Years: 37.00     Additional pack years: 0.00     Total pack years: 111.00     Types: Cigars, Cigarettes     Start date:      Quit date:      Years since quittin.8    Smokeless tobacco: Never    Tobacco comments:     1 packs= 2 cigars a day; only smokes cigars   Vaping Use    Vaping Use: Never used   Substance Use Topics    Alcohol use: Not Currently     Alcohol/week: 8.0 standard drinks of alcohol     Types: 8 Cans of beer per week     Comment: Has now been abstinent for about one year    Drug use: Never       Family History:  Family History   Problem Relation Age of Onset    Arthritis Mother     Hypertension Mother     Heart failure Mother 73        Cause of death    Arthritis Father     Hypertension Father     Kidney cancer Father 57        Cause of death. Was a smoker    Heart disease Brother     Esophageal cancer Brother 59        Cause of death. Has a heavy drinker       Medications:  Medications Prior to Admission   Medication Sig Dispense Refill Last Dose    ALPRAZolam (XANAX) 0.25 MG tablet Take 1 tablet by mouth 3 (Three) Times a Day As Needed for Anxiety. for anxiety 90 tablet 3 10/27/2023    aspirin 81 MG chewable tablet Chew 1 tablet Daily.   10/24/2023    Evolocumab (REPATHA) solution prefilled syringe injection Inject 1 mL under the skin into the appropriate area as directed Every 14 (Fourteen) Days. 6 mL 3 Past Month    FLUoxetine (PROzac) 20 MG capsule Take 1 capsule by mouth Daily. 90 capsule 1 10/27/2023    fluticasone (FLONASE) 50 MCG/ACT nasal spray 1 spray into the nostril(s) as directed by provider Daily.   10/26/2023    pantoprazole (PROTONIX) 40 MG EC tablet Take 1 tablet by mouth Daily.   10/26/2023    esomeprazole (nexIUM) 40 MG capsule Take 1 capsule by mouth Every  Morning Before Breakfast. (Patient not taking: Reported on 10/19/2023) 90 capsule 1 Not Taking       Scheduled Meds:levoFLOXacin, 250 mg, Intravenous, Once  levoFLOXacin (LEVAQUIN) 500 mg/100 mL D5W (premix), , ,   metroNIDAZOLE, , ,   metroNIDAZOLE, 500 mg, Intravenous, Once      Continuous Infusions:   PRN Meds:.  acetaminophen    atropine    ePHEDrine Sulfate (Pressors)    hydrALAZINE    ipratropium-albuterol    labetalol    levoFLOXacin (LEVAQUIN) 500 mg/100 mL D5W (premix)    metroNIDAZOLE    ondansetron    ALLERGIES:  Cephalosporins, Dye  [contrast dye (echo or unknown ct/mr)], Sulfa antibiotics, Zetia [ezetimibe], Iodinated contrast media, Statins, and Sulfate        Objective     Vital Signs:   Temp:  [98.1 °F (36.7 °C)] 98.1 °F (36.7 °C)  Heart Rate:  [75] 75  Resp:  [11] 11  BP: (115)/(66) 115/66    Physical Exam:      General Appearance:    Awake and alert, in no acute distress   Lungs:     Clear to auscultation bilaterally, respirations regular, even and unlabored    Heart:    Regular rhythm and normal rate, normal S1 and S2, no            murmur, no gallop, no rub   Abdomen:     Normal bowel sounds, soft, non-tender, no rebound or guarding, non-distended, no hepatosplenomegaly        Results Review:   I reviewed the patient's labs and imaging.  Lab Results (last 24 hours)       ** No results found for the last 24 hours. **            Imaging Results (Last 24 Hours)       ** No results found for the last 24 hours. **               I discussed the patients findings and my recommendations with the patient.  Joselyn Savage MD  10/27/23  12:36 EDT

## 2023-10-30 LAB
BEAKER LAB AP INTRAOPERATIVE CONSULTATION: NORMAL
LAB AP CASE REPORT: NORMAL
LAB AP DIAGNOSIS COMMENT: NORMAL
PATH REPORT.FINAL DX SPEC: NORMAL
PATH REPORT.GROSS SPEC: NORMAL

## 2023-10-30 NOTE — ANESTHESIA POSTPROCEDURE EVALUATION
Patient: Jourdan Lebron    Procedure Summary       Date: 10/27/23 Room / Location: [facility] ENDOSCOPY 2 / [facility] ENDOSCOPY    Anesthesia Start: 1144 Anesthesia Stop: 1235    Procedure: ENDOSCOPIC ULTRASOUND WITH STAGING AND FINE NEEDLE ASPIRATION X2 AREAS Diagnosis:       Adenocarcinoma      (Adenocarcinoma [C80.1])    Surgeons: Joselyn Savage MD Provider: Jamal Maki MD    Anesthesia Type: general ASA Status: 2            Anesthesia Type: general    Vitals  Vitals Value Taken Time   /64 10/27/23 1257   Temp     Pulse 61 10/27/23 1306   Resp 11 10/27/23 1255   SpO2 99 % 10/27/23 1306   Vitals shown include unfiled device data.        Post Anesthesia Care and Evaluation    Patient location during evaluation: PACU  Patient participation: complete - patient participated  Level of consciousness: awake  Pain scale: See nurse's notes for pain score.  Pain management: adequate    Airway patency: patent  Anesthetic complications: No anesthetic complications  PONV Status: none  Cardiovascular status: acceptable  Respiratory status: acceptable and spontaneous ventilation  Hydration status: acceptable    Comments: Patient seen and examined postoperatively; vital signs stable; SpO2 greater than or equal to 90%; cardiopulmonary status stable; nausea/vomiting adequately controlled; pain adequately controlled; no apparent anesthesia complications; patient discharged from anesthesia care when discharge criteria were met

## 2023-10-31 NOTE — PROGRESS NOTES
"                     HEMATOLOGY ONCOLOGY OUTPATIENT FOLLOW UP       Patient name: Jourdan Lebron  : 1970  MRN: [id number]  Primary Care Physician: Justa Castano MD  Referring Physician: Justa Castano MD  Reason For Consult:     Chief Complaint   Patient presents with    Follow-up     Follow up  Erythrocytosis     HPI:   History of Present Illness:  Jourdan Lebron is 53 y.o. male who presented to our office on 2021 for consultation regarding elevated hematocrit    On 2021 Mr. Lebron was referred for the investigation of macrocytosis and elevated hematocrit.  He gave a history of a stroke in .  He also described a long history of anxiety and \"panic attacks\".  Finally he had undergone an adrenalectomy, apparently because of an adrenal adenoma.  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  In spite of that he felt poorly, mainly because of fatigue and had several investigations.  For a long time, at least since , he had been noted to have an elevated MCV and MCH.  A clear cause for this had not been identified and generally speaking he was asymptomatic at that time.  In , September and 2021 his blood counts had reported a hematocrit of 51.3, 51.6 and 53.8% respectively.  This was in the setting of a normal high hemoglobin.  He gave a history of prolonged and intense, at times of up to 3 packs of cigarettes per day, cigarette smoking.  Close to the time of the initial visit, he was smoking only cigars but did admit to inhaling the smoke.  He, as well, admitted to dyspnea with some exertion.    2022 Mr. Lebron was back in the office for follow-up.  At the time of his initial evaluation his blood count had been found to be within normal limits.  His folic acid was found to be immediately below the normal range of normalcy.  He started taking folic acid replacement.    7/15/2022: Back in the office for follow-up after 6 " months.  Reported he had had some changes to his medications and he was feeling somewhat better.  In particular, he discussed changes to his antidepressant, that seemed to have made a difference.  He also had stopped smoking.  The physical exam was unchanged.  The laboratory exams revealed normal hemoglobin and hematocrit, but he did persist with mild macrocytosis at 97.8 fL.  A clear explanation for this was not identified.  A decision was made to observe as it was unlikely to represent a serious problem.    10/10/2023: Seen in the office after an absence of more than one year. He reported that for a few months he had been experiencing dysphagia and weight loss. He was initially treated with a proton pump inhibitor, but as there was no response and rather he reported worsening of the symptoms, he underwent upper and lower gastrointestinal endoscopies. In the colon multiple polyps were identified in the cecum, the ascending colon, the transverse colon and the descending colon. In the esophagus a protruding lesion that seemed infiltrative and was fungating and ulcerated was found in the lower third of the esophagus. It extended from 36 to 46 cm from the incisors. Biopsy reported invasive moderate to poorly differentiated adenocarcinoma.     11/3/2023: In the office to review the PET scan. His symptoms have not changed much. He continues to have dysphagia and it is severe enough that he has been able to eat much; he has some success with soft foods but there fare some foods that he can definitely not swallow. He has lost about 10 lbs but none recently. He remains afebrile and has not had any cough. He has not had any pain. On exam no jaundice or pallor. Lungs diminished and heart regular. Abdomen soft and not tender. No edema. Reviewed the images of the PET scan. Reviewed the result of the endoscopic ultrasound and the FNA of the lymph nodes, at least one of which is positive for metastatic disease. This makes the  tumor T2N1 disease. Neoadjuvant chemotherapy and radiation was discussed with him and his spouse and I made referrals to Dr. Sabillon in Radiation Oncology and to Dr. Hurtado in Thoracic surgery. He is to have next generation sequencing, Her2 expression and PD-L1 expression on tumor tissue. Will present in tumor conference.     Subjective:  11/3/2023: Feeling about the same as at the time of the last visit. Persists with dysphagia that does not seem worse but that has resulted in a very limited diet. He has not lost any more weight, however. He remains afebrile. Has not had any dyspnea. No abdominal pain, diarrhea or dysuria. No edema. Has been abstinent from tobacco and alcohol.     The following portions of the patient's history were reviewed and updated as appropriate: allergies, current medications, past family history, past medical history, past social history, past surgical history and problem list.    Past Medical History:   Diagnosis Date    Brain fog     Esophageal cancer     GERD (gastroesophageal reflux disease)     Hand tingling     Hyperlipidemia     Stroke 05/30/2020    Tiredness      Past Surgical History:   Procedure Laterality Date    ADRENALECTOMY      KIDNEY STONE SURGERY      THROMBECTOMY      UPPER ENDOSCOPIC ULTRASOUND W/ FNA N/A 10/27/2023    Procedure: ENDOSCOPIC ULTRASOUND WITH STAGING AND FINE NEEDLE ASPIRATION X2 AREAS;  Surgeon: Joselyn Savage MD;  Location: [facility] ENDOSCOPY;  Service: Gastroenterology;  Laterality: N/A;  POST:       Current Outpatient Medications:     ALPRAZolam (XANAX) 0.25 MG tablet, Take 1 tablet by mouth 3 (Three) Times a Day As Needed for Anxiety. for anxiety, Disp: 90 tablet, Rfl: 3    aspirin 81 MG chewable tablet, Chew 1 tablet Daily., Disp: , Rfl:     Evolocumab (REPATHA) solution prefilled syringe injection, Inject 1 mL under the skin into the appropriate area as directed Every 14 (Fourteen) Days., Disp: 6 mL, Rfl: 3    FLUoxetine (PROzac) 20 MG capsule, Take 1 capsule  by mouth Daily., Disp: 90 capsule, Rfl: 1    fluticasone (FLONASE) 50 MCG/ACT nasal spray, 1 spray into the nostril(s) as directed by provider Daily., Disp: , Rfl:     ALPRAZolam (XANAX) 0.25 MG tablet, 90 (Patient not taking: Reported on 11/3/2023), Disp: , Rfl:     aspirin 81 MG EC tablet, tablet (Patient not taking: Reported on 11/3/2023), Disp: , Rfl:     Evolocumab (Repatha) solution prefilled syringe injection, 1 (Patient not taking: Reported on 11/3/2023), Disp: , Rfl:     FLUoxetine (PROzac) 20 MG capsule, 90 (Patient not taking: Reported on 11/3/2023), Disp: , Rfl:     pantoprazole (PROTONIX) 40 MG EC tablet, Take 1 tablet by mouth Daily. (Patient not taking: Reported on 11/3/2023), Disp: , Rfl:     Allergies   Allergen Reactions    Cephalosporins Anaphylaxis     Throat swelling    Dye  [Contrast Dye (Echo Or Unknown Ct/Mr)] Hives    Sulfa Antibiotics Hives    Zetia [Ezetimibe] Other (See Comments)     Made tired    Iodinated Contrast Media Unknown - Low Severity    Statins Myalgia     Myalgia and fatique    Sulfate Unknown - Low Severity     Family History   Problem Relation Age of Onset    Arthritis Mother     Hypertension Mother     Heart failure Mother 73        Cause of death    Arthritis Father     Hypertension Father     Kidney cancer Father 57        Cause of death. Was a smoker    Heart disease Brother     Esophageal cancer Brother 59        Cause of death. Has a heavy drinker     Cancer-related family history includes Esophageal cancer (age of onset: 59) in his brother; Kidney cancer (age of onset: 57) in his father.    Social History     Tobacco Use    Smoking status: Former     Packs/day: 3.00     Years: 37.00     Additional pack years: 0.00     Total pack years: 111.00     Types: Cigars, Cigarettes     Start date:      Quit date:      Years since quittin.8    Smokeless tobacco: Never    Tobacco comments:     1 packs= 2 cigars a day; only smokes cigars   Vaping Use    Vaping Use:  Never used   Substance Use Topics    Alcohol use: Not Currently     Alcohol/week: 8.0 standard drinks of alcohol     Types: 8 Cans of beer per week     Comment: Has now been abstinent for about one year    Drug use: Never     Social History     Social History Narrative    Not on file      ROS:     Review of Systems   Constitutional:  Positive for fatigue. Negative for activity change, appetite change, chills, diaphoresis, fever and unexpected weight change.   HENT:  Negative for congestion, dental problem, drooling, ear discharge, ear pain, facial swelling, hearing loss, mouth sores, nosebleeds, postnasal drip, rhinorrhea, sinus pressure, sinus pain, sneezing, sore throat, tinnitus, trouble swallowing and voice change.    Eyes:  Negative for photophobia, pain, discharge, redness, itching and visual disturbance.   Respiratory:  Negative for apnea, cough, choking, chest tightness, shortness of breath, wheezing and stridor.    Cardiovascular:  Negative for chest pain, palpitations and leg swelling.   Gastrointestinal:  Negative for abdominal distention, abdominal pain, anal bleeding, blood in stool, constipation, diarrhea, nausea, rectal pain and vomiting.   Endocrine: Negative for cold intolerance, heat intolerance, polydipsia and polyuria.   Genitourinary:  Negative for decreased urine volume, difficulty urinating, dysuria, flank pain, frequency, genital sores, hematuria and urgency.   Musculoskeletal:  Negative for arthralgias, back pain, gait problem, joint swelling, myalgias, neck pain and neck stiffness.   Skin:  Negative for color change, pallor and rash.   Neurological:  Negative for dizziness, tremors, seizures, syncope, facial asymmetry, speech difficulty, weakness, light-headedness, numbness and headaches.   Hematological:  Negative for adenopathy. Does not bruise/bleed easily.   Psychiatric/Behavioral:  Negative for agitation, behavioral problems, confusion, decreased concentration, hallucinations,  "self-injury, sleep disturbance and suicidal ideas. The patient is nervous/anxious.      Objective:    Vitals:    11/03/23 0841   BP: 94/63   Pulse: 66   SpO2: 99%   Weight: 76.1 kg (167 lb 12.8 oz)   Height: 182.9 cm (72\")   PainSc: 0-No pain     Body mass index is 22.76 kg/m².  ECOG  (0) Fully active, able to carry on all predisease performance without restriction    Physical Exam:     Physical Exam  Constitutional:       General: He is not in acute distress.     Appearance: Normal appearance. He is not ill-appearing, toxic-appearing or diaphoretic.      Comments: Slender, well-built.  Seems older than the stated age.  No distress.   HENT:      Head: Normocephalic and atraumatic.      Right Ear: External ear normal. There is no impacted cerumen.      Left Ear: External ear normal. There is no impacted cerumen.      Nose: Nose normal. No congestion or rhinorrhea.      Mouth/Throat:      Mouth: Mucous membranes are moist.      Pharynx: Oropharynx is clear. No oropharyngeal exudate or posterior oropharyngeal erythema.      Comments: Oral cavity reveals poor dentition and poor dental hygiene.  No mucosal ulcerations are present.  Eyes:      General: No scleral icterus.        Right eye: No discharge.         Left eye: No discharge.      Conjunctiva/sclera: Conjunctivae normal.      Pupils: Pupils are equal, round, and reactive to light.   Neck:      Vascular: No carotid bruit.   Cardiovascular:      Rate and Rhythm: Normal rate and regular rhythm.      Pulses: Normal pulses.      Heart sounds: Normal heart sounds. No murmur heard.     No friction rub. No gallop.   Pulmonary:      Effort: No respiratory distress.      Breath sounds: No stridor. No wheezing, rhonchi or rales.      Comments: Breath sounds are diminished bilaterally.  Chest:      Chest wall: No tenderness.   Abdominal:      General: Abdomen is flat. Bowel sounds are normal. There is no distension.      Palpations: Abdomen is soft. There is no mass.      " Tenderness: There is no abdominal tenderness. There is no right CVA tenderness, left CVA tenderness, guarding or rebound.   Musculoskeletal:         General: No swelling, tenderness, deformity or signs of injury.      Cervical back: No rigidity or tenderness.      Right lower leg: No edema.      Left lower leg: No edema.      Comments: There is clubbing of the fingers in both hands   Lymphadenopathy:      Cervical: No cervical adenopathy.   Skin:     General: Skin is warm and dry.      Coloration: Skin is not jaundiced or pale.      Findings: No bruising, erythema or rash.   Neurological:      General: No focal deficit present.      Mental Status: He is alert and oriented to person, place, and time.      Cranial Nerves: No cranial nerve deficit.      Motor: No weakness.      Coordination: Coordination normal.      Gait: Gait normal.   Psychiatric:         Mood and Affect: Mood normal.         Behavior: Behavior normal.         Thought Content: Thought content normal.         Judgment: Judgment normal.     YOSELIN Mueller MD performed a physical exam on 11/3/2023 as documented above.     Lab Results - Last 18 Months   Lab Units 11/03/23  0844 07/28/23  0625 12/22/22  0855   WBC 10*3/mm3 7.51 6.41 7.16   HEMOGLOBIN g/dL 14.7 16.0 17.1   HEMATOCRIT % 44.3 47.6 48.3   PLATELETS 10*3/mm3 253 267 280   MCV fL 98.2* 96.6 91.1     Lab Results - Last 18 Months   Lab Units 11/03/23  0844 07/28/23  0625 01/27/23  0703   SODIUM mmol/L 140 140 143   POTASSIUM mmol/L 4.5 4.5 4.4   CHLORIDE mmol/L 105 106 105   CO2 mmol/L 27.0 24.9 29.2*   BUN mg/dL 13 12 11   CREATININE mg/dL 0.87 1.05 1.10   CALCIUM mg/dL 9.4 9.0 9.6   BILIRUBIN mg/dL 0.2 0.2 0.3   ALK PHOS U/L 56 55 57   ALT (SGPT) U/L 10 11 11   AST (SGOT) U/L 13 16 14   GLUCOSE mg/dL 112* 87 84     Lab Results   Component Value Date    GLUCOSE 112 (H) 11/03/2023    BUN 13 11/03/2023    CREATININE 0.87 11/03/2023    EGFRIFNONA 67 02/24/2022    BCR 14.9 11/03/2023    K 4.5  "11/03/2023    CO2 27.0 11/03/2023    CALCIUM 9.4 11/03/2023    ALBUMIN 3.6 11/03/2023    LABIL2 1.3 09/25/2018    AST 13 11/03/2023    ALT 10 11/03/2023     Lab Results   Component Value Date    FOLATE 4.11 (L) 11/12/2021     Lab Results   Component Value Date    MECDHKZF87 992 (H) 07/28/2023     No results found for: \"SPEP\", \"UPEP\"  Uric Acid   Date Value Ref Range Status   09/16/2022 4.6 3.4 - 7.0 mg/dL Final     Lab Results   Component Value Date    SEDRATE 11 04/16/2021     Lab Results   Component Value Date    PSA 0.163 04/06/2021     Assessment & Plan     Assessment:  Adenocarcinoma of the gastroesophageal junction T2N1M0: Independently reviewed the images of the PET scan and reviewed and discussed with him and his spouse the result. The result of the endoscopic ultrasound guided FNA of the gastrohepatic ligament lymph nodes and discussed with them the significance of this. I believe given this finding treatment with preoperative chemotherapy and concurrent radiation would be appropriate. Explained to them the multimodality approach to locally advanced disease and the role of preoperative treatment. I've asked him to see Dr. Sabillon and Dr. Hurtado. I've requested next generation sequencing as well as expression of PD-L1 and Her2 on tumor tissue as this will have implications for future treatments. He is to see me in 2 weeks with results. He will need a port.   2.   Erythrocytosis and macrocytosis resolved.   3.  Tobacco smoker: Remains abstinent. I've encouraged to continue same.   4.   He will see me in 2 weeks with results.     Plan:  As above.     Oliverio Mueller MD on 11/3/2023 at 15:20    "

## 2023-11-03 ENCOUNTER — LAB (OUTPATIENT)
Dept: LAB | Facility: HOSPITAL | Age: 53
End: 2023-11-03
Payer: COMMERCIAL

## 2023-11-03 ENCOUNTER — TELEPHONE (OUTPATIENT)
Dept: FAMILY MEDICINE CLINIC | Facility: CLINIC | Age: 53
End: 2023-11-03
Payer: COMMERCIAL

## 2023-11-03 ENCOUNTER — NUTRITION (OUTPATIENT)
Dept: ONCOLOGY | Facility: CLINIC | Age: 53
End: 2023-11-03
Payer: COMMERCIAL

## 2023-11-03 ENCOUNTER — OFFICE VISIT (OUTPATIENT)
Dept: ONCOLOGY | Facility: CLINIC | Age: 53
End: 2023-11-03
Payer: COMMERCIAL

## 2023-11-03 VITALS
SYSTOLIC BLOOD PRESSURE: 94 MMHG | HEIGHT: 72 IN | DIASTOLIC BLOOD PRESSURE: 63 MMHG | BODY MASS INDEX: 22.73 KG/M2 | HEART RATE: 66 BPM | WEIGHT: 167.8 LBS | OXYGEN SATURATION: 99 %

## 2023-11-03 DIAGNOSIS — K22.89 ESOPHAGEAL MASS: ICD-10-CM

## 2023-11-03 DIAGNOSIS — K22.89 ESOPHAGEAL MASS: Primary | ICD-10-CM

## 2023-11-03 DIAGNOSIS — C15.9 ADENOCARCINOMA OF ESOPHAGUS: ICD-10-CM

## 2023-11-03 DIAGNOSIS — D75.1 ERYTHROCYTOSIS: ICD-10-CM

## 2023-11-03 DIAGNOSIS — E53.8 VITAMIN B12 DEFICIENCY: Primary | ICD-10-CM

## 2023-11-03 LAB
ALBUMIN SERPL-MCNC: 3.6 G/DL (ref 3.5–5.2)
ALBUMIN/GLOB SERPL: 1.4 G/DL
ALP SERPL-CCNC: 56 U/L (ref 39–117)
ALT SERPL W P-5'-P-CCNC: 10 U/L (ref 1–41)
ANION GAP SERPL CALCULATED.3IONS-SCNC: 8 MMOL/L (ref 5–15)
AST SERPL-CCNC: 13 U/L (ref 1–40)
BASOPHILS # BLD AUTO: 0.02 10*3/MM3 (ref 0–0.2)
BASOPHILS NFR BLD AUTO: 0.3 % (ref 0–1.5)
BILIRUB SERPL-MCNC: 0.2 MG/DL (ref 0–1.2)
BUN SERPL-MCNC: 13 MG/DL (ref 6–20)
BUN/CREAT SERPL: 14.9 (ref 7–25)
CALCIUM SPEC-SCNC: 9.4 MG/DL (ref 8.6–10.5)
CHLORIDE SERPL-SCNC: 105 MMOL/L (ref 98–107)
CO2 SERPL-SCNC: 27 MMOL/L (ref 22–29)
CREAT SERPL-MCNC: 0.87 MG/DL (ref 0.76–1.27)
DEPRECATED RDW RBC AUTO: 48.6 FL (ref 37–54)
EGFRCR SERPLBLD CKD-EPI 2021: 104.5 ML/MIN/1.73
EOSINOPHIL # BLD AUTO: 0.26 10*3/MM3 (ref 0–0.4)
EOSINOPHIL NFR BLD AUTO: 3.5 % (ref 0.3–6.2)
ERYTHROCYTE [DISTWIDTH] IN BLOOD BY AUTOMATED COUNT: 13.7 % (ref 12.3–15.4)
GLOBULIN UR ELPH-MCNC: 2.6 GM/DL
GLUCOSE SERPL-MCNC: 112 MG/DL (ref 65–99)
HCT VFR BLD AUTO: 44.3 % (ref 37.5–51)
HGB BLD-MCNC: 14.7 G/DL (ref 13–17.7)
HOLD SPECIMEN: NORMAL
LYMPHOCYTES # BLD AUTO: 2.26 10*3/MM3 (ref 0.7–3.1)
LYMPHOCYTES NFR BLD AUTO: 30.1 % (ref 19.6–45.3)
MCH RBC QN AUTO: 32.6 PG (ref 26.6–33)
MCHC RBC AUTO-ENTMCNC: 33.2 G/DL (ref 31.5–35.7)
MCV RBC AUTO: 98.2 FL (ref 79–97)
MONOCYTES # BLD AUTO: 0.74 10*3/MM3 (ref 0.1–0.9)
MONOCYTES NFR BLD AUTO: 9.9 % (ref 5–12)
NEUTROPHILS NFR BLD AUTO: 4.23 10*3/MM3 (ref 1.7–7)
NEUTROPHILS NFR BLD AUTO: 56.2 % (ref 42.7–76)
PLATELET # BLD AUTO: 253 10*3/MM3 (ref 140–450)
PMV BLD AUTO: 9.1 FL (ref 6–12)
POTASSIUM SERPL-SCNC: 4.5 MMOL/L (ref 3.5–5.2)
PROT SERPL-MCNC: 6.2 G/DL (ref 6–8.5)
RBC # BLD AUTO: 4.51 10*6/MM3 (ref 4.14–5.8)
SODIUM SERPL-SCNC: 140 MMOL/L (ref 136–145)
WBC NRBC COR # BLD: 7.51 10*3/MM3 (ref 3.4–10.8)

## 2023-11-03 PROCEDURE — 36415 COLL VENOUS BLD VENIPUNCTURE: CPT

## 2023-11-03 PROCEDURE — 85025 COMPLETE CBC W/AUTO DIFF WBC: CPT

## 2023-11-03 PROCEDURE — 80053 COMPREHEN METABOLIC PANEL: CPT | Performed by: INTERNAL MEDICINE

## 2023-11-03 RX ORDER — FLUOXETINE HYDROCHLORIDE 20 MG/1
CAPSULE ORAL
COMMUNITY
End: 2023-11-06

## 2023-11-03 RX ORDER — ASPIRIN 81 MG/1
TABLET ORAL
COMMUNITY
End: 2023-11-06

## 2023-11-03 RX ORDER — EVOLOCUMAB 140 MG/ML
INJECTION, SOLUTION SUBCUTANEOUS
COMMUNITY
End: 2023-11-06

## 2023-11-03 RX ORDER — ALPRAZOLAM 0.25 MG/1
TABLET ORAL
COMMUNITY
End: 2023-11-06

## 2023-11-03 NOTE — PROGRESS NOTES
Nutrition Assessment  Jourdan Lebron    Height: 6'  Weight: 167.7#  BMI: 22.8  Weight Hx:   Wt Readings from Last 20 Encounters:   11/03/23 76.1 kg (167 lb 12.8 oz)   10/27/23 74.3 kg (163 lb 12.8 oz)   10/10/23 76.9 kg (169 lb 9.6 oz)   06/22/23 79.8 kg (176 lb)   12/22/22 77.7 kg (171 lb 6.4 oz)   09/16/22 84.1 kg (185 lb 6.4 oz)   07/15/22 80.3 kg (177 lb)   03/11/22 80.6 kg (177 lb 12.8 oz)   03/02/22 78 kg (172 lb)   01/14/22 79.4 kg (175 lb)   12/10/21 78.1 kg (172 lb 3.2 oz)   11/12/21 80.4 kg (177 lb 3.2 oz)   10/14/21 76.2 kg (168 lb)   09/07/21 74.8 kg (165 lb)   08/13/21 78.5 kg (173 lb)   07/16/21 75.9 kg (167 lb 5.3 oz)   07/08/21 75.8 kg (167 lb)   07/01/21 77 kg (169 lb 12.1 oz)   06/11/21 76.7 kg (169 lb)   05/14/21 77.1 kg (170 lb)     IBW: 178# (94.2%)  UBW: 170# - 180# (93.1% - 98.6%)    Nutrition Questionnaire    Food Allergies: Pt denied allergies at this time    Chewing Problems: Pt denied chewing problems at this time.     Swallowing Problems: Pt struggling to swallow due to foods getting stuck around his esophageal tumor. Pt has not been able to eat a sufficient amount of calories due to the location of his tumor.    Who does the cooking & shopping: Pt and his wife, Claudia    Nausea/Vomiting/Diarrhea: Pt w/occasional nausea, he also has d/c, pt denies vomiting    Appetite: (poor) 1 2 3 4 5 6 7 8 9 10 (excellent): 0    24-Hour Diet Recall:  Breakfast - Igor Slick sausage, egg, cheese biscuit, coffee w/cream & sugar, water  Lunch - [place]: 1/2 salad, 1/4 sandwich (unable to finish the s/w due to trouble swallowing), sweet tea  Dinner - 3 slices pepperoni pizza (pt denied difficulty eating this)  Snacks - Dr. Pepper, cheese fries  Water ~ 64 oz/day    Discussion: Met the pt and his wife to assess his nutrition status and ability to swallow as it relates to his esophageal cancer. I provided guidance on what and how to eat to maximize calories, pt and his wife verbalized  understanding. I recommended sipping on water throughout the day, having a minimum of six small meals/day consisting of soft/moist/high-protein foods and high calorie beverages, pt and his wife verbalized understanding. We discussed a feeding tube and what that entails should he need one.    All questions and concerns were addressed and answered. I provided my contact information and encouraged them to call or schedule an appointment as needed.      Chandler Ruby, MS,RD,LD-[place],CD-[place]  Registered Dietitian

## 2023-11-06 ENCOUNTER — TELEPHONE (OUTPATIENT)
Dept: GENETICS | Facility: HOSPITAL | Age: 53
End: 2023-11-06
Payer: COMMERCIAL

## 2023-11-06 PROBLEM — C15.9: Status: ACTIVE | Noted: 2023-11-06

## 2023-11-06 PROBLEM — E27.2 ACUTE ADRENAL CRISIS: Status: RESOLVED | Noted: 2023-11-06 | Resolved: 2023-11-06

## 2023-11-06 PROBLEM — C77.2: Status: ACTIVE | Noted: 2023-11-06

## 2023-11-06 PROBLEM — K57.30 DIVERTICULOSIS OF LARGE INTESTINE WITHOUT PERFORATION OR ABSCESS WITHOUT BLEEDING: Status: RESOLVED | Noted: 2023-10-02 | Resolved: 2023-11-06

## 2023-11-06 PROBLEM — I63.9 ISCHEMIC STROKE: Status: ACTIVE | Noted: 2020-06-15

## 2023-11-06 PROBLEM — E27.40 ADRENAL CORTICAL HYPOFUNCTION: Status: RESOLVED | Noted: 2021-03-25 | Resolved: 2023-11-06

## 2023-11-06 NOTE — PROGRESS NOTES
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    Subjective   HISTORY OF PRESENTING ILLNESS:   Jourdan Lebron is a 53 y.o. male who has significant medical problems as mentioned in the medical chart.     He was having intermittent dysphagia for over a year which became progressively worse.  On 10/2/2023, he underwent EGD and colonoscopy by Dr. Ozzy Abdalla at [facility].  He was found to have a 6 cm mass at the lower third of the esophagus (36 to 42 cm) (biopsied), mild diverticulosis of the sigmoid colon, 5 polyps noted in the colon and cecum that were removed.  The pathology of the esophageal mass revealed invasive moderate to poorly differentiated adenocarcinoma. All polypectomy samples were negative for malignancy (fragments of tubular adenoma).  CT scan of the chest, abdomen and pelvis on 10/9/2023 at [facility] showed 4 cm abnormal thickening of the lower thoracic esophagus extending to the GE junction thought to represent patient's known primary lung malignancy.  There were no convincing evidence of metastatic disease elsewhere in the chest, abdomen, pelvis, or skeleton.  There were stable left adrenalectomy changes, right adrenal adenoma unchanged.     He was seen in the office by Dr. Tacos Osuna ([facility]) for new diagnosis of esophageal cancer. He was an established patient of Dr. Harinder Mueller for erythrocytosis and macrocytosis since 11/2021 (resolved).      PET/CT on 10/20/2023 at [facility] showed hypermetabolic (SUV 7.7) activity within the distal esophagus extending to the GE junction compatible with patient's known malignancy.  He then underwent EGD with EUS by Dr. Joselyn Savage on 10/27/2023. Findings included close to 6 cm ulcerated mass extending from 36 to 42 cm consistent with poorly differentiated adenocarcinoma.  Mass penetrates through muscularis propria without involving the adventitia consistent  with T2 lesion. Two small round hypodense lymph nodes in close proximity to the mass measuring 5 and 6 mm concerning for malignancy. Other lymph nodes around the GE junction measuring 7.8 and 8.7 mm were also concerning for malignancy but immediate cytology was negative for malignancy. Pathology of the gastrohepatic lymph node was positive for metastatic adenocarcinoma; however, the lymph node at 36 cm was negative for malignancy.    He was referred to thoracic surgery for further evaluation.  At the time of initial consultation, he could eat and swallow quite a bit, but it depends. He avoids a lot of bread because it gets stuck lower down his chest. He went down from 200 pounds to 170 pounds 3 years ago after he had a stroke. He had memory loss and general fatigue after the stroke. He lost his appetite but began to add some weight. He then noticed he gets bloated and was having dyspepsia. He had adrenalectomy performed at the [facility] after an adrenal biopsy was done on an adrenal mass, and he was going into adrenal crisis. He took hydrocortisone for 1 year and discontinued it on 09/2022. He developed abdominal discomfort afterwards. He denies having any past chest surgeries or myocardial infarction. T his wife mentioned the cause of the previous stroke was never known despite having a complete work up and a loop recorder inserted which was removed in the spring. He denies any abnormal heart rhythms or pedal edema. The patient quit smoking cigarettes 8 to 9 years ago and started smoking cigars.     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    Brain fog     Diverticulosis of large intestine without perforation or abscess without bleeding 10/02/2023    Esophageal cancer     GERD (gastroesophageal reflux disease)     Hand tingling      Hyperlipidemia     Stroke 2020    Tiredness        Past Surgical History:   Procedure Laterality Date    ADRENALECTOMY      KIDNEY STONE SURGERY      THROMBECTOMY      UPPER ENDOSCOPIC ULTRASOUND W/ FNA N/A 10/27/2023    Procedure: ENDOSCOPIC ULTRASOUND WITH STAGING AND FINE NEEDLE ASPIRATION X2 AREAS;  Surgeon: Joselyn Savage MD;  Location: [facility] ENDOSCOPY;  Service: Gastroenterology;  Laterality: N/A;  POST:       Family History   Problem Relation Age of Onset    Arthritis Mother     Hypertension Mother     Heart failure Mother 73        Cause of death    Arthritis Father     Hypertension Father     Kidney cancer Father 57        Cause of death. Was a smoker    Heart disease Brother     Esophageal cancer Brother 59        Cause of death. Has a heavy drinker       Social History     Socioeconomic History    Marital status:    Tobacco Use    Smoking status: Former     Packs/day: 3.00     Years: 37.00     Additional pack years: 0.00     Total pack years: 111.00     Types: Cigars, Cigarettes     Start date:      Quit date:      Years since quittin.8    Smokeless tobacco: Never    Tobacco comments:     1 packs= 2 cigars a day; only smokes cigars   Vaping Use    Vaping Use: Never used   Substance and Sexual Activity    Alcohol use: Not Currently     Alcohol/week: 8.0 standard drinks of alcohol     Types: 8 Cans of beer per week     Comment: Has now been abstinent for about one year    Drug use: Never    Sexual activity: Yes     Partners: Female     Birth control/protection: None       No current facility-administered medications for this visit.  No current outpatient medications on file.    Facility-Administered Medications Ordered in Other Visits:     lactated ringers infusion 1,000 mL, 1,000 mL, Intravenous, Continuous, Andrzej Calderón MD, Last Rate: 25 mL/hr at 23, 1,000 mL at 23    Pharmacy to dose vancomycin, , Does not apply, Continuous PRKARLA, Saurabh Hurtado MD     "sodium chloride 0.9 % flush 10 mL, 10 mL, Intravenous, PRN, Andrzej Calderón MD    vancomycin (VANCOCIN) 1,000 mg in sodium chloride 0.9 % 250 mL IVPB-VTB, 15 mg/kg, Intravenous, Once, Saurabh Hurtado MD     Allergies   Allergen Reactions    Cephalosporins Anaphylaxis     Throat swelling    Dye  [Contrast Dye (Echo Or Unknown Ct/Mr)] Hives    Sulfa Antibiotics Hives    Zetia [Ezetimibe] Other (See Comments)     Made tired    Iodinated Contrast Media Unknown - Low Severity    Statins Myalgia     Myalgia and fatique    Sulfate Unknown - Low Severity             Objective    OBJECTIVE:     VITAL SIGNS:  /70 (BP Location: Right arm, Patient Position: Sitting, Cuff Size: Adult)   Ht 182.9 cm (72.01\")   Wt 75.8 kg (167 lb)   BMI 22.64 kg/m²     PHYSICAL EXAM:  Constitutional:       Appearance: Normal appearance.   HENT:      Head: Normocephalic.      Right Ear: External ear normal.      Left Ear: External ear normal.      Nose: Nose normal.      Mouth/Throat: No obvious deformity     Pharynx: Oropharynx is clear.   Eyes:      Conjunctiva/sclera: Conjunctivae normal.   Cardiovascular:      Rate and Rhythm: Normal rate.      Pulses: Normal pulses.   Pulmonary:      Effort: Pulmonary effort is normal.   Abdominal:      Palpations: Abdomen is soft.   Musculoskeletal:         General: Normal range of motion.      Cervical back: Normal range of motion.   Skin:     General: Skin is warm.   Neurological:      General: No focal deficit present.      Mental Status: He is alert and oriented to person, place, and time.     LAB RESULTS:  I have reviewed all the available laboratory results in the chart.    RESULTS REVIEW:  I have reviewed the patient's all relevant laboratory and imaging findings.     PET/ CT of skull base to mid-thigh done 10/20/2023.  Abnormal metabolic activity within the distal esophagus extending to the GE junction compatible with patient's known malignancy.          ASSESSMENT & PLAN:  Jourdan Lebron is a 53 " y.o. male with significant medical conditions as mentioned above presented to my clinic.    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  He has good functional status.  Based on the clinical presentation, will be considered a candidate for trimodality treatment.  He was informed about the possibility of having a feeding tube if he is unable to swallow substantial amount of nutrition. He will need a chemotherapy Mediport inserted on 11/13/2023.     After concurrent chemo and radiation, he will need transthoracic echo and carotid ultrasound for preoperative risk assessment.  He is at risk for adrenal insufficiency in the context of history of adrenalectomy and adrenal crisis.    I discussed the patients findings and my recommendations with the patient. The patient was given adequate time to ask questions and all questions were answered to patient satisfaction. Thank you for this consult and allowing us to participate in the care of your patient.      Saurabh Hurtado MD  Thoracic Surgeon  [facility]        Dictated utilizing Dragon dictation    I spent 60 minutes caring for Jourdan on this date of service. This time includes time spent by me in the following activities:preparing for the visit, reviewing tests, obtaining and/or reviewing a separately obtained history, performing a medically appropriate examination and/or evaluation , counseling and educating the patient/family/caregiver, ordering medications, tests, or procedures, referring and communicating with other health care professionals , documenting information in the medical record, independently interpreting results and communicating that information with the patient/family/caregiver, and care coordination and more than half the time was spent in direct face to face evaluation and decision making.   Transcribed from ambient dictation for  Saurabh Hurtado MD by Kristine Lloyd.  11/07/23   12:49 EST    Patient or patient representative verbalized consent to the visit recording.  I have personally performed the services described in this document as transcribed by the above individual, and it is both accurate and complete.

## 2023-11-06 NOTE — TELEPHONE ENCOUNTER
Called pt to remind him of his upcoming genetic counseling appt tomorrow. Left message asking pt to call me back to review family history prior to appt.

## 2023-11-06 NOTE — PROGRESS NOTES
RADIATION THERAPY CONSULT NOTE    NAME: Jourdan Lebron  YOB: 1970  MRN #: [id number]  DATE OF SERVICE: 11/7/2023  REFERRING PROVIDER: Justa Castano MD  [address] [place] [address],  [place] [id number]  PRIMARY CARE PROVIDER: Justa Castano MD    DIAGNOSIS:  T2N1M0, stage IIIA  Encounter Diagnosis   Name Primary?    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                              REQUESTING PHYSICIAN:    Justa Castano Md  [address] [place] [address],  [place] [address]    RECORDS OBTAINED:  Records of the patients history including those obtained from the referring provider were reviewed and summarized in detail.    HISTORY OF PRESENT ILLNESS:  Jourdan Lebron is a 53 y.o. male who was recently diagnosed with esophageal carcinoma after work-up for dysphagia.    10/2/2023: EGD with colonoscopy by Dr. Ozzy Abdalla at [facility].  Findings include normal stomach and duodenum, 6 cm mass at the lower third of the esophagus (36 to 42 cm) (biopsied), mild diverticulosis of the sigmoid colon, 5 polyps noted in the colon and cecum that were removed. Otherwise normal EGD/colonoscopy. Pathology of the esophageal mass revealed invasive moderate to poorly differentiated adenocarcinoma. All polypectomy samples were negative for malignancy (fragments of tubular adenoma).    10/9/2023: CT CAP without contrast at [facility] showed 4 cm abnormal thickening of the lower thoracic esophagus extending to the GE junction thought to represent patient's known primary lung malignancy, no convincing evidence of metastatic disease elsewhere in the chest, abdomen, pelvis, or skeleton.  Stable left adrenalectomy changes, right adrenal adenoma  unchanged.    10/10/2023: Patient was seen in the office by Dr. Tacos Osuna ([facility]) for new diagnosis of esophageal cancer. He was an established patient of Dr. Harinder Mueller for erythrocytosis and macrocytosis since 11/2021 (resolved). PET/CT and EUS ordered. Planning to discuss patient at multidisciplinary tumor board conference. Tumor sent for genetic testing.    10/20/2023: PET/CT at [facility] showed hypermetabolic (SUV 7.7) activity within the distal esophagus extending to the GE junction compatible with patient's known malignancy.     10/27/2023: EGD with EUS by Dr. Joselyn Savage. Findings include close to 6 cm ulcerated mass extending from 36 to 42 cm consistent with poorly differentiated adenocarcinoma.  Mass penetrates through muscularis propria without involving the adventitia consistent with T2 lesion. Two small round hypodense lymph nodes in close proximity to the mass measuring 5 and 6 mm concerning for malignancy. Other lymph nodes around the GE junction measuring 7.8 and 8.7 mm also concerning for malignancy but immediate cytology was negative for malignancy. Pathology of the gastrohepatic lymph node was positive for metastatic adenocarcinoma; however, the lymph node at 36 cm was negative for malignancy.    11/7/2023: Consultation with Dr. Saurabh Hurtado ([facility]). Patient is a surgical candidate and neoadjuvant chemoXRT recommended.    11/17/2023: Follow-up appointment scheduled with Dr. Mueller.    He has had trouble maintaining his weight since his stroke in 2020; he and his wife note that taste/smell issues post stroke are related to this weight loss/appetite change.    The following portions of the patient's history were reviewed and updated as appropriate: allergies, current medications, past family history, past medical history, past social history, past surgical history and problem list. Reviewed with the patient and remain unchanged.    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    MEDICATIONS:    Current Outpatient Medications:     ALPRAZolam (XANAX) 0.25 MG tablet, Take 1 tablet by mouth 3 (Three) Times a Day As Needed for Anxiety. for anxiety, Disp: 90 tablet, Rfl: 3    aspirin 81 MG chewable tablet, Chew 1 tablet Daily., Disp: , Rfl:     Evolocumab (REPATHA) solution prefilled syringe injection, Inject 1 mL under the skin into the appropriate area as directed Every 14 (Fourteen) Days., Disp: 6 mL, Rfl: 3    FLUoxetine (PROzac) 20 MG capsule, Take 1 capsule by mouth Daily., Disp: 90 capsule, Rfl: 1    fluticasone (FLONASE) 50 MCG/ACT nasal spray, 1 spray into the nostril(s) as directed by provider Daily., Disp: , Rfl:     ALLERGIES:    Allergies   Allergen Reactions    Cephalosporins Anaphylaxis     Throat swelling    Dye  [Contrast Dye (Echo Or Unknown Ct/Mr)] Hives    Sulfa Antibiotics Hives    Zetia [Ezetimibe] Other (See Comments)     Made tired    Iodinated Contrast Media Unknown - Low Severity    Statins Myalgia     Myalgia and fatique    Sulfate Unknown - Low Severity     PAST SURGICAL HISTORY:  he has a past surgical history that includes Kidney stone surgery; Adrenalectomy; Thrombectomy; and Upper endoscopic ultrasound w/ FNA (N/A, 10/27/2023).    PREVIOUS RADIOTHERAPY OR CHEMOTHERAPY:  no    FAMILY HISTORY:  hisfamily history includes Arthritis in his father and mother; Esophageal cancer (age of onset: 59) in his brother; Heart disease in his brother; Heart failure (age of onset: 73) in his mother; Hypertension in his father and mother; Kidney cancer (age of onset:  57) in his father.    SOCIAL HISTORY:  he reports that he quit smoking about 22 months ago. His smoking use included cigars and cigarettes. He started smoking about 38 years ago. He has a 111.00 pack-year smoking history. He has never used smokeless tobacco. He reports that he does not currently use alcohol after a past usage of about 8.0 standard drinks of alcohol per week. He reports that he does not use drugs.    PAIN AND PAIN MANAGEMENT:  Jourdan Lebron reports a pain score of 0.  Given his pain assessment as noted, treatment options were discussed and the following options were decided upon as a follow-up plan to address the patient's pain: continuation of current treatment plan for pain.    NUTRITIONAL STATUS:  RD consult.  KPS:  80:  Normal activity with effort; some signs or symptoms    PHQ-9 Total Score: distress screening tool completed.    REVIEW OF SYSTEMS:   Review of Systems   General: No fevers, chills, weight change, or drenching night sweats. Skin: No rashes or jaundice.  HEENT: No change in vision or hearing, no headaches.  Neck: No dysphagia or masses.  Heme/Lymph: No easy bruising or bleeding.  Respiratory System: No shortness of breath or cough.  Cardiovascular: No chest pain, palpitations, or dyspnea on exertion.  - Pacemaker. GI: No nausea, vomiting, diarrhea, melena, or hematochezia.  : No dysuria or hematuria.  Endocrine: No heat or cold intolerance. Musculoskeletal: No myalgias or arthralgias.  Neuro: No weakness, numbness, syncope, or seizures. Psych: No mood changes or depression. Ext: Denies swelling.      Objective   VITAL SIGNS:  Vitals:    11/07/23 1022   BP: 118/70   Pulse: 74   Resp: 18   Temp: 98.1 °F (36.7 °C)   SpO2: 97%       PHYSICAL EXAM:  GENERAL:  No apparent distress. Sitting comfortably in room.    HEENT:  Normocephalic, atraumatic. Pupils are equal, round, reactive to light. Sclera anicteric. Conjunctiva not injected. Oropharynx without erythema, ulcerations or thrush.    NECK:  Supple with no masses.  LYMPHATIC:  No cervical, supraclavicular or axillary adenopathy appreciated bilaterally.   CARDIOVASCULAR:  S1 & S2 detected; no murmurs, rubs or gallops.  CHEST:  Clear to auscultation bilaterally; no wheezes, crackles or rubs. Work of breathing normal.  ABDOMEN:  Bowel sounds present. Abdomen is soft, nontender, nondistended.   MUSCULOSKELETAL:  No tenderness to palpation along the spine or scapulae. Normal range of motion.  EXTREMITIES:  No clubbing, cyanosis, edema.  SKIN:  No erythema, rashes, ulcerations noted.   NEUROLOGIC:  Cranial nerves II-XII grossly intact bilaterally. No focal neurologic deficits.  PSYCHIATRIC:  Alert, aware, and appropriate.      PERTINENT IMAGING/PATHOLOGY/LABS (Medical Decision Making):      COORDINATION OF CARE:  A copy of this note is sent to the referring provider.    PATHOLOGY (Reviewed): as noted above.    IMAGING (Reviewed): as noted above.    LABS (Reviewed):  HEMATOLOGY:  WBC   Date Value Ref Range Status   11/03/2023 7.51 3.40 - 10.80 10*3/mm3 Final     RBC   Date Value Ref Range Status   11/03/2023 4.51 4.14 - 5.80 10*6/mm3 Final     Hemoglobin   Date Value Ref Range Status   11/03/2023 14.7 13.0 - 17.7 g/dL Final     Hematocrit   Date Value Ref Range Status   11/03/2023 44.3 37.5 - 51.0 % Final     Platelets   Date Value Ref Range Status   11/03/2023 253 140 - 450 10*3/mm3 Final     CHEMISTRY:  Lab Results   Component Value Date    GLUCOSE 112 (H) 11/03/2023    BUN 13 11/03/2023    CREATININE 0.87 11/03/2023    EGFRIFNONA 67 02/24/2022    BCR 14.9 11/03/2023    K 4.5 11/03/2023    CO2 27.0 11/03/2023    CALCIUM 9.4 11/03/2023    ALBUMIN 3.6 11/03/2023    LABIL2 1.3 09/25/2018    AST 13 11/03/2023    ALT 10 11/03/2023     Assessment & Plan   ASSESSMENT AND PLAN:    Esophageal Adenocarcinoma  -T2N1M0, stage IIIA  NCCN guidelines reviewed with patient and family.  -candidate for neoadjuvant chemotherapy/radiation and definitive surgery  -PORT  upcoming  -Needs 4D CT sim   -Planning will be VMAT/IGRT   -Dosing of 4140 cGy in 23 fractions per Cross Trial Regimen.        This assessment comes from my review of the imaging, pathology, physician notes and other pertinent information as mentioned.    DISPOSITION:  CT sim and coordination of care.    TIME SPENT WITH PATIENT: I spent 45 minutes caring for Jourdan on this date of service. This time includes time spent by me in the following activities: preparing for the visit, reviewing tests, obtaining and/or reviewing a separately obtained history, performing a medically appropriate examination and/or evaluation, counseling and educating the patient/family/caregiver, ordering medications, tests, or procedures, documenting information in the medical record, and care coordination           CC: Justa Castano MD Streepey, Janet Lynn, MD Nabeel Gul, MD        Approved by:     Geovany Sabillon MD  11/07/23  18:02 EST

## 2023-11-06 NOTE — H&P (VIEW-ONLY)
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    Subjective   HISTORY OF PRESENTING ILLNESS:   Jourdan Lebron is a 53 y.o. male who has significant medical problems as mentioned in the medical chart.     He was having intermittent dysphagia for over a year which became progressively worse.  On 10/2/2023, he underwent EGD and colonoscopy by Dr. Ozzy Abdalla at [facility].  He was found to have a 6 cm mass at the lower third of the esophagus (36 to 42 cm) (biopsied), mild diverticulosis of the sigmoid colon, 5 polyps noted in the colon and cecum that were removed.  The pathology of the esophageal mass revealed invasive moderate to poorly differentiated adenocarcinoma. All polypectomy samples were negative for malignancy (fragments of tubular adenoma).  CT scan of the chest, abdomen and pelvis on 10/9/2023 at [facility] showed 4 cm abnormal thickening of the lower thoracic esophagus extending to the GE junction thought to represent patient's known primary lung malignancy.  There were no convincing evidence of metastatic disease elsewhere in the chest, abdomen, pelvis, or skeleton.  There were stable left adrenalectomy changes, right adrenal adenoma unchanged.     He was seen in the office by Dr. Tacos Osuna ([facility]) for new diagnosis of esophageal cancer. He was an established patient of Dr. Harinder Mueller for erythrocytosis and macrocytosis since 11/2021 (resolved).      PET/CT on 10/20/2023 at [facility] showed hypermetabolic (SUV 7.7) activity within the distal esophagus extending to the GE junction compatible with patient's known malignancy.  He then underwent EGD with EUS by Dr. Joselyn Savage on 10/27/2023. Findings included close to 6 cm ulcerated mass extending from 36 to 42 cm consistent with poorly differentiated adenocarcinoma.  Mass penetrates through muscularis propria without involving the adventitia consistent  with T2 lesion. Two small round hypodense lymph nodes in close proximity to the mass measuring 5 and 6 mm concerning for malignancy. Other lymph nodes around the GE junction measuring 7.8 and 8.7 mm were also concerning for malignancy but immediate cytology was negative for malignancy. Pathology of the gastrohepatic lymph node was positive for metastatic adenocarcinoma; however, the lymph node at 36 cm was negative for malignancy.    He was referred to thoracic surgery for further evaluation.  At the time of initial consultation, he could eat and swallow quite a bit, but it depends. He avoids a lot of bread because it gets stuck lower down his chest. He went down from 200 pounds to 170 pounds 3 years ago after he had a stroke. He had memory loss and general fatigue after the stroke. He lost his appetite but began to add some weight. He then noticed he gets bloated and was having dyspepsia. He had adrenalectomy performed at the [facility] after an adrenal biopsy was done on an adrenal mass, and he was going into adrenal crisis. He took hydrocortisone for 1 year and discontinued it on 09/2022. He developed abdominal discomfort afterwards. He denies having any past chest surgeries or myocardial infarction. T his wife mentioned the cause of the previous stroke was never known despite having a complete work up and a loop recorder inserted which was removed in the spring. He denies any abnormal heart rhythms or pedal edema. The patient quit smoking cigarettes 8 to 9 years ago and started smoking cigars.     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    Brain fog     Diverticulosis of large intestine without perforation or abscess without bleeding 10/02/2023    Esophageal cancer     GERD (gastroesophageal reflux disease)     Hand tingling      Hyperlipidemia     Stroke 2020    Tiredness        Past Surgical History:   Procedure Laterality Date    ADRENALECTOMY      KIDNEY STONE SURGERY      THROMBECTOMY      UPPER ENDOSCOPIC ULTRASOUND W/ FNA N/A 10/27/2023    Procedure: ENDOSCOPIC ULTRASOUND WITH STAGING AND FINE NEEDLE ASPIRATION X2 AREAS;  Surgeon: Joselyn Savage MD;  Location: [facility];  Service: Gastroenterology;  Laterality: N/A;  POST:       Family History   Problem Relation Age of Onset    Arthritis Mother     Hypertension Mother     Heart failure Mother 73        Cause of death    Arthritis Father     Hypertension Father     Kidney cancer Father 57        Cause of death. Was a smoker    Heart disease Brother     Esophageal cancer Brother 59        Cause of death. Has a heavy drinker       Social History     Socioeconomic History    Marital status:    Tobacco Use    Smoking status: Former     Packs/day: 3.00     Years: 37.00     Additional pack years: 0.00     Total pack years: 111.00     Types: Cigars, Cigarettes     Start date:      Quit date:      Years since quittin.8    Smokeless tobacco: Never    Tobacco comments:     1 packs= 2 cigars a day; only smokes cigars   Vaping Use    Vaping Use: Never used   Substance and Sexual Activity    Alcohol use: Not Currently     Alcohol/week: 8.0 standard drinks of alcohol     Types: 8 Cans of beer per week     Comment: Has now been abstinent for about one year    Drug use: Never    Sexual activity: Yes     Partners: Female     Birth control/protection: None       No current facility-administered medications for this visit.  No current outpatient medications on file.    Facility-Administered Medications Ordered in Other Visits:     lactated ringers infusion 1,000 mL, 1,000 mL, Intravenous, Continuous, Andrzej Calderón MD, Last Rate: 25 mL/hr at 23, 1,000 mL at 23    Pharmacy to dose vancomycin, , Does not apply, Continuous PRKARLA, Saurabh Hurtado MD     "sodium chloride 0.9 % flush 10 mL, 10 mL, Intravenous, PRN, Andrzej Calderón MD    vancomycin (VANCOCIN) 1,000 mg in sodium chloride 0.9 % 250 mL IVPB-VTB, 15 mg/kg, Intravenous, Once, Saurabh Hurtado MD     Allergies   Allergen Reactions    Cephalosporins Anaphylaxis     Throat swelling    Dye  [Contrast Dye (Echo Or Unknown Ct/Mr)] Hives    Sulfa Antibiotics Hives    Zetia [Ezetimibe] Other (See Comments)     Made tired    Iodinated Contrast Media Unknown - Low Severity    Statins Myalgia     Myalgia and fatique    Sulfate Unknown - Low Severity             Objective    OBJECTIVE:     VITAL SIGNS:  /70 (BP Location: Right arm, Patient Position: Sitting, Cuff Size: Adult)   Ht 182.9 cm (72.01\")   Wt 75.8 kg (167 lb)   BMI 22.64 kg/m²     PHYSICAL EXAM:  Constitutional:       Appearance: Normal appearance.   HENT:      Head: Normocephalic.      Right Ear: External ear normal.      Left Ear: External ear normal.      Nose: Nose normal.      Mouth/Throat: No obvious deformity     Pharynx: Oropharynx is clear.   Eyes:      Conjunctiva/sclera: Conjunctivae normal.   Cardiovascular:      Rate and Rhythm: Normal rate.      Pulses: Normal pulses.   Pulmonary:      Effort: Pulmonary effort is normal.   Abdominal:      Palpations: Abdomen is soft.   Musculoskeletal:         General: Normal range of motion.      Cervical back: Normal range of motion.   Skin:     General: Skin is warm.   Neurological:      General: No focal deficit present.      Mental Status: He is alert and oriented to person, place, and time.     LAB RESULTS:  I have reviewed all the available laboratory results in the chart.    RESULTS REVIEW:  I have reviewed the patient's all relevant laboratory and imaging findings.     PET/ CT of skull base to mid-thigh done 10/20/2023.  Abnormal metabolic activity within the distal esophagus extending to the GE junction compatible with patient's known malignancy.          ASSESSMENT & PLAN:  Jourdan Lebron is a 53 " y.o. male with significant medical conditions as mentioned above presented to my clinic.    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  He has good functional status.  Based on the clinical presentation, will be considered a candidate for trimodality treatment.  He was informed about the possibility of having a feeding tube if he is unable to swallow substantial amount of nutrition. He will need a chemotherapy Mediport inserted on 11/13/2023.     After concurrent chemo and radiation, he will need transthoracic echo and carotid ultrasound for preoperative risk assessment.  He is at risk for adrenal insufficiency in the context of history of adrenalectomy and adrenal crisis.    I discussed the patients findings and my recommendations with the patient. The patient was given adequate time to ask questions and all questions were answered to patient satisfaction. Thank you for this consult and allowing us to participate in the care of your patient.      Saurabh Hurtado MD  Thoracic Surgeon  [facility]        Dictated utilizing Dragon dictation    I spent 60 minutes caring for Jourdan on this date of service. This time includes time spent by me in the following activities:preparing for the visit, reviewing tests, obtaining and/or reviewing a separately obtained history, performing a medically appropriate examination and/or evaluation , counseling and educating the patient/family/caregiver, ordering medications, tests, or procedures, referring and communicating with other health care professionals , documenting information in the medical record, independently interpreting results and communicating that information with the patient/family/caregiver, and care coordination and more than half the time was spent in direct face to face evaluation and decision making.   Transcribed from ambient dictation for  Saurabh Hurtado MD by Kristine Lloyd.  11/07/23   12:49 EST    Patient or patient representative verbalized consent to the visit recording.  I have personally performed the services described in this document as transcribed by the above individual, and it is both accurate and complete.

## 2023-11-07 ENCOUNTER — PREP FOR SURGERY (OUTPATIENT)
Dept: OTHER | Facility: HOSPITAL | Age: 53
End: 2023-11-07
Payer: COMMERCIAL

## 2023-11-07 ENCOUNTER — LAB (OUTPATIENT)
Dept: LAB | Facility: HOSPITAL | Age: 53
End: 2023-11-07
Payer: COMMERCIAL

## 2023-11-07 ENCOUNTER — CLINICAL SUPPORT (OUTPATIENT)
Dept: GENETICS | Facility: HOSPITAL | Age: 53
End: 2023-11-07
Payer: COMMERCIAL

## 2023-11-07 ENCOUNTER — OFFICE VISIT (OUTPATIENT)
Dept: SURGERY | Facility: CLINIC | Age: 53
End: 2023-11-07
Payer: COMMERCIAL

## 2023-11-07 ENCOUNTER — PATIENT OUTREACH (OUTPATIENT)
Dept: ONCOLOGY | Facility: CLINIC | Age: 53
End: 2023-11-07
Payer: COMMERCIAL

## 2023-11-07 ENCOUNTER — CONSULT (OUTPATIENT)
Dept: RADIATION ONCOLOGY | Facility: HOSPITAL | Age: 53
End: 2023-11-07
Payer: COMMERCIAL

## 2023-11-07 VITALS
HEIGHT: 72 IN | BODY MASS INDEX: 22.62 KG/M2 | RESPIRATION RATE: 18 BRPM | SYSTOLIC BLOOD PRESSURE: 118 MMHG | HEART RATE: 74 BPM | OXYGEN SATURATION: 97 % | TEMPERATURE: 98.1 F | DIASTOLIC BLOOD PRESSURE: 70 MMHG | WEIGHT: 167 LBS

## 2023-11-07 VITALS
HEIGHT: 72 IN | BODY MASS INDEX: 22.62 KG/M2 | DIASTOLIC BLOOD PRESSURE: 70 MMHG | SYSTOLIC BLOOD PRESSURE: 118 MMHG | WEIGHT: 167 LBS

## 2023-11-07 DIAGNOSIS — Z80.0 FAMILY HISTORY OF ESOPHAGEAL CANCER: ICD-10-CM

## 2023-11-07 DIAGNOSIS — Z13.79 GENETIC TESTING: Primary | ICD-10-CM

## 2023-11-07 DIAGNOSIS — C15.9 ADENOCARCINOMA OF ESOPHAGUS METASTATIC TO INTRA-ABDOMINAL LYMPH NODE: ICD-10-CM

## 2023-11-07 DIAGNOSIS — C77.2 ADENOCARCINOMA OF ESOPHAGUS METASTATIC TO INTRA-ABDOMINAL LYMPH NODE: Primary | ICD-10-CM

## 2023-11-07 DIAGNOSIS — C15.9 ADENOCARCINOMA OF ESOPHAGUS METASTATIC TO INTRA-ABDOMINAL LYMPH NODE: Primary | ICD-10-CM

## 2023-11-07 DIAGNOSIS — Z80.51 FAMILY HISTORY OF KIDNEY CANCER: ICD-10-CM

## 2023-11-07 DIAGNOSIS — C77.2 ADENOCARCINOMA OF ESOPHAGUS METASTATIC TO INTRA-ABDOMINAL LYMPH NODE: ICD-10-CM

## 2023-11-07 DIAGNOSIS — C15.5 MALIGNANT NEOPLASM OF LOWER THIRD OF ESOPHAGUS: Primary | ICD-10-CM

## 2023-11-07 PROCEDURE — 96040: CPT | Performed by: GENETIC COUNSELOR, MS

## 2023-11-07 PROCEDURE — G0463 HOSPITAL OUTPT CLINIC VISIT: HCPCS | Performed by: RADIOLOGY

## 2023-11-07 PROCEDURE — 99205 OFFICE O/P NEW HI 60 MIN: CPT | Performed by: SURGERY

## 2023-11-07 NOTE — PROGRESS NOTES
Jourdan Lebron, a 53-year-old male, was referred for genetic counseling due to a personal history of cancer and pathology findings. Mr. Lebron was recently diagnosed with esophageal cancer at age 53. His treatment plan currently includes chemotherapy, radiation, and then surgery. Mr. Lebron had a colonoscopy last month and is uncertain of how many polyps were found. 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 He was interested in discussing his risk for a hereditary cancer syndrome. Mr. Lebron decided to pursue comprehensive genetic testing to evaluate his risk of cancer, therefore the CancerNext Expanded Panel was ordered through [employer] which analyzes 77 genes associated with an increased cancer risk. Results are expected in 2-3 weeks.     PERTINENT FAMILY HISTORY: (See attached pedigree)   Father:   Kidney cancer  Mat-Half Brother: Esophageal cancer    We do not have medical records regarding any of these diagnoses.       RISK ASSESSMENT:  Mr. Lebron's personal history of cancer raises the question of a hereditary cancer syndrome. He does not meet any NCCN guidelines for genetic testing. Despite this, genetic testing remains available to him. This risk assessment is based on the family history information provided at the time of the appointment. The assessment could change in the future should new information be obtained.    GENETIC COUNSELING (30 minutes):  We reviewed the family history information in detail. Cases of cancer follow three general patterns: sporadic, familial, and hereditary. While most cancer is sporadic, some cases appear to occur in family clusters. These cases are said to be familial and account for 10-20% of cancer cases. Familial cases may be due to a combination of shared genes and environmental factors among family members. In even fewer cases, the risk for cancer is inherited, and the genes responsible for  the increased cancer risk are known.       Family histories typical of hereditary cancer syndromes usually include multiple first- and second-degree relatives diagnosed with cancer types that define a syndrome. These cases tend to be diagnosed at younger-than-expected ages and can be bilateral or multifocal. The cancer in these families follows an autosomal dominant inheritance pattern, which indicates the likely presence of a mutation in a cancer susceptibility gene. Children and siblings of an individual believed to carry this mutation have a 50% chance of inheriting that mutation, thereby inheriting the increased risk to develop cancer. These mutations can be passed down from the maternal or the paternal lineage.    We discussed hereditary diffuse gastric cancer, given Mr. Lebron's pathology of signet ring features. The risk numbers that are typically reported for HDGC come almost entirely from data of families that specifically met CDH1 testing criteria. That data shows a 67-83% lifetime risk for diffuse gastric cancer, and a 39-52% lifetime risk for lobular breast cancer. Since multigene panels became available and more widely used in recent years, there have been a number of families reported that have CDH1 mutations but would not have met CDH1 testing criteria, and in many of these families there are no known cases of gastric cancer. A study published online in [employer] in June of 2019 by Emir, et al looked at the penetrance in families that were ascertained through multigene panel testing. This study found the lifetime risk of gastric cancer to be 33% for women and 42% for men, suggesting that the lifetime risk of gastric cancer in CDH1 mutation carriers may be significantly lower than previously reported. The risk for breast cancer was comparable to the earlier numbers, with a 55% lifetime risk.    GENETIC TESTING:  The risks, benefits, and limitations of genetic testing and implications for clinical  management following testing were reviewed. DNA test results can influence decisions regarding screening and prevention. Genetic testing can have significant psychological implications for both individuals and families. Also discussed was the possibility of employment and insurance discrimination based on genetic test results and the laws in place to prevent this, as well as the limitations of these laws.       We discussed panel testing, which would involve testing 77 genes associated with increased cancer risk. The implications of a positive or negative test result were discussed. We also discussed the importance of testing an affected relative and how a negative result for Mr. Lebron wouldn't necessarily mean the cancers presenting in his family weren't due to a genetic mutation that Mr. Lebron did not inherit. In general, a negative genetic test result is most informative if a mutation has first been established in an affected member of the family. In cases where an affected individual is not available or interested in testing, it is appropriate to offer testing to an unaffected individual. We discussed the possibility that, in some cases, genetic test results may be ambiguous due to the identification of a genetic variant. These variants may or may not be associated with an increased cancer risk. With multigene panel testing, it is not uncommon for a variant of uncertain significance (VUS) to be identified. If a VUS is identified, testing family members is typically not recommended and screening recommendations are made based on the family history. The laboratories that perform genetic testing work to reclassify the VUS and send out an amended report if and when a VUS is reclassified. The majority of variant findings are ultimately reclassified to a negative result. Given Mr. Lebron's family history, a negative test result does not eliminate all cancer risk, although the risk would not be as high as it would with  positive genetic testing.     PLAN: Genetic testing was ordered via the CancerNext Expanded Panel through [employer]. He had his blood drawn today at [facility]. Results are expected in 2-3 weeks and will be called out to Mr. Lebron. If he has any questions in the meantime, he is welcome to call me at [phone].     Mary Lion MS, [facility] [place], [facility]  Licensed Certified Genetic Counselor

## 2023-11-08 ENCOUNTER — HOSPITAL ENCOUNTER (OUTPATIENT)
Dept: RADIATION ONCOLOGY | Facility: HOSPITAL | Age: 53
Setting detail: RADIATION/ONCOLOGY SERIES
End: 2023-11-08
Payer: COMMERCIAL

## 2023-11-08 PROCEDURE — 77334 RADIATION TREATMENT AID(S): CPT | Performed by: RADIOLOGY

## 2023-11-08 PROCEDURE — 77470 SPECIAL RADIATION TREATMENT: CPT | Performed by: RADIOLOGY

## 2023-11-08 PROCEDURE — 77263 THER RADIOLOGY TX PLNG CPLX: CPT | Performed by: RADIOLOGY

## 2023-11-08 NOTE — SIGNIFICANT NOTE
I accompanied patient and wife to Dr. Hurtado's office visit.     Dr. Hurtado reviewed medical history, scan images and discussed treatment plan. He also discussed surgery after chemotherapy and radiation. All questions answered. Port scheduled for 11/13. Patient has my direct number for any questions or concerns.

## 2023-11-09 ENCOUNTER — HOSPITAL ENCOUNTER (OUTPATIENT)
Dept: CARDIOLOGY | Facility: HOSPITAL | Age: 53
Discharge: HOME | End: 2023-11-09
Payer: COMMERCIAL

## 2023-11-09 LAB
QT INTERVAL: 419 MS
QTC INTERVAL: 462 MS

## 2023-11-09 PROCEDURE — 93005 ELECTROCARDIOGRAM TRACING: CPT | Performed by: SURGERY

## 2023-11-10 ENCOUNTER — PATIENT ROUNDING (BHMG ONLY) (OUTPATIENT)
Dept: SURGERY | Facility: CLINIC | Age: 53
End: 2023-11-10
Payer: COMMERCIAL

## 2023-11-12 ENCOUNTER — ANESTHESIA EVENT (OUTPATIENT)
Dept: PERIOP | Facility: HOSPITAL | Age: 53
End: 2023-11-12
Payer: COMMERCIAL

## 2023-11-12 PROCEDURE — 77293 RESPIRATOR MOTION MGMT SIMUL: CPT | Performed by: RADIOLOGY

## 2023-11-12 PROCEDURE — 77300 RADIATION THERAPY DOSE PLAN: CPT | Performed by: RADIOLOGY

## 2023-11-12 PROCEDURE — 77338 DESIGN MLC DEVICE FOR IMRT: CPT | Performed by: RADIOLOGY

## 2023-11-12 PROCEDURE — 77301 RADIOTHERAPY DOSE PLAN IMRT: CPT | Performed by: RADIOLOGY

## 2023-11-12 NOTE — ANESTHESIA PREPROCEDURE EVALUATION
Anesthesia Evaluation     NPO Solid Status: > 8 hours  NPO Liquid Status: > 8 hours           Airway   Mallampati: I  TM distance: >3 FB  Neck ROM: full  No difficulty expected  Dental - normal exam     Pulmonary - normal exam   Cardiovascular - normal exam    (+) hyperlipidemia      Neuro/Psych  (+) CVA, numbness, psychiatric history  GI/Hepatic/Renal/Endo    (+) GERD, renal disease-    Musculoskeletal     (+) neck pain  Abdominal  - normal exam    Bowel sounds: normal.   Substance History      OB/GYN          Other      history of cancer      Other Comment: History Comments History Comments  Hyperlipidemia  Tiredness   Brain fog  Hand tingling   Stroke  Esophageal cancer   GERD (gastroesophageal reflux disease)  Adrenal cortical hypofunction   Acute adrenal crisis  Diverticulosis of large intestine without perforation or abscess without bleeding   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       Surgical History  Current as of 11/12/23 1626  KIDNEY STONE SURGERY ADRENALECTOMY  THROMBECTOMY UPPER ENDOSCOPIC ULTRASOUND W/ FNA      ROS/Med Hx Other: ESOPHAGEAL CANCER  L ADRENALECTOMY WITH SUBSEQUENT ADRENAL CRISIS. NO STEROIDS X1 YEAR  CVA DATE 2020 NO RESIDUAL               Anesthesia Plan    ASA 3     general     intravenous induction     Anesthetic plan, risks, benefits, and alternatives have been provided, discussed and informed consent has been obtained with: patient.  Pre-procedure education provided  Plan discussed with CRNA.    CODE STATUS:

## 2023-11-13 ENCOUNTER — HOSPITAL ENCOUNTER (OUTPATIENT)
Facility: HOSPITAL | Age: 53
Setting detail: HOSPITAL OUTPATIENT SURGERY
Discharge: HOME | End: 2023-11-13
Attending: SURGERY | Admitting: SURGERY
Payer: COMMERCIAL

## 2023-11-13 ENCOUNTER — ANESTHESIA (OUTPATIENT)
Dept: PERIOP | Facility: HOSPITAL | Age: 53
End: 2023-11-13
Payer: COMMERCIAL

## 2023-11-13 ENCOUNTER — APPOINTMENT (OUTPATIENT)
Dept: GENERAL RADIOLOGY | Facility: HOSPITAL | Age: 53
End: 2023-11-13
Payer: COMMERCIAL

## 2023-11-13 VITALS
OXYGEN SATURATION: 98 % | BODY MASS INDEX: 22.2 KG/M2 | HEIGHT: 71 IN | RESPIRATION RATE: 12 BRPM | DIASTOLIC BLOOD PRESSURE: 58 MMHG | WEIGHT: 158.6 LBS | HEART RATE: 66 BPM | SYSTOLIC BLOOD PRESSURE: 107 MMHG | TEMPERATURE: 97.5 F

## 2023-11-13 PROCEDURE — 25010000002 DEXAMETHASONE PER 1 MG: Performed by: NURSE ANESTHETIST, CERTIFIED REGISTERED

## 2023-11-13 PROCEDURE — 25010000002 MIDAZOLAM PER 1 MG: Performed by: NURSE ANESTHETIST, CERTIFIED REGISTERED

## 2023-11-13 PROCEDURE — 25010000002 FENTANYL CITRATE (PF) 100 MCG/2ML SOLUTION: Performed by: NURSE ANESTHETIST, CERTIFIED REGISTERED

## 2023-11-13 PROCEDURE — 25810000003 LACTATED RINGERS PER 1000 ML: Performed by: ANESTHESIOLOGY

## 2023-11-13 PROCEDURE — 25010000002 ONDANSETRON PER 1 MG: Performed by: NURSE ANESTHETIST, CERTIFIED REGISTERED

## 2023-11-13 PROCEDURE — C1788 PORT, INDWELLING, IMP: HCPCS | Performed by: SURGERY

## 2023-11-13 PROCEDURE — 25810000003 SODIUM CHLORIDE 0.9 % SOLUTION 250 ML FLEX CONT: Performed by: SURGERY

## 2023-11-13 PROCEDURE — 77001 FLUOROGUIDE FOR VEIN DEVICE: CPT

## 2023-11-13 PROCEDURE — 25010000002 FENTANYL CITRATE (PF) 50 MCG/ML SOLUTION: Performed by: NURSE ANESTHETIST, CERTIFIED REGISTERED

## 2023-11-13 PROCEDURE — 36561 INSERT TUNNELED CV CATH: CPT | Performed by: SURGERY

## 2023-11-13 PROCEDURE — 25010000002 HYDROCORTISONE SOD SUC (PF) 100 MG RECONSTITUTED SOLUTION: Performed by: NURSE ANESTHETIST, CERTIFIED REGISTERED

## 2023-11-13 PROCEDURE — 77001 FLUOROGUIDE FOR VEIN DEVICE: CPT | Performed by: SURGERY

## 2023-11-13 PROCEDURE — 76937 US GUIDE VASCULAR ACCESS: CPT | Performed by: SURGERY

## 2023-11-13 PROCEDURE — 25010000002 HEPARIN (PORCINE) PER 1000 UNITS: Performed by: SURGERY

## 2023-11-13 PROCEDURE — 25010000002 PROPOFOL 10 MG/ML EMULSION: Performed by: NURSE ANESTHETIST, CERTIFIED REGISTERED

## 2023-11-13 PROCEDURE — 25010000002 VANCOMYCIN 1 G RECONSTITUTED SOLUTION 1 EACH VIAL: Performed by: SURGERY

## 2023-11-13 PROCEDURE — 76000 FLUOROSCOPY <1 HR PHYS/QHP: CPT

## 2023-11-13 DEVICE — PRT INTRO VASC/INTERV VORTEX FILL/HL DETACH/POLYURET/CATH 8F: Type: IMPLANTABLE DEVICE | Site: CHEST | Status: FUNCTIONAL

## 2023-11-13 RX ORDER — EPHEDRINE SULFATE 5 MG/ML
INJECTION INTRAVENOUS AS NEEDED
Status: DISCONTINUED | OUTPATIENT
Start: 2023-11-13 | End: 2023-11-13 | Stop reason: SURG

## 2023-11-13 RX ORDER — SODIUM CHLORIDE 0.9 % (FLUSH) 0.9 %
10 SYRINGE (ML) INJECTION AS NEEDED
Status: DISCONTINUED | OUTPATIENT
Start: 2023-11-13 | End: 2023-11-13 | Stop reason: HOSPADM

## 2023-11-13 RX ORDER — LIDOCAINE HYDROCHLORIDE 20 MG/ML
INJECTION, SOLUTION EPIDURAL; INFILTRATION; INTRACAUDAL; PERINEURAL AS NEEDED
Status: DISCONTINUED | OUTPATIENT
Start: 2023-11-13 | End: 2023-11-13 | Stop reason: SURG

## 2023-11-13 RX ORDER — NALOXONE HCL 0.4 MG/ML
0.4 VIAL (ML) INJECTION AS NEEDED
Status: DISCONTINUED | OUTPATIENT
Start: 2023-11-13 | End: 2023-11-13 | Stop reason: HOSPADM

## 2023-11-13 RX ORDER — DEXAMETHASONE SODIUM PHOSPHATE 4 MG/ML
INJECTION, SOLUTION INTRA-ARTICULAR; INTRALESIONAL; INTRAMUSCULAR; INTRAVENOUS; SOFT TISSUE AS NEEDED
Status: DISCONTINUED | OUTPATIENT
Start: 2023-11-13 | End: 2023-11-13 | Stop reason: SURG

## 2023-11-13 RX ORDER — ONDANSETRON 2 MG/ML
4 INJECTION INTRAMUSCULAR; INTRAVENOUS ONCE AS NEEDED
Status: DISCONTINUED | OUTPATIENT
Start: 2023-11-13 | End: 2023-11-13 | Stop reason: HOSPADM

## 2023-11-13 RX ORDER — PHENYLEPHRINE HCL IN 0.9% NACL 1 MG/10 ML
SYRINGE (ML) INTRAVENOUS AS NEEDED
Status: DISCONTINUED | OUTPATIENT
Start: 2023-11-13 | End: 2023-11-13 | Stop reason: SURG

## 2023-11-13 RX ORDER — ALBUTEROL SULFATE 2.5 MG/3ML
2.5 SOLUTION RESPIRATORY (INHALATION) ONCE AS NEEDED
Status: DISCONTINUED | OUTPATIENT
Start: 2023-11-13 | End: 2023-11-13 | Stop reason: HOSPADM

## 2023-11-13 RX ORDER — FLUMAZENIL 0.1 MG/ML
0.1 INJECTION INTRAVENOUS AS NEEDED
Status: DISCONTINUED | OUTPATIENT
Start: 2023-11-13 | End: 2023-11-13 | Stop reason: HOSPADM

## 2023-11-13 RX ORDER — FENTANYL CITRATE 50 UG/ML
25 INJECTION, SOLUTION INTRAMUSCULAR; INTRAVENOUS
Status: DISCONTINUED | OUTPATIENT
Start: 2023-11-13 | End: 2023-11-13 | Stop reason: HOSPADM

## 2023-11-13 RX ORDER — MIDAZOLAM HYDROCHLORIDE 1 MG/ML
INJECTION INTRAMUSCULAR; INTRAVENOUS AS NEEDED
Status: DISCONTINUED | OUTPATIENT
Start: 2023-11-13 | End: 2023-11-13 | Stop reason: SURG

## 2023-11-13 RX ORDER — ACETAMINOPHEN 650 MG/1
325 SUPPOSITORY RECTAL EVERY 4 HOURS PRN
Status: DISCONTINUED | OUTPATIENT
Start: 2023-11-13 | End: 2023-11-13 | Stop reason: HOSPADM

## 2023-11-13 RX ORDER — SODIUM CHLORIDE, SODIUM LACTATE, POTASSIUM CHLORIDE, CALCIUM CHLORIDE 600; 310; 30; 20 MG/100ML; MG/100ML; MG/100ML; MG/100ML
1000 INJECTION, SOLUTION INTRAVENOUS CONTINUOUS
Status: DISCONTINUED | OUTPATIENT
Start: 2023-11-13 | End: 2023-11-13 | Stop reason: HOSPADM

## 2023-11-13 RX ORDER — ACETAMINOPHEN 325 MG/1
650 TABLET ORAL ONCE AS NEEDED
Status: DISCONTINUED | OUTPATIENT
Start: 2023-11-13 | End: 2023-11-13 | Stop reason: HOSPADM

## 2023-11-13 RX ORDER — LABETALOL HYDROCHLORIDE 5 MG/ML
5 INJECTION, SOLUTION INTRAVENOUS
Status: DISCONTINUED | OUTPATIENT
Start: 2023-11-13 | End: 2023-11-13 | Stop reason: HOSPADM

## 2023-11-13 RX ORDER — FENTANYL CITRATE 50 UG/ML
50 INJECTION, SOLUTION INTRAMUSCULAR; INTRAVENOUS
Status: DISCONTINUED | OUTPATIENT
Start: 2023-11-13 | End: 2023-11-13 | Stop reason: HOSPADM

## 2023-11-13 RX ORDER — PROCHLORPERAZINE EDISYLATE 5 MG/ML
10 INJECTION INTRAMUSCULAR; INTRAVENOUS ONCE AS NEEDED
Status: DISCONTINUED | OUTPATIENT
Start: 2023-11-13 | End: 2023-11-13 | Stop reason: HOSPADM

## 2023-11-13 RX ORDER — HYDROCODONE BITARTRATE AND ACETAMINOPHEN 7.5; 325 MG/1; MG/1
1 TABLET ORAL EVERY 4 HOURS PRN
Status: DISCONTINUED | OUTPATIENT
Start: 2023-11-13 | End: 2023-11-13 | Stop reason: HOSPADM

## 2023-11-13 RX ORDER — PROPOFOL 10 MG/ML
VIAL (ML) INTRAVENOUS AS NEEDED
Status: DISCONTINUED | OUTPATIENT
Start: 2023-11-13 | End: 2023-11-13 | Stop reason: SURG

## 2023-11-13 RX ORDER — HYDRALAZINE HYDROCHLORIDE 20 MG/ML
5 INJECTION INTRAMUSCULAR; INTRAVENOUS
Status: DISCONTINUED | OUTPATIENT
Start: 2023-11-13 | End: 2023-11-13 | Stop reason: HOSPADM

## 2023-11-13 RX ORDER — FENTANYL CITRATE 50 UG/ML
INJECTION, SOLUTION INTRAMUSCULAR; INTRAVENOUS AS NEEDED
Status: DISCONTINUED | OUTPATIENT
Start: 2023-11-13 | End: 2023-11-13 | Stop reason: SURG

## 2023-11-13 RX ORDER — ACETAMINOPHEN 500 MG
1000 TABLET ORAL EVERY 6 HOURS PRN
Qty: 60 TABLET | Refills: 0 | Status: SHIPPED | OUTPATIENT
Start: 2023-11-13 | End: 2023-11-21

## 2023-11-13 RX ORDER — ONDANSETRON 2 MG/ML
INJECTION INTRAMUSCULAR; INTRAVENOUS AS NEEDED
Status: DISCONTINUED | OUTPATIENT
Start: 2023-11-13 | End: 2023-11-13 | Stop reason: SURG

## 2023-11-13 RX ORDER — DIPHENHYDRAMINE HYDROCHLORIDE 50 MG/ML
12.5 INJECTION INTRAMUSCULAR; INTRAVENOUS
Status: DISCONTINUED | OUTPATIENT
Start: 2023-11-13 | End: 2023-11-13 | Stop reason: HOSPADM

## 2023-11-13 RX ADMIN — EPHEDRINE SULFATE 5 MG: 5 INJECTION INTRAVENOUS at 07:53

## 2023-11-13 RX ADMIN — DEXAMETHASONE SODIUM PHOSPHATE 4 MG: 4 INJECTION, SOLUTION INTRAMUSCULAR; INTRAVENOUS at 07:33

## 2023-11-13 RX ADMIN — Medication 100 MCG: at 08:04

## 2023-11-13 RX ADMIN — Medication 50 MCG: at 07:44

## 2023-11-13 RX ADMIN — EPHEDRINE SULFATE 10 MG: 5 INJECTION INTRAVENOUS at 07:49

## 2023-11-13 RX ADMIN — PROPOFOL 200 MG: 10 INJECTION, EMULSION INTRAVENOUS at 07:34

## 2023-11-13 RX ADMIN — FENTANYL CITRATE 50 MCG: 50 INJECTION, SOLUTION INTRAMUSCULAR; INTRAVENOUS at 07:35

## 2023-11-13 RX ADMIN — ONDANSETRON 4 MG: 2 INJECTION INTRAMUSCULAR; INTRAVENOUS at 07:33

## 2023-11-13 RX ADMIN — HYDROCODONE BITARTRATE AND ACETAMINOPHEN 1 TABLET: 7.5; 325 TABLET ORAL at 08:30

## 2023-11-13 RX ADMIN — SODIUM CHLORIDE 1 G: 9 INJECTION, SOLUTION INTRAVENOUS at 07:26

## 2023-11-13 RX ADMIN — FENTANYL CITRATE 50 MCG: 50 INJECTION, SOLUTION INTRAMUSCULAR; INTRAVENOUS at 08:30

## 2023-11-13 RX ADMIN — HYDROCORTISONE SODIUM SUCCINATE 100 MG: 100 INJECTION, POWDER, FOR SOLUTION INTRAMUSCULAR; INTRAVENOUS at 07:52

## 2023-11-13 RX ADMIN — Medication 50 MCG: at 07:47

## 2023-11-13 RX ADMIN — SODIUM CHLORIDE, POTASSIUM CHLORIDE, SODIUM LACTATE AND CALCIUM CHLORIDE 1000 ML: 600; 310; 30; 20 INJECTION, SOLUTION INTRAVENOUS at 06:57

## 2023-11-13 RX ADMIN — Medication 100 MCG: at 07:53

## 2023-11-13 RX ADMIN — LIDOCAINE HYDROCHLORIDE 60 MG: 20 INJECTION, SOLUTION EPIDURAL; INFILTRATION; INTRACAUDAL; PERINEURAL at 07:34

## 2023-11-13 RX ADMIN — FENTANYL CITRATE 50 MCG: 50 INJECTION, SOLUTION INTRAMUSCULAR; INTRAVENOUS at 07:29

## 2023-11-13 RX ADMIN — MIDAZOLAM 2 MG: 1 INJECTION INTRAMUSCULAR; INTRAVENOUS at 07:26

## 2023-11-13 NOTE — ANESTHESIA PROCEDURE NOTES
Airway  Urgency: elective    Date/Time: 11/13/2023 7:36 AM  Airway not difficult    General Information and Staff    Patient location during procedure: OR  CRNA/CAA: Gary Mcghee CRNA    Indications and Patient Condition  Indications for airway management: airway protection    Preoxygenated: yes  Mask difficulty assessment: 1 - vent by mask    Final Airway Details  Final airway type: supraglottic airway      Successful airway: LMA and I-gel  Size 4     Number of attempts at approach: 1  Assessment: lips, teeth, and gum same as pre-op and atraumatic intubation

## 2023-11-13 NOTE — OP NOTE
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  Dysphagia.  History of cerebrovascular accident.  History of tobacco abuse.  History of left adrenalectomy.  History of adrenal crisis.  Cervical radiculopathy.  Cervical stenosis of spinal canal.  Near syncope.  Generalized hyperhidrosis.  Renal calculus.  Abnormal electrocardiogram.  Vitamin B12 deficiency.  Seasonal allergic rhinitis.  Erythrocytosis.  Macrocytosis.  Generalized anxiety disorder.  Major depressive disorder.    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  Dysphagia.  History of cerebrovascular accident.  History of tobacco abuse.  History of left adrenalectomy.  History of adrenal crisis.  Cervical radiculopathy.  Cervical stenosis of spinal canal.  Near syncope.  Generalized hyperhidrosis.  Renal calculus.  Abnormal electrocardiogram.  Vitamin B12 deficiency.  Seasonal allergic rhinitis.  Erythrocytosis.  Macrocytosis.  Generalized anxiety disorder.  Major depressive disorder.    Procedure performed:   Ultrasound-guided cannulation of Right internal jugular vein.  8 Fr implantable vascular access device placement.  Interpretation of fluoroscopy    Indications:   Jourdan Lebron is a 53 y.o. male who has significant medical problems as mentioned in the medical chart.      He was having intermittent dysphagia for over a year which became progressively worse.  On 10/2/2023, he underwent EGD and colonoscopy by Dr. Ozzy Abdalla at [facility].  He was found to have a 6 cm mass at the lower third of the esophagus (36 to 42 cm) (biopsied), mild diverticulosis of the sigmoid colon, 5 polyps noted in the colon and cecum that were removed.  The pathology of the esophageal mass revealed invasive moderate to poorly differentiated adenocarcinoma. All polypectomy samples were negative for malignancy  (fragments of tubular adenoma).  CT scan of the chest, abdomen and pelvis on 10/9/2023 at [facility] showed 4 cm abnormal thickening of the lower thoracic esophagus extending to the GE junction thought to represent patient's known primary lung malignancy.  There were no convincing evidence of metastatic disease elsewhere in the chest, abdomen, pelvis, or skeleton.  There were stable left adrenalectomy changes, right adrenal adenoma unchanged.     He was seen in the office by Dr. Tacos Osuna ([facility]) for new diagnosis of esophageal cancer. He was an established patient of Dr. Harinder Mueller for erythrocytosis and macrocytosis since 11/2021 (resolved).      PET/CT on 10/20/2023 at [facility] showed hypermetabolic (SUV 7.7) activity within the distal esophagus extending to the GE junction compatible with patient's known malignancy.  He then underwent EGD with EUS by Dr. Joselyn Savage on 10/27/2023. Findings included close to 6 cm ulcerated mass extending from 36 to 42 cm consistent with poorly differentiated adenocarcinoma.  Mass penetrates through muscularis propria without involving the adventitia consistent with T2 lesion. Two small round hypodense lymph nodes in close proximity to the mass measuring 5 and 6 mm concerning for malignancy. Other lymph nodes around the GE junction measuring 7.8 and 8.7 mm were also concerning for malignancy but immediate cytology was negative for malignancy. Pathology of the gastrohepatic lymph node was positive for metastatic adenocarcinoma; however, the lymph node at 36 cm was negative for malignancy.     He was referred to thoracic surgery for further evaluation.  At the time of initial consultation, he could eat and swallow soft food without much difficulty.  He went down from 200 pounds to 170 pounds 3 years ago after he had a stroke. He had memory loss and general fatigue after the stroke. He lost his appetite but began to gain some weight. He had adrenalectomy  performed at the [facility] after an adrenal biopsy was done on an adrenal mass.  He went through a adrenal crisis and required hydrocortisone for 1 year which he discontinued on 09/2022. He denied having any past chest surgeries or myocardial infarction. His wife mentioned the cause of the previous stroke was never known despite having a complete work up and a loop recorder inserted which was removed in the spring. He denied any abnormal heart rhythms or pedal edema. The patient quit smoking cigarettes 8 to 9 years ago and started smoking cigars.     He has good functional status.  Based on the clinical presentation, he was considered a candidate for trimodality treatment.  He was informed about the possibility of having a feeding tube if he was unable to swallow substantial amount of nutrition. He needed a Mediport for systemic treatment.    I explained to the patient the risks involved with Mediport placement. The overall complication rate has been reported up to 7 to 12% in literature. There is a risk of infection related to the port venous system (1.6 to 27%), pneumothorax (1.5 to 6%), catheter related thrombosis (5 to 18%), mechanical complication related to catheter (4%) such as malpositioning of the catheter, catheter migration and fracture, hematoma of the chest wall (8%) and upper extremity venous thrombosis (1.8%).  I also explained to the patient that there is less than 1% risk of death related to any invasive procedure that may require general anesthesia. The patient understood the risk and signed the consent for the above procedure.     Surgeon: Saurabh Hurtado MD     Assistants: No qualified assistant available for this procedure.    Anesthesia: General LMA anesthesia    ASA Class: 3    Procedure Details   On 11/13/2023, the patient was brought to the operating room and placed in the supine position on the operating room table.  The procedure was performed under monitored anesthesia care  with sedation managed by anesthesiologist. Pneumatic compression devices were placed on the lower extremities. The patient received 1 g of vancomycin for intravenous antibiotic prophylaxis.  The anterior chest and neck was prepped and draped in the usual sterile fashion. Prior to beginning the operation, a time-out was conducted with all members of surgical team present. The patient was identified as Jourdan Lebron, the procedure and the correct site were verified.      Using ultrasound guidance, the right internal jugular vein was identified and cannulated with an 18-gauge needle. Guidewire was passed.  0.25% Marcaine was injected into the incision site.  An approximately 3 cm incision was made 2 fingerbreadths below the clavicle.  The dissection was carried down to the fascia and a subcutaneous pocket was constructed using blunt dissection to big enough for the PowerPort. The catheter and port were connected and flushed.  The port was secured to the underlying fascia with three 2-0 silk sutures. A small puncture was made at the internal jugular vein puncture site. A tunneler was used to connect the infraclavicular port site to the internal jugular puncture site.  The placement of the guidewire was confirmed with fluoroscopy. The introducer trocar with the dilator was inserted over the wire via Seldinger technique.  The catheter tip was cut at the level of the carinal bifurcation with fluoroscopy guidance. The catheter was then placed into the trocar and the outer sheath of the trocar was removed.  The positioning was confirmed with fluoroscopy and the catheter tip was approximately at the SVC/ right atrial junction. I was able to draw venous blood and flush without any resistance. The port was flushed with heparinized saline and the incisions were closed in multiple layers with Vicryl and Monocryl in a standard fashion.  Dermabond was applied as dressing.     The sponge, needle, and instrument counts were correct at  the end of the operation.  The patient was awakened from anesthesia and was transported to the Post Anesthesia Care Unit in stable condition.    Findings:  Normal anatomic finding.  The tip of the catheter parked at the junction of the superior vena cava and right atrium.    Estimated Blood Loss:  minimal           Drains: None                 Specimens: None           Implants: 8 Fr implantable vascular access device placement.           Complications: None           Disposition: PACU - hemodynamically stable.           Condition: stable     Saurabh Hurtado MD   Thoracic Surgeon  [facility]

## 2023-11-13 NOTE — ANESTHESIA POSTPROCEDURE EVALUATION
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        Post Anesthesia Care and Evaluation    Patient location during evaluation: PACU  Patient participation: complete - patient participated  Level of consciousness: awake  Pain scale: See nurse's notes for pain score.  Pain management: adequate    Airway patency: patent  Anesthetic complications: No anesthetic complications  PONV Status: none  Cardiovascular status: acceptable  Respiratory status: acceptable and spontaneous ventilation  Hydration status: acceptable    Comments: Patient seen and examined postoperatively; vital signs stable; SpO2 greater than or equal to 90%; cardiopulmonary status stable; nausea/vomiting adequately controlled; pain adequately controlled; no apparent anesthesia complications; patient discharged from anesthesia care when discharge criteria were met

## 2023-11-15 NOTE — PROGRESS NOTES
"                     HEMATOLOGY ONCOLOGY OUTPATIENT FOLLOW UP       Patient name: Jourdan Lebron  : 1970  MRN: [id number]  Primary Care Physician: Justa Castano MD  Referring Physician: No ref. provider found  Reason For Consult:     Chief Complaint   Patient presents with    Follow-up     Esophageal mass     HPI:   History of Present Illness:  Jourdan Lebron is 53 y.o. male who presented to our office on 2021 for consultation regarding elevated hematocrit    On 2021 Mr. Lebron was referred for the investigation of macrocytosis and elevated hematocrit.  He gave a history of a stroke in .  He also described a long history of anxiety and \"panic attacks\".  Finally he had undergone an adrenalectomy, apparently because of an adrenal adenoma.  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  In spite of that he felt poorly, mainly because of fatigue and had several investigations.  For a long time, at least since , he had been noted to have an elevated MCV and MCH.  A clear cause for this had not been identified and generally speaking he was asymptomatic at that time.  In , September and 2021 his blood counts had reported a hematocrit of 51.3, 51.6 and 53.8% respectively.  This was in the setting of a normal high hemoglobin.  He gave a history of prolonged and intense, at times of up to 3 packs of cigarettes per day, cigarette smoking.  Close to the time of the initial visit, he was smoking only cigars but did admit to inhaling the smoke.  He, as well, admitted to dyspnea with some exertion.    2022 Mr. Lebron was back in the office for follow-up.  At the time of his initial evaluation his blood count had been found to be within normal limits.  His folic acid was found to be immediately below the normal range of normalcy.  He started taking folic acid replacement.    7/15/2022: Back in the office for follow-up after 6 months.  " Reported he had had some changes to his medications and he was feeling somewhat better.  In particular, he discussed changes to his antidepressant, that seemed to have made a difference.  He also had stopped smoking.  The physical exam was unchanged.  The laboratory exams revealed normal hemoglobin and hematocrit, but he did persist with mild macrocytosis at 97.8 fL.  A clear explanation for this was not identified.  A decision was made to observe as it was unlikely to represent a serious problem.    10/10/2023: Seen in the office after an absence of more than one year. He reported that for a few months he had been experiencing dysphagia and weight loss. He was initially treated with a proton pump inhibitor, but as there was no response and rather he reported worsening of the symptoms, he underwent upper and lower gastrointestinal endoscopies. In the colon multiple polyps were identified in the cecum, the ascending colon, the transverse colon and the descending colon. In the esophagus a protruding lesion that seemed infiltrative and was fungating and ulcerated was found in the lower third of the esophagus. It extended from 36 to 46 cm from the incisors. Biopsy reported invasive moderate to poorly differentiated adenocarcinoma.     11/3/2023: In the office to review the PET scan. His symptoms have not changed much. He continues to have dysphagia and it is severe enough that he has been able to eat much; he has some success with soft foods but there fare some foods that he can definitely not swallow. He has lost about 10 lbs but none recently. He remains afebrile and has not had any cough. He has not had any pain. On exam no jaundice or pallor. Lungs diminished and heart regular. Abdomen soft and not tender. No edema. Reviewed the images of the PET scan. Reviewed the result of the endoscopic ultrasound and the FNA of the lymph nodes, at least one of which is positive for metastatic disease. This makes the tumor T2N1  disease. Neoadjuvant chemotherapy and radiation was discussed with him and his spouse and I made referrals to Dr. Sabillon in Radiation Oncology and to Dr. Hurtado in Thoracic surgery. He is to have next generation sequencing, Her2 expression and PD-L1 expression on tumor tissue. Will present in tumor conference.     11/17/2023: Not any different than at the time of the last visit.  No further weight loss.  Able to swallow some foods without any difficulties.  However, other foods are very difficult to swallow, if not impossible.  He has not had any fevers.  He underwent placement of the port without any difficulties.  On exam alert, conversant and in no distress.  Port site clean and healing well.  Lungs diminished bilaterally.  Heart regular.  No edema.  Laboratory exams were reviewed.  Discussed with him concurrent chemotherapy and radiation.  Treatment with carboplatin and paclitaxel was discussed and a treatment plan was placed.  Discussed with Dr. Sabillon.  To begin on the week of November 27, 2023.    Subjective:  11/17/2023: Without many new symptoms.  Still having dysphagia but less and sometimes none.  Some soreness at the site of the port but not major pain.  No chest pains or cough.  No abdominal pain, diarrhea or dysuria.  No edema.  No skin rash.    The following portions of the patient's history were reviewed and updated as appropriate: allergies, current medications, past family history, past medical history, past social history, past surgical history and problem list.    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    Brain fog     Diverticulosis of large intestine without perforation or abscess without bleeding 10/02/2023    Esophageal cancer     GERD (gastroesophageal reflux disease)     Hand tingling     Hyperlipidemia     Stroke 05/30/2020    Tiredness       Past Surgical History:   Procedure Laterality Date    ADRENALECTOMY      KIDNEY STONE SURGERY      THROMBECTOMY      UPPER ENDOSCOPIC ULTRASOUND W/ FNA N/A 10/27/2023    Procedure: ENDOSCOPIC ULTRASOUND WITH STAGING AND FINE NEEDLE ASPIRATION X2 AREAS;  Surgeon: Joselyn Savage MD;  Location: [facility] ENDOSCOPY;  Service: Gastroenterology;  Laterality: N/A;  POST:    VENOUS ACCESS DEVICE (PORT) INSERTION Right 11/13/2023    Procedure: INSERTION VENOUS ACCESS DEVICE;  Surgeon: Saurabh Hurtado MD;  Location: [facility] MAIN OR;  Service: Thoracic;  Laterality: Right;       Current Outpatient Medications:     acetaminophen (TYLENOL) 500 MG tablet, Take 2 tablets by mouth Every 6 (Six) Hours As Needed for Moderate Pain for up to 8 days., Disp: 60 tablet, Rfl: 0    ALPRAZolam (XANAX) 0.25 MG tablet, Take 1 tablet by mouth 3 (Three) Times a Day As Needed for Anxiety. for anxiety, Disp: 90 tablet, Rfl: 3    Evolocumab (REPATHA) solution prefilled syringe injection, Inject 1 mL under the skin into the appropriate area as directed Every 14 (Fourteen) Days., Disp: 6 mL, Rfl: 3    FLUoxetine (PROzac) 20 MG capsule, Take 1 capsule by mouth Daily., Disp: 90 capsule, Rfl: 1    fluticasone (FLONASE) 50 MCG/ACT nasal spray, 1 spray into the nostril(s) as directed by provider Daily., Disp: , Rfl:     aspirin 81 MG chewable tablet, Chew 1 tablet Daily. (Patient not taking: Reported on 11/17/2023), Disp: , Rfl:     Allergies   Allergen Reactions    Cephalosporins Anaphylaxis     Throat swelling    Dye  [Contrast Dye (Echo Or Unknown Ct/Mr)] Hives    Sulfa Antibiotics Hives    Zetia [Ezetimibe] Other (See Comments)     Made tired    Iodinated Contrast Media Unknown - Low Severity    Statins Myalgia     Myalgia and fatique    Sulfate Unknown - Low Severity     Family History   Problem Relation Age of Onset    Arthritis Mother     Hypertension Mother     Heart failure Mother 73        Cause of death    Arthritis Father     Hypertension Father      Kidney cancer Father 57        Cause of death. Was a smoker    Heart disease Brother     Esophageal cancer Brother 59        Cause of death. Has a heavy drinker     Cancer-related family history includes Esophageal cancer (age of onset: 59) in his brother; Kidney cancer (age of onset: 57) in his father.    Social History     Tobacco Use    Smoking status: Former     Packs/day: 3.00     Years: 37.00     Additional pack years: 0.00     Total pack years: 111.00     Types: Cigars, Cigarettes     Start date:      Quit date:      Years since quittin.8    Smokeless tobacco: Never    Tobacco comments:     1 packs= 2 cigars a day; only smokes cigars   Vaping Use    Vaping Use: Never used   Substance Use Topics    Alcohol use: Not Currently     Alcohol/week: 8.0 standard drinks of alcohol     Types: 8 Cans of beer per week     Comment: Has now been abstinent for about one year    Drug use: Never     Social History     Social History Narrative    Not on file      ROS:     Review of Systems   Constitutional:  Positive for fatigue. Negative for activity change, appetite change, chills, diaphoresis, fever and unexpected weight change.   HENT:  Negative for congestion, dental problem, drooling, ear discharge, ear pain, facial swelling, hearing loss, mouth sores, nosebleeds, postnasal drip, rhinorrhea, sinus pressure, sinus pain, sneezing, sore throat, tinnitus, trouble swallowing and voice change.    Eyes:  Negative for photophobia, pain, discharge, redness, itching and visual disturbance.   Respiratory:  Negative for apnea, cough, choking, chest tightness, shortness of breath, wheezing and stridor.    Cardiovascular:  Negative for chest pain, palpitations and leg swelling.   Gastrointestinal:  Negative for abdominal distention, abdominal pain, anal bleeding, blood in stool, constipation, diarrhea, nausea, rectal pain and vomiting.   Endocrine: Negative for cold intolerance, heat intolerance, polydipsia and  "polyuria.   Genitourinary:  Negative for decreased urine volume, difficulty urinating, dysuria, flank pain, frequency, genital sores, hematuria and urgency.   Musculoskeletal:  Negative for arthralgias, back pain, gait problem, joint swelling, myalgias, neck pain and neck stiffness.   Skin:  Negative for color change, pallor and rash.   Neurological:  Negative for dizziness, tremors, seizures, syncope, facial asymmetry, speech difficulty, weakness, light-headedness, numbness and headaches.   Hematological:  Negative for adenopathy. Does not bruise/bleed easily.   Psychiatric/Behavioral:  Negative for agitation, behavioral problems, confusion, decreased concentration, hallucinations, self-injury, sleep disturbance and suicidal ideas. The patient is nervous/anxious.      Objective:    Vitals:    11/17/23 0831   BP: 104/69   Pulse: 75   Temp: 98 °F (36.7 °C)   TempSrc: Oral   SpO2: 98%   Weight: 75.9 kg (167 lb 6.4 oz)   Height: 180.3 cm (70.98\")   PainSc: 0-No pain       Body mass index is 23.36 kg/m².  ECOG  (0) Fully active, able to carry on all predisease performance without restriction    Physical Exam:     Physical Exam  Constitutional:       General: He is not in acute distress.     Appearance: Normal appearance. He is not ill-appearing, toxic-appearing or diaphoretic.      Comments: Slender, well-built.  Seems older than the stated age.  No distress.   HENT:      Head: Normocephalic and atraumatic.      Right Ear: External ear normal. There is no impacted cerumen.      Left Ear: External ear normal. There is no impacted cerumen.      Nose: Nose normal. No congestion or rhinorrhea.      Mouth/Throat:      Mouth: Mucous membranes are moist.      Pharynx: Oropharynx is clear. No oropharyngeal exudate or posterior oropharyngeal erythema.      Comments: Oral cavity reveals poor dentition and poor dental hygiene.  No mucosal ulcerations are present.  Eyes:      General: No scleral icterus.        Right eye: No " discharge.         Left eye: No discharge.      Conjunctiva/sclera: Conjunctivae normal.      Pupils: Pupils are equal, round, and reactive to light.   Neck:      Vascular: No carotid bruit.   Cardiovascular:      Rate and Rhythm: Normal rate and regular rhythm.      Pulses: Normal pulses.      Heart sounds: Normal heart sounds. No murmur heard.     No friction rub. No gallop.   Pulmonary:      Effort: No respiratory distress.      Breath sounds: No stridor. No wheezing, rhonchi or rales.      Comments: Breath sounds are diminished bilaterally.  Chest:      Chest wall: No tenderness.   Abdominal:      General: Abdomen is flat. Bowel sounds are normal. There is no distension.      Palpations: Abdomen is soft. There is no mass.      Tenderness: There is no abdominal tenderness. There is no right CVA tenderness, left CVA tenderness, guarding or rebound.   Musculoskeletal:         General: No swelling, tenderness, deformity or signs of injury.      Cervical back: No rigidity or tenderness.      Right lower leg: No edema.      Left lower leg: No edema.      Comments: There is clubbing of the fingers in both hands   Lymphadenopathy:      Cervical: No cervical adenopathy.   Skin:     General: Skin is warm and dry.      Coloration: Skin is not jaundiced or pale.      Findings: No bruising, erythema or rash.   Neurological:      General: No focal deficit present.      Mental Status: He is alert and oriented to person, place, and time.      Cranial Nerves: No cranial nerve deficit.      Motor: No weakness.      Coordination: Coordination normal.      Gait: Gait normal.   Psychiatric:         Mood and Affect: Mood normal.         Behavior: Behavior normal.         Thought Content: Thought content normal.         Judgment: Judgment normal.     I Oliverio Mueller MD performed a physical exam on 11/17/2023 as documented above.    Lab Results - Last 18 Months   Lab Units 11/17/23  0837 11/03/23  0844 07/28/23  0625   WBC 10*3/mm3  "7.67 7.51 6.41   HEMOGLOBIN g/dL 14.7 14.7 16.0   HEMATOCRIT % 44.7 44.3 47.6   PLATELETS 10*3/mm3 251 253 267   MCV fL 98.7* 98.2* 96.6     Lab Results - Last 18 Months   Lab Units 11/03/23  0844 07/28/23  0625 01/27/23  0703   SODIUM mmol/L 140 140 143   POTASSIUM mmol/L 4.5 4.5 4.4   CHLORIDE mmol/L 105 106 105   CO2 mmol/L 27.0 24.9 29.2*   BUN mg/dL 13 12 11   CREATININE mg/dL 0.87 1.05 1.10   CALCIUM mg/dL 9.4 9.0 9.6   BILIRUBIN mg/dL 0.2 0.2 0.3   ALK PHOS U/L 56 55 57   ALT (SGPT) U/L 10 11 11   AST (SGOT) U/L 13 16 14   GLUCOSE mg/dL 112* 87 84     Lab Results   Component Value Date    GLUCOSE 112 (H) 11/03/2023    BUN 13 11/03/2023    CREATININE 0.87 11/03/2023    EGFRIFNONA 67 02/24/2022    BCR 14.9 11/03/2023    K 4.5 11/03/2023    CO2 27.0 11/03/2023    CALCIUM 9.4 11/03/2023    ALBUMIN 3.6 11/03/2023    LABIL2 1.3 09/25/2018    AST 13 11/03/2023    ALT 10 11/03/2023     Lab Results   Component Value Date    FOLATE 4.11 (L) 11/12/2021     Lab Results   Component Value Date    VYRHNPOF40 992 (H) 07/28/2023     No results found for: \"SPEP\", \"UPEP\"  Uric Acid   Date Value Ref Range Status   09/16/2022 4.6 3.4 - 7.0 mg/dL Final     Lab Results   Component Value Date    SEDRATE 11 04/16/2021     Lab Results   Component Value Date    PSA 0.163 04/06/2021     Assessment & Plan     Assessment:  Adenocarcinoma of the gastroesophageal junction T2N1M0: Received the port.  To begin treatment tentatively on the week of November 27.  He will receive carboplatin and paclitaxel with concurrent radiation.  2.   We will see him on the first week of December 2024 after he has commenced the treatment.  3.  Tobacco smoker: Remains abstinent.    Plan:  As above.    Oliverio Mueller MD on 11/17/2023 at 9:23 AM.    "

## 2023-11-17 ENCOUNTER — LAB (OUTPATIENT)
Dept: LAB | Facility: HOSPITAL | Age: 53
End: 2023-11-17
Payer: COMMERCIAL

## 2023-11-17 ENCOUNTER — OFFICE VISIT (OUTPATIENT)
Dept: ONCOLOGY | Facility: CLINIC | Age: 53
End: 2023-11-17
Payer: COMMERCIAL

## 2023-11-17 VITALS
WEIGHT: 167.4 LBS | BODY MASS INDEX: 23.44 KG/M2 | OXYGEN SATURATION: 98 % | SYSTOLIC BLOOD PRESSURE: 104 MMHG | DIASTOLIC BLOOD PRESSURE: 69 MMHG | HEART RATE: 75 BPM | HEIGHT: 71 IN | TEMPERATURE: 98 F

## 2023-11-17 DIAGNOSIS — C15.9 ADENOCARCINOMA OF ESOPHAGUS: ICD-10-CM

## 2023-11-17 DIAGNOSIS — C15.9 ADENOCARCINOMA OF ESOPHAGUS: Primary | ICD-10-CM

## 2023-11-17 DIAGNOSIS — E53.8 VITAMIN B12 DEFICIENCY: Primary | ICD-10-CM

## 2023-11-17 LAB
BASOPHILS # BLD AUTO: 0.04 10*3/MM3 (ref 0–0.2)
BASOPHILS NFR BLD AUTO: 0.5 % (ref 0–1.5)
DEPRECATED RDW RBC AUTO: 50.3 FL (ref 37–54)
EOSINOPHIL # BLD AUTO: 0.39 10*3/MM3 (ref 0–0.4)
EOSINOPHIL NFR BLD AUTO: 5.1 % (ref 0.3–6.2)
ERYTHROCYTE [DISTWIDTH] IN BLOOD BY AUTOMATED COUNT: 14.2 % (ref 12.3–15.4)
HCT VFR BLD AUTO: 44.7 % (ref 37.5–51)
HGB BLD-MCNC: 14.7 G/DL (ref 13–17.7)
HOLD SPECIMEN: NORMAL
HOLD SPECIMEN: NORMAL
LYMPHOCYTES # BLD AUTO: 2.26 10*3/MM3 (ref 0.7–3.1)
LYMPHOCYTES NFR BLD AUTO: 29.5 % (ref 19.6–45.3)
MCH RBC QN AUTO: 32.5 PG (ref 26.6–33)
MCHC RBC AUTO-ENTMCNC: 32.9 G/DL (ref 31.5–35.7)
MCV RBC AUTO: 98.7 FL (ref 79–97)
MONOCYTES # BLD AUTO: 0.83 10*3/MM3 (ref 0.1–0.9)
MONOCYTES NFR BLD AUTO: 10.8 % (ref 5–12)
NEUTROPHILS NFR BLD AUTO: 4.15 10*3/MM3 (ref 1.7–7)
NEUTROPHILS NFR BLD AUTO: 54.1 % (ref 42.7–76)
PLATELET # BLD AUTO: 251 10*3/MM3 (ref 140–450)
PMV BLD AUTO: 9.1 FL (ref 6–12)
RBC # BLD AUTO: 4.53 10*6/MM3 (ref 4.14–5.8)
WBC NRBC COR # BLD AUTO: 7.67 10*3/MM3 (ref 3.4–10.8)

## 2023-11-17 PROCEDURE — 36415 COLL VENOUS BLD VENIPUNCTURE: CPT

## 2023-11-17 PROCEDURE — 85025 COMPLETE CBC W/AUTO DIFF WBC: CPT

## 2023-11-21 ENCOUNTER — TELEPHONE (OUTPATIENT)
Dept: GENETICS | Facility: HOSPITAL | Age: 53
End: 2023-11-21
Payer: COMMERCIAL

## 2023-11-21 NOTE — PROGRESS NOTES
TREATMENT  PREPARATION    Jourdan Lebron  [id number]  1970    Chief Complaint: Treatment preparation and needs assessment    History of present illness:  Jourdan Lebron is a 53 y.o. year old male who is here today for treatment preparation and needs assessment.  The patient has been diagnosed with   Encounter Diagnoses   Name Primary?    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     Past Medical History, Past Surgical History, Social History, Family History have been reviewed and are without significant changes except as mentioned.    Physical Exam:    Vitals:    11/22/23 1130   BP: 96/64   Pulse: 70   SpO2: 99%     Vitals:    11/22/23 1130   PainSc: 0-No pain        ECOG score: 0             Physical Exam  Vitals reviewed.   Constitutional:       General: He is not in acute distress.     Appearance: Normal appearance.   HENT:      Head: Normocephalic and atraumatic.      Right Ear: External ear normal.      Left Ear: External ear normal.      Nose: Nose normal.   Eyes:      Extraocular Movements: Extraocular movements intact.   Pulmonary:      Effort: Pulmonary effort is normal. No respiratory distress.   Musculoskeletal:         General: Normal range of motion.      Cervical back: Normal range of motion.   Neurological:      Mental Status: He is alert and oriented to person, place, and time.   Psychiatric:         Mood and Affect: Mood normal.         Behavior: Behavior normal.           NEEDS ASSESSMENTS    Genetics  The  patient's new diagnosis and family history have been reviewed for genetic counseling needs. The patient will not be referred..     Psychosocial and Barriers to care  The patient has completed a PHQ-9 Depression Screening and the Distress Thermometer (DT) today.  PHQ-9 results show PHQ-2 Total Score: 0 PHQ-9 Total Score: PHQ-9 Total Score: 0    Results were reviewed along with psychosocial resources offered by our cancer center.  Our Supportive Oncology team will be flagged for a score of 4 or above, and a same day call will be made for a score of 9 or 10.  A mental health referral is offered at that time. Patients who score less than 4 have been educated on our support services and can be referred to our  upon request.  The patient will not be referred to our .       Nutrition  The patient has completed the malnutrition screening today.  Patients beginning at risk treatment regimens or who have dietary concerns will also be referred to our oncology dietitian. The patient will be referred.    Functional Assessment  Persons who are age 70 or greater will be screened for qualification of a comprehensive geriatric assessment by our survivorship nurse practitioner.  Older adults with cancer face unique challenges. These may include an increased risk of drug reactions, financial burdens, and caregiver stress. The patient scored   . Patients scoring 14 or lower will referred for an older adult functional assessment with the survivorship advanced practice registered nurse to ensure all needed support is provided as patients plan for their treatments. NOT APPLICABLE    Intravenous Access Assessment  The patient and I discussed planned intravenous chemo/biotherapy as well as other IV treatments that are often needed throughout the course of treatment. These may include, but are not limited to blood transfusions, antibiotics, and IV hydration. Discussed that depending on selected treatment and vein  "assessment, patient may require venous access device (VAD) which could include but not limited to a Mediport or PICC line. Risks and benefits of VADs reviewed. The patient will be treated via Port.    Reproductive/Sexual Activity   People should avoid becoming pregnant and should not get a partner pregnant while undergoing chemo/biotherapy.  People of childbearing age should use effective contraception during active therapy. The best recommendation for all people is to use a barrier method for a minimum of 1 week after the last infusion of chemo/biotherapy to prevent your partner being exposed to byproducts from treatment medications in bodily fluids. Effective contraception should be discussed with your oncology team to make sure it is safe to take based on your diagnosis. Possible options include oral contraceptives, barrier methods. Chemo/biotherapy can change your ability to reproduce children in the future.  There are options for fertility preservation. NOT APPLICABLE    Advanced Care Planning  Advance Care Planning   The patient and I discussed advanced care planning, \"Conversations that Matter\".   This service is offered for development of advance directives with a certified ACP facilitator.  The patient does not have an up-to-date advanced directive. This document is not on file with our office. The patient is not interested in an appointment with one of our facilitators to create or update their advanced directives.               Smoking cessation  Tobacco Use: Medium Risk (11/22/2023)    Patient History     Smoking Tobacco Use: Former     Smokeless Tobacco Use: Never     Passive Exposure: Not on file       Patient and I discussed their tobacco use history. Referral will not be made for smoking cessation.      Palliative Care  When appropriate, the patient and I discussed the availability palliative care services and when appropriate Hospice care. Palliative care is not the same as Hospice care which was " explained to the patient.NOT APPLICABLE.    Survivorship   When appropriate, we discussed that we will refer the patient to survivorship clinic to discuss next steps following completion of planned treatment.  Reviewed this visit will include assessment of your physical, psychological, functional, and spiritual needs as a survivor and the need at attend this visit when scheduled.    TREATMENT EDUCATION    Today I met with the patient to discuss the chemo/biotherapy regimen recommended for treatment of 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  - ondansetron (ZOFRAN) 8 MG tablet  - lidocaine-prilocaine (EMLA) 2.5-2.5 % cream    Encounter for education  .  The patient was given explanation of treatment premed side effects including office policy that prohibits patients to drive if sedating medications are administered, MD explanation given regarding benefits, side effects, toxicities and goals of treatment.  The patient received a Chemotherapy/Biotherapy Plan Summary including diagnosis and explanation of specific treatment plan.    SIDE EFFECTS:  Common side effects were discussed with the patient and/or significant other.  Discussion included where applicable hair loss/discoloration, anemia/fatigue, infection/chills/fever, appetite, bleeding risk/precautions, constipation, diarrhea, mouth sores, taste alteration, loss of appetite, nausea/vomiting, peripheral neuropathy, skin/nail changes, rash, muscle aches/weakness, photosensitivity, weight gain/loss, hearing loss, dizziness, menopausal symptoms, menstrual irregularity, sterility, high blood pressure, heart damage, liver damage, lung damage, kidney damage, DVT/PE risk, fluid retention, pleural/pericardial effusion, somnolence, electrolyte/LFT imbalance, vein exercises and/or the possible need for vascular access/port placement.  The patient was advised that although uncommon, leakage of an infused medication from the vein or venous access  device may lead to skin breakdown and/or other tissue damage.  The patient was advised that he/she may have pain, bleeding, and/or bruising from the insertion of a needle in their vein or venous access device (port).  The patient was further advised that, in spite of proper technique, infection with redness and irritation may rarely occur at the site where the needle was inserted.  The patient was advised that if complications occur, additional medical treatment is available.  Finally, where applicable we have reviewed rare but potential immune mediated side effects including shortness of breath, cough, chest pain (pneumonitis), abdominal pain, diarrhea (colitis), thyroiditis (hypothyroid or hyperthyroid), hepatitis and liver dysfunction, nephritis and renal dysfunction.    Discussion also included side effects specific to drugs in the treatment plan, specifically:    Treatment Plans       Name Type Plan Dates Plan Provider         Active    OP GE PACLitaxel / CARBOplatin (weekly) + XRT ONCOLOGY TREATMENT  11/26/2023 - Present Oliverio Mueller MD                      Questions answered and additional information discussed on topics including:  Anemia, Thrombocytopenia, Neutropenia, Nutrition and appetite changes, Constipation, Diarrhea, Nausea & vomiting, Mouth sores, Alopecia, Intimate activity, Nervous system changes, Pain, Skin & nail changes, Organ toxicities, Survivorship, Home care, and Vaccinations       Assessment and Plan:    Diagnoses and all orders for this visit:    1. 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 (Primary)  -     ondansetron (ZOFRAN) 8 MG tablet; Take 1 tablet by mouth 3 (Three) Times a Day As Needed for Nausea or Vomiting.  Dispense: 30 tablet; Refill: 5  -     lidocaine-prilocaine (EMLA) 2.5-2.5 % cream; Apply 1 application  topically to the appropriate area as directed As Needed (apply 60 minutes prior to port access).  Dispense: 30 g; Refill: 2    2. Encounter  for education      No orders of the defined types were placed in this encounter.        The patient and I have reviewed their diagnosis and scheduled treatment plan. Needs assessment was completed where applicable including genetics, psychosocial needs, barriers to care, VAD evaluation, advanced care planning, survivorship, and palliative care services where indicated. Referrals have been ordered as appropriate based upon evaluation today and patient desires.   Chemo/biotherapy teaching was completed today and consent obtained. See separate documentation for further details.  Adequate time was given to answer questions.  Patient made aware of their care team members and contact information if they have questions or problems throughout the treatment course.  Discussion held and written information provided describing frequency of office visits and ongoing monitoring throughout the treatment plan.     Reviewed with patient any prescribed medication sent to pharmacy.  Education provided regarding proper storage, safe handling, and proper disposal of unused medication.  Proper handling of body fluids and waste discussed and written information provided.  If appropriate, patient had pretreatment labs drawn today.    Learning assessment completed at initial patient encounter. See separate flowsheet. Chemo/biotherapy education comprehension assessed at today's visit.    I spent 60 minutes caring for Jourdan on this date of service. This time includes time spent by me in the following activities: preparing for the visit, reviewing tests, obtaining and/or reviewing a separately obtained history, performing a medically appropriate examination and/or evaluation, counseling and educating the patient/family/caregiver, ordering medications, tests, or procedures, and documenting information in the medical record.     Pam Mireles, APRN   11/22/23

## 2023-11-22 ENCOUNTER — DOCUMENTATION (OUTPATIENT)
Dept: ONCOLOGY | Facility: CLINIC | Age: 53
End: 2023-11-22
Payer: COMMERCIAL

## 2023-11-22 ENCOUNTER — DOCUMENTATION (OUTPATIENT)
Dept: GENETICS | Facility: HOSPITAL | Age: 53
End: 2023-11-22
Payer: COMMERCIAL

## 2023-11-22 ENCOUNTER — NUTRITION (OUTPATIENT)
Dept: ONCOLOGY | Facility: CLINIC | Age: 53
End: 2023-11-22
Payer: COMMERCIAL

## 2023-11-22 ENCOUNTER — OFFICE VISIT (OUTPATIENT)
Dept: ONCOLOGY | Facility: CLINIC | Age: 53
End: 2023-11-22
Payer: COMMERCIAL

## 2023-11-22 VITALS
OXYGEN SATURATION: 99 % | BODY MASS INDEX: 22.68 KG/M2 | HEART RATE: 70 BPM | SYSTOLIC BLOOD PRESSURE: 96 MMHG | WEIGHT: 162 LBS | HEIGHT: 71 IN | DIASTOLIC BLOOD PRESSURE: 64 MMHG

## 2023-11-22 DIAGNOSIS — C77.2 ADENOCARCINOMA OF ESOPHAGUS METASTATIC TO INTRA-ABDOMINAL LYMPH NODE: Primary | ICD-10-CM

## 2023-11-22 DIAGNOSIS — C15.9 ADENOCARCINOMA OF ESOPHAGUS METASTATIC TO INTRA-ABDOMINAL LYMPH NODE: Primary | ICD-10-CM

## 2023-11-22 DIAGNOSIS — Z71.9 ENCOUNTER FOR EDUCATION: ICD-10-CM

## 2023-11-22 RX ORDER — LIDOCAINE AND PRILOCAINE 25; 25 MG/G; MG/G
1 CREAM TOPICAL AS NEEDED
Qty: 30 G | Refills: 2 | Status: SHIPPED | OUTPATIENT
Start: 2023-11-22

## 2023-11-22 RX ORDER — ONDANSETRON HYDROCHLORIDE 8 MG/1
8 TABLET, FILM COATED ORAL 3 TIMES DAILY PRN
Qty: 30 TABLET | Refills: 5 | Status: SHIPPED | OUTPATIENT
Start: 2023-11-22

## 2023-11-22 NOTE — PROGRESS NOTES
OSW met with patient and his wife for introduction and explanation of role and services available.  Welcome letter provided.  Patient and wife deny any needs at this time.  Patient has a history of depression and panic attacks; however, he states that he is well controlled at this time.  He sees a mental health provider regularly and has access to counseling services if needed.  OSW encouraged patient and wife to reach out if there are any changes or if needs arise.  Patient and wife v/u.  No concerns or issues at this time.  OSW will remain available as needed.

## 2023-11-22 NOTE — PROGRESS NOTES
Nutrition Assessment  Jourdan Lebron    Height: 6'  Weight: 162#  BMI: 22.6  Weight Hx:   Wt Readings from Last 20 Encounters:   11/22/23 73.5 kg (162 lb)   11/17/23 75.9 kg (167 lb 6.4 oz)   11/13/23 71.9 kg (158 lb 9.6 oz)   11/07/23 75.8 kg (167 lb)   11/07/23 75.8 kg (167 lb)   11/03/23 76.1 kg (167 lb 12.8 oz)   10/27/23 74.3 kg (163 lb 12.8 oz)   10/10/23 76.9 kg (169 lb 9.6 oz)   06/22/23 79.8 kg (176 lb)   12/22/22 77.7 kg (171 lb 6.4 oz)   09/16/22 84.1 kg (185 lb 6.4 oz)   07/15/22 80.3 kg (177 lb)   03/11/22 80.6 kg (177 lb 12.8 oz)   03/02/22 78 kg (172 lb)   01/14/22 79.4 kg (175 lb)   12/10/21 78.1 kg (172 lb 3.2 oz)   11/12/21 80.4 kg (177 lb 3.2 oz)   10/14/21 76.2 kg (168 lb)   09/07/21 74.8 kg (165 lb)   08/13/21 78.5 kg (173 lb)     IBW: 178# (91%)  UBW: 170# - 180# (90% - 95.2%)    Nutrition Questionnaire    Food Allergies: Pt denied allergies at this time    Chewing Problems: Pt denied chewing problems at this time.     Swallowing Problems: Pt struggling to swallow due to foods getting stuck around his esophageal tumor. Pt has not been able to eat a sufficient amount of calories due to the location of his tumor.    Who does the cooking & shopping: Pt and his wife, Claudia    Nausea/Vomiting/Diarrhea: Pt w/occasional nausea, he also has d/c, pt denies vomiting    24-Hour Diet Recall:  Breakfast - I[employer] breakfast roll x2, Ensure, coffee, water  Lunch - [employer] boneless wings, coleslaw, sweet tea  Dinner - two large beef soft tacos  Snacks - honey nut cheerios w/whole milk  Water ~64 oz/day  **Pt states it takes a minimum of 30 minutes to eat anything and it takes longer as the day goes on.    Discussion: Met the pt and his wife to provide new-pt diet education. We reviewed possible side effects of his treatment and how it may impact his ability to eat and tolerate food. We discussed the importance of weight maintenance, consuming adequate calories and protein, even when appetite  is low, taste changes occur, and energy is low, pt verbalized understanding. We reviewed ways to manage side effects with diet, pt verbalized understanding.    All questions and concerns were addressed and answered. I provided my contact information and encouraged them to call or schedule an appointment as needed.    Nutrition-Related Side Effects:    []Abdominal Pain  []Constipation  [x]Diarrhea  []Electrolyte Imbalance  []Fatigue  []HTN  []Hypertriglyceridemia  []Hyponatremia  []Loss of Appetite  []Low Blood Pressure  [x]Metallic Taste  [x]Mouth Sores  [x]Nausea  [x]Neutropenia  []Peripheral Neuropathy  []Proteinuria  []Shortness of Breath  []Taste Changes  [x]Vomiting  []Weakness  []Weight Gain  []Other      Chandler Ruby, MS,RD,LD-[place],CD-[place]  Registered Dietitian

## 2023-11-22 NOTE — PROGRESS NOTES
Jourdan Lebron, a 53-year-old male, was referred for genetic counseling due to a personal history of cancer and pathology findings. Mr. Lebron was recently diagnosed with esophageal cancer at age 53. His treatment plan currently includes chemotherapy, radiation, and then surgery. Mr. Lebron had a colonoscopy last month and is uncertain of how many polyps were found. 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 He was interested in discussing his risk for a hereditary cancer syndrome. Mr. Lebron decided to pursue comprehensive genetic testing to evaluate his risk of cancer, therefore the CancerNext Expanded Panel was ordered through [employer] which analyzes 77 genes associated with an increased cancer risk. 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 These results were discussed by phone with Mr. Lebron on 11/22/23.     PERTINENT FAMILY HISTORY: (See attached pedigree)   Father:   Kidney cancer  Mat-Half Brother: Esophageal cancer    We do not have medical records regarding any of these diagnoses.       RISK ASSESSMENT:  Mr. Lebron's personal history of cancer raises the question of a hereditary cancer syndrome. He does not meet any NCCN guidelines for genetic testing. Despite this, genetic testing remains available to him. This risk assessment is based on the family history information provided at the time of the appointment. The assessment could change in the future should new information be obtained.    GENETIC COUNSELING (30 minutes):  We reviewed the family history information in detail. Cases of cancer follow three general patterns: sporadic, familial, and hereditary. While most cancer is sporadic, some cases appear to occur in family clusters. These cases are said to be familial and account for 10-20% of cancer cases. Familial cases may be due to a combination of shared genes and environmental  factors among family members. In even fewer cases, the risk for cancer is inherited, and the genes responsible for the increased cancer risk are known.       Family histories typical of hereditary cancer syndromes usually include multiple first- and second-degree relatives diagnosed with cancer types that define a syndrome. These cases tend to be diagnosed at younger-than-expected ages and can be bilateral or multifocal. The cancer in these families follows an autosomal dominant inheritance pattern, which indicates the likely presence of a mutation in a cancer susceptibility gene. Children and siblings of an individual believed to carry this mutation have a 50% chance of inheriting that mutation, thereby inheriting the increased risk to develop cancer. These mutations can be passed down from the maternal or the paternal lineage.    We discussed hereditary diffuse gastric cancer, given Mr. Lebron's pathology of signet ring features. The risk numbers that are typically reported for HDGC come almost entirely from data of families that specifically met CDH1 testing criteria. That data shows a 67-83% lifetime risk for diffuse gastric cancer, and a 39-52% lifetime risk for lobular breast cancer. Since multigene panels became available and more widely used in recent years, there have been a number of families reported that have CDH1 mutations but would not have met CDH1 testing criteria, and in many of these families there are no known cases of gastric cancer. A study published online in [employer] in June of 2019 by Emir, et al looked at the penetrance in families that were ascertained through multigene panel testing. This study found the lifetime risk of gastric cancer to be 33% for women and 42% for men, suggesting that the lifetime risk of gastric cancer in CDH1 mutation carriers may be significantly lower than previously reported. The risk for breast cancer was comparable to the earlier numbers, with a 55% lifetime  risk.    GENETIC TESTING:  The risks, benefits, and limitations of genetic testing and implications for clinical management following testing were reviewed. DNA test results can influence decisions regarding screening and prevention. Genetic testing can have significant psychological implications for both individuals and families. Also discussed was the possibility of employment and insurance discrimination based on genetic test results and the laws in place to prevent this, as well as the limitations of these laws.       We discussed panel testing, which would involve testing 77 genes associated with increased cancer risk. The implications of a positive or negative test result were discussed. We also discussed the importance of testing an affected relative and how a negative result for Mr. Lebron wouldn't necessarily mean the cancers presenting in his family weren't due to a genetic mutation that Mr. Lebron did not inherit. In general, a negative genetic test result is most informative if a mutation has first been established in an affected member of the family. In cases where an affected individual is not available or interested in testing, it is appropriate to offer testing to an unaffected individual. We discussed the possibility that, in some cases, genetic test results may be ambiguous due to the identification of a genetic variant. These variants may or may not be associated with an increased cancer risk. With multigene panel testing, it is not uncommon for a variant of uncertain significance (VUS) to be identified. If a VUS is identified, testing family members is typically not recommended and screening recommendations are made based on the family history. The laboratories that perform genetic testing work to reclassify the VUS and send out an amended report if and when a VUS is reclassified. The majority of variant findings are ultimately reclassified to a negative result. Given Mr. Lebron's family history, a  negative test result does not eliminate all cancer risk, although the risk would not be as high as it would with positive genetic testing.     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 Mutations in the POT1 gene are causative of POT1 Tumor Predisposition (POT1-TPD), which is a relatively recently described hereditary predisposition syndrome. Relatives could pursue genetic testing for the POT1 mutation to determine whether they are at an increased risk for the associated cancers. His siblings each have a 50% chance of having inherited this mutation. Relatives would need a copy of Mr. Lebron's genetic test result to ensure they were being tested for the correct mutation.      Genetic counseling and testing for the identified POT1 mutation are available to Mr. Lebron's at-risk family members. We would be happy to see family members age 18 and older in our clinic to further discuss this information and testing options. They can request a referral to [facility], and our coordinator will contact the individual to schedule an appointment once the referral is received. They can call [phone] to receive more information on how to place the referral. For family members who live elsewhere, there are genetic counselors at most [facility]. They can find a genetic counselor by visiting the National Society of Genetic Counselors website at www.nsgc.org or they can call our office and we would be happy to give them the contact information of the closest genetic counselor.        CANCER RISK: POT1 tumor predisposition (POT1-TPD) is characterized by an increased lifetime risk for multiple cutaneous melanomas, chronic lymphocytic leukemia (CLL), angiosarcoma (particularly cardiac angiosarcomas), and gliomas. Additional cancers (e.g., colorectal cancer, thyroid cancer, kidney cancer, breast angiosarcomas) have been reported in individuals with POT1-TPD  but with very limited evidence. The age of onset for first primary cutaneous melanoma ranges from 15 to 80 years. The majority of POT1 associated cancers are diagnosed in adulthood. Lifetime cancer risk estimates for POT1 are not currently available.     It is important to note that clinical manifestations of POT1-TPD cannot be predicted in family members who also test positive for this POT1 mutation because the full phenotypic spectrum and penetrance of POT1-TPD are unknown. The types of POT1-related tumors can vary among different members of the same family and current evidence is limited to disease-focused studies. To date, fewer than 100 families with a germline POT1 variant have been identified. Due to the limited number of families reported to date, the penetrance, natural history, and frequencies of the POT1-associated tumors are yet to be determined. We expect the tumor/cancer types associated with POT1 germline mutations to change/expand as more research is performed. It is currently unknown whether ionizing radiation poses an increased risk to individuals with POT1-TPD, but because of the need for lifelong surveillance, it seems reasonable to avoid radiation in diagnostic procedures and instead recommend MRI or ultrasonography.    There are no published guidelines for surveillance for individuals with POT1-TPD. Decisions regarding individual surveillance protocols should be based on the emerging phenotypic spectrum of POT1-TPD, as well as on the affected individual's personal and family history. Below are screening recommendations that could be considered:  Full skin examination by a dermatologist beginning at age 18 years at least every six months with excision of any lesions suspicious for melanoma; consider exams every three months in individuals with multiple atypical nevi, history of melanoma, and/or family history of melanoma.   CBC with differential annually beginning at age 18 years to screen for  CLL. Annual comprehensive physical examination including examination of lymph nodes.   Whole-body MRI annually in families fulfilling Li-Fraumeni syndrome or Li-Fraumeni-like criteria beginning at age 18 years; Consider whole-body MRI every one to two years depending on personal and family history of non-cutaneous, non-brain malignancies.   Consider brain MRI every one to two years beginning at age 18 years depending on family history of glioma.    Based on the family history known, Mr. Lebron family history does not fulfill Li-Fraumeni syndrome criteria. He is encouraged to re-contact genetics periodically to determine whether there have been additional findings or updated screening guidelines for POT1.     PLAN: Genetic counseling remains available to Mr. Lebron and his family. We discussed the importance of having him see a dermatologist at least for a baseline exam. Mr. Lebron is encouraged to contact us with any questions or concerns at [phone].      Mary Lion MS, [facility]  Licensed Certified Genetic Counselor      Cc: Jourdan Osuna MD

## 2023-11-27 ENCOUNTER — RADIATION ONCOLOGY WEEKLY ASSESSMENT (OUTPATIENT)
Dept: RADIATION ONCOLOGY | Facility: HOSPITAL | Age: 53
End: 2023-11-27
Payer: COMMERCIAL

## 2023-11-27 ENCOUNTER — HOSPITAL ENCOUNTER (OUTPATIENT)
Dept: RADIATION ONCOLOGY | Facility: HOSPITAL | Age: 53
Discharge: HOME | End: 2023-11-27
Payer: COMMERCIAL

## 2023-11-27 ENCOUNTER — HOSPITAL ENCOUNTER (OUTPATIENT)
Dept: ONCOLOGY | Facility: HOSPITAL | Age: 53
Discharge: HOME | End: 2023-11-27
Admitting: INTERNAL MEDICINE
Payer: COMMERCIAL

## 2023-11-27 ENCOUNTER — TELEPHONE (OUTPATIENT)
Dept: FAMILY MEDICINE CLINIC | Facility: CLINIC | Age: 53
End: 2023-11-27
Payer: COMMERCIAL

## 2023-11-27 VITALS
SYSTOLIC BLOOD PRESSURE: 106 MMHG | BODY MASS INDEX: 22.47 KG/M2 | DIASTOLIC BLOOD PRESSURE: 74 MMHG | WEIGHT: 160.5 LBS | OXYGEN SATURATION: 100 % | HEART RATE: 51 BPM | TEMPERATURE: 98.2 F | RESPIRATION RATE: 18 BRPM | HEIGHT: 71 IN

## 2023-11-27 VITALS
OXYGEN SATURATION: 100 % | RESPIRATION RATE: 18 BRPM | HEIGHT: 71 IN | TEMPERATURE: 98.2 F | DIASTOLIC BLOOD PRESSURE: 74 MMHG | SYSTOLIC BLOOD PRESSURE: 106 MMHG | HEART RATE: 51 BPM | WEIGHT: 160.5 LBS | BODY MASS INDEX: 22.47 KG/M2

## 2023-11-27 DIAGNOSIS — C77.2 ADENOCARCINOMA OF ESOPHAGUS METASTATIC TO INTRA-ABDOMINAL LYMPH NODE: Primary | ICD-10-CM

## 2023-11-27 DIAGNOSIS — C15.9 ADENOCARCINOMA OF ESOPHAGUS METASTATIC TO INTRA-ABDOMINAL LYMPH NODE: Primary | ICD-10-CM

## 2023-11-27 DIAGNOSIS — Z95.828 PORT-A-CATH IN PLACE: ICD-10-CM

## 2023-11-27 LAB
ALBUMIN SERPL-MCNC: 3.9 G/DL (ref 3.5–5.2)
ALBUMIN/GLOB SERPL: 1.4 G/DL
ALP SERPL-CCNC: 63 U/L (ref 39–117)
ALT SERPL W P-5'-P-CCNC: 17 U/L (ref 1–41)
ANION GAP SERPL CALCULATED.3IONS-SCNC: 10 MMOL/L (ref 5–15)
AST SERPL-CCNC: 20 U/L (ref 1–40)
BASOPHILS # BLD AUTO: 0.02 10*3/MM3 (ref 0–0.2)
BASOPHILS NFR BLD AUTO: 0.3 % (ref 0–1.5)
BILIRUB SERPL-MCNC: <0.2 MG/DL (ref 0–1.2)
BUN SERPL-MCNC: 11 MG/DL (ref 6–20)
BUN/CREAT SERPL: 14.7 (ref 7–25)
CALCIUM SPEC-SCNC: 9.5 MG/DL (ref 8.6–10.5)
CHLORIDE SERPL-SCNC: 103 MMOL/L (ref 98–107)
CO2 SERPL-SCNC: 26 MMOL/L (ref 22–29)
CREAT BLDA-MCNC: 0.9 MG/DL (ref 0.6–1.3)
CREAT SERPL-MCNC: 0.75 MG/DL (ref 0.76–1.27)
DEPRECATED RDW RBC AUTO: 50.6 FL (ref 37–54)
EGFRCR SERPLBLD CKD-EPI 2021: 103.4 ML/MIN/1.73
EGFRCR SERPLBLD CKD-EPI 2021: 109.3 ML/MIN/1.73
EOSINOPHIL # BLD AUTO: 0.27 10*3/MM3 (ref 0–0.4)
EOSINOPHIL NFR BLD AUTO: 3.5 % (ref 0.3–6.2)
ERYTHROCYTE [DISTWIDTH] IN BLOOD BY AUTOMATED COUNT: 14.1 % (ref 12.3–15.4)
GLOBULIN UR ELPH-MCNC: 2.7 GM/DL
GLUCOSE SERPL-MCNC: 59 MG/DL (ref 65–99)
HCT VFR BLD AUTO: 47.7 % (ref 37.5–51)
HGB BLD-MCNC: 15.9 G/DL (ref 13–17.7)
LYMPHOCYTES # BLD AUTO: 2.43 10*3/MM3 (ref 0.7–3.1)
LYMPHOCYTES NFR BLD AUTO: 31.8 % (ref 19.6–45.3)
MCH RBC QN AUTO: 33 PG (ref 26.6–33)
MCHC RBC AUTO-ENTMCNC: 33.3 G/DL (ref 31.5–35.7)
MCV RBC AUTO: 99 FL (ref 79–97)
MONOCYTES # BLD AUTO: 0.74 10*3/MM3 (ref 0.1–0.9)
MONOCYTES NFR BLD AUTO: 9.7 % (ref 5–12)
NEUTROPHILS NFR BLD AUTO: 4.19 10*3/MM3 (ref 1.7–7)
NEUTROPHILS NFR BLD AUTO: 54.7 % (ref 42.7–76)
PLATELET # BLD AUTO: 274 10*3/MM3 (ref 140–450)
PMV BLD AUTO: 9.4 FL (ref 6–12)
POTASSIUM SERPL-SCNC: 4.4 MMOL/L (ref 3.5–5.2)
PROT SERPL-MCNC: 6.6 G/DL (ref 6–8.5)
RAD ONC ARIA COURSE ID: NORMAL
RAD ONC ARIA COURSE LAST TREATMENT DATE: NORMAL
RAD ONC ARIA COURSE START DATE: NORMAL
RAD ONC ARIA COURSE TREATMENT ELAPSED DAYS: 0
RAD ONC ARIA FIRST TREATMENT DATE: NORMAL
RAD ONC ARIA PLAN FRACTIONS TREATED TO DATE: 1
RAD ONC ARIA PLAN ID: NORMAL
RAD ONC ARIA PLAN PRESCRIBED DOSE PER FRACTION: 1.8 GY
RAD ONC ARIA PLAN PRIMARY REFERENCE POINT: NORMAL
RAD ONC ARIA PLAN TOTAL FRACTIONS PRESCRIBED: 23
RAD ONC ARIA PLAN TOTAL PRESCRIBED DOSE: 4140 CGY
RAD ONC ARIA REFERENCE POINT DOSAGE GIVEN TO DATE: 1.8 GY
RAD ONC ARIA REFERENCE POINT ID: NORMAL
RAD ONC ARIA REFERENCE POINT SESSION DOSAGE GIVEN: 1.8 GY
RBC # BLD AUTO: 4.82 10*6/MM3 (ref 4.14–5.8)
SODIUM SERPL-SCNC: 139 MMOL/L (ref 136–145)
WBC NRBC COR # BLD AUTO: 7.65 10*3/MM3 (ref 3.4–10.8)

## 2023-11-27 PROCEDURE — 96417 CHEMO IV INFUS EACH ADDL SEQ: CPT

## 2023-11-27 PROCEDURE — 25810000003 SODIUM CHLORIDE 0.9 % SOLUTION: Performed by: INTERNAL MEDICINE

## 2023-11-27 PROCEDURE — 77386: CPT | Performed by: RADIOLOGY

## 2023-11-27 PROCEDURE — 85025 COMPLETE CBC W/AUTO DIFF WBC: CPT | Performed by: INTERNAL MEDICINE

## 2023-11-27 PROCEDURE — 96375 TX/PRO/DX INJ NEW DRUG ADDON: CPT

## 2023-11-27 PROCEDURE — 96413 CHEMO IV INFUSION 1 HR: CPT

## 2023-11-27 PROCEDURE — 25010000002 PALONOSETRON 0.25 MG/5ML SOLUTION PREFILLED SYRINGE: Performed by: INTERNAL MEDICINE

## 2023-11-27 PROCEDURE — 25010000002 PACLITAXEL PER 1 MG: Performed by: INTERNAL MEDICINE

## 2023-11-27 PROCEDURE — 25010000002 ONDANSETRON PER 1 MG: Performed by: INTERNAL MEDICINE

## 2023-11-27 PROCEDURE — 25010000002 HYDROCORTISONE SOD SUC (PF) 100 MG RECONSTITUTED SOLUTION: Performed by: INTERNAL MEDICINE

## 2023-11-27 PROCEDURE — 25810000003 SODIUM CHLORIDE 0.9 % SOLUTION 250 ML FLEX CONT: Performed by: INTERNAL MEDICINE

## 2023-11-27 PROCEDURE — 25010000002 DEXAMETHASONE SODIUM PHOSPHATE 120 MG/30ML SOLUTION: Performed by: INTERNAL MEDICINE

## 2023-11-27 PROCEDURE — 82565 ASSAY OF CREATININE: CPT

## 2023-11-27 PROCEDURE — 96376 TX/PRO/DX INJ SAME DRUG ADON: CPT

## 2023-11-27 PROCEDURE — 25010000002 CARBOPLATIN PER 50 MG: Performed by: INTERNAL MEDICINE

## 2023-11-27 PROCEDURE — 25010000002 HEPARIN LOCK FLUSH PER 10 UNITS: Performed by: INTERNAL MEDICINE

## 2023-11-27 PROCEDURE — 25010000002 DIPHENHYDRAMINE PER 50 MG: Performed by: INTERNAL MEDICINE

## 2023-11-27 PROCEDURE — 96367 TX/PROPH/DG ADDL SEQ IV INF: CPT

## 2023-11-27 PROCEDURE — 80053 COMPREHEN METABOLIC PANEL: CPT | Performed by: INTERNAL MEDICINE

## 2023-11-27 RX ORDER — SODIUM CHLORIDE 0.9 % (FLUSH) 0.9 %
10 SYRINGE (ML) INJECTION AS NEEDED
Status: DISCONTINUED | OUTPATIENT
Start: 2023-11-27 | End: 2023-11-28 | Stop reason: HOSPADM

## 2023-11-27 RX ORDER — FAMOTIDINE 10 MG/ML
20 INJECTION, SOLUTION INTRAVENOUS ONCE
Status: COMPLETED | OUTPATIENT
Start: 2023-11-27 | End: 2023-11-27

## 2023-11-27 RX ORDER — HEPARIN SODIUM (PORCINE) LOCK FLUSH IV SOLN 100 UNIT/ML 100 UNIT/ML
500 SOLUTION INTRAVENOUS AS NEEDED
Status: CANCELLED | OUTPATIENT
Start: 2023-11-27

## 2023-11-27 RX ORDER — PALONOSETRON 0.05 MG/ML
0.25 INJECTION, SOLUTION INTRAVENOUS ONCE
Status: COMPLETED | OUTPATIENT
Start: 2023-11-27 | End: 2023-11-27

## 2023-11-27 RX ORDER — ONDANSETRON 2 MG/ML
4 INJECTION INTRAMUSCULAR; INTRAVENOUS ONCE
Status: COMPLETED | OUTPATIENT
Start: 2023-11-27 | End: 2023-11-27

## 2023-11-27 RX ORDER — HEPARIN SODIUM (PORCINE) LOCK FLUSH IV SOLN 100 UNIT/ML 100 UNIT/ML
500 SOLUTION INTRAVENOUS AS NEEDED
Status: DISCONTINUED | OUTPATIENT
Start: 2023-11-27 | End: 2023-11-28 | Stop reason: HOSPADM

## 2023-11-27 RX ORDER — SODIUM CHLORIDE 9 MG/ML
20 INJECTION, SOLUTION INTRAVENOUS ONCE
Status: COMPLETED | OUTPATIENT
Start: 2023-11-27 | End: 2023-11-27

## 2023-11-27 RX ORDER — SODIUM CHLORIDE 9 MG/ML
500 INJECTION, SOLUTION INTRAVENOUS ONCE
Status: COMPLETED | OUTPATIENT
Start: 2023-11-27 | End: 2023-11-27

## 2023-11-27 RX ORDER — FAMOTIDINE 10 MG/ML
20 INJECTION, SOLUTION INTRAVENOUS AS NEEDED
Status: CANCELLED | OUTPATIENT
Start: 2023-11-27

## 2023-11-27 RX ORDER — DIPHENHYDRAMINE HYDROCHLORIDE 50 MG/ML
50 INJECTION INTRAMUSCULAR; INTRAVENOUS AS NEEDED
Status: CANCELLED | OUTPATIENT
Start: 2023-11-27

## 2023-11-27 RX ORDER — SODIUM CHLORIDE 0.9 % (FLUSH) 0.9 %
10 SYRINGE (ML) INJECTION AS NEEDED
Status: CANCELLED | OUTPATIENT
Start: 2023-11-27

## 2023-11-27 RX ORDER — FAMOTIDINE 10 MG/ML
20 INJECTION, SOLUTION INTRAVENOUS AS NEEDED
Status: COMPLETED | OUTPATIENT
Start: 2023-11-27 | End: 2023-11-27

## 2023-11-27 RX ORDER — DIPHENHYDRAMINE HYDROCHLORIDE 50 MG/ML
50 INJECTION INTRAMUSCULAR; INTRAVENOUS AS NEEDED
Status: COMPLETED | OUTPATIENT
Start: 2023-11-27 | End: 2023-11-27

## 2023-11-27 RX ADMIN — FAMOTIDINE 20 MG: 10 INJECTION, SOLUTION INTRAVENOUS at 09:32

## 2023-11-27 RX ADMIN — DIPHENHYDRAMINE HYDROCHLORIDE 50 MG: 50 INJECTION, SOLUTION INTRAMUSCULAR; INTRAVENOUS at 08:36

## 2023-11-27 RX ADMIN — PACLITAXEL 100 MG: 6 INJECTION, SOLUTION, CONCENTRATE INTRAVENOUS at 09:18

## 2023-11-27 RX ADMIN — DEXAMETHASONE SODIUM PHOSPHATE 12 MG: 4 INJECTION, SOLUTION INTRA-ARTICULAR; INTRALESIONAL; INTRAMUSCULAR; INTRAVENOUS; SOFT TISSUE at 08:57

## 2023-11-27 RX ADMIN — DIPHENHYDRAMINE HYDROCHLORIDE 50 MG: 50 INJECTION INTRAMUSCULAR; INTRAVENOUS at 09:27

## 2023-11-27 RX ADMIN — HYDROCORTISONE SODIUM SUCCINATE 100 MG: 100 INJECTION, POWDER, FOR SOLUTION INTRAMUSCULAR; INTRAVENOUS at 09:31

## 2023-11-27 RX ADMIN — PALONOSETRON 0.25 MG: 0.25 INJECTION, SOLUTION INTRAVENOUS at 08:57

## 2023-11-27 RX ADMIN — SODIUM CHLORIDE 20 ML/HR: 9 INJECTION, SOLUTION INTRAVENOUS at 08:36

## 2023-11-27 RX ADMIN — FAMOTIDINE 20 MG: 10 INJECTION, SOLUTION INTRAVENOUS at 08:37

## 2023-11-27 RX ADMIN — Medication 10 ML: at 11:38

## 2023-11-27 RX ADMIN — SODIUM CHLORIDE 500 ML/HR: 9 INJECTION, SOLUTION INTRAVENOUS at 09:45

## 2023-11-27 RX ADMIN — CARBOPLATIN 250 MG: 10 INJECTION INTRAVENOUS at 11:08

## 2023-11-27 RX ADMIN — ONDANSETRON 4 MG: 2 INJECTION, SOLUTION INTRAMUSCULAR; INTRAVENOUS at 09:30

## 2023-11-27 RX ADMIN — HEPARIN 500 UNITS: 100 SYRINGE at 11:38

## 2023-11-27 NOTE — TELEPHONE ENCOUNTER
RELAY  ----- Message from Justa Castano MD sent at 11/27/2023  4:27 PM EST -----  Glucose was slightly low on today's test.  Were you able to complete the test or did you have to treat your low blood sugar.  Please let us know how things are going.

## 2023-11-27 NOTE — PROGRESS NOTES
Benadryl dose increased from 25 mg to 50 mg per Dr. Mueller starting on cycle 2 due to patient reaction in cycle 1.

## 2023-11-27 NOTE — PROGRESS NOTES
"NAME: Jourdan Lebron DATE: 2023   : 1970    MRN: [id number] DIAGNOSIS:   Encounter Diagnosis   Name Primary?    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 ID Energy Dose/Fx (cGy) #Fx Dose Correction (cGy) Total Dose (cGy) Start Date End Date Elapsed Days   Esophagus Esophagus 6X 180  0 180 2023 0         General:     Review of Systems  [x] No new complaints [] Fever/chills /night sweats  [] Decreased energy level [] Decreased appetite [] Oxygen:  L/min  [] Dry cough   [] Productive cough  [] SOB  [] Hemoptysis  [] Dysphagia      [x] Fatigue, severity: 0 [x] Pain, severity: 0, location: denies pain  [] Pain medication regimen:    [] Esophagitis regimen:    [] Skin regimen:      Subjective:  He started treatments today, no concerns or complaints at this time.    KPS: 90     Vital Signs: /74   Pulse 51   Temp 98.2 °F (36.8 °C) (Oral)   Resp 18   Ht 180.3 cm (71\")   Wt 72.8 kg (160 lb 7.9 oz)   SpO2 100%   BMI 22.38 kg/m²     Weight:   Wt Readings from Last 3 Encounters:   23 72.8 kg (160 lb 7.9 oz)   23 72.8 kg (160 lb 8 oz)   23 73.5 kg (162 lb)       Medication:   Current Outpatient Medications:     ALPRAZolam (XANAX) 0.25 MG tablet, Take 1 tablet by mouth 3 (Three) Times a Day As Needed for Anxiety. for anxiety, Disp: 90 tablet, Rfl: 3    aspirin 81 MG chewable tablet, Chew 1 tablet Daily., Disp: , Rfl:     Evolocumab (REPATHA) solution prefilled syringe injection, Inject 1 mL under the skin into the appropriate area as directed Every 14 (Fourteen) Days., Disp: 6 mL, Rfl: 3    FLUoxetine (PROzac) 20 MG capsule, Take 1 capsule by mouth Daily., Disp: 90 capsule, Rfl: 1    fluticasone (FLONASE) 50 MCG/ACT nasal spray, 1 spray into the nostril(s) as directed by " provider Daily., Disp: , Rfl:     lidocaine-prilocaine (EMLA) 2.5-2.5 % cream, Apply 1 application  topically to the appropriate area as directed As Needed (apply 60 minutes prior to port access)., Disp: 30 g, Rfl: 2    ondansetron (ZOFRAN) 8 MG tablet, Take 1 tablet by mouth 3 (Three) Times a Day As Needed for Nausea or Vomiting., Disp: 30 tablet, Rfl: 5  No current facility-administered medications for this visit.    Facility-Administered Medications Ordered in Other Visits:     heparin injection 500 Units, 500 Units, Intravenous, Ervin SEGAL Alfonso, MD, 500 Units at 11/27/23 1138    sodium chloride 0.9 % flush 10 mL, 10 mL, Intravenous, PRN, Oliverio Mueller MD, 10 mL at 11/27/23 1138     LABS (Reviewed):  Hematology  WBC   Date Value Ref Range Status   11/27/2023 7.65 3.40 - 10.80 10*3/mm3 Final     RBC   Date Value Ref Range Status   11/27/2023 4.82 4.14 - 5.80 10*6/mm3 Final     Hemoglobin   Date Value Ref Range Status   11/27/2023 15.9 13.0 - 17.7 g/dL Final     Hematocrit   Date Value Ref Range Status   11/27/2023 47.7 37.5 - 51.0 % Final     Platelets   Date Value Ref Range Status   11/27/2023 274 140 - 450 10*3/mm3 Final     Chemistry   Lab Results   Component Value Date    GLUCOSE 59 (L) 11/27/2023    BUN 11 11/27/2023    CREATININE 0.90 11/27/2023    EGFRIFNONA 67 02/24/2022    BCR 14.7 11/27/2023    K 4.4 11/27/2023    CO2 26.0 11/27/2023    CALCIUM 9.5 11/27/2023    ALBUMIN 3.9 11/27/2023    LABIL2 1.3 09/25/2018    AST 20 11/27/2023    ALT 17 11/27/2023         Physical Exam:     Pulmonary: [] Cough:     [] Dyspnea:  Neck:  [] Lymphadenopathy  Lungs:  [x] Clear to auscultation  CVS:  [x] Regular rate & rhythm  Skin:  stGstrstastdstest:st st1st GI:  [x] Dysphagia: baseline    [x] Esophagitis: none    Comments/Notes:      [x] Review of labs, images, dosimetry, dose delivered, & treatment parameters.    Comments:     [x] Patient treatment setup reviewed.    Comments:     Recommendations:  continue XRT and supportive  measures    [x] Continue RT  [] Change RT Plan [] Hold RT, length:        Approved Electronically By:  Geovany Sabillon MD, 11/27/2023, 12:19 EST      Confidentiality of this medical record shall be maintained except when use or disclosure is required or permitted by law, regulation or written authorization by the patient.

## 2023-11-27 NOTE — PROGRESS NOTES
Glucose was slightly low on today's test.  Were you able to complete the test or did you have to treat your low blood sugar.  Please let us know how things are going.

## 2023-11-27 NOTE — PROGRESS NOTES
Patient came in for first time Carbo, taxol without complaints, all questions answered to best of my ability.    Patients taxol ran about five minutes before patient complained of feeling flushed and hot, as well as nauseated. Vitals WNL. Patient then complained of chest tightness. Vitals WNL. Emergency meds given, see MAR, fluids began at 999. After meds were given patient states symptoms had resolved and vitals WNL. MICH Keys CN spoke with Dr. Mueller who states to give patient 30 minutes from last med given and then try again at 75% of start rate, reassess and then bump rate if tolerated. Patient started back at a rate of 200. Monitored patient for 15 minutes in room with no reaction. At 30 Minutes rate increased to 235, tolerated for 30 minutes. Rate increased to goal rate of 266. Remaining treatment tolerated. Patient denies further needs at this time, AVS given, patient sent to radiation.

## 2023-11-28 ENCOUNTER — HOSPITAL ENCOUNTER (OUTPATIENT)
Dept: RADIATION ONCOLOGY | Facility: HOSPITAL | Age: 53
Discharge: HOME | End: 2023-11-28

## 2023-11-28 LAB
QT INTERVAL: 419 MS
QTC INTERVAL: 462 MS
RAD ONC ARIA COURSE ID: NORMAL
RAD ONC ARIA COURSE LAST TREATMENT DATE: NORMAL
RAD ONC ARIA COURSE START DATE: NORMAL
RAD ONC ARIA COURSE TREATMENT ELAPSED DAYS: 1
RAD ONC ARIA FIRST TREATMENT DATE: NORMAL
RAD ONC ARIA PLAN FRACTIONS TREATED TO DATE: 2
RAD ONC ARIA PLAN ID: NORMAL
RAD ONC ARIA PLAN PRESCRIBED DOSE PER FRACTION: 1.8 GY
RAD ONC ARIA PLAN PRIMARY REFERENCE POINT: NORMAL
RAD ONC ARIA PLAN TOTAL FRACTIONS PRESCRIBED: 23
RAD ONC ARIA PLAN TOTAL PRESCRIBED DOSE: 4140 CGY
RAD ONC ARIA REFERENCE POINT DOSAGE GIVEN TO DATE: 3.6 GY
RAD ONC ARIA REFERENCE POINT ID: NORMAL
RAD ONC ARIA REFERENCE POINT SESSION DOSAGE GIVEN: 1.8 GY

## 2023-11-28 PROCEDURE — 77386: CPT | Performed by: RADIOLOGY

## 2023-11-29 ENCOUNTER — HOSPITAL ENCOUNTER (OUTPATIENT)
Dept: RADIATION ONCOLOGY | Facility: HOSPITAL | Age: 53
Discharge: HOME | End: 2023-11-29

## 2023-11-29 LAB
RAD ONC ARIA COURSE ID: NORMAL
RAD ONC ARIA COURSE LAST TREATMENT DATE: NORMAL
RAD ONC ARIA COURSE START DATE: NORMAL
RAD ONC ARIA COURSE TREATMENT ELAPSED DAYS: 2
RAD ONC ARIA FIRST TREATMENT DATE: NORMAL
RAD ONC ARIA PLAN FRACTIONS TREATED TO DATE: 3
RAD ONC ARIA PLAN ID: NORMAL
RAD ONC ARIA PLAN PRESCRIBED DOSE PER FRACTION: 1.8 GY
RAD ONC ARIA PLAN PRIMARY REFERENCE POINT: NORMAL
RAD ONC ARIA PLAN TOTAL FRACTIONS PRESCRIBED: 23
RAD ONC ARIA PLAN TOTAL PRESCRIBED DOSE: 4140 CGY
RAD ONC ARIA REFERENCE POINT DOSAGE GIVEN TO DATE: 5.4 GY
RAD ONC ARIA REFERENCE POINT ID: NORMAL
RAD ONC ARIA REFERENCE POINT SESSION DOSAGE GIVEN: 1.8 GY

## 2023-11-29 PROCEDURE — 77386: CPT | Performed by: RADIOLOGY

## 2023-11-30 ENCOUNTER — HOSPITAL ENCOUNTER (OUTPATIENT)
Dept: RADIATION ONCOLOGY | Facility: HOSPITAL | Age: 53
Discharge: HOME | End: 2023-11-30

## 2023-11-30 LAB
RAD ONC ARIA COURSE ID: NORMAL
RAD ONC ARIA COURSE LAST TREATMENT DATE: NORMAL
RAD ONC ARIA COURSE START DATE: NORMAL
RAD ONC ARIA COURSE TREATMENT ELAPSED DAYS: 3
RAD ONC ARIA FIRST TREATMENT DATE: NORMAL
RAD ONC ARIA PLAN FRACTIONS TREATED TO DATE: 4
RAD ONC ARIA PLAN ID: NORMAL
RAD ONC ARIA PLAN PRESCRIBED DOSE PER FRACTION: 1.8 GY
RAD ONC ARIA PLAN PRIMARY REFERENCE POINT: NORMAL
RAD ONC ARIA PLAN TOTAL FRACTIONS PRESCRIBED: 23
RAD ONC ARIA PLAN TOTAL PRESCRIBED DOSE: 4140 CGY
RAD ONC ARIA REFERENCE POINT DOSAGE GIVEN TO DATE: 7.2 GY
RAD ONC ARIA REFERENCE POINT ID: NORMAL
RAD ONC ARIA REFERENCE POINT SESSION DOSAGE GIVEN: 1.8 GY

## 2023-11-30 PROCEDURE — 77336 RADIATION PHYSICS CONSULT: CPT | Performed by: RADIOLOGY

## 2023-11-30 PROCEDURE — 77386: CPT | Performed by: RADIOLOGY

## 2023-12-01 ENCOUNTER — HOSPITAL ENCOUNTER (OUTPATIENT)
Dept: RADIATION ONCOLOGY | Facility: HOSPITAL | Age: 53
Setting detail: RADIATION/ONCOLOGY SERIES
End: 2023-12-01
Payer: COMMERCIAL

## 2023-12-01 ENCOUNTER — HOSPITAL ENCOUNTER (OUTPATIENT)
Dept: RADIATION ONCOLOGY | Facility: HOSPITAL | Age: 53
Discharge: HOME | End: 2023-12-01

## 2023-12-01 LAB
RAD ONC ARIA COURSE ID: NORMAL
RAD ONC ARIA COURSE LAST TREATMENT DATE: NORMAL
RAD ONC ARIA COURSE START DATE: NORMAL
RAD ONC ARIA COURSE TREATMENT ELAPSED DAYS: 4
RAD ONC ARIA FIRST TREATMENT DATE: NORMAL
RAD ONC ARIA PLAN FRACTIONS TREATED TO DATE: 5
RAD ONC ARIA PLAN ID: NORMAL
RAD ONC ARIA PLAN PRESCRIBED DOSE PER FRACTION: 1.8 GY
RAD ONC ARIA PLAN PRIMARY REFERENCE POINT: NORMAL
RAD ONC ARIA PLAN TOTAL FRACTIONS PRESCRIBED: 23
RAD ONC ARIA PLAN TOTAL PRESCRIBED DOSE: 4140 CGY
RAD ONC ARIA REFERENCE POINT DOSAGE GIVEN TO DATE: 9 GY
RAD ONC ARIA REFERENCE POINT ID: NORMAL
RAD ONC ARIA REFERENCE POINT SESSION DOSAGE GIVEN: 1.8 GY

## 2023-12-01 PROCEDURE — 77386: CPT | Performed by: RADIOLOGY

## 2023-12-04 ENCOUNTER — HOSPITAL ENCOUNTER (OUTPATIENT)
Dept: ONCOLOGY | Facility: HOSPITAL | Age: 53
Discharge: HOME | End: 2023-12-04
Admitting: INTERNAL MEDICINE
Payer: COMMERCIAL

## 2023-12-04 ENCOUNTER — RADIATION ONCOLOGY WEEKLY ASSESSMENT (OUTPATIENT)
Dept: RADIATION ONCOLOGY | Facility: HOSPITAL | Age: 53
End: 2023-12-04
Payer: COMMERCIAL

## 2023-12-04 ENCOUNTER — HOSPITAL ENCOUNTER (OUTPATIENT)
Dept: RADIATION ONCOLOGY | Facility: HOSPITAL | Age: 53
Discharge: HOME | End: 2023-12-04
Payer: COMMERCIAL

## 2023-12-04 VITALS
SYSTOLIC BLOOD PRESSURE: 103 MMHG | WEIGHT: 159 LBS | HEIGHT: 71 IN | RESPIRATION RATE: 18 BRPM | HEART RATE: 65 BPM | TEMPERATURE: 98.3 F | DIASTOLIC BLOOD PRESSURE: 70 MMHG | OXYGEN SATURATION: 100 % | BODY MASS INDEX: 22.26 KG/M2

## 2023-12-04 VITALS
HEART RATE: 76 BPM | TEMPERATURE: 97.3 F | RESPIRATION RATE: 16 BRPM | SYSTOLIC BLOOD PRESSURE: 101 MMHG | HEIGHT: 71 IN | OXYGEN SATURATION: 99 % | DIASTOLIC BLOOD PRESSURE: 65 MMHG | WEIGHT: 159 LBS | BODY MASS INDEX: 22.26 KG/M2

## 2023-12-04 DIAGNOSIS — C77.2 ADENOCARCINOMA OF ESOPHAGUS METASTATIC TO INTRA-ABDOMINAL LYMPH NODE: Primary | ICD-10-CM

## 2023-12-04 DIAGNOSIS — K20.80 RADIATION-INDUCED ESOPHAGITIS: ICD-10-CM

## 2023-12-04 DIAGNOSIS — T66.XXXA RADIATION-INDUCED ESOPHAGITIS: ICD-10-CM

## 2023-12-04 DIAGNOSIS — Z95.828 PORT-A-CATH IN PLACE: ICD-10-CM

## 2023-12-04 DIAGNOSIS — C15.9 ADENOCARCINOMA OF ESOPHAGUS METASTATIC TO INTRA-ABDOMINAL LYMPH NODE: Primary | ICD-10-CM

## 2023-12-04 LAB
ALBUMIN SERPL-MCNC: 4 G/DL (ref 3.5–5.2)
ALBUMIN/GLOB SERPL: 1.5 G/DL
ALP SERPL-CCNC: 57 U/L (ref 39–117)
ALT SERPL W P-5'-P-CCNC: 15 U/L (ref 1–41)
ANION GAP SERPL CALCULATED.3IONS-SCNC: 9 MMOL/L (ref 5–15)
AST SERPL-CCNC: 13 U/L (ref 1–40)
BASOPHILS # BLD AUTO: 0.02 10*3/MM3 (ref 0–0.2)
BASOPHILS NFR BLD AUTO: 0.5 % (ref 0–1.5)
BILIRUB SERPL-MCNC: 0.2 MG/DL (ref 0–1.2)
BUN SERPL-MCNC: 11 MG/DL (ref 6–20)
BUN/CREAT SERPL: 15.3 (ref 7–25)
CALCIUM SPEC-SCNC: 9.5 MG/DL (ref 8.6–10.5)
CHLORIDE SERPL-SCNC: 104 MMOL/L (ref 98–107)
CO2 SERPL-SCNC: 26 MMOL/L (ref 22–29)
CREAT BLDA-MCNC: 0.8 MG/DL (ref 0.6–1.3)
CREAT SERPL-MCNC: 0.72 MG/DL (ref 0.76–1.27)
DEPRECATED RDW RBC AUTO: 48.2 FL (ref 37–54)
EGFRCR SERPLBLD CKD-EPI 2021: 107.1 ML/MIN/1.73
EGFRCR SERPLBLD CKD-EPI 2021: 110.6 ML/MIN/1.73
EOSINOPHIL # BLD AUTO: 0.22 10*3/MM3 (ref 0–0.4)
EOSINOPHIL NFR BLD AUTO: 5.3 % (ref 0.3–6.2)
ERYTHROCYTE [DISTWIDTH] IN BLOOD BY AUTOMATED COUNT: 13.5 % (ref 12.3–15.4)
GLOBULIN UR ELPH-MCNC: 2.6 GM/DL
GLUCOSE SERPL-MCNC: 79 MG/DL (ref 65–99)
HCT VFR BLD AUTO: 45.8 % (ref 37.5–51)
HGB BLD-MCNC: 15.3 G/DL (ref 13–17.7)
LYMPHOCYTES # BLD AUTO: 0.75 10*3/MM3 (ref 0.7–3.1)
LYMPHOCYTES NFR BLD AUTO: 18.1 % (ref 19.6–45.3)
MCH RBC QN AUTO: 33 PG (ref 26.6–33)
MCHC RBC AUTO-ENTMCNC: 33.4 G/DL (ref 31.5–35.7)
MCV RBC AUTO: 98.9 FL (ref 79–97)
MONOCYTES # BLD AUTO: 0.46 10*3/MM3 (ref 0.1–0.9)
MONOCYTES NFR BLD AUTO: 11.1 % (ref 5–12)
NEUTROPHILS NFR BLD AUTO: 2.7 10*3/MM3 (ref 1.7–7)
NEUTROPHILS NFR BLD AUTO: 65 % (ref 42.7–76)
PLATELET # BLD AUTO: 251 10*3/MM3 (ref 140–450)
PMV BLD AUTO: 9.6 FL (ref 6–12)
POTASSIUM SERPL-SCNC: 4.1 MMOL/L (ref 3.5–5.2)
PROT SERPL-MCNC: 6.6 G/DL (ref 6–8.5)
RAD ONC ARIA COURSE ID: NORMAL
RAD ONC ARIA COURSE LAST TREATMENT DATE: NORMAL
RAD ONC ARIA COURSE START DATE: NORMAL
RAD ONC ARIA COURSE TREATMENT ELAPSED DAYS: 7
RAD ONC ARIA FIRST TREATMENT DATE: NORMAL
RAD ONC ARIA PLAN FRACTIONS TREATED TO DATE: 6
RAD ONC ARIA PLAN ID: NORMAL
RAD ONC ARIA PLAN PRESCRIBED DOSE PER FRACTION: 1.8 GY
RAD ONC ARIA PLAN PRIMARY REFERENCE POINT: NORMAL
RAD ONC ARIA PLAN TOTAL FRACTIONS PRESCRIBED: 23
RAD ONC ARIA PLAN TOTAL PRESCRIBED DOSE: 4140 CGY
RAD ONC ARIA REFERENCE POINT DOSAGE GIVEN TO DATE: 10.8 GY
RAD ONC ARIA REFERENCE POINT ID: NORMAL
RAD ONC ARIA REFERENCE POINT SESSION DOSAGE GIVEN: 1.8 GY
RBC # BLD AUTO: 4.63 10*6/MM3 (ref 4.14–5.8)
SODIUM SERPL-SCNC: 139 MMOL/L (ref 136–145)
WBC NRBC COR # BLD AUTO: 4.15 10*3/MM3 (ref 3.4–10.8)

## 2023-12-04 PROCEDURE — 80053 COMPREHEN METABOLIC PANEL: CPT | Performed by: INTERNAL MEDICINE

## 2023-12-04 PROCEDURE — 96367 TX/PROPH/DG ADDL SEQ IV INF: CPT

## 2023-12-04 PROCEDURE — 96413 CHEMO IV INFUSION 1 HR: CPT

## 2023-12-04 PROCEDURE — 77427 RADIATION TX MANAGEMENT X5: CPT | Performed by: RADIOLOGY

## 2023-12-04 PROCEDURE — 25010000002 CARBOPLATIN PER 50 MG: Performed by: INTERNAL MEDICINE

## 2023-12-04 PROCEDURE — 96375 TX/PRO/DX INJ NEW DRUG ADDON: CPT

## 2023-12-04 PROCEDURE — 25010000002 DIPHENHYDRAMINE PER 50 MG: Performed by: INTERNAL MEDICINE

## 2023-12-04 PROCEDURE — 77386: CPT | Performed by: RADIOLOGY

## 2023-12-04 PROCEDURE — FACE2FACE: Performed by: RADIOLOGY

## 2023-12-04 PROCEDURE — 82565 ASSAY OF CREATININE: CPT

## 2023-12-04 PROCEDURE — 85025 COMPLETE CBC W/AUTO DIFF WBC: CPT | Performed by: INTERNAL MEDICINE

## 2023-12-04 PROCEDURE — 96417 CHEMO IV INFUS EACH ADDL SEQ: CPT

## 2023-12-04 PROCEDURE — 25810000003 SODIUM CHLORIDE 0.9 % SOLUTION: Performed by: INTERNAL MEDICINE

## 2023-12-04 PROCEDURE — 25810000003 SODIUM CHLORIDE 0.9 % SOLUTION 250 ML FLEX CONT: Performed by: INTERNAL MEDICINE

## 2023-12-04 PROCEDURE — 77014 CHG CT GUIDANCE RADIATION THERAPY FLDS PLACEMENT: CPT | Performed by: RADIOLOGY

## 2023-12-04 PROCEDURE — 25010000002 DEXAMETHASONE SODIUM PHOSPHATE 120 MG/30ML SOLUTION: Performed by: INTERNAL MEDICINE

## 2023-12-04 PROCEDURE — 25010000002 HEPARIN LOCK FLUSH PER 10 UNITS: Performed by: INTERNAL MEDICINE

## 2023-12-04 PROCEDURE — 25010000002 PACLITAXEL PER 1 MG: Performed by: INTERNAL MEDICINE

## 2023-12-04 PROCEDURE — 25010000002 PALONOSETRON 0.25 MG/5ML SOLUTION PREFILLED SYRINGE: Performed by: INTERNAL MEDICINE

## 2023-12-04 RX ORDER — FAMOTIDINE 10 MG/ML
20 INJECTION, SOLUTION INTRAVENOUS AS NEEDED
Status: CANCELLED | OUTPATIENT
Start: 2023-12-04

## 2023-12-04 RX ORDER — DIPHENHYDRAMINE HYDROCHLORIDE 50 MG/ML
50 INJECTION INTRAMUSCULAR; INTRAVENOUS AS NEEDED
Status: CANCELLED | OUTPATIENT
Start: 2023-12-04

## 2023-12-04 RX ORDER — FAMOTIDINE 10 MG/ML
20 INJECTION, SOLUTION INTRAVENOUS ONCE
Status: COMPLETED | OUTPATIENT
Start: 2023-12-04 | End: 2023-12-04

## 2023-12-04 RX ORDER — FAMOTIDINE 10 MG/ML
20 INJECTION, SOLUTION INTRAVENOUS ONCE
Status: CANCELLED | OUTPATIENT
Start: 2023-12-04

## 2023-12-04 RX ORDER — SODIUM CHLORIDE 9 MG/ML
20 INJECTION, SOLUTION INTRAVENOUS ONCE
Status: COMPLETED | OUTPATIENT
Start: 2023-12-04 | End: 2023-12-04

## 2023-12-04 RX ORDER — PALONOSETRON 0.05 MG/ML
0.25 INJECTION, SOLUTION INTRAVENOUS ONCE
Status: COMPLETED | OUTPATIENT
Start: 2023-12-04 | End: 2023-12-04

## 2023-12-04 RX ORDER — PALONOSETRON 0.05 MG/ML
0.25 INJECTION, SOLUTION INTRAVENOUS ONCE
Status: CANCELLED | OUTPATIENT
Start: 2023-12-04

## 2023-12-04 RX ORDER — HEPARIN SODIUM (PORCINE) LOCK FLUSH IV SOLN 100 UNIT/ML 100 UNIT/ML
500 SOLUTION INTRAVENOUS AS NEEDED
Status: DISCONTINUED | OUTPATIENT
Start: 2023-12-04 | End: 2023-12-05 | Stop reason: HOSPADM

## 2023-12-04 RX ORDER — SODIUM CHLORIDE 9 MG/ML
20 INJECTION, SOLUTION INTRAVENOUS ONCE
Status: CANCELLED | OUTPATIENT
Start: 2023-12-04

## 2023-12-04 RX ORDER — SODIUM CHLORIDE 0.9 % (FLUSH) 0.9 %
10 SYRINGE (ML) INJECTION AS NEEDED
Status: DISCONTINUED | OUTPATIENT
Start: 2023-12-04 | End: 2023-12-05 | Stop reason: HOSPADM

## 2023-12-04 RX ADMIN — DEXAMETHASONE SODIUM PHOSPHATE 12 MG: 4 INJECTION, SOLUTION INTRA-ARTICULAR; INTRALESIONAL; INTRAMUSCULAR; INTRAVENOUS; SOFT TISSUE at 08:57

## 2023-12-04 RX ADMIN — PACLITAXEL 100 MG: 6 INJECTION, SOLUTION, CONCENTRATE INTRAVENOUS at 09:24

## 2023-12-04 RX ADMIN — CARBOPLATIN 270 MG: 10 INJECTION INTRAVENOUS at 10:27

## 2023-12-04 RX ADMIN — HEPARIN 500 UNITS: 100 SYRINGE at 11:04

## 2023-12-04 RX ADMIN — FAMOTIDINE 20 MG: 10 INJECTION, SOLUTION INTRAVENOUS at 08:37

## 2023-12-04 RX ADMIN — DIPHENHYDRAMINE HYDROCHLORIDE 50 MG: 50 INJECTION, SOLUTION INTRAMUSCULAR; INTRAVENOUS at 08:38

## 2023-12-04 RX ADMIN — PALONOSETRON 0.25 MG: 0.25 INJECTION, SOLUTION INTRAVENOUS at 08:57

## 2023-12-04 RX ADMIN — SODIUM CHLORIDE 20 ML/HR: 9 INJECTION, SOLUTION INTRAVENOUS at 08:37

## 2023-12-04 RX ADMIN — Medication 10 ML: at 11:04

## 2023-12-04 NOTE — PROGRESS NOTES
Pt here for C2 D1 taxol, carboplatin. Port accessed for blood collection. Port aspirated, positive blood return noted. 10 ml blood wasted prior to blood for labs being collected. Pt tolerated today's treatment well. AVS given at discharge and he verbalized understanding.

## 2023-12-04 NOTE — PROGRESS NOTES
Radiation Oncology Nurse Note    Prescription for MMM called in to [facility] in [place]. LVM on pharmacist line.    Kati Loaiza RN, BSN  Radiation Oncology Department  [facility]  [phone]  Office  [phone]  Fax

## 2023-12-04 NOTE — PROGRESS NOTES
"NAME: Jourdan Lebron DATE: 2023   : 1970    MRN: [id number] DIAGNOSIS:   Encounter Diagnosis   Name Primary?    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 ID Energy Dose/Fx (cGy) #Fx Dose Correction (cGy) Total Dose (cGy) Start Date End Date Elapsed Days   Esophagus Esophagus 6X 180  0 1,080 2023 7       General:     Review of Systems  [] No new complaints [] Fever/chills /night sweats  [] Decreased energy level [] Decreased appetite [] Oxygen:  L/min  [] Dry cough   [] Productive cough  [] SOB  [] Hemoptysis  [] Dysphagia      [x] Fatigue, severity: 1 [x] Pain, severity: 0, location: denies pain now; upto 3/10 already though when present  [x] Pain medication regimen:  OTC--tylenol.  [] Esophagitis regimen:    [x] Skin regimen:  Eucerin    Subjective:  He is doing well with treatments so far, he mentions that has had some pain in his chest/ esophagus, but is not in any pain at this moment. He doesn't have any other concerns or complaints.  He is taking Tylenol--need to ask  med/onc what they recommend for pain--likely gonna need a liquid pain medication.    KPS: 70-80     Vital Signs: /70   Pulse 65   Temp 98.3 °F (36.8 °C) (Oral)   Resp 18   Ht 180.3 cm (71\")   Wt 72.1 kg (159 lb)   SpO2 100%   BMI 22.18 kg/m²     Weight:   Wt Readings from Last 3 Encounters:   23 72.1 kg (159 lb)   23 72.1 kg (159 lb)   23 72.8 kg (160 lb 8 oz)     7 lb weight loss from 2023    Medication:   Current Outpatient Medications:     ALPRAZolam (XANAX) 0.25 MG tablet, Take 1 tablet by mouth 3 (Three) Times a Day As Needed for Anxiety. for anxiety, Disp: 90 tablet, Rfl: 3    aspirin 81 MG chewable tablet, Chew 1 tablet Daily., Disp: , Rfl:     Evolocumab (REPATHA) solution " prefilled syringe injection, Inject 1 mL under the skin into the appropriate area as directed Every 14 (Fourteen) Days., Disp: 6 mL, Rfl: 3    FLUoxetine (PROzac) 20 MG capsule, Take 1 capsule by mouth Daily., Disp: 90 capsule, Rfl: 1    fluticasone (FLONASE) 50 MCG/ACT nasal spray, 1 spray into the nostril(s) as directed by provider Daily., Disp: , Rfl:     lidocaine-prilocaine (EMLA) 2.5-2.5 % cream, Apply 1 application  topically to the appropriate area as directed As Needed (apply 60 minutes prior to port access)., Disp: 30 g, Rfl: 2    ondansetron (ZOFRAN) 8 MG tablet, Take 1 tablet by mouth 3 (Three) Times a Day As Needed for Nausea or Vomiting., Disp: 30 tablet, Rfl: 5  No current facility-administered medications for this visit.    Facility-Administered Medications Ordered in Other Visits:     heparin injection 500 Units, 500 Units, Intravenous, PRKARLA, Oliverio Mueller MD, 500 Units at 12/04/23 1104    sodium chloride 0.9 % flush 10 mL, 10 mL, Intravenous, PRN, Oliverio Mueller MD, 10 mL at 12/04/23 1104     LABS (Reviewed):  Hematology  WBC   Date Value Ref Range Status   12/04/2023 4.15 3.40 - 10.80 10*3/mm3 Final     RBC   Date Value Ref Range Status   12/04/2023 4.63 4.14 - 5.80 10*6/mm3 Final     Hemoglobin   Date Value Ref Range Status   12/04/2023 15.3 13.0 - 17.7 g/dL Final     Hematocrit   Date Value Ref Range Status   12/04/2023 45.8 37.5 - 51.0 % Final     Platelets   Date Value Ref Range Status   12/04/2023 251 140 - 450 10*3/mm3 Final     Chemistry   Lab Results   Component Value Date    GLUCOSE 79 12/04/2023    BUN 11 12/04/2023    CREATININE 0.80 12/04/2023    EGFRIFNONA 67 02/24/2022    BCR 15.3 12/04/2023    K 4.1 12/04/2023    CO2 26.0 12/04/2023    CALCIUM 9.5 12/04/2023    ALBUMIN 4.0 12/04/2023    LABIL2 1.3 09/25/2018    AST 13 12/04/2023    ALT 15 12/04/2023         Physical Exam:     Pulmonary: [x] Cough: No    [x] Dyspnea: No  Neck:  [] Lymphadenopathy  Lungs:  [x] Clear to  auscultation  CVS:  [x] Regular rate & rhythm  Skin:  Grade: SELECT  GI:  [x] Dysphagia: baseline    [x] Esophagitis: early change--sending in Charmaine medina to [place]    Comments/Notes:      [x] Review of labs, images, dosimetry, dose delivered, & treatment parameters.    Comments:     [x] Patient treatment setup reviewed.    Comments:     Recommendations:  Rx for Charmaine medina  Pain medications discussed    [x] Continue RT  [] Change RT Plan [] Hold RT, length:        Approved Electronically By:  Geovany Sabillon MD, 12/4/2023, 12:22 EST      Confidentiality of this medical record shall be maintained except when use or disclosure is required or permitted by law, regulation or written authorization by the patient.

## 2023-12-05 ENCOUNTER — TELEPHONE (OUTPATIENT)
Dept: FAMILY MEDICINE CLINIC | Facility: CLINIC | Age: 53
End: 2023-12-05
Payer: COMMERCIAL

## 2023-12-05 ENCOUNTER — HOSPITAL ENCOUNTER (OUTPATIENT)
Dept: RADIATION ONCOLOGY | Facility: HOSPITAL | Age: 53
Discharge: HOME | End: 2023-12-05

## 2023-12-05 LAB
RAD ONC ARIA COURSE ID: NORMAL
RAD ONC ARIA COURSE LAST TREATMENT DATE: NORMAL
RAD ONC ARIA COURSE START DATE: NORMAL
RAD ONC ARIA COURSE TREATMENT ELAPSED DAYS: 8
RAD ONC ARIA FIRST TREATMENT DATE: NORMAL
RAD ONC ARIA PLAN FRACTIONS TREATED TO DATE: 7
RAD ONC ARIA PLAN ID: NORMAL
RAD ONC ARIA PLAN PRESCRIBED DOSE PER FRACTION: 1.8 GY
RAD ONC ARIA PLAN PRIMARY REFERENCE POINT: NORMAL
RAD ONC ARIA PLAN TOTAL FRACTIONS PRESCRIBED: 23
RAD ONC ARIA PLAN TOTAL PRESCRIBED DOSE: 4140 CGY
RAD ONC ARIA REFERENCE POINT DOSAGE GIVEN TO DATE: 12.6 GY
RAD ONC ARIA REFERENCE POINT ID: NORMAL
RAD ONC ARIA REFERENCE POINT SESSION DOSAGE GIVEN: 1.8 GY

## 2023-12-05 PROCEDURE — 77386: CPT | Performed by: RADIOLOGY

## 2023-12-05 PROCEDURE — 77014 CHG CT GUIDANCE RADIATION THERAPY FLDS PLACEMENT: CPT | Performed by: RADIOLOGY

## 2023-12-06 ENCOUNTER — HOSPITAL ENCOUNTER (OUTPATIENT)
Dept: RADIATION ONCOLOGY | Facility: HOSPITAL | Age: 53
Discharge: HOME | End: 2023-12-06

## 2023-12-06 LAB
RAD ONC ARIA COURSE ID: NORMAL
RAD ONC ARIA COURSE LAST TREATMENT DATE: NORMAL
RAD ONC ARIA COURSE START DATE: NORMAL
RAD ONC ARIA COURSE TREATMENT ELAPSED DAYS: 9
RAD ONC ARIA FIRST TREATMENT DATE: NORMAL
RAD ONC ARIA PLAN FRACTIONS TREATED TO DATE: 8
RAD ONC ARIA PLAN ID: NORMAL
RAD ONC ARIA PLAN PRESCRIBED DOSE PER FRACTION: 1.8 GY
RAD ONC ARIA PLAN PRIMARY REFERENCE POINT: NORMAL
RAD ONC ARIA PLAN TOTAL FRACTIONS PRESCRIBED: 23
RAD ONC ARIA PLAN TOTAL PRESCRIBED DOSE: 4140 CGY
RAD ONC ARIA REFERENCE POINT DOSAGE GIVEN TO DATE: 14.4 GY
RAD ONC ARIA REFERENCE POINT ID: NORMAL
RAD ONC ARIA REFERENCE POINT SESSION DOSAGE GIVEN: 1.8 GY

## 2023-12-06 PROCEDURE — 77014 CHG CT GUIDANCE RADIATION THERAPY FLDS PLACEMENT: CPT | Performed by: RADIOLOGY

## 2023-12-06 PROCEDURE — 77386: CPT | Performed by: RADIOLOGY

## 2023-12-06 NOTE — PROGRESS NOTES
"                     HEMATOLOGY ONCOLOGY OUTPATIENT FOLLOW UP       Patient name: Jourdan Lebron  : 1970  MRN: [id number]  Primary Care Physician: Justa Castano MD  Referring Physician: No ref. provider found  Reason For Consult:     Chief Complaint   Patient presents with    Follow-up     Adenocarcinoma of esophagus     HPI:   History of Present Illness:  Jourdan Lebron is 53 y.o. male who presented to our office on 2021 for consultation regarding elevated hematocrit    On 2021 Mr. Lebron was referred for the investigation of macrocytosis and elevated hematocrit.  He gave a history of a stroke in .  He also described a long history of anxiety and \"panic attacks\".  Finally he had undergone an adrenalectomy, apparently because of an adrenal adenoma.  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  In spite of that he felt poorly, mainly because of fatigue and had several investigations.  For a long time, at least since , he had been noted to have an elevated MCV and MCH.  A clear cause for this had not been identified and generally speaking he was asymptomatic at that time.  In , September and 2021 his blood counts had reported a hematocrit of 51.3, 51.6 and 53.8% respectively.  This was in the setting of a normal high hemoglobin.  He gave a history of prolonged and intense, at times of up to 3 packs of cigarettes per day, cigarette smoking.  Close to the time of the initial visit, he was smoking only cigars but did admit to inhaling the smoke.  He, as well, admitted to dyspnea with some exertion.    2022 Mr. Lebron was back in the office for follow-up.  At the time of his initial evaluation his blood count had been found to be within normal limits.  His folic acid was found to be immediately below the normal range of normalcy.  He started taking folic acid replacement.    7/15/2022: Back in the office for follow-up after 6 " months.  Reported he had had some changes to his medications and he was feeling somewhat better.  In particular, he discussed changes to his antidepressant, that seemed to have made a difference.  He also had stopped smoking.  The physical exam was unchanged.  The laboratory exams revealed normal hemoglobin and hematocrit, but he did persist with mild macrocytosis at 97.8 fL.  A clear explanation for this was not identified.  A decision was made to observe as it was unlikely to represent a serious problem.    10/10/2023: Seen in the office after an absence of more than one year. He reported that for a few months he had been experiencing dysphagia and weight loss. He was initially treated with a proton pump inhibitor, but as there was no response and rather he reported worsening of the symptoms, he underwent upper and lower gastrointestinal endoscopies. In the colon multiple polyps were identified in the cecum, the ascending colon, the transverse colon and the descending colon. In the esophagus a protruding lesion that seemed infiltrative and was fungating and ulcerated was found in the lower third of the esophagus. It extended from 36 to 46 cm from the incisors. Biopsy reported invasive moderate to poorly differentiated adenocarcinoma.     11/3/2023: In the office to review the PET scan. His symptoms have not changed much. He continues to have dysphagia and it is severe enough that he has been able to eat much; he has some success with soft foods but there fare some foods that he can definitely not swallow. He has lost about 10 lbs but none recently. He remains afebrile and has not had any cough. He has not had any pain. On exam no jaundice or pallor. Lungs diminished and heart regular. Abdomen soft and not tender. No edema. Reviewed the images of the PET scan. Reviewed the result of the endoscopic ultrasound and the FNA of the lymph nodes, at least one of which is positive for metastatic disease. This makes the  tumor T2N1 disease. Neoadjuvant chemotherapy and radiation was discussed with him and his spouse and I made referrals to Dr. Sabillon in Radiation Oncology and to Dr. Hurtado in Thoracic surgery. He is to have next generation sequencing, Her2 expression and PD-L1 expression on tumor tissue. Will present in tumor conference.     11/17/2023: Not any different than at the time of the last visit.  No further weight loss.  Able to swallow some foods without any difficulties.  However, other foods are very difficult to swallow, if not impossible.  He has not had any fevers.  He underwent placement of the port without any difficulties.  On exam alert, conversant and in no distress.  Port site clean and healing well.  Lungs diminished bilaterally.  Heart regular.  No edema.  Laboratory exams were reviewed.  Discussed with him concurrent chemotherapy and radiation.  Treatment with carboplatin and paclitaxel was discussed and a treatment plan was placed.  Discussed with Dr. Sabillon.  To begin on the week of November 27, 2023.    12/8/2023: Feeling reasonably well. Has experienced occasional headaches and facial pain. As well feels the lower extremities to be heavy and had some aching pain. Has been able to eat some though his appetite is poor. He may be swallowing better and does not have odynophagia. No abdominal pain or diarrhea and no dysuria. Has not lost weight. On exam no changes. The laboratory exams were reviewed. Discussed with him. To continue with the same therapy.     Subjective:  12/8/2023: Not many new symptoms. Fatigued. Having some aching pain and some headache. Not much nausea and no emesis, but has not had much appetite. He has not lost weight. He has been afebrile. No chest pain and no cough. No abdominal pain. Continues to have dysphagia, but perhaps it is better. No weight loss. Denies diarrhea or dysuria. No edema.     The following portions of the patient's history were reviewed and updated as appropriate:  allergies, current medications, past family history, past medical history, past social history, past surgical history and problem list.    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    Brain fog     Diverticulosis of large intestine without perforation or abscess without bleeding 10/02/2023    Esophageal cancer     GERD (gastroesophageal reflux disease)     Hand tingling     Hyperlipidemia     Stroke 05/30/2020    Tiredness      Past Surgical History:   Procedure Laterality Date    ADRENALECTOMY      KIDNEY STONE SURGERY      THROMBECTOMY      UPPER ENDOSCOPIC ULTRASOUND W/ FNA N/A 10/27/2023    Procedure: ENDOSCOPIC ULTRASOUND WITH STAGING AND FINE NEEDLE ASPIRATION X2 AREAS;  Surgeon: Joselyn Savage MD;  Location: [facility] ENDOSCOPY;  Service: Gastroenterology;  Laterality: N/A;  POST:    VENOUS ACCESS DEVICE (PORT) INSERTION Right 11/13/2023    Procedure: INSERTION VENOUS ACCESS DEVICE;  Surgeon: Saurabh Hurtado MD;  Location: [facility] MAIN OR;  Service: Thoracic;  Laterality: Right;       Current Outpatient Medications:     ALPRAZolam (XANAX) 0.25 MG tablet, Take 1 tablet by mouth 3 (Three) Times a Day As Needed for Anxiety. for anxiety, Disp: 90 tablet, Rfl: 3    aspirin 81 MG chewable tablet, Chew 1 tablet Daily., Disp: , Rfl:     Evolocumab (REPATHA) solution prefilled syringe injection, Inject 1 mL under the skin into the appropriate area as directed Every 14 (Fourteen) Days., Disp: 6 mL, Rfl: 3    FLUoxetine (PROzac) 20 MG capsule, Take 1 capsule by mouth Daily., Disp: 90 capsule, Rfl: 1    fluticasone (FLONASE) 50 MCG/ACT nasal spray, 1 spray into the nostril(s) as directed by provider Daily., Disp: , Rfl:     lidocaine-prilocaine (EMLA) 2.5-2.5 % cream, Apply 1 application  topically to the appropriate area as directed As Needed (apply 60 minutes prior to  port access)., Disp: 30 g, Rfl: 2    Nystatin (magic mouthwash), Swish and swallow 5 mL 4 (Four) Times a Day Before Meals & at Bedtime., Disp: 1 bottle, Rfl: 4    ondansetron (ZOFRAN) 8 MG tablet, Take 1 tablet by mouth 3 (Three) Times a Day As Needed for Nausea or Vomiting., Disp: 30 tablet, Rfl: 5    Allergies   Allergen Reactions    Cephalosporins Anaphylaxis     Throat swelling    Dye  [Contrast Dye (Echo Or Unknown Ct/Mr)] Hives    Sulfa Antibiotics Hives    Zetia [Ezetimibe] Other (See Comments)     Made tired    Iodinated Contrast Media Unknown - Low Severity    Statins Myalgia     Myalgia and fatique    Sulfate Unknown - Low Severity     Family History   Problem Relation Age of Onset    Arthritis Mother     Hypertension Mother     Heart failure Mother 73        Cause of death    Arthritis Father     Hypertension Father     Kidney cancer Father 57        Cause of death. Was a smoker    Heart disease Brother     Esophageal cancer Brother 59        Cause of death. Has a heavy drinker     Cancer-related family history includes Esophageal cancer (age of onset: 59) in his brother; Kidney cancer (age of onset: 57) in his father.    Social History     Tobacco Use    Smoking status: Former     Packs/day: 3.00     Years: 37.00     Additional pack years: 0.00     Total pack years: 111.00     Types: Cigars, Cigarettes     Start date:      Quit date:      Years since quittin.9    Smokeless tobacco: Never    Tobacco comments:     1 packs= 2 cigars a day; only smokes cigars   Vaping Use    Vaping Use: Never used   Substance Use Topics    Alcohol use: Not Currently     Alcohol/week: 8.0 standard drinks of alcohol     Types: 8 Cans of beer per week     Comment: Has now been abstinent for about one year    Drug use: Never     Social History     Social History Narrative    Not on file      ROS:     Review of Systems   Constitutional:  Positive for fatigue. Negative for activity change, appetite change, chills,  "diaphoresis, fever and unexpected weight change.   HENT:  Negative for congestion, dental problem, drooling, ear discharge, ear pain, facial swelling, hearing loss, mouth sores, nosebleeds, postnasal drip, rhinorrhea, sinus pressure, sinus pain, sneezing, sore throat, tinnitus, trouble swallowing and voice change.    Eyes:  Negative for photophobia, pain, discharge, redness, itching and visual disturbance.   Respiratory:  Negative for apnea, cough, choking, chest tightness, shortness of breath, wheezing and stridor.    Cardiovascular:  Negative for chest pain, palpitations and leg swelling.   Gastrointestinal:  Negative for abdominal distention, abdominal pain, anal bleeding, blood in stool, constipation, diarrhea, nausea, rectal pain and vomiting.   Endocrine: Negative for cold intolerance, heat intolerance, polydipsia and polyuria.   Genitourinary:  Negative for decreased urine volume, difficulty urinating, dysuria, flank pain, frequency, genital sores, hematuria and urgency.   Musculoskeletal:  Negative for arthralgias, back pain, gait problem, joint swelling, myalgias, neck pain and neck stiffness.   Skin:  Negative for color change, pallor and rash.   Neurological:  Negative for dizziness, tremors, seizures, syncope, facial asymmetry, speech difficulty, weakness, light-headedness, numbness and headaches.   Hematological:  Negative for adenopathy. Does not bruise/bleed easily.   Psychiatric/Behavioral:  Negative for agitation, behavioral problems, confusion, decreased concentration, hallucinations, self-injury, sleep disturbance and suicidal ideas. The patient is nervous/anxious.      Objective:    Vitals:    12/08/23 0805   BP: 102/67   Pulse: 70   Temp: 98 °F (36.7 °C)   TempSrc: Oral   SpO2: 99%   Weight: 73.3 kg (161 lb 9.6 oz)   Height: 180.3 cm (70.98\")   PainSc: 0-No pain     Body mass index is 22.55 kg/m².  ECOG  (0) Fully active, able to carry on all predisease performance without " restriction    Physical Exam:     Physical Exam  Constitutional:       General: He is not in acute distress.     Appearance: Normal appearance. He is not ill-appearing, toxic-appearing or diaphoretic.      Comments: Slender, well-built.  Seems older than the stated age.  No distress.   HENT:      Head: Normocephalic and atraumatic.      Right Ear: External ear normal. There is no impacted cerumen.      Left Ear: External ear normal. There is no impacted cerumen.      Nose: Nose normal. No congestion or rhinorrhea.      Mouth/Throat:      Mouth: Mucous membranes are moist.      Pharynx: Oropharynx is clear. No oropharyngeal exudate or posterior oropharyngeal erythema.      Comments: Oral cavity reveals poor dentition and poor dental hygiene.  No mucosal ulcerations are present.  Eyes:      General: No scleral icterus.        Right eye: No discharge.         Left eye: No discharge.      Conjunctiva/sclera: Conjunctivae normal.      Pupils: Pupils are equal, round, and reactive to light.   Neck:      Vascular: No carotid bruit.   Cardiovascular:      Rate and Rhythm: Normal rate and regular rhythm.      Pulses: Normal pulses.      Heart sounds: Normal heart sounds. No murmur heard.     No friction rub. No gallop.   Pulmonary:      Effort: No respiratory distress.      Breath sounds: No stridor. No wheezing, rhonchi or rales.      Comments: Breath sounds are diminished bilaterally.  Chest:      Chest wall: No tenderness.   Abdominal:      General: Abdomen is flat. Bowel sounds are normal. There is no distension.      Palpations: Abdomen is soft. There is no mass.      Tenderness: There is no abdominal tenderness. There is no right CVA tenderness, left CVA tenderness, guarding or rebound.   Musculoskeletal:         General: No swelling, tenderness, deformity or signs of injury.      Cervical back: No rigidity or tenderness.      Right lower leg: No edema.      Left lower leg: No edema.      Comments: There is clubbing of  the fingers in both hands   Lymphadenopathy:      Cervical: No cervical adenopathy.   Skin:     General: Skin is warm and dry.      Coloration: Skin is not jaundiced or pale.      Findings: No bruising, erythema or rash.   Neurological:      General: No focal deficit present.      Mental Status: He is alert and oriented to person, place, and time.      Cranial Nerves: No cranial nerve deficit.      Motor: No weakness.      Coordination: Coordination normal.      Gait: Gait normal.   Psychiatric:         Mood and Affect: Mood normal.         Behavior: Behavior normal.         Thought Content: Thought content normal.         Judgment: Judgment normal.     YOSELIN Mueller MD performed a physical exam on 12/8/2023 as documented above.     Lab Results - Last 18 Months   Lab Units 12/04/23  0749 11/27/23  0739 11/17/23  0837   WBC 10*3/mm3 4.15 7.65 7.67   HEMOGLOBIN g/dL 15.3 15.9 14.7   HEMATOCRIT % 45.8 47.7 44.7   PLATELETS 10*3/mm3 251 274 251   MCV fL 98.9* 99.0* 98.7*     Lab Results - Last 18 Months   Lab Units 12/04/23  0816 12/04/23  0749 11/27/23  0800 11/27/23  0739 11/03/23  0844   SODIUM mmol/L  --  139  --  139 140   POTASSIUM mmol/L  --  4.1  --  4.4 4.5   CHLORIDE mmol/L  --  104  --  103 105   CO2 mmol/L  --  26.0  --  26.0 27.0   BUN mg/dL  --  11  --  11 13   CREATININE mg/dL 0.80 0.72* 0.90 0.75* 0.87   CALCIUM mg/dL  --  9.5  --  9.5 9.4   BILIRUBIN mg/dL  --  0.2  --  <0.2 0.2   ALK PHOS U/L  --  57  --  63 56   ALT (SGPT) U/L  --  15  --  17 10   AST (SGOT) U/L  --  13  --  20 13   GLUCOSE mg/dL  --  79  --  59* 112*     Lab Results   Component Value Date    GLUCOSE 79 12/04/2023    BUN 11 12/04/2023    CREATININE 0.80 12/04/2023    EGFRIFNONA 67 02/24/2022    BCR 15.3 12/04/2023    K 4.1 12/04/2023    CO2 26.0 12/04/2023    CALCIUM 9.5 12/04/2023    ALBUMIN 4.0 12/04/2023    LABIL2 1.3 09/25/2018    AST 13 12/04/2023    ALT 15 12/04/2023     Lab Results   Component Value Date    FOLATE 4.11 (L)  "11/12/2021     Lab Results   Component Value Date    WIKMAAIH51 992 (H) 07/28/2023     No results found for: \"SPEP\", \"UPEP\"  Uric Acid   Date Value Ref Range Status   09/16/2022 4.6 3.4 - 7.0 mg/dL Final     Lab Results   Component Value Date    SEDRATE 11 04/16/2021     Lab Results   Component Value Date    PSA 0.163 04/06/2021     Assessment & Plan     Assessment:  Adenocarcinoma of the gastroesophageal junction T2N1M0, microsatellite stable, low mutation burden, Her2 positive (awaiting PD-L1 expression): Started treatment with weekly carboplatin and paclitaxel with concurrent radiation. Tolerating treatment with the expected side effects. Discussed with him. No suggestion of complications.   Nutrition and weight loss: Weight stable now and not having more dysphagia than before. I have asked Nutrition to see to explore options for support.   3.  He will see me in 3 weeks.   3.  Tobacco smoker: Remains abstinent.     Plan:  As above.     Oliverio Mueller MD on 12/8/2023 at 8:51 AM.     "

## 2023-12-07 ENCOUNTER — HOSPITAL ENCOUNTER (OUTPATIENT)
Dept: RADIATION ONCOLOGY | Facility: HOSPITAL | Age: 53
Discharge: HOME | End: 2023-12-07

## 2023-12-07 LAB
RAD ONC ARIA COURSE ID: NORMAL
RAD ONC ARIA COURSE LAST TREATMENT DATE: NORMAL
RAD ONC ARIA COURSE START DATE: NORMAL
RAD ONC ARIA COURSE TREATMENT ELAPSED DAYS: 10
RAD ONC ARIA FIRST TREATMENT DATE: NORMAL
RAD ONC ARIA PLAN FRACTIONS TREATED TO DATE: 9
RAD ONC ARIA PLAN ID: NORMAL
RAD ONC ARIA PLAN PRESCRIBED DOSE PER FRACTION: 1.8 GY
RAD ONC ARIA PLAN PRIMARY REFERENCE POINT: NORMAL
RAD ONC ARIA PLAN TOTAL FRACTIONS PRESCRIBED: 23
RAD ONC ARIA PLAN TOTAL PRESCRIBED DOSE: 4140 CGY
RAD ONC ARIA REFERENCE POINT DOSAGE GIVEN TO DATE: 16.2 GY
RAD ONC ARIA REFERENCE POINT ID: NORMAL
RAD ONC ARIA REFERENCE POINT SESSION DOSAGE GIVEN: 1.8 GY

## 2023-12-07 PROCEDURE — 77386: CPT | Performed by: RADIOLOGY

## 2023-12-07 PROCEDURE — 77336 RADIATION PHYSICS CONSULT: CPT | Performed by: RADIOLOGY

## 2023-12-07 PROCEDURE — 77014 CHG CT GUIDANCE RADIATION THERAPY FLDS PLACEMENT: CPT | Performed by: RADIOLOGY

## 2023-12-08 ENCOUNTER — OFFICE VISIT (OUTPATIENT)
Dept: ONCOLOGY | Facility: CLINIC | Age: 53
End: 2023-12-08
Payer: COMMERCIAL

## 2023-12-08 ENCOUNTER — DOCUMENTATION (OUTPATIENT)
Dept: ONCOLOGY | Facility: CLINIC | Age: 53
End: 2023-12-08
Payer: COMMERCIAL

## 2023-12-08 ENCOUNTER — HOSPITAL ENCOUNTER (OUTPATIENT)
Dept: RADIATION ONCOLOGY | Facility: HOSPITAL | Age: 53
Discharge: HOME | End: 2023-12-08

## 2023-12-08 VITALS
DIASTOLIC BLOOD PRESSURE: 67 MMHG | OXYGEN SATURATION: 99 % | SYSTOLIC BLOOD PRESSURE: 102 MMHG | BODY MASS INDEX: 22.62 KG/M2 | WEIGHT: 161.6 LBS | HEIGHT: 71 IN | TEMPERATURE: 98 F | HEART RATE: 70 BPM

## 2023-12-08 DIAGNOSIS — C15.9 ADENOCARCINOMA OF ESOPHAGUS METASTATIC TO INTRA-ABDOMINAL LYMPH NODE: Primary | ICD-10-CM

## 2023-12-08 DIAGNOSIS — C77.2 ADENOCARCINOMA OF ESOPHAGUS METASTATIC TO INTRA-ABDOMINAL LYMPH NODE: Primary | ICD-10-CM

## 2023-12-08 LAB
RAD ONC ARIA COURSE ID: NORMAL
RAD ONC ARIA COURSE LAST TREATMENT DATE: NORMAL
RAD ONC ARIA COURSE START DATE: NORMAL
RAD ONC ARIA COURSE TREATMENT ELAPSED DAYS: 11
RAD ONC ARIA FIRST TREATMENT DATE: NORMAL
RAD ONC ARIA PLAN FRACTIONS TREATED TO DATE: 10
RAD ONC ARIA PLAN ID: NORMAL
RAD ONC ARIA PLAN PRESCRIBED DOSE PER FRACTION: 1.8 GY
RAD ONC ARIA PLAN PRIMARY REFERENCE POINT: NORMAL
RAD ONC ARIA PLAN TOTAL FRACTIONS PRESCRIBED: 23
RAD ONC ARIA PLAN TOTAL PRESCRIBED DOSE: 4140 CGY
RAD ONC ARIA REFERENCE POINT DOSAGE GIVEN TO DATE: 18 GY
RAD ONC ARIA REFERENCE POINT ID: NORMAL
RAD ONC ARIA REFERENCE POINT SESSION DOSAGE GIVEN: 1.8 GY

## 2023-12-08 PROCEDURE — 77014 CHG CT GUIDANCE RADIATION THERAPY FLDS PLACEMENT: CPT | Performed by: RADIOLOGY

## 2023-12-08 PROCEDURE — 77386: CPT | Performed by: RADIOLOGY

## 2023-12-08 NOTE — PROGRESS NOTES
Met the pt and his wife after his MD appointment today. Pt has noticed a decrease in appetite, however, he has been able to maintain his weight bc he continues to eat regardless of desire. Pt continues to have smaller meals throughout the day and drinks fluids throughout the day instead of with meals to avoid feeling over-full and food getting stuck. Pt reports and improvement in swallowing and keeping food down since he has started treatment. I praised the pt and his wife for the continued efforts and encouraged them to keep it up as it is working well for his weight management, pt verbalized understanding.    24-Hour Diet Recall:  Breakfast - 1 packet apple cinnamon oatmeal, coffee, Ensure  Lunch - 7 taquitoes, sweet tea  Dinner - 3 thin-crush pizza slices ([place] at home, supreme & sausage)  Snacks - maple nuts, chocolate covered raisins  Water ~64 oz/day    All questions and concerns were addressed and answered. I provided my contact information and encouraged them to call or schedule an appointment as needed.    Chandler Ruby, MS,RD,LD-[place],CD-[place]  Registered Dietitian

## 2023-12-11 ENCOUNTER — RADIATION ONCOLOGY WEEKLY ASSESSMENT (OUTPATIENT)
Dept: RADIATION ONCOLOGY | Facility: HOSPITAL | Age: 53
End: 2023-12-11
Payer: COMMERCIAL

## 2023-12-11 ENCOUNTER — HOSPITAL ENCOUNTER (OUTPATIENT)
Dept: ONCOLOGY | Facility: HOSPITAL | Age: 53
Discharge: HOME | End: 2023-12-11
Admitting: INTERNAL MEDICINE
Payer: COMMERCIAL

## 2023-12-11 ENCOUNTER — HOSPITAL ENCOUNTER (OUTPATIENT)
Dept: RADIATION ONCOLOGY | Facility: HOSPITAL | Age: 53
Discharge: HOME | End: 2023-12-11
Payer: COMMERCIAL

## 2023-12-11 VITALS
HEART RATE: 86 BPM | BODY MASS INDEX: 22.09 KG/M2 | OXYGEN SATURATION: 99 % | WEIGHT: 157.8 LBS | DIASTOLIC BLOOD PRESSURE: 61 MMHG | HEIGHT: 71 IN | TEMPERATURE: 98.1 F | SYSTOLIC BLOOD PRESSURE: 92 MMHG | RESPIRATION RATE: 16 BRPM

## 2023-12-11 VITALS
OXYGEN SATURATION: 99 % | RESPIRATION RATE: 16 BRPM | BODY MASS INDEX: 22.1 KG/M2 | WEIGHT: 157.85 LBS | HEART RATE: 86 BPM | DIASTOLIC BLOOD PRESSURE: 61 MMHG | TEMPERATURE: 98.1 F | SYSTOLIC BLOOD PRESSURE: 92 MMHG | HEIGHT: 71 IN

## 2023-12-11 DIAGNOSIS — C77.2 ADENOCARCINOMA OF ESOPHAGUS METASTATIC TO INTRA-ABDOMINAL LYMPH NODE: ICD-10-CM

## 2023-12-11 DIAGNOSIS — C77.2 ADENOCARCINOMA OF ESOPHAGUS METASTATIC TO INTRA-ABDOMINAL LYMPH NODE: Primary | ICD-10-CM

## 2023-12-11 DIAGNOSIS — C15.9 ADENOCARCINOMA OF ESOPHAGUS METASTATIC TO INTRA-ABDOMINAL LYMPH NODE: Primary | ICD-10-CM

## 2023-12-11 DIAGNOSIS — G89.3 CANCER ASSOCIATED PAIN: Primary | ICD-10-CM

## 2023-12-11 DIAGNOSIS — Z95.828 PORT-A-CATH IN PLACE: ICD-10-CM

## 2023-12-11 DIAGNOSIS — C15.9 ADENOCARCINOMA OF ESOPHAGUS METASTATIC TO INTRA-ABDOMINAL LYMPH NODE: ICD-10-CM

## 2023-12-11 LAB
ALBUMIN SERPL-MCNC: 3.9 G/DL (ref 3.5–5.2)
ALBUMIN/GLOB SERPL: 1.5 G/DL
ALP SERPL-CCNC: 52 U/L (ref 39–117)
ALT SERPL W P-5'-P-CCNC: 13 U/L (ref 1–41)
ANION GAP SERPL CALCULATED.3IONS-SCNC: 7 MMOL/L (ref 5–15)
AST SERPL-CCNC: 13 U/L (ref 1–40)
BASOPHILS # BLD AUTO: 0.01 10*3/MM3 (ref 0–0.2)
BASOPHILS NFR BLD AUTO: 0.3 % (ref 0–1.5)
BILIRUB SERPL-MCNC: 0.2 MG/DL (ref 0–1.2)
BUN SERPL-MCNC: 12 MG/DL (ref 6–20)
BUN/CREAT SERPL: 16.7 (ref 7–25)
CALCIUM SPEC-SCNC: 9 MG/DL (ref 8.6–10.5)
CHLORIDE SERPL-SCNC: 105 MMOL/L (ref 98–107)
CO2 SERPL-SCNC: 27 MMOL/L (ref 22–29)
CREAT BLDA-MCNC: 0.8 MG/DL (ref 0.6–1.3)
CREAT SERPL-MCNC: 0.72 MG/DL (ref 0.76–1.27)
DEPRECATED RDW RBC AUTO: 47.4 FL (ref 37–54)
EGFRCR SERPLBLD CKD-EPI 2021: 107.1 ML/MIN/1.73
EGFRCR SERPLBLD CKD-EPI 2021: 110.6 ML/MIN/1.73
EOSINOPHIL # BLD AUTO: 0.13 10*3/MM3 (ref 0–0.4)
EOSINOPHIL NFR BLD AUTO: 3.7 % (ref 0.3–6.2)
ERYTHROCYTE [DISTWIDTH] IN BLOOD BY AUTOMATED COUNT: 13.4 % (ref 12.3–15.4)
GLOBULIN UR ELPH-MCNC: 2.6 GM/DL
GLUCOSE SERPL-MCNC: 69 MG/DL (ref 65–99)
HCT VFR BLD AUTO: 42.8 % (ref 37.5–51)
HGB BLD-MCNC: 14.1 G/DL (ref 13–17.7)
LYMPHOCYTES # BLD AUTO: 0.55 10*3/MM3 (ref 0.7–3.1)
LYMPHOCYTES NFR BLD AUTO: 15.5 % (ref 19.6–45.3)
MCH RBC QN AUTO: 32.7 PG (ref 26.6–33)
MCHC RBC AUTO-ENTMCNC: 32.9 G/DL (ref 31.5–35.7)
MCV RBC AUTO: 99.3 FL (ref 79–97)
MONOCYTES # BLD AUTO: 0.46 10*3/MM3 (ref 0.1–0.9)
MONOCYTES NFR BLD AUTO: 13 % (ref 5–12)
NEUTROPHILS NFR BLD AUTO: 2.39 10*3/MM3 (ref 1.7–7)
NEUTROPHILS NFR BLD AUTO: 67.5 % (ref 42.7–76)
PLATELET # BLD AUTO: 226 10*3/MM3 (ref 140–450)
PMV BLD AUTO: 9.7 FL (ref 6–12)
POTASSIUM SERPL-SCNC: 4.4 MMOL/L (ref 3.5–5.2)
PROT SERPL-MCNC: 6.5 G/DL (ref 6–8.5)
RAD ONC ARIA COURSE ID: NORMAL
RAD ONC ARIA COURSE LAST TREATMENT DATE: NORMAL
RAD ONC ARIA COURSE START DATE: NORMAL
RAD ONC ARIA COURSE TREATMENT ELAPSED DAYS: 14
RAD ONC ARIA FIRST TREATMENT DATE: NORMAL
RAD ONC ARIA PLAN FRACTIONS TREATED TO DATE: 11
RAD ONC ARIA PLAN ID: NORMAL
RAD ONC ARIA PLAN PRESCRIBED DOSE PER FRACTION: 1.8 GY
RAD ONC ARIA PLAN PRIMARY REFERENCE POINT: NORMAL
RAD ONC ARIA PLAN TOTAL FRACTIONS PRESCRIBED: 23
RAD ONC ARIA PLAN TOTAL PRESCRIBED DOSE: 4140 CGY
RAD ONC ARIA REFERENCE POINT DOSAGE GIVEN TO DATE: 19.8 GY
RAD ONC ARIA REFERENCE POINT ID: NORMAL
RAD ONC ARIA REFERENCE POINT SESSION DOSAGE GIVEN: 1.8 GY
RBC # BLD AUTO: 4.31 10*6/MM3 (ref 4.14–5.8)
SODIUM SERPL-SCNC: 139 MMOL/L (ref 136–145)
WBC NRBC COR # BLD AUTO: 3.54 10*3/MM3 (ref 3.4–10.8)

## 2023-12-11 PROCEDURE — 96417 CHEMO IV INFUS EACH ADDL SEQ: CPT

## 2023-12-11 PROCEDURE — 25010000002 PACLITAXEL PER 1 MG: Performed by: INTERNAL MEDICINE

## 2023-12-11 PROCEDURE — 25010000002 DEXAMETHASONE SODIUM PHOSPHATE 120 MG/30ML SOLUTION: Performed by: INTERNAL MEDICINE

## 2023-12-11 PROCEDURE — 25010000002 DIPHENHYDRAMINE PER 50 MG: Performed by: INTERNAL MEDICINE

## 2023-12-11 PROCEDURE — 25010000002 CARBOPLATIN PER 50 MG: Performed by: INTERNAL MEDICINE

## 2023-12-11 PROCEDURE — 80053 COMPREHEN METABOLIC PANEL: CPT | Performed by: INTERNAL MEDICINE

## 2023-12-11 PROCEDURE — 25010000002 PALONOSETRON PER 25 MCG: Performed by: INTERNAL MEDICINE

## 2023-12-11 PROCEDURE — FACE2FACE: Performed by: RADIOLOGY

## 2023-12-11 PROCEDURE — 25010000002 HEPARIN LOCK FLUSH PER 10 UNITS: Performed by: INTERNAL MEDICINE

## 2023-12-11 PROCEDURE — 25810000003 SODIUM CHLORIDE 0.9 % SOLUTION 250 ML FLEX CONT: Performed by: INTERNAL MEDICINE

## 2023-12-11 PROCEDURE — 96367 TX/PROPH/DG ADDL SEQ IV INF: CPT

## 2023-12-11 PROCEDURE — 96413 CHEMO IV INFUSION 1 HR: CPT

## 2023-12-11 PROCEDURE — 77014 CHG CT GUIDANCE RADIATION THERAPY FLDS PLACEMENT: CPT | Performed by: RADIOLOGY

## 2023-12-11 PROCEDURE — 82565 ASSAY OF CREATININE: CPT

## 2023-12-11 PROCEDURE — 25810000003 SODIUM CHLORIDE 0.9 % SOLUTION: Performed by: INTERNAL MEDICINE

## 2023-12-11 PROCEDURE — 85025 COMPLETE CBC W/AUTO DIFF WBC: CPT | Performed by: INTERNAL MEDICINE

## 2023-12-11 PROCEDURE — 77427 RADIATION TX MANAGEMENT X5: CPT | Performed by: RADIOLOGY

## 2023-12-11 PROCEDURE — 96375 TX/PRO/DX INJ NEW DRUG ADDON: CPT

## 2023-12-11 PROCEDURE — 77386: CPT | Performed by: RADIOLOGY

## 2023-12-11 RX ORDER — SODIUM CHLORIDE 9 MG/ML
20 INJECTION, SOLUTION INTRAVENOUS ONCE
Status: CANCELLED | OUTPATIENT
Start: 2023-12-11

## 2023-12-11 RX ORDER — FAMOTIDINE 10 MG/ML
20 INJECTION, SOLUTION INTRAVENOUS ONCE
Status: CANCELLED | OUTPATIENT
Start: 2023-12-11

## 2023-12-11 RX ORDER — PALONOSETRON 0.05 MG/ML
0.25 INJECTION, SOLUTION INTRAVENOUS ONCE
Status: COMPLETED | OUTPATIENT
Start: 2023-12-11 | End: 2023-12-11

## 2023-12-11 RX ORDER — SODIUM CHLORIDE 9 MG/ML
20 INJECTION, SOLUTION INTRAVENOUS ONCE
Status: COMPLETED | OUTPATIENT
Start: 2023-12-11 | End: 2023-12-11

## 2023-12-11 RX ORDER — FAMOTIDINE 10 MG/ML
20 INJECTION, SOLUTION INTRAVENOUS ONCE
Status: COMPLETED | OUTPATIENT
Start: 2023-12-11 | End: 2023-12-11

## 2023-12-11 RX ORDER — HEPARIN SODIUM (PORCINE) LOCK FLUSH IV SOLN 100 UNIT/ML 100 UNIT/ML
500 SOLUTION INTRAVENOUS AS NEEDED
Status: DISCONTINUED | OUTPATIENT
Start: 2023-12-11 | End: 2023-12-12 | Stop reason: HOSPADM

## 2023-12-11 RX ORDER — FAMOTIDINE 10 MG/ML
20 INJECTION, SOLUTION INTRAVENOUS AS NEEDED
Status: CANCELLED | OUTPATIENT
Start: 2023-12-11

## 2023-12-11 RX ORDER — DIPHENHYDRAMINE HYDROCHLORIDE 50 MG/ML
50 INJECTION INTRAMUSCULAR; INTRAVENOUS AS NEEDED
Status: CANCELLED | OUTPATIENT
Start: 2023-12-11

## 2023-12-11 RX ORDER — PALONOSETRON 0.05 MG/ML
0.25 INJECTION, SOLUTION INTRAVENOUS ONCE
Status: CANCELLED | OUTPATIENT
Start: 2023-12-11

## 2023-12-11 RX ORDER — SODIUM CHLORIDE 0.9 % (FLUSH) 0.9 %
10 SYRINGE (ML) INJECTION AS NEEDED
Status: DISCONTINUED | OUTPATIENT
Start: 2023-12-11 | End: 2023-12-12 | Stop reason: HOSPADM

## 2023-12-11 RX ORDER — TRAMADOL HYDROCHLORIDE 50 MG/1
50 TABLET ORAL EVERY 6 HOURS PRN
Qty: 60 TABLET | Refills: 0 | Status: SHIPPED | OUTPATIENT
Start: 2023-12-11

## 2023-12-11 RX ADMIN — Medication 10 ML: at 11:59

## 2023-12-11 RX ADMIN — DEXAMETHASONE SODIUM PHOSPHATE 12 MG: 4 INJECTION, SOLUTION INTRA-ARTICULAR; INTRALESIONAL; INTRAMUSCULAR; INTRAVENOUS; SOFT TISSUE at 09:36

## 2023-12-11 RX ADMIN — SODIUM CHLORIDE 20 ML/HR: 9 INJECTION, SOLUTION INTRAVENOUS at 09:12

## 2023-12-11 RX ADMIN — HEPARIN 500 UNITS: 100 SYRINGE at 11:59

## 2023-12-11 RX ADMIN — FAMOTIDINE 20 MG: 10 INJECTION, SOLUTION INTRAVENOUS at 09:17

## 2023-12-11 RX ADMIN — DIPHENHYDRAMINE HYDROCHLORIDE 50 MG: 50 INJECTION, SOLUTION INTRAMUSCULAR; INTRAVENOUS at 09:18

## 2023-12-11 RX ADMIN — PACLITAXEL 100 MG: 6 INJECTION, SOLUTION, CONCENTRATE INTRAVENOUS at 10:09

## 2023-12-11 RX ADMIN — PALONOSETRON HYDROCHLORIDE 0.25 MG: 0.25 INJECTION, SOLUTION INTRAVENOUS at 09:12

## 2023-12-11 RX ADMIN — CARBOPLATIN 270 MG: 10 INJECTION INTRAVENOUS at 11:21

## 2023-12-11 NOTE — PROGRESS NOTES
"NAME: Jourdan Lebron DATE: 2023   : 1970    MRN: [id number] DIAGNOSIS:   Encounter Diagnosis   Name Primary?    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 Yes          RADIATION ON TREATMENT VISIT NOTE - CHEST    Treatment Summary    [x] Concurrent Chemo   Regimen: Carbo/Taxol weekly on      Treatment Site Ref. ID Energy Dose/Fx (cGy) #Fx Dose Correction (cGy) Total Dose (cGy) Start Date End Date Elapsed Days   Esophagus Esophagus 6X 180  0 1,980 2023 14         General:     Review of Systems  [] No new complaints [] Fever/chills /night sweats  [] Decreased energy level [] Decreased appetite [] Oxygen:  L/min  [] Dry cough   [] Productive cough  [] SOB  [] Hemoptysis  [x] Dysphagia      [x] Fatigue, severity: 1 [x] Pain, severity: 0, location: denies pain  [] Pain medication regimen:    [x] Esophagitis regimen:  Lisa's Magic  [] Skin regimen:      Subjective:  He is doing well with treatments so far, he doesn't have any concerns or complaints.  Discussed interval weight loss; increasing supplements  KPS: 70-80     Vital Signs: BP 92/61   Pulse 86   Temp 98.1 °F (36.7 °C) (Oral)   Resp 16   Ht 180.3 cm (71\")   Wt 71.6 kg (157 lb 13.6 oz)   SpO2 99%   BMI 22.02 kg/m²     Weight:   Wt Readings from Last 3 Encounters:   23 71.6 kg (157 lb 13.6 oz)   23 71.6 kg (157 lb 12.8 oz)   23 73.3 kg (161 lb 9.6 oz)       Medication:   Current Outpatient Medications:     ALPRAZolam (XANAX) 0.25 MG tablet, Take 1 tablet by mouth 3 (Three) Times a Day As Needed for Anxiety. for anxiety, Disp: 90 tablet, Rfl: 3    aspirin 81 MG chewable tablet, Chew 1 tablet Daily., Disp: , Rfl:     Evolocumab (REPATHA) solution prefilled syringe injection, Inject 1 mL under the skin into the appropriate area as directed Every 14 (Fourteen) Days., Disp: 6 mL, Rfl: 3    FLUoxetine (PROzac) 20 MG capsule, Take 1 capsule by mouth Daily., Disp: 90 capsule, Rfl: " 1    fluticasone (FLONASE) 50 MCG/ACT nasal spray, 1 spray into the nostril(s) as directed by provider Daily., Disp: , Rfl:     lidocaine-prilocaine (EMLA) 2.5-2.5 % cream, Apply 1 application  topically to the appropriate area as directed As Needed (apply 60 minutes prior to port access)., Disp: 30 g, Rfl: 2    Nystatin (magic mouthwash), Swish and swallow 5 mL 4 (Four) Times a Day Before Meals & at Bedtime., Disp: 1 bottle, Rfl: 4    ondansetron (ZOFRAN) 8 MG tablet, Take 1 tablet by mouth 3 (Three) Times a Day As Needed for Nausea or Vomiting., Disp: 30 tablet, Rfl: 5    traMADol (ULTRAM) 50 MG tablet, Take 1 tablet by mouth Every 6 (Six) Hours As Needed for Moderate Pain., Disp: 60 tablet, Rfl: 0  No current facility-administered medications for this visit.    Facility-Administered Medications Ordered in Other Visits:     heparin injection 500 Units, 500 Units, Intravenous, PRN, Oliverio Mueller MD, 500 Units at 12/11/23 1159    sodium chloride 0.9 % flush 10 mL, 10 mL, Intravenous, PRN, Oliverio Mueller MD, 10 mL at 12/11/23 1159     LABS (Reviewed):  Hematology  WBC   Date Value Ref Range Status   12/11/2023 3.54 3.40 - 10.80 10*3/mm3 Final     RBC   Date Value Ref Range Status   12/11/2023 4.31 4.14 - 5.80 10*6/mm3 Final     Hemoglobin   Date Value Ref Range Status   12/11/2023 14.1 13.0 - 17.7 g/dL Final     Hematocrit   Date Value Ref Range Status   12/11/2023 42.8 37.5 - 51.0 % Final     Platelets   Date Value Ref Range Status   12/11/2023 226 140 - 450 10*3/mm3 Final     Chemistry   Lab Results   Component Value Date    GLUCOSE 69 12/11/2023    BUN 12 12/11/2023    CREATININE 0.80 12/11/2023    EGFRIFNONA 67 02/24/2022    BCR 16.7 12/11/2023    K 4.4 12/11/2023    CO2 27.0 12/11/2023    CALCIUM 9.0 12/11/2023    ALBUMIN 3.9 12/11/2023    LABIL2 1.3 09/25/2018    AST 13 12/11/2023    ALT 13 12/11/2023         Physical Exam:     Pulmonary: [] Cough:     [] Dyspnea:  Neck:  [] Lymphadenopathy  Lungs:  [x]  Clear to auscultation  CVS:  [x] Regular rate & rhythm  Skin:  stGstrstastdstest:st st1st GI:  [x] Dysphagia:     [x] Esophagitis: denies    Comments/Notes:      [x] Review of labs, images, dosimetry, dose delivered, & treatment parameters.    Comments:     [x] Patient treatment setup reviewed.    Comments:     Recommendations:  monitor weights this week  Continue XRT  Increase supplements.    [x] Continue RT  [] Change RT Plan [] Hold RT, length:        Approved Electronically By:  Geovany Sabillon MD, 12/11/2023, 13:03 EST      Confidentiality of this medical record shall be maintained except when use or disclosure is required or permitted by law, regulation or written authorization by the patient.

## 2023-12-12 ENCOUNTER — TELEPHONE (OUTPATIENT)
Dept: FAMILY MEDICINE CLINIC | Facility: CLINIC | Age: 53
End: 2023-12-12
Payer: COMMERCIAL

## 2023-12-12 ENCOUNTER — HOSPITAL ENCOUNTER (OUTPATIENT)
Dept: RADIATION ONCOLOGY | Facility: HOSPITAL | Age: 53
Discharge: HOME | End: 2023-12-12

## 2023-12-12 LAB
RAD ONC ARIA COURSE ID: NORMAL
RAD ONC ARIA COURSE LAST TREATMENT DATE: NORMAL
RAD ONC ARIA COURSE START DATE: NORMAL
RAD ONC ARIA COURSE TREATMENT ELAPSED DAYS: 15
RAD ONC ARIA FIRST TREATMENT DATE: NORMAL
RAD ONC ARIA PLAN FRACTIONS TREATED TO DATE: 12
RAD ONC ARIA PLAN ID: NORMAL
RAD ONC ARIA PLAN PRESCRIBED DOSE PER FRACTION: 1.8 GY
RAD ONC ARIA PLAN PRIMARY REFERENCE POINT: NORMAL
RAD ONC ARIA PLAN TOTAL FRACTIONS PRESCRIBED: 23
RAD ONC ARIA PLAN TOTAL PRESCRIBED DOSE: 4140 CGY
RAD ONC ARIA REFERENCE POINT DOSAGE GIVEN TO DATE: 21.6 GY
RAD ONC ARIA REFERENCE POINT ID: NORMAL
RAD ONC ARIA REFERENCE POINT SESSION DOSAGE GIVEN: 1.8 GY

## 2023-12-12 PROCEDURE — 77014 CHG CT GUIDANCE RADIATION THERAPY FLDS PLACEMENT: CPT | Performed by: RADIOLOGY

## 2023-12-12 PROCEDURE — 77386: CPT | Performed by: RADIOLOGY

## 2023-12-13 ENCOUNTER — HOSPITAL ENCOUNTER (OUTPATIENT)
Dept: RADIATION ONCOLOGY | Facility: HOSPITAL | Age: 53
Discharge: HOME | End: 2023-12-13

## 2023-12-13 LAB
RAD ONC ARIA COURSE ID: NORMAL
RAD ONC ARIA COURSE LAST TREATMENT DATE: NORMAL
RAD ONC ARIA COURSE START DATE: NORMAL
RAD ONC ARIA COURSE TREATMENT ELAPSED DAYS: 16
RAD ONC ARIA FIRST TREATMENT DATE: NORMAL
RAD ONC ARIA PLAN FRACTIONS TREATED TO DATE: 13
RAD ONC ARIA PLAN ID: NORMAL
RAD ONC ARIA PLAN PRESCRIBED DOSE PER FRACTION: 1.8 GY
RAD ONC ARIA PLAN PRIMARY REFERENCE POINT: NORMAL
RAD ONC ARIA PLAN TOTAL FRACTIONS PRESCRIBED: 23
RAD ONC ARIA PLAN TOTAL PRESCRIBED DOSE: 4140 CGY
RAD ONC ARIA REFERENCE POINT DOSAGE GIVEN TO DATE: 23.4 GY
RAD ONC ARIA REFERENCE POINT ID: NORMAL
RAD ONC ARIA REFERENCE POINT SESSION DOSAGE GIVEN: 1.8 GY

## 2023-12-13 PROCEDURE — 77336 RADIATION PHYSICS CONSULT: CPT | Performed by: RADIOLOGY

## 2023-12-13 PROCEDURE — 77386: CPT | Performed by: RADIOLOGY

## 2023-12-13 PROCEDURE — 77014 CHG CT GUIDANCE RADIATION THERAPY FLDS PLACEMENT: CPT | Performed by: RADIOLOGY

## 2023-12-14 ENCOUNTER — HOSPITAL ENCOUNTER (OUTPATIENT)
Dept: RADIATION ONCOLOGY | Facility: HOSPITAL | Age: 53
Discharge: HOME | End: 2023-12-14

## 2023-12-14 LAB
RAD ONC ARIA COURSE ID: NORMAL
RAD ONC ARIA COURSE LAST TREATMENT DATE: NORMAL
RAD ONC ARIA COURSE START DATE: NORMAL
RAD ONC ARIA COURSE TREATMENT ELAPSED DAYS: 17
RAD ONC ARIA FIRST TREATMENT DATE: NORMAL
RAD ONC ARIA PLAN FRACTIONS TREATED TO DATE: 14
RAD ONC ARIA PLAN ID: NORMAL
RAD ONC ARIA PLAN PRESCRIBED DOSE PER FRACTION: 1.8 GY
RAD ONC ARIA PLAN PRIMARY REFERENCE POINT: NORMAL
RAD ONC ARIA PLAN TOTAL FRACTIONS PRESCRIBED: 23
RAD ONC ARIA PLAN TOTAL PRESCRIBED DOSE: 4140 CGY
RAD ONC ARIA REFERENCE POINT DOSAGE GIVEN TO DATE: 25.2 GY
RAD ONC ARIA REFERENCE POINT ID: NORMAL
RAD ONC ARIA REFERENCE POINT SESSION DOSAGE GIVEN: 1.8 GY

## 2023-12-14 PROCEDURE — 77014 CHG CT GUIDANCE RADIATION THERAPY FLDS PLACEMENT: CPT | Performed by: RADIOLOGY

## 2023-12-14 PROCEDURE — 77386: CPT | Performed by: RADIOLOGY

## 2023-12-15 ENCOUNTER — HOSPITAL ENCOUNTER (OUTPATIENT)
Dept: RADIATION ONCOLOGY | Facility: HOSPITAL | Age: 53
Discharge: HOME | End: 2023-12-15

## 2023-12-15 LAB
RAD ONC ARIA COURSE ID: NORMAL
RAD ONC ARIA COURSE LAST TREATMENT DATE: NORMAL
RAD ONC ARIA COURSE START DATE: NORMAL
RAD ONC ARIA COURSE TREATMENT ELAPSED DAYS: 18
RAD ONC ARIA FIRST TREATMENT DATE: NORMAL
RAD ONC ARIA PLAN FRACTIONS TREATED TO DATE: 15
RAD ONC ARIA PLAN ID: NORMAL
RAD ONC ARIA PLAN PRESCRIBED DOSE PER FRACTION: 1.8 GY
RAD ONC ARIA PLAN PRIMARY REFERENCE POINT: NORMAL
RAD ONC ARIA PLAN TOTAL FRACTIONS PRESCRIBED: 23
RAD ONC ARIA PLAN TOTAL PRESCRIBED DOSE: 4140 CGY
RAD ONC ARIA REFERENCE POINT DOSAGE GIVEN TO DATE: 27 GY
RAD ONC ARIA REFERENCE POINT ID: NORMAL
RAD ONC ARIA REFERENCE POINT SESSION DOSAGE GIVEN: 1.8 GY

## 2023-12-15 PROCEDURE — 77386: CPT | Performed by: INTERNAL MEDICINE

## 2023-12-15 PROCEDURE — 77014 CHG CT GUIDANCE RADIATION THERAPY FLDS PLACEMENT: CPT | Performed by: INTERNAL MEDICINE

## 2023-12-18 ENCOUNTER — HOSPITAL ENCOUNTER (OUTPATIENT)
Dept: ONCOLOGY | Facility: HOSPITAL | Age: 53
Discharge: HOME | End: 2023-12-18
Admitting: INTERNAL MEDICINE
Payer: COMMERCIAL

## 2023-12-18 ENCOUNTER — HOSPITAL ENCOUNTER (OUTPATIENT)
Dept: RADIATION ONCOLOGY | Facility: HOSPITAL | Age: 53
Discharge: HOME | End: 2023-12-18
Payer: COMMERCIAL

## 2023-12-18 ENCOUNTER — RADIATION ONCOLOGY WEEKLY ASSESSMENT (OUTPATIENT)
Dept: RADIATION ONCOLOGY | Facility: HOSPITAL | Age: 53
End: 2023-12-18
Payer: COMMERCIAL

## 2023-12-18 VITALS
SYSTOLIC BLOOD PRESSURE: 106 MMHG | TEMPERATURE: 97.8 F | BODY MASS INDEX: 22.89 KG/M2 | HEIGHT: 71 IN | HEART RATE: 78 BPM | DIASTOLIC BLOOD PRESSURE: 69 MMHG | OXYGEN SATURATION: 99 % | RESPIRATION RATE: 14 BRPM | WEIGHT: 163.5 LBS

## 2023-12-18 VITALS
WEIGHT: 159.8 LBS | BODY MASS INDEX: 22.3 KG/M2 | RESPIRATION RATE: 18 BRPM | DIASTOLIC BLOOD PRESSURE: 71 MMHG | SYSTOLIC BLOOD PRESSURE: 107 MMHG | OXYGEN SATURATION: 99 % | HEART RATE: 77 BPM

## 2023-12-18 DIAGNOSIS — C77.2 ADENOCARCINOMA OF ESOPHAGUS METASTATIC TO INTRA-ABDOMINAL LYMPH NODE: Primary | ICD-10-CM

## 2023-12-18 DIAGNOSIS — C15.9 ADENOCARCINOMA OF ESOPHAGUS METASTATIC TO INTRA-ABDOMINAL LYMPH NODE: Primary | ICD-10-CM

## 2023-12-18 DIAGNOSIS — Z95.828 PORT-A-CATH IN PLACE: ICD-10-CM

## 2023-12-18 LAB
ALBUMIN SERPL-MCNC: 3.6 G/DL (ref 3.5–5.2)
ALBUMIN/GLOB SERPL: 1.4 G/DL
ALP SERPL-CCNC: 51 U/L (ref 39–117)
ALT SERPL W P-5'-P-CCNC: 13 U/L (ref 1–41)
ANION GAP SERPL CALCULATED.3IONS-SCNC: 10 MMOL/L (ref 5–15)
AST SERPL-CCNC: 15 U/L (ref 1–40)
BASOPHILS # BLD AUTO: 0.02 10*3/MM3 (ref 0–0.2)
BASOPHILS NFR BLD AUTO: 0.5 % (ref 0–1.5)
BILIRUB SERPL-MCNC: 0.2 MG/DL (ref 0–1.2)
BUN SERPL-MCNC: 11 MG/DL (ref 6–20)
BUN/CREAT SERPL: 16.9 (ref 7–25)
CALCIUM SPEC-SCNC: 8.9 MG/DL (ref 8.6–10.5)
CHLORIDE SERPL-SCNC: 103 MMOL/L (ref 98–107)
CO2 SERPL-SCNC: 25 MMOL/L (ref 22–29)
CREAT BLDA-MCNC: 0.8 MG/DL (ref 0.6–1.3)
CREAT SERPL-MCNC: 0.65 MG/DL (ref 0.76–1.27)
DEPRECATED RDW RBC AUTO: 47.1 FL (ref 37–54)
EGFRCR SERPLBLD CKD-EPI 2021: 107.1 ML/MIN/1.73
EGFRCR SERPLBLD CKD-EPI 2021: 114.1 ML/MIN/1.73
EOSINOPHIL # BLD AUTO: 0.06 10*3/MM3 (ref 0–0.4)
EOSINOPHIL NFR BLD AUTO: 1.6 % (ref 0.3–6.2)
ERYTHROCYTE [DISTWIDTH] IN BLOOD BY AUTOMATED COUNT: 13.4 % (ref 12.3–15.4)
GLOBULIN UR ELPH-MCNC: 2.6 GM/DL
GLUCOSE SERPL-MCNC: 134 MG/DL (ref 65–99)
HCT VFR BLD AUTO: 37.9 % (ref 37.5–51)
HGB BLD-MCNC: 13.2 G/DL (ref 13–17.7)
LYMPHOCYTES # BLD AUTO: 0.46 10*3/MM3 (ref 0.7–3.1)
LYMPHOCYTES NFR BLD AUTO: 12.3 % (ref 19.6–45.3)
MCH RBC QN AUTO: 33.9 PG (ref 26.6–33)
MCHC RBC AUTO-ENTMCNC: 34.8 G/DL (ref 31.5–35.7)
MCV RBC AUTO: 97.4 FL (ref 79–97)
MONOCYTES # BLD AUTO: 0.43 10*3/MM3 (ref 0.1–0.9)
MONOCYTES NFR BLD AUTO: 11.5 % (ref 5–12)
NEUTROPHILS NFR BLD AUTO: 2.78 10*3/MM3 (ref 1.7–7)
NEUTROPHILS NFR BLD AUTO: 74.1 % (ref 42.7–76)
PLATELET # BLD AUTO: 170 10*3/MM3 (ref 140–450)
PMV BLD AUTO: 9.2 FL (ref 6–12)
POTASSIUM SERPL-SCNC: 4 MMOL/L (ref 3.5–5.2)
PROT SERPL-MCNC: 6.2 G/DL (ref 6–8.5)
RAD ONC ARIA COURSE ID: NORMAL
RAD ONC ARIA COURSE LAST TREATMENT DATE: NORMAL
RAD ONC ARIA COURSE START DATE: NORMAL
RAD ONC ARIA COURSE TREATMENT ELAPSED DAYS: 21
RAD ONC ARIA FIRST TREATMENT DATE: NORMAL
RAD ONC ARIA PLAN FRACTIONS TREATED TO DATE: 16
RAD ONC ARIA PLAN ID: NORMAL
RAD ONC ARIA PLAN PRESCRIBED DOSE PER FRACTION: 1.8 GY
RAD ONC ARIA PLAN PRIMARY REFERENCE POINT: NORMAL
RAD ONC ARIA PLAN TOTAL FRACTIONS PRESCRIBED: 23
RAD ONC ARIA PLAN TOTAL PRESCRIBED DOSE: 4140 CGY
RAD ONC ARIA REFERENCE POINT DOSAGE GIVEN TO DATE: 28.8 GY
RAD ONC ARIA REFERENCE POINT ID: NORMAL
RAD ONC ARIA REFERENCE POINT SESSION DOSAGE GIVEN: 1.8 GY
RBC # BLD AUTO: 3.89 10*6/MM3 (ref 4.14–5.8)
SODIUM SERPL-SCNC: 138 MMOL/L (ref 136–145)
WBC NRBC COR # BLD AUTO: 3.75 10*3/MM3 (ref 3.4–10.8)

## 2023-12-18 PROCEDURE — 25010000002 DIPHENHYDRAMINE PER 50 MG: Performed by: INTERNAL MEDICINE

## 2023-12-18 PROCEDURE — 25010000002 PACLITAXEL PER 1 MG: Performed by: INTERNAL MEDICINE

## 2023-12-18 PROCEDURE — 25810000003 SODIUM CHLORIDE 0.9 % SOLUTION: Performed by: INTERNAL MEDICINE

## 2023-12-18 PROCEDURE — 25810000003 SODIUM CHLORIDE 0.9 % SOLUTION 250 ML FLEX CONT: Performed by: INTERNAL MEDICINE

## 2023-12-18 PROCEDURE — 25010000002 PALONOSETRON 0.25 MG/5ML SOLUTION PREFILLED SYRINGE: Performed by: INTERNAL MEDICINE

## 2023-12-18 PROCEDURE — 77014 CHG CT GUIDANCE RADIATION THERAPY FLDS PLACEMENT: CPT | Performed by: RADIOLOGY

## 2023-12-18 PROCEDURE — 96417 CHEMO IV INFUS EACH ADDL SEQ: CPT

## 2023-12-18 PROCEDURE — 80053 COMPREHEN METABOLIC PANEL: CPT | Performed by: INTERNAL MEDICINE

## 2023-12-18 PROCEDURE — 96367 TX/PROPH/DG ADDL SEQ IV INF: CPT

## 2023-12-18 PROCEDURE — 25010000002 CARBOPLATIN PER 50 MG: Performed by: INTERNAL MEDICINE

## 2023-12-18 PROCEDURE — 82565 ASSAY OF CREATININE: CPT

## 2023-12-18 PROCEDURE — 25010000002 DEXAMETHASONE SODIUM PHOSPHATE 120 MG/30ML SOLUTION: Performed by: INTERNAL MEDICINE

## 2023-12-18 PROCEDURE — 77386: CPT | Performed by: RADIOLOGY

## 2023-12-18 PROCEDURE — 25010000002 HEPARIN LOCK FLUSH PER 10 UNITS: Performed by: INTERNAL MEDICINE

## 2023-12-18 PROCEDURE — 85025 COMPLETE CBC W/AUTO DIFF WBC: CPT | Performed by: INTERNAL MEDICINE

## 2023-12-18 PROCEDURE — 96413 CHEMO IV INFUSION 1 HR: CPT

## 2023-12-18 PROCEDURE — 96375 TX/PRO/DX INJ NEW DRUG ADDON: CPT

## 2023-12-18 RX ORDER — SODIUM CHLORIDE 9 MG/ML
20 INJECTION, SOLUTION INTRAVENOUS ONCE
Status: COMPLETED | OUTPATIENT
Start: 2023-12-18 | End: 2023-12-18

## 2023-12-18 RX ORDER — FAMOTIDINE 10 MG/ML
20 INJECTION, SOLUTION INTRAVENOUS AS NEEDED
Status: CANCELLED | OUTPATIENT
Start: 2023-12-18

## 2023-12-18 RX ORDER — PALONOSETRON 0.05 MG/ML
0.25 INJECTION, SOLUTION INTRAVENOUS ONCE
Status: COMPLETED | OUTPATIENT
Start: 2023-12-18 | End: 2023-12-18

## 2023-12-18 RX ORDER — DIPHENHYDRAMINE HYDROCHLORIDE 50 MG/ML
50 INJECTION INTRAMUSCULAR; INTRAVENOUS AS NEEDED
Status: CANCELLED | OUTPATIENT
Start: 2023-12-18

## 2023-12-18 RX ORDER — HEPARIN SODIUM (PORCINE) LOCK FLUSH IV SOLN 100 UNIT/ML 100 UNIT/ML
500 SOLUTION INTRAVENOUS AS NEEDED
Status: DISCONTINUED | OUTPATIENT
Start: 2023-12-18 | End: 2023-12-19 | Stop reason: HOSPADM

## 2023-12-18 RX ORDER — SODIUM CHLORIDE 0.9 % (FLUSH) 0.9 %
10 SYRINGE (ML) INJECTION AS NEEDED
Status: DISCONTINUED | OUTPATIENT
Start: 2023-12-18 | End: 2023-12-19 | Stop reason: HOSPADM

## 2023-12-18 RX ORDER — FAMOTIDINE 10 MG/ML
20 INJECTION, SOLUTION INTRAVENOUS ONCE
Status: COMPLETED | OUTPATIENT
Start: 2023-12-18 | End: 2023-12-18

## 2023-12-18 RX ADMIN — FAMOTIDINE 20 MG: 10 INJECTION, SOLUTION INTRAVENOUS at 10:37

## 2023-12-18 RX ADMIN — PALONOSETRON 0.25 MG: 0.25 INJECTION, SOLUTION INTRAVENOUS at 10:35

## 2023-12-18 RX ADMIN — SODIUM CHLORIDE 20 ML/HR: 9 INJECTION, SOLUTION INTRAVENOUS at 10:33

## 2023-12-18 RX ADMIN — CARBOPLATIN 280 MG: 10 INJECTION INTRAVENOUS at 12:43

## 2023-12-18 RX ADMIN — DEXAMETHASONE SODIUM PHOSPHATE 12 MG: 4 INJECTION, SOLUTION INTRA-ARTICULAR; INTRALESIONAL; INTRAMUSCULAR; INTRAVENOUS; SOFT TISSUE at 11:13

## 2023-12-18 RX ADMIN — HEPARIN 500 UNITS: 100 SYRINGE at 13:20

## 2023-12-18 RX ADMIN — Medication 10 ML: at 13:20

## 2023-12-18 RX ADMIN — PACLITAXEL 100 MG: 6 INJECTION, SOLUTION, CONCENTRATE INTRAVENOUS at 11:33

## 2023-12-18 RX ADMIN — DIPHENHYDRAMINE HYDROCHLORIDE 50 MG: 50 INJECTION, SOLUTION INTRAMUSCULAR; INTRAVENOUS at 10:43

## 2023-12-18 NOTE — PROGRESS NOTES
Glucose was 134 so if this was a fasting test watch your carbohydrates and increase your walking and we should check again in 3 months.

## 2023-12-18 NOTE — PROGRESS NOTES
NAME: Jourdan Lebron DATE: 2023   : 1970    MRN: [id number] DIAGNOSIS:   Encounter Diagnosis   Name Primary?    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 ID Energy Dose/Fx (cGy) #Fx Dose Correction (cGy) Total Dose (cGy) Start Date End Date Elapsed Days   Esophagus Esophagus 6X 180  0 2,880 2023     DIAGNOSIS:  T2N1M0, stage IIIA     General:     Review of Systems  [] No new complaints [] Fever/chills /night sweats  [x] Decreased energy level [] Decreased appetite [] Oxygen:  L/min  [] Dry cough   [] Productive cough  [] SOB  [] Hemoptysis  [x] Dysphagia      [x] Fatigue, severity: 1 [] Pain, severity: 0, location:   [x] Pain medication regimen:  Tylenol PRN  [x] Esophagitis regimen:  Lisa's Magic Mouthwash   [x] Skin regimen:  eucerin/aquaphor    Subjective:  Unable to eat as much as one time as normal. Recommended eating small, frequent meals.    KPS: 80     Vital Signs: /71   Pulse 77   Resp 18   Wt 72.5 kg (159 lb 12.8 oz)   SpO2 99%   BMI 22.30 kg/m²     Weight:   Wt Readings from Last 3 Encounters:   23 72.5 kg (159 lb 12.8 oz)   23 74.2 kg (163 lb 8 oz)   23 71.6 kg (157 lb 12.8 oz)       Medication:   Current Outpatient Medications:     ALPRAZolam (XANAX) 0.25 MG tablet, Take 1 tablet by mouth 3 (Three) Times a Day As Needed for Anxiety. for anxiety, Disp: 90 tablet, Rfl: 3    aspirin 81 MG chewable tablet, Chew 1 tablet Daily., Disp: , Rfl:     Evolocumab (REPATHA) solution prefilled syringe injection, Inject 1 mL under the skin into the appropriate area as directed Every 14 (Fourteen) Days., Disp: 6 mL, Rfl: 3    FLUoxetine (PROzac) 20 MG capsule, Take 1 capsule by mouth Daily., Disp: 90 capsule, Rfl: 1    fluticasone (FLONASE) 50 MCG/ACT nasal spray,  1 spray into the nostril(s) as directed by provider Daily., Disp: , Rfl:     lidocaine-prilocaine (EMLA) 2.5-2.5 % cream, Apply 1 application  topically to the appropriate area as directed As Needed (apply 60 minutes prior to port access)., Disp: 30 g, Rfl: 2    Nystatin (magic mouthwash), Swish and swallow 5 mL 4 (Four) Times a Day Before Meals & at Bedtime., Disp: 1 bottle, Rfl: 4    ondansetron (ZOFRAN) 8 MG tablet, Take 1 tablet by mouth 3 (Three) Times a Day As Needed for Nausea or Vomiting., Disp: 30 tablet, Rfl: 5    traMADol (ULTRAM) 50 MG tablet, Take 1 tablet by mouth Every 6 (Six) Hours As Needed for Moderate Pain., Disp: 60 tablet, Rfl: 0  No current facility-administered medications for this visit.    Facility-Administered Medications Ordered in Other Visits:     heparin injection 500 Units, 500 Units, Intravenous, PRKARLA, Oliverio Mueller MD, 500 Units at 12/18/23 1320    sodium chloride 0.9 % flush 10 mL, 10 mL, Intravenous, PRN, Oliverio Mueller MD, 10 mL at 12/18/23 1320     LABS (Reviewed):  Hematology  WBC   Date Value Ref Range Status   12/18/2023 3.75 3.40 - 10.80 10*3/mm3 Final     RBC   Date Value Ref Range Status   12/18/2023 3.89 (L) 4.14 - 5.80 10*6/mm3 Final     Hemoglobin   Date Value Ref Range Status   12/18/2023 13.2 13.0 - 17.7 g/dL Final     Hematocrit   Date Value Ref Range Status   12/18/2023 37.9 37.5 - 51.0 % Final     Platelets   Date Value Ref Range Status   12/18/2023 170 140 - 450 10*3/mm3 Final     Chemistry   Lab Results   Component Value Date    GLUCOSE 134 (H) 12/18/2023    BUN 11 12/18/2023    CREATININE 0.80 12/18/2023    EGFRIFNONA 67 02/24/2022    BCR 16.9 12/18/2023    K 4.0 12/18/2023    CO2 25.0 12/18/2023    CALCIUM 8.9 12/18/2023    ALBUMIN 3.6 12/18/2023    LABIL2 1.3 09/25/2018    AST 15 12/18/2023    ALT 13 12/18/2023         Physical Exam:     Pulmonary: [] Cough:     [] Dyspnea:  Neck:  [] Lymphadenopathy  Lungs:  [x] Clear to auscultation  CVS:  [x] Regular  rate & rhythm  Skin:  stGstrstastdstest:st st1st GI:  [] Dysphagia:     [x] Esophagitis: grade 1--ovi's magic helping; discussed using tramadol as well.    Comments/Notes:      [x] Review of labs, images, dosimetry, dose delivered, & treatment parameters.    Comments:     [x] Patient treatment setup reviewed.    Comments:     Recommendations:  RD/nutrition discussed  Continue XRT and supportive measures  PO supplements discussed  Ovi's magic and Tramadol recommended.    [x] Continue RT  [] Change RT Plan [] Hold RT, length:        Approved Electronically By:  Geovany Sabillon MD, 12/18/2023, 14:15 EST      Confidentiality of this medical record shall be maintained except when use or disclosure is required or permitted by law, regulation or written authorization by the patient.

## 2023-12-19 ENCOUNTER — TELEPHONE (OUTPATIENT)
Dept: FAMILY MEDICINE CLINIC | Facility: CLINIC | Age: 53
End: 2023-12-19
Payer: COMMERCIAL

## 2023-12-19 ENCOUNTER — HOSPITAL ENCOUNTER (OUTPATIENT)
Dept: RADIATION ONCOLOGY | Facility: HOSPITAL | Age: 53
Discharge: HOME | End: 2023-12-19

## 2023-12-19 LAB
RAD ONC ARIA COURSE ID: NORMAL
RAD ONC ARIA COURSE LAST TREATMENT DATE: NORMAL
RAD ONC ARIA COURSE START DATE: NORMAL
RAD ONC ARIA COURSE TREATMENT ELAPSED DAYS: 22
RAD ONC ARIA FIRST TREATMENT DATE: NORMAL
RAD ONC ARIA PLAN FRACTIONS TREATED TO DATE: 17
RAD ONC ARIA PLAN ID: NORMAL
RAD ONC ARIA PLAN PRESCRIBED DOSE PER FRACTION: 1.8 GY
RAD ONC ARIA PLAN PRIMARY REFERENCE POINT: NORMAL
RAD ONC ARIA PLAN TOTAL FRACTIONS PRESCRIBED: 23
RAD ONC ARIA PLAN TOTAL PRESCRIBED DOSE: 4140 CGY
RAD ONC ARIA REFERENCE POINT DOSAGE GIVEN TO DATE: 30.6 GY
RAD ONC ARIA REFERENCE POINT ID: NORMAL
RAD ONC ARIA REFERENCE POINT SESSION DOSAGE GIVEN: 1.8 GY

## 2023-12-19 PROCEDURE — 77386: CPT | Performed by: RADIOLOGY

## 2023-12-19 PROCEDURE — 77014 CHG CT GUIDANCE RADIATION THERAPY FLDS PLACEMENT: CPT | Performed by: RADIOLOGY

## 2023-12-19 NOTE — TELEPHONE ENCOUNTER
"Relay     \"Glucose was 134 so if this was a fasting test watch your carbohydrates and increase your walking and we should check again in 3 months.\"                "

## 2023-12-20 ENCOUNTER — HOSPITAL ENCOUNTER (OUTPATIENT)
Dept: RADIATION ONCOLOGY | Facility: HOSPITAL | Age: 53
Discharge: HOME | End: 2023-12-20

## 2023-12-20 LAB
RAD ONC ARIA COURSE ID: NORMAL
RAD ONC ARIA COURSE LAST TREATMENT DATE: NORMAL
RAD ONC ARIA COURSE START DATE: NORMAL
RAD ONC ARIA COURSE TREATMENT ELAPSED DAYS: 23
RAD ONC ARIA FIRST TREATMENT DATE: NORMAL
RAD ONC ARIA PLAN FRACTIONS TREATED TO DATE: 18
RAD ONC ARIA PLAN ID: NORMAL
RAD ONC ARIA PLAN PRESCRIBED DOSE PER FRACTION: 1.8 GY
RAD ONC ARIA PLAN PRIMARY REFERENCE POINT: NORMAL
RAD ONC ARIA PLAN TOTAL FRACTIONS PRESCRIBED: 23
RAD ONC ARIA PLAN TOTAL PRESCRIBED DOSE: 4140 CGY
RAD ONC ARIA REFERENCE POINT DOSAGE GIVEN TO DATE: 32.4 GY
RAD ONC ARIA REFERENCE POINT ID: NORMAL
RAD ONC ARIA REFERENCE POINT SESSION DOSAGE GIVEN: 1.8 GY

## 2023-12-20 PROCEDURE — 77386: CPT | Performed by: RADIOLOGY

## 2023-12-20 PROCEDURE — 77014 CHG CT GUIDANCE RADIATION THERAPY FLDS PLACEMENT: CPT | Performed by: RADIOLOGY

## 2023-12-21 ENCOUNTER — HOSPITAL ENCOUNTER (OUTPATIENT)
Dept: RADIATION ONCOLOGY | Facility: HOSPITAL | Age: 53
Discharge: HOME | End: 2023-12-21

## 2023-12-21 LAB
RAD ONC ARIA COURSE ID: NORMAL
RAD ONC ARIA COURSE LAST TREATMENT DATE: NORMAL
RAD ONC ARIA COURSE START DATE: NORMAL
RAD ONC ARIA COURSE TREATMENT ELAPSED DAYS: 24
RAD ONC ARIA FIRST TREATMENT DATE: NORMAL
RAD ONC ARIA PLAN FRACTIONS TREATED TO DATE: 19
RAD ONC ARIA PLAN ID: NORMAL
RAD ONC ARIA PLAN PRESCRIBED DOSE PER FRACTION: 1.8 GY
RAD ONC ARIA PLAN PRIMARY REFERENCE POINT: NORMAL
RAD ONC ARIA PLAN TOTAL FRACTIONS PRESCRIBED: 23
RAD ONC ARIA PLAN TOTAL PRESCRIBED DOSE: 4140 CGY
RAD ONC ARIA REFERENCE POINT DOSAGE GIVEN TO DATE: 34.2 GY
RAD ONC ARIA REFERENCE POINT ID: NORMAL
RAD ONC ARIA REFERENCE POINT SESSION DOSAGE GIVEN: 1.8 GY

## 2023-12-21 PROCEDURE — 77386: CPT | Performed by: RADIOLOGY

## 2023-12-21 PROCEDURE — 77336 RADIATION PHYSICS CONSULT: CPT | Performed by: RADIOLOGY

## 2023-12-21 PROCEDURE — 77014 CHG CT GUIDANCE RADIATION THERAPY FLDS PLACEMENT: CPT | Performed by: RADIOLOGY

## 2023-12-21 NOTE — PATIENT INSTRUCTIONS
Health Maintenance Due   Topic Date Due    ZOSTER VACCINE (1 of 2) Never done    COVID-19 Vaccine (3 - Pfizer risk series) 10/15/2021    Pneumococcal Vaccine 0-64 (3 - PCV) 01/28/2022    LUNG CANCER SCREENING  04/21/2022    INFLUENZA VACCINE  08/01/2023    ANNUAL PHYSICAL  09/16/2023    You are due for Shingrix vaccination series ( the newest shingles vaccine).  It is a two shot series spaced 2-6 months apart. Please get this vaccine series started at your earliest convenience at your local pharmacy to help avoid shingles outbreak. It is more effective than the old Zostavax vaccine and is recommended even if you have had the Zostavax vaccine in the past.  Once the Shingrix series is completed, it does not need to be repeated.   For more information, please look at the website below:  [facility] Shingrix Vaccine Information      Patient to check with oncology about aspirin use    Patient to call GI about liquid medicine to help with heartburn.    Consider liquid IV    Patient to ask Dr. Haynes about Chest CT for lung cancer screening-or is that not necessary due to recent PET

## 2023-12-22 ENCOUNTER — OFFICE VISIT (OUTPATIENT)
Dept: FAMILY MEDICINE CLINIC | Facility: CLINIC | Age: 53
End: 2023-12-22
Payer: COMMERCIAL

## 2023-12-22 ENCOUNTER — HOSPITAL ENCOUNTER (OUTPATIENT)
Dept: RADIATION ONCOLOGY | Facility: HOSPITAL | Age: 53
Discharge: HOME | End: 2023-12-22

## 2023-12-22 VITALS
WEIGHT: 162 LBS | TEMPERATURE: 97.6 F | HEIGHT: 71 IN | SYSTOLIC BLOOD PRESSURE: 93 MMHG | OXYGEN SATURATION: 98 % | HEART RATE: 81 BPM | DIASTOLIC BLOOD PRESSURE: 67 MMHG | BODY MASS INDEX: 22.68 KG/M2

## 2023-12-22 DIAGNOSIS — Z87.891 HISTORY OF TOBACCO ABUSE: ICD-10-CM

## 2023-12-22 DIAGNOSIS — C15.9 ADENOCARCINOMA OF ESOPHAGUS METASTATIC TO INTRA-ABDOMINAL LYMPH NODE: ICD-10-CM

## 2023-12-22 DIAGNOSIS — R11.0 NAUSEA: ICD-10-CM

## 2023-12-22 DIAGNOSIS — E89.6 POSTPROCEDURAL ADRENOCORTICAL (-MEDULLARY) HYPOFUNCTION: ICD-10-CM

## 2023-12-22 DIAGNOSIS — E78.5 HYPERLIPIDEMIA, UNSPECIFIED HYPERLIPIDEMIA TYPE: ICD-10-CM

## 2023-12-22 DIAGNOSIS — F33.1 MAJOR DEPRESSIVE DISORDER, RECURRENT EPISODE, MODERATE: Chronic | ICD-10-CM

## 2023-12-22 DIAGNOSIS — E55.9 VITAMIN D DEFICIENCY: ICD-10-CM

## 2023-12-22 DIAGNOSIS — Z00.01 ENCOUNTER FOR ANNUAL GENERAL MEDICAL EXAMINATION WITH ABNORMAL FINDINGS IN ADULT: Primary | ICD-10-CM

## 2023-12-22 DIAGNOSIS — Z20.818 STREPTOCOCCUS EXPOSURE: ICD-10-CM

## 2023-12-22 DIAGNOSIS — Z12.2 ENCOUNTER FOR SCREENING FOR LUNG CANCER: ICD-10-CM

## 2023-12-22 DIAGNOSIS — Z86.73 HISTORY OF CEREBROVASCULAR ACCIDENT: ICD-10-CM

## 2023-12-22 DIAGNOSIS — E53.8 VITAMIN B12 DEFICIENCY: ICD-10-CM

## 2023-12-22 DIAGNOSIS — C77.2 ADENOCARCINOMA OF ESOPHAGUS METASTATIC TO INTRA-ABDOMINAL LYMPH NODE: ICD-10-CM

## 2023-12-22 DIAGNOSIS — N20.0 CALCULUS OF KIDNEY: ICD-10-CM

## 2023-12-22 PROBLEM — R21 RASH: Status: ACTIVE | Noted: 2023-12-22

## 2023-12-22 LAB
25(OH)D3 SERPL-MCNC: 40.6 NG/ML (ref 30–100)
CHOLEST SERPL-MCNC: 100 MG/DL (ref 0–200)
CORTIS SERPL-MCNC: 6.95 MCG/DL
EXPIRATION DATE: NORMAL
HDLC SERPL-MCNC: 50 MG/DL (ref 40–60)
INTERNAL CONTROL: NORMAL
LDLC SERPL CALC-MCNC: 31 MG/DL (ref 0–100)
LDLC/HDLC SERPL: 0.61 {RATIO}
Lab: NORMAL
MAGNESIUM SERPL-MCNC: 2 MG/DL (ref 1.6–2.6)
RAD ONC ARIA COURSE ID: NORMAL
RAD ONC ARIA COURSE LAST TREATMENT DATE: NORMAL
RAD ONC ARIA COURSE START DATE: NORMAL
RAD ONC ARIA COURSE TREATMENT ELAPSED DAYS: 25
RAD ONC ARIA FIRST TREATMENT DATE: NORMAL
RAD ONC ARIA PLAN FRACTIONS TREATED TO DATE: 20
RAD ONC ARIA PLAN ID: NORMAL
RAD ONC ARIA PLAN PRESCRIBED DOSE PER FRACTION: 1.8 GY
RAD ONC ARIA PLAN PRIMARY REFERENCE POINT: NORMAL
RAD ONC ARIA PLAN TOTAL FRACTIONS PRESCRIBED: 23
RAD ONC ARIA PLAN TOTAL PRESCRIBED DOSE: 4140 CGY
RAD ONC ARIA REFERENCE POINT DOSAGE GIVEN TO DATE: 36 GY
RAD ONC ARIA REFERENCE POINT ID: NORMAL
RAD ONC ARIA REFERENCE POINT SESSION DOSAGE GIVEN: 1.8 GY
S PYO AG THROAT QL: NEGATIVE
TRIGL SERPL-MCNC: 98 MG/DL (ref 0–150)
TSH SERPL DL<=0.05 MIU/L-ACNC: 1.17 UIU/ML (ref 0.27–4.2)
URATE SERPL-MCNC: 3.9 MG/DL (ref 3.4–7)
VIT B12 BLD-MCNC: 1108 PG/ML (ref 211–946)
VLDLC SERPL-MCNC: 19 MG/DL (ref 5–40)

## 2023-12-22 PROCEDURE — 83735 ASSAY OF MAGNESIUM: CPT | Performed by: PREVENTIVE MEDICINE

## 2023-12-22 PROCEDURE — 77386: CPT | Performed by: RADIOLOGY

## 2023-12-22 PROCEDURE — 82607 VITAMIN B-12: CPT | Performed by: PREVENTIVE MEDICINE

## 2023-12-22 PROCEDURE — 82533 TOTAL CORTISOL: CPT | Performed by: PREVENTIVE MEDICINE

## 2023-12-22 PROCEDURE — 84550 ASSAY OF BLOOD/URIC ACID: CPT | Performed by: PREVENTIVE MEDICINE

## 2023-12-22 PROCEDURE — 82306 VITAMIN D 25 HYDROXY: CPT | Performed by: PREVENTIVE MEDICINE

## 2023-12-22 PROCEDURE — 77014 CHG CT GUIDANCE RADIATION THERAPY FLDS PLACEMENT: CPT | Performed by: RADIOLOGY

## 2023-12-22 PROCEDURE — 80061 LIPID PANEL: CPT | Performed by: PREVENTIVE MEDICINE

## 2023-12-22 PROCEDURE — 84443 ASSAY THYROID STIM HORMONE: CPT | Performed by: PREVENTIVE MEDICINE

## 2023-12-22 NOTE — ASSESSMENT & PLAN NOTE
- order placed for fasting labs  - advised patient not to monitor saturated fat while in process of chemotherapy

## 2023-12-22 NOTE — PROGRESS NOTES
Venipuncture performed on Left Arm by Ratna King MA  with good hemostasis. Patient tolerated well. 12/22/23

## 2023-12-22 NOTE — PROGRESS NOTES
Subjective   Jourdan Lebron is a 53 y.o. male presents for   Chief Complaint   Patient presents with    Annual Exam     Patient is fasting.   Patient has not had flu shot and would like to hold off due to chemo.    Hypotension     Patient concerned been running lower then normal.       Health Maintenance Due   Topic Date Due    ZOSTER VACCINE (1 of 2) Never done    Pneumococcal Vaccine 0-64 (3 - PCV) 01/28/2022    LUNG CANCER SCREENING  04/21/2022    ANNUAL PHYSICAL  09/16/2023   Patient was present for annual exam and was advised to wear sunscreen and a seatbelt.    DAXHPI  The patient is a 53-year-old male who is here today for his annual wellness exam, encounter for lung cancer screening, calculus of the kidney, history of CVA, history of tobacco abuse, hyperlipidemia, major depression, nausea, vitamin B12 deficiency post procedure, procedural adrenal cortical medullary hypofunction, adenocarcinoma of the esophagus metastatic to the intra-abdominal lymph nodes, vitamin D deficiency, and hypotension.    He is fasting today.    He has not been able to eat a lot because he gets full real fast.     He was on pantoprazole before all this happened, but it got to the point where he could not eat and now he is getting really bad reflux. He quit taking the pantoprazole because he felt like he was choking.     Since he has been doing radiation, the tumor has shrunk. He has 3 days of radiation left. He has 2 rounds of chemotherapy. The chemotherapy has been rough. He has been using magic mouthwash, which has been helping. He has been trying not to use lidocaine. He was given tramadol for pain, but he has been taking acetaminophen. He is going to have his esophagus removed. He is going to talk to Dr. Rubalcava on 01/09/2024, and they are going to try to give him a little bit of time to recuperate. He was having a little bit of trouble swallowing, and it progressively got worse over time. It seemed to happen after he switched  "medications and SRIs. He has been drinking Ensure shakes and Boost, but it hurt his stomach. He talked to a nutritionist at [facility], and she told him he should not eat eggs due to salmonella. He has been taking Pepcid in the first part of the week of his chemotherapy, which helps. He is trying not to take too much of Tums. It does not seem to upset his stomach. He woke up a couple of nights ago and felt like he was going to vomit. He had a radiation treatment this morning. His blood pressure has been running low since he has been doing the radiation. He was not on chemotherapy or radiation at the beginning of 11/2023. He has to monitor the amount of water versus food because he gets full fast. He has not tried powdered IVs that have electrolytes. He keeps [place] and Pedialyte at home. He had an EGD ultrasound after the PET scan, and they found 2 lymph nodes.     He quit taking B12.     He has not had any stroke symptoms.     He is trying not to smoke.     He is watching his saturated fats.     He is not taking vitamin D.     It has been a long time since he has seen a dentist or eye doctor.     His throat has been sore. He has been around someone with strep throat earlier in the week, but he has not had any symptoms, but he has been concerned about it.     He has noticed increased nausea.     He has a rash on his legs, but it does not itch.    He is going to get an RF detector when he goes back to work.    His brother had esophageal cancer and passed away 5 years ago.    He is allergic to CEPHALOSPORINS, SULFA, ZETIA, IODINATED STATINS, and SULFA ANTIBIOTICS.    Vitals:    12/22/23 0907 12/22/23 0908   BP: 95/66 93/67   BP Location: Right arm Left arm   Patient Position: Sitting Sitting   Cuff Size: Adult Adult   Pulse: 78 81   Temp: 97.6 °F (36.4 °C)    TempSrc: Temporal    SpO2: 98%    Weight: 73.5 kg (162 lb)    Height: 180.3 cm (70.98\")      Body mass index is 22.61 kg/m².    Current Outpatient Medications " on File Prior to Visit   Medication Sig Dispense Refill    ALPRAZolam (XANAX) 0.25 MG tablet Take 1 tablet by mouth 3 (Three) Times a Day As Needed for Anxiety. for anxiety 90 tablet 3    Evolocumab (REPATHA) solution prefilled syringe injection Inject 1 mL under the skin into the appropriate area as directed Every 14 (Fourteen) Days. 6 mL 3    FLUoxetine (PROzac) 20 MG capsule Take 1 capsule by mouth Daily. 90 capsule 1    fluticasone (FLONASE) 50 MCG/ACT nasal spray 1 spray into the nostril(s) as directed by provider Daily.      lidocaine-prilocaine (EMLA) 2.5-2.5 % cream Apply 1 application  topically to the appropriate area as directed As Needed (apply 60 minutes prior to port access). 30 g 2    Nystatin (magic mouthwash) Swish and swallow 5 mL 4 (Four) Times a Day Before Meals & at Bedtime. 1 bottle 4    ondansetron (ZOFRAN) 8 MG tablet Take 1 tablet by mouth 3 (Three) Times a Day As Needed for Nausea or Vomiting. 30 tablet 5    traMADol (ULTRAM) 50 MG tablet Take 1 tablet by mouth Every 6 (Six) Hours As Needed for Moderate Pain. 60 tablet 0    aspirin 81 MG chewable tablet Chew 1 tablet Daily. (Patient not taking: Reported on 12/22/2023)       No current facility-administered medications on file prior to visit.       The following portions of the patient's history were reviewed and updated as appropriate: allergies, current medications, past family history, past medical history, past social history, past surgical history, and problem list.    Review of Systems   Constitutional:  Negative for chills, diaphoresis, fatigue and fever.   HENT:  Positive for sore throat. Negative for hearing loss, tinnitus and trouble swallowing.    Eyes:  Negative for blurred vision, double vision, photophobia and visual disturbance.   Respiratory:  Negative for chest tightness, shortness of breath and wheezing.    Cardiovascular:  Negative for chest pain, palpitations and leg swelling.   Gastrointestinal:  Positive for nausea and  indigestion. Negative for abdominal pain, blood in stool, constipation, diarrhea and vomiting.   Genitourinary:  Negative for dysuria, hematuria and erectile dysfunction.   Neurological:  Negative for dizziness, tremors, seizures, syncope, weakness, light-headedness, numbness, headache and confusion.   Psychiatric/Behavioral:  Negative for suicidal ideas.      DAXROS    Objective   Physical Exam  Vitals reviewed.   Constitutional:       General: He is not in acute distress.     Appearance: Normal appearance. He is well-developed and normal weight. He is not ill-appearing or toxic-appearing.   HENT:      Head: Normocephalic and atraumatic.      Right Ear: Tympanic membrane, ear canal and external ear normal.      Left Ear: Tympanic membrane, ear canal and external ear normal.      Nose: Nose normal.      Mouth/Throat:      Pharynx: Posterior oropharyngeal erythema present.   Eyes:      Extraocular Movements: Extraocular movements intact.      Conjunctiva/sclera: Conjunctivae normal.      Pupils: Pupils are equal, round, and reactive to light.   Neck:      Vascular: No carotid bruit.   Cardiovascular:      Rate and Rhythm: Normal rate and regular rhythm.      Pulses: Normal pulses.      Heart sounds: Normal heart sounds. No murmur heard.     No gallop.   Pulmonary:      Effort: Pulmonary effort is normal.      Breath sounds: Examination of the right-upper field reveals decreased breath sounds. Examination of the left-upper field reveals decreased breath sounds. Examination of the right-middle field reveals decreased breath sounds. Examination of the left-middle field reveals decreased breath sounds. Examination of the right-lower field reveals decreased breath sounds. Examination of the left-lower field reveals decreased breath sounds. Decreased breath sounds present. No wheezing, rhonchi or rales.   Abdominal:      General: Bowel sounds are normal. There is no distension.      Palpations: Abdomen is soft. There is no  mass.      Tenderness: There is no abdominal tenderness.      Hernia: No hernia is present.   Musculoskeletal:         General: Normal range of motion.      Cervical back: Neck supple.      Right lower leg: No edema.      Left lower leg: No edema.   Lymphadenopathy:      Cervical: No cervical adenopathy.   Skin:     General: Skin is warm.   Neurological:      General: No focal deficit present.      Mental Status: He is alert and oriented to person, place, and time.   Psychiatric:         Mood and Affect: Mood normal.         Behavior: Behavior normal.       DAXEXAM  PHQ-9 Total Score:      Assessment & Plan   Diagnoses and all orders for this visit:    1. Encounter for annual general medical examination with abnormal findings in adult (Primary)  Assessment & Plan:  - advised patient to wear sunscreen and seatbelt.   - advised patient not to visit his dentist.  - order placed for fasting labs      2. Encounter for screening for lung cancer  Assessment & Plan:  - advised patient to contact Dr. Haynes regarding chest CT or if PET scan will suffice      3. Calculus of kidney  Assessment & Plan:  - order placed for fasting labs    Orders:  -     Uric Acid    4. History of cerebrovascular accident  Assessment & Plan:  - well controlled      5. History of tobacco abuse  Assessment & Plan:  - well controlled      6. Hyperlipidemia, unspecified hyperlipidemia type  Assessment & Plan:  - order placed for fasting labs  - advised patient not to monitor saturated fat while in process of chemotherapy    Orders:  -     Lipid panel; Future  -     Lipid panel    7. Major depressive disorder, recurrent episode, moderate  Assessment & Plan:  - order placed for fasting labs    Orders:  -     Magnesium; Future  -     TSH; Future  -     Magnesium  -     TSH    8. Nausea  Assessment & Plan:  - advised patient to try to stay hydrated      9. Vitamin B12 deficiency  Assessment & Plan:  - order placed for fasting labs    Orders:  -      Vitamin B12; Future  -     Vitamin B12    10. Postprocedural adrenocortical (-medullary) hypofunction  Assessment & Plan:  - order placed for fasting labs    Orders:  -     Cortisol - AM; Future  -     Cortisol - AM    11. 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 Hurtado and Dr. Haynes      12. Vitamin D deficiency  Assessment & Plan:  - order placed for fasting labs    Orders:  -     Vitamin D 25 hydroxy; Future  -     Vitamin D 25 hydroxy    13. Streptococcus exposure  Assessment & Plan:  - order placed for Rapid Strep A swab.  - advised patient to call over the weekend if his throat starts getting sore and we will put him on some antibiotic.    Orders:  -     POC Rapid Strep A        DAXPLAN  DAXRESULTS    Patient Instructions     Health Maintenance Due   Topic Date Due    ZOSTER VACCINE (1 of 2) Never done    COVID-19 Vaccine (3 - Pfizer risk series) 10/15/2021    Pneumococcal Vaccine 0-64 (3 - PCV) 01/28/2022    LUNG CANCER SCREENING  04/21/2022    INFLUENZA VACCINE  08/01/2023    ANNUAL PHYSICAL  09/16/2023    You are due for Shingrix vaccination series ( the newest shingles vaccine).  It is a two shot series spaced 2-6 months apart. Please get this vaccine series started at your earliest convenience at your local pharmacy to help avoid shingles outbreak. It is more effective than the old Zostavax vaccine and is recommended even if you have had the Zostavax vaccine in the past.  Once the Shingrix series is completed, it does not need to be repeated.   For more information, please look at the website below:  [facility] Shingrix Vaccine Information      Patient to check with oncology about aspirin use    Patient to call GI about liquid medicine to help with heartburn.    Consider liquid IV    Patient to ask Dr. Haynes about Chest CT for lung cancer screening-or is that not necessary due to recent PET       Transcribed from  ambient dictation for Justa Castano MD by Linnette Mascorro.  12/22/23   11:17 EST    Patient or patient representative verbalized consent to the visit recording.  I have personally performed the services described in this document as transcribed by the above individual, and it is both accurate and complete.

## 2023-12-22 NOTE — ASSESSMENT & PLAN NOTE
- order placed for Rapid Strep A swab.  - advised patient to call over the weekend if his throat starts getting sore and we will put him on some antibiotic.

## 2023-12-22 NOTE — ASSESSMENT & PLAN NOTE
- advised patient to wear sunscreen and seatbelt.   - advised patient not to visit his dentist.  - order placed for fasting labs

## 2023-12-23 NOTE — PROGRESS NOTES
B12 is slightly high you can decrease your dose a day or 2/week rest the labs look good keep up the good work

## 2023-12-26 ENCOUNTER — HOSPITAL ENCOUNTER (OUTPATIENT)
Dept: RADIATION ONCOLOGY | Facility: HOSPITAL | Age: 53
Discharge: HOME | End: 2023-12-26

## 2023-12-26 ENCOUNTER — RADIATION ONCOLOGY WEEKLY ASSESSMENT (OUTPATIENT)
Dept: RADIATION ONCOLOGY | Facility: HOSPITAL | Age: 53
End: 2023-12-26
Payer: COMMERCIAL

## 2023-12-26 VITALS
DIASTOLIC BLOOD PRESSURE: 66 MMHG | SYSTOLIC BLOOD PRESSURE: 96 MMHG | RESPIRATION RATE: 18 BRPM | HEART RATE: 74 BPM | WEIGHT: 154.2 LBS | BODY MASS INDEX: 21.52 KG/M2 | OXYGEN SATURATION: 100 %

## 2023-12-26 DIAGNOSIS — C77.2 ADENOCARCINOMA OF ESOPHAGUS METASTATIC TO INTRA-ABDOMINAL LYMPH NODE: Primary | ICD-10-CM

## 2023-12-26 DIAGNOSIS — C15.9 ADENOCARCINOMA OF ESOPHAGUS METASTATIC TO INTRA-ABDOMINAL LYMPH NODE: Primary | ICD-10-CM

## 2023-12-26 LAB
RAD ONC ARIA COURSE ID: NORMAL
RAD ONC ARIA COURSE LAST TREATMENT DATE: NORMAL
RAD ONC ARIA COURSE START DATE: NORMAL
RAD ONC ARIA COURSE TREATMENT ELAPSED DAYS: 29
RAD ONC ARIA FIRST TREATMENT DATE: NORMAL
RAD ONC ARIA PLAN FRACTIONS TREATED TO DATE: 21
RAD ONC ARIA PLAN ID: NORMAL
RAD ONC ARIA PLAN PRESCRIBED DOSE PER FRACTION: 1.8 GY
RAD ONC ARIA PLAN PRIMARY REFERENCE POINT: NORMAL
RAD ONC ARIA PLAN TOTAL FRACTIONS PRESCRIBED: 23
RAD ONC ARIA PLAN TOTAL PRESCRIBED DOSE: 4140 CGY
RAD ONC ARIA REFERENCE POINT DOSAGE GIVEN TO DATE: 37.8 GY
RAD ONC ARIA REFERENCE POINT ID: NORMAL
RAD ONC ARIA REFERENCE POINT SESSION DOSAGE GIVEN: 1.8 GY

## 2023-12-26 PROCEDURE — 77014 CHG CT GUIDANCE RADIATION THERAPY FLDS PLACEMENT: CPT | Performed by: RADIOLOGY

## 2023-12-26 PROCEDURE — 77386: CPT | Performed by: RADIOLOGY

## 2023-12-26 NOTE — PROGRESS NOTES
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 male who presented to our office on 2021 for consultation regarding elevated hematocrit    On 2021 Mr. Lebron was referred for the investigation of macrocytosis and elevated hematocrit.  He gave a history of a stroke in .  He also described a long history of anxiety and \"panic attacks\".  Finally he had undergone an adrenalectomy, apparently because of an adrenal adenoma.  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  In spite of that he felt poorly, mainly because of fatigue and had several investigations.  For a long time, at least since , he had been noted to have an elevated MCV and MCH.  A clear cause for this had not been identified and generally speaking he was asymptomatic at that time.  In , September and 2021 his blood counts had reported a hematocrit of 51.3, 51.6 and 53.8% respectively.  This was in the setting of a normal high hemoglobin.  He gave a history of prolonged and intense, at times of up to 3 packs of cigarettes per day, cigarette smoking.  Close to the time of the initial visit, he was smoking only cigars but did admit to inhaling the smoke.  He, as well, admitted to dyspnea with some exertion.    2022 Mr. Lebron was back in the office for follow-up.  At the time of his initial evaluation his blood count had been found to be within normal limits.  His folic acid was found to be immediately below the normal range of normalcy.  He started taking folic acid " replacement.    7/15/2022: Back in the office for follow-up after 6 months.  Reported he had had some changes to his medications and he was feeling somewhat better.  In particular, he discussed changes to his antidepressant, that seemed to have made a difference.  He also had stopped smoking.  The physical exam was unchanged.  The laboratory exams revealed normal hemoglobin and hematocrit, but he did persist with mild macrocytosis at 97.8 fL.  A clear explanation for this was not identified.  A decision was made to observe as it was unlikely to represent a serious problem.    10/10/2023: Seen in the office after an absence of more than one year. He reported that for a few months he had been experiencing dysphagia and weight loss. He was initially treated with a proton pump inhibitor, but as there was no response and rather he reported worsening of the symptoms, he underwent upper and lower gastrointestinal endoscopies. In the colon multiple polyps were identified in the cecum, the ascending colon, the transverse colon and the descending colon. In the esophagus a protruding lesion that seemed infiltrative and was fungating and ulcerated was found in the lower third of the esophagus. It extended from 36 to 46 cm from the incisors. Biopsy reported invasive moderate to poorly differentiated adenocarcinoma.     11/3/2023: In the office to review the PET scan. His symptoms have not changed much. He continues to have dysphagia and it is severe enough that he has been able to eat much; he has some success with soft foods but there fare some foods that he can definitely not swallow. He has lost about 10 lbs but none recently. He remains afebrile and has not had any cough. He has not had any pain. On exam no jaundice or pallor. Lungs diminished and heart regular. Abdomen soft and not tender. No edema. Reviewed the images of the PET scan. Reviewed the result of the endoscopic ultrasound and the FNA of the lymph nodes, at least  one of which is positive for metastatic disease. This makes the tumor T2N1 disease. Neoadjuvant chemotherapy and radiation was discussed with him and his spouse and I made referrals to Dr. Sabillon in Radiation Oncology and to Dr. Hurtado in Thoracic surgery. He is to have next generation sequencing, Her2 expression and PD-L1 expression on tumor tissue. Will present in tumor conference.     11/17/2023: Not any different than at the time of the last visit.  No further weight loss.  Able to swallow some foods without any difficulties.  However, other foods are very difficult to swallow, if not impossible.  He has not had any fevers.  He underwent placement of the port without any difficulties.  On exam alert, conversant and in no distress.  Port site clean and healing well.  Lungs diminished bilaterally.  Heart regular.  No edema.  Laboratory exams were reviewed.  Discussed with him concurrent chemotherapy and radiation.  Treatment with carboplatin and paclitaxel was discussed and a treatment plan was placed.  Discussed with Dr. Sabillon.  To begin on the week of November 27, 2023.    12/8/2023: Feeling reasonably well. Has experienced occasional headaches and facial pain. As well feels the lower extremities to be heavy and had some aching pain. Has been able to eat some though his appetite is poor. He may be swallowing better and does not have odynophagia. No abdominal pain or diarrhea and no dysuria. Has not lost weight. On exam no changes. The laboratory exams were reviewed. Discussed with him. To continue with the same therapy.     12/27/2023: Without new symptoms.  Reasonably active.  Not eating as well because of early satiety but no dysphagia.  No chest pains.  As well not coughing or more dyspneic than before.  On exam no changes.  No palpable supraclavicular adenopathy.  Lungs are diminished bilaterally.  Heart regular.  Abdomen soft and nontender.  There is no edema.  Small petechiae are present in the lower extremities.   The laboratory exams were reviewed.  He has thrombocytopenia today that does not require transfusion but that will keep him from receiving the last dose of the chemotherapy.  To finish radiation tomorrow.  He will have a PET scan and will see me with the results in approximately 4 weeks.  Discussed with him.    Subjective:  12/27/2023: Without new symptoms.  No more dysphagia than before.  Eating some but not as well as he was eating before.  He has lost some weight.  No fevers.  No chest pains or cough.  Continues to smoke some but very little.  No abdominal pain, diarrhea or dysuria.  No skin rash.    The following portions of the patient's history were reviewed and updated as appropriate: allergies, current medications, past family history, past medical history, past social history, past surgical history and problem list.    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    Brain fog     Diverticulosis of large intestine without perforation or abscess without bleeding 10/02/2023    Esophageal cancer     GERD (gastroesophageal reflux disease)     Hand tingling     Hyperlipidemia     Stroke 05/30/2020    Tiredness      Past Surgical History:   Procedure Laterality Date    ADRENALECTOMY      KIDNEY STONE SURGERY      THROMBECTOMY      UPPER ENDOSCOPIC ULTRASOUND W/ FNA N/A 10/27/2023    Procedure: ENDOSCOPIC ULTRASOUND WITH STAGING AND FINE NEEDLE ASPIRATION X2 AREAS;  Surgeon: Joselyn Savage MD;  Location: [facility] ENDOSCOPY;  Service: Gastroenterology;  Laterality: N/A;  POST:    VENOUS ACCESS DEVICE (PORT) INSERTION Right 11/13/2023    Procedure: INSERTION VENOUS ACCESS DEVICE;  Surgeon: Saurabh Hurtado MD;  Location: [facility] MAIN OR;  Service: Thoracic;  Laterality: Right;       Current Outpatient Medications:     ALPRAZolam (XANAX) 0.25 MG tablet, Take 1 tablet by  mouth 3 (Three) Times a Day As Needed for Anxiety. for anxiety, Disp: 90 tablet, Rfl: 3    aspirin 81 MG chewable tablet, Chew 1 tablet Daily. (Patient not taking: Reported on 12/22/2023), Disp: , Rfl:     Evolocumab (REPATHA) solution prefilled syringe injection, Inject 1 mL under the skin into the appropriate area as directed Every 14 (Fourteen) Days., Disp: 6 mL, Rfl: 3    FLUoxetine (PROzac) 20 MG capsule, Take 1 capsule by mouth Daily., Disp: 90 capsule, Rfl: 1    fluticasone (FLONASE) 50 MCG/ACT nasal spray, 1 spray into the nostril(s) as directed by provider Daily., Disp: , Rfl:     lidocaine-prilocaine (EMLA) 2.5-2.5 % cream, Apply 1 application  topically to the appropriate area as directed As Needed (apply 60 minutes prior to port access)., Disp: 30 g, Rfl: 2    Nystatin (magic mouthwash), Swish and swallow 5 mL 4 (Four) Times a Day Before Meals & at Bedtime., Disp: 1 bottle, Rfl: 4    ondansetron (ZOFRAN) 8 MG tablet, Take 1 tablet by mouth 3 (Three) Times a Day As Needed for Nausea or Vomiting., Disp: 30 tablet, Rfl: 5    pantoprazole (PROTONIX) 40 MG EC tablet, Take 1 tablet by mouth Daily., Disp: , Rfl:     traMADol (ULTRAM) 50 MG tablet, Take 1 tablet by mouth Every 6 (Six) Hours As Needed for Moderate Pain., Disp: 60 tablet, Rfl: 0  No current facility-administered medications for this visit.    Facility-Administered Medications Ordered in Other Visits:     heparin injection 500 Units, 500 Units, Intravenous, PRN, Oliverio Mueller MD    sodium chloride 0.9 % flush 10 mL, 10 mL, Intravenous, PRN, Oliverio Mueller MD    Allergies   Allergen Reactions    Cephalosporins Anaphylaxis     Throat swelling    Dye  [Contrast Dye (Echo Or Unknown Ct/Mr)] Hives    Sulfa Antibiotics Hives    Zetia [Ezetimibe] Other (See Comments)     Made tired    Iodinated Contrast Media Unknown - Low Severity    Statins Myalgia     Myalgia and fatique    Sulfate Unknown - Low Severity     Family History   Problem Relation Age  of Onset    Arthritis Mother     Hypertension Mother     Heart failure Mother 73        Cause of death    Arthritis Father     Hypertension Father     Kidney cancer Father 57        Cause of death. Was a smoker    Heart disease Brother     Esophageal cancer Brother 59        Cause of death. Has a heavy drinker     Cancer-related family history includes Esophageal cancer (age of onset: 59) in his brother; Kidney cancer (age of onset: 57) in his father.    Social History     Tobacco Use    Smoking status: Former     Packs/day: 3.00     Years: 37.00     Additional pack years: 0.00     Total pack years: 111.00     Types: Cigars, Cigarettes     Start date:      Quit date:      Years since quittin.9    Smokeless tobacco: Never    Tobacco comments:     1 packs= 2 cigars a day; only smokes cigars   Vaping Use    Vaping Use: Never used   Substance Use Topics    Alcohol use: Not Currently     Alcohol/week: 8.0 standard drinks of alcohol     Types: 8 Cans of beer per week     Comment: Has now been abstinent for about one year    Drug use: Never     Social History     Social History Narrative    Not on file      ROS:     Review of Systems   Constitutional:  Positive for fatigue. Negative for activity change, appetite change, chills, diaphoresis, fever and unexpected weight change.   HENT:  Negative for congestion, dental problem, drooling, ear discharge, ear pain, facial swelling, hearing loss, mouth sores, nosebleeds, postnasal drip, rhinorrhea, sinus pressure, sinus pain, sneezing, sore throat, tinnitus, trouble swallowing and voice change.    Eyes:  Negative for photophobia, pain, discharge, redness, itching and visual disturbance.   Respiratory:  Negative for apnea, cough, choking, chest tightness, shortness of breath, wheezing and stridor.    Cardiovascular:  Negative for chest pain, palpitations and leg swelling.   Gastrointestinal:  Negative for abdominal distention, abdominal pain, anal bleeding, blood in  "stool, constipation, diarrhea, nausea, rectal pain and vomiting.   Endocrine: Negative for cold intolerance, heat intolerance, polydipsia and polyuria.   Genitourinary:  Negative for decreased urine volume, difficulty urinating, dysuria, flank pain, frequency, genital sores, hematuria and urgency.   Musculoskeletal:  Negative for arthralgias, back pain, gait problem, joint swelling, myalgias, neck pain and neck stiffness.   Skin:  Negative for color change, pallor and rash.   Neurological:  Negative for dizziness, tremors, seizures, syncope, facial asymmetry, speech difficulty, weakness, light-headedness, numbness and headaches.   Hematological:  Negative for adenopathy. Does not bruise/bleed easily.   Psychiatric/Behavioral:  Negative for agitation, behavioral problems, confusion, decreased concentration, hallucinations, self-injury, sleep disturbance and suicidal ideas. The patient is nervous/anxious.      Objective:    Vitals:    12/27/23 0925   BP: 98/64   Pulse: 76   Resp: 16   Temp: 97.9 °F (36.6 °C)   TempSrc: Oral   SpO2: 99%   Weight: 72.1 kg (158 lb 15.2 oz)   Height: 180.3 cm (70.98\")     Body mass index is 22.18 kg/m².  ECOG  (0) Fully active, able to carry on all predisease performance without restriction    Physical Exam:     Physical Exam  Constitutional:       General: He is not in acute distress.     Appearance: Normal appearance. He is not ill-appearing, toxic-appearing or diaphoretic.      Comments: Slender, well-built.  Seems older than the stated age.  No distress.   HENT:      Head: Normocephalic and atraumatic.      Right Ear: External ear normal. There is no impacted cerumen.      Left Ear: External ear normal. There is no impacted cerumen.      Nose: Nose normal. No congestion or rhinorrhea.      Mouth/Throat:      Mouth: Mucous membranes are moist.      Pharynx: Oropharynx is clear. No oropharyngeal exudate or posterior oropharyngeal erythema.      Comments: Oral cavity reveals poor " dentition and poor dental hygiene.  No mucosal ulcerations are present.  Eyes:      General: No scleral icterus.        Right eye: No discharge.         Left eye: No discharge.      Conjunctiva/sclera: Conjunctivae normal.      Pupils: Pupils are equal, round, and reactive to light.   Neck:      Vascular: No carotid bruit.   Cardiovascular:      Rate and Rhythm: Normal rate and regular rhythm.      Pulses: Normal pulses.      Heart sounds: Normal heart sounds. No murmur heard.     No friction rub. No gallop.   Pulmonary:      Effort: No respiratory distress.      Breath sounds: No stridor. No wheezing, rhonchi or rales.      Comments: Breath sounds are diminished bilaterally.  Chest:      Chest wall: No tenderness.   Abdominal:      General: Abdomen is flat. Bowel sounds are normal. There is no distension.      Palpations: Abdomen is soft. There is no mass.      Tenderness: There is no abdominal tenderness. There is no right CVA tenderness, left CVA tenderness, guarding or rebound.   Musculoskeletal:         General: No swelling, tenderness, deformity or signs of injury.      Cervical back: No rigidity or tenderness.      Right lower leg: No edema.      Left lower leg: No edema.      Comments: There is clubbing of the fingers in both hands   Lymphadenopathy:      Cervical: No cervical adenopathy.   Skin:     General: Skin is warm and dry.      Coloration: Skin is not jaundiced or pale.      Findings: No bruising, erythema or rash.   Neurological:      General: No focal deficit present.      Mental Status: He is alert and oriented to person, place, and time.      Cranial Nerves: No cranial nerve deficit.      Motor: No weakness.      Coordination: Coordination normal.      Gait: Gait normal.   Psychiatric:         Mood and Affect: Mood normal.         Behavior: Behavior normal.         Thought Content: Thought content normal.         Judgment: Judgment normal.     YOSELIN Mueller MD performed a physical exam on  1237 2023 as documented above.    Lab Results - Last 18 Months   Lab Units 12/27/23  0924 12/18/23  0957 12/11/23  0820   WBC 10*3/mm3 3.60 3.75 3.54   HEMOGLOBIN g/dL 12.7* 13.2 14.1   HEMATOCRIT % 37.7 37.9 42.8   PLATELETS 10*3/mm3 78* 170 226   MCV fL 101.1* 97.4* 99.3*     Lab Results - Last 18 Months   Lab Units 12/27/23  0932 12/18/23  1010 12/18/23  0957 12/11/23  0828 12/11/23  0820 12/04/23  0816 12/04/23  0749   SODIUM mmol/L  --   --  138  --  139  --  139   POTASSIUM mmol/L  --   --  4.0  --  4.4  --  4.1   CHLORIDE mmol/L  --   --  103  --  105  --  104   CO2 mmol/L  --   --  25.0  --  27.0  --  26.0   BUN mg/dL  --   --  11  --  12  --  11   CREATININE mg/dL 0.80 0.80 0.65*   < > 0.72*   < > 0.72*   CALCIUM mg/dL  --   --  8.9  --  9.0  --  9.5   BILIRUBIN mg/dL  --   --  0.2  --  0.2  --  0.2   ALK PHOS U/L  --   --  51  --  52  --  57   ALT (SGPT) U/L  --   --  13  --  13  --  15   AST (SGOT) U/L  --   --  15  --  13  --  13   GLUCOSE mg/dL  --   --  134*  --  69  --  79    < > = values in this interval not displayed.     Lab Results   Component Value Date    GLUCOSE 134 (H) 12/18/2023    BUN 11 12/18/2023    CREATININE 0.80 12/27/2023    EGFRIFNONA 67 02/24/2022    BCR 16.9 12/18/2023    K 4.0 12/18/2023    CO2 25.0 12/18/2023    CALCIUM 8.9 12/18/2023    ALBUMIN 3.6 12/18/2023    LABIL2 1.3 09/25/2018    AST 15 12/18/2023    ALT 13 12/18/2023     Lab Results   Component Value Date    FOLATE 4.11 (L) 11/12/2021     Lab Results   Component Value Date    KMCGNXZL77 1,108 (H) 12/22/2023     Uric Acid   Date Value Ref Range Status   12/22/2023 3.9 3.4 - 7.0 mg/dL Final     Lab Results   Component Value Date    SEDRATE 11 04/16/2021     Lab Results   Component Value Date    PSA 0.163 04/06/2021     Assessment & Plan     Assessment:  Adenocarcinoma of the gastroesophageal junction T2N1M0, microsatellite stable, low mutation burden, Her2 positive (awaiting PD-L1 expression): To complete treatment with  radiation.  He was not received today cycle of chemotherapy because of a platelet count of 74,000.  He is to see me in approximately 4 weeks with new scans.  Reviewed the laboratory exams including blood counts and chemistries.  Discussed with him.  3.  Tobacco smoker: Close to being abstinent at this point.  Discussed with him.  3.  He is to see me in approximately 4 weeks with scans.    Plan:  As above.    Oliverio Mueller MD on 12/27/2023 at 10:28 AM.

## 2023-12-26 NOTE — PROGRESS NOTES
"NAME: Jourdan Lebron DATE: 2023   : 1970    MRN: [id number] DIAGNOSIS:   Encounter Diagnosis   Name Primary?    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 ID Energy Dose/Fx (cGy) #Fx Dose Correction (cGy) Total Dose (cGy) Start Date End Date Elapsed Days   Esophagus Esophagus 6X 180  0 3,780 2023 29         General:     Review of Systems  [] No new complaints [] Fever/chills /night sweats  [x] Decreased energy level [] Decreased appetite [] Oxygen:  L/min  [] Dry cough   [] Productive cough  [] SOB  [] Hemoptysis  [] Dysphagia      [x] Fatigue, severity: 1 [x] Pain, severity: 4, location: esophagus  [] Pain medication regimen:    [x] Esophagitis regimen:  MMM QID  [] Skin regimen:      Subjective:  Having pain to upper abdomen (\"heartburn\"). MMM helps relieve. Feels like he's eating well. Trying to supplement with Boost. Has had discussions with dietician.    RD to call the patient  -He states not wearing his 4 lb work boots today.    KPS: 90     Vital Signs: BP 96/66   Pulse 74   Resp 18   Wt 69.9 kg (154 lb 3.2 oz)   SpO2 100%   BMI 21.52 kg/m²     Weight:   Wt Readings from Last 3 Encounters:   23 69.9 kg (154 lb 3.2 oz)   23 73.5 kg (162 lb)   23 74.2 kg (163 lb 8 oz)   **Patient normally wears 4-lb boots but did not wear today.    Medication:   Current Outpatient Medications:     ALPRAZolam (XANAX) 0.25 MG tablet, Take 1 tablet by mouth 3 (Three) Times a Day As Needed for Anxiety. for anxiety, Disp: 90 tablet, Rfl: 3    aspirin 81 MG chewable tablet, Chew 1 tablet Daily. (Patient not taking: Reported on 2023), Disp: , Rfl:     Evolocumab (REPATHA) solution prefilled syringe injection, Inject 1 mL under the skin into the appropriate area as directed Every " 14 (Fourteen) Days., Disp: 6 mL, Rfl: 3    FLUoxetine (PROzac) 20 MG capsule, Take 1 capsule by mouth Daily., Disp: 90 capsule, Rfl: 1    fluticasone (FLONASE) 50 MCG/ACT nasal spray, 1 spray into the nostril(s) as directed by provider Daily., Disp: , Rfl:     lidocaine-prilocaine (EMLA) 2.5-2.5 % cream, Apply 1 application  topically to the appropriate area as directed As Needed (apply 60 minutes prior to port access)., Disp: 30 g, Rfl: 2    Nystatin (magic mouthwash), Swish and swallow 5 mL 4 (Four) Times a Day Before Meals & at Bedtime., Disp: 1 bottle, Rfl: 4    ondansetron (ZOFRAN) 8 MG tablet, Take 1 tablet by mouth 3 (Three) Times a Day As Needed for Nausea or Vomiting., Disp: 30 tablet, Rfl: 5    traMADol (ULTRAM) 50 MG tablet, Take 1 tablet by mouth Every 6 (Six) Hours As Needed for Moderate Pain., Disp: 60 tablet, Rfl: 0     LABS (Reviewed):  Hematology  WBC   Date Value Ref Range Status   12/18/2023 3.75 3.40 - 10.80 10*3/mm3 Final     RBC   Date Value Ref Range Status   12/18/2023 3.89 (L) 4.14 - 5.80 10*6/mm3 Final     Hemoglobin   Date Value Ref Range Status   12/18/2023 13.2 13.0 - 17.7 g/dL Final     Hematocrit   Date Value Ref Range Status   12/18/2023 37.9 37.5 - 51.0 % Final     Platelets   Date Value Ref Range Status   12/18/2023 170 140 - 450 10*3/mm3 Final     Chemistry   Lab Results   Component Value Date    GLUCOSE 134 (H) 12/18/2023    BUN 11 12/18/2023    CREATININE 0.80 12/18/2023    EGFRIFNONA 67 02/24/2022    BCR 16.9 12/18/2023    K 4.0 12/18/2023    CO2 25.0 12/18/2023    CALCIUM 8.9 12/18/2023    ALBUMIN 3.6 12/18/2023    LABIL2 1.3 09/25/2018    AST 15 12/18/2023    ALT 13 12/18/2023         Physical Exam:     Pulmonary: [] Cough:     [] Dyspnea:  Neck:  [] Lymphadenopathy  Lungs:  [x] Clear to auscultation  CVS:  [x] Regular rate & rhythm  Skin:  ndGndrndanddndend:nd nd2nd GI:  [] Dysphagia:     [x] Esophagitis: 1--ovi's magic has helped    Comments/Notes:      [x] Review of labs, images,  dosimetry, dose delivered, & treatment parameters.    Comments:     [x] Patient treatment setup reviewed.    Comments:     Recommendations:  Fluids with med/onc  RD and boost/ensure discussion.  3-4 lb weight loss  Continue XRT and supportive measures  Labs this week on 12/27/2023 and chemo    [x] Continue RT  [] Change RT Plan [] Hold RT, length:        Approved Electronically By:  Geovany Sabillon MD, 12/26/2023, 08:32 EST      Confidentiality of this medical record shall be maintained except when use or disclosure is required or permitted by law, regulation or written authorization by the patient.

## 2023-12-27 ENCOUNTER — HOSPITAL ENCOUNTER (OUTPATIENT)
Dept: ONCOLOGY | Facility: HOSPITAL | Age: 53
Discharge: HOME | End: 2023-12-27
Admitting: INTERNAL MEDICINE
Payer: COMMERCIAL

## 2023-12-27 ENCOUNTER — HOSPITAL ENCOUNTER (OUTPATIENT)
Dept: RADIATION ONCOLOGY | Facility: HOSPITAL | Age: 53
Discharge: HOME | End: 2023-12-27

## 2023-12-27 ENCOUNTER — OFFICE VISIT (OUTPATIENT)
Dept: ONCOLOGY | Facility: CLINIC | Age: 53
End: 2023-12-27
Payer: COMMERCIAL

## 2023-12-27 ENCOUNTER — TELEPHONE (OUTPATIENT)
Dept: SURGERY | Facility: CLINIC | Age: 53
End: 2023-12-27
Payer: COMMERCIAL

## 2023-12-27 VITALS
DIASTOLIC BLOOD PRESSURE: 64 MMHG | RESPIRATION RATE: 16 BRPM | BODY MASS INDEX: 22.26 KG/M2 | HEART RATE: 76 BPM | WEIGHT: 159 LBS | HEIGHT: 71 IN | SYSTOLIC BLOOD PRESSURE: 98 MMHG | OXYGEN SATURATION: 99 % | TEMPERATURE: 97.9 F

## 2023-12-27 VITALS
RESPIRATION RATE: 16 BRPM | SYSTOLIC BLOOD PRESSURE: 98 MMHG | HEART RATE: 76 BPM | OXYGEN SATURATION: 99 % | WEIGHT: 158.95 LBS | DIASTOLIC BLOOD PRESSURE: 64 MMHG | BODY MASS INDEX: 22.25 KG/M2 | HEIGHT: 71 IN | TEMPERATURE: 97.9 F

## 2023-12-27 DIAGNOSIS — C77.2 ADENOCARCINOMA OF ESOPHAGUS METASTATIC TO INTRA-ABDOMINAL LYMPH NODE: Primary | ICD-10-CM

## 2023-12-27 DIAGNOSIS — Z45.2 ENCOUNTER FOR CARE RELATED TO PORT-A-CATH: ICD-10-CM

## 2023-12-27 DIAGNOSIS — C15.9 ADENOCARCINOMA OF ESOPHAGUS METASTATIC TO INTRA-ABDOMINAL LYMPH NODE: Primary | ICD-10-CM

## 2023-12-27 LAB
ALBUMIN SERPL-MCNC: 3.7 G/DL (ref 3.5–5.2)
ALBUMIN/GLOB SERPL: 1.6 G/DL
ALP SERPL-CCNC: 57 U/L (ref 39–117)
ALT SERPL W P-5'-P-CCNC: 14 U/L (ref 1–41)
ANION GAP SERPL CALCULATED.3IONS-SCNC: 10 MMOL/L (ref 5–15)
AST SERPL-CCNC: 16 U/L (ref 1–40)
BASOPHILS # BLD AUTO: 0.01 10*3/MM3 (ref 0–0.2)
BASOPHILS NFR BLD AUTO: 0.3 % (ref 0–1.5)
BILIRUB SERPL-MCNC: <0.2 MG/DL (ref 0–1.2)
BUN SERPL-MCNC: 12 MG/DL (ref 6–20)
BUN/CREAT SERPL: 17.9 (ref 7–25)
CALCIUM SPEC-SCNC: 8.9 MG/DL (ref 8.6–10.5)
CHLORIDE SERPL-SCNC: 103 MMOL/L (ref 98–107)
CO2 SERPL-SCNC: 26 MMOL/L (ref 22–29)
CREAT BLDA-MCNC: 0.8 MG/DL (ref 0.6–1.3)
CREAT SERPL-MCNC: 0.67 MG/DL (ref 0.76–1.27)
DEPRECATED RDW RBC AUTO: 48.8 FL (ref 37–54)
EGFRCR SERPLBLD CKD-EPI 2021: 107.1 ML/MIN/1.73
EGFRCR SERPLBLD CKD-EPI 2021: 113 ML/MIN/1.73
EOSINOPHIL # BLD AUTO: 0.02 10*3/MM3 (ref 0–0.4)
EOSINOPHIL NFR BLD AUTO: 0.6 % (ref 0.3–6.2)
ERYTHROCYTE [DISTWIDTH] IN BLOOD BY AUTOMATED COUNT: 13.8 % (ref 12.3–15.4)
GLOBULIN UR ELPH-MCNC: 2.3 GM/DL
GLUCOSE SERPL-MCNC: 89 MG/DL (ref 65–99)
HCT VFR BLD AUTO: 37.7 % (ref 37.5–51)
HGB BLD-MCNC: 12.7 G/DL (ref 13–17.7)
LYMPHOCYTES # BLD AUTO: 0.41 10*3/MM3 (ref 0.7–3.1)
LYMPHOCYTES NFR BLD AUTO: 11.4 % (ref 19.6–45.3)
MCH RBC QN AUTO: 34 PG (ref 26.6–33)
MCHC RBC AUTO-ENTMCNC: 33.7 G/DL (ref 31.5–35.7)
MCV RBC AUTO: 101.1 FL (ref 79–97)
MONOCYTES # BLD AUTO: 0.55 10*3/MM3 (ref 0.1–0.9)
MONOCYTES NFR BLD AUTO: 15.3 % (ref 5–12)
NEUTROPHILS NFR BLD AUTO: 2.61 10*3/MM3 (ref 1.7–7)
NEUTROPHILS NFR BLD AUTO: 72.4 % (ref 42.7–76)
PLATELET # BLD AUTO: 78 10*3/MM3 (ref 140–450)
PMV BLD AUTO: 9.5 FL (ref 6–12)
POTASSIUM SERPL-SCNC: 4.4 MMOL/L (ref 3.5–5.2)
PROT SERPL-MCNC: 6 G/DL (ref 6–8.5)
RAD ONC ARIA COURSE ID: NORMAL
RAD ONC ARIA COURSE LAST TREATMENT DATE: NORMAL
RAD ONC ARIA COURSE START DATE: NORMAL
RAD ONC ARIA COURSE TREATMENT ELAPSED DAYS: 30
RAD ONC ARIA FIRST TREATMENT DATE: NORMAL
RAD ONC ARIA PLAN FRACTIONS TREATED TO DATE: 22
RAD ONC ARIA PLAN ID: NORMAL
RAD ONC ARIA PLAN PRESCRIBED DOSE PER FRACTION: 1.8 GY
RAD ONC ARIA PLAN PRIMARY REFERENCE POINT: NORMAL
RAD ONC ARIA PLAN TOTAL FRACTIONS PRESCRIBED: 23
RAD ONC ARIA PLAN TOTAL PRESCRIBED DOSE: 4140 CGY
RAD ONC ARIA REFERENCE POINT DOSAGE GIVEN TO DATE: 39.6 GY
RAD ONC ARIA REFERENCE POINT ID: NORMAL
RAD ONC ARIA REFERENCE POINT SESSION DOSAGE GIVEN: 1.8 GY
RBC # BLD AUTO: 3.73 10*6/MM3 (ref 4.14–5.8)
SODIUM SERPL-SCNC: 139 MMOL/L (ref 136–145)
WBC NRBC COR # BLD AUTO: 3.6 10*3/MM3 (ref 3.4–10.8)

## 2023-12-27 PROCEDURE — 77386: CPT | Performed by: RADIOLOGY

## 2023-12-27 PROCEDURE — 77014 CHG CT GUIDANCE RADIATION THERAPY FLDS PLACEMENT: CPT | Performed by: RADIOLOGY

## 2023-12-27 PROCEDURE — 25010000002 HEPARIN LOCK FLUSH PER 10 UNITS: Performed by: INTERNAL MEDICINE

## 2023-12-27 PROCEDURE — 82565 ASSAY OF CREATININE: CPT

## 2023-12-27 PROCEDURE — 85025 COMPLETE CBC W/AUTO DIFF WBC: CPT | Performed by: INTERNAL MEDICINE

## 2023-12-27 PROCEDURE — 36591 DRAW BLOOD OFF VENOUS DEVICE: CPT

## 2023-12-27 PROCEDURE — 80053 COMPREHEN METABOLIC PANEL: CPT | Performed by: INTERNAL MEDICINE

## 2023-12-27 RX ORDER — PANTOPRAZOLE SODIUM 40 MG/1
40 TABLET, DELAYED RELEASE ORAL DAILY
COMMUNITY

## 2023-12-27 RX ORDER — HEPARIN SODIUM (PORCINE) LOCK FLUSH IV SOLN 100 UNIT/ML 100 UNIT/ML
500 SOLUTION INTRAVENOUS AS NEEDED
Status: DISCONTINUED | OUTPATIENT
Start: 2023-12-27 | End: 2023-12-28 | Stop reason: HOSPADM

## 2023-12-27 RX ORDER — SODIUM CHLORIDE 0.9 % (FLUSH) 0.9 %
10 SYRINGE (ML) INJECTION AS NEEDED
Status: DISCONTINUED | OUTPATIENT
Start: 2023-12-27 | End: 2023-12-28 | Stop reason: HOSPADM

## 2023-12-27 RX ADMIN — HEPARIN 500 UNITS: 100 SYRINGE at 10:51

## 2023-12-27 RX ADMIN — Medication 10 ML: at 10:51

## 2023-12-27 NOTE — PROGRESS NOTES
Pt to the clinic for Dr. Mueller f/u and chemotherapy. Port accessed using sterile technique with positive blood return noted. 10cc of blood wasted prior to obtaining lab specimen per orders and flushed with 20cc normal saline.  Pt tolerated well.  CBC resulted and Plt count 78.  Pt denies any bleeding but has some bruising to arms. Pt reports he is having his last xrt tomorrow.  Dr. Mueller at chairside for f/u appt. Chemo to be held today and will not be rescheduled.  Port flushed with 20cc normal saline and 500 units heparin then deaccessed. Pt tolerated well. AVS given prior to discharge.

## 2023-12-28 ENCOUNTER — HOSPITAL ENCOUNTER (OUTPATIENT)
Dept: RADIATION ONCOLOGY | Facility: HOSPITAL | Age: 53
Discharge: HOME | End: 2023-12-28

## 2023-12-28 ENCOUNTER — RADIATION ONCOLOGY WEEKLY ASSESSMENT (OUTPATIENT)
Dept: RADIATION ONCOLOGY | Facility: HOSPITAL | Age: 53
End: 2023-12-28
Payer: COMMERCIAL

## 2023-12-28 ENCOUNTER — TELEPHONE (OUTPATIENT)
Dept: FAMILY MEDICINE CLINIC | Facility: CLINIC | Age: 53
End: 2023-12-28
Payer: COMMERCIAL

## 2023-12-28 DIAGNOSIS — C15.9 ADENOCARCINOMA OF ESOPHAGUS METASTATIC TO INTRA-ABDOMINAL LYMPH NODE: Primary | ICD-10-CM

## 2023-12-28 DIAGNOSIS — C77.2 ADENOCARCINOMA OF ESOPHAGUS METASTATIC TO INTRA-ABDOMINAL LYMPH NODE: Primary | ICD-10-CM

## 2023-12-28 LAB
RAD ONC ARIA COURSE ID: NORMAL
RAD ONC ARIA COURSE LAST TREATMENT DATE: NORMAL
RAD ONC ARIA COURSE START DATE: NORMAL
RAD ONC ARIA COURSE TREATMENT ELAPSED DAYS: 31
RAD ONC ARIA FIRST TREATMENT DATE: NORMAL
RAD ONC ARIA PLAN FRACTIONS TREATED TO DATE: 23
RAD ONC ARIA PLAN ID: NORMAL
RAD ONC ARIA PLAN PRESCRIBED DOSE PER FRACTION: 1.8 GY
RAD ONC ARIA PLAN PRIMARY REFERENCE POINT: NORMAL
RAD ONC ARIA PLAN TOTAL FRACTIONS PRESCRIBED: 23
RAD ONC ARIA PLAN TOTAL PRESCRIBED DOSE: 4140 CGY
RAD ONC ARIA REFERENCE POINT DOSAGE GIVEN TO DATE: 41.4 GY
RAD ONC ARIA REFERENCE POINT ID: NORMAL
RAD ONC ARIA REFERENCE POINT SESSION DOSAGE GIVEN: 1.8 GY

## 2023-12-28 PROCEDURE — 77386: CPT | Performed by: RADIOLOGY

## 2023-12-28 PROCEDURE — 77336 RADIATION PHYSICS CONSULT: CPT | Performed by: RADIOLOGY

## 2023-12-28 PROCEDURE — 77014 CHG CT GUIDANCE RADIATION THERAPY FLDS PLACEMENT: CPT | Performed by: RADIOLOGY

## 2023-12-28 NOTE — TELEPHONE ENCOUNTER
"Relay     \"Hemoglobin and platelets were both decreased and I believe that your oncologist has already discussed that with you call if any other questions or concerns\"                "

## 2023-12-28 NOTE — PROGRESS NOTES
Hemoglobin and platelets were both decreased and I believe that your oncologist has already discussed that with you call if any other questions or concerns

## 2023-12-28 NOTE — PROGRESS NOTES
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 The summary of his treatment is as follows:    TREATMENT SITE:   Esophagus START DATE:   11/27/2023   TOTAL DOSE (cGy):   3960 END DATE:   12/28/2023    DOSE/FRACTION:   180 ELAPSED DAYS:   31   TOTAL FACTIONS:   23 PHYSICIAN:    Geovany Sabillon MD     1-month follow-up appointment after completion of radiation therapy scheduled on 1/31/2024.    The following instructions were provided to the patient and/or family in their printed after visit summary (AVS) as well as discussed in-person by the radiation oncology nurse or medical assistant. The patient and/or family had the opportunity to ask questions and verbalized their questions were adequately answered. Encouraged patient to contact our department with any questions or concerns.  _______________________________________________________________________      RADIATION THERAPY DISCHARGE INSTRUCTIONS  Esophagus    CONGRATULATIONS! You completed 23 radiation treatments for treatment of your esophageal cancer. These discharge instructions are important for you to follow until your one-month follow up appointment with Dr. Sabillon. Please make sure to review these instructions and call the Radiation Oncology Department if you have any questions or concerns with symptoms you may experience. Thank you for trusting us with your cancer treatment!    DIET  Eat a regular, well balanced diet that is high in protein such as meat, eggs, cheese, and nut butters  Drink lots of fluids to help decrease thick oral secretions  Use nutritional supplements if you are not able to eat full meals  Monitor your weight and report continued weight loss to your doctor    MEDICATIONS  Use Tylenol as needed to decrease discomfort and irritation to treatment area  Take pain medications only as prescribed  Take a  laxative or stool softener as needed to prevent constipation due to pain medications  Use Lisa's Magic Mouthwash or other oral pain relief medication before meals and before taking medications as needed to ease the discomfort of swallowing    SKIN CARE  Wash treated skin gently with your hands using a mild, non-drying soap such as Dove® or Aveeno® until skin returns to normal  Pat skin dry - do not rub  Keep treated skin moist with frequent applications of Aquaphor® or unscented lotion (such as Eucerin)®  Always protect your treated skin when outdoors by wearing protective clothing and applying sunscreen SPF 15 or higher at least 30 minutes before going outdoors and reapply frequently    ACTIVITY  Fatigue is a normal side effect of radiation therapy and should improve over time  Alternate rest and activity  Exercise such as walking may help to improve your fatigue    FOLLOW-UP  Continue follow-up appointments with all other doctors as necessary  Call your radiation oncology doctor if you are concerned with any side effects you are experiencing: [phone]    SPECIAL INSTRUCTIONS  Notify your medical or radiation oncologist if you notice any white patches inside your mouth or on your tongue, with or without foul taste. This could be a yeast infection and prompt treatment is important.    _______________________________________________________________________    Completed by: Kati Loaiza RN, Radiation Oncology Nurse on 12/28/23 at 07:49 EST

## 2023-12-28 NOTE — PATIENT INSTRUCTIONS
RADIATION THERAPY DISCHARGE INSTRUCTIONS  Esophagus    CONGRATULATIONS! You completed 23 radiation treatments for treatment of your esophageal cancer. These discharge instructions are important for you to follow until your one-month follow up appointment with Dr. Sabillon. Please make sure to review these instructions and call the Radiation Oncology Department if you have any questions or concerns with symptoms you may experience. Thank you for trusting us with your cancer treatment!    DIET  Eat a regular, well balanced diet that is high in protein such as meat, eggs, cheese, and nut butters  Drink lots of fluids to help decrease thick oral secretions  Use nutritional supplements if you are not able to eat full meals  Monitor your weight and report continued weight loss to your doctor    MEDICATIONS  Use Tylenol as needed to decrease discomfort and irritation to treatment area  Take pain medications only as prescribed  Take a laxative or stool softener as needed to prevent constipation due to pain medications  Use Lisa's Magic Mouthwash or other oral pain relief medication before meals and before taking medications as needed to ease the discomfort of swallowing    SKIN CARE  Wash treated skin gently with your hands using a mild, non-drying soap such as Dove® or Aveeno® until skin returns to normal  Pat skin dry - do not rub  Keep treated skin moist with frequent applications of Aquaphor® or unscented lotion (such as Eucerin)®  Always protect your treated skin when outdoors by wearing protective clothing and applying sunscreen SPF 15 or higher at least 30 minutes before going outdoors and reapply frequently    ACTIVITY  Fatigue is a normal side effect of radiation therapy and should improve over time  Alternate rest and activity  Exercise such as walking may help to improve your fatigue    FOLLOW-UP  Continue follow-up appointments with all other doctors as necessary  Call your radiation oncology doctor if you are  concerned with any side effects you are experiencing: [phone]    SPECIAL INSTRUCTIONS  Notify your medical or radiation oncologist if you notice any white patches inside your mouth or on your tongue, with or without foul taste. This could be a yeast infection and prompt treatment is important.    _______________________________________________________________________    Completed by: Kati Loaiza RN on 12/28/2023 at 07:50 EST

## 2024-01-04 LAB
RAD ONC ARIA COURSE END DATE: NORMAL
RAD ONC ARIA COURSE ID: NORMAL
RAD ONC ARIA COURSE LAST TREATMENT DATE: NORMAL
RAD ONC ARIA COURSE START DATE: NORMAL
RAD ONC ARIA COURSE TREATMENT ELAPSED DAYS: 31
RAD ONC ARIA FIRST TREATMENT DATE: NORMAL
RAD ONC ARIA PLAN FRACTIONS TREATED TO DATE: 23
RAD ONC ARIA PLAN ID: NORMAL
RAD ONC ARIA PLAN NAME: NORMAL
RAD ONC ARIA PLAN PRESCRIBED DOSE PER FRACTION: 1.8 GY
RAD ONC ARIA PLAN PRIMARY REFERENCE POINT: NORMAL
RAD ONC ARIA PLAN TOTAL FRACTIONS PRESCRIBED: 23
RAD ONC ARIA PLAN TOTAL PRESCRIBED DOSE: 4140 CGY
RAD ONC ARIA REFERENCE POINT DOSAGE GIVEN TO DATE: 41.4 GY
RAD ONC ARIA REFERENCE POINT ID: NORMAL

## 2024-01-09 ENCOUNTER — TELEPHONE (OUTPATIENT)
Dept: ONCOLOGY | Facility: CLINIC | Age: 54
End: 2024-01-09
Payer: COMMERCIAL

## 2024-01-09 NOTE — TELEPHONE ENCOUNTER
Caller: [facility]    Relationship:     Best call back number: [phone]    What is the best time to reach you: ANYTIME    Who are you requesting to speak with (clinical staff, provider,  specific staff member): CLINICAL     Do you know the name of the person who called: JAIRON    What was the call regarding: JAIRON CALLING FROM SCHEDULING PET SCAN FOR 1/10/2024 HAS BEEN DENIED. CAN SHE RESCHEDULE?     DO YOU WANT TO CALL TO DO A PEER TO PEER, PHONE NUMBER [phone].  AUTH NUMBER [id number]    SEE DENIAL IN MEDIA    CALL TO ADVISE     Is it okay if the provider responds through [facility]:

## 2024-01-09 NOTE — TELEPHONE ENCOUNTER
PET scan appt cancelled due to insurance denial.  Peer to peer to be completed per Dr. Mueller.     Peer to peer to be scheduled.     Called and spoke to Lou with [facility]. Lou stated they do not scheduled peer to peers for this scan therefore we will need to callback with a provider. Informed RUBINA Orozco of this information. Peer to peer to be completed tomorrow.    Attempted to contact Mr. Lebron regarding cancelling his PET scan appt. Unable to speak with the patient. Voicemail left stating the appt has been cancelled and that we are working on getting the scan approved. I stated that we will contact him to reschedule. Callback number left on voicemail.    no

## 2024-01-10 ENCOUNTER — DOCUMENTATION (OUTPATIENT)
Dept: ONCOLOGY | Facility: CLINIC | Age: 54
End: 2024-01-10
Payer: COMMERCIAL

## 2024-01-10 NOTE — PROGRESS NOTES
Peer to peer performed with Dr. Kerr.  Reviewed the patient's diagnosis, treatment regimen and completion of treatment date.  Per Dr. Kerr the PET/CT cannot be approved until 5 weeks after completion of treatment.  Last day of radiation was on 12/26/2023.  She recommended that the order for the PET/CT be resubmitted to be completed on that date.  Notified Dr. Mueller's RN so that the order can be obtained and the patient rescheduled.

## 2024-01-11 ENCOUNTER — TELEPHONE (OUTPATIENT)
Dept: ONCOLOGY | Facility: CLINIC | Age: 54
End: 2024-01-11
Payer: COMMERCIAL

## 2024-01-11 ENCOUNTER — TELEPHONE (OUTPATIENT)
Dept: SURGERY | Facility: CLINIC | Age: 54
End: 2024-01-11
Payer: COMMERCIAL

## 2024-01-11 NOTE — TELEPHONE ENCOUNTER
Left detailed message with name, medical group, and call back number regarding confirming, canceling or rescheduling upcoming appt    DB 7:33  1/11/2024

## 2024-01-11 NOTE — TELEPHONE ENCOUNTER
Pt confirmed his appt on 1/16/2024. Pt. Also expressed a concern with not being able to follow through on his PET appt which was for 1/10/2024 at [facility] by Jeanne with Dr Sabillon due to needing a pre auth. Pt stated he is waiting to hear back from Dr Sabillon staff regarding this issue.    DB 8:34  1/11/2024

## 2024-01-11 NOTE — TELEPHONE ENCOUNTER
ATTEMPTED TO CONTACT THE PATIENT REGARDING THE NEED TO RESCHEDULE HIS FOLLOW UP APPT. UNABLE TO SPEAK WITH THE PATIENT. VOICEMAIL LEFT REQUESTING A CALLBACK. CALLBACK NUMBER LEFT ON VOICEMAIL.

## 2024-01-12 ENCOUNTER — PREP FOR SURGERY (OUTPATIENT)
Dept: OTHER | Facility: HOSPITAL | Age: 54
End: 2024-01-12
Payer: COMMERCIAL

## 2024-01-12 ENCOUNTER — NURSE NAVIGATOR (OUTPATIENT)
Dept: ONCOLOGY | Facility: CLINIC | Age: 54
End: 2024-01-12
Payer: COMMERCIAL

## 2024-01-12 DIAGNOSIS — C15.9 ADENOCARCINOMA OF ESOPHAGUS METASTATIC TO INTRA-ABDOMINAL LYMPH NODE: ICD-10-CM

## 2024-01-12 DIAGNOSIS — C15.5 MALIGNANT NEOPLASM OF LOWER THIRD OF ESOPHAGUS: Primary | ICD-10-CM

## 2024-01-12 DIAGNOSIS — C15.9 ADENOCARCINOMA OF ESOPHAGUS: Primary | ICD-10-CM

## 2024-01-12 DIAGNOSIS — C77.2 ADENOCARCINOMA OF ESOPHAGUS METASTATIC TO INTRA-ABDOMINAL LYMPH NODE: ICD-10-CM

## 2024-01-12 RX ORDER — SODIUM CHLORIDE 0.9 % (FLUSH) 0.9 %
10 SYRINGE (ML) INJECTION EVERY 12 HOURS SCHEDULED
OUTPATIENT
Start: 2024-01-12

## 2024-01-12 RX ORDER — SODIUM CHLORIDE 0.9 % (FLUSH) 0.9 %
10 SYRINGE (ML) INJECTION AS NEEDED
OUTPATIENT
Start: 2024-01-12

## 2024-01-12 RX ORDER — SODIUM CHLORIDE 9 MG/ML
40 INJECTION, SOLUTION INTRAVENOUS AS NEEDED
OUTPATIENT
Start: 2024-01-12

## 2024-01-12 RX ORDER — VANCOMYCIN HYDROCHLORIDE 1 G/200ML
15 INJECTION, SOLUTION INTRAVENOUS ONCE
OUTPATIENT
Start: 2024-01-12 | End: 2024-01-12

## 2024-01-15 ENCOUNTER — TELEPHONE (OUTPATIENT)
Dept: SURGERY | Facility: CLINIC | Age: 54
End: 2024-01-15
Payer: COMMERCIAL

## 2024-01-15 NOTE — TELEPHONE ENCOUNTER
Called pt about appt tomorrow at 8:45. Patient confirmed last week however I wanted to follow up just to make sure. No answer left message to call back  and will try again later

## 2024-01-16 ENCOUNTER — TELEPHONE (OUTPATIENT)
Dept: SURGERY | Facility: CLINIC | Age: 54
End: 2024-01-16
Payer: COMMERCIAL

## 2024-01-16 NOTE — TELEPHONE ENCOUNTER
Pt confirmed imaging test on 1/18/2024 and provider appointment on 1/23/2024    DB 12:58  1/16/2024

## 2024-01-18 ENCOUNTER — NURSE NAVIGATOR (OUTPATIENT)
Dept: ONCOLOGY | Facility: CLINIC | Age: 54
End: 2024-01-18
Payer: COMMERCIAL

## 2024-01-18 NOTE — PROGRESS NOTES
Patient scheduled for echo, called with instructions, date time and location. He has my direct number for any questions or concerns.

## 2024-01-22 ENCOUNTER — HOSPITAL ENCOUNTER (OUTPATIENT)
Dept: CARDIOLOGY | Facility: HOSPITAL | Age: 54
Discharge: HOME | End: 2024-01-22
Admitting: SURGERY
Payer: COMMERCIAL

## 2024-01-22 VITALS
BODY MASS INDEX: 22.25 KG/M2 | HEIGHT: 71 IN | SYSTOLIC BLOOD PRESSURE: 93 MMHG | WEIGHT: 158.95 LBS | DIASTOLIC BLOOD PRESSURE: 54 MMHG

## 2024-01-22 DIAGNOSIS — C15.5 MALIGNANT NEOPLASM OF LOWER THIRD OF ESOPHAGUS: ICD-10-CM

## 2024-01-22 LAB
BH CV ECHO MEAS - ACS: 1.82 CM
BH CV ECHO MEAS - AI P1/2T: 554.9 MSEC
BH CV ECHO MEAS - AO MAX PG: 5.1 MMHG
BH CV ECHO MEAS - AO MEAN PG: 2.5 MMHG
BH CV ECHO MEAS - AO ROOT DIAM: 3.6 CM
BH CV ECHO MEAS - AO V2 MAX: 112.8 CM/SEC
BH CV ECHO MEAS - AO V2 VTI: 23 CM
BH CV ECHO MEAS - AVA(I,D): 2.8 CM2
BH CV ECHO MEAS - EDV(CUBED): 96.2 ML
BH CV ECHO MEAS - EDV(MOD-SP4): 63.8 ML
BH CV ECHO MEAS - EF(MOD-BP): 50 %
BH CV ECHO MEAS - EF(MOD-SP4): 49.2 %
BH CV ECHO MEAS - ESV(CUBED): 50 ML
BH CV ECHO MEAS - ESV(MOD-SP4): 32.4 ML
BH CV ECHO MEAS - FS: 19.6 %
BH CV ECHO MEAS - IVS/LVPW: 0.85 CM
BH CV ECHO MEAS - IVSD: 0.66 CM
BH CV ECHO MEAS - LA DIMENSION: 2.8 CM
BH CV ECHO MEAS - LV DIASTOLIC VOL/BSA (35-75): 33.5 CM2
BH CV ECHO MEAS - LV MASS(C)D: 102.9 GRAMS
BH CV ECHO MEAS - LV MAX PG: 3.9 MMHG
BH CV ECHO MEAS - LV MEAN PG: 1.73 MMHG
BH CV ECHO MEAS - LV SYSTOLIC VOL/BSA (12-30): 17 CM2
BH CV ECHO MEAS - LV V1 MAX: 98.5 CM/SEC
BH CV ECHO MEAS - LV V1 VTI: 20.5 CM
BH CV ECHO MEAS - LVIDD: 4.6 CM
BH CV ECHO MEAS - LVIDS: 3.7 CM
BH CV ECHO MEAS - LVOT AREA: 3.1 CM2
BH CV ECHO MEAS - LVOT DIAM: 1.99 CM
BH CV ECHO MEAS - LVPWD: 0.78 CM
BH CV ECHO MEAS - MR MAX PG: 57.2 MMHG
BH CV ECHO MEAS - MR MAX VEL: 376.6 CM/SEC
BH CV ECHO MEAS - MV A MAX VEL: 64.3 CM/SEC
BH CV ECHO MEAS - MV DEC SLOPE: 325 CM/SEC2
BH CV ECHO MEAS - MV DEC TIME: 0.22 SEC
BH CV ECHO MEAS - MV E MAX VEL: 70 CM/SEC
BH CV ECHO MEAS - MV E/A: 1.09
BH CV ECHO MEAS - MV MAX PG: 2.42 MMHG
BH CV ECHO MEAS - MV MEAN PG: 1.03 MMHG
BH CV ECHO MEAS - MV V2 VTI: 28.8 CM
BH CV ECHO MEAS - MVA(VTI): 2.21 CM2
BH CV ECHO MEAS - PA ACC TIME: 0.13 SEC
BH CV ECHO MEAS - PA V2 MAX: 81.5 CM/SEC
BH CV ECHO MEAS - PULM A REVS DUR: 0.08 SEC
BH CV ECHO MEAS - PULM A REVS VEL: 36.8 CM/SEC
BH CV ECHO MEAS - PULM DIAS VEL: 39 CM/SEC
BH CV ECHO MEAS - PULM S/D: 1.42
BH CV ECHO MEAS - PULM SYS VEL: 55.4 CM/SEC
BH CV ECHO MEAS - RAP SYSTOLE: 10 MMHG
BH CV ECHO MEAS - RVSP: 40.2 MMHG
BH CV ECHO MEAS - SI(MOD-SP4): 16.5 ML/M2
BH CV ECHO MEAS - SV(LVOT): 63.5 ML
BH CV ECHO MEAS - SV(MOD-SP4): 31.4 ML
BH CV ECHO MEAS - TAPSE (>1.6): 2.5 CM
BH CV ECHO MEAS - TR MAX PG: 30.2 MMHG
BH CV ECHO MEAS - TR MAX VEL: 258.9 CM/SEC
BH CV XLRA - RV BASE: 3.7 CM
BH CV XLRA - RV MID: 2.2 CM

## 2024-01-22 PROCEDURE — 93306 TTE W/DOPPLER COMPLETE: CPT

## 2024-01-22 PROCEDURE — 93306 TTE W/DOPPLER COMPLETE: CPT | Performed by: INTERNAL MEDICINE

## 2024-01-23 ENCOUNTER — OFFICE VISIT (OUTPATIENT)
Dept: SURGERY | Facility: CLINIC | Age: 54
End: 2024-01-23
Payer: COMMERCIAL

## 2024-01-23 ENCOUNTER — PATIENT OUTREACH (OUTPATIENT)
Dept: ONCOLOGY | Facility: CLINIC | Age: 54
End: 2024-01-23
Payer: COMMERCIAL

## 2024-01-23 VITALS
OXYGEN SATURATION: 98 % | WEIGHT: 153 LBS | BODY MASS INDEX: 21.42 KG/M2 | HEIGHT: 71 IN | HEART RATE: 82 BPM | SYSTOLIC BLOOD PRESSURE: 118 MMHG | DIASTOLIC BLOOD PRESSURE: 60 MMHG

## 2024-01-23 DIAGNOSIS — C15.5 MALIGNANT NEOPLASM OF LOWER THIRD OF ESOPHAGUS: Primary | ICD-10-CM

## 2024-01-23 PROCEDURE — 99215 OFFICE O/P EST HI 40 MIN: CPT | Performed by: SURGERY

## 2024-01-23 NOTE — PROGRESS NOTES
THORACIC SURGERY CLINIC CONSULT NOTE    REASON FOR CONSULT: Stage III-A ([id number]) Adenocarcinoma of the lower third of the esophagus.    Subjective   HISTORY OF PRESENTING ILLNESS:   Jourdan Lebron is a 53 y.o. male who has significant medical problems as mentioned in the medical chart.     He was having intermittent dysphagia for over a year which became progressively worse.  On 10/2/2023, he underwent EGD and colonoscopy by Dr. Ozzy Abdalla at [facility].  He was found to have a 6 cm mass at the lower third of the esophagus (36 to 42 cm) (biopsied), mild diverticulosis of the sigmoid colon, 5 polyps noted in the colon and cecum that were removed.  The pathology of the esophageal mass revealed invasive moderate to poorly differentiated adenocarcinoma. All polypectomy samples were negative for malignancy (fragments of tubular adenoma).  CT scan of the chest, abdomen and pelvis on 10/9/2023 at [facility] showed 4 cm abnormal thickening of the lower thoracic esophagus extending to the GE junction thought to represent patient's known primary lung malignancy.  There were no convincing evidence of metastatic disease elsewhere in the chest, abdomen, pelvis, or skeleton.  There were stable left adrenalectomy changes, right adrenal adenoma unchanged.     He was seen in the office by Dr. Tacos Osuna ([facility]) for new diagnosis of esophageal cancer. He was an established patient of Dr. Harinder Mueller for erythrocytosis and macrocytosis since 11/2021 (resolved).      PET/CT on 10/20/2023 at [facility] showed hypermetabolic (SUV 7.7) activity within the distal esophagus extending to the GE junction compatible with patient's known malignancy.  He then underwent EGD with EUS by Dr. Joselyn Savage on 10/27/2023. Findings included close to 6 cm ulcerated mass extending from 36 to 42 cm consistent with poorly differentiated adenocarcinoma.  Mass penetrated through muscularis propria without involving the  adventitia consistent with T2 lesion. Two small round hypodense lymph nodes in close proximity to the mass measuring 5 and 6 mm concerning for malignancy. Other lymph nodes around the GE junction measuring 7 and 8 mm were also concerning for malignancy but immediate cytology was negative for malignancy. Pathology of the gastrohepatic lymph node was positive for metastatic adenocarcinoma; however, the lymph node at 36 cm was negative for malignancy.    He was referred to thoracic surgery for further evaluation.  At the time of initial consultation, he could eat and swallow quite a bit, but it depends. He avoids a lot of bread because it gets stuck lower down his chest. He went down from 200 pounds to 170 pounds 3 years ago after he had a stroke. He had memory loss and general fatigue after the stroke. He lost his appetite but began to add some weight. He then noticed he gets bloated and was having dyspepsia. He had adrenalectomy performed at the [facility] after an adrenal biopsy was done on an adrenal mass, and he went into adrenal crisis. He took hydrocortisone for 1 year and discontinued it on 09/2022. He developed abdominal discomfort afterwards. He denies having any past chest surgeries or myocardial infarction.  His wife mentioned the cause of the previous stroke was never known despite having a complete work up and a loop recorder inserted which was removed in the spring. He denies any abnormal heart rhythms or pedal edema. The patient quit smoking cigarettes 8 to 9 years ago and started smoking cigars.     He has good functional status.  Based on the clinical presentation, he was considered a candidate for trimodality treatment.  He was informed about the possibility of having a feeding tube if he was unable to swallow substantial amount of nutrition.  I placed Mediport on 11/13/2023.  He received concurrent chemoradiation therapy.  His last dose of chemotherapy and radiation was on  12/28/2023.    He came to clinic for follow-up visit.  He has been able to tolerate soft food without difficulty.  He has tolerated chemoradiation without much difficulty.    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    Anxiety     Brain fog     COVID 02/2023    Diverticulosis of large intestine without perforation or abscess without bleeding 10/02/2023    Esophageal cancer 10/2023    GERD (gastroesophageal reflux disease)     Hand tingling     History of blood clot in brain 2020    had thrombectomy    History of cancer chemotherapy 12/2023    History of radiation therapy 11/2023    Hyperlipidemia     Stroke 05/30/2020    Tiredness        Past Surgical History:   Procedure Laterality Date    ADRENALECTOMY      COLONOSCOPY      ENDOSCOPY      ESOPHAGUS SURGERY  10/2023    biopsy    KIDNEY STONE SURGERY      OTHER SURGICAL HISTORY  2020    loop recorder    THROMBECTOMY  2020    UPPER ENDOSCOPIC ULTRASOUND W/ FNA N/A 10/27/2023    Procedure: ENDOSCOPIC ULTRASOUND WITH STAGING AND FINE NEEDLE ASPIRATION X2 AREAS;  Surgeon: Joselyn Savage MD;  Location: [facility] ENDOSCOPY;  Service: Gastroenterology;  Laterality: N/A;  POST:    VENOUS ACCESS DEVICE (PORT) INSERTION Right 11/13/2023    Procedure: INSERTION VENOUS ACCESS DEVICE;  Surgeon: Saurabh Hurtado MD;  Location: [facility] MAIN OR;  Service: Thoracic;  Laterality: Right;       Family History   Problem Relation Age of Onset    Arthritis Mother     Hypertension Mother     Heart failure Mother 73        Cause of death    Arthritis Father     Hypertension Father     Kidney cancer Father 57        Cause of death. Was a smoker    Heart disease Brother     Esophageal cancer Brother 59        Cause of death. Has a heavy drinker    Malig Hyperthermia Neg Hx        Social History     Socioeconomic History     Marital status:    Tobacco Use    Smoking status: Former     Packs/day: 1.00     Years: 37.00     Additional pack years: 0.00     Total pack years: 37.00     Types: Cigars, Cigarettes     Start date: 1985     Quit date: 2022     Years since quittin.0     Passive exposure: Past    Smokeless tobacco: Never    Tobacco comments:     1 packs= stopped 10 years ago and continued cigars   2 cigars a day; has stopped cigars as of    Vaping Use    Vaping Use: Never used   Substance and Sexual Activity    Alcohol use: Not Currently     Comment: Has now been abstinent for about one year    Drug use: Never    Sexual activity: Yes     Partners: Female     Birth control/protection: None         Current Outpatient Medications:     ALPRAZolam (XANAX) 0.25 MG tablet, Take 1 tablet by mouth 3 (Three) Times a Day As Needed for Anxiety. for anxiety, Disp: 90 tablet, Rfl: 3    aspirin 81 MG chewable tablet, Chew 1 tablet Daily., Disp: , Rfl:     Evolocumab (REPATHA) solution prefilled syringe injection, Inject 1 mL under the skin into the appropriate area as directed Every 14 (Fourteen) Days., Disp: 6 mL, Rfl: 3    FLUoxetine (PROzac) 20 MG capsule, Take 1 capsule by mouth Daily. (Patient taking differently: Take 1 capsule by mouth Every Morning.), Disp: 90 capsule, Rfl: 1    fluticasone (FLONASE) 50 MCG/ACT nasal spray, 2 sprays into the nostril(s) as directed by provider Daily., Disp: , Rfl:     lidocaine-prilocaine (EMLA) 2.5-2.5 % cream, Apply 1 application  topically to the appropriate area as directed As Needed (apply 60 minutes prior to port access)., Disp: 30 g, Rfl: 2    Nystatin (magic mouthwash), Swish and swallow 5 mL 4 (Four) Times a Day Before Meals & at Bedtime. (Patient taking differently: Swish and swallow 5 mL As Needed.), Disp: 1 bottle, Rfl: 4    ondansetron (ZOFRAN) 8 MG tablet, Take 1 tablet by mouth 3 (Three) Times a Day As Needed for Nausea or Vomiting., Disp: 30 tablet, Rfl: 5    traMADol  "(ULTRAM) 50 MG tablet, Take 1 tablet by mouth Every 6 (Six) Hours As Needed for Moderate Pain., Disp: 60 tablet, Rfl: 0     Allergies   Allergen Reactions    Cephalosporins Anaphylaxis     Throat swelling    Dye  [Contrast Dye (Echo Or Unknown Ct/Mr)] Hives    Iodinated Contrast Media Hives    Sulfa Antibiotics Hives    Sulfate Hives    Zetia [Ezetimibe] Other (See Comments) and Myalgia     Made tired    Statins Myalgia     Myalgia and fatique             Objective    OBJECTIVE:     VITAL SIGNS:  /60 (BP Location: Left arm, Patient Position: Sitting, Cuff Size: Adult)   Pulse 82   Ht 180.3 cm (70.98\")   Wt 69.4 kg (153 lb)   SpO2 98%   BMI 21.35 kg/m²     PHYSICAL EXAM:  Constitutional:       Appearance: Normal appearance.   HENT:      Head: Normocephalic.      Right Ear: External ear normal.      Left Ear: External ear normal.      Nose: Nose normal.      Mouth/Throat: No obvious deformity     Pharynx: Oropharynx is clear.   Eyes:      Conjunctiva/sclera: Conjunctivae normal.   Cardiovascular:      Rate and Rhythm: Normal rate.      Pulses: Normal pulses.   Pulmonary:      Effort: Pulmonary effort is normal.   Abdominal:      Palpations: Abdomen is soft.   Musculoskeletal:         General: Normal range of motion.      Cervical back: Normal range of motion.   Skin:     General: Skin is warm.   Neurological:      General: No focal deficit present.      Mental Status: He is alert and oriented to person, place, and time.     LAB RESULTS:  I have reviewed all the available laboratory results in the chart.    RESULTS REVIEW:  I have reviewed the patient's all relevant laboratory and imaging findings.     PET/ CT of skull base to mid-thigh done 10/20/2023.  Abnormal metabolic activity within the distal esophagus extending to the GE junction compatible with patient's known malignancy.          ASSESSMENT & PLAN:  Jourdan Lebron is a 53 y.o. male with significant medical conditions as mentioned above presented to " my clinic.    Diagnosis: Adenocarcinoma of the lower third of the esophagus  Staging: Stage III-A ([id number])    He has adenocarcinoma of the lower third of the esophagus.  He has good functional status.  Based on the clinical presentation, he was considered a candidate for trimodality treatment.  He has tolerated concurrent chemoradiation well.    As a part of preoperative evaluation, I recommended obtaining transthoracic echo and carotid ultrasound.  He will also require cardiology evaluation. He is at risk for adrenal insufficiency in the context of history of adrenalectomy and adrenal crisis.  He follows up with endocrinologist  [facility] and I advised him to follow-up with them preoperative evaluation.  I will also discuss his case in our multidisciplinary tumor board conference.    I discussed the patients findings and my recommendations with the patient. The patient was given adequate time to ask questions and all questions were answered to patient satisfaction.     Saurabh Hurtado MD  Thoracic Surgeon  [facility]        Dictated utilizing Dragon dictation    I spent 40 minutes caring for Jourdan on this date of service. This time includes time spent by me in the following activities:preparing for the visit, reviewing tests, obtaining and/or reviewing a separately obtained history, performing a medically appropriate examination and/or evaluation , counseling and educating the patient/family/caregiver, ordering medications, tests, or procedures, referring and communicating with other health care professionals , documenting information in the medical record, independently interpreting results and communicating that information with the patient/family/caregiver, and care coordination and more than half the time was spent in direct face to face evaluation and decision making.

## 2024-01-24 ENCOUNTER — NURSE NAVIGATOR (OUTPATIENT)
Dept: ONCOLOGY | Facility: CLINIC | Age: 54
End: 2024-01-24
Payer: COMMERCIAL

## 2024-01-24 ENCOUNTER — HOSPITAL ENCOUNTER (OUTPATIENT)
Dept: ULTRASOUND IMAGING | Facility: HOSPITAL | Age: 54
Discharge: HOME | End: 2024-01-24
Admitting: SURGERY
Payer: COMMERCIAL

## 2024-01-24 DIAGNOSIS — E04.1 THYROID NODULE: Primary | ICD-10-CM

## 2024-01-24 DIAGNOSIS — C15.5 MALIGNANT NEOPLASM OF LOWER THIRD OF ESOPHAGUS: ICD-10-CM

## 2024-01-24 PROCEDURE — 93880 EXTRACRANIAL BILAT STUDY: CPT

## 2024-01-24 NOTE — PROGRESS NOTES
"                     HEMATOLOGY ONCOLOGY OUTPATIENT FOLLOW UP       Patient name: Jourdan Lebron  : 1970  MRN: [id number]  Primary Care Physician: Justa Castano MD  Referring Physician: No ref. 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 male who presented to our office on 2021 for consultation regarding elevated hematocrit    On 2021 Mr. Lebron was referred for the investigation of macrocytosis and elevated hematocrit.  He gave a history of a stroke in .  He also described a long history of anxiety and \"panic attacks\".  Finally he had undergone an adrenalectomy, apparently because of an adrenal adenoma.  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  In spite of that he felt poorly, mainly because of fatigue and had several investigations.  For a long time, at least since , he had been noted to have an elevated MCV and MCH.  A clear cause for this had not been identified and generally speaking he was asymptomatic at that time.  In , September and 2021 his blood counts had reported a hematocrit of 51.3, 51.6 and 53.8% respectively.  This was in the setting of a normal high hemoglobin.  He gave a history of prolonged and intense, at times of up to 3 packs of cigarettes per day, cigarette smoking.  Close to the time of the initial visit, he was smoking only cigars but did admit to inhaling the smoke.  He, as well, admitted to dyspnea with some exertion.    2022 Mr. Lebron was back in the office for follow-up.  At the time of his initial evaluation his blood count had been found to be within normal limits.  His folic acid was found to be immediately below the normal range of normalcy.  He started taking folic acid replacement.    7/15/2022: " Back in the office for follow-up after 6 months.  Reported he had had some changes to his medications and he was feeling somewhat better.  In particular, he discussed changes to his antidepressant, that seemed to have made a difference.  He also had stopped smoking.  The physical exam was unchanged.  The laboratory exams revealed normal hemoglobin and hematocrit, but he did persist with mild macrocytosis at 97.8 fL.  A clear explanation for this was not identified.  A decision was made to observe as it was unlikely to represent a serious problem.    10/10/2023: Seen in the office after an absence of more than one year. He reported that for a few months he had been experiencing dysphagia and weight loss. He was initially treated with a proton pump inhibitor, but as there was no response and rather he reported worsening of the symptoms, he underwent upper and lower gastrointestinal endoscopies. In the colon multiple polyps were identified in the cecum, the ascending colon, the transverse colon and the descending colon. In the esophagus a protruding lesion that seemed infiltrative and was fungating and ulcerated was found in the lower third of the esophagus. It extended from 36 to 46 cm from the incisors. Biopsy reported invasive moderate to poorly differentiated adenocarcinoma.     11/3/2023: In the office to review the PET scan. His symptoms have not changed much. He continues to have dysphagia and it is severe enough that he has been able to eat much; he has some success with soft foods but there fare some foods that he can definitely not swallow. He has lost about 10 lbs but none recently. He remains afebrile and has not had any cough. He has not had any pain. On exam no jaundice or pallor. Lungs diminished and heart regular. Abdomen soft and not tender. No edema. Reviewed the images of the PET scan. Reviewed the result of the endoscopic ultrasound and the FNA of the lymph nodes, at least one of which is positive  for metastatic disease. This makes the tumor T2N1 disease. Neoadjuvant chemotherapy and radiation was discussed with him and his spouse and I made referrals to Dr. Sabillon in Radiation Oncology and to Dr. Hurtado in Thoracic surgery. He is to have next generation sequencing, Her2 expression and PD-L1 expression on tumor tissue. Will present in tumor conference.     11/17/2023: Not any different than at the time of the last visit.  No further weight loss.  Able to swallow some foods without any difficulties.  However, other foods are very difficult to swallow, if not impossible.  He has not had any fevers.  He underwent placement of the port without any difficulties.  On exam alert, conversant and in no distress.  Port site clean and healing well.  Lungs diminished bilaterally.  Heart regular.  No edema.  Laboratory exams were reviewed.  Discussed with him concurrent chemotherapy and radiation.  Treatment with carboplatin and paclitaxel was discussed and a treatment plan was placed.  Discussed with Dr. Sabillon.  To begin on the week of November 27, 2023.    12/8/2023: Feeling reasonably well. Has experienced occasional headaches and facial pain. As well feels the lower extremities to be heavy and had some aching pain. Has been able to eat some though his appetite is poor. He may be swallowing better and does not have odynophagia. No abdominal pain or diarrhea and no dysuria. Has not lost weight. On exam no changes. The laboratory exams were reviewed. Discussed with him. To continue with the same therapy.     12/27/2023: Without new symptoms.  Reasonably active.  Not eating as well because of early satiety but no dysphagia.  No chest pains.  As well not coughing or more dyspneic than before.  On exam no changes.  No palpable supraclavicular adenopathy.  Lungs are diminished bilaterally.  Heart regular.  Abdomen soft and nontender.  There is no edema.  Small petechiae are present in the lower extremities.  The laboratory exams  were reviewed.  He has thrombocytopenia today that does not require transfusion but that will keep him from receiving the last dose of the chemotherapy.  To finish radiation tomorrow.  He will have a PET scan and will see me with the results in approximately 4 weeks.  Discussed with him.    1/25/2024: Feeling reasonably well.  Anxious about surgery.  Undergoing preoperative investigations he was found to have a thyroid nodule.  He is eating reasonably well and does not have major dysphagia although sometimes he needs to belch before he can continue to swallow.  No odynophagia.  He has also been free of dyspnea.  Denies abdominal pain and has maintained regular bowel activity.  No dysuria or hematuria.  No peripheral edema.  On exam alert, conversant and in good spirits.  No jaundice and no pallor.  Lungs diminished but clear.  Heart regular.  Abdomen soft.  Port site clean.  Laboratory exams reviewed.  Awaiting authorization from the insurance company to do the PET scan.  Continue to see Dr. Valera.  See me in approximately 4 weeks.    The following portions of the patient's history were reviewed and updated as appropriate: allergies, current medications, past family history, past medical history, past social history, past surgical history and problem list.    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    Brain fog     Diverticulosis of large intestine without perforation or abscess without bleeding 10/02/2023    Esophageal cancer     GERD (gastroesophageal reflux disease)     Hand tingling     Hyperlipidemia     Stroke 05/30/2020    Tiredness      Past Surgical History:   Procedure Laterality Date    ADRENALECTOMY      KIDNEY STONE SURGERY      THROMBECTOMY      UPPER ENDOSCOPIC ULTRASOUND W/ FNA N/A 10/27/2023    Procedure: ENDOSCOPIC ULTRASOUND WITH STAGING  AND FINE NEEDLE ASPIRATION X2 AREAS;  Surgeon: Joselyn Savage MD;  Location: [facility] ENDOSCOPY;  Service: Gastroenterology;  Laterality: N/A;  POST:    VENOUS ACCESS DEVICE (PORT) INSERTION Right 11/13/2023    Procedure: INSERTION VENOUS ACCESS DEVICE;  Surgeon: Saurabh Hurtado MD;  Location: [facility] MAIN OR;  Service: Thoracic;  Laterality: Right;       Current Outpatient Medications:     ALPRAZolam (XANAX) 0.25 MG tablet, Take 1 tablet by mouth 3 (Three) Times a Day As Needed for Anxiety. for anxiety, Disp: 90 tablet, Rfl: 3    aspirin 81 MG chewable tablet, Chew 1 tablet Daily., Disp: , Rfl:     Evolocumab (REPATHA) solution prefilled syringe injection, Inject 1 mL under the skin into the appropriate area as directed Every 14 (Fourteen) Days., Disp: 6 mL, Rfl: 3    FLUoxetine (PROzac) 20 MG capsule, Take 1 capsule by mouth Daily., Disp: 90 capsule, Rfl: 1    fluticasone (FLONASE) 50 MCG/ACT nasal spray, 1 spray into the nostril(s) as directed by provider Daily., Disp: , Rfl:     lidocaine-prilocaine (EMLA) 2.5-2.5 % cream, Apply 1 application  topically to the appropriate area as directed As Needed (apply 60 minutes prior to port access)., Disp: 30 g, Rfl: 2    Nystatin (magic mouthwash), Swish and swallow 5 mL 4 (Four) Times a Day Before Meals & at Bedtime., Disp: 1 bottle, Rfl: 4    ondansetron (ZOFRAN) 8 MG tablet, Take 1 tablet by mouth 3 (Three) Times a Day As Needed for Nausea or Vomiting., Disp: 30 tablet, Rfl: 5    pantoprazole (PROTONIX) 40 MG EC tablet, Take 1 tablet by mouth Daily., Disp: , Rfl:     traMADol (ULTRAM) 50 MG tablet, Take 1 tablet by mouth Every 6 (Six) Hours As Needed for Moderate Pain., Disp: 60 tablet, Rfl: 0    Allergies   Allergen Reactions    Cephalosporins Anaphylaxis     Throat swelling    Dye  [Contrast Dye (Echo Or Unknown Ct/Mr)] Hives    Sulfa Antibiotics Hives    Zetia [Ezetimibe] Other (See Comments)     Made tired    Iodinated Contrast Media Unknown - Low Severity    Statins  Myalgia     Myalgia and fatique    Sulfate Unknown - Low Severity     Family History   Problem Relation Age of Onset    Arthritis Mother     Hypertension Mother     Heart failure Mother 73        Cause of death    Arthritis Father     Hypertension Father     Kidney cancer Father 57        Cause of death. Was a smoker    Heart disease Brother     Esophageal cancer Brother 59        Cause of death. Has a heavy drinker     Cancer-related family history includes Esophageal cancer (age of onset: 59) in his brother; Kidney cancer (age of onset: 57) in his father.    Social History     Tobacco Use    Smoking status: Former     Packs/day: 3.00     Years: 37.00     Additional pack years: 0.00     Total pack years: 111.00     Types: Cigars, Cigarettes     Start date:      Quit date:      Years since quittin.0    Smokeless tobacco: Never    Tobacco comments:     1 packs= 2 cigars a day; only smokes cigars   Vaping Use    Vaping Use: Never used   Substance Use Topics    Alcohol use: Not Currently     Alcohol/week: 8.0 standard drinks of alcohol     Types: 8 Cans of beer per week     Comment: Has now been abstinent for about one year    Drug use: Never     Social History     Social History Narrative    Not on file      ROS:     Review of Systems   Constitutional:  Positive for fatigue. Negative for activity change, appetite change, chills, diaphoresis, fever and unexpected weight change.   HENT:  Negative for congestion, dental problem, drooling, ear discharge, ear pain, facial swelling, hearing loss, mouth sores, nosebleeds, postnasal drip, rhinorrhea, sinus pressure, sinus pain, sneezing, sore throat, tinnitus, trouble swallowing and voice change.    Eyes:  Negative for photophobia, pain, discharge, redness, itching and visual disturbance.   Respiratory:  Negative for apnea, cough, choking, chest tightness, shortness of breath, wheezing and stridor.    Cardiovascular:  Negative for chest pain, palpitations and leg  "swelling.   Gastrointestinal:  Negative for abdominal distention, abdominal pain, anal bleeding, blood in stool, constipation, diarrhea, nausea, rectal pain and vomiting.   Endocrine: Negative for cold intolerance, heat intolerance, polydipsia and polyuria.   Genitourinary:  Negative for decreased urine volume, difficulty urinating, dysuria, flank pain, frequency, genital sores, hematuria and urgency.   Musculoskeletal:  Negative for arthralgias, back pain, gait problem, joint swelling, myalgias, neck pain and neck stiffness.   Skin:  Negative for color change, pallor and rash.   Neurological:  Negative for dizziness, tremors, seizures, syncope, facial asymmetry, speech difficulty, weakness, light-headedness, numbness and headaches.   Hematological:  Negative for adenopathy. Does not bruise/bleed easily.   Psychiatric/Behavioral:  Negative for agitation, behavioral problems, confusion, decreased concentration, hallucinations, self-injury, sleep disturbance and suicidal ideas. The patient is nervous/anxious.      Objective:    Vitals:    01/25/24 0934   BP: 99/64   Pulse: 65   Temp: 98 °F (36.7 °C)   TempSrc: Oral   SpO2: 100%   Weight: 69.6 kg (153 lb 6.4 oz)   Height: 180.3 cm (70.98\")   PainSc: 0-No pain     Body mass index is 21.41 kg/m².  ECOG  (0) Fully active, able to carry on all predisease performance without restriction    Physical Exam:     Physical Exam  Constitutional:       General: He is not in acute distress.     Appearance: Normal appearance. He is not ill-appearing, toxic-appearing or diaphoretic.      Comments: Slender, well-built.  Seems older than the stated age.  No distress.   HENT:      Head: Normocephalic and atraumatic.      Right Ear: External ear normal. There is no impacted cerumen.      Left Ear: External ear normal. There is no impacted cerumen.      Nose: Nose normal. No congestion or rhinorrhea.      Mouth/Throat:      Mouth: Mucous membranes are moist.      Pharynx: Oropharynx is " clear. No oropharyngeal exudate or posterior oropharyngeal erythema.      Comments: Oral cavity reveals poor dentition and poor dental hygiene.  No mucosal ulcerations are present.  Eyes:      General: No scleral icterus.        Right eye: No discharge.         Left eye: No discharge.      Conjunctiva/sclera: Conjunctivae normal.      Pupils: Pupils are equal, round, and reactive to light.   Neck:      Vascular: No carotid bruit.   Cardiovascular:      Rate and Rhythm: Normal rate and regular rhythm.      Pulses: Normal pulses.      Heart sounds: Normal heart sounds. No murmur heard.     No friction rub. No gallop.   Pulmonary:      Effort: No respiratory distress.      Breath sounds: No stridor. No wheezing, rhonchi or rales.      Comments: Breath sounds are diminished bilaterally.  Chest:      Chest wall: No tenderness.   Abdominal:      General: Abdomen is flat. Bowel sounds are normal. There is no distension.      Palpations: Abdomen is soft. There is no mass.      Tenderness: There is no abdominal tenderness. There is no right CVA tenderness, left CVA tenderness, guarding or rebound.   Musculoskeletal:         General: No swelling, tenderness, deformity or signs of injury.      Cervical back: No rigidity or tenderness.      Right lower leg: No edema.      Left lower leg: No edema.      Comments: There is clubbing of the fingers in both hands   Lymphadenopathy:      Cervical: No cervical adenopathy.   Skin:     General: Skin is warm and dry.      Coloration: Skin is not jaundiced or pale.      Findings: No bruising, erythema or rash.   Neurological:      General: No focal deficit present.      Mental Status: He is alert and oriented to person, place, and time.      Cranial Nerves: No cranial nerve deficit.      Motor: No weakness.      Coordination: Coordination normal.      Gait: Gait normal.   Psychiatric:         Mood and Affect: Mood normal.         Behavior: Behavior normal.         Thought Content: Thought  content normal.         Judgment: Judgment normal.     YOSELIN Mueller MD performed a physical exam on 1/25/2024 as documented above.    Lab Results - Last 18 Months   Lab Units 01/25/24  0932 12/27/23  0924 12/18/23  0957   WBC 10*3/mm3 3.72 3.60 3.75   HEMOGLOBIN g/dL 12.8* 12.7* 13.2   HEMATOCRIT % 38.4 37.7 37.9   PLATELETS 10*3/mm3 226 78* 170   MCV fL 102.7* 101.1* 97.4*     Lab Results - Last 18 Months   Lab Units 12/27/23  0932 12/27/23  0924 12/18/23  1010 12/18/23  0957 12/11/23  0828 12/11/23  0820   SODIUM mmol/L  --  139  --  138  --  139   POTASSIUM mmol/L  --  4.4  --  4.0  --  4.4   CHLORIDE mmol/L  --  103  --  103  --  105   CO2 mmol/L  --  26.0  --  25.0  --  27.0   BUN mg/dL  --  12  --  11  --  12   CREATININE mg/dL 0.80 0.67* 0.80 0.65*   < > 0.72*   CALCIUM mg/dL  --  8.9  --  8.9  --  9.0   BILIRUBIN mg/dL  --  <0.2  --  0.2  --  0.2   ALK PHOS U/L  --  57  --  51  --  52   ALT (SGPT) U/L  --  14  --  13  --  13   AST (SGOT) U/L  --  16  --  15  --  13   GLUCOSE mg/dL  --  89  --  134*  --  69    < > = values in this interval not displayed.     Lab Results   Component Value Date    GLUCOSE 89 12/27/2023    BUN 12 12/27/2023    CREATININE 0.80 12/27/2023    EGFRIFNONA 67 02/24/2022    BCR 17.9 12/27/2023    K 4.4 12/27/2023    CO2 26.0 12/27/2023    CALCIUM 8.9 12/27/2023    ALBUMIN 3.7 12/27/2023    LABIL2 1.3 09/25/2018    AST 16 12/27/2023    ALT 14 12/27/2023     Lab Results   Component Value Date    FOLATE 4.11 (L) 11/12/2021     Lab Results   Component Value Date    EQGCDLKR92 1,108 (H) 12/22/2023     Uric Acid   Date Value Ref Range Status   12/22/2023 3.9 3.4 - 7.0 mg/dL Final     Lab Results   Component Value Date    SEDRATE 11 04/16/2021     Lab Results   Component Value Date    PSA 0.163 04/06/2021     Assessment & Plan     Assessment:  Adenocarcinoma of the gastroesophageal junction T2N1M0, microsatellite stable, low mutation burden, Her2 positive (awaiting PD-L1 expression): To  complete treatment with radiation.  Last dose of chemotherapy with concurrent radiation was omitted because of thrombocytopenia which has now resolved.  He is to continue investigations prior to surgery.  There is clinical evidence of response but awaiting the PET scan which has been delayed by the insurance.  Reviewed the blood counts and the chemistries.  3.  Tobacco smoker: Discussed with him.  3.  He is to see me in 4 weeks.    Plan:  As above.    Oliverio Mueller MD on 1/25/2024 at 10:06 AM.

## 2024-01-24 NOTE — PROGRESS NOTES
RADIATION ONCOLOGY FOLLOW-UP NOTE    NAME: Jourdan Lebron  YOB: 1970  MRN #: [id number]  DATE OF SERVICE: 1/31/2024  REFERRING PROVIDER: Justa Castano MD  [address] [place] [address]  [place] [id number]  PRIMARY CARE PROVIDER: Justa Castano MD    DIAGNOSIS:  T2N1M0, stage IIIA, esophageal cancer  Encounter Diagnoses   Name Primary?    Malignant neoplasm of lower third of esophagus Yes    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      REASON FOR VISIT/CC: Esophageal cancer,  1M s/p XRT F/U    RADIATION TREATMENT COURSE:    11/27/2023-12/28/2023: 4140 cGy in 23 fractions to esophagus.    HISTORY OF PRESENT ILLNESS: The patient is a 53 y.o. year old male who was last seen in our office on 12/28/2023 upon completion of radiation therapy treatment.    Last seen by Dr. Mueller 1/25/2024. Next follow-up scheduled 2/28/2024.    PET/CT scheduled 2/2/2024.    Follow-up with Dr. Hurtado scheduled 2/6/2024. Scheduled for surgery 2/9/2024 at [facility].    He has no specific XRT complaints or concerns.  He is slowly gaining weight and eating more foods.  Denies pain or aspiration symptoms.    The following portions of the patient's history were reviewed and updated as appropriate: allergies, current medications, past family history, past medical history, past social history, past surgical history and problem list. Reviewed with the patient and remain unchanged.    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    MEDICATIONS:    Current Outpatient Medications:     ALPRAZolam (XANAX)  0.25 MG tablet, Take 1 tablet by mouth 3 (Three) Times a Day As Needed for Anxiety. for anxiety, Disp: 90 tablet, Rfl: 3    aspirin 81 MG chewable tablet, Chew 1 tablet Daily., Disp: , Rfl:     Evolocumab (REPATHA) solution prefilled syringe injection, Inject 1 mL under the skin into the appropriate area as directed Every 14 (Fourteen) Days., Disp: 6 mL, Rfl: 3    FLUoxetine (PROzac) 20 MG capsule, Take 1 capsule by mouth Daily., Disp: 90 capsule, Rfl: 1    fluticasone (FLONASE) 50 MCG/ACT nasal spray, 1 spray into the nostril(s) as directed by provider Daily., Disp: , Rfl:     lidocaine-prilocaine (EMLA) 2.5-2.5 % cream, Apply 1 application  topically to the appropriate area as directed As Needed (apply 60 minutes prior to port access)., Disp: 30 g, Rfl: 2    Nystatin (magic mouthwash), Swish and swallow 5 mL 4 (Four) Times a Day Before Meals & at Bedtime., Disp: 1 bottle, Rfl: 4    ondansetron (ZOFRAN) 8 MG tablet, Take 1 tablet by mouth 3 (Three) Times a Day As Needed for Nausea or Vomiting., Disp: 30 tablet, Rfl: 5    pantoprazole (PROTONIX) 40 MG EC tablet, Take 1 tablet by mouth Daily., Disp: , Rfl:     traMADol (ULTRAM) 50 MG tablet, Take 1 tablet by mouth Every 6 (Six) Hours As Needed for Moderate Pain., Disp: 60 tablet, Rfl: 0    ALLERGIES:    Allergies   Allergen Reactions    Cephalosporins Anaphylaxis     Throat swelling    Dye  [Contrast Dye (Echo Or Unknown Ct/Mr)] Hives    Sulfa Antibiotics Hives    Zetia [Ezetimibe] Other (See Comments)     Made tired    Iodinated Contrast Media Unknown - Low Severity    Statins Myalgia     Myalgia and fatique    Sulfate Unknown - Low Severity       PAST SURGICAL HISTORY:  he has a past surgical history that includes Kidney stone surgery; Adrenalectomy; Thrombectomy; Upper endoscopic ultrasound w/ FNA (N/A, 10/27/2023); and Venous Access Device (Port) (Right, 11/13/2023).    PREVIOUS RADIOTHERAPY OR CHEMOTHERAPY:  yes; surgery and then eval for adjuvant immunotherapy  as next step (PET on 2/2/2024)    FAMILY HISTORY:  hisfamily history includes Arthritis in his father and mother; Esophageal cancer (age of onset: 59) in his brother; Heart disease in his brother; Heart failure (age of onset: 73) in his mother; Hypertension in his father and mother; Kidney cancer (age of onset: 57) in his father.    SOCIAL HISTORY:  he reports that he quit smoking about 2 years ago. His smoking use included cigars and cigarettes. He started smoking about 39 years ago. He has a 111.00 pack-year smoking history. He has been exposed to tobacco smoke. He has never used smokeless tobacco. He reports that he does not currently use alcohol after a past usage of about 8.0 standard drinks of alcohol per week. He reports that he does not use drugs.    PAIN AND PAIN MANAGEMENT:    Vitals:    01/31/24 0838   BP: 98/64   Pulse: 60   Resp: 18   SpO2: 98%   Weight: 71.7 kg (158 lb)   PainSc: 0-No pain       NUTRITIONAL STATUS:  no issues    KPS:  90:  Minor signs or symptoms      REVIEW OF SYSTEMS:   Review of Systems     Otherwise negative as below.     General: No fevers, chills, weight change, or drenching night sweats. Skin: No rashes or jaundice.  HEENT: No change in vision or hearing, no headaches.  Neck: No dysphagia or masses.  Heme/Lymph: No easy bruising or bleeding.  Respiratory System: No shortness of breath or cough.  Cardiovascular: No chest pain, palpitations, or dyspnea on exertion.  - Pacemaker. GI: as noted above  : No dysuria or hematuria.  Endocrine: No heat or cold intolerance. Musculoskeletal: No myalgias or arthralgias.  Neuro: No weakness, numbness, syncope, or seizures. Psych: No mood changes or depression. Ext: Denies swelling.    Objective   VITAL SIGNS:  Vitals:    01/31/24 0838   BP: 98/64   Pulse: 60   Resp: 18   SpO2: 98%   Weight: 71.7 kg (158 lb)   PainSc: 0-No pain     PHYSICAL EXAM:  GENERAL: In no apparent distress, sitting comfortably in room.    HEENT: Normocephalic,  atraumatic. Pupils are equal, round, reactive to light. Sclera anicteric. Conjunctiva not injected. Oropharynx without erythema, ulcerations or thrush.   NECK: Supple with no masses.  LYMPHATIC: No cervical, supraclavicular or axillary adenopathy appreciated bilaterally.   CARDIOVASCULAR: S1 & S2 detected; no murmurs, rubs or gallops.  CHEST: Clear to auscultation bilaterally; no wheezes, crackles or rubs. Work of breathing normal.  ABDOMEN: Bowel sounds present. Abdomen is soft, nontender, nondistended.   MUSCULOSKELETAL: No tenderness to palpation along the spine or scapulae. Normal range of motion.  EXTREMITIES: No clubbing, cyanosis, edema.  SKIN: No erythema, rashes, ulcerations noted.   NEUROLOGIC: Cranial nerves II-XII grossly intact bilaterally. No focal neurologic deficits.  PSYCHIATRIC:  Alert, aware, and appropriate.      PERTINENT IMAGING/PATHOLOGY/LABS (Medical Decision Making):     COORDINATION OF CARE: A copy of this note is sent to the referring provider.    PATHOLOGY (Reviewed):     IMAGING (Reviewed):     LABS (Reviewed):  HEMATOLOGY:  WBC   Date Value Ref Range Status   01/25/2024 3.72 3.40 - 10.80 10*3/mm3 Final     RBC   Date Value Ref Range Status   01/25/2024 3.74 (L) 4.14 - 5.80 10*6/mm3 Final     Hemoglobin   Date Value Ref Range Status   01/25/2024 12.8 (L) 13.0 - 17.7 g/dL Final     Hematocrit   Date Value Ref Range Status   01/25/2024 38.4 37.5 - 51.0 % Final     Platelets   Date Value Ref Range Status   01/25/2024 226 140 - 450 10*3/mm3 Final     CHEMISTRY:  Lab Results   Component Value Date    GLUCOSE 78 01/25/2024    BUN 12 01/25/2024    CREATININE 0.88 01/25/2024    EGFRIFNONA 67 02/24/2022    BCR 13.6 01/25/2024    K 4.3 01/25/2024    CO2 31.0 (H) 01/25/2024    CALCIUM 9.6 01/25/2024    ALBUMIN 3.8 01/25/2024    LABIL2 1.3 09/25/2018    AST 15 01/25/2024    ALT 15 01/25/2024     Assessment & Plan   ASSESSMENT AND PLAN:    1. Malignant neoplasm of lower third of esophagus    2.  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  PET/CT 2/2/2024  Assuming no evidence of mets, definitive surgery will be on 02/07/2024 with Dr. Hurtado.  Dr. Mueller will see him later that month to discuss final pathology and timing/recommendation for any adjuvant therapies (such as immunotherapy) based on final path findings.    We will remain available PRN as needed but no anticipated follow-up at this time.  We appreciate assisting in his care.        This assessment comes from my review of the imaging, pathology, physician notes and other pertinent information as mentioned.    CC: MD Oliverio Martins MD      Approved by:     Geovany Sabillon MD

## 2024-01-24 NOTE — PROGRESS NOTES
I called patient per Dr. Hurtado and scheduled for Thyroid US. Patient aware of date time and location. He has my direct number for any questions or concerns.

## 2024-01-25 ENCOUNTER — LAB (OUTPATIENT)
Dept: LAB | Facility: HOSPITAL | Age: 54
End: 2024-01-25
Payer: COMMERCIAL

## 2024-01-25 ENCOUNTER — OFFICE VISIT (OUTPATIENT)
Dept: ONCOLOGY | Facility: CLINIC | Age: 54
End: 2024-01-25
Payer: COMMERCIAL

## 2024-01-25 VITALS
WEIGHT: 153.4 LBS | TEMPERATURE: 98 F | DIASTOLIC BLOOD PRESSURE: 64 MMHG | HEIGHT: 71 IN | OXYGEN SATURATION: 100 % | HEART RATE: 65 BPM | BODY MASS INDEX: 21.48 KG/M2 | SYSTOLIC BLOOD PRESSURE: 99 MMHG

## 2024-01-25 DIAGNOSIS — C15.9 ADENOCARCINOMA OF ESOPHAGUS METASTATIC TO INTRA-ABDOMINAL LYMPH NODE: Primary | ICD-10-CM

## 2024-01-25 DIAGNOSIS — C15.9 ADENOCARCINOMA OF ESOPHAGUS METASTATIC TO INTRA-ABDOMINAL LYMPH NODE: ICD-10-CM

## 2024-01-25 DIAGNOSIS — E53.8 VITAMIN B12 DEFICIENCY: Primary | ICD-10-CM

## 2024-01-25 DIAGNOSIS — C77.2 ADENOCARCINOMA OF ESOPHAGUS METASTATIC TO INTRA-ABDOMINAL LYMPH NODE: Primary | ICD-10-CM

## 2024-01-25 DIAGNOSIS — C77.2 ADENOCARCINOMA OF ESOPHAGUS METASTATIC TO INTRA-ABDOMINAL LYMPH NODE: ICD-10-CM

## 2024-01-25 LAB
ALBUMIN SERPL-MCNC: 3.8 G/DL (ref 3.5–5.2)
ALBUMIN/GLOB SERPL: 1.6 G/DL
ALP SERPL-CCNC: 67 U/L (ref 39–117)
ALT SERPL W P-5'-P-CCNC: 15 U/L (ref 1–41)
ANION GAP SERPL CALCULATED.3IONS-SCNC: 6 MMOL/L (ref 5–15)
AST SERPL-CCNC: 15 U/L (ref 1–40)
BASOPHILS # BLD AUTO: 0.02 10*3/MM3 (ref 0–0.2)
BASOPHILS NFR BLD AUTO: 0.5 % (ref 0–1.5)
BILIRUB SERPL-MCNC: <0.2 MG/DL (ref 0–1.2)
BUN SERPL-MCNC: 12 MG/DL (ref 6–20)
BUN/CREAT SERPL: 13.6 (ref 7–25)
CALCIUM SPEC-SCNC: 9.6 MG/DL (ref 8.6–10.5)
CHLORIDE SERPL-SCNC: 102 MMOL/L (ref 98–107)
CO2 SERPL-SCNC: 31 MMOL/L (ref 22–29)
CREAT SERPL-MCNC: 0.88 MG/DL (ref 0.76–1.27)
DEPRECATED RDW RBC AUTO: 57.9 FL (ref 37–54)
EGFRCR SERPLBLD CKD-EPI 2021: 104.1 ML/MIN/1.73
EOSINOPHIL # BLD AUTO: 0.05 10*3/MM3 (ref 0–0.4)
EOSINOPHIL NFR BLD AUTO: 1.3 % (ref 0.3–6.2)
ERYTHROCYTE [DISTWIDTH] IN BLOOD BY AUTOMATED COUNT: 15.6 % (ref 12.3–15.4)
GLOBULIN UR ELPH-MCNC: 2.4 GM/DL
GLUCOSE SERPL-MCNC: 78 MG/DL (ref 65–99)
HCT VFR BLD AUTO: 38.4 % (ref 37.5–51)
HGB BLD-MCNC: 12.8 G/DL (ref 13–17.7)
HOLD SPECIMEN: NORMAL
LYMPHOCYTES # BLD AUTO: 0.76 10*3/MM3 (ref 0.7–3.1)
LYMPHOCYTES NFR BLD AUTO: 20.4 % (ref 19.6–45.3)
MCH RBC QN AUTO: 34.2 PG (ref 26.6–33)
MCHC RBC AUTO-ENTMCNC: 33.3 G/DL (ref 31.5–35.7)
MCV RBC AUTO: 102.7 FL (ref 79–97)
MONOCYTES # BLD AUTO: 0.9 10*3/MM3 (ref 0.1–0.9)
MONOCYTES NFR BLD AUTO: 24.2 % (ref 5–12)
NEUTROPHILS NFR BLD AUTO: 1.99 10*3/MM3 (ref 1.7–7)
NEUTROPHILS NFR BLD AUTO: 53.6 % (ref 42.7–76)
PLATELET # BLD AUTO: 226 10*3/MM3 (ref 140–450)
PMV BLD AUTO: 9 FL (ref 6–12)
POTASSIUM SERPL-SCNC: 4.3 MMOL/L (ref 3.5–5.2)
PROT SERPL-MCNC: 6.2 G/DL (ref 6–8.5)
RBC # BLD AUTO: 3.74 10*6/MM3 (ref 4.14–5.8)
SODIUM SERPL-SCNC: 139 MMOL/L (ref 136–145)
WBC NRBC COR # BLD AUTO: 3.72 10*3/MM3 (ref 3.4–10.8)
WHOLE BLOOD HOLD COAG: NORMAL

## 2024-01-25 PROCEDURE — 80053 COMPREHEN METABOLIC PANEL: CPT | Performed by: INTERNAL MEDICINE

## 2024-01-25 PROCEDURE — 36415 COLL VENOUS BLD VENIPUNCTURE: CPT

## 2024-01-25 PROCEDURE — 85025 COMPLETE CBC W/AUTO DIFF WBC: CPT

## 2024-01-26 ENCOUNTER — OFFICE VISIT (OUTPATIENT)
Dept: CARDIOLOGY | Facility: CLINIC | Age: 54
End: 2024-01-26
Payer: COMMERCIAL

## 2024-01-26 ENCOUNTER — TELEPHONE (OUTPATIENT)
Dept: CARDIOLOGY | Facility: CLINIC | Age: 54
End: 2024-01-26

## 2024-01-26 VITALS
DIASTOLIC BLOOD PRESSURE: 54 MMHG | HEIGHT: 71 IN | BODY MASS INDEX: 21.28 KG/M2 | WEIGHT: 152 LBS | HEART RATE: 52 BPM | SYSTOLIC BLOOD PRESSURE: 90 MMHG | OXYGEN SATURATION: 100 %

## 2024-01-26 DIAGNOSIS — F41.9 ANXIETY AND DEPRESSION: ICD-10-CM

## 2024-01-26 DIAGNOSIS — I63.9 CRYPTOGENIC STROKE: ICD-10-CM

## 2024-01-26 DIAGNOSIS — Z01.810 PRE-OPERATIVE CARDIOVASCULAR EXAMINATION: ICD-10-CM

## 2024-01-26 DIAGNOSIS — F32.A ANXIETY AND DEPRESSION: ICD-10-CM

## 2024-01-26 DIAGNOSIS — C15.5 MALIGNANT NEOPLASM OF LOWER THIRD OF ESOPHAGUS: Primary | ICD-10-CM

## 2024-01-26 DIAGNOSIS — Z87.891 EX-SMOKER: ICD-10-CM

## 2024-01-26 DIAGNOSIS — E78.2 MIXED HYPERLIPIDEMIA: ICD-10-CM

## 2024-01-26 DIAGNOSIS — E27.8 LEFT ADRENAL MASS: ICD-10-CM

## 2024-01-29 ENCOUNTER — HOSPITAL ENCOUNTER (OUTPATIENT)
Dept: ULTRASOUND IMAGING | Facility: HOSPITAL | Age: 54
Discharge: HOME | End: 2024-01-29
Admitting: SURGERY
Payer: COMMERCIAL

## 2024-01-29 ENCOUNTER — TRANSCRIBE ORDERS (OUTPATIENT)
Dept: ADMINISTRATIVE | Facility: HOSPITAL | Age: 54
End: 2024-01-29
Payer: COMMERCIAL

## 2024-01-29 DIAGNOSIS — E89.6: Primary | ICD-10-CM

## 2024-01-29 DIAGNOSIS — E04.1 THYROID NODULE: ICD-10-CM

## 2024-01-29 PROCEDURE — 76536 US EXAM OF HEAD AND NECK: CPT

## 2024-01-31 ENCOUNTER — TELEPHONE (OUTPATIENT)
Dept: SURGERY | Facility: CLINIC | Age: 54
End: 2024-01-31
Payer: COMMERCIAL

## 2024-01-31 ENCOUNTER — OFFICE VISIT (OUTPATIENT)
Dept: RADIATION ONCOLOGY | Facility: HOSPITAL | Age: 54
End: 2024-01-31
Payer: COMMERCIAL

## 2024-01-31 VITALS
WEIGHT: 158 LBS | HEART RATE: 60 BPM | SYSTOLIC BLOOD PRESSURE: 98 MMHG | DIASTOLIC BLOOD PRESSURE: 64 MMHG | OXYGEN SATURATION: 98 % | RESPIRATION RATE: 18 BRPM | BODY MASS INDEX: 22.04 KG/M2

## 2024-01-31 DIAGNOSIS — C15.5 MALIGNANT NEOPLASM OF LOWER THIRD OF ESOPHAGUS: Primary | ICD-10-CM

## 2024-01-31 DIAGNOSIS — C15.9 ADENOCARCINOMA OF ESOPHAGUS METASTATIC TO INTRA-ABDOMINAL LYMPH NODE: ICD-10-CM

## 2024-01-31 DIAGNOSIS — C77.2 ADENOCARCINOMA OF ESOPHAGUS METASTATIC TO INTRA-ABDOMINAL LYMPH NODE: ICD-10-CM

## 2024-01-31 PROCEDURE — G0463 HOSPITAL OUTPT CLINIC VISIT: HCPCS | Performed by: RADIOLOGY

## 2024-02-01 ENCOUNTER — HOSPITAL ENCOUNTER (OUTPATIENT)
Dept: GENERAL RADIOLOGY | Facility: HOSPITAL | Age: 54
Discharge: HOME | End: 2024-02-01
Payer: COMMERCIAL

## 2024-02-01 ENCOUNTER — PRE-ADMISSION TESTING (OUTPATIENT)
Dept: PREADMISSION TESTING | Facility: HOSPITAL | Age: 54
End: 2024-02-01
Payer: COMMERCIAL

## 2024-02-01 VITALS
HEART RATE: 60 BPM | RESPIRATION RATE: 20 BRPM | OXYGEN SATURATION: 100 % | TEMPERATURE: 97 F | WEIGHT: 155 LBS | DIASTOLIC BLOOD PRESSURE: 60 MMHG | SYSTOLIC BLOOD PRESSURE: 94 MMHG | BODY MASS INDEX: 22.19 KG/M2 | HEIGHT: 70 IN

## 2024-02-01 DIAGNOSIS — C15.5 MALIGNANT NEOPLASM OF LOWER THIRD OF ESOPHAGUS: ICD-10-CM

## 2024-02-01 DIAGNOSIS — C15.5 MALIGNANT NEOPLASM OF LOWER THIRD OF ESOPHAGUS: Primary | ICD-10-CM

## 2024-02-01 LAB
ABO GROUP BLD: NORMAL
ALBUMIN SERPL-MCNC: 3.9 G/DL (ref 3.5–5.2)
ALBUMIN/GLOB SERPL: 1.9 G/DL
ALP SERPL-CCNC: 61 U/L (ref 39–117)
ALT SERPL W P-5'-P-CCNC: 17 U/L (ref 1–41)
ANION GAP SERPL CALCULATED.3IONS-SCNC: 8 MMOL/L (ref 5–15)
AST SERPL-CCNC: 18 U/L (ref 1–40)
BASOPHILS # BLD AUTO: 0.02 10*3/MM3 (ref 0–0.2)
BASOPHILS NFR BLD AUTO: 0.5 % (ref 0–1.5)
BILIRUB SERPL-MCNC: <0.2 MG/DL (ref 0–1.2)
BLD GP AB SCN SERPL QL: NEGATIVE
BUN SERPL-MCNC: 9 MG/DL (ref 6–20)
BUN/CREAT SERPL: 9.6 (ref 7–25)
CALCIUM SPEC-SCNC: 9.6 MG/DL (ref 8.6–10.5)
CHLORIDE SERPL-SCNC: 104 MMOL/L (ref 98–107)
CO2 SERPL-SCNC: 29 MMOL/L (ref 22–29)
CREAT SERPL-MCNC: 0.94 MG/DL (ref 0.76–1.27)
DEPRECATED RDW RBC AUTO: 55.5 FL (ref 37–54)
EGFRCR SERPLBLD CKD-EPI 2021: 98.1 ML/MIN/1.73
EOSINOPHIL # BLD AUTO: 0.1 10*3/MM3 (ref 0–0.4)
EOSINOPHIL NFR BLD AUTO: 2.4 % (ref 0.3–6.2)
ERYTHROCYTE [DISTWIDTH] IN BLOOD BY AUTOMATED COUNT: 15.6 % (ref 12.3–15.4)
GLOBULIN UR ELPH-MCNC: 2.1 GM/DL
GLUCOSE SERPL-MCNC: 69 MG/DL (ref 65–99)
HCT VFR BLD AUTO: 39.4 % (ref 37.5–51)
HGB BLD-MCNC: 13 G/DL (ref 13–17.7)
IMM GRANULOCYTES # BLD AUTO: 0.03 10*3/MM3 (ref 0–0.05)
IMM GRANULOCYTES NFR BLD AUTO: 0.7 % (ref 0–0.5)
INR PPP: 0.93 (ref 0.9–1.1)
LYMPHOCYTES # BLD AUTO: 0.74 10*3/MM3 (ref 0.7–3.1)
LYMPHOCYTES NFR BLD AUTO: 17.7 % (ref 19.6–45.3)
MCH RBC QN AUTO: 32.7 PG (ref 26.6–33)
MCHC RBC AUTO-ENTMCNC: 33 G/DL (ref 31.5–35.7)
MCV RBC AUTO: 99 FL (ref 79–97)
MONOCYTES # BLD AUTO: 0.77 10*3/MM3 (ref 0.1–0.9)
MONOCYTES NFR BLD AUTO: 18.4 % (ref 5–12)
NEUTROPHILS NFR BLD AUTO: 2.52 10*3/MM3 (ref 1.7–7)
NEUTROPHILS NFR BLD AUTO: 60.3 % (ref 42.7–76)
NRBC BLD AUTO-RTO: 0 /100 WBC (ref 0–0.2)
PLATELET # BLD AUTO: 237 10*3/MM3 (ref 140–450)
PMV BLD AUTO: 9.4 FL (ref 6–12)
POTASSIUM SERPL-SCNC: 4.4 MMOL/L (ref 3.5–5.2)
PROT SERPL-MCNC: 6 G/DL (ref 6–8.5)
PROTHROMBIN TIME: 12.5 SECONDS (ref 11.7–14.2)
RBC # BLD AUTO: 3.98 10*6/MM3 (ref 4.14–5.8)
RH BLD: POSITIVE
SODIUM SERPL-SCNC: 141 MMOL/L (ref 136–145)
T&S EXPIRATION DATE: NORMAL
WBC NRBC COR # BLD AUTO: 4.18 10*3/MM3 (ref 3.4–10.8)

## 2024-02-01 PROCEDURE — 71046 X-RAY EXAM CHEST 2 VIEWS: CPT

## 2024-02-01 PROCEDURE — 85025 COMPLETE CBC W/AUTO DIFF WBC: CPT

## 2024-02-01 PROCEDURE — 85610 PROTHROMBIN TIME: CPT

## 2024-02-01 PROCEDURE — 80053 COMPREHEN METABOLIC PANEL: CPT

## 2024-02-01 PROCEDURE — 86901 BLOOD TYPING SEROLOGIC RH(D): CPT

## 2024-02-01 PROCEDURE — 86900 BLOOD TYPING SEROLOGIC ABO: CPT

## 2024-02-01 PROCEDURE — 36415 COLL VENOUS BLD VENIPUNCTURE: CPT

## 2024-02-01 PROCEDURE — 86850 RBC ANTIBODY SCREEN: CPT

## 2024-02-01 NOTE — DISCHARGE INSTRUCTIONS
Take the following medications the morning of surgery:    Prozac    xanax if needed    If you are on prescription narcotic pain medication to control your pain you may also take that medication the morning of surgery.    General Instructions:  Do not eat solid food after midnight the night before surgery.  You may drink clear liquids day of surgery but must stop at least one hour before your hospital arrival time.  It is beneficial for you to have a clear drink that contains carbohydrates the day of surgery.  We suggest a 12 to 20 ounce bottle of Gatorade or Powerade for non-diabetic patients or a 12 to 20 ounce bottle of G2 or Powerade Zero for diabetic patients. (Pediatric patients, are not advised to drink a 12 to 20 ounce carbohydrate drink)    Clear liquids are liquids you can see through.  Nothing red in color.     Plain water                               Sports drinks  Sodas                                   Gelatin (Jell-O)  Fruit juices without pulp such as white grape juice and apple juice  Popsicles that contain no fruit or yogurt  Tea or coffee (no cream or milk added)  Gatorade / Powerade  G2 / Powerade Zero    Infants may have breast milk up to four hours before surgery.  Infants drinking formula may drink formula up to six hours before surgery.   Patients who avoid smoking, chewing tobacco and alcohol for 4 weeks prior to surgery have a reduced risk of post-operative complications.  Quit smoking as many days before surgery as you can.  Do not smoke, use chewing tobacco or drink alcohol the day of surgery.   If applicable bring your C-PAP/ BI-PAP machine in with you to preop day of surgery.  Bring any papers given to you in the doctor’s office.  Wear clean comfortable clothes.  Do not wear contact lenses, false eyelashes or make-up.  Bring a case for your glasses.   Bring crutches or walker if applicable.  Remove all piercings.  Leave jewelry and any other valuables at home.  Hair extensions with  metal clips must be removed prior to surgery.  The Pre-Admission Testing nurse will instruct you to bring medications if unable to obtain an accurate list in Pre-Admission Testing.        If you were given a blood bank ID arm band remember to bring it with you the day of surgery.    Preventing a Surgical Site Infection:  For 2 to 3 days before surgery, avoid shaving with a razor because the razor can irritate skin and make it easier to develop an infection.    Any areas of open skin can increase the risk of a post-operative wound infection by allowing bacteria to enter and travel throughout the body.  Notify your surgeon if you have any skin wounds / rashes even if it is not near the expected surgical site.  The area will need assessed to determine if surgery should be delayed until it is healed.  The night prior to surgery shower using a fresh bar of anti-bacterial soap (such as Dial) and clean washcloth.  Sleep in a clean bed with clean clothing.  Do not allow pets to sleep with you.  Shower on the morning of surgery using a fresh bar of anti-bacterial soap (such as Dial) and clean washcloth.  Dry with a clean towel and dress in clean clothing.  Ask your surgeon if you will be receiving antibiotics prior to surgery.  Make sure you, your family, and all healthcare providers clean their hands with soap and water or an alcohol based hand  before caring for you or your wound.    Day of surgery:2/9/2024  0530  Your arrival time is approximately two hours before your scheduled surgery time.  Upon arrival, a Pre-op nurse and Anesthesiologist will review your health history, obtain vital signs, and answer questions you may have.  The only belongings needed at this time will be a list of your home medications and if applicable your C-PAP/BI-PAP machine.  A Pre-op nurse will start an IV and you may receive medication in preparation for surgery, including something to help you relax.     Please be aware that surgery  does come with discomfort.  We want to make every effort to control your discomfort so please discuss any uncontrolled symptoms with your nurse.   Your doctor will most likely have prescribed pain medications.      If you are going home after surgery you will receive individualized written care instructions before being discharged.  A responsible adult must drive you to and from the hospital on the day of your surgery and ideally stay with you through the night.   .  Discharge prescriptions can be filled by the hospital pharmacy during regular pharmacy hours.  If you are having surgery late in the day/evening your prescription may be e-prescribed to your pharmacy.  Please verify your pharmacy hours or chose a 24 hour pharmacy to avoid not having access to your prescription because your pharmacy has closed for the day.    If you are staying overnight following surgery, you will be transported to your hospital room following the recovery period.  [facility] has all private rooms.    If you have any questions please call Pre-Admission Testing at [phone].  Deductibles and co-payments are collected on the day of service. Please be prepared to pay the required co-pay, deductible or deposit on the day of service as defined by your plan.    Call your surgeon immediately if you experience any of the following symptoms:  Sore Throat  Shortness of Breath or difficulty breathing  Cough  Chills  Body soreness or muscle pain  Headache  Fever  New loss of taste or smell  Do not arrive for your surgery ill.  Your procedure will need to be rescheduled to another time.  You will need to call your physician before the day of surgery to avoid any unnecessary exposure to hospital staff as well as other patients.  CHLORHEXIDINE CLOTH INSTRUCTIONS  The morning of surgery follow these instructions using the Chlorhexidine cloths you've been given.  These steps reduce bacteria on the body.  Do not use the cloths near  your eyes, ears mouth, genitalia or on open wounds.  Throw the cloths away after use but do not try to flush them down a toilet.      Open and remove one cloth at a time from the package.    Leave the cloth unfolded and begin the bathing.  Massage the skin with the cloths using gentle pressure to remove bacteria.  Do not scrub harshly.   Follow the steps below with one 2% CHG cloth per area (6 total cloths).  One cloth for neck, shoulders and chest.  One cloth for both arms, hands, fingers and underarms (do underarms last).  One cloth for the abdomen followed by groin.  One cloth for right leg and foot including between the toes.  One cloth for left leg and foot including between the toes.  The last cloth is to be used for the back of the neck, back and buttocks.    Allow the CHG to air dry 3 minutes on the skin which will give it time to work and decrease the chance of irritation.  The skin may feel sticky until it is dry.  Do not rinse with water or any other liquid or you will lose the beneficial effects of the CHG.  If mild skin irritation occurs, do rinse the skin to remove the CHG.  Report this to the nurse at time of admission.  Do not apply lotions, creams, ointments, deodorants or perfumes after using the clothes. Dress in clean clothes before coming to the hospital.

## 2024-02-02 ENCOUNTER — HOSPITAL ENCOUNTER (OUTPATIENT)
Dept: PET IMAGING | Facility: HOSPITAL | Age: 54
Discharge: HOME | End: 2024-02-02
Payer: COMMERCIAL

## 2024-02-02 DIAGNOSIS — C15.9 ADENOCARCINOMA OF ESOPHAGUS: ICD-10-CM

## 2024-02-02 LAB — GLUCOSE BLDC GLUCOMTR-MCNC: 91 MG/DL (ref 70–105)

## 2024-02-02 PROCEDURE — 0 FLUDEOXYGLUCOSE F18 SOLUTION: Performed by: SURGERY

## 2024-02-02 PROCEDURE — 78815 PET IMAGE W/CT SKULL-THIGH: CPT

## 2024-02-02 PROCEDURE — A9552 F18 FDG: HCPCS | Performed by: SURGERY

## 2024-02-02 PROCEDURE — 82948 REAGENT STRIP/BLOOD GLUCOSE: CPT

## 2024-02-02 RX ADMIN — FLUDEOXYGLUCOSE F 18 1 DOSE: 200 INJECTION, SOLUTION INTRAVENOUS at 08:00

## 2024-02-04 DIAGNOSIS — F33.1 MAJOR DEPRESSIVE DISORDER, RECURRENT EPISODE, MODERATE: Chronic | ICD-10-CM

## 2024-02-05 ENCOUNTER — PRIOR AUTHORIZATION (OUTPATIENT)
Dept: FAMILY MEDICINE CLINIC | Facility: CLINIC | Age: 54
End: 2024-02-05
Payer: COMMERCIAL

## 2024-02-05 NOTE — TELEPHONE ENCOUNTER
ALFREDA EVANS ([place]: [id number]) - [phone]  Repatha 140MG/ML syringes  Status: PA Response - Approved

## 2024-02-05 NOTE — PROGRESS NOTES
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    Subjective   HISTORY OF PRESENTING ILLNESS:   Jourdan Lebron is a 53 y.o. male who has significant medical problems as mentioned in the medical chart.     He was having intermittent dysphagia for over a year which became progressively worse.  On 10/2/2023, he underwent EGD and colonoscopy by Dr. Ozzy Abdalla at [facility].  He was found to have a 6 cm mass at the lower third of the esophagus (36 to 42 cm) (biopsied), mild diverticulosis of the sigmoid colon, 5 polyps noted in the colon and cecum that were removed.  The pathology of the esophageal mass revealed invasive moderate to poorly differentiated adenocarcinoma. All polypectomy samples were negative for malignancy (fragments of tubular adenoma).  CT scan of the chest, abdomen and pelvis on 10/9/2023 at [facility] showed 4 cm abnormal thickening of the lower thoracic esophagus extending to the GE junction thought to represent patient's known primary lung malignancy.  There were no convincing evidence of metastatic disease elsewhere in the chest, abdomen, pelvis, or skeleton.  There were stable left adrenalectomy changes, right adrenal adenoma unchanged.     He was seen in the office by Dr. Tacos Osuna ([facility]) for new diagnosis of esophageal cancer. He was an established patient of Dr. Harinder Mueller for erythrocytosis and macrocytosis since 11/2021 (resolved).      PET/CT on 10/20/2023 at [facility] showed hypermetabolic (SUV 7.7) activity within the distal esophagus extending to the GE junction compatible with patient's known malignancy.  He then underwent EGD with EUS by Dr. Joselyn Savage on 10/27/2023. Findings included close to 6 cm ulcerated mass extending from 36 to 42 cm consistent with poorly differentiated adenocarcinoma.  Mass penetrated through muscularis propria without involving the  adventitia consistent with T2 lesion. Two small round hypodense lymph nodes in close proximity to the mass measuring 5 and 6 mm concerning for malignancy. Other lymph nodes around the GE junction measuring 7 and 8 mm were also concerning for malignancy but immediate cytology was negative for malignancy. Pathology of the gastrohepatic lymph node was positive for metastatic adenocarcinoma; however, the lymph node at 36 cm was negative for malignancy.    He was referred to thoracic surgery for further evaluation.  At the time of initial consultation, he could eat and swallow quite a bit, but it depends. He avoids a lot of bread because it gets stuck lower down his chest. He went down from 200 pounds to 170 pounds 3 years ago after he had a stroke. He had memory loss and general fatigue after the stroke. He lost his appetite but began to add some weight. He then noticed he gets bloated and was having dyspepsia. He had adrenalectomy performed at the [facility] after an adrenal biopsy was done on an adrenal mass, and he went into adrenal crisis. He took hydrocortisone for 1 year and discontinued it on 09/2022. He developed abdominal discomfort afterwards. He denies having any past chest surgeries or myocardial infarction.  His wife mentioned the cause of the previous stroke was never known despite having a complete work up and a loop recorder inserted which was removed in the spring. He denies any abnormal heart rhythms or pedal edema. The patient quit smoking cigarettes 8 to 9 years ago and started smoking cigars.     He had good functional status.  Based on the clinical presentation, he was considered a candidate for trimodality treatment. I placed Mediport on 11/13/2023.  He received concurrent chemoradiation therapy.  His last dose of chemotherapy and radiation was on 12/28/2023.    He came to clinic for follow-up visit.  He has been able to tolerate soft food without difficulty.  He has tolerated  chemoradiation without much difficulty.  Restaging PET/CT scan on 2/2/2024 showed wall thickening involving the distal esophagus extending the GE junction consistent with patient's known esophageal malignancy.  There was no evidence of distant metastases.  There was significant decreased uptake in the distal esophagus consistent with treatment effect.    Due to his history of stroke, ultrasound bilateral carotid were obtained which showed no significant carotid stenosis.  There was incidentally noted right thyroid nodule for which follow-up ultrasound of the thyroid was done which showed multiple nodules but not concerning for malignancy.  Transthoracic echo was performed on 1/22/2024 and noted ejection fraction of 51 to 55%.  He was sent to Dr. Hayward for cardiology clearance.  He had 0.8% risk of myocardial infarction or cardiac arrest, intraoperatively or up to 30 days postoperatively.  No further workup was recommended.  He had a history of adrenal crisis and was sent to evaluation by his primary endocrinologist at [facility], Anabela Crenshaw MD.  Complete ACTH stimulation test was performed which was reported normal.    He came to clinic for follow-up visit.  He has been trying to be active and has improved his diet. He has been trying to consume more solid foods. He complains of fatigue.  He is not taking anti-coagulants or steroids. He has not taken the baby aspirin since the port was placed. He was told that his port needed to be flushed, but he was not sure where this needed to be done.     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    Anxiety     Brain fog     COVID 02/2023    Diverticulosis of large intestine without perforation or abscess without bleeding 10/02/2023    Esophageal cancer 10/2023    GERD  (gastroesophageal reflux disease)     Hand tingling     History of blood clot in brain     had thrombectomy    History of cancer chemotherapy 2023    History of radiation therapy 2023    Hyperlipidemia     Stroke 2020    Tiredness        Past Surgical History:   Procedure Laterality Date    ADRENALECTOMY      COLONOSCOPY      ENDOSCOPY      ESOPHAGUS SURGERY  10/2023    biopsy    KIDNEY STONE SURGERY      OTHER SURGICAL HISTORY      loop recorder    THROMBECTOMY      UPPER ENDOSCOPIC ULTRASOUND W/ FNA N/A 10/27/2023    Procedure: ENDOSCOPIC ULTRASOUND WITH STAGING AND FINE NEEDLE ASPIRATION X2 AREAS;  Surgeon: Joselyn Savage MD;  Location: [facility] ENDOSCOPY;  Service: Gastroenterology;  Laterality: N/A;  POST:    VENOUS ACCESS DEVICE (PORT) INSERTION Right 2023    Procedure: INSERTION VENOUS ACCESS DEVICE;  Surgeon: Saurabh Hurtado MD;  Location: [facility] MAIN OR;  Service: Thoracic;  Laterality: Right;       Family History   Problem Relation Age of Onset    Arthritis Mother     Hypertension Mother     Heart failure Mother 73        Cause of death    Arthritis Father     Hypertension Father     Kidney cancer Father 57        Cause of death. Was a smoker    Heart disease Brother     Esophageal cancer Brother 59        Cause of death. Has a heavy drinker    Malig Hyperthermia Neg Hx        Social History     Socioeconomic History    Marital status:    Tobacco Use    Smoking status: Former     Packs/day: 1.00     Years: 37.00     Additional pack years: 0.00     Total pack years: 37.00     Types: Cigars, Cigarettes     Start date: 1985     Quit date: 2022     Years since quittin.1     Passive exposure: Past    Smokeless tobacco: Never    Tobacco comments:     1 packs= stopped 10 years ago and continued cigars   2 cigars a day; has stopped cigars as of    Vaping Use    Vaping Use: Never used   Substance and Sexual Activity    Alcohol use: Not Currently     Comment: Has now  "been abstinent for about one year    Drug use: Never    Sexual activity: Yes     Partners: Female     Birth control/protection: None       No current facility-administered medications for this visit.  No current outpatient medications on file.    Facility-Administered Medications Ordered in Other Visits:     bupivacaine liposome (EXPAREL) 1.3 % injection  - ADS Override Pull, , , ,     fentaNYL citrate (PF) (SUBLIMAZE) injection 50 mcg, 50 mcg, Intravenous, Once PRN, Chris Mann MD    lactated ringers infusion, 9 mL/hr, Intravenous, Continuous, Chris Mann MD, Last Rate: 9 mL/hr at 02/09/24 0634, 9 mL/hr at 02/09/24 0634    lidocaine (XYLOCAINE) 1 % injection 0.5 mL, 0.5 mL, Intradermal, Once PRN, Chris Mann MD    midazolam (VERSED) injection 1 mg, 1 mg, Intravenous, Q5 Min PRN, Chris Mann MD    sodium chloride 0.9 % flush 10 mL, 10 mL, Intravenous, Q12H, Saurabh Hurtado MD    sodium chloride 0.9 % flush 10 mL, 10 mL, Intravenous, PRN, Saurabh Hurtado MD    sodium chloride 0.9 % flush 3 mL, 3 mL, Intravenous, Q12H, Chris Mann MD    sodium chloride 0.9 % flush 3-10 mL, 3-10 mL, Intravenous, PRN, Chris Mann MD    sodium chloride 0.9 % infusion 40 mL, 40 mL, Intravenous, PRN, Saurabh Hurtado MD    vancomycin (VANCOCIN) 1000 mg/200 mL dextrose 5% IVPB, 15 mg/kg, Intravenous, Once, Saurabh Hurtado MD     Allergies   Allergen Reactions    Cephalosporins Anaphylaxis     Throat swelling    Dye  [Contrast Dye (Echo Or Unknown Ct/Mr)] Hives    Iodinated Contrast Media Hives    Sulfa Antibiotics Hives    Sulfate Hives    Zetia [Ezetimibe] Other (See Comments) and Myalgia     Made tired    Statins Myalgia     Myalgia and fatique             Objective    OBJECTIVE:     VITAL SIGNS:  /80 (BP Location: Left arm, Patient Position: Sitting, Cuff Size: Adult)   Pulse 66   Ht 177.8 cm (70\")   Wt 69.9 kg (154 lb)   SpO2 100%   BMI 22.10 kg/m²     PHYSICAL EXAM:  Constitutional:       Appearance: Normal appearance. "   HENT:      Head: Normocephalic.      Right Ear: External ear normal.      Left Ear: External ear normal.      Nose: Nose normal.      Mouth/Throat: No obvious deformity     Pharynx: Oropharynx is clear.   Eyes:      Conjunctiva/sclera: Conjunctivae normal.   Cardiovascular:      Rate and Rhythm: Normal rate.      Pulses: Normal pulses.   Pulmonary:      Effort: Pulmonary effort is normal.   Abdominal:      Palpations: Abdomen is soft.   Musculoskeletal:         General: Normal range of motion.      Cervical back: Normal range of motion.   Skin:     General: Skin is warm.   Neurological:      General: No focal deficit present.      Mental Status: He is alert and oriented to person, place, and time.     LAB RESULTS:  I have reviewed all the available laboratory results in the chart.    RESULTS REVIEW:  I have reviewed the patient's all relevant laboratory and imaging findings.     PET/ CT of skull base to mid-thigh done 10/20/2023.  Abnormal metabolic activity within the distal esophagus extending to the GE junction compatible with patient's known malignancy.    PET scan from 02/02/2024 was reviewed with the patient.  The tumor has decreased in size. It still shows some activity, which could be some radiation changes.     Ultrasound of the carotid arteries from 01/24/2024 did not see any significant blockage.     Ultrasound of the thyroid from 01/29/2024 revealed a small nodule on the thyroid that was not concerning for cancer.         ASSESSMENT & PLAN:  Jourdan Lebron is a 53 y.o. male with significant medical conditions as mentioned above presented to my clinic.    Diagnosis: Adenocarcinoma of the lower third of the esophagus  Staging: Stage III-A ([id number])    He has adenocarcinoma of the lower third of the esophagus.  He has good functional status.  Based on the clinical presentation, he was considered a candidate for trimodality treatment.  He has tolerated concurrent chemoradiation well. He did respond to  treatment.     We discussed surgery in detail, including esophageal resection. He will receive a pain block before and after surgery. With a history of steroids and smoking, there is a 5 to 10 percent chance of leakage. Discussed surgery risks such as pneumonia and life modifications post-surgery. The tumor will be evaluated after the resection to determine any further treatment. The patient was recommended to keep walking and stay active to improve his recovery time.     I discussed the patients findings and my recommendations with the patient. The patient was given adequate time to ask questions and all questions were answered to patient satisfaction.     Saurabh Hurtado MD  Thoracic Surgeon  [facility]        Dictated utilizing Dragon dictation    I spent 40 minutes caring for Jourdan on this date of service. This time includes time spent by me in the following activities:preparing for the visit, reviewing tests, obtaining and/or reviewing a separately obtained history, performing a medically appropriate examination and/or evaluation , counseling and educating the patient/family/caregiver, ordering medications, tests, or procedures, referring and communicating with other health care professionals , documenting information in the medical record, independently interpreting results and communicating that information with the patient/family/caregiver, and care coordination and more than half the time was spent in direct face to face evaluation and decision making.     Transcribed from ambient dictation for Saurabh Hurtado MD by Izabella Garsia.  02/06/24   10:29 EST    Patient or patient representative verbalized consent to the visit recording.  I have personally performed the services described in this document as transcribed by the above individual, and it is both accurate and complete.

## 2024-02-05 NOTE — H&P (VIEW-ONLY)
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    Subjective   HISTORY OF PRESENTING ILLNESS:   Jourdan Lebron is a 53 y.o. male who has significant medical problems as mentioned in the medical chart.     He was having intermittent dysphagia for over a year which became progressively worse.  On 10/2/2023, he underwent EGD and colonoscopy by Dr. Ozzy Abdalla at [facility].  He was found to have a 6 cm mass at the lower third of the esophagus (36 to 42 cm) (biopsied), mild diverticulosis of the sigmoid colon, 5 polyps noted in the colon and cecum that were removed.  The pathology of the esophageal mass revealed invasive moderate to poorly differentiated adenocarcinoma. All polypectomy samples were negative for malignancy (fragments of tubular adenoma).  CT scan of the chest, abdomen and pelvis on 10/9/2023 at [facility] showed 4 cm abnormal thickening of the lower thoracic esophagus extending to the GE junction thought to represent patient's known primary lung malignancy.  There were no convincing evidence of metastatic disease elsewhere in the chest, abdomen, pelvis, or skeleton.  There were stable left adrenalectomy changes, right adrenal adenoma unchanged.     He was seen in the office by Dr. Tacos Osuna ([facility]) for new diagnosis of esophageal cancer. He was an established patient of Dr. Harinder Mueller for erythrocytosis and macrocytosis since 11/2021 (resolved).      PET/CT on 10/20/2023 at [facility] showed hypermetabolic (SUV 7.7) activity within the distal esophagus extending to the GE junction compatible with patient's known malignancy.  He then underwent EGD with EUS by Dr. Joselyn Savage on 10/27/2023. Findings included close to 6 cm ulcerated mass extending from 36 to 42 cm consistent with poorly differentiated adenocarcinoma.  Mass penetrated through muscularis propria without involving the  adventitia consistent with T2 lesion. Two small round hypodense lymph nodes in close proximity to the mass measuring 5 and 6 mm concerning for malignancy. Other lymph nodes around the GE junction measuring 7 and 8 mm were also concerning for malignancy but immediate cytology was negative for malignancy. Pathology of the gastrohepatic lymph node was positive for metastatic adenocarcinoma; however, the lymph node at 36 cm was negative for malignancy.    He was referred to thoracic surgery for further evaluation.  At the time of initial consultation, he could eat and swallow quite a bit, but it depends. He avoids a lot of bread because it gets stuck lower down his chest. He went down from 200 pounds to 170 pounds 3 years ago after he had a stroke. He had memory loss and general fatigue after the stroke. He lost his appetite but began to add some weight. He then noticed he gets bloated and was having dyspepsia. He had adrenalectomy performed at the [facility] after an adrenal biopsy was done on an adrenal mass, and he went into adrenal crisis. He took hydrocortisone for 1 year and discontinued it on 09/2022. He developed abdominal discomfort afterwards. He denies having any past chest surgeries or myocardial infarction.  His wife mentioned the cause of the previous stroke was never known despite having a complete work up and a loop recorder inserted which was removed in the spring. He denies any abnormal heart rhythms or pedal edema. The patient quit smoking cigarettes 8 to 9 years ago and started smoking cigars.     He had good functional status.  Based on the clinical presentation, he was considered a candidate for trimodality treatment. I placed Mediport on 11/13/2023.  He received concurrent chemoradiation therapy.  His last dose of chemotherapy and radiation was on 12/28/2023.    He came to clinic for follow-up visit.  He has been able to tolerate soft food without difficulty.  He has tolerated  chemoradiation without much difficulty.  Restaging PET/CT scan on 2/2/2024 showed wall thickening involving the distal esophagus extending the GE junction consistent with patient's known esophageal malignancy.  There was no evidence of distant metastases.  There was significant decreased uptake in the distal esophagus consistent with treatment effect.    Due to his history of stroke, ultrasound bilateral carotid were obtained which showed no significant carotid stenosis.  There was incidentally noted right thyroid nodule for which follow-up ultrasound of the thyroid was done which showed multiple nodules but not concerning for malignancy.  Transthoracic echo was performed on 1/22/2024 and noted ejection fraction of 51 to 55%.  He was sent to Dr. Hayward for cardiology clearance.  He had 0.8% risk of myocardial infarction or cardiac arrest, intraoperatively or up to 30 days postoperatively.  No further workup was recommended.  He had a history of adrenal crisis and was sent to evaluation by his primary endocrinologist at [facility], Anabela Crenshaw MD.  Complete ACTH stimulation test was performed which was reported normal.    He came to clinic for follow-up visit.  He has been trying to be active and has improved his diet. He has been trying to consume more solid foods. He complains of fatigue.  He is not taking anti-coagulants or steroids. He has not taken the baby aspirin since the port was placed. He was told that his port needed to be flushed, but he was not sure where this needed to be done.     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    Anxiety     Brain fog     COVID 02/2023    Diverticulosis of large intestine without perforation or abscess without bleeding 10/02/2023    Esophageal cancer 10/2023    GERD  (gastroesophageal reflux disease)     Hand tingling     History of blood clot in brain     had thrombectomy    History of cancer chemotherapy 2023    History of radiation therapy 2023    Hyperlipidemia     Stroke 2020    Tiredness        Past Surgical History:   Procedure Laterality Date    ADRENALECTOMY      COLONOSCOPY      ENDOSCOPY      ESOPHAGUS SURGERY  10/2023    biopsy    KIDNEY STONE SURGERY      OTHER SURGICAL HISTORY      loop recorder    THROMBECTOMY      UPPER ENDOSCOPIC ULTRASOUND W/ FNA N/A 10/27/2023    Procedure: ENDOSCOPIC ULTRASOUND WITH STAGING AND FINE NEEDLE ASPIRATION X2 AREAS;  Surgeon: Joselyn Savage MD;  Location: [facility] ENDOSCOPY;  Service: Gastroenterology;  Laterality: N/A;  POST:    VENOUS ACCESS DEVICE (PORT) INSERTION Right 2023    Procedure: INSERTION VENOUS ACCESS DEVICE;  Surgeon: Saurabh Hurtado MD;  Location: [facility] MAIN OR;  Service: Thoracic;  Laterality: Right;       Family History   Problem Relation Age of Onset    Arthritis Mother     Hypertension Mother     Heart failure Mother 73        Cause of death    Arthritis Father     Hypertension Father     Kidney cancer Father 57        Cause of death. Was a smoker    Heart disease Brother     Esophageal cancer Brother 59        Cause of death. Has a heavy drinker    Malig Hyperthermia Neg Hx        Social History     Socioeconomic History    Marital status:    Tobacco Use    Smoking status: Former     Packs/day: 1.00     Years: 37.00     Additional pack years: 0.00     Total pack years: 37.00     Types: Cigars, Cigarettes     Start date: 1985     Quit date: 2022     Years since quittin.1     Passive exposure: Past    Smokeless tobacco: Never    Tobacco comments:     1 packs= stopped 10 years ago and continued cigars   2 cigars a day; has stopped cigars as of    Vaping Use    Vaping Use: Never used   Substance and Sexual Activity    Alcohol use: Not Currently     Comment: Has now  "been abstinent for about one year    Drug use: Never    Sexual activity: Yes     Partners: Female     Birth control/protection: None       No current facility-administered medications for this visit.  No current outpatient medications on file.    Facility-Administered Medications Ordered in Other Visits:     bupivacaine liposome (EXPAREL) 1.3 % injection  - ADS Override Pull, , , ,     fentaNYL citrate (PF) (SUBLIMAZE) injection 50 mcg, 50 mcg, Intravenous, Once PRN, Chris Mann MD    lactated ringers infusion, 9 mL/hr, Intravenous, Continuous, Chris Mann MD, Last Rate: 9 mL/hr at 02/09/24 0634, 9 mL/hr at 02/09/24 0634    lidocaine (XYLOCAINE) 1 % injection 0.5 mL, 0.5 mL, Intradermal, Once PRN, Chris Mann MD    midazolam (VERSED) injection 1 mg, 1 mg, Intravenous, Q5 Min PRN, Chris Mann MD    sodium chloride 0.9 % flush 10 mL, 10 mL, Intravenous, Q12H, Saurabh Hurtado MD    sodium chloride 0.9 % flush 10 mL, 10 mL, Intravenous, PRN, Saurabh Hurtado MD    sodium chloride 0.9 % flush 3 mL, 3 mL, Intravenous, Q12H, Chris Mann MD    sodium chloride 0.9 % flush 3-10 mL, 3-10 mL, Intravenous, PRN, Chris Mann MD    sodium chloride 0.9 % infusion 40 mL, 40 mL, Intravenous, PRN, Saurabh Hurtado MD    vancomycin (VANCOCIN) 1000 mg/200 mL dextrose 5% IVPB, 15 mg/kg, Intravenous, Once, Saurabh Hurtado MD     Allergies   Allergen Reactions    Cephalosporins Anaphylaxis     Throat swelling    Dye  [Contrast Dye (Echo Or Unknown Ct/Mr)] Hives    Iodinated Contrast Media Hives    Sulfa Antibiotics Hives    Sulfate Hives    Zetia [Ezetimibe] Other (See Comments) and Myalgia     Made tired    Statins Myalgia     Myalgia and fatique             Objective    OBJECTIVE:     VITAL SIGNS:  /80 (BP Location: Left arm, Patient Position: Sitting, Cuff Size: Adult)   Pulse 66   Ht 177.8 cm (70\")   Wt 69.9 kg (154 lb)   SpO2 100%   BMI 22.10 kg/m²     PHYSICAL EXAM:  Constitutional:       Appearance: Normal appearance. "   HENT:      Head: Normocephalic.      Right Ear: External ear normal.      Left Ear: External ear normal.      Nose: Nose normal.      Mouth/Throat: No obvious deformity     Pharynx: Oropharynx is clear.   Eyes:      Conjunctiva/sclera: Conjunctivae normal.   Cardiovascular:      Rate and Rhythm: Normal rate.      Pulses: Normal pulses.   Pulmonary:      Effort: Pulmonary effort is normal.   Abdominal:      Palpations: Abdomen is soft.   Musculoskeletal:         General: Normal range of motion.      Cervical back: Normal range of motion.   Skin:     General: Skin is warm.   Neurological:      General: No focal deficit present.      Mental Status: He is alert and oriented to person, place, and time.     LAB RESULTS:  I have reviewed all the available laboratory results in the chart.    RESULTS REVIEW:  I have reviewed the patient's all relevant laboratory and imaging findings.     PET/ CT of skull base to mid-thigh done 10/20/2023.  Abnormal metabolic activity within the distal esophagus extending to the GE junction compatible with patient's known malignancy.    PET scan from 02/02/2024 was reviewed with the patient.  The tumor has decreased in size. It still shows some activity, which could be some radiation changes.     Ultrasound of the carotid arteries from 01/24/2024 did not see any significant blockage.     Ultrasound of the thyroid from 01/29/2024 revealed a small nodule on the thyroid that was not concerning for cancer.         ASSESSMENT & PLAN:  Jourdan Lebron is a 53 y.o. male with significant medical conditions as mentioned above presented to my clinic.    Diagnosis: Adenocarcinoma of the lower third of the esophagus  Staging: Stage III-A ([id number])    He has adenocarcinoma of the lower third of the esophagus.  He has good functional status.  Based on the clinical presentation, he was considered a candidate for trimodality treatment.  He has tolerated concurrent chemoradiation well. He did respond to  treatment.     We discussed surgery in detail, including esophageal resection. He will receive a pain block before and after surgery. With a history of steroids and smoking, there is a 5 to 10 percent chance of leakage. Discussed surgery risks such as pneumonia and life modifications post-surgery. The tumor will be evaluated after the resection to determine any further treatment. The patient was recommended to keep walking and stay active to improve his recovery time.     I discussed the patients findings and my recommendations with the patient. The patient was given adequate time to ask questions and all questions were answered to patient satisfaction.     Saurabh Hurtado MD  Thoracic Surgeon  [facility]        Dictated utilizing Dragon dictation    I spent 40 minutes caring for Jourdan on this date of service. This time includes time spent by me in the following activities:preparing for the visit, reviewing tests, obtaining and/or reviewing a separately obtained history, performing a medically appropriate examination and/or evaluation , counseling and educating the patient/family/caregiver, ordering medications, tests, or procedures, referring and communicating with other health care professionals , documenting information in the medical record, independently interpreting results and communicating that information with the patient/family/caregiver, and care coordination and more than half the time was spent in direct face to face evaluation and decision making.     Transcribed from ambient dictation for Saurabh Hurtado MD by Izabella Garsia.  02/06/24   10:29 EST    Patient or patient representative verbalized consent to the visit recording.  I have personally performed the services described in this document as transcribed by the above individual, and it is both accurate and complete.

## 2024-02-05 NOTE — TELEPHONE ENCOUNTER
PA sent 02/05/24 On license of [facility] PA request has been approved. Additional information will be provided in the approval communication.[facility]    .

## 2024-02-06 ENCOUNTER — OFFICE VISIT (OUTPATIENT)
Dept: SURGERY | Facility: CLINIC | Age: 54
End: 2024-02-06
Payer: COMMERCIAL

## 2024-02-06 ENCOUNTER — ANESTHESIA EVENT (OUTPATIENT)
Dept: PERIOP | Facility: HOSPITAL | Age: 54
End: 2024-02-06
Payer: COMMERCIAL

## 2024-02-06 VITALS
HEART RATE: 66 BPM | OXYGEN SATURATION: 100 % | HEIGHT: 70 IN | SYSTOLIC BLOOD PRESSURE: 116 MMHG | DIASTOLIC BLOOD PRESSURE: 80 MMHG | WEIGHT: 154 LBS | BODY MASS INDEX: 22.05 KG/M2

## 2024-02-06 DIAGNOSIS — C15.5 MALIGNANT NEOPLASM OF LOWER THIRD OF ESOPHAGUS: Primary | ICD-10-CM

## 2024-02-06 NOTE — TELEPHONE ENCOUNTER
Rx Refill Note  Requested Prescriptions     Pending Prescriptions Disp Refills    FLUoxetine (PROzac) 20 MG capsule [Pharmacy Med Name: FLUOXETIN(P) CAP 20MG] 90 capsule 1     Sig: TAKE 1 CAPSULE DAILY      Last office visit with prescribing clinician: 9/19/2023   Last telemedicine visit with prescribing clinician: Visit date not found   Next office visit with prescribing clinician: 3/26/2024   Office Visit with Clair Lyman MD (09/19/2023)                       Would you like a call back once the refill request has been completed: [] Yes [] No    If the office needs to give you a call back, can they leave a voicemail: [] Yes [] No    Lidia Ojeda MA  02/06/24, 15:44 EST

## 2024-02-07 RX ORDER — FLUOXETINE HYDROCHLORIDE 20 MG/1
20 CAPSULE ORAL DAILY
Qty: 90 CAPSULE | Refills: 1 | Status: ON HOLD | OUTPATIENT
Start: 2024-02-07

## 2024-02-09 ENCOUNTER — APPOINTMENT (OUTPATIENT)
Dept: GENERAL RADIOLOGY | Facility: HOSPITAL | Age: 54
DRG: 375 | End: 2024-02-09
Payer: COMMERCIAL

## 2024-02-09 ENCOUNTER — HOSPITAL ENCOUNTER (INPATIENT)
Facility: HOSPITAL | Age: 54
LOS: 14 days | Discharge: HOME | DRG: 375 | End: 2024-02-23
Attending: SURGERY | Admitting: SURGERY
Payer: COMMERCIAL

## 2024-02-09 ENCOUNTER — ANESTHESIA (OUTPATIENT)
Dept: PERIOP | Facility: HOSPITAL | Age: 54
End: 2024-02-09
Payer: COMMERCIAL

## 2024-02-09 DIAGNOSIS — C15.5 MALIGNANT NEOPLASM OF LOWER THIRD OF ESOPHAGUS: Primary | ICD-10-CM

## 2024-02-09 DIAGNOSIS — F41.1 GENERALIZED ANXIETY DISORDER: Chronic | ICD-10-CM

## 2024-02-09 DIAGNOSIS — F33.1 MAJOR DEPRESSIVE DISORDER, RECURRENT EPISODE, MODERATE: Chronic | ICD-10-CM

## 2024-02-09 LAB
ANION GAP SERPL CALCULATED.3IONS-SCNC: 11.8 MMOL/L (ref 5–15)
BASOPHILS # BLD AUTO: 0.02 10*3/MM3 (ref 0–0.2)
BASOPHILS NFR BLD AUTO: 0.2 % (ref 0–1.5)
BUN SERPL-MCNC: 10 MG/DL (ref 6–20)
BUN/CREAT SERPL: 12.7 (ref 7–25)
CALCIUM SPEC-SCNC: 8.1 MG/DL (ref 8.6–10.5)
CHLORIDE SERPL-SCNC: 102 MMOL/L (ref 98–107)
CO2 SERPL-SCNC: 21.2 MMOL/L (ref 22–29)
CREAT SERPL-MCNC: 0.79 MG/DL (ref 0.76–1.27)
DEPRECATED RDW RBC AUTO: 58.1 FL (ref 37–54)
EGFRCR SERPLBLD CKD-EPI 2021: 107.6 ML/MIN/1.73
EOSINOPHIL # BLD AUTO: 0 10*3/MM3 (ref 0–0.4)
EOSINOPHIL NFR BLD AUTO: 0 % (ref 0.3–6.2)
ERYTHROCYTE [DISTWIDTH] IN BLOOD BY AUTOMATED COUNT: 15.7 % (ref 12.3–15.4)
GLUCOSE SERPL-MCNC: 159 MG/DL (ref 65–99)
HCT VFR BLD AUTO: 35.8 % (ref 37.5–51)
HGB BLD-MCNC: 12.1 G/DL (ref 13–17.7)
IMM GRANULOCYTES # BLD AUTO: 0.03 10*3/MM3 (ref 0–0.05)
IMM GRANULOCYTES NFR BLD AUTO: 0.3 % (ref 0–0.5)
LYMPHOCYTES # BLD AUTO: 0.31 10*3/MM3 (ref 0.7–3.1)
LYMPHOCYTES NFR BLD AUTO: 2.9 % (ref 19.6–45.3)
MAGNESIUM SERPL-MCNC: 2 MG/DL (ref 1.6–2.6)
MCH RBC QN AUTO: 34.2 PG (ref 26.6–33)
MCHC RBC AUTO-ENTMCNC: 33.8 G/DL (ref 31.5–35.7)
MCV RBC AUTO: 101.1 FL (ref 79–97)
MONOCYTES # BLD AUTO: 0.79 10*3/MM3 (ref 0.1–0.9)
MONOCYTES NFR BLD AUTO: 7.5 % (ref 5–12)
NEUTROPHILS NFR BLD AUTO: 89.1 % (ref 42.7–76)
NEUTROPHILS NFR BLD AUTO: 9.45 10*3/MM3 (ref 1.7–7)
NRBC BLD AUTO-RTO: 0 /100 WBC (ref 0–0.2)
PHOSPHATE SERPL-MCNC: 2.4 MG/DL (ref 2.5–4.5)
PLATELET # BLD AUTO: 225 10*3/MM3 (ref 140–450)
PMV BLD AUTO: 9.1 FL (ref 6–12)
POTASSIUM SERPL-SCNC: 4 MMOL/L (ref 3.5–5.2)
RBC # BLD AUTO: 3.54 10*6/MM3 (ref 4.14–5.8)
SODIUM SERPL-SCNC: 135 MMOL/L (ref 136–145)
WBC NRBC COR # BLD AUTO: 10.6 10*3/MM3 (ref 3.4–10.8)

## 2024-02-09 PROCEDURE — 25010000002 FENTANYL CITRATE (PF) 50 MCG/ML SOLUTION: Performed by: ANESTHESIOLOGY

## 2024-02-09 PROCEDURE — 25810000003 LACTATED RINGERS PER 1000 ML: Performed by: NURSE ANESTHETIST, CERTIFIED REGISTERED

## 2024-02-09 PROCEDURE — 80048 BASIC METABOLIC PNL TOTAL CA: CPT | Performed by: SURGERY

## 2024-02-09 PROCEDURE — 43249 ESOPH EGD DILATION <30 MM: CPT | Performed by: SURGERY

## 2024-02-09 PROCEDURE — 25010000002 INDOCYANINE GREEN 25 MG RECONSTITUTED SOLUTION: Performed by: NURSE ANESTHETIST, CERTIFIED REGISTERED

## 2024-02-09 PROCEDURE — 25010000002 MAGNESIUM SULFATE PER 500 MG OF MAGNESIUM: Performed by: NURSE ANESTHETIST, CERTIFIED REGISTERED

## 2024-02-09 PROCEDURE — 88341 IMHCHEM/IMCYTCHM EA ADD ANTB: CPT | Performed by: SURGERY

## 2024-02-09 PROCEDURE — C9290 INJ, BUPIVACAINE LIPOSOME: HCPCS | Performed by: ANESTHESIOLOGY

## 2024-02-09 PROCEDURE — 25010000002 FENTANYL CITRATE (PF) 50 MCG/ML SOLUTION: Performed by: NURSE ANESTHETIST, CERTIFIED REGISTERED

## 2024-02-09 PROCEDURE — 25810000003 SODIUM CHLORIDE 0.9 % SOLUTION: Performed by: SURGERY

## 2024-02-09 PROCEDURE — 3E0T3BZ INTRODUCTION OF ANESTHETIC AGENT INTO PERIPHERAL NERVES AND PLEXI, PERCUTANEOUS APPROACH: ICD-10-PCS | Performed by: SURGERY

## 2024-02-09 PROCEDURE — 0DHA4UZ INSERTION OF FEEDING DEVICE INTO JEJUNUM, PERCUTANEOUS ENDOSCOPIC APPROACH: ICD-10-PCS | Performed by: SURGERY

## 2024-02-09 PROCEDURE — P9041 ALBUMIN (HUMAN),5%, 50ML: HCPCS | Performed by: NURSE ANESTHETIST, CERTIFIED REGISTERED

## 2024-02-09 PROCEDURE — 25010000002 GLYCOPYRROLATE 0.2 MG/ML SOLUTION: Performed by: NURSE ANESTHETIST, CERTIFIED REGISTERED

## 2024-02-09 PROCEDURE — 25010000002 MIDAZOLAM PER 1 MG: Performed by: ANESTHESIOLOGY

## 2024-02-09 PROCEDURE — 25010000002 BUPIVACAINE (PF) 0.25 % SOLUTION: Performed by: ANESTHESIOLOGY

## 2024-02-09 PROCEDURE — 71045 X-RAY EXAM CHEST 1 VIEW: CPT

## 2024-02-09 PROCEDURE — 43117 PARTIAL REMOVAL OF ESOPHAGUS: CPT | Performed by: SURGERY

## 2024-02-09 PROCEDURE — 25010000002 VANCOMYCIN PER 500 MG: Performed by: SURGERY

## 2024-02-09 PROCEDURE — 25010000002 PHENYLEPHRINE 10 MG/ML SOLUTION 5 ML VIAL: Performed by: NURSE ANESTHETIST, CERTIFIED REGISTERED

## 2024-02-09 PROCEDURE — 25010000002 BUPIVACAINE (PF) 0.25 % SOLUTION: Performed by: SURGERY

## 2024-02-09 PROCEDURE — 88309 TISSUE EXAM BY PATHOLOGIST: CPT | Performed by: SURGERY

## 2024-02-09 PROCEDURE — 25010000002 HEPARIN (PORCINE) PER 1000 UNITS: Performed by: SURGERY

## 2024-02-09 PROCEDURE — 85025 COMPLETE CBC W/AUTO DIFF WBC: CPT | Performed by: SURGERY

## 2024-02-09 PROCEDURE — C1729 CATH, DRAINAGE: HCPCS | Performed by: SURGERY

## 2024-02-09 PROCEDURE — 44015 INSERT NEEDLE CATH BOWEL: CPT | Performed by: THORACIC SURGERY (CARDIOTHORACIC VASCULAR SURGERY)

## 2024-02-09 PROCEDURE — 44015 INSERT NEEDLE CATH BOWEL: CPT | Performed by: SURGERY

## 2024-02-09 PROCEDURE — 0D778ZZ DILATION OF STOMACH, PYLORUS, VIA NATURAL OR ARTIFICIAL OPENING ENDOSCOPIC: ICD-10-PCS | Performed by: SURGERY

## 2024-02-09 PROCEDURE — 84100 ASSAY OF PHOSPHORUS: CPT | Performed by: SURGERY

## 2024-02-09 PROCEDURE — 25810000003 LACTATED RINGERS PER 1000 ML: Performed by: ANESTHESIOLOGY

## 2024-02-09 PROCEDURE — 25010000002 ALBUMIN HUMAN 5% PER 50 ML: Performed by: NURSE ANESTHETIST, CERTIFIED REGISTERED

## 2024-02-09 PROCEDURE — 0DB34ZZ EXCISION OF LOWER ESOPHAGUS, PERCUTANEOUS ENDOSCOPIC APPROACH: ICD-10-PCS | Performed by: SURGERY

## 2024-02-09 PROCEDURE — 25010000002 HYDROMORPHONE PER 4 MG: Performed by: NURSE ANESTHETIST, CERTIFIED REGISTERED

## 2024-02-09 PROCEDURE — 25810000003 SODIUM CHLORIDE 0.9 % SOLUTION 250 ML FLEX CONT: Performed by: NURSE ANESTHETIST, CERTIFIED REGISTERED

## 2024-02-09 PROCEDURE — 25010000002 VASOPRESSIN 20 UNIT/ML SOLUTION: Performed by: NURSE ANESTHETIST, CERTIFIED REGISTERED

## 2024-02-09 PROCEDURE — C1726 CATH, BAL DIL, NON-VASCULAR: HCPCS | Performed by: SURGERY

## 2024-02-09 PROCEDURE — 88342 IMHCHEM/IMCYTCHM 1ST ANTB: CPT | Performed by: SURGERY

## 2024-02-09 PROCEDURE — 25010000002 DEXAMETHASONE PER 1 MG: Performed by: NURSE ANESTHETIST, CERTIFIED REGISTERED

## 2024-02-09 PROCEDURE — 25010000002 HYDROMORPHONE HCL PF 50 MG/5ML SOLUTION: Performed by: SURGERY

## 2024-02-09 PROCEDURE — 88305 TISSUE EXAM BY PATHOLOGIST: CPT | Performed by: SURGERY

## 2024-02-09 PROCEDURE — 83735 ASSAY OF MAGNESIUM: CPT | Performed by: SURGERY

## 2024-02-09 PROCEDURE — 8E0W4CZ ROBOTIC ASSISTED PROCEDURE OF TRUNK REGION, PERCUTANEOUS ENDOSCOPIC APPROACH: ICD-10-PCS | Performed by: SURGERY

## 2024-02-09 PROCEDURE — 25010000002 HYDROMORPHONE 1 MG/ML SOLUTION: Performed by: NURSE ANESTHETIST, CERTIFIED REGISTERED

## 2024-02-09 PROCEDURE — 43117 PARTIAL REMOVAL OF ESOPHAGUS: CPT | Performed by: THORACIC SURGERY (CARDIOTHORACIC VASCULAR SURGERY)

## 2024-02-09 PROCEDURE — 25010000002 ONDANSETRON PER 1 MG: Performed by: NURSE ANESTHETIST, CERTIFIED REGISTERED

## 2024-02-09 PROCEDURE — 88304 TISSUE EXAM BY PATHOLOGIST: CPT | Performed by: SURGERY

## 2024-02-09 PROCEDURE — 25010000002 PROPOFOL 10 MG/ML EMULSION: Performed by: NURSE ANESTHETIST, CERTIFIED REGISTERED

## 2024-02-09 PROCEDURE — 0 BUPIVACAINE LIPOSOME 1.3 % SUSPENSION: Performed by: ANESTHESIOLOGY

## 2024-02-09 PROCEDURE — 0BJ08ZZ INSPECTION OF TRACHEOBRONCHIAL TREE, VIA NATURAL OR ARTIFICIAL OPENING ENDOSCOPIC: ICD-10-PCS | Performed by: SURGERY

## 2024-02-09 DEVICE — LIGACLIP MCA MULTIPLE CLIP APPLIERS, 20 MEDIUM CLIPS
Type: IMPLANTABLE DEVICE | Site: CHEST | Status: FUNCTIONAL
Brand: LIGACLIP

## 2024-02-09 DEVICE — CELLULAR STAPLER WITH TRI-STAPLE TECHNOLOGY
Type: IMPLANTABLE DEVICE | Site: ABDOMEN | Status: FUNCTIONAL
Brand: EEA

## 2024-02-09 DEVICE — ABSORBABLE HEMOSTAT (OXIDIZED REGENERATED CELLULOSE)
Type: IMPLANTABLE DEVICE | Site: ESOPHAGUS | Status: FUNCTIONAL
Brand: SURGICEL

## 2024-02-09 DEVICE — ARTICULATION RELOAD WITH TRI-STAPLE TECHNOLOGY
Type: IMPLANTABLE DEVICE | Site: CHEST | Status: FUNCTIONAL
Brand: ENDO GIA

## 2024-02-09 DEVICE — KNOTLESS TISSUE CONTROL DEVICE, VIOLET UNIDIRECTIONAL (ANTIBACTERIAL) SYNTHETIC ABSORBABLE DEVICE
Type: IMPLANTABLE DEVICE | Site: ESOPHAGUS | Status: FUNCTIONAL
Brand: STRATAFIX

## 2024-02-09 DEVICE — SUREFORM 45 RELOAD GREEN
Type: IMPLANTABLE DEVICE | Site: ABDOMEN | Status: FUNCTIONAL
Brand: SUREFORM

## 2024-02-09 DEVICE — SUREFORM 45 RELOAD WHITE
Type: IMPLANTABLE DEVICE | Site: ABDOMEN | Status: FUNCTIONAL
Brand: SUREFORM

## 2024-02-09 RX ORDER — SODIUM CHLORIDE 0.9 % (FLUSH) 0.9 %
3 SYRINGE (ML) INJECTION EVERY 12 HOURS SCHEDULED
Status: DISCONTINUED | OUTPATIENT
Start: 2024-02-09 | End: 2024-02-23 | Stop reason: HOSPADM

## 2024-02-09 RX ORDER — SODIUM CHLORIDE 0.9 % (FLUSH) 0.9 %
3-10 SYRINGE (ML) INJECTION AS NEEDED
Status: DISCONTINUED | OUTPATIENT
Start: 2024-02-09 | End: 2024-02-09 | Stop reason: HOSPADM

## 2024-02-09 RX ORDER — MAGNESIUM SULFATE HEPTAHYDRATE 500 MG/ML
INJECTION, SOLUTION INTRAMUSCULAR; INTRAVENOUS AS NEEDED
Status: DISCONTINUED | OUTPATIENT
Start: 2024-02-09 | End: 2024-02-09 | Stop reason: SURG

## 2024-02-09 RX ORDER — EPHEDRINE SULFATE 50 MG/ML
5 INJECTION, SOLUTION INTRAVENOUS ONCE AS NEEDED
Status: DISCONTINUED | OUTPATIENT
Start: 2024-02-09 | End: 2024-02-09 | Stop reason: HOSPADM

## 2024-02-09 RX ORDER — IPRATROPIUM BROMIDE AND ALBUTEROL SULFATE 2.5; .5 MG/3ML; MG/3ML
3 SOLUTION RESPIRATORY (INHALATION) ONCE AS NEEDED
Status: DISCONTINUED | OUTPATIENT
Start: 2024-02-09 | End: 2024-02-09 | Stop reason: HOSPADM

## 2024-02-09 RX ORDER — MAGNESIUM HYDROXIDE 1200 MG/15ML
LIQUID ORAL AS NEEDED
Status: DISCONTINUED | OUTPATIENT
Start: 2024-02-09 | End: 2024-02-09 | Stop reason: HOSPADM

## 2024-02-09 RX ORDER — NALOXONE HCL 0.4 MG/ML
0.2 VIAL (ML) INJECTION AS NEEDED
Status: DISCONTINUED | OUTPATIENT
Start: 2024-02-09 | End: 2024-02-09 | Stop reason: HOSPADM

## 2024-02-09 RX ORDER — LIDOCAINE HYDROCHLORIDE 10 MG/ML
0.5 INJECTION, SOLUTION INFILTRATION; PERINEURAL ONCE AS NEEDED
Status: DISCONTINUED | OUTPATIENT
Start: 2024-02-09 | End: 2024-02-09 | Stop reason: HOSPADM

## 2024-02-09 RX ORDER — ALBUMIN, HUMAN INJ 5% 5 %
SOLUTION INTRAVENOUS CONTINUOUS PRN
Status: DISCONTINUED | OUTPATIENT
Start: 2024-02-09 | End: 2024-02-09 | Stop reason: SURG

## 2024-02-09 RX ORDER — GLYCOPYRROLATE 0.2 MG/ML
INJECTION INTRAMUSCULAR; INTRAVENOUS AS NEEDED
Status: DISCONTINUED | OUTPATIENT
Start: 2024-02-09 | End: 2024-02-09 | Stop reason: SURG

## 2024-02-09 RX ORDER — SODIUM CHLORIDE, SODIUM LACTATE, POTASSIUM CHLORIDE, CALCIUM CHLORIDE 600; 310; 30; 20 MG/100ML; MG/100ML; MG/100ML; MG/100ML
9 INJECTION, SOLUTION INTRAVENOUS CONTINUOUS
Status: DISCONTINUED | OUTPATIENT
Start: 2024-02-09 | End: 2024-02-15

## 2024-02-09 RX ORDER — HYDROMORPHONE HCL/0.9% NACL/PF 10 MG/50ML
PATIENT CONTROLLED ANALGESIA SYRINGE INTRAVENOUS CONTINUOUS
Status: DISCONTINUED | OUTPATIENT
Start: 2024-02-09 | End: 2024-02-10

## 2024-02-09 RX ORDER — INDOCYANINE GREEN AND WATER 25 MG
KIT INJECTION AS NEEDED
Status: DISCONTINUED | OUTPATIENT
Start: 2024-02-09 | End: 2024-02-09 | Stop reason: SURG

## 2024-02-09 RX ORDER — SODIUM CHLORIDE 0.9 % (FLUSH) 0.9 %
3 SYRINGE (ML) INJECTION EVERY 12 HOURS SCHEDULED
Status: DISCONTINUED | OUTPATIENT
Start: 2024-02-09 | End: 2024-02-09 | Stop reason: HOSPADM

## 2024-02-09 RX ORDER — DIPHENHYDRAMINE HYDROCHLORIDE 50 MG/ML
12.5 INJECTION INTRAMUSCULAR; INTRAVENOUS
Status: DISCONTINUED | OUTPATIENT
Start: 2024-02-09 | End: 2024-02-09 | Stop reason: HOSPADM

## 2024-02-09 RX ORDER — HYDRALAZINE HYDROCHLORIDE 20 MG/ML
5 INJECTION INTRAMUSCULAR; INTRAVENOUS
Status: DISCONTINUED | OUTPATIENT
Start: 2024-02-09 | End: 2024-02-09 | Stop reason: HOSPADM

## 2024-02-09 RX ORDER — ENOXAPARIN SODIUM 100 MG/ML
40 INJECTION SUBCUTANEOUS DAILY
Status: DISCONTINUED | OUTPATIENT
Start: 2024-02-10 | End: 2024-02-23 | Stop reason: HOSPADM

## 2024-02-09 RX ORDER — BUPIVACAINE HYDROCHLORIDE 2.5 MG/ML
INJECTION, SOLUTION EPIDURAL; INFILTRATION; INTRACAUDAL AS NEEDED
Status: DISCONTINUED | OUTPATIENT
Start: 2024-02-09 | End: 2024-02-09 | Stop reason: HOSPADM

## 2024-02-09 RX ORDER — BUPIVACAINE HYDROCHLORIDE 2.5 MG/ML
INJECTION, SOLUTION EPIDURAL; INFILTRATION; INTRACAUDAL
Status: COMPLETED | OUTPATIENT
Start: 2024-02-09 | End: 2024-02-09

## 2024-02-09 RX ORDER — PROPOFOL 10 MG/ML
VIAL (ML) INTRAVENOUS AS NEEDED
Status: DISCONTINUED | OUTPATIENT
Start: 2024-02-09 | End: 2024-02-09 | Stop reason: SURG

## 2024-02-09 RX ORDER — PHENYLEPHRINE HCL IN 0.9% NACL 1 MG/10 ML
SYRINGE (ML) INTRAVENOUS AS NEEDED
Status: DISCONTINUED | OUTPATIENT
Start: 2024-02-09 | End: 2024-02-09 | Stop reason: SURG

## 2024-02-09 RX ORDER — DROPERIDOL 2.5 MG/ML
0.62 INJECTION, SOLUTION INTRAMUSCULAR; INTRAVENOUS
Status: DISCONTINUED | OUTPATIENT
Start: 2024-02-09 | End: 2024-02-09 | Stop reason: HOSPADM

## 2024-02-09 RX ORDER — OXYCODONE AND ACETAMINOPHEN 7.5; 325 MG/1; MG/1
1 TABLET ORAL EVERY 4 HOURS PRN
Status: DISCONTINUED | OUTPATIENT
Start: 2024-02-09 | End: 2024-02-09 | Stop reason: HOSPADM

## 2024-02-09 RX ORDER — PROMETHAZINE HYDROCHLORIDE 25 MG/1
25 SUPPOSITORY RECTAL ONCE AS NEEDED
Status: DISCONTINUED | OUTPATIENT
Start: 2024-02-09 | End: 2024-02-09 | Stop reason: HOSPADM

## 2024-02-09 RX ORDER — LABETALOL HYDROCHLORIDE 5 MG/ML
5 INJECTION, SOLUTION INTRAVENOUS
Status: DISCONTINUED | OUTPATIENT
Start: 2024-02-09 | End: 2024-02-09 | Stop reason: HOSPADM

## 2024-02-09 RX ORDER — ONDANSETRON 2 MG/ML
4 INJECTION INTRAMUSCULAR; INTRAVENOUS ONCE AS NEEDED
Status: DISCONTINUED | OUTPATIENT
Start: 2024-02-09 | End: 2024-02-09 | Stop reason: HOSPADM

## 2024-02-09 RX ORDER — FENTANYL CITRATE 50 UG/ML
INJECTION, SOLUTION INTRAMUSCULAR; INTRAVENOUS
Status: COMPLETED | OUTPATIENT
Start: 2024-02-09 | End: 2024-02-09

## 2024-02-09 RX ORDER — FENTANYL CITRATE 50 UG/ML
INJECTION, SOLUTION INTRAMUSCULAR; INTRAVENOUS AS NEEDED
Status: DISCONTINUED | OUTPATIENT
Start: 2024-02-09 | End: 2024-02-09 | Stop reason: SURG

## 2024-02-09 RX ORDER — PROMETHAZINE HYDROCHLORIDE 25 MG/1
25 TABLET ORAL ONCE AS NEEDED
Status: DISCONTINUED | OUTPATIENT
Start: 2024-02-09 | End: 2024-02-09 | Stop reason: HOSPADM

## 2024-02-09 RX ORDER — FENTANYL CITRATE 50 UG/ML
50 INJECTION, SOLUTION INTRAMUSCULAR; INTRAVENOUS
Status: DISCONTINUED | OUTPATIENT
Start: 2024-02-09 | End: 2024-02-09 | Stop reason: HOSPADM

## 2024-02-09 RX ORDER — VASOPRESSIN 20 U/ML
INJECTION PARENTERAL AS NEEDED
Status: DISCONTINUED | OUTPATIENT
Start: 2024-02-09 | End: 2024-02-09 | Stop reason: SURG

## 2024-02-09 RX ORDER — PANTOPRAZOLE SODIUM 40 MG/10ML
40 INJECTION, POWDER, LYOPHILIZED, FOR SOLUTION INTRAVENOUS
Status: DISCONTINUED | OUTPATIENT
Start: 2024-02-10 | End: 2024-02-23 | Stop reason: HOSPADM

## 2024-02-09 RX ORDER — FENTANYL CITRATE 50 UG/ML
50 INJECTION, SOLUTION INTRAMUSCULAR; INTRAVENOUS ONCE AS NEEDED
Status: DISCONTINUED | OUTPATIENT
Start: 2024-02-09 | End: 2024-02-09 | Stop reason: HOSPADM

## 2024-02-09 RX ORDER — VANCOMYCIN HYDROCHLORIDE 1 G/200ML
15 INJECTION, SOLUTION INTRAVENOUS ONCE
Status: COMPLETED | OUTPATIENT
Start: 2024-02-09 | End: 2024-02-09

## 2024-02-09 RX ORDER — LIDOCAINE HYDROCHLORIDE 20 MG/ML
INJECTION, SOLUTION INFILTRATION; PERINEURAL AS NEEDED
Status: DISCONTINUED | OUTPATIENT
Start: 2024-02-09 | End: 2024-02-09 | Stop reason: SURG

## 2024-02-09 RX ORDER — SODIUM CHLORIDE 9 MG/ML
40 INJECTION, SOLUTION INTRAVENOUS AS NEEDED
Status: DISCONTINUED | OUTPATIENT
Start: 2024-02-09 | End: 2024-02-23 | Stop reason: HOSPADM

## 2024-02-09 RX ORDER — SODIUM CHLORIDE 0.9 % (FLUSH) 0.9 %
10 SYRINGE (ML) INJECTION AS NEEDED
Status: DISCONTINUED | OUTPATIENT
Start: 2024-02-09 | End: 2024-02-09 | Stop reason: HOSPADM

## 2024-02-09 RX ORDER — DEXTROSE MONOHYDRATE, SODIUM CHLORIDE, AND POTASSIUM CHLORIDE 50; 1.49; 4.5 G/1000ML; G/1000ML; G/1000ML
50 INJECTION, SOLUTION INTRAVENOUS CONTINUOUS
Status: DISCONTINUED | OUTPATIENT
Start: 2024-02-09 | End: 2024-02-13

## 2024-02-09 RX ORDER — SODIUM CHLORIDE 0.9 % (FLUSH) 0.9 %
10 SYRINGE (ML) INJECTION EVERY 12 HOURS SCHEDULED
Status: DISCONTINUED | OUTPATIENT
Start: 2024-02-09 | End: 2024-02-09 | Stop reason: HOSPADM

## 2024-02-09 RX ORDER — SODIUM CHLORIDE 0.9 % (FLUSH) 0.9 %
3-10 SYRINGE (ML) INJECTION AS NEEDED
Status: DISCONTINUED | OUTPATIENT
Start: 2024-02-09 | End: 2024-02-23 | Stop reason: HOSPADM

## 2024-02-09 RX ORDER — SODIUM CHLORIDE 9 MG/ML
40 INJECTION, SOLUTION INTRAVENOUS AS NEEDED
Status: DISCONTINUED | OUTPATIENT
Start: 2024-02-09 | End: 2024-02-09 | Stop reason: HOSPADM

## 2024-02-09 RX ORDER — FAMOTIDINE 10 MG/ML
20 INJECTION, SOLUTION INTRAVENOUS ONCE
Status: COMPLETED | OUTPATIENT
Start: 2024-02-09 | End: 2024-02-09

## 2024-02-09 RX ORDER — ONDANSETRON 2 MG/ML
INJECTION INTRAMUSCULAR; INTRAVENOUS AS NEEDED
Status: DISCONTINUED | OUTPATIENT
Start: 2024-02-09 | End: 2024-02-09 | Stop reason: SURG

## 2024-02-09 RX ORDER — FLUMAZENIL 0.1 MG/ML
0.2 INJECTION INTRAVENOUS AS NEEDED
Status: DISCONTINUED | OUTPATIENT
Start: 2024-02-09 | End: 2024-02-09 | Stop reason: HOSPADM

## 2024-02-09 RX ORDER — DEXAMETHASONE SODIUM PHOSPHATE 4 MG/ML
INJECTION, SOLUTION INTRA-ARTICULAR; INTRALESIONAL; INTRAMUSCULAR; INTRAVENOUS; SOFT TISSUE AS NEEDED
Status: DISCONTINUED | OUTPATIENT
Start: 2024-02-09 | End: 2024-02-09 | Stop reason: SURG

## 2024-02-09 RX ORDER — NALOXONE HCL 0.4 MG/ML
0.1 VIAL (ML) INJECTION
Status: DISCONTINUED | OUTPATIENT
Start: 2024-02-09 | End: 2024-02-23 | Stop reason: HOSPADM

## 2024-02-09 RX ORDER — SODIUM CHLORIDE 9 MG/ML
30 INJECTION, SOLUTION INTRAVENOUS CONTINUOUS PRN
Status: DISCONTINUED | OUTPATIENT
Start: 2024-02-09 | End: 2024-02-23 | Stop reason: HOSPADM

## 2024-02-09 RX ORDER — SODIUM CHLORIDE, SODIUM LACTATE, POTASSIUM CHLORIDE, CALCIUM CHLORIDE 600; 310; 30; 20 MG/100ML; MG/100ML; MG/100ML; MG/100ML
INJECTION, SOLUTION INTRAVENOUS CONTINUOUS PRN
Status: DISCONTINUED | OUTPATIENT
Start: 2024-02-09 | End: 2024-02-09 | Stop reason: SURG

## 2024-02-09 RX ORDER — ROCURONIUM BROMIDE 10 MG/ML
INJECTION, SOLUTION INTRAVENOUS AS NEEDED
Status: DISCONTINUED | OUTPATIENT
Start: 2024-02-09 | End: 2024-02-09 | Stop reason: SURG

## 2024-02-09 RX ORDER — EPHEDRINE SULFATE 50 MG/ML
INJECTION INTRAVENOUS AS NEEDED
Status: DISCONTINUED | OUTPATIENT
Start: 2024-02-09 | End: 2024-02-09 | Stop reason: SURG

## 2024-02-09 RX ORDER — HEPARIN SODIUM 5000 [USP'U]/ML
5000 INJECTION, SOLUTION INTRAVENOUS; SUBCUTANEOUS ONCE
Status: COMPLETED | OUTPATIENT
Start: 2024-02-09 | End: 2024-02-09

## 2024-02-09 RX ORDER — MIDAZOLAM HYDROCHLORIDE 1 MG/ML
1 INJECTION INTRAMUSCULAR; INTRAVENOUS
Status: DISCONTINUED | OUTPATIENT
Start: 2024-02-09 | End: 2024-02-09 | Stop reason: HOSPADM

## 2024-02-09 RX ORDER — HYDROMORPHONE HYDROCHLORIDE 1 MG/ML
0.5 INJECTION, SOLUTION INTRAMUSCULAR; INTRAVENOUS; SUBCUTANEOUS
Status: DISCONTINUED | OUTPATIENT
Start: 2024-02-09 | End: 2024-02-09 | Stop reason: HOSPADM

## 2024-02-09 RX ORDER — KETAMINE HCL IN NACL, ISO-OSM 100MG/10ML
SYRINGE (ML) INJECTION AS NEEDED
Status: DISCONTINUED | OUTPATIENT
Start: 2024-02-09 | End: 2024-02-09 | Stop reason: SURG

## 2024-02-09 RX ORDER — HYDROCODONE BITARTRATE AND ACETAMINOPHEN 5; 325 MG/1; MG/1
1 TABLET ORAL ONCE AS NEEDED
Status: DISCONTINUED | OUTPATIENT
Start: 2024-02-09 | End: 2024-02-09 | Stop reason: HOSPADM

## 2024-02-09 RX ORDER — NITROGLYCERIN 0.4 MG/1
0.4 TABLET SUBLINGUAL
Status: DISCONTINUED | OUTPATIENT
Start: 2024-02-09 | End: 2024-02-23 | Stop reason: HOSPADM

## 2024-02-09 RX ADMIN — FENTANYL CITRATE 25 MCG: 50 INJECTION, SOLUTION INTRAMUSCULAR; INTRAVENOUS at 06:46

## 2024-02-09 RX ADMIN — SODIUM CHLORIDE, POTASSIUM CHLORIDE, SODIUM LACTATE AND CALCIUM CHLORIDE: 600; 310; 30; 20 INJECTION, SOLUTION INTRAVENOUS at 10:40

## 2024-02-09 RX ADMIN — HEPARIN SODIUM 5000 UNITS: 5000 INJECTION INTRAVENOUS; SUBCUTANEOUS at 07:22

## 2024-02-09 RX ADMIN — METOPROLOL TARTRATE 2.5 MG: 1 INJECTION, SOLUTION INTRAVENOUS at 18:22

## 2024-02-09 RX ADMIN — ROCURONIUM BROMIDE 20 MG: 10 INJECTION, SOLUTION INTRAVENOUS at 10:01

## 2024-02-09 RX ADMIN — ROCURONIUM BROMIDE 50 MG: 10 INJECTION, SOLUTION INTRAVENOUS at 07:40

## 2024-02-09 RX ADMIN — ROCURONIUM BROMIDE 10 MG: 10 INJECTION, SOLUTION INTRAVENOUS at 11:03

## 2024-02-09 RX ADMIN — Medication 10 MG: at 13:45

## 2024-02-09 RX ADMIN — INDOCYANINE GREEN 7.5 MG: KIT INTRAVENOUS at 09:33

## 2024-02-09 RX ADMIN — MAGNESIUM SULFATE HEPTAHYDRATE 1 G: 500 INJECTION, SOLUTION INTRAMUSCULAR; INTRAVENOUS at 07:51

## 2024-02-09 RX ADMIN — MAGNESIUM SULFATE HEPTAHYDRATE 1 G: 500 INJECTION, SOLUTION INTRAMUSCULAR; INTRAVENOUS at 08:10

## 2024-02-09 RX ADMIN — FENTANYL CITRATE 50 MCG: 50 INJECTION, SOLUTION INTRAMUSCULAR; INTRAVENOUS at 07:40

## 2024-02-09 RX ADMIN — Medication 10 ML: at 20:37

## 2024-02-09 RX ADMIN — ROCURONIUM BROMIDE 10 MG: 10 INJECTION, SOLUTION INTRAVENOUS at 12:50

## 2024-02-09 RX ADMIN — Medication 10 MG: at 08:50

## 2024-02-09 RX ADMIN — Medication 50 MCG: at 08:54

## 2024-02-09 RX ADMIN — Medication 10 MG: at 14:45

## 2024-02-09 RX ADMIN — BUPIVACAINE HYDROCHLORIDE 20 ML: 2.5 INJECTION, SOLUTION EPIDURAL; INFILTRATION; INTRACAUDAL; PERINEURAL at 06:49

## 2024-02-09 RX ADMIN — EPHEDRINE SULFATE 10 MG: 50 INJECTION INTRAVENOUS at 08:10

## 2024-02-09 RX ADMIN — ROCURONIUM BROMIDE 20 MG: 10 INJECTION, SOLUTION INTRAVENOUS at 12:00

## 2024-02-09 RX ADMIN — Medication 10 MG: at 12:50

## 2024-02-09 RX ADMIN — HYDROMORPHONE HYDROCHLORIDE 0.5 MG: 1 INJECTION, SOLUTION INTRAMUSCULAR; INTRAVENOUS; SUBCUTANEOUS at 17:00

## 2024-02-09 RX ADMIN — FAMOTIDINE 20 MG: 10 INJECTION INTRAVENOUS at 06:34

## 2024-02-09 RX ADMIN — SODIUM CHLORIDE, POTASSIUM CHLORIDE, SODIUM LACTATE AND CALCIUM CHLORIDE: 600; 310; 30; 20 INJECTION, SOLUTION INTRAVENOUS at 13:57

## 2024-02-09 RX ADMIN — ALBUMIN (HUMAN): 12.5 INJECTION, SOLUTION INTRAVENOUS at 08:55

## 2024-02-09 RX ADMIN — VASOPRESSIN 2 UNITS: 20 INJECTION, SOLUTION INTRAVENOUS at 12:14

## 2024-02-09 RX ADMIN — SODIUM CHLORIDE, POTASSIUM CHLORIDE, SODIUM LACTATE AND CALCIUM CHLORIDE 9 ML/HR: 600; 310; 30; 20 INJECTION, SOLUTION INTRAVENOUS at 06:34

## 2024-02-09 RX ADMIN — MIDAZOLAM 1 MG: 1 INJECTION INTRAMUSCULAR; INTRAVENOUS at 06:45

## 2024-02-09 RX ADMIN — EPHEDRINE SULFATE 10 MG: 50 INJECTION INTRAVENOUS at 12:20

## 2024-02-09 RX ADMIN — HYDROMORPHONE HYDROCHLORIDE 0.5 MG: 1 INJECTION, SOLUTION INTRAMUSCULAR; INTRAVENOUS; SUBCUTANEOUS at 15:46

## 2024-02-09 RX ADMIN — Medication 10 MG: at 10:50

## 2024-02-09 RX ADMIN — LIDOCAINE HYDROCHLORIDE 100 MG: 20 INJECTION, SOLUTION INFILTRATION; PERINEURAL at 07:40

## 2024-02-09 RX ADMIN — HYDROMORPHONE HYDROCHLORIDE: 10 INJECTION, SOLUTION INTRAMUSCULAR; INTRAVENOUS; SUBCUTANEOUS at 16:44

## 2024-02-09 RX ADMIN — FENTANYL CITRATE 50 MCG: 50 INJECTION, SOLUTION INTRAMUSCULAR; INTRAVENOUS at 17:00

## 2024-02-09 RX ADMIN — HYDROMORPHONE HYDROCHLORIDE 0.5 MG: 1 INJECTION, SOLUTION INTRAMUSCULAR; INTRAVENOUS; SUBCUTANEOUS at 17:30

## 2024-02-09 RX ADMIN — Medication 3 ML: at 20:38

## 2024-02-09 RX ADMIN — HYDROMORPHONE HYDROCHLORIDE 0.5 MG: 1 INJECTION, SOLUTION INTRAMUSCULAR; INTRAVENOUS; SUBCUTANEOUS at 16:25

## 2024-02-09 RX ADMIN — ROCURONIUM BROMIDE 10 MG: 10 INJECTION, SOLUTION INTRAVENOUS at 13:21

## 2024-02-09 RX ADMIN — SODIUM CHLORIDE, POTASSIUM CHLORIDE, SODIUM LACTATE AND CALCIUM CHLORIDE: 600; 310; 30; 20 INJECTION, SOLUTION INTRAVENOUS at 06:29

## 2024-02-09 RX ADMIN — ONDANSETRON 4 MG: 2 INJECTION INTRAMUSCULAR; INTRAVENOUS at 15:20

## 2024-02-09 RX ADMIN — PHENYLEPHRINE HYDROCHLORIDE 0.25 MCG/KG/MIN: 10 INJECTION, SOLUTION INTRAVENOUS at 09:00

## 2024-02-09 RX ADMIN — SODIUM CHLORIDE, POTASSIUM CHLORIDE, SODIUM LACTATE AND CALCIUM CHLORIDE: 600; 310; 30; 20 INJECTION, SOLUTION INTRAVENOUS at 10:21

## 2024-02-09 RX ADMIN — VANCOMYCIN HYDROCHLORIDE 1000 MG: 1 INJECTION, SOLUTION INTRAVENOUS at 07:03

## 2024-02-09 RX ADMIN — PROPOFOL 140 MG: 10 INJECTION, EMULSION INTRAVENOUS at 07:40

## 2024-02-09 RX ADMIN — VASOPRESSIN 2 UNITS: 20 INJECTION, SOLUTION INTRAVENOUS at 13:31

## 2024-02-09 RX ADMIN — FENTANYL CITRATE 50 MCG: 50 INJECTION, SOLUTION INTRAMUSCULAR; INTRAVENOUS at 17:30

## 2024-02-09 RX ADMIN — Medication 10 MG: at 09:50

## 2024-02-09 RX ADMIN — POTASSIUM CHLORIDE, DEXTROSE MONOHYDRATE AND SODIUM CHLORIDE 100 ML/HR: 150; 5; 450 INJECTION, SOLUTION INTRAVENOUS at 18:22

## 2024-02-09 RX ADMIN — BUPIVACAINE 10 ML: 13.3 INJECTION, SUSPENSION, LIPOSOMAL INFILTRATION at 06:49

## 2024-02-09 RX ADMIN — FENTANYL CITRATE 50 MCG: 50 INJECTION, SOLUTION INTRAMUSCULAR; INTRAVENOUS at 16:25

## 2024-02-09 RX ADMIN — ROCURONIUM BROMIDE 10 MG: 10 INJECTION, SOLUTION INTRAVENOUS at 14:12

## 2024-02-09 RX ADMIN — Medication 30 MG: at 07:50

## 2024-02-09 RX ADMIN — DEXAMETHASONE SODIUM PHOSPHATE 6 MG: 4 INJECTION, SOLUTION INTRA-ARTICULAR; INTRALESIONAL; INTRAMUSCULAR; INTRAVENOUS; SOFT TISSUE at 07:45

## 2024-02-09 RX ADMIN — GLYCOPYRROLATE 0.2 MG: 0.2 INJECTION INTRAMUSCULAR; INTRAVENOUS at 07:40

## 2024-02-09 RX ADMIN — EPHEDRINE SULFATE 10 MG: 50 INJECTION INTRAVENOUS at 14:31

## 2024-02-09 RX ADMIN — Medication 10 MG: at 11:50

## 2024-02-09 RX ADMIN — ROCURONIUM BROMIDE 20 MG: 10 INJECTION, SOLUTION INTRAVENOUS at 08:42

## 2024-02-09 NOTE — ANESTHESIA POSTPROCEDURE EVALUATION
"Patient: Jourdan Lebron    Procedure Summary       Date: 02/09/24 Room / Location: [facility] OR [address] [place] [address] MAIN OR    Anesthesia Start: 0730 Anesthesia Stop: 1600    Procedure: BRONCHOSCOPY, EGD, ESOPHAGECTOMY ALTAGRACIA-FABIOLA WITH DAVINCI ROBOT, LAPAROSCOPY, RIGHT THORACOSCOPY CONVERTED TO THORACOTOMY, FEEDING JEJUNOSTOMY TUBE PLACEMENT, INTERCOSTAL NERVE BLOCKS (Esophagus) Diagnosis:       Malignant neoplasm of lower third of esophagus      (Malignant neoplasm of lower third of esophagus [C15.5])    Surgeons: Saurabh Hurtado MD Provider: Chris Mann MD    Anesthesia Type: general with block, Marcelina ASA Status: 3            Anesthesia Type: general with block, Marcelina    Vitals  Vitals Value Taken Time   /88 02/09/24 1730   Temp 36.9 °C (98.5 °F) 02/09/24 1600   Pulse 88 02/09/24 1738   Resp 16 02/09/24 1730   SpO2 98 % 02/09/24 1738   Vitals shown include unfiled device data.        Post Anesthesia Care and Evaluation    Patient location during evaluation: bedside  Patient participation: complete - patient participated  Level of consciousness: awake and alert  Pain management: adequate    Airway patency: patent  Anesthetic complications: No anesthetic complications    Cardiovascular status: acceptable  Respiratory status: acceptable  Hydration status: acceptable    Comments: /88 (BP Location: Right arm, Patient Position: Lying)   Pulse 99   Temp 36.9 °C (98.5 °F) (Oral)   Resp 16   Ht 177.8 cm (70\")   Wt 69.5 kg (153 lb 3.5 oz)   SpO2 98%   BMI 21.98 kg/m²     "

## 2024-02-09 NOTE — CONSULTS
[facility]    Reason for consult: Critical care co-management    HPI:  Mr. Lebron is a 52yo male s/p esophagectomy for adenocarcinoma of the lower 1/3 of the esophagus s/p neoadjuvant chemoxrt.  He is currently in ICU recovering. He has just gotten narcotics for pain and is currently a bit sleepy but he does arouse and is appropriate and does  not endorse any unexpected problems but  most history is obtained from the chart for this reason.    Past Medical History:   Diagnosis Date    Abnormal ECG     Acute adrenal crisis 2023    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 2023    Adrenal cortical hypofunction 2021    Anxiety     Brain fog     COVID 2023    Diverticulosis of large intestine without perforation or abscess without bleeding 10/02/2023    Esophageal cancer 10/2023    GERD (gastroesophageal reflux disease)     Hand tingling     History of blood clot in brain     had thrombectomy    History of cancer chemotherapy 2023    History of radiation therapy 2023    Hyperlipidemia     Stroke 2020    Tiredness      Social History     Socioeconomic History    Marital status:    Tobacco Use    Smoking status: Former     Packs/day: 1.00     Years: 37.00     Additional pack years: 0.00     Total pack years: 37.00     Types: Cigars, Cigarettes     Start date: 1985     Quit date: 2022     Years since quittin.1     Passive exposure: Past    Smokeless tobacco: Never    Tobacco comments:     1 packs= stopped 10 years ago and continued cigars   2 cigars a day; has stopped cigars as of    Vaping Use    Vaping Use: Never used   Substance and Sexual Activity    Alcohol use: Not Currently     Comment: Has now been abstinent for about one year    Drug use: Never    Sexual activity: Yes     Partners: Female     Birth control/protection: None     Family History   Problem Relation Age of Onset    Arthritis Mother     Hypertension Mother      Heart failure Mother 73        Cause of death    Arthritis Father     Hypertension Father     Kidney cancer Father 57        Cause of death. Was a smoker    Heart disease Brother     Esophageal cancer Brother 59        Cause of death. Has a heavy drinker    Malig Hyperthermia Neg Hx      MEDS: reviewed as per chart  ALL: list of 7 reviewed as per chart  ROS: UTO; reviewed that as per Hand P however    Vital Sign Min/Max for last 24 hours  Temp  Min: 97.5 °F (36.4 °C)  Max: 98.5 °F (36.9 °C)   BP  Min: 92/68  Max: 127/88   Pulse  Min: 49  Max: 105   Resp  Min: 14  Max: 16   SpO2  Min: 97 %  Max: 100 %   Flow (L/min)  Min: 4  Max: 4   Weight  Min: 69.5 kg (153 lb 3.5 oz)  Max: 69.5 kg (153 lb 3.5 oz)     GEN:   appears ill, sleepy but does arouse and is ortiented x3  HEENT: PERRL, EOMI, no icterus, mmm, no JVD, trachea midline, neck supple  CHEST: CTA bilat, no wheezes, no crackles, no use of accessory muscles  CV: RRR, no m/g/r  ABD: soft, nt, nd +bs, no hepatosplenomegaly  EXT: no c/c/e  SKIN: no rashes, no xanthomas, nl turgor, warm,dry  LYMPH: no palpable cervical or supraclavicular lymphadenopathy  NEURO: CN 2-12 intact and symmetric bilaterally  PSYCH: nl affect, nl orientation, nl judgment, nl mood  MSK: no kyphocoliosis, 5/5 strength ue and le bilterally    Labs: 2/9: reviewed:  Glucose 159  Bun 10  Cr 0.79  Na 135  Bicarb 21  Wbc 10.6  Hgb 12.1  Plts 225    CXR: 2/9: reviewed expected post operative changes as per dictated report    A/P:  Adenocarcinoma of the lower third of the esophagus s/p neoadjuvant chemo/xrt and now s/p esophagectomy -- monitor in ICU; supportive care, follow recommendations per Thoracic surgery, strict NPO and NO pap therapy.  H/o adrenocortical hypofunction -- doesn't appear he was on any cortisol or anything prior to admission, monitor  H/o CVA  Pain control -- on pca  Dvt prophylaxis -- on lovenox to start tomorrow  Gerd -- on ppi

## 2024-02-09 NOTE — ANESTHESIA PROCEDURE NOTES
Peripheral Block      Patient reassessed immediately prior to procedure    Patient location during procedure: pre-op  Reason for block: at surgeon's request and post-op pain management  Performed by  Anesthesiologist: Chris Mann MD  Preanesthetic Checklist  Completed: patient identified, IV checked, site marked, risks and benefits discussed, surgical consent, monitors and equipment checked, pre-op evaluation and timeout performed  Prep:  Pt Position: sitting  Sterile barriers:cap, gloves, mask and washed/disinfected hands  Prep: ChloraPrep  Patient monitoring: continuous pulse oximetry, blood pressure monitoring and EKG  Procedure    Sedation: yes    Guidance:ultrasound guided  Images:still images obtained, printed/placed on chart    Laterality:right  Block Type:erector spinae block  Injection Technique:single-shot  Needle Type:echogenic  Needle Gauge:21 G      Medications Used: bupivacaine liposome (EXPAREL) 1.3 % injection - Infiltration   10 mL - 2/9/2024 6:49:00 AM  bupivacaine PF (MARCAINE) 0.25 % injection - Injection   20 mL - 2/9/2024 6:49:00 AM      Post Assessment  Injection Assessment: negative aspiration for heme, incremental injection and no paresthesia on injection  Patient Tolerance:comfortable throughout block  Complications:no

## 2024-02-09 NOTE — ANESTHESIA PREPROCEDURE EVALUATION
Anesthesia Evaluation     Patient summary reviewed and Nursing notes reviewed   NPO Solid Status: > 6 hours  NPO Liquid Status: > 2 hours           Airway   Mallampati: II  TM distance: >3 FB  Neck ROM: full  Dental - normal exam     Pulmonary    (-) COPD, asthma, not a smoker  Cardiovascular   Exercise tolerance: good (4-7 METS)    ECG reviewed    (+) hyperlipidemia  (-) past MI, dysrhythmias, angina    ROS comment: Nl LV/RV fxn, no significant valve issues on preop echo.     Neuro/Psych  (+) CVA (About three years ago. No residual deficit.), psychiatric history Anxiety  (-) seizures  GI/Hepatic/Renal/Endo    (+) GERD well controlled  (-) liver disease, no renal disease, diabetes, no thyroid disorder    Musculoskeletal     (+) neck pain  Abdominal    Substance History      OB/GYN          Other                    Anesthesia Plan    ASA 3     general with block and [place]       Anesthetic plan, risks, benefits, and alternatives have been provided, discussed and informed consent has been obtained with: patient.    CODE STATUS:

## 2024-02-09 NOTE — ANESTHESIA PROCEDURE NOTES
Arterial Line      Patient reassessed immediately prior to procedure    Patient location during procedure: pre-op   Line placed for hemodynamic monitoring.  Performed By   Anesthesiologist: Chris Mann MD   Preanesthetic Checklist  Completed: patient identified, IV checked, site marked, risks and benefits discussed, surgical consent, monitors and equipment checked, pre-op evaluation and timeout performed  Arterial Line Prep    Sterile Tech: cap, gloves, mask and sterile barriers  Prep: ChloraPrep  Patient monitoring: blood pressure monitoring, continuous pulse oximetry and EKG  Arterial Line Procedure   Laterality:left  Location:  radial artery  Catheter size: 20 G   Guidance: ultrasound guided  PROCEDURE NOTE/ULTRASOUND INTERPRETATION.  Using ultrasound guidance the potential vascular sites for insertion of the catheter were visualized to determine the patency of the vessel to be used for vascular access.  After selecting the appropriate site for insertion, the needle was visualized under ultrasound being inserted into the radial artery, followed by ultrasound confirmation of wire and catheter placement. There were no abnormalities seen on ultrasound; an image was taken; and the patient tolerated the procedure with no complications.   Number of attempts: 1  Successful placement: yes Images: still images not obtained  Post Assessment   Dressing Type: occlusive dressing applied and secured with tape.   Complications no  Circ/Move/Sens Assessment: normal and unchanged.   Patient Tolerance: patient tolerated the procedure well with no apparent complications

## 2024-02-09 NOTE — OP NOTE
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  Dysphagia.  History of cerebrovascular accident.  History of tobacco abuse.  History of left adrenalectomy.  History of adrenal crisis.  Cervical radiculopathy.  Cervical stenosis of spinal canal.  Near syncope.  Generalized hyperhidrosis.  Renal calculus.  Abnormal electrocardiogram.  Vitamin B12 deficiency.  Seasonal allergic rhinitis.  Erythrocytosis.  Macrocytosis.  Generalized anxiety disorder.  Major depressive disorder.     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  Dysphagia.  History of cerebrovascular accident.  History of tobacco abuse.  History of left adrenalectomy.  History of adrenal crisis.  Cervical radiculopathy.  Cervical stenosis of spinal canal.  Near syncope.  Generalized hyperhidrosis.  Renal calculus.  Abnormal electrocardiogram.  Vitamin B12 deficiency.  Seasonal allergic rhinitis.  Erythrocytosis.  Macrocytosis.  Generalized anxiety disorder.  Major depressive disorder.     Procedure performed:   Flexible bronchoscopy.  Flexible esophagogastroduodenoscopy  CRE balloon dilation of the pylorus up to 20 mm.  Robot-assisted minimally-invasive Vinicio Shayne esophagectomy (via a robot-assisted laparoscopic and robot-assisted right thoracoscopic approach converted to thoracotomy).  Robot-assisted laparoscopic feeding jejunostomy tube placement  Intercostal nerve block.     Indications:   Jourdan Lebron is a 53-year-old male who has significant medical problems as mentioned in the medical chart.      He was having intermittent dysphagia for over a year which became progressively worse.  On 10/2/2023, he underwent EGD and colonoscopy by Dr. Ozzy Abdalla at [facility].  He was found to have a  6 cm mass at the lower third of the esophagus (36 to 42 cm) (biopsied), mild diverticulosis of the sigmoid colon, 5 polyps noted in the colon and cecum that were removed.  The pathology of the esophageal mass revealed invasive moderate to poorly differentiated adenocarcinoma. All polypectomy samples were negative for malignancy (fragments of tubular adenoma).  CT scan of the chest, abdomen and pelvis on 10/9/2023 at [facility] showed 4 cm abnormal thickening of the lower thoracic esophagus extending to the GE junction thought to represent patient's known primary lung malignancy.  There were no convincing evidence of metastatic disease elsewhere in the chest, abdomen, pelvis, or skeleton.  There were stable left adrenalectomy changes, right adrenal adenoma unchanged.     He was seen in the office by Dr. Tacos Osuna ([facility]) for new diagnosis of esophageal cancer. He was an established patient of Dr. Harinder Mueller for erythrocytosis and macrocytosis since 11/2021 (resolved).      PET/CT on 10/20/2023 at [facility] showed hypermetabolic (SUV 7.7) activity within the distal esophagus extending to the GE junction compatible with patient's known malignancy.  He then underwent EGD with EUS by Dr. Joselyn Savage on 10/27/2023. Findings included close to 6 cm ulcerated mass extending from 36 to 42 cm consistent with poorly differentiated adenocarcinoma.  Mass penetrated through muscularis propria without involving the adventitia consistent with T2 lesion. Two small round hypodense lymph nodes in close proximity to the mass measuring 5 and 6 mm concerning for malignancy. Other lymph nodes around the GE junction measuring 7 and 8 mm were also concerning for malignancy but immediate cytology was negative for malignancy. Pathology of the gastrohepatic lymph node was positive for metastatic adenocarcinoma; however, the lymph node at 36 cm was negative for malignancy.     He was referred to thoracic surgery for  further evaluation.  At the time of initial consultation, he could eat and swallow quite a bit, but it depends. He avoids a lot of bread because it gets stuck lower down his chest. He went down from 200 pounds to 170 pounds 3 years ago after he had a stroke. He had memory loss and general fatigue after the stroke. He lost his appetite but began to add some weight. He then noticed he gets bloated and was having dyspepsia. He had adrenalectomy performed at the [facility] after an adrenal biopsy was done on an adrenal mass, and he went into adrenal crisis. He took hydrocortisone for 1 year and discontinued it on 09/2022. He developed abdominal discomfort afterwards. He denies having any past chest surgeries or myocardial infarction.  His wife mentioned the cause of the previous stroke was never known despite having a complete work up and a loop recorder inserted which was removed in the spring. He denies any abnormal heart rhythms or pedal edema. The patient quit smoking cigarettes 8 to 9 years ago and started smoking cigars.      He had good functional status.  Based on the clinical presentation, he was considered a candidate for trimodality treatment. I placed Mediport on 11/13/2023.  He received concurrent chemoradiation therapy.  His last dose of chemotherapy and radiation was on 12/28/2023.     He came to clinic for follow-up visit.  He has been able to tolerate soft food without difficulty.  He has tolerated chemoradiation without much difficulty.  Restaging PET/CT scan on 2/2/2024 showed wall thickening involving the distal esophagus extending the GE junction consistent with patient's known esophageal malignancy.  There was no evidence of distant metastases.  There was significant decreased uptake in the distal esophagus consistent with treatment effect.     Due to his history of stroke, ultrasound bilateral carotid were obtained which showed no significant carotid stenosis.  There was incidentally  noted right thyroid nodule for which follow-up ultrasound of the thyroid was done which showed multiple nodules but not concerning for malignancy.  Transthoracic echo was performed on 1/22/2024 and noted ejection fraction of 51 to 55%.  He was sent to Dr. Hayward for cardiology clearance.  He had 0.8% risk of myocardial infarction or cardiac arrest, intraoperatively or up to 30 days postoperatively.  No further workup was recommended.  He had a history of adrenal crisis and was sent to evaluation by his primary endocrinologist at [facility], Anabela Crenshaw MD.  Complete ACTH stimulation test was performed which was reported normal.     After discussing the risks and benefits of the procedure, the patient provided informed consent for a flexible bronchoscopy, flexible esophagogastroduodenoscopy, robot-assisted minimally-invasive (possible open) Vinicio Shayne esophagectomy, feeding jejunostomy tube placement, and balloon dilatation of pylorus.         Surgeon:   Saurabh Hurtado MD (primary).  Cynthia Sexton MD     Assistants: Dieudonne Diamond CSA was responsible for performing the following activities: Retraction, Suction, Irrigation, Suturing, Closing, Placing Dressing and Held/Positioned Camera and their skilled assistance was necessary for the success of this case.    Anesthesia: General endotracheal anesthesia with double-lumen tube     ASA Class: 3     Procedure Details   On 2/9/2024, the patient was brought to the operating room and was placed supine on the operating room table. Following an uneventful induction of general anesthesia, the patient was intubated without incident. Pneumatic compression devices were placed on bilateral lower extremities. A Kendrick catheter was inserted. A radial arterial line and additional peripheral IVs were placed by the anesthesia team. The patient received IV vancomycin for intravenous antibiotic prophylaxis and 5000 U SQH for DVT prophylaxis.  Prior to  beginning the operation, a time out was conducted during which all members of the surgical team were present.      A flexible adult endoscope was placed into the oropharynx and advanced down the proximal esophagus under direct vision.  The cricopharyngeus was located at 16 cm. The proximal esophagus appeared normal. There was evidence of distal esophageal mucosal changes and Olivera's esophagus.  There was no evidence of mass at the prior site of esophageal cancer.  The endoscope was advanced into the stomach without difficulty and retroflexed.  There was no tumor extension on to the stomach.  The remainder of the stomach was normal.  The pylorus was widely patent, and the 1st and 2nd portions of the duodenum appeared normal.  I advanced an 18-19-20 CRE balloon down the working channel of the endoscope and dilated the pylorus.  I performed three serial dilatations up to 20 mm for 60 seconds each.  Repeat endoscopy demonstrated no mucosal tears.  The duodenum, stomach and esophagus were evacuated free of air and debris, the endoscope was removed.     A footboard was placed.  Both arms were tucked.  All bony prominences were well padded. His lower chest and abdomen were prepped and draped in usual sterile fashion.  I made a stab incision below the left costal margin and inserted a Veress needle into the peritoneal cavity without incident.  Pneumoperitoneum 15 mmHg was achieved. Next, I made an 5-mm transverse incision just to the left of midline and above the umbilicus 15 cm caudal to the xiphoid process.  The peritoneal cavity was entered through this incision via a 5 mm trocar via the Optiview technique using 5 mm 0 degree camera.  The port was up-sized to 8 mm da Eusebio port. The abdomen was explored.  There was no ascites, liver lesions, peritoneal lesions, or omental implants.   Next, I placed my additional ports, all parallel to one another and perpendicular to the midline axis.  An 8 mm (arm 4) port was placed  "laterally in the left mid abdomen.  A 12-mm (arm 3) port midway between the camera and arm 4 port, at his prior port site.   A 12 mm (arm 1) port was placed in the right mid abdomen in line with camera port.  A 12 mm assistant port was placed in the right lower quadrant triangulated between the arm 1 and arm 2 ports at his prior right lower quadrant port site.  The patient was positioned in steep reverse Trendelenburg.  A 5-mm port was placed in the right upper quadrant at the costal margin laterally.  A self-retaining snake liver retractor was inserted through the right upper quadrant trocar to facilitate exposure of the hiatus.  The robot was docked in standard fashion.  All instruments were inserted under direct vision.      I turned my attention to the upper abdomen.   I began by mobilizing the stomach.  During the entire dissection, a \"no-touch\" technique was utilized to avoid trauma to the eventual gastric conduit.  The gastrohepatic ligament was taken down directly above the caudate lobe using SynchroSeal energy device.   The dissection was continued towards the base of the right marilee.  I began to define the medial border of the right crural pillar using a combination of blunt and sharp dissection with SynchroSeal energy device.  The dissection was continued up and over the apex of the hiatus.  All pericardial soft tissues were swept posteriorly onto the specimen.       I then began to define the apex of the medial border of the left crural pillar using a SynchroSeal energy device. I turned my attention back to the lesser curvature.  I entered a well-defined plane between the aorta and the periesophageal soft tissues and began to mobilize the esophagus posteriorly and the mediastinum using a vessel sealer. Next, I skeletonized lymphatic tissue off the left gastric vascular pedicle, sweeping all lymphatic tissue upwards towards the specimen.  The left gastric pedicle was divided with a 45-mm curved tip da " Eusebio vascular load stapler. I continued to work underneath the lesser curvature and identified the decussation of the crural pillars.  Retrogastric attachments to the left marilee were taken down and the highest short gastrics were taken down using SynchroSeal energy device.   The distal esophagus was encircled with a 0.5 inch Penrose drain. The esophagus was circumferentially mobilized into the mediastinum to near the level of the inferior pulmonary veins.  All pericardial soft tissues were dissected off the pericardium anteriorly.  I circumferentially dissected around the esophagus in a well-defined areolar planes.        I turned my attention to the greater curvature of the stomach. Directly cephalad to the proximal extent of the pedicle, the lesser sac was entered taking care to stay approximately 1 or 2 cm off the stomach.  The short gastrics were taken down all the way to the angle of His.  I continued to take down attachments between the stomach and the left hemidiaphragm and continue to define the medial border of the left crural pillar using SynchroSeal energy device.       I proceeded to mobilize the stomach by mobilizing the greater omentum off the transverse colon and transverse mesocolon to the level of the pylorus.  Remaining retro gastric attachments were taken down.  I proceeded to perform a mini-Kocher maneuver by taking down the remaining retro gastric attachments and antro-pyloric attachments using a SynchroSeal energy device back to near the origin of the gastroepiploic pedicle.   I also took down some of the attachments to the lateral wall of the duodenum and hilum of the liver using a vessel sealer.  At this point, the pylorus easily reached the level of the right marilee, indicative of adequate mobilization.      Next, I constructed the gastric conduit.  Just above the 3rd branch of the right gastric artery, the lesser curve vascular pedicle was divided using a SynchroSeal energy device. While  providing simultaneous retraction cranially on the tip of the gastric fundus and caudally on the antrum to assure construction of a straight conduit, a 4 cm gastric tube was constructed using multiple firings of the 45 mm da Eusebio green stapler following a line parallel to the greater curvature of the stomach.  3 mL of indocyanine green were administered intravenously and flushed with 10 mL of saline.  With near infrared spectroscopy, I noted the entire conduit with the exception of the proximal 5 cm to rapidly perfuse.  Next, taking care to assure proper orientation (lesser curve staple line facing towards the patient's right side), the conduit was tacked to the specimen using two 0 silk U stitches.  The specimen was tucked the hiatus along with Penrose drain.  The robot was undocked.  The self-retraining liver retractor was removed under direct vision.  The patient was positioned supine.     The arm # 3 12 mm port was closed using figure-of-eight 0 Vicryl suture with Boyd Elliott.  I placed an 8 mm da Eusebio trocar in the right upper quadrant.  The robot was side docked.  Next, I turned my attention to placing a feeding jejunostomy tube. The transverse colon was identified and retracted cephalad. We then identified the ligament of Treitz. Approximately 40-cm distal to ligament of Treitz, we identified a freely mobile loop of jejunum to serve as the site of his feeding jejunostomy tube. Using a finder needle, we identified a suitable location on his abdominal wall. The antimesenteric border of the jejunum 40 cm distal to ligament of Treitz was tacked to the anterior abdominal wall at this location using 2-0 silk suture.  Using 2-0 PDS STRATAFIX™, I anchored to the antimesenteric 10 cm segment of jejunum to the abdominal wall.  A T-tube jejunostomy was inserted into the abdominal cavity.  An enterotomy was made using monopolar hot scissors.  The T-tube was inserted into the jejunum.  Using 2-0 Vicryl, a purse  string suture was placed around the enterotomy. The T-tube was retrieved through a small stab incision.  The 2-0 PDS STRATAFIX™ suture was ran cephalad anchoring the bowel to the abdominal wall.  The tube was injected with water and checked for leak.  There was no leakage noted.     Hemostasis was assured.  The robot was undocked.  The fascia of the 12-mm arm 1 and right lower quadrant assistant port sites were closed using 0 Vicryl sutures with the assistance of a Aura suture passer.  Pneumoperitoneum was evacuated.  The ports were removed.  The subdermal tissues with closed with 2-0 Vicryl suture.  The skin incision was closed with staples.  Dry sterile dressings were applied.     The patient was repositioned in a left lateral decubitus position.  The table was jackknifed.  All bony processes were well padded.  Appropriate placement of the endotracheal tube was again confirmed with a pediatric bronchoscope.  His right chest was prepped and draped in the usual sterile fashion. Following right lung isolation, I began by making an 8 mm oblique skin incision at approximately the 6th intercostal space in the posterior to mid axillary line.  The pleural space was entered without incident directly of the rib using blunt tonsil dissection.  An 8 mm trocar was inserted.  After assuring safe pleural entry, carbon dioxide insufflation to 8 mmHg was utilized.  Next, I placed my additional ports:  An 8 mm (arm 3) port was placed at approximately the 8th intercostal space in the mid to posterior axillary line.  An 8-mm (arm 4) port was placed at approximately the 4th intercostal space in the mid axillary line.  A 12 mm (arm 4) port was placed in line at the tip of the scapula at approximately the 9th to 10th intercostal space. The robot was docked in a standard fashion, targeting the right shoulder. All instruments were inserted under direct vision.       I turned my attention to the thoracic esophagus.  The Penrose  was immediately visible.  I proceeded to mobilize the esophagus by taking down the remainder of the inferior pulmonary ligament all the way to the inferior pulmonary vein. I proceeded to work anterior to the esophagus and took down the mediastinal pleura from the inferior pulmonary vein to the azygos vein using bipolar cautery.  The azygos vein was elevated.  Using bipolar cautery, the azygos vein was dissected free from the surrounding mediastinal soft tissues.  The pleura cephalad to the azygos vein was incised thereby creating an avascular window around the azygos vein.  The vein was then divided using a 45-mm da Eusebio curved tip vascular stapler.  I continued to work in the anterior plane from a caudal to cranial direction.  I skeletonized the lymphatics off the bronchus intermedius, right mainstem bronchus, and left mainstem bronchus using bipolar cautery, taking care to avoid airway injury.  The subcarinal lymph node packet was skeletonized off the airways and swept onto the specimen.  I turned my attention to the posterior plane.   The pleura posterior esophagus was taken down all the way to the azygos vein using a vessel sealer.  I continued to work in the posterior plane in a caudal to cranial direction and divided all aortoesophageal and lymphatic branches using SynchroSeal energy device.  I proceeded to mobilize the esophagus cephalad to the azygos vein.  Cephalad to the vein, the dissection proceeded directly on the esophagus to minimize the risk of recurrent laryngeal nerve injury.  The Penrose drain was grasped and utilized to facilitate retraction.  I worked back and forth between the anterior and posterior planes to completely mobilize the esophagus.  The dissection continued approximately midway between the azygos vein and the thoracic inlet. Taking care to assure proper orientation, the conduit was delivered in the chest.  The esophagus was then elevated and remaining posterior attachments were  taken down using  a vessel sealer.  The esophagus was circumferentially mobilized to midway between the azygos vein and thoracic inlet.  Using robotic oswaldo, the esophagus was divided 2 cm cephalad to the azygos vein.     I then made a 7 cm non-rib-spreading lateral mini thoracotomy two interspaces below the tip of the scapula.  The latissimus and serratus were divided.  The pleural space was entered without incident.  The ribs were not spread.  An Chris wound protector was utilized to facilitate exposure.  The esophagogastrectomy specimen was removed through the Chris wound retractor and sent to pathology.     Next, I turned my attention to completing the reconstruction.  A 0 Prolene purse string suture was placed in a baseball stitch fashion around the opening of the remnant esophagus.  A 28 EEA anvil was delivered in the chest, placed into the esophagus.  The purse string suture was tied down.  Another 0 Prolene purse string suture was placed to gather a small rosebud around the stem of the anvil.  The robot was temporarily undocked.  We had some difficulty performing intracorporeal EEA anastomosis.  The anvil was not functioning properly and decision was made to redo the anastomosis.  We converted the minithoracotomy to formal thoracotomy incision.  The EEA anvil was removed.  Another baseball pursestring suture was placed using 0 Prolene suture.  The anvil was advanced into the esophagus.  The pursestring suture was tied down.  Another 0 Prolene pursestring suture was placed together a small rosebud around the stem of the anvil.  The conduit was delivered in the chest taking care to assure proper orientation (lesser curve staple line facing upwards and avoiding delivery of access conduit into the chest).  The conduit appearing pink and healthy.  A gastrotomy was made in the tip of conduit just anterior to the lesser curve staple line with cautery.  A 28 EEA stapler was advanced through the thoracotomy  incision and into the gastrotomy.  The EEA stapler spike was deployed posteriorly directly opposite to lesser curve staple line near a well vascularized pedicle.  The stapler then was docked to the anvil, fired, and removed in a standard fashion.  The anvil was inspected.  One complete esophageal anastomotic ring and partial complete gastric donut was noted and sent to pathology as a permanent specimens.  We are concerned about the integrity of the anastomosis.  EGD was performed a leak was identified at the anastomosis.  We placed several figure-of-eight imbricating sutures to reinforce the staple line.  Air leak test was performed again and no leak was identified. The chest was irrigated with 2 liter of warm saline.  Hemostasis was assured.  The conduit was inspected and noted to be pink and healthy as well as non-redundant.  A #10 Leroy-Oconnell drain was placed through a stab incision in the right lower chest and positioned posterior to the conduit.  The drain was secured to the skin using Ethibond sutures.  A #24 Indian straight chest tube was advanced through the camera port and directed posteriorly and towards the apex.  The chest tube was secured to the skin using #2 Ethibond suture.  Using combination of 0.25% Marcaine and 1.3% liposomal bupivacaine, intercostal nerve block was performed.  The lung was re-expanded under direct vision.  The ports were removed.  The port sites were irrigated.     The ribs were approximated using #2 Vicryl suture.  The muscle layer was closed using a running 0 Vicryl suture.  The latissimus dorsi was closed using a running 0 Vicryl suture.  The subcutaneous tissues were closed using 2-0 Vicryl suture.  The fascia of the 12-mm ports was closed with 0 Vicryl suture.  The subcutaneous tissues were closed using 2-0 Vicryl suture.  The skin incisions were closed with 4-0 Monocryl subcuticular suture.   Dry sterile dressings were applied.       The patient was repositioned supine.  Repeat endoscopy was performed.  An intact, patent esophagogastrostomy was noted at 22-24 cm.  The conduit appeared healthy, mildly-tortuous and non-redundant. A nasogastric tube was advanced down the conduit under endoscopic visualization.  The endoscope was removed.    I performed the laparoscopic and majority of thoracoscopic portion of the case.  Dr. Cynthia Sexton was available for the critical part of the laparoscopic and thoracoscopic procedure.  She was scrubbed for the thoracoscopy and thoracotomy part of the procedure.  We performed the anastomosis together.  Her participation in services was required for the complex procedure.     Findings:  Flexible esophagogastroduodenoscopy was notable for mucosal changes at the distal esophagus and Olivera's esophagus.  Findings were consistent with treatment effect.  Rather than a gastric emptying procedure, I performed a balloon dilatation of the pylorus to 20 mm with a CRE balloon  I performed a robot-assisted minimally-invasive Vinicio Shayne esophagectomy via a robot-assisted laparoscopic and robot assisted right thoracoscopic approach converted to thoracotomy.   I constructed a 4 cm gastric tube and performed an esophagogastrostomy approximately 2 cm above the azygos vein using a 28 EEA stapler.  After firing the stapler, 1 complete esophageal anastomotic ring and a partial gastric anastomotic ring was noted.  The anastomosis was checked under water with air insufflation and leak was identified which was oversewn.  The anastomosis was partially imbricated and no leak was identified under water with air insufflation afterwards.  A feeding jejunostomy tube was placed 40 cm distal to ligament of Treitz.  Repeat endoscopy was notable for a widely patent anastomosis at 22-24 cm.  The conduit was healthy appearing and mildly tortuous and non redundant.  The patient remained hemodynamically stable throughout the case.  He received 4000 cc of crystalloid and made 1000 cc of  urine.  He required small doses of phenylephrine throughout the case.     Estimated Blood Loss: 200 ml           Drains:   24-Mexican right chest tube.  #10 right chest Leroy-Oconnell positioned posterior to the anastomosis.                  Specimens:  ID Type Source Tests Collected by Time   A : GASTRIC FAT PAD Tissue Stomach TISSUE PATHOLOGY EXAM Saurabh Hurtado MD 2/9/2024 0937   B : LEVEL 4  LYMPH NODE FOR PERMENANT Tissue Lymph Node TISSUE PATHOLOGY EXAM Saurabh Hurtado MD 2/9/2024 1231   C : ESOPHAGUS Tissue Esophagus TISSUE PATHOLOGY EXAM Saurabh Hurtado MD 2/9/2024 1434   D : ESOPHAGEAL MARGIN Tissue Esophagus TISSUE PATHOLOGY EXAM Saurabh Hurtado MD 2/9/2024 1434   E : GASTRIC MARGIN Tissue Stomach TISSUE PATHOLOGY EXAM Saurabh Hurtado MD 2/9/2024 1435     Implants: None           Complications: None           Disposition: PACU - hemodynamically stable.           Condition: stable     Saurabh Hurtado MD   Thoracic Surgeon  [facility]

## 2024-02-09 NOTE — ANESTHESIA PROCEDURE NOTES
Airway  Urgency: elective    Date/Time: 2/9/2024 7:41 AM  Airway not difficult    General Information and Staff    Patient location during procedure: OR  Anesthesiologist: Chris Mann MD  CRNA/CAA: Stanislav Prado CRNA    Indications and Patient Condition  Indications for airway management: airway protection    Preoxygenated: yes  MILS maintained throughout  Mask difficulty assessment: 1 - vent by mask    Final Airway Details  Final airway type: endotracheal airway      Successful airway: EBT - double lumen left  Cuffed: yes   Successful intubation technique: direct laryngoscopy  Blade: Duane  Blade size: 4  EBT DL size (fr): 39  Cormack-Lehane Classification: grade I - full view of glottis  Placement verified by: chest auscultation, bronchoscopy and capnometry   Measured from: teeth  ETT/EBT  to teeth (cm): 29  Number of attempts at approach: 1  Assessment: lips, teeth, and gum same as pre-op and atraumatic intubation

## 2024-02-10 ENCOUNTER — APPOINTMENT (OUTPATIENT)
Dept: GENERAL RADIOLOGY | Facility: HOSPITAL | Age: 54
DRG: 375 | End: 2024-02-10
Payer: COMMERCIAL

## 2024-02-10 LAB
ANION GAP SERPL CALCULATED.3IONS-SCNC: 9.7 MMOL/L (ref 5–15)
BASOPHILS # BLD AUTO: 0.02 10*3/MM3 (ref 0–0.2)
BASOPHILS NFR BLD AUTO: 0.2 % (ref 0–1.5)
BUN SERPL-MCNC: 10 MG/DL (ref 6–20)
BUN/CREAT SERPL: 13 (ref 7–25)
CALCIUM SPEC-SCNC: 8.2 MG/DL (ref 8.6–10.5)
CHLORIDE SERPL-SCNC: 103 MMOL/L (ref 98–107)
CO2 SERPL-SCNC: 22.3 MMOL/L (ref 22–29)
CREAT SERPL-MCNC: 0.77 MG/DL (ref 0.76–1.27)
DEPRECATED RDW RBC AUTO: 53.6 FL (ref 37–54)
EGFRCR SERPLBLD CKD-EPI 2021: 108.4 ML/MIN/1.73
EOSINOPHIL # BLD AUTO: 0 10*3/MM3 (ref 0–0.4)
EOSINOPHIL NFR BLD AUTO: 0 % (ref 0.3–6.2)
ERYTHROCYTE [DISTWIDTH] IN BLOOD BY AUTOMATED COUNT: 15.4 % (ref 12.3–15.4)
GLUCOSE BLDC GLUCOMTR-MCNC: 114 MG/DL (ref 70–130)
GLUCOSE BLDC GLUCOMTR-MCNC: 131 MG/DL (ref 70–130)
GLUCOSE SERPL-MCNC: 152 MG/DL (ref 65–99)
HCT VFR BLD AUTO: 32.8 % (ref 37.5–51)
HGB BLD-MCNC: 11.4 G/DL (ref 13–17.7)
IMM GRANULOCYTES # BLD AUTO: 0.04 10*3/MM3 (ref 0–0.05)
IMM GRANULOCYTES NFR BLD AUTO: 0.3 % (ref 0–0.5)
LYMPHOCYTES # BLD AUTO: 0.35 10*3/MM3 (ref 0.7–3.1)
LYMPHOCYTES NFR BLD AUTO: 2.7 % (ref 19.6–45.3)
MAGNESIUM SERPL-MCNC: 2 MG/DL (ref 1.6–2.6)
MCH RBC QN AUTO: 33.6 PG (ref 26.6–33)
MCHC RBC AUTO-ENTMCNC: 34.8 G/DL (ref 31.5–35.7)
MCV RBC AUTO: 96.8 FL (ref 79–97)
MONOCYTES # BLD AUTO: 1.22 10*3/MM3 (ref 0.1–0.9)
MONOCYTES NFR BLD AUTO: 9.3 % (ref 5–12)
NEUTROPHILS NFR BLD AUTO: 11.51 10*3/MM3 (ref 1.7–7)
NEUTROPHILS NFR BLD AUTO: 87.5 % (ref 42.7–76)
NRBC BLD AUTO-RTO: 0 /100 WBC (ref 0–0.2)
PHOSPHATE SERPL-MCNC: 2.4 MG/DL (ref 2.5–4.5)
PLATELET # BLD AUTO: 226 10*3/MM3 (ref 140–450)
PMV BLD AUTO: 9.3 FL (ref 6–12)
POTASSIUM SERPL-SCNC: 4.4 MMOL/L (ref 3.5–5.2)
RBC # BLD AUTO: 3.39 10*6/MM3 (ref 4.14–5.8)
SODIUM SERPL-SCNC: 135 MMOL/L (ref 136–145)
WBC NRBC COR # BLD AUTO: 13.14 10*3/MM3 (ref 3.4–10.8)

## 2024-02-10 PROCEDURE — 82948 REAGENT STRIP/BLOOD GLUCOSE: CPT

## 2024-02-10 PROCEDURE — 94761 N-INVAS EAR/PLS OXIMETRY MLT: CPT

## 2024-02-10 PROCEDURE — 71045 X-RAY EXAM CHEST 1 VIEW: CPT

## 2024-02-10 PROCEDURE — 94799 UNLISTED PULMONARY SVC/PX: CPT

## 2024-02-10 PROCEDURE — 99024 POSTOP FOLLOW-UP VISIT: CPT | Performed by: SURGERY

## 2024-02-10 PROCEDURE — 83735 ASSAY OF MAGNESIUM: CPT | Performed by: SURGERY

## 2024-02-10 PROCEDURE — 25010000002 ENOXAPARIN PER 10 MG: Performed by: SURGERY

## 2024-02-10 PROCEDURE — 85025 COMPLETE CBC W/AUTO DIFF WBC: CPT | Performed by: SURGERY

## 2024-02-10 PROCEDURE — 25010000002 KETOROLAC TROMETHAMINE PER 15 MG: Performed by: SURGERY

## 2024-02-10 PROCEDURE — 94664 DEMO&/EVAL PT USE INHALER: CPT

## 2024-02-10 PROCEDURE — 25010000002 HYDROMORPHONE PER 4 MG: Performed by: SURGERY

## 2024-02-10 PROCEDURE — 25810000003 SODIUM CHLORIDE 0.9 % SOLUTION: Performed by: SURGERY

## 2024-02-10 PROCEDURE — 25010000002 HYDROMORPHONE HCL PF 50 MG/5ML SOLUTION: Performed by: SURGERY

## 2024-02-10 PROCEDURE — 80048 BASIC METABOLIC PNL TOTAL CA: CPT | Performed by: SURGERY

## 2024-02-10 PROCEDURE — 84100 ASSAY OF PHOSPHORUS: CPT | Performed by: SURGERY

## 2024-02-10 RX ORDER — HYDROMORPHONE HYDROCHLORIDE 1 MG/ML
0.5 INJECTION, SOLUTION INTRAMUSCULAR; INTRAVENOUS; SUBCUTANEOUS ONCE
Status: COMPLETED | OUTPATIENT
Start: 2024-02-10 | End: 2024-02-10

## 2024-02-10 RX ORDER — DIAZEPAM 5 MG/ML
2.5 INJECTION, SOLUTION INTRAMUSCULAR; INTRAVENOUS EVERY 4 HOURS PRN
Status: DISCONTINUED | OUTPATIENT
Start: 2024-02-10 | End: 2024-02-13

## 2024-02-10 RX ORDER — ACETYLCYSTEINE 200 MG/ML
3 SOLUTION ORAL; RESPIRATORY (INHALATION)
Status: DISCONTINUED | OUTPATIENT
Start: 2024-02-10 | End: 2024-02-11

## 2024-02-10 RX ORDER — HYDROMORPHONE HCL/0.9% NACL/PF 10 MG/50ML
PATIENT CONTROLLED ANALGESIA SYRINGE INTRAVENOUS CONTINUOUS
Status: DISCONTINUED | OUTPATIENT
Start: 2024-02-10 | End: 2024-02-12 | Stop reason: SDUPTHER

## 2024-02-10 RX ORDER — LIDOCAINE 4 G/G
1 PATCH TOPICAL
Status: DISCONTINUED | OUTPATIENT
Start: 2024-02-10 | End: 2024-02-23 | Stop reason: HOSPADM

## 2024-02-10 RX ORDER — HYDROMORPHONE HYDROCHLORIDE 1 MG/ML
0.25 INJECTION, SOLUTION INTRAMUSCULAR; INTRAVENOUS; SUBCUTANEOUS
Status: DISCONTINUED | OUTPATIENT
Start: 2024-02-10 | End: 2024-02-13

## 2024-02-10 RX ORDER — KETOROLAC TROMETHAMINE 15 MG/ML
15 INJECTION, SOLUTION INTRAMUSCULAR; INTRAVENOUS ONCE
Status: COMPLETED | OUTPATIENT
Start: 2024-02-10 | End: 2024-02-10

## 2024-02-10 RX ORDER — ACETAMINOPHEN 160 MG/5ML
500 SOLUTION ORAL EVERY 6 HOURS
Status: DISCONTINUED | OUTPATIENT
Start: 2024-02-10 | End: 2024-02-11

## 2024-02-10 RX ADMIN — ENOXAPARIN SODIUM 40 MG: 100 INJECTION SUBCUTANEOUS at 08:49

## 2024-02-10 RX ADMIN — ACETAMINOPHEN 500 MG: 160 SOLUTION ORAL at 21:42

## 2024-02-10 RX ADMIN — HYDROMORPHONE HYDROCHLORIDE 0.25 MG: 1 INJECTION, SOLUTION INTRAMUSCULAR; INTRAVENOUS; SUBCUTANEOUS at 23:53

## 2024-02-10 RX ADMIN — ACETYLCYSTEINE 3 ML: 200 INHALANT RESPIRATORY (INHALATION) at 10:57

## 2024-02-10 RX ADMIN — LIDOCAINE 1 PATCH: 4 PATCH TOPICAL at 10:46

## 2024-02-10 RX ADMIN — PANTOPRAZOLE SODIUM 40 MG: 40 INJECTION, POWDER, FOR SOLUTION INTRAVENOUS at 06:13

## 2024-02-10 RX ADMIN — METOPROLOL TARTRATE 2.5 MG: 1 INJECTION, SOLUTION INTRAVENOUS at 19:14

## 2024-02-10 RX ADMIN — HYDROMORPHONE HYDROCHLORIDE 0.25 MG: 1 INJECTION, SOLUTION INTRAMUSCULAR; INTRAVENOUS; SUBCUTANEOUS at 20:30

## 2024-02-10 RX ADMIN — METOPROLOL TARTRATE 2.5 MG: 1 INJECTION, SOLUTION INTRAVENOUS at 12:45

## 2024-02-10 RX ADMIN — HYDROMORPHONE HYDROCHLORIDE 0.25 MG: 1 INJECTION, SOLUTION INTRAMUSCULAR; INTRAVENOUS; SUBCUTANEOUS at 09:37

## 2024-02-10 RX ADMIN — ACETYLCYSTEINE 3 ML: 200 INHALANT RESPIRATORY (INHALATION) at 15:55

## 2024-02-10 RX ADMIN — IPRATROPIUM BROMIDE 0.5 MG: 0.5 SOLUTION RESPIRATORY (INHALATION) at 10:51

## 2024-02-10 RX ADMIN — POTASSIUM CHLORIDE, DEXTROSE MONOHYDRATE AND SODIUM CHLORIDE 100 ML/HR: 150; 5; 450 INJECTION, SOLUTION INTRAVENOUS at 15:35

## 2024-02-10 RX ADMIN — POTASSIUM CHLORIDE, DEXTROSE MONOHYDRATE AND SODIUM CHLORIDE 100 ML/HR: 150; 5; 450 INJECTION, SOLUTION INTRAVENOUS at 03:12

## 2024-02-10 RX ADMIN — KETOROLAC TROMETHAMINE 15 MG: 15 INJECTION, SOLUTION INTRAMUSCULAR; INTRAVENOUS at 10:46

## 2024-02-10 RX ADMIN — IPRATROPIUM BROMIDE 0.5 MG: 0.5 SOLUTION RESPIRATORY (INHALATION) at 21:30

## 2024-02-10 RX ADMIN — Medication 3 ML: at 20:30

## 2024-02-10 RX ADMIN — HYDROMORPHONE HYDROCHLORIDE: 10 INJECTION, SOLUTION INTRAMUSCULAR; INTRAVENOUS; SUBCUTANEOUS at 13:25

## 2024-02-10 RX ADMIN — Medication 3 ML: at 08:49

## 2024-02-10 RX ADMIN — ACETAMINOPHEN 500 MG: 160 SOLUTION ORAL at 09:37

## 2024-02-10 RX ADMIN — HYDROMORPHONE HYDROCHLORIDE 0.5 MG: 1 INJECTION, SOLUTION INTRAMUSCULAR; INTRAVENOUS; SUBCUTANEOUS at 07:10

## 2024-02-10 RX ADMIN — METOPROLOL TARTRATE 2.5 MG: 1 INJECTION, SOLUTION INTRAVENOUS at 23:53

## 2024-02-10 RX ADMIN — IPRATROPIUM BROMIDE 0.5 MG: 0.5 SOLUTION RESPIRATORY (INHALATION) at 15:57

## 2024-02-10 NOTE — SIGNIFICANT NOTE
02/10/24 1416   OTHER   Discipline physical therapist   Rehab Time/Intention   Session Not Performed other (see comments)  (Pt just ambulated with RN/nsg and did well. RN reports waiting on order for CT to come off suction. Will hold eval as pt just returned to bed. Discussed log roll technique for abdominal sparing, plan for eval tomorrow and home set-up. PT to follow up tmw)   Recommendation   PT - Next Appointment 02/11/24

## 2024-02-10 NOTE — PROGRESS NOTES
PROGRESS NOTE  Patient Name: Jourdan Lebron  Age/Sex: 53 y.o. male  : 1970  MRN: [id number]    Date of Admission: 2024  Date of Encounter Visit: 02/10/24   LOS: 1 day   Patient Care Team:  Justa Castano MD as PCP - General (Family Medicine)  Luz Sepulveda PA-C as Physician Assistant (Neurosurgery)  Gonzalo Cruz MD as Consulting Physician (Surgical Oncology)  Anabela Crenshaw MD as Consulting Physician (Endocrinology)  Oliverio Mueller MD as Consulting Physician (Hematology and Oncology)  Nidhi Hollins APRN as Nurse Practitioner (Nurse Practitioner)  Saurabh Hurtado MD as Surgeon (Thoracic Surgery)  Kelsea Carreno RN as Nurse Navigator  Geovany Sabillon MD as Consulting Physician (Radiation Oncology)  Tristan Hayward MD as Cardiologist (Cardiology)    Chief Complaint: 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  Pain is fairly well-controlled  No nausea or vomiting, NG tube in position        REVIEW OF SYSTEMS:   CONSTITUTIONAL: no fever or chills  CARDIOVASCULAR: No chest pain, chest pressure or chest discomfort. No palpitations or edema.   RESPIRATORY: No shortness of breath, cough or sputum.   GASTROINTESTINAL: No anorexia, nausea, vomiting or diarrhea. No abdominal pain or blood.   HEMATOLOGIC: No bleeding or bruising.     Ventilator/Non-Invasive Ventilation Settings (From admission, onward)      None              Vital Signs  Temp:  [97.6 °F (36.4 °C)-98.5 °F (36.9 °C)] 97.7 °F (36.5 °C)  Heart Rate:  [] 82  Resp:  [14-20] 20  BP: ()/(55-88) 97/62  SpO2:  [93 %-100 %] 100 %  on  Flow (L/min):  [2-4] 2 Device (Oxygen Therapy): room  "air    Intake/Output Summary (Last 24 hours) at 2/10/2024 1229  Last data filed at 2/10/2024 1200  Gross per 24 hour   Intake 3976.34 ml   Output 2645 ml   Net 1331.34 ml     Flowsheet Rows      Flowsheet Row First Filed Value   Admission Height 177.8 cm (70\") Documented at 02/09/2024 0553   Admission Weight 69.5 kg (153 lb 3.5 oz) Documented at 02/09/2024 0553          Body mass index is 21.98 kg/m².      02/09/24  0553   Weight: 69.5 kg (153 lb 3.5 oz)       Physical Exam:  GEN:  No acute distress, alert, cooperative, well developed   EYES:   Sclerae clear. No icterus. PERRL. Normal EOM.  Nasogastric tube in position  ENT:   External ears/nose normal, no oral lesions, no thrush, mucous membranes moist   NECK:  Supple, midline trachea, no JVD  LUNGS: positive subcutaneous emphysema especially on the left side, had an IV port on the right, CTAB, no wheezes. No rhonchi. No crackles. Respirations regular, even and unlabored.  Chest tube in position, no airleak  CV:  Regular rhythm and rate. Normal S1/S2. No murmurs, gallops, or rubs noted.  ABD:  Soft, nontender and nondistended. Normal bowel sounds. No guarding  EXT:  Moves all extremities well. No cyanosis. No redness. No edema.   Skin: Dry, intact, no bleeding    Results Review:        Results from last 7 days   Lab Units 02/10/24  0342 02/09/24  1631   SODIUM mmol/L 135* 135*   POTASSIUM mmol/L 4.4 4.0   CHLORIDE mmol/L 103 102   CO2 mmol/L 22.3 21.2*   BUN mg/dL 10 10   CREATININE mg/dL 0.77 0.79   CALCIUM mg/dL 8.2* 8.1*   ANION GAP mmol/L 9.7 11.8                 Results from last 7 days   Lab Units 02/10/24  0342 02/09/24  1631   WBC 10*3/mm3 13.14* 10.60   HEMOGLOBIN g/dL 11.4* 12.1*   HEMATOCRIT % 32.8* 35.8*   PLATELETS 10*3/mm3 226 225   MCV fL 96.8 101.1*   NEUTROPHIL % % 87.5* 89.1*   LYMPHOCYTE % % 2.7* 2.9*   MONOCYTES % % 9.3 7.5   EOSINOPHIL % % 0.0* 0.0*   BASOPHIL % % 0.2 0.2   IMM GRAN % % 0.3 0.3         Results from last 7 days   Lab Units " "02/10/24  0342 02/09/24  1631   MAGNESIUM mg/dL 2.0 2.0           Invalid input(s): \"LDLCALC\"          Glucose   Date/Time Value Ref Range Status   02/10/2024 1111 114 70 - 130 mg/dL Final   02/10/2024 0551 131 (H) 70 - 130 mg/dL Final                               Imaging:   Imaging Results (All)       Procedure Component Value Units Date/Time            I reviewed the patient's new clinical results.  I personally viewed and interpreted the patient's imaging results:        Medication Review:   acetaminophen, 500 mg, Oral, Q6H  acetylcysteine, 3 mL, Nebulization, TID - RT  enoxaparin, 40 mg, Subcutaneous, Daily  ipratropium, 0.5 mg, Nebulization, 4x Daily - RT  Lidocaine, 1 patch, Transdermal, Q24H  metoprolol tartrate, 2.5 mg, Intravenous, Q6H  pantoprazole, 40 mg, Intravenous, Q AM  sodium chloride, 3 mL, Intravenous, Q12H        dextrose 5 % and sodium chloride 0.45 % with KCl 20 mEq/L, 100 mL/hr, Last Rate: 100 mL/hr (02/10/24 0312)  HYDROmorphone HCl-NaCl,   lactated ringers, 9 mL/hr, Last Rate: 9 mL/hr (02/09/24 0730)  Pharmacy Consult,   sodium chloride, 30 mL/hr        ASSESSMENT:   Adenocarcinoma of the esophagus status post neoadjuvant chemotherapy/XRT followed by esophagectomy being monitored in the ICU postoperatively  History of adrenal cortical deficiency, not requiring any maintenance cortisol therapy at baseline  History of CVA      PLAN:  Patient is on stress ulcer and DVT prophylaxis With pharmacological agents  Doing well postoperatively with no decompensation  On mucolytic therapy and pulmonary hygiene measures  Pain fairly well-controlled  His blood pressure has been doing great, and no need for any stress dose of steroids, prefer to avoid unless strictly necessary in order not to interfere with wound healing  He is on room air, his chest x-ray is actually looking great with no atelectasis, he does have some subcutaneous emphysema which is mild and will be monitored.  Chest tube in position " with no airleak.    Discussed with patient and family and nursing staff  Labs/Notes/films were independently reviewed and pertinent results are summarized above  The copied texts in this note were reviewed and they are accurate as of 02/10/24    Disposition: Monitor in the ICU until cleared for transfer out by the thoracic surgery team    Kamron De MD  02/10/24  12:29 EST          Dictated utilizing Dragon dictation

## 2024-02-10 NOTE — PLAN OF CARE
Goal Outcome Evaluation:  Plan of Care Reviewed With: patient, spouse        Progress: improving  Outcome Evaluation: Pt admitted yesterday s/p esophagectomy. Today, walked two laps around the unit twice today. Chest tube now to water seal. PCA pump still inuse, lidocaine patch placed. Pain more under control this evening than earlier on in the shift. REYNALDO to be emptied only once per shift per Dr. Hurtado. Possibly can remove chin in AM depending on what overnight urine output is.

## 2024-02-10 NOTE — CONSULTS
"Nutrition Services    Patient Name:  Jourdan Lebron  YOB: 1970  MRN: [id number]  Admit Date:  2/9/2024    Assessment Date:  02/10/24    CLINICAL NUTRITION ASSESSMENT    Comments: Consult for tube feeding assessment    53yoM dx w/ lower third esophagus cancer [id number]. S/p esophagectomy, J -tube placement distal to ligament of treitz, balloon dilation of pylorus on 2/9. Wt hx only since 12/2023. Per chart review, pt wt is down from 200# to 170# 3 yrs ago. Wt seems stable for now.     Labs: Na 135, glu 131, ca 8.2, Phosphorus 2.4, Mg 2.0, K+ 4.4  Meds: pepcid, heparin, protonix, D5W, NaCl w/ KCl IVF 100mL/hr    Recommendations:  Formula: Impact peptide 1.5  Method: J-tube  Feeding Schedule: Continuous. 24 hrs feed duration.   Initial Rate/Volume: 10 mL/hr, advance only per cardiothoracic surgeon orders  Goal Rate/Volume: 55 mL  Water Flush: 30 mL Q 4 or per physician to manage    TF @ goal provides: 1980 kcal, 124 g pro, 1016mL FW    RD to follow tolerance, labs and clinical course.     Encounter Information         Reason for Encounter TF consult    Admitting Diagnosis Esophagus cancer of the lower 3rd    Pertinent Medical History S/p esophagectomy, J -tube placement distal to ligament of treitz, balloon dilation of pylorus on 2/9, GERD, diverticulosis, stroke    Current Issues Currently recovering in the ICU from procedures     Current Nutrition Orders & Evaluation of Intake       Oral Nutrition     Food Allergies    Current PO Diet NPO Diet NPO Type: Strict NPO   Supplement    PO Evaluation      % PO Intake    --  Anthropometrics          Height    Weight Height: 177.8 cm (70\")  Weight: 69.5 kg (153 lb 3.5 oz) (02/09/24 0553)    BMI kg/m2 Body mass index is 21.98 kg/m².    BMI Category Normal/Healthy (18.4 - 24.9)    Weight History  Wt Readings from Last 15 Encounters:   02/09/24 0553 69.5 kg (153 lb 3.5 oz)   02/06/24 0907 69.9 kg (154 lb)   02/01/24 0658 70.3 kg (155 lb)   01/31/24 0838 71.7 kg (158 " lb)   01/26/24 1220 68.9 kg (152 lb)   01/25/24 0934 69.6 kg (153 lb 6.4 oz)   01/23/24 0910 69.4 kg (153 lb)   01/22/24 0908 72.1 kg (158 lb 15.2 oz)   12/27/23 0916 72.1 kg (159 lb)   12/27/23 0925 72.1 kg (158 lb 15.2 oz)   12/26/23 0812 69.9 kg (154 lb 3.2 oz)   12/22/23 0907 73.5 kg (162 lb)   12/18/23 0927 74.2 kg (163 lb 8 oz)   12/18/23 1414 72.5 kg (159 lb 12.8 oz)   12/11/23 0809 71.6 kg (157 lb 12.8 oz)        Estimated/Assessed Needs        Energy Requirements    Weight for Calculation 69.5 kg   Method for Estimation  30 kcal/kg   EST Needs (kcal/day) 2,085kcal        Protein Requirements    Weight for Calculation 69.5 kg   EST Protein Needs (g/kg) 1.2 - 1.5 gm/kg   EST Daily Needs (g/day)  g        Fluid Requirements     Method for Estimation 1 mL/kcal, Defer to physician    Estimated Needs (mL/day)          Physical Findings          Physical Appearance alert, oriented   Oral/Mouth Cavity dry mouth   Edema  no edema   Gastrointestinal WDL   Skin  surgical incision: abdomen, chest   Tubes/Drains/Lines jejunostomy    NFPE Not indicated at this time     Labs        Pertinent Labs Reviewed, listed below     Results from last 7 days   Lab Units 02/10/24  0342 02/09/24  1631   SODIUM mmol/L 135* 135*   POTASSIUM mmol/L 4.4 4.0   CHLORIDE mmol/L 103 102   CO2 mmol/L 22.3 21.2*   BUN mg/dL 10 10   CREATININE mg/dL 0.77 0.79   CALCIUM mg/dL 8.2* 8.1*   GLUCOSE mg/dL 152* 159*     Results from last 7 days   Lab Units 02/10/24  0342 02/09/24  1631   MAGNESIUM mg/dL 2.0 2.0   PHOSPHORUS mg/dL 2.4* 2.4*   HEMOGLOBIN g/dL 11.4* 12.1*   HEMATOCRIT % 32.8* 35.8*   WBC 10*3/mm3 13.14* 10.60     Results from last 7 days   Lab Units 02/10/24  0342 02/09/24  1631   PLATELETS 10*3/mm3 226 225     COVID19   Date Value Ref Range Status   03/01/2022 Not Detected Not Detected - Ref. Range Final     Lab Results   Component Value Date    HGBA1C 5.3 10/29/2021          Medications            Scheduled Medications  acetaminophen, 500 mg, Oral, Q6H  acetylcysteine, 3 mL, Nebulization, TID - RT  enoxaparin, 40 mg, Subcutaneous, Daily  ipratropium, 0.5 mg, Nebulization, 4x Daily - RT  Lidocaine, 1 patch, Transdermal, Q24H  metoprolol tartrate, 2.5 mg, Intravenous, Q6H  pantoprazole, 40 mg, Intravenous, Q AM  sodium chloride, 3 mL, Intravenous, Q12H        Infusions dextrose 5 % and sodium chloride 0.45 % with KCl 20 mEq/L, 100 mL/hr, Last Rate: 100 mL/hr (02/10/24 0312)  HYDROmorphone HCl-NaCl,   lactated ringers, 9 mL/hr, Last Rate: 9 mL/hr (02/09/24 0730)  Pharmacy Consult,   sodium chloride, 30 mL/hr        PRN Medications   diazePAM    HYDROmorphone    naloxone    nitroglycerin    Pharmacy Consult    sodium chloride    sodium chloride    sodium chloride     PES STATEMENT / NUTRITION DIAGNOSIS      Nutrition Dx Problem  Problem: Altered GI Function  Etiology: Medical Diagnosis - esophagus cancer of the lower 3rd  Signs/Symptoms: EN Intake/Delivery    Comment: s/p jejunostomy     PLAN OF CARE  Intervention Goal        Intervention Goal(s) Meet estimated needs, Disease management/therapy, Initiate TF/PN, Tolerate TF/PN at goal, Maintain weight, and No significant weight loss     Nutrition Intervention         RD Action Await initiation of EN/PN, Continue to monitor, Care plan reviewed, and Recommend/order: TF     Nutrition Prescription          Recommendation       Enteral Prescription:     Enteral Route Jejunostomy    TF Delivery Method Continuous    Enteral Product Impact Peptide 1.5    Modular None    Propofol Rate/Kcal     TF Start Rate/Volume  10 mL/hr    TF Goal Rate/Volume 55 mL/hr    Free Water Flush 30 mL Q 4 or per physician    TF Provision at Goal:          Calories 1980 kcal, meets 95 % needs         Protein  124 gm protein, meets 119 % needs         Fluid (mL) 1016 mL free water + 180 mL in flushes    Prescription Ordered Yes     Monitor/Evaluation        Monitor Per protocol     RD to follow up per  protocol.    Electronically signed by:  Danisha Post  02/10/24 10:50 EST

## 2024-02-11 ENCOUNTER — APPOINTMENT (OUTPATIENT)
Dept: GENERAL RADIOLOGY | Facility: HOSPITAL | Age: 54
DRG: 375 | End: 2024-02-11
Payer: COMMERCIAL

## 2024-02-11 LAB
ANION GAP SERPL CALCULATED.3IONS-SCNC: 7 MMOL/L (ref 5–15)
BASOPHILS # BLD AUTO: 0.02 10*3/MM3 (ref 0–0.2)
BASOPHILS NFR BLD AUTO: 0.2 % (ref 0–1.5)
BUN SERPL-MCNC: 11 MG/DL (ref 6–20)
BUN/CREAT SERPL: 17.5 (ref 7–25)
CALCIUM SPEC-SCNC: 8.5 MG/DL (ref 8.6–10.5)
CHLORIDE SERPL-SCNC: 105 MMOL/L (ref 98–107)
CO2 SERPL-SCNC: 24 MMOL/L (ref 22–29)
CREAT SERPL-MCNC: 0.63 MG/DL (ref 0.76–1.27)
DEPRECATED RDW RBC AUTO: 54.9 FL (ref 37–54)
EGFRCR SERPLBLD CKD-EPI 2021: 115.2 ML/MIN/1.73
EOSINOPHIL # BLD AUTO: 0.14 10*3/MM3 (ref 0–0.4)
EOSINOPHIL NFR BLD AUTO: 1.2 % (ref 0.3–6.2)
ERYTHROCYTE [DISTWIDTH] IN BLOOD BY AUTOMATED COUNT: 15.7 % (ref 12.3–15.4)
GLUCOSE BLDC GLUCOMTR-MCNC: 91 MG/DL (ref 70–130)
GLUCOSE BLDC GLUCOMTR-MCNC: 92 MG/DL (ref 70–130)
GLUCOSE BLDC GLUCOMTR-MCNC: 99 MG/DL (ref 70–130)
GLUCOSE SERPL-MCNC: 101 MG/DL (ref 65–99)
HCT VFR BLD AUTO: 31.9 % (ref 37.5–51)
HGB BLD-MCNC: 10.9 G/DL (ref 13–17.7)
IMM GRANULOCYTES # BLD AUTO: 0.05 10*3/MM3 (ref 0–0.05)
IMM GRANULOCYTES NFR BLD AUTO: 0.4 % (ref 0–0.5)
LYMPHOCYTES # BLD AUTO: 0.4 10*3/MM3 (ref 0.7–3.1)
LYMPHOCYTES NFR BLD AUTO: 3.3 % (ref 19.6–45.3)
MAGNESIUM SERPL-MCNC: 2.2 MG/DL (ref 1.6–2.6)
MCH RBC QN AUTO: 32.9 PG (ref 26.6–33)
MCHC RBC AUTO-ENTMCNC: 34.2 G/DL (ref 31.5–35.7)
MCV RBC AUTO: 96.4 FL (ref 79–97)
MONOCYTES # BLD AUTO: 1.43 10*3/MM3 (ref 0.1–0.9)
MONOCYTES NFR BLD AUTO: 11.8 % (ref 5–12)
NEUTROPHILS NFR BLD AUTO: 10.03 10*3/MM3 (ref 1.7–7)
NEUTROPHILS NFR BLD AUTO: 83.1 % (ref 42.7–76)
NRBC BLD AUTO-RTO: 0 /100 WBC (ref 0–0.2)
PHOSPHATE SERPL-MCNC: 1.8 MG/DL (ref 2.5–4.5)
PHOSPHATE SERPL-MCNC: 1.8 MG/DL (ref 2.5–4.5)
PLATELET # BLD AUTO: 201 10*3/MM3 (ref 140–450)
PMV BLD AUTO: 9.3 FL (ref 6–12)
POTASSIUM SERPL-SCNC: 4.3 MMOL/L (ref 3.5–5.2)
RBC # BLD AUTO: 3.31 10*6/MM3 (ref 4.14–5.8)
SODIUM SERPL-SCNC: 136 MMOL/L (ref 136–145)
WBC NRBC COR # BLD AUTO: 12.07 10*3/MM3 (ref 3.4–10.8)

## 2024-02-11 PROCEDURE — 84100 ASSAY OF PHOSPHORUS: CPT | Performed by: INTERNAL MEDICINE

## 2024-02-11 PROCEDURE — 25010000002 HYDROMORPHONE HCL PF 50 MG/5ML SOLUTION: Performed by: SURGERY

## 2024-02-11 PROCEDURE — 82948 REAGENT STRIP/BLOOD GLUCOSE: CPT

## 2024-02-11 PROCEDURE — 83735 ASSAY OF MAGNESIUM: CPT | Performed by: SURGERY

## 2024-02-11 PROCEDURE — 99024 POSTOP FOLLOW-UP VISIT: CPT | Performed by: NURSE PRACTITIONER

## 2024-02-11 PROCEDURE — 25810000003 SODIUM CHLORIDE 0.9 % SOLUTION: Performed by: SURGERY

## 2024-02-11 PROCEDURE — 80048 BASIC METABOLIC PNL TOTAL CA: CPT | Performed by: SURGERY

## 2024-02-11 PROCEDURE — 25010000002 HYDROMORPHONE PER 4 MG: Performed by: SURGERY

## 2024-02-11 PROCEDURE — 25810000003 SODIUM CHLORIDE 0.9 % SOLUTION: Performed by: INTERNAL MEDICINE

## 2024-02-11 PROCEDURE — 25010000002 DIAZEPAM PER 5 MG: Performed by: SURGERY

## 2024-02-11 PROCEDURE — 84100 ASSAY OF PHOSPHORUS: CPT | Performed by: SURGERY

## 2024-02-11 PROCEDURE — 25010000002 ENOXAPARIN PER 10 MG: Performed by: SURGERY

## 2024-02-11 PROCEDURE — 85025 COMPLETE CBC W/AUTO DIFF WBC: CPT | Performed by: SURGERY

## 2024-02-11 PROCEDURE — 71045 X-RAY EXAM CHEST 1 VIEW: CPT

## 2024-02-11 RX ORDER — ACETAMINOPHEN 160 MG/5ML
650 SOLUTION ORAL EVERY 6 HOURS
Status: DISCONTINUED | OUTPATIENT
Start: 2024-02-11 | End: 2024-02-11

## 2024-02-11 RX ORDER — OXYCODONE HCL 5 MG/5 ML
7.5 SOLUTION, ORAL ORAL EVERY 4 HOURS PRN
Status: DISCONTINUED | OUTPATIENT
Start: 2024-02-11 | End: 2024-02-12

## 2024-02-11 RX ORDER — ACETAMINOPHEN 160 MG/5ML
1000 SOLUTION ORAL 3 TIMES DAILY
Status: DISCONTINUED | OUTPATIENT
Start: 2024-02-11 | End: 2024-02-23 | Stop reason: HOSPADM

## 2024-02-11 RX ADMIN — HYDROMORPHONE HYDROCHLORIDE 0.25 MG: 1 INJECTION, SOLUTION INTRAMUSCULAR; INTRAVENOUS; SUBCUTANEOUS at 06:22

## 2024-02-11 RX ADMIN — POTASSIUM CHLORIDE, DEXTROSE MONOHYDRATE AND SODIUM CHLORIDE 100 ML/HR: 150; 5; 450 INJECTION, SOLUTION INTRAVENOUS at 02:11

## 2024-02-11 RX ADMIN — ACETAMINOPHEN 1000 MG: 160 SOLUTION ORAL at 20:31

## 2024-02-11 RX ADMIN — DIAZEPAM 2.5 MG: 5 INJECTION INTRAMUSCULAR; INTRAVENOUS at 14:25

## 2024-02-11 RX ADMIN — ACETAMINOPHEN 1000 MG: 160 SOLUTION ORAL at 08:38

## 2024-02-11 RX ADMIN — ACETAMINOPHEN 500 MG: 160 SOLUTION ORAL at 04:17

## 2024-02-11 RX ADMIN — ACETAMINOPHEN 1000 MG: 160 SOLUTION ORAL at 15:05

## 2024-02-11 RX ADMIN — METOPROLOL TARTRATE 2.5 MG: 1 INJECTION, SOLUTION INTRAVENOUS at 06:01

## 2024-02-11 RX ADMIN — SODIUM PHOSPHATE, MONOBASIC, MONOHYDRATE AND SODIUM PHOSPHATE, DIBASIC, ANHYDROUS 15 MMOL: 142; 276 INJECTION, SOLUTION INTRAVENOUS at 20:30

## 2024-02-11 RX ADMIN — DIAZEPAM 2.5 MG: 5 INJECTION INTRAMUSCULAR; INTRAVENOUS at 20:31

## 2024-02-11 RX ADMIN — HYDROMORPHONE HYDROCHLORIDE 0.25 MG: 1 INJECTION, SOLUTION INTRAMUSCULAR; INTRAVENOUS; SUBCUTANEOUS at 02:11

## 2024-02-11 RX ADMIN — LIDOCAINE 1 PATCH: 4 PATCH TOPICAL at 08:31

## 2024-02-11 RX ADMIN — Medication 3 ML: at 20:31

## 2024-02-11 RX ADMIN — SODIUM PHOSPHATE, MONOBASIC, MONOHYDRATE AND SODIUM PHOSPHATE, DIBASIC, ANHYDROUS 15 MMOL: 142; 276 INJECTION, SOLUTION INTRAVENOUS at 06:01

## 2024-02-11 RX ADMIN — METOPROLOL TARTRATE 2.5 MG: 1 INJECTION, SOLUTION INTRAVENOUS at 15:03

## 2024-02-11 RX ADMIN — OXYCODONE HYDROCHLORIDE 7.5 MG: 5 SOLUTION ORAL at 12:51

## 2024-02-11 RX ADMIN — HYDROMORPHONE HYDROCHLORIDE 0.25 MG: 1 INJECTION, SOLUTION INTRAMUSCULAR; INTRAVENOUS; SUBCUTANEOUS at 04:17

## 2024-02-11 RX ADMIN — PANTOPRAZOLE SODIUM 40 MG: 40 INJECTION, POWDER, FOR SOLUTION INTRAVENOUS at 06:01

## 2024-02-11 RX ADMIN — Medication 3 ML: at 08:33

## 2024-02-11 RX ADMIN — OXYCODONE HYDROCHLORIDE 7.5 MG: 5 SOLUTION ORAL at 22:57

## 2024-02-11 RX ADMIN — OXYCODONE HYDROCHLORIDE 7.5 MG: 5 SOLUTION ORAL at 08:30

## 2024-02-11 RX ADMIN — METOPROLOL TARTRATE 2.5 MG: 1 INJECTION, SOLUTION INTRAVENOUS at 23:46

## 2024-02-11 RX ADMIN — ENOXAPARIN SODIUM 40 MG: 100 INJECTION SUBCUTANEOUS at 08:32

## 2024-02-11 RX ADMIN — HYDROMORPHONE HYDROCHLORIDE: 10 INJECTION, SOLUTION INTRAMUSCULAR; INTRAVENOUS; SUBCUTANEOUS at 05:53

## 2024-02-11 RX ADMIN — OXYCODONE HYDROCHLORIDE 7.5 MG: 5 SOLUTION ORAL at 17:44

## 2024-02-11 RX ADMIN — POTASSIUM CHLORIDE, DEXTROSE MONOHYDRATE AND SODIUM CHLORIDE 75 ML/HR: 150; 5; 450 INJECTION, SOLUTION INTRAVENOUS at 12:53

## 2024-02-11 RX ADMIN — HYDROMORPHONE HYDROCHLORIDE 0.25 MG: 1 INJECTION, SOLUTION INTRAMUSCULAR; INTRAVENOUS; SUBCUTANEOUS at 23:47

## 2024-02-11 NOTE — PROGRESS NOTES
Progress note    Reason for visit: Postop care s/p esophagectomy    No acute issues overnight.  Pain was suboptimally controlled requiring changes in pain medication.  Pain was better controlled later in the morning.  He ambulated a couple of laps in the unit.    No acute distress.  Alert, oriented x 3.  Afebrile.  Hemodynamic stable.  Normal sinus rhythm.  Nonlabored breathing.  On 2 LPM nasal cannula.  Chest tube with serosanguineous drainage.  No air leak.  REYNALDO drain to bulb suction with serous drainage.  Kendrick with clear urine.  Extremities warm.  NG with clear drainage.    Imaging reviewed.  Labs reviewed.    POD #1 s/p Cambria Shayne esophagectomy.    Continue multimodal pain management.  Only liquid pain medication can be given through J-tube.  Encourage incentive spirometer, ambulation.  No BiPAP.  Keep the head of the bed elevated 30 degrees all the time.  Chest tube to waterseal.  REYNALDO drain to bulb suction and drain once a shift only.  NG tube to low intermittent wall suction.  Flush the NG tube with 30 cc water each shift.  IV fluids at 100 cc/h.  Kendrick catheter to be removed tomorrow morning if urine output adequate.  Chemical DVT prophylaxis.  PT/OT.  Appreciate ICU cares.  Anticipate moving out of the ICU on Monday if no critical events.    Saurabh Hurtado MD  Thoracic surgeon

## 2024-02-11 NOTE — NURSING NOTE
Notified Dr. Hurtado of developing upper bilateral anterior subcue air and small air leak in the chest tube.  Orders received to place CT to -20 suction and placed to suction.

## 2024-02-11 NOTE — PROGRESS NOTES
"  PROGRESS NOTE  Patient Name: Jourdan Lebron  Age/Sex: 53 y.o. male  : 1970  MRN: [id number]    Date of Admission: 2024  Date of Encounter Visit: 24   LOS: 2 days   Patient Care Team:  Justa Castano MD as PCP - General (Family Medicine)  Luz Sepulveda PA-C as Physician Assistant (Neurosurgery)  Gonzalo Cruz MD as Consulting Physician (Surgical Oncology)  Anabela Crenshaw MD as Consulting Physician (Endocrinology)  Oliverio Mueller MD as Consulting Physician (Hematology and Oncology)  Nidhi Hollins APRN as Nurse Practitioner (Nurse Practitioner)  Saurabh Hurtado MD as Surgeon (Thoracic Surgery)  Kelsea Carreno RN as Nurse Navigator  Geovany Sabillon MD as Consulting Physician (Radiation Oncology)  Tristan Hayward MD as Cardiologist (Cardiology)    Chief Complaint: 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      REVIEW OF SYSTEMS:   CONSTITUTIONAL: no fever or chills  CARDIOVASCULAR: pain controlled . No palpitations or edema.   RESPIRATORY: No shortness of breath, cough or sputum.   GASTROINTESTINAL: No anorexia, nausea, vomiting or diarrhea. No abdominal pain or blood.   HEMATOLOGIC: No bleeding or bruising.     Ventilator/Non-Invasive Ventilation Settings (From admission, onward)      None              Vital Signs  Temp:  [97.7 °F (36.5 °C)-98.6 °F (37 °C)] 98.6 °F (37 °C)  Heart Rate:  [64-96] 85  Resp:  [18] 18  BP: ()/(61-72) 108/69  SpO2:  [77 %-100 %] 88 %  on    Device (Oxygen Therapy): room air    Intake/Output Summary (Last 24 hours) at 2024 1218  Last data filed at 2024 0900  Gross per 24 hour   Intake 2239.99 ml   Output 2237 ml   Net 2.99 ml     Flowsheet Rows      Flowsheet Row First Filed Value   Admission Height 177.8 cm (70\") Documented at 2024 0553   Admission Weight 69.5 kg (153 lb 3.5 oz) Documented at 2024 0553 " "         Body mass index is 21.98 kg/m².      02/09/24  0553   Weight: 69.5 kg (153 lb 3.5 oz)       Physical Exam:  GEN:  No acute distress, alert, cooperative, well developed   EYES:   Sclerae clear. No icterus. PERRL. Normal EOM.  Nasogastric tube in position  ENT:   External ears/nose normal, no oral lesions, no thrush, mucous membranes moist   NECK:  Supple, midline trachea, no JVD  LUNGS: positive subcutaneous emphysema especially on the left side, had an IV port on the right, CTAB, no wheezes. No rhonchi. No crackles. Respirations regular, even and unlabored.  Chest tube in position, no airleak  CV:  Regular rhythm and rate. Normal S1/S2. No murmurs, gallops, or rubs noted.  ABD:  Soft, nontender and nondistended. Normal bowel sounds. No guarding  EXT:  Moves all extremities well. No cyanosis. No redness. No edema.   Skin: Dry, intact, no bleeding    Results Review:        Results from last 7 days   Lab Units 02/11/24  0303 02/10/24  0342 02/09/24  1631   SODIUM mmol/L 136 135* 135*   POTASSIUM mmol/L 4.3 4.4 4.0   CHLORIDE mmol/L 105 103 102   CO2 mmol/L 24.0 22.3 21.2*   BUN mg/dL 11 10 10   CREATININE mg/dL 0.63* 0.77 0.79   CALCIUM mg/dL 8.5* 8.2* 8.1*   ANION GAP mmol/L 7.0 9.7 11.8                 Results from last 7 days   Lab Units 02/11/24  0303 02/10/24  0342 02/09/24  1631   WBC 10*3/mm3 12.07* 13.14* 10.60   HEMOGLOBIN g/dL 10.9* 11.4* 12.1*   HEMATOCRIT % 31.9* 32.8* 35.8*   PLATELETS 10*3/mm3 201 226 225   MCV fL 96.4 96.8 101.1*   NEUTROPHIL % % 83.1* 87.5* 89.1*   LYMPHOCYTE % % 3.3* 2.7* 2.9*   MONOCYTES % % 11.8 9.3 7.5   EOSINOPHIL % % 1.2 0.0* 0.0*   BASOPHIL % % 0.2 0.2 0.2   IMM GRAN % % 0.4 0.3 0.3         Results from last 7 days   Lab Units 02/11/24  0303 02/10/24  0342 02/09/24  1631   MAGNESIUM mg/dL 2.2 2.0 2.0           Invalid input(s): \"LDLCALC\"          Glucose   Date/Time Value Ref Range Status   02/11/2024 1118 91 70 - 130 mg/dL Final   02/10/2024 1111 114 70 - 130 mg/dL " Final   02/10/2024 0551 131 (H) 70 - 130 mg/dL Final                               Imaging:   Imaging Results (All)       Procedure Component Value Units Date/Time              I reviewed the patient's new clinical results.  I personally viewed and interpreted the patient's imaging results:        Medication Review:   acetaminophen, 1,000 mg, Per J Tube, TID  enoxaparin, 40 mg, Subcutaneous, Daily  Lidocaine, 1 patch, Transdermal, Q24H  metoprolol tartrate, 2.5 mg, Intravenous, Q6H  pantoprazole, 40 mg, Intravenous, Q AM  sodium chloride, 3 mL, Intravenous, Q12H        dextrose 5 % and sodium chloride 0.45 % with KCl 20 mEq/L, 75 mL/hr, Last Rate: 75 mL/hr (02/11/24 0747)  HYDROmorphone HCl-NaCl,   lactated ringers, 9 mL/hr, Last Rate: 9 mL/hr (02/09/24 0730)  Pharmacy Consult,   sodium chloride, 30 mL/hr        ASSESSMENT:   Adenocarcinoma of the esophagus status post neoadjuvant chemotherapy/XRT followed by esophagectomy being monitored in the ICU postoperatively  History of adrenal cortical deficiency, not requiring any maintenance cortisol therapy at baseline  History of CVA      PLAN:  Patient is on stress ulcer and DVT prophylaxis With pharmacological agents  Doing well postoperatively with no decompensation  Ambulatory on room air  Improving subcutaneous emphysema  No pneumothorax or suspected air leak  Continue to monitor in the ICU until cleared to transfer out by thoracic surgery  Given the lack of any significant respiratory issues we will follow only while in the ICU    Discussed with patient and family and nursing staff and with nursing staff  Labs/Notes/films were independently reviewed and pertinent results are summarized above  The copied texts in this note were reviewed and they are accurate as of 02/11/24    Disposition: Monitor in the ICU until cleared for transfer out by the thoracic surgery team    Kamron De MD  02/11/24  12:18 EST          Dictated utilizing Dragon dictation

## 2024-02-11 NOTE — PLAN OF CARE
Goal Outcome Evaluation: Pt continues to c/o significant pain. PRN's given in addition to PCA doses. PCA syringe changed with Steffanie MALIN RN. Drainage still at CT site, CT aware. Pt alert and following commands. IS performed. Splinting with coughs encouraged. Phos replaced this am (see labs). Family at the bedside.

## 2024-02-11 NOTE — PROGRESS NOTES
"  POST-OPERATIVE NOTE     Chief Complaint: esophageal carcinoma  S/P: esophagectomy   POD # 2    Subjective:  Symptoms:  Stable.  He reports weakness, chest pressure and anxiety.  No shortness of breath.    Diet:  NPO.  No nausea or vomiting.    Activity level: Impaired due to weakness.    Pain:  He complains of pain that is moderate.  He reports pain is improving.        Objective:  General Appearance:  Comfortable, well-appearing and in no acute distress.    Vital signs: (most recent): Blood pressure 108/69, pulse 85, temperature 98.6 °F (37 °C), temperature source Oral, resp. rate 18, height 177.8 cm (70\"), weight 69.5 kg (153 lb 3.5 oz), SpO2 (!) 88%.    Output: Producing urine.    HEENT: (NG tube sutured)    Lungs:  Normal effort.  He is not in respiratory distress.  There are decreased breath sounds.    Heart: Normal rate.    Chest: Chest wall tenderness present.    Abdomen: Hypoactive bowel sounds.   There is incisional tenderness.    Neurological: Patient is alert.    Skin:  Warm and dry.                Chest tube:   Site: Right, Clean, Dry, Intact, and Securement device intact  Suction: waterseal and -20 cm  Air Leak: negative  24 Hour Total: 48ml    REYNALDO Drain: 300ml    Results Review:     I reviewed the patient's new clinical results.  I reviewed the patient's new imaging results and agree with the interpretation.  Discussed with patient, RN, family at bedside and Dr. Hurtado    Assessment & Plan     Mr. Lebron is POD 2 s/p Vinicio-Shayne esophagectomy. Pain control is an ongoing issue and PCA has been adjusted. Add oxycodone via J-tube.  Continue chest tube to waterseal and NG tube to intermittent LWS.   REYNALDO bulb \"open\" (not collapsed).  Initiate trickle feeds via J-tube at 10cc/h. Do not advance.  IVF at 75cc per hour. Remove Kendrick today.  Increase mobilization.  DVT prophylaxis.  Aspiration precautions with HOB >30 degrees at all times.  Hopefully out of ICU tomorrow.    Dorothy Calero, EMILY, APRN  Thoracic " [facility]  02/11/24  10:03 EST    Patient was seen and assessed while wearing personal protective equipment including facemask, protective eyewear and gloves.  Hand hygiene performed prior to entering the room and upon exiting with doffing of gloves.

## 2024-02-11 NOTE — PLAN OF CARE
Goal Outcome Evaluation:                 Ambulating very well around unit with Ax1 and RN.  Issues with suction for NGT first thing this morning - addressed with Dr. Hurtado at bedside.  NGT functioning.  200ml out so far this shift.      Soft BP at times but able to tolerate lopressor IV.  TF started.  Pink-tinged output noted from NGT after second walk.  Dr. Hurtado notified and visualized output.      Subcue air noted and small air leak noted at 1600 assessment.  CT to suction, per Dr. Hurtado.  Dr. Hurtado assessed patient at bedside and is aware of small air leak.

## 2024-02-11 NOTE — SIGNIFICANT NOTE
02/11/24 7954   OTHER   Discipline physical therapist   Rehab Time/Intention   Session Not Performed other (see comments)  (Spoke w/ RN in AM who requested PT hold as MD needing to check CT site d/t drainage. When checked on again in later AM pt having A-line removed. PT was unable to follow up in the afternoon and will follow up with pt again tomorrow for evaluation as appropriate.)   Recommendation   PT - Next Appointment 02/12/24

## 2024-02-12 ENCOUNTER — APPOINTMENT (OUTPATIENT)
Dept: GENERAL RADIOLOGY | Facility: HOSPITAL | Age: 54
DRG: 375 | End: 2024-02-12
Payer: COMMERCIAL

## 2024-02-12 LAB
ALBUMIN SERPL-MCNC: 2.9 G/DL (ref 3.5–5.2)
ANION GAP SERPL CALCULATED.3IONS-SCNC: 6.5 MMOL/L (ref 5–15)
BUN SERPL-MCNC: 7 MG/DL (ref 6–20)
BUN/CREAT SERPL: 11.5 (ref 7–25)
CALCIUM SPEC-SCNC: 8.5 MG/DL (ref 8.6–10.5)
CHLORIDE SERPL-SCNC: 102 MMOL/L (ref 98–107)
CO2 SERPL-SCNC: 25.5 MMOL/L (ref 22–29)
CREAT SERPL-MCNC: 0.61 MG/DL (ref 0.76–1.27)
DEPRECATED RDW RBC AUTO: 54.1 FL (ref 37–54)
EGFRCR SERPLBLD CKD-EPI 2021: 116.3 ML/MIN/1.73
ERYTHROCYTE [DISTWIDTH] IN BLOOD BY AUTOMATED COUNT: 15.5 % (ref 12.3–15.4)
GLUCOSE BLDC GLUCOMTR-MCNC: 111 MG/DL (ref 70–130)
GLUCOSE BLDC GLUCOMTR-MCNC: 80 MG/DL (ref 70–130)
GLUCOSE BLDC GLUCOMTR-MCNC: 96 MG/DL (ref 70–130)
GLUCOSE SERPL-MCNC: 95 MG/DL (ref 65–99)
HCT VFR BLD AUTO: 33.2 % (ref 37.5–51)
HGB BLD-MCNC: 11.3 G/DL (ref 13–17.7)
MAGNESIUM SERPL-MCNC: 1.7 MG/DL (ref 1.6–2.6)
MCH RBC QN AUTO: 33 PG (ref 26.6–33)
MCHC RBC AUTO-ENTMCNC: 34 G/DL (ref 31.5–35.7)
MCV RBC AUTO: 97.1 FL (ref 79–97)
PHOSPHATE SERPL-MCNC: 2.2 MG/DL (ref 2.5–4.5)
PHOSPHATE SERPL-MCNC: 3.6 MG/DL (ref 2.5–4.5)
PLATELET # BLD AUTO: 206 10*3/MM3 (ref 140–450)
PMV BLD AUTO: 8.9 FL (ref 6–12)
POTASSIUM SERPL-SCNC: 4.1 MMOL/L (ref 3.5–5.2)
RBC # BLD AUTO: 3.42 10*6/MM3 (ref 4.14–5.8)
SODIUM SERPL-SCNC: 134 MMOL/L (ref 136–145)
WBC NRBC COR # BLD AUTO: 9.44 10*3/MM3 (ref 3.4–10.8)

## 2024-02-12 PROCEDURE — 99024 POSTOP FOLLOW-UP VISIT: CPT | Performed by: NURSE PRACTITIONER

## 2024-02-12 PROCEDURE — 97162 PT EVAL MOD COMPLEX 30 MIN: CPT

## 2024-02-12 PROCEDURE — 71045 X-RAY EXAM CHEST 1 VIEW: CPT

## 2024-02-12 PROCEDURE — 25810000003 SODIUM CHLORIDE 0.9 % SOLUTION: Performed by: INTERNAL MEDICINE

## 2024-02-12 PROCEDURE — 85027 COMPLETE CBC AUTOMATED: CPT | Performed by: NURSE PRACTITIONER

## 2024-02-12 PROCEDURE — 97110 THERAPEUTIC EXERCISES: CPT

## 2024-02-12 PROCEDURE — 84100 ASSAY OF PHOSPHORUS: CPT | Performed by: INTERNAL MEDICINE

## 2024-02-12 PROCEDURE — 25010000002 HYDROMORPHONE PER 4 MG: Performed by: SURGERY

## 2024-02-12 PROCEDURE — 25010000002 DIAZEPAM PER 5 MG: Performed by: SURGERY

## 2024-02-12 PROCEDURE — 25010000002 HYDROMORPHONE HCL PF 50 MG/5ML SOLUTION: Performed by: SURGERY

## 2024-02-12 PROCEDURE — 82948 REAGENT STRIP/BLOOD GLUCOSE: CPT

## 2024-02-12 PROCEDURE — 80069 RENAL FUNCTION PANEL: CPT | Performed by: NURSE PRACTITIONER

## 2024-02-12 PROCEDURE — 25810000003 SODIUM CHLORIDE 0.9 % SOLUTION: Performed by: SURGERY

## 2024-02-12 PROCEDURE — 25010000002 ENOXAPARIN PER 10 MG: Performed by: SURGERY

## 2024-02-12 PROCEDURE — 83735 ASSAY OF MAGNESIUM: CPT | Performed by: NURSE PRACTITIONER

## 2024-02-12 RX ORDER — OXYCODONE HCL 5 MG/5 ML
7.5 SOLUTION, ORAL ORAL EVERY 4 HOURS PRN
Status: DISCONTINUED | OUTPATIENT
Start: 2024-02-12 | End: 2024-02-23 | Stop reason: HOSPADM

## 2024-02-12 RX ORDER — FLUOXETINE HYDROCHLORIDE 20 MG/1
20 CAPSULE ORAL DAILY
Status: DISCONTINUED | OUTPATIENT
Start: 2024-02-12 | End: 2024-02-12

## 2024-02-12 RX ORDER — FLUOXETINE 20 MG/5ML
20 SOLUTION ORAL DAILY
Status: DISCONTINUED | OUTPATIENT
Start: 2024-02-12 | End: 2024-02-23 | Stop reason: HOSPADM

## 2024-02-12 RX ORDER — GABAPENTIN 300 MG/1
300 CAPSULE ORAL 3 TIMES DAILY
Status: DISCONTINUED | OUTPATIENT
Start: 2024-02-12 | End: 2024-02-14

## 2024-02-12 RX ORDER — HYDROMORPHONE HCL/0.9% NACL/PF 10 MG/50ML
PATIENT CONTROLLED ANALGESIA SYRINGE INTRAVENOUS CONTINUOUS
Status: DISCONTINUED | OUTPATIENT
Start: 2024-02-12 | End: 2024-02-13 | Stop reason: SDUPTHER

## 2024-02-12 RX ADMIN — HYDROMORPHONE HYDROCHLORIDE 0.25 MG: 1 INJECTION, SOLUTION INTRAMUSCULAR; INTRAVENOUS; SUBCUTANEOUS at 23:40

## 2024-02-12 RX ADMIN — POTASSIUM CHLORIDE, DEXTROSE MONOHYDRATE AND SODIUM CHLORIDE 75 ML/HR: 150; 5; 450 INJECTION, SOLUTION INTRAVENOUS at 02:02

## 2024-02-12 RX ADMIN — METOPROLOL TARTRATE 2.5 MG: 1 INJECTION, SOLUTION INTRAVENOUS at 09:42

## 2024-02-12 RX ADMIN — HYDROMORPHONE HYDROCHLORIDE 0.25 MG: 1 INJECTION, SOLUTION INTRAMUSCULAR; INTRAVENOUS; SUBCUTANEOUS at 08:40

## 2024-02-12 RX ADMIN — DIAZEPAM 2.5 MG: 5 INJECTION INTRAMUSCULAR; INTRAVENOUS at 00:15

## 2024-02-12 RX ADMIN — ACETAMINOPHEN 1000 MG: 160 SOLUTION ORAL at 09:42

## 2024-02-12 RX ADMIN — ENOXAPARIN SODIUM 40 MG: 100 INJECTION SUBCUTANEOUS at 09:42

## 2024-02-12 RX ADMIN — LIDOCAINE 1 PATCH: 4 PATCH TOPICAL at 09:43

## 2024-02-12 RX ADMIN — GABAPENTIN 300 MG: 300 CAPSULE ORAL at 09:43

## 2024-02-12 RX ADMIN — ACETAMINOPHEN 1000 MG: 160 SOLUTION ORAL at 21:18

## 2024-02-12 RX ADMIN — GABAPENTIN 300 MG: 300 CAPSULE ORAL at 21:20

## 2024-02-12 RX ADMIN — HYDROMORPHONE HYDROCHLORIDE: 10 INJECTION, SOLUTION INTRAMUSCULAR; INTRAVENOUS; SUBCUTANEOUS at 02:02

## 2024-02-12 RX ADMIN — HYDROMORPHONE HYDROCHLORIDE: 10 INJECTION, SOLUTION INTRAMUSCULAR; INTRAVENOUS; SUBCUTANEOUS at 16:46

## 2024-02-12 RX ADMIN — ACETAMINOPHEN 1000 MG: 160 SOLUTION ORAL at 14:07

## 2024-02-12 RX ADMIN — GABAPENTIN 300 MG: 300 CAPSULE ORAL at 15:45

## 2024-02-12 RX ADMIN — HYDROMORPHONE HYDROCHLORIDE 0.25 MG: 1 INJECTION, SOLUTION INTRAMUSCULAR; INTRAVENOUS; SUBCUTANEOUS at 03:33

## 2024-02-12 RX ADMIN — Medication 3 ML: at 09:43

## 2024-02-12 RX ADMIN — SODIUM PHOSPHATE, MONOBASIC, MONOHYDRATE AND SODIUM PHOSPHATE, DIBASIC, ANHYDROUS 15 MMOL: 142; 276 INJECTION, SOLUTION INTRAVENOUS at 09:43

## 2024-02-12 RX ADMIN — FLUOXETINE HYDROCHLORIDE 20 MG: 20 SOLUTION ORAL at 14:08

## 2024-02-12 RX ADMIN — Medication 3 ML: at 21:19

## 2024-02-12 RX ADMIN — METOPROLOL TARTRATE 2.5 MG: 1 INJECTION, SOLUTION INTRAVENOUS at 21:19

## 2024-02-12 RX ADMIN — DIAZEPAM 2.5 MG: 5 INJECTION INTRAMUSCULAR; INTRAVENOUS at 12:09

## 2024-02-12 RX ADMIN — POTASSIUM CHLORIDE, DEXTROSE MONOHYDRATE AND SODIUM CHLORIDE 50 ML/HR: 150; 5; 450 INJECTION, SOLUTION INTRAVENOUS at 18:56

## 2024-02-12 RX ADMIN — PANTOPRAZOLE SODIUM 40 MG: 40 INJECTION, POWDER, FOR SOLUTION INTRAVENOUS at 05:59

## 2024-02-12 RX ADMIN — OXYCODONE HYDROCHLORIDE 7.5 MG: 5 SOLUTION ORAL at 18:50

## 2024-02-12 NOTE — PAYOR COMM NOTE
"Alfreda Lebron (53 y.o. Male)                              ATTENTION; CLINICALS CASE REF # [id number]  ,  HP AND OP REPORT FOR REVIEW.                            REPLY TO UR DEPT  [phone] OR CALL  CHETNA TOLEDO LPKARLA           Date of Birth   1970    Social Security Number       Address   [address] [facility] [place] [address]    Home Phone   [phone]    MRN   [id number]       Uatsdin   Tenriism    Marital Status                               Admission Date   2/9/24    Admission Type   Elective    Admitting Provider   Saurabh Hurtado MD    Attending Provider   Saurabh Hurtado MD    Department, Room/Bed   [facility] INTENSIVE CARE, I373/1       Discharge Date       Discharge Disposition       Discharge Destination                                 Attending Provider: Saurabh Hurtado MD    Allergies: Cephalosporins, Dye  [Contrast Dye (Echo Or Unknown Ct/mr)], Iodinated Contrast Media, Sulfa Antibiotics, Sulfate, Zetia [Ezetimibe], Statins    Isolation: None   Infection: None   Code Status: CPR    Ht: 177.8 cm (70\")   Wt: 69.5 kg (153 lb 3.5 oz)    Admission Cmt: None   Principal Problem: Malignant neoplasm of lower third of esophagus [C15.5]                   Active Insurance as of 2/9/2024       Primary Coverage       Payor Plan Insurance Group Employer/Plan Group    ANTH BLUE CROSS ANTH BLUE CROSS BLUE SHIELD PPO [id number]       Payor Plan Address Payor Plan Phone Number Payor Plan Fax Number Effective Dates    [address] [phone]  1/1/2013 - None Entered    [place] [address]         Subscriber Name Subscriber Birth Date Member ID       ALFREDA LEBRON 1970 [id number]                     Emergency Contacts        (Rel.) Home Phone Work Phone Mobile Phone    MINERVA LEBRON (Spouse) [phone] -- [phone]                 History & Physical        Saurabh Hurtado MD at 02/09/24 0641            H&P reviewed. The patient was examined and there " are no changes to the H&P.          Electronically signed by Saurabh Hurtado MD at 02/09/24 0641   Source Note            THORACIC SURGERY CLINIC CONSULT NOTE    REASON FOR CONSULT: Stage III-A (cP3C2Y1) Adenocarcinoma of the lower third of the esophagus.    Subjective  HISTORY OF PRESENTING ILLNESS:   Jourdan Lebron is a 53 y.o. male who has significant medical problems as mentioned in the medical chart.     He was having intermittent dysphagia for over a year which became progressively worse.  On 10/2/2023, he underwent EGD and colonoscopy by Dr. Ozzy Abdalla at [facility].  He was found to have a 6 cm mass at the lower third of the esophagus (36 to 42 cm) (biopsied), mild diverticulosis of the sigmoid colon, 5 polyps noted in the colon and cecum that were removed.  The pathology of the esophageal mass revealed invasive moderate to poorly differentiated adenocarcinoma. All polypectomy samples were negative for malignancy (fragments of tubular adenoma).  CT scan of the chest, abdomen and pelvis on 10/9/2023 at [facility] showed 4 cm abnormal thickening of the lower thoracic esophagus extending to the GE junction thought to represent patient's known primary lung malignancy.  There were no convincing evidence of metastatic disease elsewhere in the chest, abdomen, pelvis, or skeleton.  There were stable left adrenalectomy changes, right adrenal adenoma unchanged.     He was seen in the office by Dr. Tacos Osuna ([facility]) for new diagnosis of esophageal cancer. He was an established patient of Dr. Harinder Mueller for erythrocytosis and macrocytosis since 11/2021 (resolved).      PET/CT on 10/20/2023 at [facility] showed hypermetabolic (SUV 7.7) activity within the distal esophagus extending to the GE junction compatible with patient's known malignancy.  He then underwent EGD with EUS by Dr. Joselyn Savage on 10/27/2023. Findings included close to 6 cm ulcerated mass extending from 36 to 42 cm  consistent with poorly differentiated adenocarcinoma.  Mass penetrated through muscularis propria without involving the adventitia consistent with T2 lesion. Two small round hypodense lymph nodes in close proximity to the mass measuring 5 and 6 mm concerning for malignancy. Other lymph nodes around the GE junction measuring 7 and 8 mm were also concerning for malignancy but immediate cytology was negative for malignancy. Pathology of the gastrohepatic lymph node was positive for metastatic adenocarcinoma; however, the lymph node at 36 cm was negative for malignancy.    He was referred to thoracic surgery for further evaluation.  At the time of initial consultation, he could eat and swallow quite a bit, but it depends. He avoids a lot of bread because it gets stuck lower down his chest. He went down from 200 pounds to 170 pounds 3 years ago after he had a stroke. He had memory loss and general fatigue after the stroke. He lost his appetite but began to add some weight. He then noticed he gets bloated and was having dyspepsia. He had adrenalectomy performed at the [facility] after an adrenal biopsy was done on an adrenal mass, and he went into adrenal crisis. He took hydrocortisone for 1 year and discontinued it on 09/2022. He developed abdominal discomfort afterwards. He denies having any past chest surgeries or myocardial infarction.  His wife mentioned the cause of the previous stroke was never known despite having a complete work up and a loop recorder inserted which was removed in the spring. He denies any abnormal heart rhythms or pedal edema. The patient quit smoking cigarettes 8 to 9 years ago and started smoking cigars.     He had good functional status.  Based on the clinical presentation, he was considered a candidate for trimodality treatment. I placed Mediport on 11/13/2023.  He received concurrent chemoradiation therapy.  His last dose of chemotherapy and radiation was on 12/28/2023.    He  came to clinic for follow-up visit.  He has been able to tolerate soft food without difficulty.  He has tolerated chemoradiation without much difficulty.  Restaging PET/CT scan on 2/2/2024 showed wall thickening involving the distal esophagus extending the GE junction consistent with patient's known esophageal malignancy.  There was no evidence of distant metastases.  There was significant decreased uptake in the distal esophagus consistent with treatment effect.    Due to his history of stroke, ultrasound bilateral carotid were obtained which showed no significant carotid stenosis.  There was incidentally noted right thyroid nodule for which follow-up ultrasound of the thyroid was done which showed multiple nodules but not concerning for malignancy.  Transthoracic echo was performed on 1/22/2024 and noted ejection fraction of 51 to 55%.  He was sent to Dr. Hayward for cardiology clearance.  He had 0.8% risk of myocardial infarction or cardiac arrest, intraoperatively or up to 30 days postoperatively.  No further workup was recommended.  He had a history of adrenal crisis and was sent to evaluation by his primary endocrinologist at [facility], Anabela Crenshaw MD.  Complete ACTH stimulation test was performed which was reported normal.    He came to clinic for follow-up visit.  He has been trying to be active and has improved his diet. He has been trying to consume more solid foods. He complains of fatigue.  He is not taking anti-coagulants or steroids. He has not taken the baby aspirin since the port was placed. He was told that his port needed to be flushed, but he was not sure where this needed to be done.     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    Anxiety     Brain fog     COVID 02/2023    Diverticulosis of large  intestine without perforation or abscess without bleeding 10/02/2023    Esophageal cancer 10/2023    GERD (gastroesophageal reflux disease)     Hand tingling     History of blood clot in brain     had thrombectomy    History of cancer chemotherapy 2023    History of radiation therapy 2023    Hyperlipidemia     Stroke 2020    Tiredness      Past Surgical History:   Procedure Laterality Date    ADRENALECTOMY      COLONOSCOPY      ENDOSCOPY      ESOPHAGUS SURGERY  10/2023    biopsy    KIDNEY STONE SURGERY      OTHER SURGICAL HISTORY      loop recorder    THROMBECTOMY      UPPER ENDOSCOPIC ULTRASOUND W/ FNA N/A 10/27/2023    Procedure: ENDOSCOPIC ULTRASOUND WITH STAGING AND FINE NEEDLE ASPIRATION X2 AREAS;  Surgeon: Joselyn Savage MD;  Location: [facility] ENDOSCOPY;  Service: Gastroenterology;  Laterality: N/A;  POST:    VENOUS ACCESS DEVICE (PORT) INSERTION Right 2023    Procedure: INSERTION VENOUS ACCESS DEVICE;  Surgeon: Saurabh Hurtado MD;  Location: [facility] MAIN OR;  Service: Thoracic;  Laterality: Right;     Family History   Problem Relation Age of Onset    Arthritis Mother     Hypertension Mother     Heart failure Mother 73        Cause of death    Arthritis Father     Hypertension Father     Kidney cancer Father 57        Cause of death. Was a smoker    Heart disease Brother     Esophageal cancer Brother 59        Cause of death. Has a heavy drinker    Malig Hyperthermia Neg Hx      Social History     Socioeconomic History    Marital status:    Tobacco Use    Smoking status: Former     Packs/day: 1.00     Years: 37.00     Additional pack years: 0.00     Total pack years: 37.00     Types: Cigars, Cigarettes     Start date: 1985     Quit date: 2022     Years since quittin.1     Passive exposure: Past    Smokeless tobacco: Never    Tobacco comments:     1 packs= stopped 10 years ago and continued cigars   2 cigars a day; has stopped cigars as of    Vaping Use    Vaping  "Use: Never used   Substance and Sexual Activity    Alcohol use: Not Currently     Comment: Has now been abstinent for about one year    Drug use: Never    Sexual activity: Yes     Partners: Female     Birth control/protection: None     No current facility-administered medications for this visit.  No current outpatient medications on file.    Facility-Administered Medications Ordered in Other Visits:     bupivacaine liposome (EXPAREL) 1.3 % injection  - ADS Override Pull, , , ,     fentaNYL citrate (PF) (SUBLIMAZE) injection 50 mcg, 50 mcg, Intravenous, Once PRN, Chris Mann MD    lactated ringers infusion, 9 mL/hr, Intravenous, Continuous, Chris Mann MD, Last Rate: 9 mL/hr at 02/09/24 0634, 9 mL/hr at 02/09/24 0634    lidocaine (XYLOCAINE) 1 % injection 0.5 mL, 0.5 mL, Intradermal, Once PRN, Chris Mann MD    midazolam (VERSED) injection 1 mg, 1 mg, Intravenous, Q5 Min PRN, Chris Mann MD    sodium chloride 0.9 % flush 10 mL, 10 mL, Intravenous, Q12H, Saurabh Hurtado MD    sodium chloride 0.9 % flush 10 mL, 10 mL, Intravenous, PRN, Saurabh Hurtado MD    sodium chloride 0.9 % flush 3 mL, 3 mL, Intravenous, Q12H, Chris Mann MD    sodium chloride 0.9 % flush 3-10 mL, 3-10 mL, Intravenous, PRN, Chris Mann MD    sodium chloride 0.9 % infusion 40 mL, 40 mL, Intravenous, PRN, Saurabh Hurtado MD    vancomycin (VANCOCIN) 1000 mg/200 mL dextrose 5% IVPB, 15 mg/kg, Intravenous, Once, Saurabh Hurtado MD     Allergies   Allergen Reactions    Cephalosporins Anaphylaxis     Throat swelling    Dye  [Contrast Dye (Echo Or Unknown Ct/Mr)] Hives    Iodinated Contrast Media Hives    Sulfa Antibiotics Hives    Sulfate Hives    Zetia [Ezetimibe] Other (See Comments) and Myalgia     Made tired    Statins Myalgia     Myalgia and fatique           Objective   OBJECTIVE:     VITAL SIGNS:  /80 (BP Location: Left arm, Patient Position: Sitting, Cuff Size: Adult)   Pulse 66   Ht 177.8 cm (70\")   Wt 69.9 kg (154 lb)   SpO2 " 100%   BMI 22.10 kg/m²     PHYSICAL EXAM:  Constitutional:       Appearance: Normal appearance.   HENT:      Head: Normocephalic.      Right Ear: External ear normal.      Left Ear: External ear normal.      Nose: Nose normal.      Mouth/Throat: No obvious deformity     Pharynx: Oropharynx is clear.   Eyes:      Conjunctiva/sclera: Conjunctivae normal.   Cardiovascular:      Rate and Rhythm: Normal rate.      Pulses: Normal pulses.   Pulmonary:      Effort: Pulmonary effort is normal.   Abdominal:      Palpations: Abdomen is soft.   Musculoskeletal:         General: Normal range of motion.      Cervical back: Normal range of motion.   Skin:     General: Skin is warm.   Neurological:      General: No focal deficit present.      Mental Status: He is alert and oriented to person, place, and time.     LAB RESULTS:  I have reviewed all the available laboratory results in the chart.    RESULTS REVIEW:  I have reviewed the patient's all relevant laboratory and imaging findings.     PET/ CT of skull base to mid-thigh done 10/20/2023.  Abnormal metabolic activity within the distal esophagus extending to the GE junction compatible with patient's known malignancy.    PET scan from 02/02/2024 was reviewed with the patient.  The tumor has decreased in size. It still shows some activity, which could be some radiation changes.     Ultrasound of the carotid arteries from 01/24/2024 did not see any significant blockage.     Ultrasound of the thyroid from 01/29/2024 revealed a small nodule on the thyroid that was not concerning for cancer.         ASSESSMENT & PLAN:  Jourdan Lebron is a 53 y.o. male with significant medical conditions as mentioned above presented to my clinic.    Diagnosis: Adenocarcinoma of the lower third of the esophagus  Staging: Stage III-A ([id number])    He has adenocarcinoma of the lower third of the esophagus.  He has good functional status.  Based on the clinical presentation, he was considered a candidate for  trimodality treatment.  He has tolerated concurrent chemoradiation well. He did respond to treatment.     We discussed surgery in detail, including esophageal resection. He will receive a pain block before and after surgery. With a history of steroids and smoking, there is a 5 to 10 percent chance of leakage. Discussed surgery risks such as pneumonia and life modifications post-surgery. The tumor will be evaluated after the resection to determine any further treatment. The patient was recommended to keep walking and stay active to improve his recovery time.     I discussed the patients findings and my recommendations with the patient. The patient was given adequate time to ask questions and all questions were answered to patient satisfaction.     Saurabh Hurtado MD  Thoracic Surgeon  [facility]        Dictated utilizing Dragon dictation    I spent 40 minutes caring for Jourdan on this date of service. This time includes time spent by me in the following activities:preparing for the visit, reviewing tests, obtaining and/or reviewing a separately obtained history, performing a medically appropriate examination and/or evaluation , counseling and educating the patient/family/caregiver, ordering medications, tests, or procedures, referring and communicating with other health care professionals , documenting information in the medical record, independently interpreting results and communicating that information with the patient/family/caregiver, and care coordination and more than half the time was spent in direct face to face evaluation and decision making.     Transcribed from ambient dictation for Saurabh Hurtado MD by Izabella Garsia.  02/06/24   10:29 EST    Patient or patient representative verbalized consent to the visit recording.  I have personally performed the services described in this document as transcribed by the above individual, and it is both accurate and complete.      Electronically  signed by Saurabh Hurtado MD at 02/09/24 0641                      Operative/Procedure Notes (all)        Saurabh Hurtado MD at 02/09/24 5378  Version 1 of 1         OPERATIVE NOTE     Date of procedure:2/9/2024     Patient name: Jourdan Lebron  MRN: [id number]     Pre OP diagnosis:  Adenocarcinoma of the lower third of the esophagus s/p neoadjuvant chemoradiotherapy.  Dysphagia.  History of cerebrovascular accident.  History of tobacco abuse.  History of left adrenalectomy.  History of adrenal crisis.  Cervical radiculopathy.  Cervical stenosis of spinal canal.  Near syncope.  Generalized hyperhidrosis.  Renal calculus.  Abnormal electrocardiogram.  Vitamin B12 deficiency.  Seasonal allergic rhinitis.  Erythrocytosis.  Macrocytosis.  Generalized anxiety disorder.  Major depressive disorder.     Post OP diagnosis:  Adenocarcinoma of the lower third of the esophagus s/p neoadjuvant chemoradiotherapy followed by [place] esophagectomy.  Dysphagia.  History of cerebrovascular accident.  History of tobacco abuse.  History of left adrenalectomy.  History of adrenal crisis.  Cervical radiculopathy.  Cervical stenosis of spinal canal.  Near syncope.  Generalized hyperhidrosis.  Renal calculus.  Abnormal electrocardiogram.  Vitamin B12 deficiency.  Seasonal allergic rhinitis.  Erythrocytosis.  Macrocytosis.  Generalized anxiety disorder.  Major depressive disorder.     Procedure performed:   Flexible bronchoscopy.  Flexible esophagogastroduodenoscopy  CRE balloon dilation of the pylorus up to 20 mm.  Robot-assisted minimally-invasive [place] esophagectomy (via a robot-assisted laparoscopic and robot-assisted right thoracoscopic approach converted to thoracotomy).  Robot-assisted laparoscopic feeding jejunostomy tube placement  Intercostal nerve block.     Indications:   Jourdan Lebron is a 53-year-old male who has significant medical problems as mentioned in the medical chart.      He was having intermittent dysphagia for over a  year which became progressively worse.  On 10/2/2023, he underwent EGD and colonoscopy by Dr. Ozzy Abdalla at [facility].  He was found to have a 6 cm mass at the lower third of the esophagus (36 to 42 cm) (biopsied), mild diverticulosis of the sigmoid colon, 5 polyps noted in the colon and cecum that were removed.  The pathology of the esophageal mass revealed invasive moderate to poorly differentiated adenocarcinoma. All polypectomy samples were negative for malignancy (fragments of tubular adenoma).  CT scan of the chest, abdomen and pelvis on 10/9/2023 at [facility] showed 4 cm abnormal thickening of the lower thoracic esophagus extending to the GE junction thought to represent patient's known primary lung malignancy.  There were no convincing evidence of metastatic disease elsewhere in the chest, abdomen, pelvis, or skeleton.  There were stable left adrenalectomy changes, right adrenal adenoma unchanged.     He was seen in the office by Dr. Tacos Osuna ([facility]) for new diagnosis of esophageal cancer. He was an established patient of Dr. Harinder Mueller for erythrocytosis and macrocytosis since 11/2021 (resolved).      PET/CT on 10/20/2023 at [facility] showed hypermetabolic (SUV 7.7) activity within the distal esophagus extending to the GE junction compatible with patient's known malignancy.  He then underwent EGD with EUS by Dr. Joselyn Savage on 10/27/2023. Findings included close to 6 cm ulcerated mass extending from 36 to 42 cm consistent with poorly differentiated adenocarcinoma.  Mass penetrated through muscularis propria without involving the adventitia consistent with T2 lesion. Two small round hypodense lymph nodes in close proximity to the mass measuring 5 and 6 mm concerning for malignancy. Other lymph nodes around the GE junction measuring 7 and 8 mm were also concerning for malignancy but immediate cytology was negative for malignancy. Pathology of the gastrohepatic  lymph node was positive for metastatic adenocarcinoma; however, the lymph node at 36 cm was negative for malignancy.     He was referred to thoracic surgery for further evaluation.  At the time of initial consultation, he could eat and swallow quite a bit, but it depends. He avoids a lot of bread because it gets stuck lower down his chest. He went down from 200 pounds to 170 pounds 3 years ago after he had a stroke. He had memory loss and general fatigue after the stroke. He lost his appetite but began to add some weight. He then noticed he gets bloated and was having dyspepsia. He had adrenalectomy performed at the [facility] after an adrenal biopsy was done on an adrenal mass, and he went into adrenal crisis. He took hydrocortisone for 1 year and discontinued it on 09/2022. He developed abdominal discomfort afterwards. He denies having any past chest surgeries or myocardial infarction.  His wife mentioned the cause of the previous stroke was never known despite having a complete work up and a loop recorder inserted which was removed in the spring. He denies any abnormal heart rhythms or pedal edema. The patient quit smoking cigarettes 8 to 9 years ago and started smoking cigars.      He had good functional status.  Based on the clinical presentation, he was considered a candidate for trimodality treatment. I placed Mediport on 11/13/2023.  He received concurrent chemoradiation therapy.  His last dose of chemotherapy and radiation was on 12/28/2023.     He came to clinic for follow-up visit.  He has been able to tolerate soft food without difficulty.  He has tolerated chemoradiation without much difficulty.  Restaging PET/CT scan on 2/2/2024 showed wall thickening involving the distal esophagus extending the GE junction consistent with patient's known esophageal malignancy.  There was no evidence of distant metastases.  There was significant decreased uptake in the distal esophagus consistent with  treatment effect.     Due to his history of stroke, ultrasound bilateral carotid were obtained which showed no significant carotid stenosis.  There was incidentally noted right thyroid nodule for which follow-up ultrasound of the thyroid was done which showed multiple nodules but not concerning for malignancy.  Transthoracic echo was performed on 1/22/2024 and noted ejection fraction of 51 to 55%.  He was sent to Dr. Hayward for cardiology clearance.  He had 0.8% risk of myocardial infarction or cardiac arrest, intraoperatively or up to 30 days postoperatively.  No further workup was recommended.  He had a history of adrenal crisis and was sent to evaluation by his primary endocrinologist at [facility], Anabela Crenshaw MD.  Complete ACTH stimulation test was performed which was reported normal.     After discussing the risks and benefits of the procedure, the patient provided informed consent for a flexible bronchoscopy, flexible esophagogastroduodenoscopy, robot-assisted minimally-invasive (possible open) [place] esophagectomy, feeding jejunostomy tube placement, and balloon dilatation of pylorus.         Surgeon:   Saurabh Hurtado MD (primary).  Cynthia Sexton MD     Assistants: Dieudonne Diamond CSA was responsible for performing the following activities: Retraction, Suction, Irrigation, Suturing, Closing, Placing Dressing and Held/Positioned Camera and their skilled assistance was necessary for the success of this case.    Anesthesia: General endotracheal anesthesia with double-lumen tube     ASA Class: 3     Procedure Details   On 2/9/2024, the patient was brought to the operating room and was placed supine on the operating room table. Following an uneventful induction of general anesthesia, the patient was intubated without incident. Pneumatic compression devices were placed on bilateral lower extremities. A Kendrick catheter was inserted. A radial arterial line and additional peripheral  IVs were placed by the anesthesia team. The patient received IV vancomycin for intravenous antibiotic prophylaxis and 5000 U SQH for DVT prophylaxis.  Prior to beginning the operation, a time out was conducted during which all members of the surgical team were present.      A flexible adult endoscope was placed into the oropharynx and advanced down the proximal esophagus under direct vision.  The cricopharyngeus was located at 16 cm. The proximal esophagus appeared normal. There was evidence of distal esophageal mucosal changes and Olivera's esophagus.  There was no evidence of mass at the prior site of esophageal cancer.  The endoscope was advanced into the stomach without difficulty and retroflexed.  There was no tumor extension on to the stomach.  The remainder of the stomach was normal.  The pylorus was widely patent, and the 1st and 2nd portions of the duodenum appeared normal.  I advanced an 18-19-20 CRE balloon down the working channel of the endoscope and dilated the pylorus.  I performed three serial dilatations up to 20 mm for 60 seconds each.  Repeat endoscopy demonstrated no mucosal tears.  The duodenum, stomach and esophagus were evacuated free of air and debris, the endoscope was removed.     A footboard was placed.  Both arms were tucked.  All bony prominences were well padded. His lower chest and abdomen were prepped and draped in usual sterile fashion.  I made a stab incision below the left costal margin and inserted a Veress needle into the peritoneal cavity without incident.  Pneumoperitoneum 15 mmHg was achieved. Next, I made an 5-mm transverse incision just to the left of midline and above the umbilicus 15 cm caudal to the xiphoid process.  The peritoneal cavity was entered through this incision via a 5 mm trocar via the Optiview technique using 5 mm 0 degree camera.  The port was up-sized to 8 mm da Eusebio port. The abdomen was explored.  There was no ascites, liver lesions, peritoneal lesions,  "or omental implants.   Next, I placed my additional ports, all parallel to one another and perpendicular to the midline axis.  An 8 mm (arm 4) port was placed laterally in the left mid abdomen.  A 12-mm (arm 3) port midway between the camera and arm 4 port, at his prior port site.   A 12 mm (arm 1) port was placed in the right mid abdomen in line with camera port.  A 12 mm assistant port was placed in the right lower quadrant triangulated between the arm 1 and arm 2 ports at his prior right lower quadrant port site.  The patient was positioned in steep reverse Trendelenburg.  A 5-mm port was placed in the right upper quadrant at the costal margin laterally.  A self-retaining snake liver retractor was inserted through the right upper quadrant trocar to facilitate exposure of the hiatus.  The robot was docked in standard fashion.  All instruments were inserted under direct vision.      I turned my attention to the upper abdomen.   I began by mobilizing the stomach.  During the entire dissection, a \"no-touch\" technique was utilized to avoid trauma to the eventual gastric conduit.  The gastrohepatic ligament was taken down directly above the caudate lobe using SynchroSeal energy device.   The dissection was continued towards the base of the right marilee.  I began to define the medial border of the right crural pillar using a combination of blunt and sharp dissection with SynchroSeal energy device.  The dissection was continued up and over the apex of the hiatus.  All pericardial soft tissues were swept posteriorly onto the specimen.       I then began to define the apex of the medial border of the left crural pillar using a SynchroSeal energy device. I turned my attention back to the lesser curvature.  I entered a well-defined plane between the aorta and the periesophageal soft tissues and began to mobilize the esophagus posteriorly and the mediastinum using a vessel sealer. Next, I skeletonized lymphatic tissue off the " left gastric vascular pedicle, sweeping all lymphatic tissue upwards towards the specimen.  The left gastric pedicle was divided with a 45-mm curved tip da Eusebio vascular load stapler. I continued to work underneath the lesser curvature and identified the decussation of the crural pillars.  Retrogastric attachments to the left marilee were taken down and the highest short gastrics were taken down using SynchroSeal energy device.   The distal esophagus was encircled with a 0.5 inch Penrose drain. The esophagus was circumferentially mobilized into the mediastinum to near the level of the inferior pulmonary veins.  All pericardial soft tissues were dissected off the pericardium anteriorly.  I circumferentially dissected around the esophagus in a well-defined areolar planes.        I turned my attention to the greater curvature of the stomach. Directly cephalad to the proximal extent of the pedicle, the lesser sac was entered taking care to stay approximately 1 or 2 cm off the stomach.  The short gastrics were taken down all the way to the angle of His.  I continued to take down attachments between the stomach and the left hemidiaphragm and continue to define the medial border of the left crural pillar using SynchroSeal energy device.       I proceeded to mobilize the stomach by mobilizing the greater omentum off the transverse colon and transverse mesocolon to the level of the pylorus.  Remaining retro gastric attachments were taken down.  I proceeded to perform a mini-Kocher maneuver by taking down the remaining retro gastric attachments and antro-pyloric attachments using a SynchroSeal energy device back to near the origin of the gastroepiploic pedicle.   I also took down some of the attachments to the lateral wall of the duodenum and hilum of the liver using a vessel sealer.  At this point, the pylorus easily reached the level of the right marilee, indicative of adequate mobilization.      Next, I constructed the gastric  conduit.  Just above the 3rd branch of the right gastric artery, the lesser curve vascular pedicle was divided using a SynchroSeal energy device. While providing simultaneous retraction cranially on the tip of the gastric fundus and caudally on the antrum to assure construction of a straight conduit, a 4 cm gastric tube was constructed using multiple firings of the 45 mm da Eusebio green stapler following a line parallel to the greater curvature of the stomach.  3 mL of indocyanine green were administered intravenously and flushed with 10 mL of saline.  With near infrared spectroscopy, I noted the entire conduit with the exception of the proximal 5 cm to rapidly perfuse.  Next, taking care to assure proper orientation (lesser curve staple line facing towards the patient's right side), the conduit was tacked to the specimen using two 0 silk U stitches.  The specimen was tucked the hiatus along with Penrose drain.  The robot was undocked.  The self-retraining liver retractor was removed under direct vision.  The patient was positioned supine.     The arm # 3 12 mm port was closed using figure-of-eight 0 Vicryl suture with Boyd Elliott.  I placed an 8 mm da Eusebio trocar in the right upper quadrant.  The robot was side docked.  Next, I turned my attention to placing a feeding jejunostomy tube. The transverse colon was identified and retracted cephalad. We then identified the ligament of Treitz. Approximately 40-cm distal to ligament of Treitz, we identified a freely mobile loop of jejunum to serve as the site of his feeding jejunostomy tube. Using a finder needle, we identified a suitable location on his abdominal wall. The antimesenteric border of the jejunum 40 cm distal to ligament of Treitz was tacked to the anterior abdominal wall at this location using 2-0 silk suture.  Using 2-0 PDS STRATAFIX™, I anchored to the antimesenteric 10 cm segment of jejunum to the abdominal wall.  A T-tube jejunostomy was inserted  into the abdominal cavity.  An enterotomy was made using monopolar hot scissors.  The T-tube was inserted into the jejunum.  Using 2-0 Vicryl, a purse string suture was placed around the enterotomy. The T-tube was retrieved through a small stab incision.  The 2-0 PDS STRATAFIX™ suture was ran cephalad anchoring the bowel to the abdominal wall.  The tube was injected with water and checked for leak.  There was no leakage noted.     Hemostasis was assured.  The robot was undocked.  The fascia of the 12-mm arm 1 and right lower quadrant assistant port sites were closed using 0 Vicryl sutures with the assistance of a Aura suture passer.  Pneumoperitoneum was evacuated.  The ports were removed.  The subdermal tissues with closed with 2-0 Vicryl suture.  The skin incision was closed with staples.  Dry sterile dressings were applied.     The patient was repositioned in a left lateral decubitus position.  The table was jackknifed.  All bony processes were well padded.  Appropriate placement of the endotracheal tube was again confirmed with a pediatric bronchoscope.  His right chest was prepped and draped in the usual sterile fashion. Following right lung isolation, I began by making an 8 mm oblique skin incision at approximately the 6th intercostal space in the posterior to mid axillary line.  The pleural space was entered without incident directly of the rib using blunt tonsil dissection.  An 8 mm trocar was inserted.  After assuring safe pleural entry, carbon dioxide insufflation to 8 mmHg was utilized.  Next, I placed my additional ports:  An 8 mm (arm 3) port was placed at approximately the 8th intercostal space in the mid to posterior axillary line.  An 8-mm (arm 4) port was placed at approximately the 4th intercostal space in the mid axillary line.  A 12 mm (arm 4) port was placed in line at the tip of the scapula at approximately the 9th to 10th intercostal space. The robot was docked in a standard  fashion, targeting the right shoulder. All instruments were inserted under direct vision.       I turned my attention to the thoracic esophagus.  The Penrose was immediately visible.  I proceeded to mobilize the esophagus by taking down the remainder of the inferior pulmonary ligament all the way to the inferior pulmonary vein. I proceeded to work anterior to the esophagus and took down the mediastinal pleura from the inferior pulmonary vein to the azygos vein using bipolar cautery.  The azygos vein was elevated.  Using bipolar cautery, the azygos vein was dissected free from the surrounding mediastinal soft tissues.  The pleura cephalad to the azygos vein was incised thereby creating an avascular window around the azygos vein.  The vein was then divided using a 45-mm da Eusebio curved tip vascular stapler.  I continued to work in the anterior plane from a caudal to cranial direction.  I skeletonized the lymphatics off the bronchus intermedius, right mainstem bronchus, and left mainstem bronchus using bipolar cautery, taking care to avoid airway injury.  The subcarinal lymph node packet was skeletonized off the airways and swept onto the specimen.  I turned my attention to the posterior plane.   The pleura posterior esophagus was taken down all the way to the azygos vein using a vessel sealer.  I continued to work in the posterior plane in a caudal to cranial direction and divided all aortoesophageal and lymphatic branches using SynchroSeal energy device.  I proceeded to mobilize the esophagus cephalad to the azygos vein.  Cephalad to the vein, the dissection proceeded directly on the esophagus to minimize the risk of recurrent laryngeal nerve injury.  The Penrose drain was grasped and utilized to facilitate retraction.  I worked back and forth between the anterior and posterior planes to completely mobilize the esophagus.  The dissection continued approximately midway between the azygos vein and the thoracic inlet.  Taking care to assure proper orientation, the conduit was delivered in the chest.  The esophagus was then elevated and remaining posterior attachments were taken down using  a vessel sealer.  The esophagus was circumferentially mobilized to midway between the azygos vein and thoracic inlet.  Using robotic oswaldo, the esophagus was divided 2 cm cephalad to the azygos vein.     I then made a 7 cm non-rib-spreading lateral mini thoracotomy two interspaces below the tip of the scapula.  The latissimus and serratus were divided.  The pleural space was entered without incident.  The ribs were not spread.  An Chris wound protector was utilized to facilitate exposure.  The esophagogastrectomy specimen was removed through the Chris wound retractor and sent to pathology.     Next, I turned my attention to completing the reconstruction.  A 0 Prolene purse string suture was placed in a baseball stitch fashion around the opening of the remnant esophagus.  A 28 EEA anvil was delivered in the chest, placed into the esophagus.  The purse string suture was tied down.  Another 0 Prolene purse string suture was placed to gather a small rosebud around the stem of the anvil.  The robot was temporarily undocked.  We had some difficulty performing intracorporeal EEA anastomosis.  The anvil was not functioning properly and decision was made to redo the anastomosis.  We converted the minithoracotomy to formal thoracotomy incision.  The EEA anvil was removed.  Another baseball pursestring suture was placed using 0 Prolene suture.  The anvil was advanced into the esophagus.  The pursestring suture was tied down.  Another 0 Prolene pursestring suture was placed together a small rosebud around the stem of the anvil.  The conduit was delivered in the chest taking care to assure proper orientation (lesser curve staple line facing upwards and avoiding delivery of access conduit into the chest).  The conduit appearing pink and healthy.  A  gastrotomy was made in the tip of conduit just anterior to the lesser curve staple line with cautery.  A 28 EEA stapler was advanced through the thoracotomy incision and into the gastrotomy.  The EEA stapler spike was deployed posteriorly directly opposite to lesser curve staple line near a well vascularized pedicle.  The stapler then was docked to the anvil, fired, and removed in a standard fashion.  The anvil was inspected.  One complete esophageal anastomotic ring and partial complete gastric donut was noted and sent to pathology as a permanent specimens.  We are concerned about the integrity of the anastomosis.  EGD was performed a leak was identified at the anastomosis.  We placed several figure-of-eight imbricating sutures to reinforce the staple line.  Air leak test was performed again and no leak was identified. The chest was irrigated with 2 liter of warm saline.  Hemostasis was assured.  The conduit was inspected and noted to be pink and healthy as well as non-redundant.  A #10 Leroy-Oconnell drain was placed through a stab incision in the right lower chest and positioned posterior to the conduit.  The drain was secured to the skin using Ethibond sutures.  A #24 Slovenian straight chest tube was advanced through the camera port and directed posteriorly and towards the apex.  The chest tube was secured to the skin using #2 Ethibond suture.  Using combination of 0.25% Marcaine and 1.3% liposomal bupivacaine, intercostal nerve block was performed.  The lung was re-expanded under direct vision.  The ports were removed.  The port sites were irrigated.     The ribs were approximated using #2 Vicryl suture.  The muscle layer was closed using a running 0 Vicryl suture.  The latissimus dorsi was closed using a running 0 Vicryl suture.  The subcutaneous tissues were closed using 2-0 Vicryl suture.  The fascia of the 12-mm ports was closed with 0 Vicryl suture.  The subcutaneous tissues were closed using 2-0 Vicryl  suture.  The skin incisions were closed with 4-0 Monocryl subcuticular suture.   Dry sterile dressings were applied.       The patient was repositioned supine. Repeat endoscopy was performed.  An intact, patent esophagogastrostomy was noted at 22-24 cm.  The conduit appeared healthy, mildly-tortuous and non-redundant. A nasogastric tube was advanced down the conduit under endoscopic visualization.  The endoscope was removed.    I performed the laparoscopic and majority of thoracoscopic portion of the case.  Dr. Cynthia Sexton was available for the critical part of the laparoscopic and thoracoscopic procedure.  She was scrubbed for the thoracoscopy and thoracotomy part of the procedure.  We performed the anastomosis together.  Her participation in services was required for the complex procedure.     Findings:  Flexible esophagogastroduodenoscopy was notable for mucosal changes at the distal esophagus and Olivera's esophagus.  Findings were consistent with treatment effect.  Rather than a gastric emptying procedure, I performed a balloon dilatation of the pylorus to 20 mm with a CRE balloon  I performed a robot-assisted minimally-invasive Vinicio Shayne esophagectomy via a robot-assisted laparoscopic and robot assisted right thoracoscopic approach converted to thoracotomy.   I constructed a 4 cm gastric tube and performed an esophagogastrostomy approximately 2 cm above the azygos vein using a 28 EEA stapler.  After firing the stapler, 1 complete esophageal anastomotic ring and a partial gastric anastomotic ring was noted.  The anastomosis was checked under water with air insufflation and leak was identified which was oversewn.  The anastomosis was partially imbricated and no leak was identified under water with air insufflation afterwards.  A feeding jejunostomy tube was placed 40 cm distal to ligament of Treitz.  Repeat endoscopy was notable for a widely patent anastomosis at 22-24 cm.  The conduit was healthy appearing  and mildly tortuous and non redundant.  The patient remained hemodynamically stable throughout the case.  He received 4000 cc of crystalloid and made 1000 cc of urine.  He required small doses of phenylephrine throughout the case.     Estimated Blood Loss: 200 ml           Drains:   24-St Helenian right chest tube.  #10 right chest Leroy-Oconnell positioned posterior to the anastomosis.                  Specimens:  ID Type Source Tests Collected by Time   A : GASTRIC FAT PAD Tissue Stomach TISSUE PATHOLOGY EXAM Saurabh Hurtado MD 2/9/2024 0937   B : LEVEL 4  LYMPH NODE FOR PERMENANT Tissue Lymph Node TISSUE PATHOLOGY EXAM Saurabh Hurtado MD 2/9/2024 1231   C : ESOPHAGUS Tissue Esophagus TISSUE PATHOLOGY EXAM Saurabh Hurtado MD 2/9/2024 1434   D : ESOPHAGEAL MARGIN Tissue Esophagus TISSUE PATHOLOGY EXAM Saurabh Hurtado MD 2/9/2024 1434   E : GASTRIC MARGIN Tissue Stomach TISSUE PATHOLOGY EXAM Saurabh Hurtado MD 2/9/2024 1435     Implants: None           Complications: None           Disposition: PACU - hemodynamically stable.           Condition: stable     Saurabh Hurtado MD   Thoracic Surgeon  [facility]      Electronically signed by Saurabh Hurtado MD at 02/09/24 3460

## 2024-02-12 NOTE — NURSING NOTE
Call placed to thoracic surgery, s/w Dr. Hurtado regarding PO med given through NGT instead of J-tube. Pt c/o of mild heartburn briefly after med given. No other events to note.

## 2024-02-12 NOTE — PROGRESS NOTES
Patient ambulated this morning, doing very well with physical therapy.  Off oxygen.  Chart reviewed.  Pulmonary will sign off.  Please call if any questions

## 2024-02-12 NOTE — PLAN OF CARE
Goal Outcome Evaluation:      Patient is a 53 y.o. male admitted to [facility] for Adenocarcinoma of the esophagus status post neoadjuvant chemotherapy/XRT followed by esophagectomy on 2/9/2024. PMHx includes CVA and adrenocortical hypofunction. Patient is independent at baseline and lives at home with spouse. Today, patient performed bed mobility with SBA, required CGA for transfers, and ambulated 300' CGA with a RW- second person assist for line mgmt only and CT on suction throughout ambulation. Mild strength and endurance deficits noted but overall doing very fall. Patient may benefit from skilled PT services acutely to address functional deficits as well as improve level of independence prior to discharge. Anticipate returning home with spouse upon DC- PT will follow peripherally.    Anticipated Discharge Disposition (PT): home with assist, home with home health (pending patient progress made)

## 2024-02-12 NOTE — THERAPY EVALUATION
Patient Name: Jourdan Lebron  : 1970    MRN: [id number]                              Today's Date: 2024       Admit Date: 2024    Visit Dx:     ICD-10-CM ICD-9-CM   1. Malignant neoplasm of lower third of esophagus  C15.5 150.5     Patient Active Problem List   Diagnosis    Abnormal electrocardiogram    Calculus of kidney    History of cerebrovascular accident    Family history of malignant neoplasm    Generalized hyperhidrosis    Hyperlipidemia    Seasonal allergies    History of tobacco abuse    Cervical radiculopathy    Cervical stenosis of spinal canal    Near syncope    Other fatigue    Arthropathy of cervical facet joint    Neck pain    Pain in left arm    Shoulder pain    Seasonal allergic rhinitis    Thoracic back pain    Vitamin B12 deficiency    Spinal stenosis of cervical region    Reduced libido    Hypogonadotropic hypogonadism    Major depressive disorder, recurrent episode, moderate    Generalized anxiety disorder    Macrocytosis    Erythrocytosis    Postprocedural adrenocortical (-medullary) hypofunction    Nausea    Ischemic stroke    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    Encounter for care related to Port-a-Cath    Streptococcus exposure    Rash    Encounter for screening for lung cancer    Encounter for annual general medical examination with abnormal findings in adult    Vitamin D deficiency    Malignant neoplasm of lower third of esophagus    Esophageal cancer     Past Medical History:   Diagnosis Date    Abnormal ECG     Acute adrenal crisis 2023    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 2023    Adrenal cortical hypofunction 2021    Anxiety     Brain fog     COVID 2023    Diverticulosis of large intestine without perforation or abscess without bleeding 10/02/2023    Esophageal cancer 10/2023    GERD (gastroesophageal reflux disease)     Hand tingling     History of blood clot in brain 2020    had  thrombectomy    History of cancer chemotherapy 12/2023    History of radiation therapy 11/2023    Hyperlipidemia     Stroke 05/30/2020    Tiredness      Past Surgical History:   Procedure Laterality Date    ADRENALECTOMY      COLONOSCOPY      ENDOSCOPY      ESOPHAGOGASTRECTOMY N/A 2/9/2024    Procedure: BRONCHOSCOPY, EGD, ESOPHAGECTOMY ALTAGRACIA-FABIOLA WITH DAVINCI ROBOT, LAPAROSCOPY, RIGHT THORACOSCOPY CONVERTED TO THORACOTOMY, FEEDING JEJUNOSTOMY TUBE PLACEMENT, INTERCOSTAL NERVE BLOCKS;  Surgeon: Saurabh Hurtado MD;  Location: [facility] MAIN OR;  Service: Robotics - DaVinci;  Laterality: N/A;    ESOPHAGUS SURGERY  10/2023    biopsy    KIDNEY STONE SURGERY      OTHER SURGICAL HISTORY  2020    loop recorder    THROMBECTOMY  2020    UPPER ENDOSCOPIC ULTRASOUND W/ FNA N/A 10/27/2023    Procedure: ENDOSCOPIC ULTRASOUND WITH STAGING AND FINE NEEDLE ASPIRATION X2 AREAS;  Surgeon: Joselyn Savage MD;  Location: [facility] ENDOSCOPY;  Service: Gastroenterology;  Laterality: N/A;  POST:    VENOUS ACCESS DEVICE (PORT) INSERTION Right 11/13/2023    Procedure: INSERTION VENOUS ACCESS DEVICE;  Surgeon: Saurabh Hurtado MD;  Location: [facility] MAIN OR;  Service: Thoracic;  Laterality: Right;      General Information       Row Name 02/12/24 0942          Physical Therapy Time and Intention    Document Type evaluation  -MS     Mode of Treatment physical therapy  -MS       Row Name 02/12/24 0942          General Information    Patient Profile Reviewed yes  -MS     Prior Level of Function independent:;all household mobility;work  ind, no prior use of AD  -MS     Existing Precautions/Restrictions fall  Multiple lines- REYNALDO, CT on suction, NG, TF  -MS     Barriers to Rehab medically complex;previous functional deficit  -MS       Row Name 02/12/24 0942          Living Environment    People in Home spouse  -MS       Row Name 02/12/24 0942          Home Main Entrance    Number of Stairs, Main Entrance two  -MS       Row Name 02/12/24 0942          Stairs Within  Home, Primary    Stairs Comment, Within Home, Primary bedroom resides on main level.  -MS       Row Name 02/12/24 0942          Cognition    Orientation Status (Cognition) oriented x 4  -MS       Row Name 02/12/24 0942          Safety Issues, Functional Mobility    Safety Issues Affecting Function (Mobility) impulsivity;insight into deficits/self-awareness;judgment  -MS     Impairments Affecting Function (Mobility) pain;strength;endurance/activity tolerance;balance  -MS     Comment, Safety Issues/Impairments (Mobility) Deferred use of gait belt due to multiple lines and abdominal incision. Nonskid socks donned.  -MS               User Key  (r) = Recorded By, (t) = Taken By, (c) = Cosigned By      Initials Name Provider Type    MS GaryaMry, PT Physical Therapist                   Mobility       Row Name 02/12/24 0946          Bed Mobility    Bed Mobility supine-sit;sit-supine  -MS     Supine-Sit [place] (Bed Mobility) verbal cues;nonverbal cues (demo/gesture);standby assist  -MS     Sit-Supine [place] (Bed Mobility) verbal cues;nonverbal cues (demo/gesture);standby assist  -MS     Assistive Device (Bed Mobility) bed rails;head of bed elevated  -MS     Comment, (Bed Mobility) VCs for log roll technique. Difficulty getting comfortable.  -MS       Row Name 02/12/24 0946          Transfers    Comment, (Transfers) Mildly impulsive.  -MS       Row Name 02/12/24 0946          Sit-Stand Transfer    Sit-Stand [place] (Transfers) contact guard;nonverbal cues (demo/gesture);verbal cues  -MS     Assistive Device (Sit-Stand Transfers) walker, 4-wheeled  -MS       Row Name 02/12/24 0946          Gait/Stairs (Locomotion)    [place] Level (Gait) contact guard;verbal cues;nonverbal cues (demo/gesture)  -MS     Assistive Device (Gait) walker, 4-wheeled  -MS     Distance in Feet (Gait) 300'  -MS     Deviations/Abnormal Patterns (Gait) librado decreased;gait speed decreased  -MS     Bilateral Gait  Deviations forward flexed posture  -MS     Comment, (Gait/Stairs) Slowed librado, second person assist for line mgmt. No overt LOB or veering noted.  -MS               User Key  (r) = Recorded By, (t) = Taken By, (c) = Cosigned By      Initials Name Provider Type    MS GaryMary, PT Physical Therapist                   Obj/Interventions       Row Name 02/12/24 0947          Range of Motion Comprehensive    Comment, General Range of Motion B LEs grossly WFL  -MS       Row Name 02/12/24 0947          Strength Comprehensive (MMT)    General Manual Muscle Testing (MMT) Assessment lower extremity strength deficits identified  -MS     Comment, General Manual Muscle Testing (MMT) Assessment B LEs grossly 4/5  -MS       Row Name 02/12/24 0947          Balance    Balance Assessment sitting static balance;standing static balance  -MS     Static Sitting Balance supervision  -MS     Position, Sitting Balance sitting edge of bed  -MS     Static Standing Balance standby assist  -MS     Position/Device Used, Standing Balance supported;walker, rolling  -MS       Row Name 02/12/24 0947          Sensory Assessment (Somatosensory)    Sensory Assessment (Somatosensory) sensation intact  -MS               User Key  (r) = Recorded By, (t) = Taken By, (c) = Cosigned By      Initials Name Provider Type    MS GaryMary, PT Physical Therapist                   Goals/Plan       Row Name 02/12/24 0907          Bed Mobility Goal 1 (PT)    Activity/Assistive Device (Bed Mobility Goal 1, PT) bed mobility activities, all  -MS     [place] Level/Cues Needed (Bed Mobility Goal 1, PT) supervision required  -MS     Time Frame (Bed Mobility Goal 1, PT) 1 week  -MS       Row Name 02/12/24 0949          Transfer Goal 1 (PT)    Activity/Assistive Device (Transfer Goal 1, PT) transfers, all  -MS     [place] Level/Cues Needed (Transfer Goal 1, PT) supervision required  -MS     Time Frame (Transfer Goal 1, PT) 1 week  -MS       Row  Name 02/12/24 0949          Gait Training Goal 1 (PT)    Activity/Assistive Device (Gait Training Goal 1, PT) gait (walking locomotion)  -MS     [place] Level (Gait Training Goal 1, PT) supervision required  -MS     Distance (Gait Training Goal 1, PT) 300  -MS     Time Frame (Gait Training Goal 1, PT) 1 week  -MS       Row Name 02/12/24 0949          Therapy Assessment/Plan (PT)    Planned Therapy Interventions (PT) balance training;bed mobility training;gait training;home exercise program;postural re-education;patient/family education;ROM (range of motion);stair training;strengthening;transfer training  -MS               User Key  (r) = Recorded By, (t) = Taken By, (c) = Cosigned By      Initials Name Provider Type    Mary Craig, PT Physical Therapist                   Clinical Impression       Row Name 02/12/24 0948          Pain    Pretreatment Pain Rating 3/10  -MS     Posttreatment Pain Rating 3/10  -MS     Pain Location - Side/Orientation Right  -MS     Pain Location - flank  -MS     Pain Intervention(s) Repositioned;Ambulation/increased activity;Rest  -MS       Row Name 02/12/24 0948          Therapy Assessment/Plan (PT)    Rehab Potential (PT) good, to achieve stated therapy goals  -MS     Criteria for Skilled Interventions Met (PT) yes;meets criteria  -MS     Therapy Frequency (PT) 3 times/wk  -MS       Row Name 02/12/24 0948          Vital Signs    Pre SpO2 (%) 92  -MS     O2 Delivery Pre Treatment room air  -MS     Post SpO2 (%) 93  -MS     O2 Delivery Post Treatment room air  -MS       Row Name 02/12/24 0948          Positioning and Restraints    Pre-Treatment Position in bed  -MS     Post Treatment Position bed  -MS     In Bed notified nsg;fowlers;call light within reach;encouraged to call for assist;exit alarm on  -MS               User Key  (r) = Recorded By, (t) = Taken By, (c) = Cosigned By      Initials Name Provider Type    Mary Craig, PT Physical Therapist                    Outcome Measures       Row Name 02/12/24 0949          How much help from another person do you currently need...    Turning from your back to your side while in flat bed without using bedrails? 4  -MS     Moving from lying on back to sitting on the side of a flat bed without bedrails? 3  -MS     Moving to and from a bed to a chair (including a wheelchair)? 3  -MS     Standing up from a chair using your arms (e.g., wheelchair, bedside chair)? 3  -MS     Climbing 3-5 steps with a railing? 2  -MS     To walk in hospital room? 3  -MS     AM-PAC 6 Clicks Score (PT) 18  -MS     Highest Level of Mobility Goal 6 --> Walk 10 steps or more  -MS               User Key  (r) = Recorded By, (t) = Taken By, (c) = Cosigned By      Initials Name Provider Type    MS Mary Gary, PT Physical Therapist                                 Physical Therapy Education       Title: PT OT SLP Therapies (Done)       Topic: Physical Therapy (Done)       Point: Mobility training (Done)       Learning Progress Summary             Patient Acceptance, E,TB, VU by MS at 2/12/2024 0949                         Point: Home exercise program (Done)       Learning Progress Summary             Patient Acceptance, E,TB, VU by MS at 2/12/2024 0949                         Point: Body mechanics (Done)       Learning Progress Summary             Patient Acceptance, E,TB, VU by MS at 2/12/2024 0949                         Point: Precautions (Done)       Learning Progress Summary             Patient Acceptance, E,TB, VU by MS at 2/12/2024 0949                                         User Key       Initials Effective Dates Name Provider Type Discipline    MS 06/16/21 -  Mary Gary PT Physical Therapist PT                  PT Recommendation and Plan  Planned Therapy Interventions (PT): balance training, bed mobility training, gait training, home exercise program, postural re-education, patient/family education, ROM (range of motion), stair  training, strengthening, transfer training        Time Calculation:         PT Charges       Row Name 02/12/24 0939             Time Calculation    Start Time 0839  -MS      Stop Time 0917  -MS      Time Calculation (min) 38 min  -MS      PT Received On 02/12/24  -MS      PT - Next Appointment 02/14/24  -MS      PT Goal Re-Cert Due Date 02/19/24  -MS                User Key  (r) = Recorded By, (t) = Taken By, (c) = Cosigned By      Initials Name Provider Type    Mary Craig PT Physical Therapist                  Therapy Charges for Today       Code Description Service Date Service Provider Modifiers Qty    00411161810 HC PT EVAL MOD COMPLEXITY 3 2/12/2024 Mary Gary, PT GP 1    04332437955 HC PT THER PROC EA 15 MIN 2/12/2024 Mary Gary, PT GP 2    31843778770 HC PT THER SUPP EA 15 MIN 2/12/2024 Mary Gary, PT GP 2            PT G-Codes  AM-PAC 6 Clicks Score (PT): 18  PT Discharge Summary  Anticipated Discharge Disposition (PT): home with assist, home with home health (pending patient progress made)    Mary Gary PT  2/12/2024

## 2024-02-12 NOTE — PROGRESS NOTES
"  POST-OPERATIVE NOTE     Chief Complaint: esophageal carcinoma  S/P: esophagectomy   POD # 3    Subjective:  Symptoms:  Stable.  He reports weakness, chest pressure and anxiety.  No shortness of breath.    Diet:  NPO.  No nausea or vomiting.    Activity level: Impaired due to weakness.    Pain:  He complains of pain that is moderate.  He reports pain is improving.    Resting comfortably in bed.  Tolerating tube feeds via J-tube    Objective:  General Appearance:  Comfortable, well-appearing and in no acute distress.    Vital signs: (most recent): Blood pressure 100/63, pulse 62, temperature 97.9 °F (36.6 °C), temperature source Oral, resp. rate 18, height 177.8 cm (70\"), weight 69.5 kg (153 lb 3.5 oz), SpO2 91%.    Output: Producing urine.    HEENT: (NG tube sutured)    Lungs:  Normal effort.  He is not in respiratory distress.  There are decreased breath sounds.    Heart: Normal rate.    Chest: Chest wall tenderness present.    Abdomen: (J-tube intact).  Hypoactive bowel sounds.   There is incisional tenderness.    Extremities: Normal range of motion.    Neurological: Patient is alert.    Skin:  Warm and dry.                Chest tube:   Site: Right, Clean, Dry, Intact, and Securement device intact  Suction: -20 cm  Air Leak: negative  24 Hour Total: 0ml    REYNALDO Drain: 60ml    NG tube: 275ml    Results Review:     I reviewed the patient's new clinical results.  I reviewed the patient's new imaging results and agree with the interpretation.  Discussed with patient, RN, family at bedside and Dr. Hurtado    Assessment & Plan     Mr. Lebron is POD 3 s/p Vinicio-Shayne esophagectomy.  Appears more comfortable since PCA has been adjusted.  Continue all meds via J-tube.  Continue chest tube to -20cm and NG tube to intermittent LWS.   REYNALDO bulb \"open\" (not collapsed).  Tube feeds via J-tube at 20cc/h.  Advance by 10 cc every 12 hours.  IVF at 50cc per hour.  Increase mobilization.  DVT prophylaxis.  Aspiration precautions with HOB >30 " degrees at all times.  Recheck labs and chest x-ray in AM.  Replace electrolytes per protocol.  Transfer to [facility].    Dorothy Calero DNP, APRN  [facility]  02/12/24  13:29 EST    Patient was seen and assessed while wearing personal protective equipment including facemask, protective eyewear and gloves.  Hand hygiene performed prior to entering the room and upon exiting with doffing of gloves.

## 2024-02-12 NOTE — CASE MANAGEMENT/SOCIAL WORK
Discharge Planning Assessment  [facility]     Patient Name: Jourdan Lebron  MRN: [id number]  Today's Date: 2/12/2024    Admit Date: 2/9/2024    Plan: Plan is home  Pt denies needs  CCP discussed HH Pt declines   Discharge Needs Assessment       Row Name 02/12/24 1255       Living Environment    People in Home spouse    Current Living Arrangements home    Potentially Unsafe Housing Conditions none    In the past 12 months has the electric, gas, oil, or water company threatened to shut off services in your home? No    Primary Care Provided by self    Provides Primary Care For no one    Family Caregiver if Needed spouse    Quality of Family Relationships supportive       Resource/Environmental Concerns    Transportation Concerns none       Transportation Needs    In the past 12 months, has lack of transportation kept you from medical appointments or from getting medications? no    In the past 12 months, has lack of transportation kept you from meetings, work, or from getting things needed for daily living? No       Food Insecurity    Within the past 12 months, you worried that your food would run out before you got the money to buy more. Never true    Within the past 12 months, the food you bought just didn't last and you didn't have money to get more. Never true       Transition Planning    Patient/Family Anticipates Transition to home    Patient/Family Anticipated Services at Transition none    Transportation Anticipated family or friend will provide       Discharge Needs Assessment    Equipment Currently Used at Home none    Anticipated Changes Related to Illness none    Equipment Needed After Discharge none                   Discharge Plan       Row Name 02/12/24 1306       Plan    Plan Plan is home  Pt denies needs  CCP discussed  Pt declines    Plan Comments CCP spoke with patient at bedside.  CCP role explained  DC planning discussed  Face sheet verified.  Pt lives in a one story house with his wife.  He has  one step to enter.  He is IADL's.  He does not use any assistive equipment to ambulate.  He has no past HH or rehab stays.  He obtains his medicatiuons from [facility].  He plans to retun home at [place]  He denies needs.  CCP discussed HH and pt declined  CCP following.                  Continued Care and Services - Admitted Since 2/9/2024    Coordination has not been started for this encounter.          Demographic Summary    No documentation.                  Functional Status    No documentation.                  Psychosocial    No documentation.                  Abuse/Neglect    No documentation.                  Legal    No documentation.                  Substance Abuse    No documentation.                  Patient Forms    No documentation.                     Mary Aguilar RN

## 2024-02-13 ENCOUNTER — APPOINTMENT (OUTPATIENT)
Dept: GENERAL RADIOLOGY | Facility: HOSPITAL | Age: 54
DRG: 375 | End: 2024-02-13
Payer: COMMERCIAL

## 2024-02-13 LAB
ALBUMIN SERPL-MCNC: 2.9 G/DL (ref 3.5–5.2)
ANION GAP SERPL CALCULATED.3IONS-SCNC: 6 MMOL/L (ref 5–15)
BUN SERPL-MCNC: 10 MG/DL (ref 6–20)
BUN/CREAT SERPL: 16.9 (ref 7–25)
CALCIUM SPEC-SCNC: 8.8 MG/DL (ref 8.6–10.5)
CHLORIDE SERPL-SCNC: 101 MMOL/L (ref 98–107)
CO2 SERPL-SCNC: 28 MMOL/L (ref 22–29)
CREAT SERPL-MCNC: 0.59 MG/DL (ref 0.76–1.27)
DEPRECATED RDW RBC AUTO: 54.4 FL (ref 37–54)
EGFRCR SERPLBLD CKD-EPI 2021: 117.5 ML/MIN/1.73
ERYTHROCYTE [DISTWIDTH] IN BLOOD BY AUTOMATED COUNT: 15 % (ref 12.3–15.4)
GLUCOSE BLDC GLUCOMTR-MCNC: 120 MG/DL (ref 70–130)
GLUCOSE BLDC GLUCOMTR-MCNC: 134 MG/DL (ref 70–130)
GLUCOSE BLDC GLUCOMTR-MCNC: 96 MG/DL (ref 70–130)
GLUCOSE SERPL-MCNC: 99 MG/DL (ref 65–99)
HCT VFR BLD AUTO: 33.4 % (ref 37.5–51)
HGB BLD-MCNC: 11.5 G/DL (ref 13–17.7)
MAGNESIUM SERPL-MCNC: 1.8 MG/DL (ref 1.6–2.6)
MCH RBC QN AUTO: 34.3 PG (ref 26.6–33)
MCHC RBC AUTO-ENTMCNC: 34.4 G/DL (ref 31.5–35.7)
MCV RBC AUTO: 99.7 FL (ref 79–97)
PHOSPHATE SERPL-MCNC: 3.5 MG/DL (ref 2.5–4.5)
PLATELET # BLD AUTO: 231 10*3/MM3 (ref 140–450)
PMV BLD AUTO: 9.7 FL (ref 6–12)
POTASSIUM SERPL-SCNC: 4 MMOL/L (ref 3.5–5.2)
RBC # BLD AUTO: 3.35 10*6/MM3 (ref 4.14–5.8)
SODIUM SERPL-SCNC: 135 MMOL/L (ref 136–145)
WBC NRBC COR # BLD AUTO: 7.69 10*3/MM3 (ref 3.4–10.8)

## 2024-02-13 PROCEDURE — 85027 COMPLETE CBC AUTOMATED: CPT | Performed by: NURSE PRACTITIONER

## 2024-02-13 PROCEDURE — 25010000002 HYDROMORPHONE HCL PF 50 MG/5ML SOLUTION: Performed by: SURGERY

## 2024-02-13 PROCEDURE — 80069 RENAL FUNCTION PANEL: CPT | Performed by: NURSE PRACTITIONER

## 2024-02-13 PROCEDURE — 99024 POSTOP FOLLOW-UP VISIT: CPT | Performed by: NURSE PRACTITIONER

## 2024-02-13 PROCEDURE — 25010000002 ENOXAPARIN PER 10 MG: Performed by: SURGERY

## 2024-02-13 PROCEDURE — 25810000003 SODIUM CHLORIDE 0.9 % SOLUTION: Performed by: SURGERY

## 2024-02-13 PROCEDURE — 25010000002 DIAZEPAM PER 5 MG: Performed by: SURGERY

## 2024-02-13 PROCEDURE — 83735 ASSAY OF MAGNESIUM: CPT | Performed by: NURSE PRACTITIONER

## 2024-02-13 PROCEDURE — 71045 X-RAY EXAM CHEST 1 VIEW: CPT

## 2024-02-13 PROCEDURE — 82150 ASSAY OF AMYLASE: CPT | Performed by: NURSE PRACTITIONER

## 2024-02-13 PROCEDURE — 82948 REAGENT STRIP/BLOOD GLUCOSE: CPT

## 2024-02-13 RX ORDER — HYDROMORPHONE HCL/0.9% NACL/PF 10 MG/50ML
PATIENT CONTROLLED ANALGESIA SYRINGE INTRAVENOUS CONTINUOUS
Status: DISCONTINUED | OUTPATIENT
Start: 2024-02-13 | End: 2024-02-15

## 2024-02-13 RX ADMIN — DIAZEPAM 2.5 MG: 5 INJECTION INTRAMUSCULAR; INTRAVENOUS at 01:50

## 2024-02-13 RX ADMIN — OXYCODONE HYDROCHLORIDE 7.5 MG: 5 SOLUTION ORAL at 04:16

## 2024-02-13 RX ADMIN — METOPROLOL TARTRATE 2.5 MG: 1 INJECTION, SOLUTION INTRAVENOUS at 06:24

## 2024-02-13 RX ADMIN — ACETAMINOPHEN 1000 MG: 160 SOLUTION ORAL at 20:37

## 2024-02-13 RX ADMIN — METOPROLOL TARTRATE 2.5 MG: 1 INJECTION, SOLUTION INTRAVENOUS at 23:47

## 2024-02-13 RX ADMIN — GABAPENTIN 300 MG: 300 CAPSULE ORAL at 08:48

## 2024-02-13 RX ADMIN — ACETAMINOPHEN 1000 MG: 160 SOLUTION ORAL at 08:48

## 2024-02-13 RX ADMIN — GABAPENTIN 300 MG: 300 CAPSULE ORAL at 20:38

## 2024-02-13 RX ADMIN — Medication 3 ML: at 21:24

## 2024-02-13 RX ADMIN — HYDROMORPHONE HYDROCHLORIDE: 10 INJECTION, SOLUTION INTRAMUSCULAR; INTRAVENOUS; SUBCUTANEOUS at 07:41

## 2024-02-13 RX ADMIN — GABAPENTIN 300 MG: 300 CAPSULE ORAL at 15:34

## 2024-02-13 RX ADMIN — OXYCODONE HYDROCHLORIDE 7.5 MG: 5 SOLUTION ORAL at 20:37

## 2024-02-13 RX ADMIN — PANTOPRAZOLE SODIUM 40 MG: 40 INJECTION, POWDER, FOR SOLUTION INTRAVENOUS at 06:25

## 2024-02-13 RX ADMIN — OXYCODONE HYDROCHLORIDE 7.5 MG: 5 SOLUTION ORAL at 15:34

## 2024-02-13 RX ADMIN — ACETAMINOPHEN 1000 MG: 160 SOLUTION ORAL at 15:33

## 2024-02-13 RX ADMIN — METOPROLOL TARTRATE 2.5 MG: 1 INJECTION, SOLUTION INTRAVENOUS at 15:35

## 2024-02-13 RX ADMIN — ENOXAPARIN SODIUM 40 MG: 100 INJECTION SUBCUTANEOUS at 08:48

## 2024-02-13 RX ADMIN — DIAZEPAM 2.5 MG: 5 INJECTION INTRAMUSCULAR; INTRAVENOUS at 10:34

## 2024-02-13 RX ADMIN — LIDOCAINE 1 PATCH: 4 PATCH TOPICAL at 10:35

## 2024-02-13 RX ADMIN — OXYCODONE HYDROCHLORIDE 7.5 MG: 5 SOLUTION ORAL at 08:52

## 2024-02-13 NOTE — PLAN OF CARE
Goal Outcome Evaluation:      VSS, CT in place and dressing changed, minimal output, Ambulated around unit a few times today with PT and nursing staff.  PCA in place and mostly effective with additional meds given for breakthrough pain.  NG patent to low wall suction, Jtube patent and TF infusing.  No acute events this shift, will continue to monitor.  Plans to move patient to 5E this evening.

## 2024-02-13 NOTE — CASE MANAGEMENT/SOCIAL WORK
Continued Stay Note  [facility]     Patient Name: Jourdan Lebron  MRN: [id number]  Today's Date: 2/13/2024    Admit Date: 2/9/2024    Plan: Home with infusion vs home with HH and infusion   Discharge Plan       Row Name 02/13/24 1329       Plan    Plan Home with infusion vs home with HH and infusion    Patient/Family in Agreement with Plan yes    Plan Comments Met with pt at bedside and discussed need for HH for tube feeds. Pt states he feels his wife could be taught to do tube feeds at home without having HH, will discuss further with wife.  Will send a referral to  infusion for tube feeds supplies.  Referral placed in Epic.  CCP continues to follow.  PARIS Kirby RN                   Discharge Codes    No documentation.                 Expected Discharge Date and Time       Expected Discharge Date Expected Discharge Time    Feb 15, 2024               Shae Kirby, RN

## 2024-02-13 NOTE — PLAN OF CARE
Problem: Pain Acute  Goal: Acceptable Pain Control and Functional Ability  Outcome: Ongoing, Progressing     Problem: Fall Injury Risk  Goal: Absence of Fall and Fall-Related Injury  Outcome: Ongoing, Progressing  Intervention: Promote Injury-Free Environment  Recent Flowsheet Documentation  Taken 2/13/2024 1200 by Soraya Sy RN  Safety Promotion/Fall Prevention:   activity supervised   clutter free environment maintained   assistive device/personal items within reach   fall prevention program maintained   nonskid shoes/slippers when out of bed   room organization consistent   safety round/check completed   toileting scheduled  Taken 2/13/2024 1000 by Soraya Sy, [place]  Safety Promotion/Fall Prevention:   activity supervised   clutter free environment maintained   assistive device/personal items within reach   fall prevention program maintained   nonskid shoes/slippers when out of bed   room organization consistent   safety round/check completed   toileting scheduled     Problem: Fall Injury Risk  Goal: Absence of Fall and Fall-Related Injury  Intervention: Promote Injury-Free Environment  Recent Flowsheet Documentation  Taken 2/13/2024 1200 by Soraya Sy, [place]  Safety Promotion/Fall Prevention:   activity supervised   clutter free environment maintained   assistive device/personal items within reach   fall prevention program maintained   nonskid shoes/slippers when out of bed   room organization consistent   safety round/check completed   toileting scheduled  Taken 2/13/2024 1000 by Soraya Sy, RN  Safety Promotion/Fall Prevention:   activity supervised   clutter free environment maintained   assistive device/personal items within reach   fall prevention program maintained   nonskid shoes/slippers when out of bed   room organization consistent   safety round/check completed   toileting scheduled   Goal Outcome Evaluation:  Plan of Care Reviewed With: patient        Progress: no change  Outcome Evaluation:  VSS. C/o pain managed with scheduled and prn medication. Up with assist, ambulated in cruz x2. Call light within reach.

## 2024-02-13 NOTE — PLAN OF CARE
Goal Outcome Evaluation:  Plan of Care Reviewed With: patient        Progress: no change  Outcome Evaluation: VSS ex soft BP at times. Alert and oriented x4. right chest tube remains to -20cm suction. No air leak. No subq air noted. Drsg changed as needed for drainage around chest tube site. Impact peptide 1.5 infusing at 30ml/hr. Increase to 40ml/hr at 1000. Medications adminsitered via jtube. NG tube to left nostril noted at 40cm. NG to intermittent LWS. Sister at bedside this shift and attentive to patient. Pain managed with PCA pump and PRN medications. Up to bedside to use urinal with assist x1. BLE SCDs in use. IS at bedside and follow-up instruction provided. Right bulb drain open (not clamped closed). Will continue to provide supportive care.

## 2024-02-13 NOTE — CONSULTS
"Nutrition Services    Patient Name:  Jourdan Lebron  YOB: 1970  MRN: [id number]  Admit Date:  2/9/2024    Assessment Date:  02/13/24    CLINICAL NUTRITION     Comments: Nutrition follow up. Tolerating Impact Peptide at 40mL//hr and advancing to goal this evening. No BM yet. NG tube remains in place to intermit LWS, 200mL's out last night. Await readiness to transition to cyclic feeds (discussed with pt)  Labs/Meds/Skin reviewed. Off IVF    Recommendations:  Continue to adv Impact Peptide at goal of 55mL/hr  Min free water while Na+ 135.  Consider adding a bowel regimen    RD to follow tolerance, labs and clinical course.     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, balloon dilation of pylorus on 2/9, GERD, diverticulosis, stroke    Current Issues Not safe for swallowing yet     Current Nutrition Orders & Evaluation of Intake       Oral Nutrition     Food Allergies    Current PO Diet NPO Diet NPO Type: Strict NPO   Supplement    PO Evaluation      % PO Intake       Enteral Nutrition     Enteral Route Jejunostomy    TF Delivery Method Continuous    Propofol Rate/Kcal     Current TF Order/Rate  Impact Peptide 1.5 @ 40 mL/hr    TF Goal Rate 55 mL/hr    Current Water Flush 30 mL Q 4 hr    Modular None    TF Residual  n/a - tube is small bowel    TF Tolerance tolerating    TF Observation Verified correct TF and water flush infusing per orders     Anthropometrics          Height    Weight Height: 177.8 cm (70\")  Weight: 73.8 kg (162 lb 11.2 oz) (02/13/24 0527)    BMI kg/m2 Body mass index is 23.34 kg/m².    BMI Category Normal/Healthy (18.4 - 24.9)     Estimated/Assessed Needs        Energy Requirements    Weight for Calculation 69.5 kg   Method for Estimation  30 kcal/kg   EST Needs (kcal/day) 2,085kcal        Protein Requirements  "   Weight for Calculation 69.5 kg   EST Protein Needs (g/kg) 1.2 - 1.5 gm/kg   EST Daily Needs (g/day)  g        Fluid Requirements     Method for Estimation 1 mL/kcal, Defer to physician    Estimated Needs (mL/day)          Physical Findings          Physical Appearance alert, oriented   Oral/Mouth Cavity dry mouth   Edema  no edema   Gastrointestinal WDL, last bowel movement: pending   Skin  surgical incision: abdomen, chest   Tubes/Drains/Lines chest tube, jejunostomy , NG tube   NFPE Not indicated at this time     Labs        Pertinent Labs Reviewed, listed below     Results from last 7 days   Lab Units 02/13/24 0337 02/12/24 0414 02/11/24  0303   SODIUM mmol/L 135* 134* 136   POTASSIUM mmol/L 4.0 4.1 4.3   CHLORIDE mmol/L 101 102 105   CO2 mmol/L 28.0 25.5 24.0   BUN mg/dL 10 7 11   CREATININE mg/dL 0.59* 0.61* 0.63*   CALCIUM mg/dL 8.8 8.5* 8.5*   GLUCOSE mg/dL 99 95 101*     Results from last 7 days   Lab Units 02/13/24 0337 02/12/24 2312 02/12/24 0414 02/11/24  1524 02/11/24  0303   MAGNESIUM mg/dL 1.8  --  1.7  --  2.2   PHOSPHORUS mg/dL 3.5   < > 2.2*   < > 1.8*   HEMOGLOBIN g/dL 11.5*  --  11.3*  --  10.9*   HEMATOCRIT % 33.4*  --  33.2*  --  31.9*   WBC 10*3/mm3 7.69  --  9.44  --  12.07*   ALBUMIN g/dL 2.9*  --  2.9*   < >  --     < > = values in this interval not displayed.     Results from last 7 days   Lab Units 02/13/24 0337 02/12/24 0414 02/11/24  0303 02/10/24  0342 02/09/24  1631   PLATELETS 10*3/mm3 231 206 201 226 225     COVID19   Date Value Ref Range Status   03/01/2022 Not Detected Not Detected - Ref. Range Final     Lab Results   Component Value Date    HGBA1C 5.3 10/29/2021          Medications            Scheduled Medications acetaminophen, 1,000 mg, Per J Tube, TID  enoxaparin, 40 mg, Subcutaneous, Daily  FLUoxetine, 20 mg, Per J Tube, Daily  gabapentin, 300 mg, Per J Tube, TID  Lidocaine, 1 patch, Transdermal, Q24H  metoprolol tartrate, 2.5 mg, Intravenous,  Q8H  pantoprazole, 40 mg, Intravenous, Q AM  sodium chloride, 3 mL, Intravenous, Q12H        Infusions HYDROmorphone HCl-NaCl, , Last Rate: Stopped (02/13/24 0751)  lactated ringers, 9 mL/hr, Last Rate: 9 mL/hr (02/09/24 0730)  sodium chloride, 30 mL/hr        PRN Medications   Calcium Replacement - Follow Nurse / BPA Driven Protocol    ipratropium    Magnesium Standard Dose Replacement - Follow Nurse / BPA Driven Protocol    naloxone    nitroglycerin    oxyCODONE    Phosphorus Replacement - Follow Nurse / BPA Driven Protocol    Potassium Replacement - Follow Nurse / BPA Driven Protocol    sodium chloride    sodium chloride    sodium chloride     PES STATEMENT / NUTRITION DIAGNOSIS      Nutrition Dx Problem  Problem: Altered GI Function  Etiology: Medical Diagnosis - esophagus cancer of the lower 3rd  Signs/Symptoms: EN Intake/Delivery    Comment: s/p jejunostomy     PLAN OF CARE  Intervention Goal        Intervention Goal(s) Meet estimated needs, Disease management/therapy, Initiate TF/PN, Tolerate TF/PN at goal, Maintain weight, and No significant weight loss     Nutrition Intervention         RD Action Await initiation/advancement of PO diet, Continue to monitor, and Care plan reviewed     Nutrition Prescription          Recommendation       Enteral Prescription:     Enteral Route Jejunostomy    TF Delivery Method Continuous    Enteral Product Impact Peptide 1.5    Modular None    Propofol Rate/Kcal     TF Start Rate/Volume  10 mL/hr    TF Goal Rate/Volume 55 mL/hr    Free Water Flush 30 mL Q 4 or per physician    TF Provision at Goal:          Calories 1980 kcal, meets 95 % needs         Protein  124 gm protein, meets 119 % needs         Fluid (mL) 1016 mL free water + 180 mL in flushes    Prescription Ordered Continue same per protocol, No changes at this time     Monitor/Evaluation        Monitor Per protocol     RD to follow up per protocol.    Electronically signed by:  Cassandra Robles RD  02/13/24 15:25  EST

## 2024-02-13 NOTE — PROGRESS NOTES
"  POST-OPERATIVE NOTE     Chief Complaint: esophageal carcinoma, postoperative care  S/P: [place] Shayne esophagectomy  POD # 4    Subjective:  Symptoms:  Stable.  He reports weakness, chest pressure and anxiety.  No shortness of breath.    Diet:  NPO.  No nausea or vomiting.    Activity level: Impaired due to weakness.    Pain:  He complains of pain that is moderate.  He reports pain is improving.    Very lethargic.  Up to chair.  Difficult to arouse but was able to awaken with physical stimulation and patient answers questions appropriately.    Objective:  General Appearance:  Comfortable, well-appearing, in no acute distress and not in pain.    Vital signs: (most recent): Blood pressure 94/60, pulse 82, temperature 98.5 °F (36.9 °C), temperature source Oral, resp. rate 18, height 177.8 cm (70\"), weight 73.8 kg (162 lb 11.2 oz), SpO2 95%.  Vital signs are normal.  No fever.    Output: Producing urine and no stool output.    HEENT: (NG tube sutured)    Lungs:  Normal effort and normal respiratory rate.  He is not in respiratory distress.  There are decreased breath sounds.  No rales, wheezes or rhonchi.    Heart: Normal rate.  Regular rhythm.    Chest: Chest wall tenderness present.    Abdomen: (J-tube intact).  Hypoactive bowel sounds.   There is incisional tenderness.    Extremities: Normal range of motion.    Neurological: Patient is alert and oriented to person, place and time.    Skin:  Warm and dry.                Chest tube:   Site: Right, Clean, Dry, Intact, and Securement device intact  Suction: -20 cm  Air Leak: negative  24 Hour Total: 36ml    REYNALDO Drain: 10ml    NG tube: 200ml    Results Review:     I reviewed the patient's new clinical results.  I reviewed the patient's new imaging results and agree with the interpretation.  Discussed with patient, RN, family at bedside and Dr. Noonan and Dr. Hurtado.    Assessment & Plan     Mr. Lebron is POD 4 s/p Vinicio-Shayne esophagectomy.    Apparently, he is quite lethargic and " "has requested multiple doses of Valium and Dilaudid for breakthrough pain.  However, he is difficult to arouse on my assessment today.  I think that at this point the pain regimen is causing oversedation.  Additionally, with the reinitiation of his Prozac which I have added a liquid formulation that can be given through his J-tube, we will stop the as needed Valium as the Prozac can increase bioavailability of the Valium which may be making him more lethargic.  Will also stop as needed Dilaudid.  Continue PCA and other meds per ERAS protocol.   Chest tube to waterseal. NG tube to intermittent LWS.   REYNALDO bulb \"open\" (not collapsed).  Check an amylase from the REYNALDO drain today.  Need to advance tube feeds to goal.  Discussed with registered dietitian.    We will stop IV fluids.  Increase mobilization.  DVT prophylaxis.  Aspiration precautions with HOB >30 degrees at all times.  Recheck labs and chest x-ray in AM.  Replace electrolytes per protocol.  Cussed plan in great detail with the patient and family at bedside.    RUBINA Rome  [facility]  02/13/24  13:54 EST    Patient was seen and assessed while wearing personal protective equipment including facemask, protective eyewear and gloves.  Hand hygiene performed prior to entering the room and upon exiting with doffing of gloves.       "

## 2024-02-13 NOTE — DISCHARGE PLACEMENT REQUEST
"Alfreda Lebron (53 y.o. Male)       Date of Birth   1970    Social Security Number       Address   [address] [place] [address]    Home Phone   [phone]    MRN   [id number]       Roman Catholic   [employer]    Marital Status                               Admission Date   2/9/24    Admission Type   Elective    Admitting Provider   Saurabh Hurtado MD    Attending Provider   Saurabh Hurtado MD    Department, Room/Bed   [address], E559/1       Discharge Date       Discharge Disposition       Discharge Destination                                 Attending Provider: Saurabh Hurtado MD    Allergies: Cephalosporins, Dye  [Contrast Dye (Echo Or Unknown Ct/mr)], Iodinated Contrast Media, Sulfa Antibiotics, Sulfate, Zetia [Ezetimibe], Statins    Isolation: None   Infection: None   Code Status: CPR    Ht: 177.8 cm (70\")   Wt: 73.8 kg (162 lb 11.2 oz)    Admission Cmt: None   Principal Problem: Malignant neoplasm of lower third of esophagus [C15.5]                   Active Insurance as of 2/9/2024       Primary Coverage       Payor Plan Insurance Group Employer/Plan Group    [employer] BLUE [employer] BLUE SHIELD PPO [id number]       Payor Plan Address Payor Plan Phone Number Payor Plan Fax Number Effective Dates    [address] [phone]  1/1/2013 - None Entered    [facility] [id number]         Subscriber Name Subscriber Birth Date Member ID       ALFREDA LEBRON 1970 [id number]                     Emergency Contacts        (Rel.) Home Phone Work Phone Mobile Phone    MINERVA LEBRON (Spouse) [phone] -- [phone]              Emergency Contact Information       Name Relation Home Work Mobile    MINERVA LEBRON Spouse [phone]          "

## 2024-02-14 ENCOUNTER — HOME HEALTH ADMISSION (OUTPATIENT)
Dept: HOME HEALTH SERVICES | Facility: HOME HEALTHCARE | Age: 54
End: 2024-02-14
Payer: COMMERCIAL

## 2024-02-14 ENCOUNTER — APPOINTMENT (OUTPATIENT)
Dept: GENERAL RADIOLOGY | Facility: HOSPITAL | Age: 54
DRG: 375 | End: 2024-02-14
Payer: COMMERCIAL

## 2024-02-14 ENCOUNTER — TELEPHONE (OUTPATIENT)
Dept: SURGERY | Facility: CLINIC | Age: 54
End: 2024-02-14
Payer: COMMERCIAL

## 2024-02-14 LAB
ALBUMIN SERPL-MCNC: 3.2 G/DL (ref 3.5–5.2)
AMYLASE FLD-CCNC: 31 U/L
ANION GAP SERPL CALCULATED.3IONS-SCNC: 7 MMOL/L (ref 5–15)
BUN SERPL-MCNC: 17 MG/DL (ref 6–20)
BUN/CREAT SERPL: 26.2 (ref 7–25)
CALCIUM SPEC-SCNC: 9.1 MG/DL (ref 8.6–10.5)
CHLORIDE SERPL-SCNC: 101 MMOL/L (ref 98–107)
CO2 SERPL-SCNC: 29 MMOL/L (ref 22–29)
CREAT SERPL-MCNC: 0.65 MG/DL (ref 0.76–1.27)
DEPRECATED RDW RBC AUTO: 56.3 FL (ref 37–54)
EGFRCR SERPLBLD CKD-EPI 2021: 114.1 ML/MIN/1.73
ERYTHROCYTE [DISTWIDTH] IN BLOOD BY AUTOMATED COUNT: 15.4 % (ref 12.3–15.4)
GLUCOSE BLDC GLUCOMTR-MCNC: 114 MG/DL (ref 70–130)
GLUCOSE BLDC GLUCOMTR-MCNC: 136 MG/DL (ref 70–130)
GLUCOSE BLDC GLUCOMTR-MCNC: 97 MG/DL (ref 70–130)
GLUCOSE SERPL-MCNC: 102 MG/DL (ref 65–99)
HCT VFR BLD AUTO: 32.7 % (ref 37.5–51)
HGB BLD-MCNC: 11.2 G/DL (ref 13–17.7)
LAB AP CASE REPORT: NORMAL
LAB AP CLINICAL INFORMATION: NORMAL
LAB AP DIAGNOSIS COMMENT: NORMAL
LAB AP SYNOPTIC CHECKLIST: NORMAL
MAGNESIUM SERPL-MCNC: 1.8 MG/DL (ref 1.6–2.6)
MCH RBC QN AUTO: 34.4 PG (ref 26.6–33)
MCHC RBC AUTO-ENTMCNC: 34.3 G/DL (ref 31.5–35.7)
MCV RBC AUTO: 100.3 FL (ref 79–97)
PATH REPORT.FINAL DX SPEC: NORMAL
PATH REPORT.GROSS SPEC: NORMAL
PHOSPHATE SERPL-MCNC: 3.6 MG/DL (ref 2.5–4.5)
PLATELET # BLD AUTO: 237 10*3/MM3 (ref 140–450)
PMV BLD AUTO: 9.1 FL (ref 6–12)
POTASSIUM SERPL-SCNC: 4.1 MMOL/L (ref 3.5–5.2)
RBC # BLD AUTO: 3.26 10*6/MM3 (ref 4.14–5.8)
SODIUM SERPL-SCNC: 137 MMOL/L (ref 136–145)
WBC NRBC COR # BLD AUTO: 7.72 10*3/MM3 (ref 3.4–10.8)

## 2024-02-14 PROCEDURE — 25810000003 SODIUM CHLORIDE 0.9 % SOLUTION: Performed by: SURGERY

## 2024-02-14 PROCEDURE — 85027 COMPLETE CBC AUTOMATED: CPT | Performed by: NURSE PRACTITIONER

## 2024-02-14 PROCEDURE — 80069 RENAL FUNCTION PANEL: CPT | Performed by: NURSE PRACTITIONER

## 2024-02-14 PROCEDURE — 25010000002 HYDROMORPHONE HCL PF 50 MG/5ML SOLUTION: Performed by: SURGERY

## 2024-02-14 PROCEDURE — 25010000002 ENOXAPARIN PER 10 MG: Performed by: SURGERY

## 2024-02-14 PROCEDURE — 83735 ASSAY OF MAGNESIUM: CPT | Performed by: NURSE PRACTITIONER

## 2024-02-14 PROCEDURE — 82150 ASSAY OF AMYLASE: CPT | Performed by: NURSE PRACTITIONER

## 2024-02-14 PROCEDURE — 71045 X-RAY EXAM CHEST 1 VIEW: CPT

## 2024-02-14 PROCEDURE — 99024 POSTOP FOLLOW-UP VISIT: CPT | Performed by: NURSE PRACTITIONER

## 2024-02-14 PROCEDURE — 82948 REAGENT STRIP/BLOOD GLUCOSE: CPT

## 2024-02-14 RX ORDER — BISACODYL 10 MG
10 SUPPOSITORY, RECTAL RECTAL ONCE
Status: COMPLETED | OUTPATIENT
Start: 2024-02-14 | End: 2024-02-14

## 2024-02-14 RX ORDER — DIPHENHYDRAMINE HYDROCHLORIDE AND LIDOCAINE HYDROCHLORIDE AND ALUMINUM HYDROXIDE AND MAGNESIUM HYDRO
5 KIT EVERY 6 HOURS
Status: DISCONTINUED | OUTPATIENT
Start: 2024-02-14 | End: 2024-02-23 | Stop reason: HOSPADM

## 2024-02-14 RX ORDER — CETIRIZINE HYDROCHLORIDE 1 MG/ML
5 SOLUTION ORAL DAILY
Status: DISCONTINUED | OUTPATIENT
Start: 2024-02-14 | End: 2024-02-23 | Stop reason: HOSPADM

## 2024-02-14 RX ORDER — GABAPENTIN 300 MG/1
300 CAPSULE ORAL EVERY 12 HOURS SCHEDULED
Status: DISCONTINUED | OUTPATIENT
Start: 2024-02-14 | End: 2024-02-18

## 2024-02-14 RX ADMIN — OXYCODONE HYDROCHLORIDE 7.5 MG: 5 SOLUTION ORAL at 13:23

## 2024-02-14 RX ADMIN — ACETAMINOPHEN 1000 MG: 160 SOLUTION ORAL at 14:39

## 2024-02-14 RX ADMIN — CETIRIZINE HYDROCHLORIDE 5 MG: 5 SOLUTION ORAL at 14:39

## 2024-02-14 RX ADMIN — ENOXAPARIN SODIUM 40 MG: 100 INJECTION SUBCUTANEOUS at 08:31

## 2024-02-14 RX ADMIN — METOPROLOL TARTRATE 2.5 MG: 1 INJECTION, SOLUTION INTRAVENOUS at 18:30

## 2024-02-14 RX ADMIN — Medication 3 ML: at 08:31

## 2024-02-14 RX ADMIN — BISACODYL 10 MG: 10 SUPPOSITORY RECTAL at 20:15

## 2024-02-14 RX ADMIN — LIDOCAINE 1 PATCH: 4 PATCH TOPICAL at 08:31

## 2024-02-14 RX ADMIN — Medication 3 ML: at 20:17

## 2024-02-14 RX ADMIN — OXYCODONE HYDROCHLORIDE 7.5 MG: 5 SOLUTION ORAL at 04:56

## 2024-02-14 RX ADMIN — OXYCODONE HYDROCHLORIDE 7.5 MG: 5 SOLUTION ORAL at 20:17

## 2024-02-14 RX ADMIN — HYDROMORPHONE HYDROCHLORIDE: 10 INJECTION, SOLUTION INTRAMUSCULAR; INTRAVENOUS; SUBCUTANEOUS at 01:25

## 2024-02-14 RX ADMIN — FLUOXETINE HYDROCHLORIDE 20 MG: 20 SOLUTION ORAL at 08:31

## 2024-02-14 RX ADMIN — HYDROMORPHONE HYDROCHLORIDE: 10 INJECTION, SOLUTION INTRAMUSCULAR; INTRAVENOUS; SUBCUTANEOUS at 17:20

## 2024-02-14 RX ADMIN — DIPHENHYDRAMINE HYDROCHLORIDE AND LIDOCAINE HYDROCHLORIDE AND ALUMINUM HYDROXIDE AND MAGNESIUM HYDRO 5 ML: KIT at 22:31

## 2024-02-14 RX ADMIN — METOPROLOL TARTRATE 2.5 MG: 1 INJECTION, SOLUTION INTRAVENOUS at 05:05

## 2024-02-14 RX ADMIN — PANTOPRAZOLE SODIUM 40 MG: 40 INJECTION, POWDER, FOR SOLUTION INTRAVENOUS at 05:00

## 2024-02-14 RX ADMIN — ACETAMINOPHEN 1000 MG: 160 SOLUTION ORAL at 08:30

## 2024-02-14 RX ADMIN — ACETAMINOPHEN 1000 MG: 160 SOLUTION ORAL at 20:09

## 2024-02-14 RX ADMIN — METOPROLOL TARTRATE 2.5 MG: 1 INJECTION, SOLUTION INTRAVENOUS at 13:24

## 2024-02-14 RX ADMIN — GABAPENTIN 300 MG: 300 CAPSULE ORAL at 20:08

## 2024-02-14 NOTE — PLAN OF CARE
Goal Outcome Evaluation:  Plan of Care Reviewed With: patient        Progress: no change  Outcome Evaluation: VSS. Alert and oriented x4. Up to bedside with x1 assist. Urinal at bedside. Right chest tube to waterseal. No air leak. Minimal output. NG tube to intermittent LWS, sutured @ 40cm. Pain managed with dilaudid PCA and PRN oxycodone. TF infusing at 50ml/hr. Increase to goal 55ml/hr at 10am. Will continue to provide supportive care.

## 2024-02-14 NOTE — CASE MANAGEMENT/SOCIAL WORK
Continued Stay Note  [facility]     Patient Name: Jourdan Lebron  MRN: [id number]  Today's Date: 2/14/2024    Admit Date: 2/9/2024    Plan: Home with [facility] infusion and HH   Discharge Plan       Row Name 02/14/24 1308       Plan    Plan Home with [facility] infusion and HH    Patient/Family in Agreement with Plan yes    Plan Comments Met with pt and wife at bedside who are agreeable to HH.  Referral to [facility] placed in [facility].  [facility] infusion following for tube feeds.  CCP continues to follow.  PARIS Kirby RN                   Discharge Codes    No documentation.                 Expected Discharge Date and Time       Expected Discharge Date Expected Discharge Time    Feb 15, 2024               Shae Kirby, RN

## 2024-02-14 NOTE — CONSULTS
"Nutrition Services    Patient Name:  Jourdan Lebron  YOB: 1970  MRN: [id number]  Admit Date:  2/9/2024    Assessment Date:  02/14/24    CLINICAL NUTRITION     Comments: Nutrition follow up. Tolerating Impact Peptide at goal of 55mL//hr. No BM yet, dulcolax ordered. NG tube remains in place to intermit LWS, 500mL's out x 24 hr. APRN placns to transition to cyclic feeds once he has a BM. Labs/Meds/Skin reviewed.     Per [facility] home infusion, they are unable to get Impact Peptide for home use. Alternatives would be:  Pivot, ([place])  Peptamen 1.5 ([place])    Cyclic feedings (x 18 hrs) wound be:  Feeds from 5p-11a, Start at 55, increase q 6 hrs to goal of 75mL/hr.  Free water 30mL at beginning and end of cycle and twice during it (if Na+ is still on the lower side).    Recommendations:  Continue to adv Impact Peptide at goal of 55mL/hr  Min free water while Na+ 137.    RD to follow tolerance, labs and clinical course.     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, balloon dilation of pylorus on 2/9, GERD, diverticulosis, stroke    Current Issues Not safe for swallowing yet     Current Nutrition Orders & Evaluation of Intake       Oral Nutrition     Food Allergies    Current PO Diet NPO Diet NPO Type: Strict NPO   Supplement    PO Evaluation      % PO Intake       Enteral Nutrition     Enteral Route Jejunostomy    TF Delivery Method Continuous    Propofol Rate/Kcal     Current TF Order/Rate  Impact Peptide 1.5 @ 55 mL/hr    TF Goal Rate 55 mL/hr    Current Water Flush 30 mL Q 4 hr    Modular None    TF Residual  n/a - tube is small bowel    TF Tolerance tolerating    TF Observation Verified correct TF and water flush infusing per orders     Anthropometrics          Height    Weight Height: 177.8 cm (70\")  Weight: 73.5 kg (162 lb 0.6 " oz) (02/14/24 0416)    BMI kg/m2 Body mass index is 23.25 kg/m².    BMI Category Normal/Healthy (18.4 - 24.9)     Estimated/Assessed Needs        Energy Requirements    Weight for Calculation 69.5 kg   Method for Estimation  30 kcal/kg   EST Needs (kcal/day) 2,085kcal        Protein Requirements    Weight for Calculation 69.5 kg   EST Protein Needs (g/kg) 1.2 - 1.5 gm/kg   EST Daily Needs (g/day)  g        Fluid Requirements     Method for Estimation 1 mL/kcal, Defer to physician    Estimated Needs (mL/day)          Physical Findings          Physical Appearance alert, oriented   Oral/Mouth Cavity dry mouth   Edema  no edema   Gastrointestinal WDL, last bowel movement: 2/9   Skin  surgical incision: abdomen, chest   Tubes/Drains/Lines chest tube, jejunostomy , NG tube   NFPE Not indicated at this time     Labs        Pertinent Labs Reviewed, listed below     Results from last 7 days   Lab Units 02/14/24 0346 02/13/24 0337 02/12/24 0414   SODIUM mmol/L 137 135* 134*   POTASSIUM mmol/L 4.1 4.0 4.1   CHLORIDE mmol/L 101 101 102   CO2 mmol/L 29.0 28.0 25.5   BUN mg/dL 17 10 7   CREATININE mg/dL 0.65* 0.59* 0.61*   CALCIUM mg/dL 9.1 8.8 8.5*   GLUCOSE mg/dL 102* 99 95     Results from last 7 days   Lab Units 02/14/24 0346 02/13/24 0337 02/12/24 2312 02/12/24 0414   MAGNESIUM mg/dL 1.8 1.8  --  1.7   PHOSPHORUS mg/dL 3.6 3.5   < > 2.2*   HEMOGLOBIN g/dL 11.2* 11.5*  --  11.3*   HEMATOCRIT % 32.7* 33.4*  --  33.2*   WBC 10*3/mm3 7.72 7.69  --  9.44   ALBUMIN g/dL 3.2* 2.9*  --  2.9*    < > = values in this interval not displayed.     Results from last 7 days   Lab Units 02/14/24  0346 02/13/24  0337 02/12/24  0414 02/11/24  0303 02/10/24  0342   PLATELETS 10*3/mm3 237 231 206 201 226     COVID19   Date Value Ref Range Status   03/01/2022 Not Detected Not Detected - Ref. Range Final     Lab Results   Component Value Date    HGBA1C 5.3 10/29/2021          Medications            Scheduled Medications  acetaminophen, 1,000 mg, Per J Tube, TID  bisacodyl, 10 mg, Rectal, Once  Cetirizine HCl, 5 mg, Per J Tube, Daily  enoxaparin, 40 mg, Subcutaneous, Daily  FLUoxetine, 20 mg, Per J Tube, Daily  gabapentin, 300 mg, Per J Tube, Q12H  Lidocaine, 1 patch, Transdermal, Q24H  metoprolol tartrate, 2.5 mg, Intravenous, Q6H  pantoprazole, 40 mg, Intravenous, Q AM  sodium chloride, 3 mL, Intravenous, Q12H        Infusions HYDROmorphone HCl-NaCl, , Last Rate: Stopped (02/14/24 0127)  lactated ringers, 9 mL/hr, Last Rate: 9 mL/hr (02/09/24 0730)  sodium chloride, 30 mL/hr        PRN Medications   Calcium Replacement - Follow Nurse / BPA Driven Protocol    ipratropium    Magnesium Standard Dose Replacement - Follow Nurse / BPA Driven Protocol    naloxone    nitroglycerin    oxyCODONE    Phosphorus Replacement - Follow Nurse / BPA Driven Protocol    Potassium Replacement - Follow Nurse / BPA Driven Protocol    sodium chloride    sodium chloride    sodium chloride     PES STATEMENT / NUTRITION DIAGNOSIS      Nutrition Dx Problem  Problem: Altered GI Function  Etiology: Medical Diagnosis - esophagus cancer of the lower 3rd  Signs/Symptoms: EN Intake/Delivery    Comment: s/p jejunostomy     PLAN OF CARE  Intervention Goal        Intervention Goal(s) Meet estimated needs, Disease management/therapy, Initiate TF/PN, Tolerate TF/PN at goal, Maintain weight, and No significant weight loss     Nutrition Intervention         RD Action Await initiation/advancement of PO diet, Continue to monitor, and Care plan reviewed     Nutrition Prescription          Recommendation       Enteral Prescription:     Enteral Route Jejunostomy    TF Delivery Method Continuous    Enteral Product Impact Peptide 1.5    Modular None    Propofol Rate/Kcal     TF Start Rate/Volume  10 mL/hr    TF Goal Rate/Volume 55 mL/hr    Free Water Flush 30 mL Q 4 or per physician    TF Provision at Goal:          Calories 1980 kcal, meets 95 % needs         Protein  124 gm  protein, meets 119 % needs         Fluid (mL) 1016 mL free water + 180 mL in flushes    Prescription Ordered Continue same per protocol, No changes at this time     Monitor/Evaluation        Monitor Per protocol     RD to follow up per protocol.    Electronically signed by:  Cassandra Robles RD  02/14/24 16:22 EST

## 2024-02-14 NOTE — DISCHARGE PLACEMENT REQUEST
"Alfreda Lebron (53 y.o. Male)       Date of Birth   1970    Social Security Number       Address   [address] [place] [address]    Home Phone   [phone]    MRN   [id number]       Episcopalian   [employer]    Marital Status                               Admission Date   2/9/24    Admission Type   Elective    Admitting Provider   Saurabh Hurtado MD    Attending Provider   Saurabh Hurtado MD    Department, Room/Bed   [address], E559/1       Discharge Date       Discharge Disposition       Discharge Destination                                 Attending Provider: Saurabh Hurtado MD    Allergies: Cephalosporins, Dye  [Contrast Dye (Echo Or Unknown Ct/mr)], Iodinated Contrast Media, Sulfa Antibiotics, Sulfate, Zetia [Ezetimibe], Statins    Isolation: None   Infection: None   Code Status: CPR    Ht: 177.8 cm (70\")   Wt: 73.5 kg (162 lb 0.6 oz)    Admission Cmt: None   Principal Problem: Malignant neoplasm of lower third of esophagus [C15.5]                   Active Insurance as of 2/9/2024       Primary Coverage       Payor Plan Insurance Group Employer/Plan Group    [employer] BLUE [employer] BLUE SHIELD PPO [id number]       Payor Plan Address Payor Plan Phone Number Payor Plan Fax Number Effective Dates    [address] [phone]  1/1/2013 - None Entered    [facility] [id number]         Subscriber Name Subscriber Birth Date Member ID       ALFREDA LEBRON 1970 [id number]                     Emergency Contacts        (Rel.) Home Phone Work Phone Mobile Phone    MINERVA LEBRON (Spouse) [phone] -- [phone]              Emergency Contact Information       Name Relation Home Work Mobile    MINERVA LEBRON Spouse [phone]          "

## 2024-02-14 NOTE — SIGNIFICANT NOTE
Pt sleeping soundly, has amb in [facility] w/nsg today, fam present and agreed w/letting pt finally rest, will F/U 2/15

## 2024-02-14 NOTE — PROGRESS NOTES
"  POST-OPERATIVE NOTE     Chief Complaint: esophageal carcinoma, postoperative care  S/P: Vinicio Shayne esophagectomy  POD # 5    Subjective:  Symptoms:  Stable.  He reports weakness, chest pressure and anxiety.  No shortness of breath.    Diet:  NPO.  No nausea or vomiting.    Activity level: Impaired due to weakness.    Pain:  He complains of pain that is moderate.  He reports pain is improving.        Objective:  General Appearance:  Comfortable, well-appearing, in no acute distress and not in pain.    Vital signs: (most recent): Blood pressure 102/68, pulse 100, temperature 98.4 °F (36.9 °C), resp. rate 16, height 177.8 cm (70\"), weight 73.5 kg (162 lb 0.6 oz), SpO2 93%.  Vital signs are normal.  No fever.    Output: Producing urine and no stool output.    HEENT: (NG tube sutured)    Lungs:  Normal effort and normal respiratory rate.  He is not in respiratory distress.  There are decreased breath sounds.  No rales, wheezes or rhonchi.    Heart: Normal rate.  Regular rhythm.    Chest: Chest wall tenderness present.    Abdomen: (J-tube intact).  Hypoactive bowel sounds.   There is incisional tenderness.    Extremities: Normal range of motion.    Neurological: Patient is alert and oriented to person, place and time.    Skin:  Warm and dry.                Chest tube:   Site: Right, Clean, Dry, Intact, and Securement device intact  Suction: -20 cm  Air Leak: negative  24 Hour Total: 6ml    REYNALDO Drain: 30ml    NG tube: 500ml    Results Review:     I reviewed the patient's new clinical results.  I reviewed the patient's new imaging results and agree with the interpretation.  Discussed with patient, RN, family at bedside and  Dr. Hurtado.    Assessment & Plan     Mr. Lebron is POD 5 s/p Vinicio-Shayne esophagectomy.    Improved today, not as lethargic.  Continue PCA and other meds per ERAS protocol.   Chest tube to waterseal. NG tube to intermittent LWS.   REYNALDO bulb \"open\" (not collapsed).  Amylase from REYNALDO drain was sent out, no " results available at this time.  Continue tube feeds at goal rate. Anticipate cyclic feeds once he has a bowel movement. Add suppository.  DVT prophylaxis.  Aspiration precautions with HOB >30 degrees at all times.  Recheck labs and chest x-ray in AM.  Replace electrolytes per protocol.      Dorothy Calero DNP, APRN  [facility]  02/14/24  11:22 EST    Patient was seen and assessed while wearing personal protective equipment including facemask, protective eyewear and gloves.  Hand hygiene performed prior to entering the room and upon exiting with doffing of gloves.

## 2024-02-14 NOTE — PLAN OF CARE
Problem: Adult Inpatient Plan of Care  Goal: Plan of Care Review  Outcome: Ongoing, Progressing  Flowsheets (Taken 2/14/2024 9927)  Progress: improving  Plan of Care Reviewed With: patient   Goal Outcome Evaluation:  Plan of Care Reviewed With: patient        Progress: improving            Pt was able to work with therapy today. He was able to walk around the nurses station. Pt still has a NG tube to intermittent low was suction. Pt has a right side chest tube to waterseal, and a REYNALDO drain on the right side. Pt has a J-tube to the LLQ some drainage coming around the tube thoracic team made aware. Pt is on tube feeds at goal and tolerating them well. Pt pain seems well controlled with PCA and PRN medications. Pt is using IS. VSS                       Epidermal Closure Graft Donor Site (Optional): simple interrupted

## 2024-02-15 ENCOUNTER — APPOINTMENT (OUTPATIENT)
Dept: GENERAL RADIOLOGY | Facility: HOSPITAL | Age: 54
DRG: 375 | End: 2024-02-15
Payer: COMMERCIAL

## 2024-02-15 LAB
ALBUMIN SERPL-MCNC: 3.1 G/DL (ref 3.5–5.2)
AMYLASE FLD-CCNC: 28 U/L
AMYLASE FLD-CCNC: 37 U/L
ANION GAP SERPL CALCULATED.3IONS-SCNC: 11 MMOL/L (ref 5–15)
BUN SERPL-MCNC: 23 MG/DL (ref 6–20)
BUN/CREAT SERPL: 35.9 (ref 7–25)
CALCIUM SPEC-SCNC: 9.4 MG/DL (ref 8.6–10.5)
CHLORIDE SERPL-SCNC: 102 MMOL/L (ref 98–107)
CO2 SERPL-SCNC: 24 MMOL/L (ref 22–29)
CREAT SERPL-MCNC: 0.64 MG/DL (ref 0.76–1.27)
DEPRECATED RDW RBC AUTO: 56.7 FL (ref 37–54)
EGFRCR SERPLBLD CKD-EPI 2021: 114.6 ML/MIN/1.73
ERYTHROCYTE [DISTWIDTH] IN BLOOD BY AUTOMATED COUNT: 15.3 % (ref 12.3–15.4)
GLUCOSE BLDC GLUCOMTR-MCNC: 100 MG/DL (ref 70–130)
GLUCOSE BLDC GLUCOMTR-MCNC: 110 MG/DL (ref 70–130)
GLUCOSE BLDC GLUCOMTR-MCNC: 123 MG/DL (ref 70–130)
GLUCOSE BLDC GLUCOMTR-MCNC: 130 MG/DL (ref 70–130)
GLUCOSE BLDC GLUCOMTR-MCNC: 92 MG/DL (ref 70–130)
GLUCOSE SERPL-MCNC: 115 MG/DL (ref 65–99)
HCT VFR BLD AUTO: 35.1 % (ref 37.5–51)
HGB BLD-MCNC: 11.7 G/DL (ref 13–17.7)
MAGNESIUM SERPL-MCNC: 2.1 MG/DL (ref 1.6–2.6)
MCH RBC QN AUTO: 33.5 PG (ref 26.6–33)
MCHC RBC AUTO-ENTMCNC: 33.3 G/DL (ref 31.5–35.7)
MCV RBC AUTO: 100.6 FL (ref 79–97)
PHOSPHATE SERPL-MCNC: 3.6 MG/DL (ref 2.5–4.5)
PLATELET # BLD AUTO: 269 10*3/MM3 (ref 140–450)
PMV BLD AUTO: 9.1 FL (ref 6–12)
POTASSIUM SERPL-SCNC: 4.2 MMOL/L (ref 3.5–5.2)
RBC # BLD AUTO: 3.49 10*6/MM3 (ref 4.14–5.8)
SODIUM SERPL-SCNC: 137 MMOL/L (ref 136–145)
WBC NRBC COR # BLD AUTO: 8.89 10*3/MM3 (ref 3.4–10.8)

## 2024-02-15 PROCEDURE — 25010000002 HYDROMORPHONE HCL PF 50 MG/5ML SOLUTION: Performed by: SURGERY

## 2024-02-15 PROCEDURE — 25010000002 METOCLOPRAMIDE PER 10 MG: Performed by: SURGERY

## 2024-02-15 PROCEDURE — 99024 POSTOP FOLLOW-UP VISIT: CPT

## 2024-02-15 PROCEDURE — 83735 ASSAY OF MAGNESIUM: CPT | Performed by: NURSE PRACTITIONER

## 2024-02-15 PROCEDURE — 25010000002 ENOXAPARIN PER 10 MG: Performed by: SURGERY

## 2024-02-15 PROCEDURE — 25810000003 SODIUM CHLORIDE 0.9 % SOLUTION: Performed by: SURGERY

## 2024-02-15 PROCEDURE — 85027 COMPLETE CBC AUTOMATED: CPT | Performed by: NURSE PRACTITIONER

## 2024-02-15 PROCEDURE — 82150 ASSAY OF AMYLASE: CPT | Performed by: SURGERY

## 2024-02-15 PROCEDURE — 97530 THERAPEUTIC ACTIVITIES: CPT

## 2024-02-15 PROCEDURE — 71045 X-RAY EXAM CHEST 1 VIEW: CPT

## 2024-02-15 PROCEDURE — 82948 REAGENT STRIP/BLOOD GLUCOSE: CPT

## 2024-02-15 PROCEDURE — 80069 RENAL FUNCTION PANEL: CPT | Performed by: NURSE PRACTITIONER

## 2024-02-15 RX ORDER — HYDROMORPHONE HYDROCHLORIDE 1 MG/ML
0.5 INJECTION, SOLUTION INTRAMUSCULAR; INTRAVENOUS; SUBCUTANEOUS
Status: DISCONTINUED | OUTPATIENT
Start: 2024-02-15 | End: 2024-02-23 | Stop reason: HOSPADM

## 2024-02-15 RX ORDER — METOCLOPRAMIDE HYDROCHLORIDE 5 MG/ML
10 INJECTION INTRAMUSCULAR; INTRAVENOUS EVERY 8 HOURS
Status: DISCONTINUED | OUTPATIENT
Start: 2024-02-15 | End: 2024-02-23

## 2024-02-15 RX ORDER — HYDROMORPHONE HCL/0.9% NACL/PF 10 MG/50ML
PATIENT CONTROLLED ANALGESIA SYRINGE INTRAVENOUS CONTINUOUS
Status: DISCONTINUED | OUTPATIENT
Start: 2024-02-15 | End: 2024-02-15

## 2024-02-15 RX ADMIN — ACETAMINOPHEN 1000 MG: 160 SOLUTION ORAL at 20:05

## 2024-02-15 RX ADMIN — Medication 3 ML: at 20:12

## 2024-02-15 RX ADMIN — METOCLOPRAMIDE 10 MG: 5 INJECTION, SOLUTION INTRAMUSCULAR; INTRAVENOUS at 08:31

## 2024-02-15 RX ADMIN — ENOXAPARIN SODIUM 40 MG: 100 INJECTION SUBCUTANEOUS at 08:10

## 2024-02-15 RX ADMIN — METOPROLOL TARTRATE 2.5 MG: 1 INJECTION, SOLUTION INTRAVENOUS at 13:14

## 2024-02-15 RX ADMIN — METOPROLOL TARTRATE 2.5 MG: 1 INJECTION, SOLUTION INTRAVENOUS at 08:11

## 2024-02-15 RX ADMIN — OXYCODONE HYDROCHLORIDE 7.5 MG: 5 SOLUTION ORAL at 01:07

## 2024-02-15 RX ADMIN — DIPHENHYDRAMINE HYDROCHLORIDE AND LIDOCAINE HYDROCHLORIDE AND ALUMINUM HYDROXIDE AND MAGNESIUM HYDRO 5 ML: KIT at 10:13

## 2024-02-15 RX ADMIN — OXYCODONE HYDROCHLORIDE 7.5 MG: 5 SOLUTION ORAL at 05:26

## 2024-02-15 RX ADMIN — DIPHENHYDRAMINE HYDROCHLORIDE AND LIDOCAINE HYDROCHLORIDE AND ALUMINUM HYDROXIDE AND MAGNESIUM HYDRO 5 ML: KIT at 22:01

## 2024-02-15 RX ADMIN — GABAPENTIN 300 MG: 300 CAPSULE ORAL at 20:05

## 2024-02-15 RX ADMIN — METOCLOPRAMIDE 10 MG: 5 INJECTION, SOLUTION INTRAMUSCULAR; INTRAVENOUS at 16:10

## 2024-02-15 RX ADMIN — OXYCODONE HYDROCHLORIDE 7.5 MG: 5 SOLUTION ORAL at 10:13

## 2024-02-15 RX ADMIN — ACETAMINOPHEN 1000 MG: 160 SOLUTION ORAL at 08:10

## 2024-02-15 RX ADMIN — DIPHENHYDRAMINE HYDROCHLORIDE AND LIDOCAINE HYDROCHLORIDE AND ALUMINUM HYDROXIDE AND MAGNESIUM HYDRO 5 ML: KIT at 05:26

## 2024-02-15 RX ADMIN — HYDROMORPHONE HYDROCHLORIDE: 10 INJECTION, SOLUTION INTRAMUSCULAR; INTRAVENOUS; SUBCUTANEOUS at 10:30

## 2024-02-15 RX ADMIN — METOPROLOL TARTRATE 2.5 MG: 1 INJECTION, SOLUTION INTRAVENOUS at 01:03

## 2024-02-15 RX ADMIN — DIPHENHYDRAMINE HYDROCHLORIDE AND LIDOCAINE HYDROCHLORIDE AND ALUMINUM HYDROXIDE AND MAGNESIUM HYDRO 5 ML: KIT at 16:10

## 2024-02-15 RX ADMIN — FLUOXETINE HYDROCHLORIDE 20 MG: 20 SOLUTION ORAL at 08:10

## 2024-02-15 RX ADMIN — Medication 3 ML: at 08:11

## 2024-02-15 RX ADMIN — ACETAMINOPHEN 1000 MG: 160 SOLUTION ORAL at 16:09

## 2024-02-15 RX ADMIN — PANTOPRAZOLE SODIUM 40 MG: 40 INJECTION, POWDER, FOR SOLUTION INTRAVENOUS at 05:26

## 2024-02-15 RX ADMIN — OXYCODONE HYDROCHLORIDE 7.5 MG: 5 SOLUTION ORAL at 20:04

## 2024-02-15 RX ADMIN — LIDOCAINE 1 PATCH: 4 PATCH TOPICAL at 08:11

## 2024-02-15 RX ADMIN — METOPROLOL TARTRATE 2.5 MG: 1 INJECTION, SOLUTION INTRAVENOUS at 18:31

## 2024-02-15 NOTE — PROGRESS NOTES
"  POST-OPERATIVE NOTE     Chief Complaint: esophageal carcinoma, postoperative care  S/P: Vinicio Shayne esophagectomy  POD # 6    Subjective:  Symptoms:  Stable.  He reports weakness, chest pressure and anxiety.  No shortness of breath.    Diet:  NPO.  No nausea or vomiting.    Activity level: Impaired due to weakness.    Pain:  He complains of pain that is moderate.  He reports pain is improving.  Pain is well controlled.        Objective:  General Appearance:  Comfortable, well-appearing, in no acute distress and not in pain.    Vital signs: (most recent): Blood pressure 118/76, pulse 110, temperature 99 °F (37.2 °C), temperature source Oral, resp. rate 16, height 177.8 cm (70\"), weight 70 kg (154 lb 5.2 oz), SpO2 95%.  Vital signs are normal.  No fever.    Output: Producing urine and no stool output.    HEENT: (NG tube sutured)    Lungs:  Normal effort and normal respiratory rate.  He is not in respiratory distress.  There are decreased breath sounds.  No rales, wheezes or rhonchi.    Heart: Normal rate.  Regular rhythm.    Chest: Symmetric chest wall expansion. Chest wall tenderness present.    Abdomen: Abdomen is soft and non-distended.  (J-tube intact).  Hypoactive bowel sounds.   There is incisional tenderness.    Extremities: Normal range of motion.    Neurological: Patient is alert and oriented to person, place and time.    Skin:  Warm and dry.              Chest tube:   Site: Right, Clean, Dry, Intact, and Securement device intact  Suction: -20 cm  Air Leak: negative  24 Hour Total: 0ml    REYNALDO Drain: 30ml    NG tube: 950ml    Results Review:     I reviewed the patient's new clinical results.  I reviewed the patient's new imaging results and agree with the interpretation.  Discussed with patient, RN, family at bedside and  Dr. Hurtado.    Assessment & Plan     Mr. Lebron is POD 6 s/p [place] esophagectomy.    Lethargy remains improved.  Follow-up chest x-ray performed today without significant " change.  Discontinue PCA and continue ERS medications.  Chest tube removed at bedside without difficulty, no output overnight.  Continue REYNALDO to bulb with bulb expanded and allow to drain passively.  NG tube output too significant for removal, continue to trend output overnight.  Start Reglan.  Will transition to cyclic feeds today.  Patient had small bowel movement today  Continue aspiration precautions and ensure head of bed remains greater than 30 degrees at all times given extreme risk for aspiration.  Trend a.m. labs and obtain a.m. chest x-ray, replace electrolytes per protocol if needed.  Final surgical pathology ypT3 N0 poorly differentiated adenocarcinoma with near complete response.    RUBINA Ramirez  [facility]  02/15/24  15:02 EST    Patient was seen and assessed while wearing personal protective equipment including facemask, protective eyewear and gloves.  Hand hygiene performed prior to entering the room and upon exiting with doffing of gloves.

## 2024-02-15 NOTE — PLAN OF CARE
Goal Outcome Evaluation:  Plan of Care Reviewed With: patient           Outcome Evaluation: Pt demonstrates increased activity tolerance and functional strength as he was able to ambulated 300 ft in the cruz without an AD and without assistance from the PT. Pt appears to be approaching his baseline functional mobility. Acute care PT will sign off at this time. Pt is encouraged to continue walking the cruz with family and nursing.      Anticipated Discharge Disposition (PT): home with assist, home with home health

## 2024-02-15 NOTE — CONSULTS
[facility], spouse at bedside. Let Pt /Spouse know of Pastoral Care services available. Pt welcomed visit, requested prayer for recovery. Prayer given. Pastoral Care services remain available if requested.

## 2024-02-15 NOTE — CONSULTS
"Nutrition Services    Patient Name:  Jourdan Lebron  YOB: 1970  MRN: [id number]  Admit Date:  2/9/2024    Assessment Date:  02/15/24    CLINICAL NUTRITION     Comments: Nutrition follow up. Tolerating Impact Peptide at goal of 55mL//hr and water 27ofl5ba.  Ok per surgery to change TF's to cycle feeding.   BM 2/14 small. NG tube remains in place to intermit LWS, 950mL's out x 24 hr. APRN ok's to transition to cyclic feeds. Labs/Meds/Skin reviewed.  Na 137    Per [facility], they are unable to get Impact Peptide for home use. Alternatives would be:  Pivot, ([place])  Peptamen 1.5 ([place])    Recommendations:  Cyclic feedings (x 18 hrs)  from 5p-11a, Start at 55, increase q 6 hrs to goal of 75mL/hr.  Free water 30mL at beginning and end of cycle and twice during it (if Na+ is still on the lower side).    RD to follow tolerance, labs and clinical course.     Encounter Information         Reason for Encounter Change TF's to cycle feeds    Admitting Diagnosis Esophagus cancer of the lower 3rd    Pertinent Medical History S/p esophagectomy, J -tube placement distal to ligament of treitz, balloon dilation of pylorus on 2/9, GERD, diverticulosis, stroke    Current Issues Not safe for swallowing yet     Current Nutrition Orders & Evaluation of Intake       Oral Nutrition     Food Allergies    Current PO Diet NPO Diet NPO Type: Strict NPO   Supplement    PO Evaluation      % PO Intake       Enteral Nutrition     Enteral Route Jejunostomy    TF Delivery Method Continuous    Propofol Rate/Kcal     Current TF Order/Rate  Impact Peptide 1.5 @ 55 mL/hr    TF Goal Rate 55 mL/hr    Current Water Flush 30 mL Q 4 hr    Modular None    TF Residual  n/a - tube is small bowel    TF Tolerance tolerating    TF Observation Verified correct TF and water flush infusing per orders     Anthropometrics          Height    Weight Height: 177.8 cm (70\")  Weight: 70 kg (154 lb 5.2 oz) (02/15/24 0534)    BMI kg/m2 Body mass " index is 22.14 kg/m².    BMI Category Normal/Healthy (18.4 - 24.9)     Estimated/Assessed Needs        Energy Requirements    Weight for Calculation 69.5 kg   Method for Estimation  30 kcal/kg   EST Needs (kcal/day) 2,085kcal        Protein Requirements    Weight for Calculation 69.5 kg   EST Protein Needs (g/kg) 1.2 - 1.5 gm/kg   EST Daily Needs (g/day)  g        Fluid Requirements     Method for Estimation 1 mL/kcal, Defer to physician    Estimated Needs (mL/day)          Physical Findings          Physical Appearance alert, oriented   Oral/Mouth Cavity dry mouth   Edema  no edema   Gastrointestinal WDL, last bowel movement: 2/14  small   Skin  surgical incision: abdomen, chest   Tubes/Drains/Lines chest tube, jejunostomy , NG tube   NFPE Not indicated at this time     Labs        Pertinent Labs Reviewed, listed below     Results from last 7 days   Lab Units 02/15/24  0423 02/14/24  0346 02/13/24  0337   SODIUM mmol/L 137 137 135*   POTASSIUM mmol/L 4.2 4.1 4.0   CHLORIDE mmol/L 102 101 101   CO2 mmol/L 24.0 29.0 28.0   BUN mg/dL 23* 17 10   CREATININE mg/dL 0.64* 0.65* 0.59*   CALCIUM mg/dL 9.4 9.1 8.8   GLUCOSE mg/dL 115* 102* 99     Results from last 7 days   Lab Units 02/15/24  0423 02/14/24  0346 02/13/24  0337   MAGNESIUM mg/dL 2.1 1.8 1.8   PHOSPHORUS mg/dL 3.6 3.6 3.5   HEMOGLOBIN g/dL 11.7* 11.2* 11.5*   HEMATOCRIT % 35.1* 32.7* 33.4*   WBC 10*3/mm3 8.89 7.72 7.69   ALBUMIN g/dL 3.1* 3.2* 2.9*     Results from last 7 days   Lab Units 02/15/24  0423 02/14/24  0346 02/13/24  0337 02/12/24  0414 02/11/24  0303   PLATELETS 10*3/mm3 269 237 231 206 201     COVID19   Date Value Ref Range Status   03/01/2022 Not Detected Not Detected - Ref. Range Final     Lab Results   Component Value Date    HGBA1C 5.3 10/29/2021          Medications            Scheduled Medications acetaminophen, 1,000 mg, Per J Tube, TID  Cetirizine HCl, 5 mg, Per J Tube, Daily  enoxaparin, 40 mg, Subcutaneous, Daily  First Mouthwash  (Magic Mouthwash), 5 mL, Swish & Spit, Q6H  FLUoxetine, 20 mg, Per J Tube, Daily  gabapentin, 300 mg, Per J Tube, Q12H  Lidocaine, 1 patch, Transdermal, Q24H  metoclopramide, 10 mg, Intravenous, Q8H  metoprolol tartrate, 2.5 mg, Intravenous, Q6H  pantoprazole, 40 mg, Intravenous, Q AM  sodium chloride, 3 mL, Intravenous, Q12H        Infusions HYDROmorphone HCl-NaCl, , Last Rate: Stopped (02/15/24 1033)  lactated ringers, 9 mL/hr, Last Rate: 9 mL/hr (02/09/24 0730)  sodium chloride, 30 mL/hr        PRN Medications   Calcium Replacement - Follow Nurse / BPA Driven Protocol    ipratropium    Magnesium Standard Dose Replacement - Follow Nurse / BPA Driven Protocol    naloxone    nitroglycerin    oxyCODONE    Phosphorus Replacement - Follow Nurse / BPA Driven Protocol    Potassium Replacement - Follow Nurse / BPA Driven Protocol    sodium chloride    sodium chloride    sodium chloride     PES STATEMENT / NUTRITION DIAGNOSIS      Nutrition Dx Problem  Problem: Altered GI Function  Etiology: Medical Diagnosis - esophagus cancer of the lower 3rd  Signs/Symptoms: EN Intake/Delivery    Comment: s/p jejunostomy     PLAN OF CARE  Intervention Goal        Intervention Goal(s) Meet estimated needs, Disease management/therapy, Tolerate TF/PN at goal, Maintain weight, and No significant weight loss     Nutrition Intervention         RD Action Await initiation/advancement of PO diet, Continue to monitor, Care plan reviewed, and Recommend/order: cyclic TF's     Nutrition Prescription          Recommendation       Enteral Prescription:     Enteral Route Jejunostomy    TF Delivery Method Cyclic    Enteral Product Impact Peptide 1.5    Modular None    Propofol Rate/Kcal     TF Start Rate/Volume  55mL/hr from 5pm-11am    TF Goal Rate/Volume 75 mL/hr    Free Water Flush 30 mL before and after cyclic feeds and x2 during cyclic feeds  MD to manage    TF Provision at Goal:          Calories 2025 kcal, meets 97 % needs         Protein  126 gm  protein, meets 122 % needs         Fluid (mL) 1039 mL free water + 120 mL in flushes    Prescription Ordered Yes     Monitor/Evaluation        Monitor Per protocol     RD to follow up per protocol.    Electronically signed by:  Acacia Felipe RD  02/15/24 12:28 EST

## 2024-02-15 NOTE — PLAN OF CARE
Problem: Adult Inpatient Plan of Care  Goal: Plan of Care Review  Outcome: Ongoing, Progressing  Flowsheets (Taken 2/15/2024 3045)  Progress: improving  Plan of Care Reviewed With: patient   Goal Outcome Evaluation:  Plan of Care Reviewed With: patient        Progress: improving          Pt right side chest tube has been pulled. Pt still has a right side REYNALDO drain and a NG tube to the left nostril, intermittent low wall suction. Pt has a LLQ j-tube. Pt is now starting cyclic feeds, 5p-11a. Pt has been up walking around the nurses station. PCA was discontinued. PRN pain medications in place. Pt has been started on reglan and has had a small bowel movement today. VSS.

## 2024-02-15 NOTE — PLAN OF CARE
Problem: Aspiration (Enteral Nutrition)  Goal: Absence of Aspiration Signs and Symptoms  Outcome: Ongoing, Progressing     Problem: Device-Related Complication Risk (Enteral Nutrition)  Goal: Safe, Effective Therapy Delivery  Outcome: Ongoing, Progressing     Problem: Feeding Intolerance (Enteral Nutrition)  Goal: Feeding Tolerance  Outcome: Ongoing, Progressing   Goal Outcome Evaluation:         TF's adjusting to Cyclic feeds per surgeons ok  See TF 's orders  RD to follow

## 2024-02-15 NOTE — THERAPY DISCHARGE NOTE
Patient Name: Jourdan Lebron  : 1970    MRN: [id number]                              Today's Date: 2/15/2024       Admit Date: 2024    Visit Dx:     ICD-10-CM ICD-9-CM   1. Malignant neoplasm of lower third of esophagus  C15.5 150.5     Patient Active Problem List   Diagnosis    Abnormal electrocardiogram    Calculus of kidney    History of cerebrovascular accident    Family history of malignant neoplasm    Generalized hyperhidrosis    Hyperlipidemia    Seasonal allergies    History of tobacco abuse    Cervical radiculopathy    Cervical stenosis of spinal canal    Near syncope    Other fatigue    Arthropathy of cervical facet joint    Neck pain    Pain in left arm    Shoulder pain    Seasonal allergic rhinitis    Thoracic back pain    Vitamin B12 deficiency    Spinal stenosis of cervical region    Reduced libido    Hypogonadotropic hypogonadism    Major depressive disorder, recurrent episode, moderate    Generalized anxiety disorder    Macrocytosis    Erythrocytosis    Postprocedural adrenocortical (-medullary) hypofunction    Nausea    Ischemic stroke    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    Encounter for care related to Port-a-Cath    Streptococcus exposure    Rash    Encounter for screening for lung cancer    Encounter for annual general medical examination with abnormal findings in adult    Vitamin D deficiency    Malignant neoplasm of lower third of esophagus    Esophageal cancer     Past Medical History:   Diagnosis Date    Abnormal ECG     Acute adrenal crisis 2023    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 2023    Adrenal cortical hypofunction 2021    Anxiety     Brain fog     COVID 2023    Diverticulosis of large intestine without perforation or abscess without bleeding 10/02/2023    Esophageal cancer 10/2023    GERD (gastroesophageal reflux disease)     Hand tingling     History of blood clot in brain 2020    had  thrombectomy    History of cancer chemotherapy 12/2023    History of radiation therapy 11/2023    Hyperlipidemia     Stroke 05/30/2020    Tiredness      Past Surgical History:   Procedure Laterality Date    ADRENALECTOMY      COLONOSCOPY      ENDOSCOPY      ESOPHAGOGASTRECTOMY N/A 2/9/2024    Procedure: BRONCHOSCOPY, EGD, ESOPHAGECTOMY ALTAGRACIA-FABIOLA WITH DAVINCI ROBOT, LAPAROSCOPY, RIGHT THORACOSCOPY CONVERTED TO THORACOTOMY, FEEDING JEJUNOSTOMY TUBE PLACEMENT, INTERCOSTAL NERVE BLOCKS;  Surgeon: Saurabh Hurtado MD;  Location: [facility] OR;  Service: Robotics - DaVinci;  Laterality: N/A;    ESOPHAGUS SURGERY  10/2023    biopsy    KIDNEY STONE SURGERY      OTHER SURGICAL HISTORY  2020    loop recorder    THROMBECTOMY  2020    UPPER ENDOSCOPIC ULTRASOUND W/ FNA N/A 10/27/2023    Procedure: ENDOSCOPIC ULTRASOUND WITH STAGING AND FINE NEEDLE ASPIRATION X2 AREAS;  Surgeon: Joselyn Savage MD;  Location: [facility] ENDOSCOPY;  Service: Gastroenterology;  Laterality: N/A;  POST:    VENOUS ACCESS DEVICE (PORT) INSERTION Right 11/13/2023    Procedure: INSERTION VENOUS ACCESS DEVICE;  Surgeon: Saurabh Hurtado MD;  Location: [facility] MAIN OR;  Service: Thoracic;  Laterality: Right;      General Information       Row Name 02/15/24 1457          Physical Therapy Time and Intention    Document Type discharge treatment  -     Mode of Treatment physical therapy;individual therapy  -       Row Name 02/15/24 1457          Cognition    Orientation Status (Cognition) oriented x 4  -               User Key  (r) = Recorded By, (t) = Taken By, (c) = Cosigned By      Initials Name Provider Type     Arianna Triplett, PT Physical Therapist                   Mobility       Row Name 02/15/24 1457          Bed Mobility    Supine-Sit [place] (Bed Mobility) supervision  -     Sit-Supine [place] (Bed Mobility) supervision  -       Row Name 02/15/24 1457          Sit-Stand Transfer    Sit-Stand [place] (Transfers) supervision  -        Row Name 02/15/24 1457          Gait/Stairs (Locomotion)    [place]rio Level (Gait) supervision  -     Assistive Device (Gait) --  no AD  -     Distance in Feet (Gait) 300  -     Deviations/Abnormal Patterns (Gait) librado decreased  -     Comment, (Gait/Stairs) gait was slow but steady, no LOB noted  -               User Key  (r) = Recorded By, (t) = Taken By, (c) = Cosigned By      Initials Name Provider Type    Arianna Walker, PT Physical Therapist                   Obj/Interventions    No documentation.                  Goals/Plan    No documentation.                  Clinical Impression       Row Name 02/15/24 1625          Pain    Pretreatment Pain Rating 0/10 - no pain  -     Posttreatment Pain Rating 0/10 - no pain  -       Row Name 02/15/24 1625          Plan of Care Review    Plan of Care Reviewed With patient  -     Outcome Evaluation Pt demonstrates increased activity tolerance and functional strength as he was able to ambulated 300 ft in the cruz without an AD and without assistance from the PT. Pt appears to be approaching his baseline functional mobility. Acute care PT will sign off at this time. Pt is encouraged to continue walking the cruz with family and nursing.  -       Row Name 02/15/24 1625          Positioning and Restraints    Pre-Treatment Position in bed  -     Post Treatment Position bed  -     In Bed supine;call light within reach;encouraged to call for assist;with nsg  -               User Key  (r) = Recorded By, (t) = Taken By, (c) = Cosigned By      Initials Name Provider Type    Arianna Walker, PT Physical Therapist                   Outcome Measures       Row Name 02/15/24 1627 02/15/24 0805       How much help from another person do you currently need...    Turning from your back to your side while in flat bed without using bedrails? 4  -CH 4  -DC    Moving from lying on back to sitting on the side of a flat bed without bedrails? 4  -CH 3   -DC    Moving to and from a bed to a chair (including a wheelchair)? 4  -CH 3  -DC    Standing up from a chair using your arms (e.g., wheelchair, bedside chair)? 4  -CH 3  -DC    Climbing 3-5 steps with a railing? 3  -CH 3  -DC    To walk in hospital room? 4  -CH 3  -DC    AM-PAC 6 Clicks Score (PT) 23  -CH 19  -DC    Highest Level of Mobility Goal 7 --> Walk 25 feet or more  -CH 6 --> Walk 10 steps or more  -DC      Row Name 02/15/24 0500          How much help from another person do you currently need...    Turning from your back to your side while in flat bed without using bedrails? 4  -MM     Moving from lying on back to sitting on the side of a flat bed without bedrails? 3  -MM     Moving to and from a bed to a chair (including a wheelchair)? 3  -MM     Standing up from a chair using your arms (e.g., wheelchair, bedside chair)? 3  -MM     Climbing 3-5 steps with a railing? 2  -MM     To walk in hospital room? 3  -MM     AM-PAC 6 Clicks Score (PT) 18  -MM     Highest Level of Mobility Goal 6 --> Walk 10 steps or more  -MM       Row Name 02/15/24 1627          Functional Assessment    Outcome Measure Options AM-PAC 6 Clicks Basic Mobility (PT)  -               User Key  (r) = Recorded By, (t) = Taken By, (c) = Cosigned By      Initials Name Provider Type     Arianna Triplett, PT Physical Therapist    Li Acosta, RN Registered Nurse    MM Amelie Calderón RN Registered Nurse                  Physical Therapy Education       Title: PT OT SLP Therapies (Done)       Topic: Physical Therapy (Done)       Point: Mobility training (Done)       Learning Progress Summary             Patient Acceptance, E,TB,D, VU,NR by  at 2/15/2024 1627    Acceptance, E,TB, VU by MS at 2/12/2024 0949                         Point: Home exercise program (Done)       Learning Progress Summary             Patient Acceptance, E,TB,D, VU,NR by  at 2/15/2024 1627    Acceptance, E,TB, VU by MS at 2/12/2024 0904                          Point: Body mechanics (Done)       Learning Progress Summary             Patient Acceptance, E,TB,D, VU,NR by  at 2/15/2024 1627    Acceptance, E,TB, VU by MS at 2/12/2024 0949                         Point: Precautions (Done)       Learning Progress Summary             Patient Acceptance, E,TB,D, VU,NR by  at 2/15/2024 1627    Acceptance, E,TB, VU by MS at 2/12/2024 0949                                         User Key       Initials Effective Dates Name Provider Type Discipline     06/16/21 -  Arianna Triplett PT Physical Therapist PT    MS 06/16/21 -  Mary Gary PT Physical Therapist PT                  PT Recommendation and Plan     Plan of Care Reviewed With: patient  Outcome Evaluation: Pt demonstrates increased activity tolerance and functional strength as he was able to ambulated 300 ft in the cruz without an AD and without assistance from the PT. Pt appears to be approaching his baseline functional mobility. Acute care PT will sign off at this time. Pt is encouraged to continue walking the cruz with family and nursing.     Time Calculation:         PT Charges       Row Name 02/15/24 1628             Time Calculation    Start Time 1422  -      Stop Time 1432  -      Time Calculation (min) 10 min  -      PT Received On 02/15/24  -      PT - Next Appointment 02/16/24  -         Time Calculation- PT    Total Timed Code Minutes- PT 10 minute(s)  -         Timed Charges    21682 - PT Therapeutic Activity Minutes 10  -CH         Total Minutes    Timed Charges Total Minutes 10  -CH       Total Minutes 10  -CH                User Key  (r) = Recorded By, (t) = Taken By, (c) = Cosigned By      Initials Name Provider Type     Arianna Triplett PT Physical Therapist                  Therapy Charges for Today       Code Description Service Date Service Provider Modifiers Qty    [id number]  PT THERAPEUTIC ACT EA 15 MIN 2/15/2024 Arianna Triplett, PT GP 1            PT  G-Codes  Outcome Measure Options: AM-PAC 6 Clicks Basic Mobility (PT)  AM-PAC 6 Clicks Score (PT): 23    PT Discharge Summary  Anticipated Discharge Disposition (PT): home with assist, home with home health    Arianna Triplett, PT  2/15/2024

## 2024-02-15 NOTE — PAYOR COMM NOTE
"Alfreda Lebron (53 y.o. Male)     PLEASE SEE ATTACHED FOR CONTINUED INPT STAY DAYS    REF #   [id number]    PLEASE CALL AGNES SAMAYOA RN/ DEPT @ [phone]   OR [phone]    THANK YOU  AGNES SAAMYOA RN  [facility]        Date of Birth   1970    Social Security Number       Address   [address] [place] [address]    Home Phone   [phone]    MRN   [id number]       [place]   [employer]    Marital Status                               Admission Date   2/9/24    Admission Type   Elective    Admitting Provider   Saurabh Hurtado MD    Attending Provider   Saurabh Hurtado MD    Department, Room/Bed   [facility], E559/1       Discharge Date       Discharge Disposition       Discharge Destination                                 Attending Provider: Saurabh Hurtado MD    Allergies: Cephalosporins, Dye  [Contrast Dye (Echo Or Unknown Ct/mr)], Iodinated Contrast Media, Sulfa Antibiotics, Sulfate, Zetia [Ezetimibe], Statins    Isolation: None   Infection: None   Code Status: CPR    Ht: 177.8 cm (70\")   Wt: 70 kg (154 lb 5.2 oz)    Admission Cmt: None   Principal Problem: Malignant neoplasm of lower third of esophagus [C15.5]                   Active Insurance as of 2/9/2024       Primary Coverage       Payor Plan Insurance Group Employer/Plan Group    [facility] [employer] [facility] [employer] BLUE [facility] PPO [id number]       Payor Plan Address Payor Plan Phone Number Payor Plan Fax Number Effective Dates    [address] [phone]  1/1/2013 - None Entered    [place] [id number]         Subscriber Name Subscriber Birth Date Member ID       ALFREDA LEBRON 1970 [id number]                     Emergency Contacts        (Rel.) Home Phone Work Phone Mobile Phone    MINERVA LEBRON (Spouse) [phone] -- [phone]              [place]: NPI [id number]  Tax ID [id number]     Physician Progress Notes (last 72 hours)        Dorothy Calero, EMILY, APRN at " "02/14/24 1112       Attestation signed by Saurabh Hurtado MD at 02/14/24 6588    I have reviewed this documentation and agree.                    POST-OPERATIVE NOTE     Chief Complaint: esophageal carcinoma, postoperative care  S/P: [place] Shayne esophagectomy  POD # 5    Subjective:  Symptoms:  Stable.  He reports weakness, chest pressure and anxiety.  No shortness of breath.    Diet:  NPO.  No nausea or vomiting.    Activity level: Impaired due to weakness.    Pain:  He complains of pain that is moderate.  He reports pain is improving.        Objective:  General Appearance:  Comfortable, well-appearing, in no acute distress and not in pain.    Vital signs: (most recent): Blood pressure 102/68, pulse 100, temperature 98.4 °F (36.9 °C), resp. rate 16, height 177.8 cm (70\"), weight 73.5 kg (162 lb 0.6 oz), SpO2 93%.  Vital signs are normal.  No fever.    Output: Producing urine and no stool output.    HEENT: (NG tube sutured)    Lungs:  Normal effort and normal respiratory rate.  He is not in respiratory distress.  There are decreased breath sounds.  No rales, wheezes or rhonchi.    Heart: Normal rate.  Regular rhythm.    Chest: Chest wall tenderness present.    Abdomen: (J-tube intact).  Hypoactive bowel sounds.   There is incisional tenderness.    Extremities: Normal range of motion.    Neurological: Patient is alert and oriented to person, place and time.    Skin:  Warm and dry.                Chest tube:   Site: Right, Clean, Dry, Intact, and Securement device intact  Suction: -20 cm  Air Leak: negative  24 Hour Total: 6ml    REYNALDO Drain: 30ml    NG tube: 500ml    Results Review:     I reviewed the patient's new clinical results.  I reviewed the patient's new imaging results and agree with the interpretation.  Discussed with patient, RN, family at bedside and  Dr. Hurtado.    Assessment & Plan     Mr. Lebron is POD 5 s/p [place]Shayne esophagectomy.    Improved today, not as lethargic.  Continue PCA and other meds per ERAS " "protocol.   Chest tube to waterseal. NG tube to intermittent LWS.   REYNALDO bulb \"open\" (not collapsed).  Amylase from REYNALDO drain was sent out, no results available at this time.  Continue tube feeds at goal rate. Anticipate cyclic feeds once he has a bowel movement. Add suppository.  DVT prophylaxis.  Aspiration precautions with HOB >30 degrees at all times.  Recheck labs and chest x-ray in AM.  Replace electrolytes per protocol.      Dorothy Calero DNP, APRN  [facility]  02/14/24  11:22 EST    Patient was seen and assessed while wearing personal protective equipment including facemask, protective eyewear and gloves.  Hand hygiene performed prior to entering the room and upon exiting with doffing of gloves.         Electronically signed by Saurabh Hurtado MD at 02/14/24 2187       Sury Monahan APRN at 02/13/24 1354       Attestation signed by Mika Noonan MD PhD at 02/13/24 4649    I have reviewed this documentation and agree.                    POST-OPERATIVE NOTE     Chief Complaint: esophageal carcinoma, postoperative care  S/P: [place] Shayne esophagectomy  POD # 4    Subjective:  Symptoms:  Stable.  He reports weakness, chest pressure and anxiety.  No shortness of breath.    Diet:  NPO.  No nausea or vomiting.    Activity level: Impaired due to weakness.    Pain:  He complains of pain that is moderate.  He reports pain is improving.    Very lethargic.  Up to chair.  Difficult to arouse but was able to awaken with physical stimulation and patient answers questions appropriately.    Objective:  General Appearance:  Comfortable, well-appearing, in no acute distress and not in pain.    Vital signs: (most recent): Blood pressure 94/60, pulse 82, temperature 98.5 °F (36.9 °C), temperature source Oral, resp. rate 18, height 177.8 cm (70\"), weight 73.8 kg (162 lb 11.2 oz), SpO2 95%.  Vital signs are normal.  No fever.    Output: Producing urine and no stool output.    HEENT: (NG tube sutured)    Lungs: " " Normal effort and normal respiratory rate.  He is not in respiratory distress.  There are decreased breath sounds.  No rales, wheezes or rhonchi.    Heart: Normal rate.  Regular rhythm.    Chest: Chest wall tenderness present.    Abdomen: (J-tube intact).  Hypoactive bowel sounds.   There is incisional tenderness.    Extremities: Normal range of motion.    Neurological: Patient is alert and oriented to person, place and time.    Skin:  Warm and dry.                Chest tube:   Site: Right, Clean, Dry, Intact, and Securement device intact  Suction: -20 cm  Air Leak: negative  24 Hour Total: 36ml    REYNALDO Drain: 10ml    NG tube: 200ml    Results Review:     I reviewed the patient's new clinical results.  I reviewed the patient's new imaging results and agree with the interpretation.  Discussed with patient, RN, family at bedside and Dr. Noonan and Dr. Hurtado.    Assessment & Plan     Mr. Lebron is POD 4 s/p Vinicio-Shayne esophagectomy.    Apparently, he is quite lethargic and has requested multiple doses of Valium and Dilaudid for breakthrough pain.  However, he is difficult to arouse on my assessment today.  I think that at this point the pain regimen is causing oversedation.  Additionally, with the reinitiation of his Prozac which I have added a liquid formulation that can be given through his J-tube, we will stop the as needed Valium as the Prozac can increase bioavailability of the Valium which may be making him more lethargic.  Will also stop as needed Dilaudid.  Continue PCA and other meds per ERAS protocol.   Chest tube to waterseal. NG tube to intermittent LWS.   REYNALDO bulb \"open\" (not collapsed).  Check an amylase from the REYNALDO drain today.  Need to advance tube feeds to goal.  Discussed with registered dietitian.    We will stop IV fluids.  Increase mobilization.  DVT prophylaxis.  Aspiration precautions with HOB >30 degrees at all times.  Recheck labs and chest x-ray in AM.  Replace electrolytes per protocol.  Cussed plan " in great detail with the patient and family at bedside.    RUBINA Rome  [facility]  02/13/24  13:54 EST    Patient was seen and assessed while wearing personal protective equipment including facemask, protective eyewear and gloves.  Hand hygiene performed prior to entering the room and upon exiting with doffing of gloves.         Electronically signed by Mika Noonan MD PhD at 02/13/24 1649          Consult Notes (last 72 hours)        Pavan Dickens at 02/15/24 1005          [facility] visit with Pt, spouse at bedside. Let Pt /Spouse know of Pastoral Care services available. Pt welcomed visit, requested prayer for recovery. Prayer given. Pastoral Care services remain available if requested.    Electronically signed by Pavan Dickens at 02/15/24 1016

## 2024-02-15 NOTE — PLAN OF CARE
Goal Outcome Evaluation:  Plan of Care Reviewed With: patient        Progress: no change  Outcome Evaluation: VSS ex tachycardic at times. Alert and oriented x4. Pt up to toilet with x1 assist. BM x1 this shift. TF infusing at goal rate of 55ml/hr. NG tube to intermittent LWS. Pain managed with dilaudid PCA and PRN oxycodone. Right chest tube to water seal. No air leak. Will continue to provide supporitve care.

## 2024-02-16 ENCOUNTER — APPOINTMENT (OUTPATIENT)
Dept: GENERAL RADIOLOGY | Facility: HOSPITAL | Age: 54
DRG: 375 | End: 2024-02-16
Payer: COMMERCIAL

## 2024-02-16 ENCOUNTER — APPOINTMENT (OUTPATIENT)
Dept: CT IMAGING | Facility: HOSPITAL | Age: 54
DRG: 375 | End: 2024-02-16
Payer: COMMERCIAL

## 2024-02-16 LAB
ALBUMIN SERPL-MCNC: 3.3 G/DL (ref 3.5–5.2)
AMYLASE FLD-CCNC: 24 U/L
ANION GAP SERPL CALCULATED.3IONS-SCNC: 8 MMOL/L (ref 5–15)
BUN SERPL-MCNC: 26 MG/DL (ref 6–20)
BUN/CREAT SERPL: 36.1 (ref 7–25)
CALCIUM SPEC-SCNC: 9.4 MG/DL (ref 8.6–10.5)
CHLORIDE SERPL-SCNC: 103 MMOL/L (ref 98–107)
CO2 SERPL-SCNC: 30 MMOL/L (ref 22–29)
CREAT SERPL-MCNC: 0.72 MG/DL (ref 0.76–1.27)
DEPRECATED RDW RBC AUTO: 53.8 FL (ref 37–54)
DEPRECATED RDW RBC AUTO: 56.2 FL (ref 37–54)
EGFRCR SERPLBLD CKD-EPI 2021: 110.6 ML/MIN/1.73
ERYTHROCYTE [DISTWIDTH] IN BLOOD BY AUTOMATED COUNT: 15.3 % (ref 12.3–15.4)
ERYTHROCYTE [DISTWIDTH] IN BLOOD BY AUTOMATED COUNT: 15.8 % (ref 12.3–15.4)
GLUCOSE BLDC GLUCOMTR-MCNC: 151 MG/DL (ref 70–130)
GLUCOSE BLDC GLUCOMTR-MCNC: 163 MG/DL (ref 70–130)
GLUCOSE BLDC GLUCOMTR-MCNC: 99 MG/DL (ref 70–130)
GLUCOSE SERPL-MCNC: 110 MG/DL (ref 65–99)
HCT VFR BLD AUTO: 32.9 % (ref 37.5–51)
HCT VFR BLD AUTO: 34.2 % (ref 37.5–51)
HGB BLD-MCNC: 10.9 G/DL (ref 13–17.7)
HGB BLD-MCNC: 11.7 G/DL (ref 13–17.7)
MAGNESIUM SERPL-MCNC: 2.2 MG/DL (ref 1.6–2.6)
MCH RBC QN AUTO: 32.5 PG (ref 26.6–33)
MCH RBC QN AUTO: 34.1 PG (ref 26.6–33)
MCHC RBC AUTO-ENTMCNC: 33.1 G/DL (ref 31.5–35.7)
MCHC RBC AUTO-ENTMCNC: 34.2 G/DL (ref 31.5–35.7)
MCV RBC AUTO: 98.2 FL (ref 79–97)
MCV RBC AUTO: 99.7 FL (ref 79–97)
MRSA DNA SPEC QL NAA+PROBE: NORMAL
PHOSPHATE SERPL-MCNC: 3 MG/DL (ref 2.5–4.5)
PLATELET # BLD AUTO: 304 10*3/MM3 (ref 140–450)
PLATELET # BLD AUTO: 309 10*3/MM3 (ref 140–450)
PMV BLD AUTO: 8.7 FL (ref 6–12)
PMV BLD AUTO: 9 FL (ref 6–12)
POTASSIUM SERPL-SCNC: 4.3 MMOL/L (ref 3.5–5.2)
RBC # BLD AUTO: 3.35 10*6/MM3 (ref 4.14–5.8)
RBC # BLD AUTO: 3.43 10*6/MM3 (ref 4.14–5.8)
SODIUM SERPL-SCNC: 141 MMOL/L (ref 136–145)
WBC NRBC COR # BLD AUTO: 10.58 10*3/MM3 (ref 3.4–10.8)
WBC NRBC COR # BLD AUTO: 13.12 10*3/MM3 (ref 3.4–10.8)

## 2024-02-16 PROCEDURE — 25810000003 LACTATED RINGERS SOLUTION: Performed by: SURGERY

## 2024-02-16 PROCEDURE — 87040 BLOOD CULTURE FOR BACTERIA: CPT | Performed by: SURGERY

## 2024-02-16 PROCEDURE — 25010000002 METOCLOPRAMIDE PER 10 MG: Performed by: SURGERY

## 2024-02-16 PROCEDURE — 25010000002 VANCOMYCIN PER 500 MG: Performed by: NURSE PRACTITIONER

## 2024-02-16 PROCEDURE — 71250 CT THORAX DX C-: CPT

## 2024-02-16 PROCEDURE — 85027 COMPLETE CBC AUTOMATED: CPT | Performed by: NURSE PRACTITIONER

## 2024-02-16 PROCEDURE — 82948 REAGENT STRIP/BLOOD GLUCOSE: CPT

## 2024-02-16 PROCEDURE — 80069 RENAL FUNCTION PANEL: CPT | Performed by: NURSE PRACTITIONER

## 2024-02-16 PROCEDURE — 25010000002 LEVOFLOXACIN PER 250 MG: Performed by: SURGERY

## 2024-02-16 PROCEDURE — 25010000002 ENOXAPARIN PER 10 MG: Performed by: SURGERY

## 2024-02-16 PROCEDURE — 71045 X-RAY EXAM CHEST 1 VIEW: CPT

## 2024-02-16 PROCEDURE — 83735 ASSAY OF MAGNESIUM: CPT | Performed by: NURSE PRACTITIONER

## 2024-02-16 PROCEDURE — 25010000002 FLUCONAZOLE PER 200 MG: Performed by: SURGERY

## 2024-02-16 PROCEDURE — 25010000002 METRONIDAZOLE 500 MG/100ML SOLUTION: Performed by: SURGERY

## 2024-02-16 PROCEDURE — 74176 CT ABD & PELVIS W/O CONTRAST: CPT

## 2024-02-16 PROCEDURE — 0 DIATRIZOATE MEGLUMINE & SODIUM PER 1 ML: Performed by: SURGERY

## 2024-02-16 PROCEDURE — 99024 POSTOP FOLLOW-UP VISIT: CPT

## 2024-02-16 PROCEDURE — 82150 ASSAY OF AMYLASE: CPT | Performed by: NURSE PRACTITIONER

## 2024-02-16 PROCEDURE — 87641 MR-STAPH DNA AMP PROBE: CPT | Performed by: NURSE PRACTITIONER

## 2024-02-16 PROCEDURE — 85027 COMPLETE CBC AUTOMATED: CPT | Performed by: SURGERY

## 2024-02-16 RX ORDER — VANCOMYCIN HYDROCHLORIDE 1 G/200ML
1000 INJECTION, SOLUTION INTRAVENOUS EVERY 12 HOURS
Status: DISCONTINUED | OUTPATIENT
Start: 2024-02-16 | End: 2024-02-17

## 2024-02-16 RX ORDER — LEVOFLOXACIN 5 MG/ML
750 INJECTION, SOLUTION INTRAVENOUS EVERY 24 HOURS
Status: COMPLETED | OUTPATIENT
Start: 2024-02-16 | End: 2024-02-22

## 2024-02-16 RX ORDER — FLUCONAZOLE 2 MG/ML
400 INJECTION, SOLUTION INTRAVENOUS EVERY 24 HOURS
Status: DISCONTINUED | OUTPATIENT
Start: 2024-02-16 | End: 2024-02-20

## 2024-02-16 RX ORDER — METRONIDAZOLE 500 MG/100ML
500 INJECTION, SOLUTION INTRAVENOUS EVERY 8 HOURS
Status: COMPLETED | OUTPATIENT
Start: 2024-02-16 | End: 2024-02-22

## 2024-02-16 RX ADMIN — LEVOFLOXACIN 750 MG: 5 INJECTION, SOLUTION INTRAVENOUS at 22:22

## 2024-02-16 RX ADMIN — ENOXAPARIN SODIUM 40 MG: 100 INJECTION SUBCUTANEOUS at 08:28

## 2024-02-16 RX ADMIN — Medication 3 ML: at 21:15

## 2024-02-16 RX ADMIN — DIPHENHYDRAMINE HYDROCHLORIDE AND LIDOCAINE HYDROCHLORIDE AND ALUMINUM HYDROXIDE AND MAGNESIUM HYDRO 5 ML: KIT at 04:34

## 2024-02-16 RX ADMIN — ACETAMINOPHEN 1000 MG: 160 SOLUTION ORAL at 16:32

## 2024-02-16 RX ADMIN — METOCLOPRAMIDE 10 MG: 5 INJECTION, SOLUTION INTRAMUSCULAR; INTRAVENOUS at 08:28

## 2024-02-16 RX ADMIN — FLUOXETINE HYDROCHLORIDE 20 MG: 20 SOLUTION ORAL at 08:28

## 2024-02-16 RX ADMIN — OXYCODONE HYDROCHLORIDE 7.5 MG: 5 SOLUTION ORAL at 20:31

## 2024-02-16 RX ADMIN — Medication 3 ML: at 08:39

## 2024-02-16 RX ADMIN — Medication 10 ML: at 21:15

## 2024-02-16 RX ADMIN — OXYCODONE HYDROCHLORIDE 7.5 MG: 5 SOLUTION ORAL at 04:20

## 2024-02-16 RX ADMIN — FLUCONAZOLE IN SODIUM CHLORIDE 400 MG: 2 INJECTION, SOLUTION INTRAVENOUS at 22:21

## 2024-02-16 RX ADMIN — METOPROLOL TARTRATE 5 MG: 1 INJECTION, SOLUTION INTRAVENOUS at 18:26

## 2024-02-16 RX ADMIN — OXYCODONE HYDROCHLORIDE 7.5 MG: 5 SOLUTION ORAL at 16:27

## 2024-02-16 RX ADMIN — GABAPENTIN 300 MG: 300 CAPSULE ORAL at 08:28

## 2024-02-16 RX ADMIN — SODIUM CHLORIDE, POTASSIUM CHLORIDE, SODIUM LACTATE AND CALCIUM CHLORIDE 500 ML: 600; 310; 30; 20 INJECTION, SOLUTION INTRAVENOUS at 21:05

## 2024-02-16 RX ADMIN — GABAPENTIN 300 MG: 300 CAPSULE ORAL at 20:31

## 2024-02-16 RX ADMIN — METOCLOPRAMIDE 10 MG: 5 INJECTION, SOLUTION INTRAMUSCULAR; INTRAVENOUS at 00:37

## 2024-02-16 RX ADMIN — CETIRIZINE HYDROCHLORIDE 5 MG: 5 SOLUTION ORAL at 08:29

## 2024-02-16 RX ADMIN — METOPROLOL TARTRATE 2.5 MG: 1 INJECTION, SOLUTION INTRAVENOUS at 12:32

## 2024-02-16 RX ADMIN — PANTOPRAZOLE SODIUM 40 MG: 40 INJECTION, POWDER, FOR SOLUTION INTRAVENOUS at 06:51

## 2024-02-16 RX ADMIN — DIPHENHYDRAMINE HYDROCHLORIDE AND LIDOCAINE HYDROCHLORIDE AND ALUMINUM HYDROXIDE AND MAGNESIUM HYDRO 5 ML: KIT at 16:27

## 2024-02-16 RX ADMIN — ACETAMINOPHEN 1000 MG: 160 SOLUTION ORAL at 20:31

## 2024-02-16 RX ADMIN — DIPHENHYDRAMINE HYDROCHLORIDE AND LIDOCAINE HYDROCHLORIDE AND ALUMINUM HYDROXIDE AND MAGNESIUM HYDRO 5 ML: KIT at 22:19

## 2024-02-16 RX ADMIN — OXYCODONE HYDROCHLORIDE 7.5 MG: 5 SOLUTION ORAL at 00:34

## 2024-02-16 RX ADMIN — METOPROLOL TARTRATE 2.5 MG: 1 INJECTION, SOLUTION INTRAVENOUS at 00:40

## 2024-02-16 RX ADMIN — ACETAMINOPHEN 1000 MG: 160 SOLUTION ORAL at 08:29

## 2024-02-16 RX ADMIN — LIDOCAINE 1 PATCH: 4 PATCH TOPICAL at 08:26

## 2024-02-16 RX ADMIN — VANCOMYCIN HYDROCHLORIDE 1000 MG: 1 INJECTION, SOLUTION INTRAVENOUS at 21:19

## 2024-02-16 RX ADMIN — METOCLOPRAMIDE 10 MG: 5 INJECTION, SOLUTION INTRAMUSCULAR; INTRAVENOUS at 16:28

## 2024-02-16 RX ADMIN — DIPHENHYDRAMINE HYDROCHLORIDE AND LIDOCAINE HYDROCHLORIDE AND ALUMINUM HYDROXIDE AND MAGNESIUM HYDRO 5 ML: KIT at 11:06

## 2024-02-16 RX ADMIN — METOPROLOL TARTRATE 2.5 MG: 1 INJECTION, SOLUTION INTRAVENOUS at 06:54

## 2024-02-16 RX ADMIN — OXYCODONE HYDROCHLORIDE 7.5 MG: 5 SOLUTION ORAL at 08:25

## 2024-02-16 RX ADMIN — METRONIDAZOLE 500 MG: 500 INJECTION, SOLUTION INTRAVENOUS at 21:14

## 2024-02-16 RX ADMIN — DIATRIZOATE MEGLUMINE AND DIATRIZOATE SODIUM 30 ML: 660; 100 LIQUID ORAL; RECTAL at 08:28

## 2024-02-16 NOTE — DISCHARGE PLACEMENT REQUEST
"Alfreda Lebron (53 y.o. Male)       Date of Birth   1970    Social Security Number       Address   [address] [place] [address]    Home Phone   [phone]    MRN   [id number]       Confucianism   [employer]    Marital Status                               Admission Date   2/9/24    Admission Type   Elective    Admitting Provider   Saurabh Hurtado MD    Attending Provider   Saurabh Hurtado MD    Department, Room/Bed   [address], E559/1       Discharge Date       Discharge Disposition       Discharge Destination                                 Attending Provider: Saurabh Hurtado MD    Allergies: Cephalosporins, Dye  [Contrast Dye (Echo Or Unknown Ct/mr)], Iodinated Contrast Media, Sulfa Antibiotics, Sulfate, Zetia [Ezetimibe], Statins    Isolation: None   Infection: None   Code Status: CPR    Ht: 177.8 cm (70\")   Wt: 66.2 kg (145 lb 15.1 oz)    Admission Cmt: None   Principal Problem: Malignant neoplasm of lower third of esophagus [C15.5]                   Active Insurance as of 2/9/2024       Primary Coverage       Payor Plan Insurance Group Employer/Plan Group    [employer] BLUE [employer] PPO [id number]       Payor Plan Address Payor Plan Phone Number Payor Plan Fax Number Effective Dates    [address] [phone]  1/1/2013 - None Entered    [facility] [id number]         Subscriber Name Subscriber Birth Date Member ID       ALFREDA LEBRON 1970 [id number]                     Emergency Contacts        (Rel.) Home Phone Work Phone Mobile Phone    MINERVA LEBRON (Spouse) [phone] -- [phone]              Emergency Contact Information       Name Relation Home Work Mobile    MINERVA LEBRON Spouse [phone]          "

## 2024-02-16 NOTE — PROGRESS NOTES
"  POST-OPERATIVE NOTE     Chief Complaint: esophageal carcinoma, postoperative care  S/P: Vinicio Shayne esophagectomy  POD # 7    Subjective:  Symptoms:  Improved.  He reports weakness.  No shortness of breath, chest pressure or anxiety.    Diet:  NPO.  No nausea or vomiting.    Activity level: Impaired due to weakness.    Pain:  He complains of pain that is mild.  He reports pain is improving.  Pain is well controlled.    No new complaints.  Resting in bed    Objective:  General Appearance:  Comfortable, well-appearing, in no acute distress and not in pain.    Vital signs: (most recent): Blood pressure 100/68, pulse 98, temperature 97.9 °F (36.6 °C), temperature source Oral, resp. rate 16, height 177.8 cm (70\"), weight 66.2 kg (145 lb 15.1 oz), SpO2 91%.  Vital signs are normal.  No fever.    Output: Producing urine and no stool output.    HEENT: (NG tube sutured)    Lungs:  Normal effort and normal respiratory rate.  He is not in respiratory distress.  There are decreased breath sounds.  No rales, wheezes or rhonchi.    Heart: Normal rate.  Regular rhythm.    Chest: Symmetric chest wall expansion. Chest wall tenderness present.    Abdomen: Abdomen is soft and non-distended.  (J-tube intact).  Hypoactive bowel sounds.   There is incisional tenderness.    Extremities: Normal range of motion.    Neurological: Patient is alert and oriented to person, place and time.    Skin:  Warm and dry.              REYNALDO Drain: 28ml  NG tube: 750ml    Results Review:     I reviewed the patient's new clinical results.  I reviewed the patient's new imaging results and agree with the interpretation.  Discussed with patient, RN, family at bedside and  Dr. Hurtado.    Assessment & Plan     Mr. Lebron is POD 7 s/p Vinicio-Shayne esophagectomy.    Lethargy remains improved.  Patient denies any significant pain.  Follow-up chest x-ray performed this morning without significant change.  Subsequent CT of the chest, abdomen and pelvis performed today " demonstrates postoperative inflation the mediastinum without postoperative fluid collection.  Segmental subsegmental airway occlusion of the posterior and lateral basilar segments of the lower lobes which may indicate aspiration.  Small bibasilar groundglass opacity.  Minuscule right pneumothorax and only trace bilateral pleural fluid.  No bowel obstruction.  Jejunostomy tube seen in position.    Reports his pain is well-controlled, continue ERAS medications.  Continue REYNALDO to bulb with bulb expanded and allow to drain passively.  NG tube output too significant for removal, continue to trend output overnight.  Continue Reglan.  4 loose bowel movements today and bowel sounds are active.  Continue cyclic feeds.  Continue aspiration precautions and ensure head of bed remains greater than 30 degrees at all times given extreme risk for aspiration.  This was discussed the patient and his primary RN.  Trend a.m. labs and obtain a.m. chest x-ray, replace electrolytes per protocol if needed.  Final surgical pathology ypT3 N0 poorly differentiated adenocarcinoma with near complete response.    RUBINA Ramirez  [facility]  02/16/24  12:46 EST    Patient was seen and assessed while wearing personal protective equipment including facemask, protective eyewear and gloves.  Hand hygiene performed prior to entering the room and upon exiting with doffing of gloves.

## 2024-02-16 NOTE — PLAN OF CARE
Problem: Adult Inpatient Plan of Care  Goal: Plan of Care Review  Outcome: Ongoing, Progressing  Flowsheets (Taken 2/16/2024 1811)  Progress: improving  Plan of Care Reviewed With:   patient   spouse  Outcome Evaluation: VSS, pain managed with PRN medications, up with assist x1, tube feeds infusing, NG tube low wall suction, call light within reach     Problem: Fall Injury Risk  Goal: Absence of Fall and Fall-Related Injury  Outcome: Ongoing, Progressing  Intervention: Identify and Manage Contributors  Recent Flowsheet Documentation  Taken 2/16/2024 1657 by Jamilah Bailey RN  Medication Review/Management: medications reviewed  Taken 2/16/2024 1632 by Jamilah Bailey RN  Medication Review/Management: medications reviewed  Taken 2/16/2024 1413 by Jamilah Bailey RN  Medication Review/Management: medications reviewed  Intervention: Promote Injury-Free Environment  Recent Flowsheet Documentation  Taken 2/16/2024 1800 by Jamilah Bailey RN  Safety Promotion/Fall Prevention: safety round/check completed  Taken 2/16/2024 1657 by Jamilah Bailey RN  Safety Promotion/Fall Prevention: clutter free environment maintained  Taken 2/16/2024 1632 by Jamilah Bailey RN  Safety Promotion/Fall Prevention: clutter free environment maintained  Taken 2/16/2024 1413 by Jamilah Bailey RN  Safety Promotion/Fall Prevention: activity supervised     Problem: Pain Acute  Goal: Acceptable Pain Control and Functional Ability  Outcome: Ongoing, Progressing  Intervention: Prevent or Manage Pain  Recent Flowsheet Documentation  Taken 2/16/2024 1657 by Jamilah Bailey RN  Medication Review/Management: medications reviewed  Taken 2/16/2024 1632 by Jamilah Bailey RN  Medication Review/Management: medications reviewed  Taken 2/16/2024 1413 by Jamilah Bailey RN  Medication Review/Management: medications reviewed  Intervention: Develop Pain Management Plan  Recent Flowsheet Documentation  Taken 2/16/2024 1657 by Jamilah Bailey RN  Pain Management  Interventions: see MAR  Taken 2/16/2024 0829 by Jamilah Bailey, RN  Pain Management Interventions: see MAR  Intervention: Optimize Psychosocial Wellbeing  Recent Flowsheet Documentation  Taken 2/16/2024 0829 by Jamilah Bailey RN  Diversional Activities:   television   smartphone     Problem: Aspiration (Enteral Nutrition)  Goal: Absence of Aspiration Signs and Symptoms  Outcome: Ongoing, Progressing  Intervention: Minimize Aspiration Risk  Recent Flowsheet Documentation  Taken 2/16/2024 1413 by Jamilah Bailey, RN  Enteral Feeding Safety: placement checked  Taken 2/16/2024 0829 by Jamilah Bailey, MICH  Head of Bed (HOB) Positioning: HOB elevated  Enteral Feeding Safety: placement checked   Goal Outcome Evaluation:  Plan of Care Reviewed With: patient, spouse        Progress: improving  Outcome Evaluation: VSS, pain managed with PRN medications, up with assist x1, tube feeds infusing, NG tube low wall suction, call light within reach

## 2024-02-16 NOTE — PLAN OF CARE
Goal Outcome Evaluation:  Plan of Care Reviewed With: patient        Progress: no change  Outcome Evaluation: vitals stable.  pt expressed pain.  meds given per orders.  tube feed infusing per order.  NGT, J-tube, and REYNALDO drain are present.  port is present, but is not accessed.  will continue to monitor.

## 2024-02-16 NOTE — CASE MANAGEMENT/SOCIAL WORK
Continued Stay Note  [facility]     Patient Name: Jourdan Lebron  MRN: [id number]  Today's Date: 2/16/2024    Admit Date: 2/9/2024    Plan: Home with [facility] infusion and HH   Discharge Plan       Row Name 02/16/24 1235       Plan    Plan Home with [facility] infusion and HH    Patient/Family in Agreement with Plan yes    Plan Comments [facility] unable to accept.  Additional  referrals placed in [facility].  [facility] infusion continues to follow.  [facility] continues to follow. PARIS Kirby RN                   Discharge Codes    No documentation.                 Expected Discharge Date and Time       Expected Discharge Date Expected Discharge Time    Feb 15, 2024               Shae Kirby, RN

## 2024-02-17 ENCOUNTER — APPOINTMENT (OUTPATIENT)
Dept: GENERAL RADIOLOGY | Facility: HOSPITAL | Age: 54
DRG: 375 | End: 2024-02-17
Payer: COMMERCIAL

## 2024-02-17 ENCOUNTER — ANESTHESIA (OUTPATIENT)
Dept: PERIOP | Facility: HOSPITAL | Age: 54
End: 2024-02-17
Payer: COMMERCIAL

## 2024-02-17 ENCOUNTER — ANESTHESIA EVENT (OUTPATIENT)
Dept: PERIOP | Facility: HOSPITAL | Age: 54
End: 2024-02-17
Payer: COMMERCIAL

## 2024-02-17 LAB
ALBUMIN SERPL-MCNC: 3 G/DL (ref 3.5–5.2)
ANION GAP SERPL CALCULATED.3IONS-SCNC: 10 MMOL/L (ref 5–15)
BACTERIA UR QL AUTO: ABNORMAL /HPF
BILIRUB UR QL STRIP: NEGATIVE
BUN SERPL-MCNC: 29 MG/DL (ref 6–20)
BUN/CREAT SERPL: 47.5 (ref 7–25)
CALCIUM SPEC-SCNC: 8.9 MG/DL (ref 8.6–10.5)
CHLORIDE SERPL-SCNC: 107 MMOL/L (ref 98–107)
CLARITY UR: CLEAR
CO2 SERPL-SCNC: 25 MMOL/L (ref 22–29)
COLOR UR: YELLOW
CREAT SERPL-MCNC: 0.61 MG/DL (ref 0.76–1.27)
DEPRECATED RDW RBC AUTO: 53.9 FL (ref 37–54)
DEPRECATED RDW RBC AUTO: 57.3 FL (ref 37–54)
EGFRCR SERPLBLD CKD-EPI 2021: 116.3 ML/MIN/1.73
ERYTHROCYTE [DISTWIDTH] IN BLOOD BY AUTOMATED COUNT: 15.3 % (ref 12.3–15.4)
ERYTHROCYTE [DISTWIDTH] IN BLOOD BY AUTOMATED COUNT: 15.5 % (ref 12.3–15.4)
GLUCOSE BLDC GLUCOMTR-MCNC: 116 MG/DL (ref 70–130)
GLUCOSE BLDC GLUCOMTR-MCNC: 130 MG/DL (ref 70–130)
GLUCOSE BLDC GLUCOMTR-MCNC: 131 MG/DL (ref 70–130)
GLUCOSE SERPL-MCNC: 148 MG/DL (ref 65–99)
GLUCOSE UR STRIP-MCNC: NEGATIVE MG/DL
HCT VFR BLD AUTO: 31.2 % (ref 37.5–51)
HCT VFR BLD AUTO: 31.7 % (ref 37.5–51)
HGB BLD-MCNC: 10.5 G/DL (ref 13–17.7)
HGB BLD-MCNC: 10.7 G/DL (ref 13–17.7)
HGB UR QL STRIP.AUTO: ABNORMAL
HYALINE CASTS UR QL AUTO: ABNORMAL /LPF
KETONES UR QL STRIP: NEGATIVE
LEUKOCYTE ESTERASE UR QL STRIP.AUTO: NEGATIVE
MAGNESIUM SERPL-MCNC: 2.1 MG/DL (ref 1.6–2.6)
MCH RBC QN AUTO: 32.3 PG (ref 26.6–33)
MCH RBC QN AUTO: 34.2 PG (ref 26.6–33)
MCHC RBC AUTO-ENTMCNC: 33.1 G/DL (ref 31.5–35.7)
MCHC RBC AUTO-ENTMCNC: 34.3 G/DL (ref 31.5–35.7)
MCV RBC AUTO: 97.5 FL (ref 79–97)
MCV RBC AUTO: 99.7 FL (ref 79–97)
NITRITE UR QL STRIP: NEGATIVE
PH UR STRIP.AUTO: 5.5 [PH] (ref 5–8)
PHOSPHATE SERPL-MCNC: 2.8 MG/DL (ref 2.5–4.5)
PLATELET # BLD AUTO: 305 10*3/MM3 (ref 140–450)
PLATELET # BLD AUTO: 308 10*3/MM3 (ref 140–450)
PMV BLD AUTO: 8.8 FL (ref 6–12)
PMV BLD AUTO: 9.1 FL (ref 6–12)
POTASSIUM SERPL-SCNC: 4.2 MMOL/L (ref 3.5–5.2)
PROCALCITONIN SERPL-MCNC: 0.17 NG/ML (ref 0–0.25)
PROT UR QL STRIP: ABNORMAL
RBC # BLD AUTO: 3.13 10*6/MM3 (ref 4.14–5.8)
RBC # BLD AUTO: 3.25 10*6/MM3 (ref 4.14–5.8)
RBC # UR STRIP: ABNORMAL /HPF
REF LAB TEST METHOD: ABNORMAL
SODIUM SERPL-SCNC: 142 MMOL/L (ref 136–145)
SP GR UR STRIP: >1.03 (ref 1–1.03)
SQUAMOUS #/AREA URNS HPF: ABNORMAL /HPF
UROBILINOGEN UR QL STRIP: ABNORMAL
VANCOMYCIN TROUGH SERPL-MCNC: <4 MCG/ML (ref 5–20)
WBC # UR STRIP: ABNORMAL /HPF
WBC NRBC COR # BLD AUTO: 15.49 10*3/MM3 (ref 3.4–10.8)
WBC NRBC COR # BLD AUTO: 18.53 10*3/MM3 (ref 3.4–10.8)

## 2024-02-17 PROCEDURE — 25010000002 HYDROMORPHONE PER 4 MG: Performed by: NURSE ANESTHETIST, CERTIFIED REGISTERED

## 2024-02-17 PROCEDURE — 25810000003 LACTATED RINGERS PER 1000 ML: Performed by: NURSE ANESTHETIST, CERTIFIED REGISTERED

## 2024-02-17 PROCEDURE — 84145 PROCALCITONIN (PCT): CPT | Performed by: STUDENT IN AN ORGANIZED HEALTH CARE EDUCATION/TRAINING PROGRAM

## 2024-02-17 PROCEDURE — 25010000002 METRONIDAZOLE 500 MG/100ML SOLUTION: Performed by: SURGERY

## 2024-02-17 PROCEDURE — 25010000002 PROPOFOL 10 MG/ML EMULSION: Performed by: NURSE ANESTHETIST, CERTIFIED REGISTERED

## 2024-02-17 PROCEDURE — 71045 X-RAY EXAM CHEST 1 VIEW: CPT

## 2024-02-17 PROCEDURE — 85027 COMPLETE CBC AUTOMATED: CPT | Performed by: NURSE PRACTITIONER

## 2024-02-17 PROCEDURE — 25010000002 HYDROMORPHONE PER 4 MG: Performed by: SURGERY

## 2024-02-17 PROCEDURE — 25010000002 ENOXAPARIN PER 10 MG: Performed by: SURGERY

## 2024-02-17 PROCEDURE — 0 DIATRIZOATE MEGLUMINE & SODIUM PER 1 ML: Performed by: SURGERY

## 2024-02-17 PROCEDURE — 25010000002 METOCLOPRAMIDE PER 10 MG: Performed by: SURGERY

## 2024-02-17 PROCEDURE — 25010000002 SUGAMMADEX 200 MG/2ML SOLUTION: Performed by: NURSE ANESTHETIST, CERTIFIED REGISTERED

## 2024-02-17 PROCEDURE — C1874 STENT, COATED/COV W/DEL SYS: HCPCS | Performed by: SURGERY

## 2024-02-17 PROCEDURE — 76000 FLUOROSCOPY <1 HR PHYS/QHP: CPT

## 2024-02-17 PROCEDURE — 25010000002 FENTANYL CITRATE (PF) 50 MCG/ML SOLUTION: Performed by: NURSE ANESTHETIST, CERTIFIED REGISTERED

## 2024-02-17 PROCEDURE — 0D758DZ DILATION OF ESOPHAGUS WITH INTRALUMINAL DEVICE, VIA NATURAL OR ARTIFICIAL OPENING ENDOSCOPIC: ICD-10-PCS | Performed by: SURGERY

## 2024-02-17 PROCEDURE — 85027 COMPLETE CBC AUTOMATED: CPT | Performed by: SURGERY

## 2024-02-17 PROCEDURE — 82948 REAGENT STRIP/BLOOD GLUCOSE: CPT

## 2024-02-17 PROCEDURE — 80202 ASSAY OF VANCOMYCIN: CPT | Performed by: NURSE PRACTITIONER

## 2024-02-17 PROCEDURE — 83735 ASSAY OF MAGNESIUM: CPT | Performed by: NURSE PRACTITIONER

## 2024-02-17 PROCEDURE — 81001 URINALYSIS AUTO W/SCOPE: CPT | Performed by: NURSE PRACTITIONER

## 2024-02-17 PROCEDURE — 25010000002 FLUCONAZOLE PER 200 MG: Performed by: SURGERY

## 2024-02-17 PROCEDURE — 80069 RENAL FUNCTION PANEL: CPT | Performed by: NURSE PRACTITIONER

## 2024-02-17 PROCEDURE — 25010000002 ONDANSETRON PER 1 MG: Performed by: NURSE ANESTHETIST, CERTIFIED REGISTERED

## 2024-02-17 PROCEDURE — 25010000002 LEVOFLOXACIN PER 250 MG: Performed by: SURGERY

## 2024-02-17 PROCEDURE — 25010000002 PHENYLEPHRINE 10 MG/ML SOLUTION: Performed by: NURSE ANESTHETIST, CERTIFIED REGISTERED

## 2024-02-17 PROCEDURE — 99222 1ST HOSP IP/OBS MODERATE 55: CPT | Performed by: STUDENT IN AN ORGANIZED HEALTH CARE EDUCATION/TRAINING PROGRAM

## 2024-02-17 PROCEDURE — 43266 EGD ENDOSCOPIC STENT PLACE: CPT | Performed by: SURGERY

## 2024-02-17 PROCEDURE — 25010000002 SUCCINYLCHOLINE PER 20 MG: Performed by: NURSE ANESTHETIST, CERTIFIED REGISTERED

## 2024-02-17 DEVICE — STENT SYSTEM
Type: IMPLANTABLE DEVICE | Site: ESOPHAGUS | Status: FUNCTIONAL
Brand: WALLFLEX™ ESOPHAGEAL

## 2024-02-17 RX ORDER — IPRATROPIUM BROMIDE AND ALBUTEROL SULFATE 2.5; .5 MG/3ML; MG/3ML
3 SOLUTION RESPIRATORY (INHALATION) ONCE AS NEEDED
Status: DISCONTINUED | OUTPATIENT
Start: 2024-02-17 | End: 2024-02-17 | Stop reason: HOSPADM

## 2024-02-17 RX ORDER — PROMETHAZINE HYDROCHLORIDE 25 MG/1
25 TABLET ORAL ONCE AS NEEDED
Status: DISCONTINUED | OUTPATIENT
Start: 2024-02-17 | End: 2024-02-17 | Stop reason: HOSPADM

## 2024-02-17 RX ORDER — NALOXONE HCL 0.4 MG/ML
0.2 VIAL (ML) INJECTION AS NEEDED
Status: DISCONTINUED | OUTPATIENT
Start: 2024-02-17 | End: 2024-02-17 | Stop reason: HOSPADM

## 2024-02-17 RX ORDER — ONDANSETRON 2 MG/ML
INJECTION INTRAMUSCULAR; INTRAVENOUS AS NEEDED
Status: DISCONTINUED | OUTPATIENT
Start: 2024-02-17 | End: 2024-02-17 | Stop reason: SURG

## 2024-02-17 RX ORDER — OXYCODONE AND ACETAMINOPHEN 7.5; 325 MG/1; MG/1
1 TABLET ORAL EVERY 4 HOURS PRN
Status: DISCONTINUED | OUTPATIENT
Start: 2024-02-17 | End: 2024-02-17 | Stop reason: HOSPADM

## 2024-02-17 RX ORDER — DROPERIDOL 2.5 MG/ML
0.62 INJECTION, SOLUTION INTRAMUSCULAR; INTRAVENOUS
Status: DISCONTINUED | OUTPATIENT
Start: 2024-02-17 | End: 2024-02-17 | Stop reason: HOSPADM

## 2024-02-17 RX ORDER — LABETALOL HYDROCHLORIDE 5 MG/ML
5 INJECTION, SOLUTION INTRAVENOUS
Status: DISCONTINUED | OUTPATIENT
Start: 2024-02-17 | End: 2024-02-17 | Stop reason: HOSPADM

## 2024-02-17 RX ORDER — PHENYLEPHRINE HYDROCHLORIDE 10 MG/ML
INJECTION INTRAVENOUS AS NEEDED
Status: DISCONTINUED | OUTPATIENT
Start: 2024-02-17 | End: 2024-02-17 | Stop reason: SURG

## 2024-02-17 RX ORDER — PROPOFOL 10 MG/ML
VIAL (ML) INTRAVENOUS AS NEEDED
Status: DISCONTINUED | OUTPATIENT
Start: 2024-02-17 | End: 2024-02-17 | Stop reason: SURG

## 2024-02-17 RX ORDER — FENTANYL CITRATE 50 UG/ML
50 INJECTION, SOLUTION INTRAMUSCULAR; INTRAVENOUS
Status: DISCONTINUED | OUTPATIENT
Start: 2024-02-17 | End: 2024-02-17 | Stop reason: HOSPADM

## 2024-02-17 RX ORDER — HYDRALAZINE HYDROCHLORIDE 20 MG/ML
5 INJECTION INTRAMUSCULAR; INTRAVENOUS
Status: DISCONTINUED | OUTPATIENT
Start: 2024-02-17 | End: 2024-02-17 | Stop reason: HOSPADM

## 2024-02-17 RX ORDER — EPHEDRINE SULFATE 50 MG/ML
5 INJECTION, SOLUTION INTRAVENOUS ONCE AS NEEDED
Status: DISCONTINUED | OUTPATIENT
Start: 2024-02-17 | End: 2024-02-17 | Stop reason: HOSPADM

## 2024-02-17 RX ORDER — ONDANSETRON 2 MG/ML
4 INJECTION INTRAMUSCULAR; INTRAVENOUS ONCE AS NEEDED
Status: DISCONTINUED | OUTPATIENT
Start: 2024-02-17 | End: 2024-02-17 | Stop reason: HOSPADM

## 2024-02-17 RX ORDER — SODIUM CHLORIDE, SODIUM LACTATE, POTASSIUM CHLORIDE, CALCIUM CHLORIDE 600; 310; 30; 20 MG/100ML; MG/100ML; MG/100ML; MG/100ML
INJECTION, SOLUTION INTRAVENOUS CONTINUOUS PRN
Status: DISCONTINUED | OUTPATIENT
Start: 2024-02-17 | End: 2024-02-17 | Stop reason: SURG

## 2024-02-17 RX ORDER — FENTANYL CITRATE 50 UG/ML
INJECTION, SOLUTION INTRAMUSCULAR; INTRAVENOUS AS NEEDED
Status: DISCONTINUED | OUTPATIENT
Start: 2024-02-17 | End: 2024-02-17 | Stop reason: SURG

## 2024-02-17 RX ORDER — PROMETHAZINE HYDROCHLORIDE 25 MG/1
25 SUPPOSITORY RECTAL ONCE AS NEEDED
Status: DISCONTINUED | OUTPATIENT
Start: 2024-02-17 | End: 2024-02-17 | Stop reason: HOSPADM

## 2024-02-17 RX ORDER — LIDOCAINE HYDROCHLORIDE 20 MG/ML
INJECTION, SOLUTION INFILTRATION; PERINEURAL AS NEEDED
Status: DISCONTINUED | OUTPATIENT
Start: 2024-02-17 | End: 2024-02-17 | Stop reason: SURG

## 2024-02-17 RX ORDER — SUCCINYLCHOLINE CHLORIDE 20 MG/ML
INJECTION INTRAMUSCULAR; INTRAVENOUS AS NEEDED
Status: DISCONTINUED | OUTPATIENT
Start: 2024-02-17 | End: 2024-02-17 | Stop reason: SURG

## 2024-02-17 RX ORDER — DIPHENHYDRAMINE HYDROCHLORIDE 50 MG/ML
12.5 INJECTION INTRAMUSCULAR; INTRAVENOUS
Status: DISCONTINUED | OUTPATIENT
Start: 2024-02-17 | End: 2024-02-17 | Stop reason: HOSPADM

## 2024-02-17 RX ORDER — ROCURONIUM BROMIDE 10 MG/ML
INJECTION, SOLUTION INTRAVENOUS AS NEEDED
Status: DISCONTINUED | OUTPATIENT
Start: 2024-02-17 | End: 2024-02-17 | Stop reason: SURG

## 2024-02-17 RX ORDER — FLUMAZENIL 0.1 MG/ML
0.2 INJECTION INTRAVENOUS AS NEEDED
Status: DISCONTINUED | OUTPATIENT
Start: 2024-02-17 | End: 2024-02-17 | Stop reason: HOSPADM

## 2024-02-17 RX ORDER — HYDROMORPHONE HYDROCHLORIDE 1 MG/ML
0.5 INJECTION, SOLUTION INTRAMUSCULAR; INTRAVENOUS; SUBCUTANEOUS
Status: DISCONTINUED | OUTPATIENT
Start: 2024-02-17 | End: 2024-02-17 | Stop reason: HOSPADM

## 2024-02-17 RX ORDER — HYDROCODONE BITARTRATE AND ACETAMINOPHEN 5; 325 MG/1; MG/1
1 TABLET ORAL ONCE AS NEEDED
Status: DISCONTINUED | OUTPATIENT
Start: 2024-02-17 | End: 2024-02-17 | Stop reason: HOSPADM

## 2024-02-17 RX ADMIN — FENTANYL CITRATE 25 MCG: 50 INJECTION, SOLUTION INTRAMUSCULAR; INTRAVENOUS at 11:21

## 2024-02-17 RX ADMIN — HYDROMORPHONE HYDROCHLORIDE 0.5 MG: 1 INJECTION, SOLUTION INTRAMUSCULAR; INTRAVENOUS; SUBCUTANEOUS at 23:25

## 2024-02-17 RX ADMIN — OXYCODONE HYDROCHLORIDE 7.5 MG: 5 SOLUTION ORAL at 19:56

## 2024-02-17 RX ADMIN — ONDANSETRON 4 MG: 2 INJECTION INTRAMUSCULAR; INTRAVENOUS at 10:52

## 2024-02-17 RX ADMIN — ENOXAPARIN SODIUM 40 MG: 100 INJECTION SUBCUTANEOUS at 08:56

## 2024-02-17 RX ADMIN — PHENYLEPHRINE HYDROCHLORIDE 200 MCG: 10 INJECTION INTRAVENOUS at 10:50

## 2024-02-17 RX ADMIN — DIATRIZOATE MEGLUMINE AND DIATRIZOATE SODIUM 30 ML: 660; 100 LIQUID ORAL; RECTAL at 10:00

## 2024-02-17 RX ADMIN — Medication 3 ML: at 20:07

## 2024-02-17 RX ADMIN — LIDOCAINE 1 PATCH: 4 PATCH TOPICAL at 08:59

## 2024-02-17 RX ADMIN — CETIRIZINE HYDROCHLORIDE 5 MG: 5 SOLUTION ORAL at 08:58

## 2024-02-17 RX ADMIN — GABAPENTIN 300 MG: 300 CAPSULE ORAL at 08:58

## 2024-02-17 RX ADMIN — OXYCODONE HYDROCHLORIDE 7.5 MG: 5 SOLUTION ORAL at 15:31

## 2024-02-17 RX ADMIN — ACETAMINOPHEN 1000 MG: 160 SOLUTION ORAL at 20:00

## 2024-02-17 RX ADMIN — LIDOCAINE HYDROCHLORIDE 60 MG: 20 INJECTION, SOLUTION INFILTRATION; PERINEURAL at 10:45

## 2024-02-17 RX ADMIN — OXYCODONE HYDROCHLORIDE 7.5 MG: 5 SOLUTION ORAL at 00:23

## 2024-02-17 RX ADMIN — GABAPENTIN 300 MG: 300 CAPSULE ORAL at 20:00

## 2024-02-17 RX ADMIN — DIPHENHYDRAMINE HYDROCHLORIDE AND LIDOCAINE HYDROCHLORIDE AND ALUMINUM HYDROXIDE AND MAGNESIUM HYDRO 5 ML: KIT at 21:31

## 2024-02-17 RX ADMIN — PHENYLEPHRINE HYDROCHLORIDE 200 MCG: 10 INJECTION INTRAVENOUS at 11:04

## 2024-02-17 RX ADMIN — ROCURONIUM BROMIDE 40 MG: 10 INJECTION, SOLUTION INTRAVENOUS at 10:47

## 2024-02-17 RX ADMIN — PHENYLEPHRINE HYDROCHLORIDE 100 MCG: 10 INJECTION INTRAVENOUS at 10:55

## 2024-02-17 RX ADMIN — LEVOFLOXACIN 750 MG: 5 INJECTION, SOLUTION INTRAVENOUS at 21:30

## 2024-02-17 RX ADMIN — HYDROMORPHONE HYDROCHLORIDE 0.5 MG: 1 INJECTION, SOLUTION INTRAMUSCULAR; INTRAVENOUS; SUBCUTANEOUS at 11:33

## 2024-02-17 RX ADMIN — DIPHENHYDRAMINE HYDROCHLORIDE AND LIDOCAINE HYDROCHLORIDE AND ALUMINUM HYDROXIDE AND MAGNESIUM HYDRO 5 ML: KIT at 03:50

## 2024-02-17 RX ADMIN — SUCCINYLCHOLINE CHLORIDE 120 MG: 20 INJECTION, SOLUTION INTRAMUSCULAR; INTRAVENOUS; PARENTERAL at 10:45

## 2024-02-17 RX ADMIN — METOPROLOL TARTRATE 5 MG: 1 INJECTION, SOLUTION INTRAVENOUS at 18:37

## 2024-02-17 RX ADMIN — PHENYLEPHRINE HYDROCHLORIDE 200 MCG: 10 INJECTION INTRAVENOUS at 10:58

## 2024-02-17 RX ADMIN — METOCLOPRAMIDE 10 MG: 5 INJECTION, SOLUTION INTRAMUSCULAR; INTRAVENOUS at 00:26

## 2024-02-17 RX ADMIN — METOCLOPRAMIDE 10 MG: 5 INJECTION, SOLUTION INTRAMUSCULAR; INTRAVENOUS at 15:18

## 2024-02-17 RX ADMIN — ACETAMINOPHEN 1000 MG: 160 SOLUTION ORAL at 15:16

## 2024-02-17 RX ADMIN — METRONIDAZOLE 500 MG: 500 INJECTION, SOLUTION INTRAVENOUS at 04:16

## 2024-02-17 RX ADMIN — METRONIDAZOLE 500 MG: 500 INJECTION, SOLUTION INTRAVENOUS at 15:16

## 2024-02-17 RX ADMIN — SUGAMMADEX 200 MG: 100 INJECTION, SOLUTION INTRAVENOUS at 11:16

## 2024-02-17 RX ADMIN — OXYCODONE HYDROCHLORIDE 7.5 MG: 5 SOLUTION ORAL at 08:56

## 2024-02-17 RX ADMIN — METOCLOPRAMIDE 10 MG: 5 INJECTION, SOLUTION INTRAMUSCULAR; INTRAVENOUS at 08:57

## 2024-02-17 RX ADMIN — METOPROLOL TARTRATE 5 MG: 1 INJECTION, SOLUTION INTRAVENOUS at 06:05

## 2024-02-17 RX ADMIN — METRONIDAZOLE 500 MG: 500 INJECTION, SOLUTION INTRAVENOUS at 20:06

## 2024-02-17 RX ADMIN — SODIUM CHLORIDE, POTASSIUM CHLORIDE, SODIUM LACTATE AND CALCIUM CHLORIDE: 600; 310; 30; 20 INJECTION, SOLUTION INTRAVENOUS at 10:39

## 2024-02-17 RX ADMIN — OXYCODONE HYDROCHLORIDE 7.5 MG: 5 SOLUTION ORAL at 04:17

## 2024-02-17 RX ADMIN — METOPROLOL TARTRATE 5 MG: 1 INJECTION, SOLUTION INTRAVENOUS at 00:29

## 2024-02-17 RX ADMIN — HYDROMORPHONE HYDROCHLORIDE 0.5 MG: 1 INJECTION, SOLUTION INTRAMUSCULAR; INTRAVENOUS; SUBCUTANEOUS at 12:31

## 2024-02-17 RX ADMIN — FENTANYL CITRATE 50 MCG: 50 INJECTION, SOLUTION INTRAMUSCULAR; INTRAVENOUS at 12:00

## 2024-02-17 RX ADMIN — FLUCONAZOLE IN SODIUM CHLORIDE 400 MG: 2 INJECTION, SOLUTION INTRAVENOUS at 21:39

## 2024-02-17 RX ADMIN — ACETAMINOPHEN 1000 MG: 160 SOLUTION ORAL at 08:57

## 2024-02-17 RX ADMIN — DIPHENHYDRAMINE HYDROCHLORIDE AND LIDOCAINE HYDROCHLORIDE AND ALUMINUM HYDROXIDE AND MAGNESIUM HYDRO 5 ML: KIT at 08:58

## 2024-02-17 RX ADMIN — FLUOXETINE HYDROCHLORIDE 20 MG: 20 SOLUTION ORAL at 08:57

## 2024-02-17 RX ADMIN — FENTANYL CITRATE 25 MCG: 50 INJECTION, SOLUTION INTRAMUSCULAR; INTRAVENOUS at 11:14

## 2024-02-17 RX ADMIN — FENTANYL CITRATE 50 MCG: 50 INJECTION, SOLUTION INTRAMUSCULAR; INTRAVENOUS at 12:18

## 2024-02-17 RX ADMIN — PROPOFOL 100 MG: 10 INJECTION, EMULSION INTRAVENOUS at 10:45

## 2024-02-17 RX ADMIN — Medication 3 ML: at 08:59

## 2024-02-17 RX ADMIN — PANTOPRAZOLE SODIUM 40 MG: 40 INJECTION, POWDER, FOR SOLUTION INTRAVENOUS at 06:02

## 2024-02-17 RX ADMIN — PHENYLEPHRINE HYDROCHLORIDE 100 MCG: 10 INJECTION INTRAVENOUS at 10:52

## 2024-02-17 NOTE — CONSULTS
"Add on procalcitonin.  Referring Provider: Saurabh Hurtado MD  Reason for Consultation:     post-op esophagectomy with leukocytosis, tachycardia, abx management         Subjective   History of present illness: Patient is a 53-year-old male with history of esophageal carcinoma status post Suleman-Shayne esophagectomy on 2/9.  ID consulted for \"postop esophagectomy with leukocytosis, tachycardia, antibiotic management.\"    Patient underwent esophagectomy in the setting of esophageal cancer on 2/9 with uncomplicated course to date until developing leukocytosis of 15 today.  He has remained afebrile.  Started on fluconazole, levofloxacin, metronidazole and vancomycin.  MRSA nares is negative.  Repeat CT of the chest yesterday showed postoperative changes with inflammation without fluid collection.    Patient seen in preop this morning.  Denies any fevers or chills.  States he is tolerating antibiotics.  Denies any difficulty breathing.  States he continues to have dry mouth.  Denies any cough or shortness of breath.    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    Anxiety     Brain fog     COVID 02/2023    Diverticulosis of large intestine without perforation or abscess without bleeding 10/02/2023    Esophageal cancer 10/2023    GERD (gastroesophageal reflux disease)     Hand tingling     History of blood clot in brain 2020    had thrombectomy    History of cancer chemotherapy 12/2023    History of radiation therapy 11/2023    Hyperlipidemia     Stroke 05/30/2020    Tiredness        Past Surgical History:   Procedure Laterality Date    ADRENALECTOMY      COLONOSCOPY      ENDOSCOPY      ESOPHAGOGASTRECTOMY N/A 2/9/2024    Procedure: BRONCHOSCOPY, EGD, ESOPHAGECTOMY ALTAGRACIA-SHAYNE WITH DAVINCI ROBOT, LAPAROSCOPY, RIGHT THORACOSCOPY CONVERTED TO THORACOTOMY, " FEEDING JEJUNOSTOMY TUBE PLACEMENT, INTERCOSTAL NERVE BLOCKS;  Surgeon: Saurabh Hurtado MD;  Location:  [facility];  Service: Robotics - DaVinci;  Laterality: N/A;    ESOPHAGUS SURGERY  10/2023    biopsy    KIDNEY STONE SURGERY      OTHER SURGICAL HISTORY  2020    loop recorder    THROMBECTOMY  2020    UPPER ENDOSCOPIC ULTRASOUND W/ FNA N/A 10/27/2023    Procedure: ENDOSCOPIC ULTRASOUND WITH STAGING AND FINE NEEDLE ASPIRATION X2 AREAS;  Surgeon: Joselyn Savage MD;  Location: [facility] ENDOSCOPY;  Service: Gastroenterology;  Laterality: N/A;  POST:    VENOUS ACCESS DEVICE (PORT) INSERTION Right 11/13/2023    Procedure: INSERTION VENOUS ACCESS DEVICE;  Surgeon: Saurabh Hurtado MD;  Location: [facility] MAIN OR;  Service: Thoracic;  Laterality: Right;       family history includes Arthritis in his father and mother; Esophageal cancer (age of onset: 59) in his brother; Heart disease in his brother; Heart failure (age of onset: 73) in his mother; Hypertension in his father and mother; Kidney cancer (age of onset: 57) in his father.     reports that he quit smoking about 2 years ago. His smoking use included cigars and cigarettes. He started smoking about 39 years ago. He has a 37.00 pack-year smoking history. He has been exposed to tobacco smoke. He has never used smokeless tobacco. He reports that he does not currently use alcohol. He reports that he does not use drugs.     Allergies   Allergen Reactions    Cephalosporins Anaphylaxis     Throat swelling    Dye  [Contrast Dye (Echo Or Unknown Ct/Mr)] Hives    Iodinated Contrast Media Hives    Sulfa Antibiotics Hives    Sulfate Hives    Zetia [Ezetimibe] Other (See Comments) and Myalgia     Made tired    Statins Myalgia     Myalgia and fatique       Medication:  Antibiotics:  Anti-Infectives (From admission, onward)      Ordered     Dose/Rate Route Frequency Start Stop    02/16/24 4702  levoFLOXacin (LEVAQUIN) 750 mg/150 mL D5W (premix) (LEVAQUIN) 750 mg        Ordering Provider:  "Saurabh Hurtado MD    750 mg  100 mL/hr over 90 Minutes Intravenous Every 24 Hours 02/16/24 1930 02/23/24 2159 02/16/24 1838  metroNIDAZOLE (FLAGYL) IVPB 500 mg        Ordering Provider: Saurabh Hurtado MD    500 mg  200 mL/hr over 30 Minutes Intravenous Every 8 Hours 02/16/24 1930 02/23/24 2059 02/16/24 1815  fluconazole (DIFLUCAN) IVPB 400 mg        Ordering Provider: Saurabh Hurtado MD    400 mg  100 mL/hr over 120 Minutes Intravenous Every 24 Hours 02/16/24 1915 02/23/24 2159 02/09/24 0611  vancomycin (VANCOCIN) 1000 mg/200 mL dextrose 5% IVPB        Ordering Provider: Saurabh Hurtado MD    15 mg/kg × 72.1 kg Intravenous Once 02/09/24 0613 02/09/24 0703              Objective     Physical Exam:   Vital Signs   Temp:  [97.8 °F (36.6 °C)-99 °F (37.2 °C)] 98.2 °F (36.8 °C)  Heart Rate:  [] 102  Resp:  [16-17] 16  BP: (100-118)/(68-89) 105/71    GENERAL: Awake and alert, in no acute distress.   HEENT: Oropharynx is clear.  NG tube in place.  Normal hearing.  EYES: PERRL. No conjunctival injection. No lid lag.   HEART: Regular rate and regular rhythm.   LUNGS: Normal work of breathing on nasal cannula  GI: Soft, nondistended.   SKIN: Warm and dry without cutaneous eruptions in exposed areas  PSYCHIATRIC: Appropriate mood, affect, insight, and judgment.     Results Review:   I reviewed the patient's new clinical results.  I reviewed the patient's new imaging results and agree with the interpretation.  I reviewed the patient's other test results and agree with the interpretation    Lab Results   Component Value Date    WBC 15.49 (H) 02/17/2024    HGB 10.7 (L) 02/17/2024    HCT 31.2 (L) 02/17/2024    MCV 99.7 (H) 02/17/2024     02/17/2024       No results found for: \"VANCOPEAK\", \"VANCOTROUGH\", \"VANCORANDOM\"    Lab Results   Component Value Date    GLUCOSE 148 (H) 02/17/2024    BUN 29 (H) 02/17/2024    CREATININE 0.61 (L) 02/17/2024    EGFRIFNONA 67 02/24/2022    BCR 47.5 (H) 02/17/2024    CO2 25.0 02/17/2024 "    CALCIUM 8.9 02/17/2024    ALBUMIN 3.0 (L) 02/17/2024    LABIL2 1.3 09/25/2018    AST 18 02/01/2024    ALT 17 02/01/2024         Estimated Creatinine Clearance: 138 mL/min (A) (by C-G formula based on SCr of 0.61 mg/dL (L)).      Microbiology:  2/16 MRSA nares negative  2/16 blood cultures in process    Radiology:  2/17 chest x-ray reviewed by me with no acute infiltrates.    2/16 CT chest abdomen and pelvis report reviewed with postoperative inflammation of the mediastinum without postoperative fluid collection.  Possible indications of airway occlusions in the lower lobes concerning for aspiration.  Tiny right pneumothorax and trace bilateral pleural fluid.  Small amount of groundglass opacification at the bases bilaterally suspected related to inflammation.    Assessment     #Leukocytosis  #Esophageal carcinoma status post esophagectomy 2/9  #Abdominal wall cellulitis at G-tube site    Blood cultures pending.  CT without any postoperative changes but no appreciable abscess.  Add on procalcitonin.  MRSA nares negative.  Recommend stopping vancomycin.  Continue levofloxacin 750 mg daily plus Flagyl 500 mg 3 times daily.  Continue fluconazole 400 mg daily.       Addendum:  Case discussed with Dr. Hurtado after EGD today and there was small amount of breakdown at the anastomotic junction however no acute signs of abscess formation.  Did report a small amount of pus drained from erythematous area around his J-tube site.  At this point suspicion is higher for infection at the site of his J-tube.  Will plan to stop fluconazole and continue levofloxacin and Flagyl.

## 2024-02-17 NOTE — OP NOTE
Operative Note     Date of procedure: 2/17/2024     Patient name: Jourdan Lebron  MRN: [id number]    Pre OP diagnosis:  Sepsis.   Abdominal wall cellulitis.  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  Dysphagia.  History of cerebrovascular accident.  History of tobacco abuse.  History of left adrenalectomy.  History of adrenal crisis.  Cervical radiculopathy.  Cervical stenosis of spinal canal.  Near syncope.  Generalized hyperhidrosis.  Renal calculus.  Abnormal electrocardiogram.  Vitamin B12 deficiency.  Seasonal allergic rhinitis.  Erythrocytosis.  Macrocytosis.  Generalized anxiety disorder.  Major depressive disorder.    Post OP diagnosis:  Esophagogastric anastomotic leak s/p esophageal stenting.  Sepsis.   Abdominal wall cellulitis.  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  Dysphagia.  History of cerebrovascular accident.  History of tobacco abuse.  History of left adrenalectomy.  History of adrenal crisis.  Cervical radiculopathy.  Cervical stenosis of spinal canal.  Near syncope.  Generalized hyperhidrosis.  Renal calculus.  Abnormal electrocardiogram.  Vitamin B12 deficiency.  Seasonal allergic rhinitis.  Erythrocytosis.  Macrocytosis.  Generalized anxiety disorder.  Major depressive disorder.    Procedure performed:   Flexible esophagogastroduodenoscopy.  23 mm X 25 mm X 103 mm fully covered Wallflex esophageal stent placed at the esophagogastric anastomosis.  Intraoperative esophagram.  Interpretation of fluoroscopy images.    Indications:   Jourdan Lebron is a 53-year-old male who has significant medical problems as mentioned in the medical chart.      He was having intermittent dysphagia for over a year which became progressively worse.  On 10/2/2023, he underwent EGD and colonoscopy by Dr. Ozzy Abdalla at [facility].  He was  found to have a 6 cm mass at the lower third of the esophagus (36 to 42 cm) (biopsied), mild diverticulosis of the sigmoid colon, 5 polyps noted in the colon and cecum that were removed.  The pathology of the esophageal mass revealed invasive moderate to poorly differentiated adenocarcinoma. All polypectomy samples were negative for malignancy (fragments of tubular adenoma).  CT scan of the chest, abdomen and pelvis on 10/9/2023 at [facility] showed 4 cm abnormal thickening of the lower thoracic esophagus extending to the GE junction thought to represent patient's known primary lung malignancy.  There were no convincing evidence of metastatic disease elsewhere in the chest, abdomen, pelvis, or skeleton.  There were stable left adrenalectomy changes, right adrenal adenoma unchanged.     He was seen in the office by Dr. Tacos Osuna ([facility]) for new diagnosis of esophageal cancer. He was an established patient of Dr. Harinder Mueller for erythrocytosis and macrocytosis since 11/2021 (resolved).      PET/CT on 10/20/2023 at [facility] showed hypermetabolic (SUV 7.7) activity within the distal esophagus extending to the GE junction compatible with patient's known malignancy.  He then underwent EGD with EUS by Dr. Joselyn Savage on 10/27/2023. Findings included close to 6 cm ulcerated mass extending from 36 to 42 cm consistent with poorly differentiated adenocarcinoma.  Mass penetrated through muscularis propria without involving the adventitia consistent with T2 lesion. Two small round hypodense lymph nodes in close proximity to the mass measuring 5 and 6 mm concerning for malignancy. Other lymph nodes around the GE junction measuring 7 and 8 mm were also concerning for malignancy but immediate cytology was negative for malignancy. Pathology of the gastrohepatic lymph node was positive for metastatic adenocarcinoma; however, the lymph node at 36 cm was negative for malignancy.     He was referred to  thoracic surgery for further evaluation.  At the time of initial consultation, he could eat and swallow quite a bit, but it depends. He avoids a lot of bread because it gets stuck lower down his chest. He went down from 200 pounds to 170 pounds 3 years ago after he had a stroke. He had memory loss and general fatigue after the stroke. He lost his appetite but began to add some weight. He then noticed he gets bloated and was having dyspepsia. He had adrenalectomy performed at the [facility] after an adrenal biopsy was done on an adrenal mass, and he went into adrenal crisis. He took hydrocortisone for 1 year and discontinued it on 09/2022. He developed abdominal discomfort afterwards. He denies having any past chest surgeries or myocardial infarction.  His wife mentioned the cause of the previous stroke was never known despite having a complete work up and a loop recorder inserted which was removed in the spring. He denies any abnormal heart rhythms or pedal edema. The patient quit smoking cigarettes 8 to 9 years ago and started smoking cigars.      He had good functional status.  Based on the clinical presentation, he was considered a candidate for trimodality treatment. I placed Mediport on 11/13/2023.  He received concurrent chemoradiation therapy.  His last dose of chemotherapy and radiation was on 12/28/2023. He tolerated chemoradiation without much difficulty.  Restaging PET/CT scan on 2/2/2024 showed wall thickening involving the distal esophagus extending to the GE junction consistent with patient's known esophageal malignancy.  There was no evidence of distant metastases.  There was significant decreased uptake in the distal esophagus consistent with treatment effect.     Due to his history of stroke, ultrasound bilateral carotid were obtained which showed no significant carotid stenosis.  There was incidentally noted right thyroid nodule for which follow-up ultrasound of the thyroid was done  which showed multiple nodules but not concerning for malignancy.  Transthoracic echo was performed on 1/22/2024 and noted ejection fraction of 51 to 55%.  He was sent to Dr. Hayward for cardiology clearance.  He had 0.8% risk of myocardial infarction or cardiac arrest, intraoperatively or up to 30 days postoperatively.  No further workup was recommended.  He had a history of adrenal crisis and was sent to evaluation by his primary endocrinologist at [facility], Anabela Crenshaw MD.  Complete ACTH stimulation test was performed which was reported normal.    On 2/9/2024, he underwent surgical resection. Flexible esophagogastroduodenoscopy was notable for mucosal changes at the distal esophagus and Olivera's esophagus.  These findings were consistent with treatment effect. Rather than a gastric emptying procedure, I performed a balloon dilatation of the pylorus to 20 mm with a CRE balloon I performed a robot-assisted minimally-invasive Vinicio Shayne esophagectomy via a robot-assisted laparoscopic and robot assisted right thoracoscopic approach converted to thoracotomy. I constructed a 4 cm gastric tube and performed an esophagogastrostomy approximately 2 cm above the azygos vein using a 28 EEA stapler.  After firing the stapler, 1 complete esophageal anastomotic ring and a partial gastric anastomotic ring was noted. The anastomosis was checked under water with air insufflation and leak was identified which was oversewn. The anastomosis was partially imbricated and no leak was identified under water with air insufflation afterwards. A feeding jejunostomy tube was placed 40 cm distal to ligament of Treitz. Repeat endoscopy was notable for a widely patent anastomosis at 22-24 cm.  The conduit was healthy appearing.  It was mildly tortuous and non redundant.  He remained hemodynamically stable throughout the case.  He received 4000 cc of crystalloid and made 1000 cc of urine.  He required small doses of  phenylephrine throughout the case.  He was extubated at the end of procedure.      The pathology reported scattered foci of residual moderate to poorly differentiated adenocarcinoma occurring at the GE junction with changes consistent with prior therapy.  There were scattered individual tumor cells and small clusters near GE junction and extending through muscularis propria into adventitia spanning an area of 2.1 x 1.8 x 0.7 cm.  There is no lymphovascular space invasion.  The proximal and distal surgical margins were negative for malignancy.  20 lymph nodes were negative for metastatic carcinoma. There was also presence of low and high-grade squamous dysplasia.  He had near complete response to chemo and radiation therapy.    Postprocedure he was transferred to the ICU.  His initial postop care was unremarkable.  He was started on tube feeds and was slowly advanced to goal rate which he tolerated well.  He demonstrated return of bowel function.  The chest tube was removed after amylase from the drain was checked which was not concerning for esophageal leak.  His white blood count were slowly trending up and had mild leukocytosis.  This was concerning for unidentified infection.  He had mild cellulitis around the jejunostomy tube site.  He was started on empiric antibiotics.  Infectious disease was consulted.  CT scan of the chest abdomen pelvis without contrast did not reveal any clear source of infection.  I offered EGD to evaluate for anastomotic leak and conduit.  The risk and benefit of the procedure were discussed in detail.  He agreed and signed the consent form.    Surgeon: Saurabh Hurtado MD     Anesthesia: General Anesthesia    ASA Class: 3    Procedure Details   On 2/17/2024, the patient was brought to the endoscopy suite. Jourdan Lebron was positioned in the supine position.  General anesthesia was provided by anesthesiologist.  He was intubated with a single-lumen endotracheal tube.  There was no evidence  of aspiration at the time of intubation.  A bite-block was placed.  He was already on antibiotics and additional prophylactic intravenous antibiotics were not indicated.  Prior to beginning the procedure, a time-out was conducted during which all members of the surgical team were present.      The nasogastric tube was removed.  I began by performing a flexible esophagogastroduodenoscopy.  He had evidence of oropharyngeal thrush.  The cricopharyngeus was located at 16 cm.  The cervical esophagus appeared normal.  The esophagogastric anastomosis was located at 24 cm.  There was fibrinous plaque covering the anastomotic ring.  There was approximately 25% defect in the anastomosis anteriorly but was likely contained by reinforcing stitch placed in the operating room.  The anastomosis was patent and the adult endoscope was advanced without difficulty.  I injected 30 cc of Isovue water-soluble contrast at the anastomosis.  The contrast esophagram showed a blind end pouch at the site of anastomotic defect on the two-dimensional fluoroscopy image. I continued with examination of the conduit.  The gastric conduit appeared viable distal to the anastomosis.  The diaphragmatic pinch was located at 40 cm from the incisor.  The antrum was below the diaphragm.  The pylorus was located at 50 cm.  It was patent and the adult endoscope was advanced without difficulty.  The first and second part of the duodenum appeared normal.     A 0.035-mm dream guidewire was advanced down the endoscope and into the duodenum under direct vision.  The endoscope was removed.  Wire placement was confirmed by fluoroscopy.  I then advanced a 23 mm X 25 mm X 103 mm fully covered Wallflex stent over the guidewire and down the anastomosis under real-time fluoroscopy.  The stent was deployed under real-time fluoroscopy according to the 's instruction such that the proximal flare was above the anastomosis.  The stent was placed between 18 to 28  cm from the incisor.  Repeat endoscopy confirmed that the stent was widely patent. The endoscope was removed.    The patient was awakened from sedation and was transported to the post-anesthesia care unit in stable condition.    Findings:  Oropharyngeal candidiasis.  Esophagogastric anastomosis located at 24 cm.  Approximately 20-25 % defect at the anastomotic ring, likely contained by the reinforcing stitch placed intraoperatively.  Fibrinous plaque covering the anastomosis.  Intraoperative esophagram revealed blind pouch anteriorly at the site of anastomotic defect.  The gastric conduit appeared viable.  The conduit orientation was straight and had no redundancy.  The diaphragmatic pinch was at 40 cm.  The infradiaphragmatic antrum was normal.  The pylorus located at 50 cm.  It was patent and allowed adult endoscope to pass without difficulty.  18 mm X 25 mm X 103 mm fully covered Wallflex stent deployed at the anastomosis.  The proximal flange was at the 18 cm luis fernando.  NG tube was replaced and secured at 45 cm luis fernando.  Small amount of purulent fluid expressed from around the J tube with gentle pressure.    Estimated Blood Loss:  None           Specimens: None           Complications: None           Disposition: PACU - hemodynamically stable.           Condition: Stable    Post-procedure recommendations:   NG tube to low intermittent wall suction.  We will plan to remove NG tube once the drainage is less.  We will perform esophagram after the NG tube was removed.     Saurabh Hurtado MD   Thoracic Surgeon  [facility]

## 2024-02-17 NOTE — PLAN OF CARE
Goal Outcome Evaluation:  Plan of Care Reviewed With: patient        Progress: no change  Outcome Evaluation: vitals stable.  pt expressed pain.  meds given per orders.  positive sepsis screen.  Dr Hurtado notified.  multiple labs and chest x-ray completed per orders.  antibiotics and bolus started per orders.  NGT, J-tube, and REYNALDO drain present.  will continue to monitor.

## 2024-02-17 NOTE — PROGRESS NOTES
"[facility] Clinical Pharmacy Services: Vancomycin Pharmacokinetic Initial Consult Note    Jourdan Lebron is a 53 y.o. male who is on day 1 of pharmacy to dose vancomycin.    Indication: Concern for mediastinitis  Consulting Provider: Sury CESPEDES  Planned Duration of Therapy: Consulted for 7 days  Loading Dose Ordered or Given: Not indicated  MRSA PCR performed: Yes; Result: Pending  Culture/Source: Bcx pending collection 2/2  Target: -600 mg/L.hr   Pertinent Vanc Dosing History: None  Other Antimicrobials: Levofloxacin, metronidazole    Vitals/Labs  Ht: 177.8 cm (70\"); Wt: 66.2 kg (145 lb 15.1 oz)  Temp Readings from Last 1 Encounters:   02/16/24 98.8 °F (37.1 °C) (Oral)    Estimated Creatinine Clearance: 113.7 mL/min (A) (by C-G formula based on SCr of 0.72 mg/dL (L)).        Results from last 7 days   Lab Units 02/16/24  1752 02/16/24  0359 02/15/24  0423 02/14/24  0346   CREATININE mg/dL  --  0.72* 0.64* 0.65*   WBC 10*3/mm3 13.12* 10.58 8.89 7.72     Assessment/Plan:    Vancomycin Dose:   1000 mg IV every  12  hours  Predictive AUC level for the dose ordered is 459 mg/L.hr, which is within the target of 400-600 mg/L.hr  Vanc Trough has been ordered for 2/18 at 0600 with AM labs     Pharmacy will follow patient's kidney function and will adjust doses and obtain levels as necessary. Thank you for involving pharmacy in this patient's care. Please contact pharmacy with any questions or concerns.                           Arsalan Lackey, [facility]  Clinical Pharmacist    "

## 2024-02-17 NOTE — PLAN OF CARE
Goal Outcome Evaluation:  Plan of Care Reviewed With: patient        Progress: no change  Outcome Evaluation: Monitor pain,labs,and vitals. VSS. O2 2L NC. IV ABX. Cyclic tube feeds at goal-patient tolerating tube feeds well. EGD with stent placement today and Jtube site drained. NG Replaced in surgery-Lnare at 50-int. low wall suction. Pain controlled with PRN medications per orders. Oral care. Will continue to monitor.

## 2024-02-18 ENCOUNTER — APPOINTMENT (OUTPATIENT)
Dept: GENERAL RADIOLOGY | Facility: HOSPITAL | Age: 54
DRG: 375 | End: 2024-02-18
Payer: COMMERCIAL

## 2024-02-18 LAB
ANION GAP SERPL CALCULATED.3IONS-SCNC: 9 MMOL/L (ref 5–15)
BUN SERPL-MCNC: 27 MG/DL (ref 6–20)
BUN/CREAT SERPL: 55.1 (ref 7–25)
CALCIUM SPEC-SCNC: 8.7 MG/DL (ref 8.6–10.5)
CHLORIDE SERPL-SCNC: 107 MMOL/L (ref 98–107)
CO2 SERPL-SCNC: 27 MMOL/L (ref 22–29)
CREAT SERPL-MCNC: 0.49 MG/DL (ref 0.76–1.27)
DEPRECATED RDW RBC AUTO: 55.6 FL (ref 37–54)
EGFRCR SERPLBLD CKD-EPI 2021: 124.2 ML/MIN/1.73
ERYTHROCYTE [DISTWIDTH] IN BLOOD BY AUTOMATED COUNT: 15.3 % (ref 12.3–15.4)
GLUCOSE BLDC GLUCOMTR-MCNC: 109 MG/DL (ref 70–130)
GLUCOSE BLDC GLUCOMTR-MCNC: 127 MG/DL (ref 70–130)
GLUCOSE BLDC GLUCOMTR-MCNC: 146 MG/DL (ref 70–130)
GLUCOSE SERPL-MCNC: 118 MG/DL (ref 65–99)
HCT VFR BLD AUTO: 29.6 % (ref 37.5–51)
HGB BLD-MCNC: 10 G/DL (ref 13–17.7)
MAGNESIUM SERPL-MCNC: 2.3 MG/DL (ref 1.6–2.6)
MCH RBC QN AUTO: 33.2 PG (ref 26.6–33)
MCHC RBC AUTO-ENTMCNC: 33.8 G/DL (ref 31.5–35.7)
MCV RBC AUTO: 98.3 FL (ref 79–97)
PHOSPHATE SERPL-MCNC: 3.1 MG/DL (ref 2.5–4.5)
PLATELET # BLD AUTO: 321 10*3/MM3 (ref 140–450)
PMV BLD AUTO: 9 FL (ref 6–12)
POTASSIUM SERPL-SCNC: 4 MMOL/L (ref 3.5–5.2)
RBC # BLD AUTO: 3.01 10*6/MM3 (ref 4.14–5.8)
SODIUM SERPL-SCNC: 143 MMOL/L (ref 136–145)
WBC NRBC COR # BLD AUTO: 18.83 10*3/MM3 (ref 3.4–10.8)

## 2024-02-18 PROCEDURE — 25010000002 METOCLOPRAMIDE PER 10 MG: Performed by: SURGERY

## 2024-02-18 PROCEDURE — 83735 ASSAY OF MAGNESIUM: CPT | Performed by: SURGERY

## 2024-02-18 PROCEDURE — 84100 ASSAY OF PHOSPHORUS: CPT | Performed by: SURGERY

## 2024-02-18 PROCEDURE — 80048 BASIC METABOLIC PNL TOTAL CA: CPT | Performed by: SURGERY

## 2024-02-18 PROCEDURE — 71045 X-RAY EXAM CHEST 1 VIEW: CPT

## 2024-02-18 PROCEDURE — 85027 COMPLETE CBC AUTOMATED: CPT | Performed by: SURGERY

## 2024-02-18 PROCEDURE — 25010000002 HYDROMORPHONE PER 4 MG: Performed by: SURGERY

## 2024-02-18 PROCEDURE — 82948 REAGENT STRIP/BLOOD GLUCOSE: CPT

## 2024-02-18 PROCEDURE — 25010000002 METRONIDAZOLE 500 MG/100ML SOLUTION: Performed by: SURGERY

## 2024-02-18 PROCEDURE — 25010000002 FLUCONAZOLE PER 200 MG: Performed by: SURGERY

## 2024-02-18 PROCEDURE — 25010000002 LEVOFLOXACIN PER 250 MG: Performed by: SURGERY

## 2024-02-18 PROCEDURE — 99024 POSTOP FOLLOW-UP VISIT: CPT | Performed by: NURSE PRACTITIONER

## 2024-02-18 PROCEDURE — 25010000002 ENOXAPARIN PER 10 MG: Performed by: SURGERY

## 2024-02-18 RX ORDER — ALPRAZOLAM 0.25 MG/1
0.25 TABLET ORAL 3 TIMES DAILY PRN
Status: DISPENSED | OUTPATIENT
Start: 2024-02-18 | End: 2024-02-23

## 2024-02-18 RX ADMIN — ACETAMINOPHEN 1000 MG: 160 SOLUTION ORAL at 15:51

## 2024-02-18 RX ADMIN — Medication 3 ML: at 08:30

## 2024-02-18 RX ADMIN — LIDOCAINE 1 PATCH: 4 PATCH TOPICAL at 08:28

## 2024-02-18 RX ADMIN — ACETAMINOPHEN 1000 MG: 160 SOLUTION ORAL at 08:29

## 2024-02-18 RX ADMIN — METOPROLOL TARTRATE 5 MG: 1 INJECTION, SOLUTION INTRAVENOUS at 06:59

## 2024-02-18 RX ADMIN — OXYCODONE HYDROCHLORIDE 7.5 MG: 5 SOLUTION ORAL at 20:31

## 2024-02-18 RX ADMIN — CETIRIZINE HYDROCHLORIDE 5 MG: 5 SOLUTION ORAL at 08:28

## 2024-02-18 RX ADMIN — LEVOFLOXACIN 750 MG: 5 INJECTION, SOLUTION INTRAVENOUS at 21:33

## 2024-02-18 RX ADMIN — HYDROMORPHONE HYDROCHLORIDE 0.5 MG: 1 INJECTION, SOLUTION INTRAMUSCULAR; INTRAVENOUS; SUBCUTANEOUS at 18:37

## 2024-02-18 RX ADMIN — HYDROMORPHONE HYDROCHLORIDE 0.5 MG: 1 INJECTION, SOLUTION INTRAMUSCULAR; INTRAVENOUS; SUBCUTANEOUS at 15:50

## 2024-02-18 RX ADMIN — ENOXAPARIN SODIUM 40 MG: 100 INJECTION SUBCUTANEOUS at 08:27

## 2024-02-18 RX ADMIN — METOPROLOL TARTRATE 5 MG: 1 INJECTION, SOLUTION INTRAVENOUS at 18:32

## 2024-02-18 RX ADMIN — METRONIDAZOLE 500 MG: 500 INJECTION, SOLUTION INTRAVENOUS at 13:07

## 2024-02-18 RX ADMIN — OXYCODONE HYDROCHLORIDE 7.5 MG: 5 SOLUTION ORAL at 00:13

## 2024-02-18 RX ADMIN — ACETAMINOPHEN 1000 MG: 160 SOLUTION ORAL at 20:22

## 2024-02-18 RX ADMIN — HYDROMORPHONE HYDROCHLORIDE 0.5 MG: 1 INJECTION, SOLUTION INTRAMUSCULAR; INTRAVENOUS; SUBCUTANEOUS at 08:28

## 2024-02-18 RX ADMIN — DIPHENHYDRAMINE HYDROCHLORIDE AND LIDOCAINE HYDROCHLORIDE AND ALUMINUM HYDROXIDE AND MAGNESIUM HYDRO 5 ML: KIT at 06:56

## 2024-02-18 RX ADMIN — ALPRAZOLAM 0.25 MG: 0.25 TABLET ORAL at 10:38

## 2024-02-18 RX ADMIN — OXYCODONE HYDROCHLORIDE 7.5 MG: 5 SOLUTION ORAL at 04:26

## 2024-02-18 RX ADMIN — PANTOPRAZOLE SODIUM 40 MG: 40 INJECTION, POWDER, FOR SOLUTION INTRAVENOUS at 06:57

## 2024-02-18 RX ADMIN — Medication 3 ML: at 20:22

## 2024-02-18 RX ADMIN — FLUCONAZOLE IN SODIUM CHLORIDE 400 MG: 2 INJECTION, SOLUTION INTRAVENOUS at 21:33

## 2024-02-18 RX ADMIN — FLUOXETINE HYDROCHLORIDE 20 MG: 20 SOLUTION ORAL at 08:28

## 2024-02-18 RX ADMIN — METOCLOPRAMIDE 10 MG: 5 INJECTION, SOLUTION INTRAMUSCULAR; INTRAVENOUS at 08:29

## 2024-02-18 RX ADMIN — METRONIDAZOLE 500 MG: 500 INJECTION, SOLUTION INTRAVENOUS at 20:22

## 2024-02-18 RX ADMIN — METOCLOPRAMIDE 10 MG: 5 INJECTION, SOLUTION INTRAMUSCULAR; INTRAVENOUS at 15:50

## 2024-02-18 RX ADMIN — HYDROMORPHONE HYDROCHLORIDE 0.5 MG: 1 INJECTION, SOLUTION INTRAMUSCULAR; INTRAVENOUS; SUBCUTANEOUS at 10:38

## 2024-02-18 RX ADMIN — METOPROLOL TARTRATE 5 MG: 1 INJECTION, SOLUTION INTRAVENOUS at 00:20

## 2024-02-18 RX ADMIN — METRONIDAZOLE 500 MG: 500 INJECTION, SOLUTION INTRAVENOUS at 04:21

## 2024-02-18 RX ADMIN — DIPHENHYDRAMINE HYDROCHLORIDE AND LIDOCAINE HYDROCHLORIDE AND ALUMINUM HYDROXIDE AND MAGNESIUM HYDRO 5 ML: KIT at 18:32

## 2024-02-18 RX ADMIN — GABAPENTIN 300 MG: 300 CAPSULE ORAL at 08:29

## 2024-02-18 RX ADMIN — METOCLOPRAMIDE 10 MG: 5 INJECTION, SOLUTION INTRAMUSCULAR; INTRAVENOUS at 00:16

## 2024-02-18 RX ADMIN — HYDROMORPHONE HYDROCHLORIDE 0.5 MG: 1 INJECTION, SOLUTION INTRAMUSCULAR; INTRAVENOUS; SUBCUTANEOUS at 23:56

## 2024-02-18 NOTE — PLAN OF CARE
Goal Outcome Evaluation:  Plan of Care Reviewed With: patient        Progress: no change  Outcome Evaluation: vitals stable.  pt expressed pain.  meds given per orders.  NGT, REYNALDO drain, and J-tube present.  port is not accessed.  will continue to monitor.

## 2024-02-18 NOTE — PROGRESS NOTES
"  POST-OPERATIVE NOTE     Chief Complaint: esophageal carcinoma, postoperative care  S/P: Vinicio Shayne esophagectomy  POD # 8  S/P: Esophagogastroduodenoscopy with stent placement  POD # 1    Subjective:  Symptoms:  Stable.  He reports weakness and anxiety.  No shortness of breath or chest pressure.    Diet:  NPO.  No nausea or vomiting.    Activity level: Impaired due to weakness.    Pain:  He complains of pain that is mild.  He reports pain is improving.  Pain is well controlled.    More anxious today.    Objective:  General Appearance:  Comfortable, well-appearing, in no acute distress and not in pain.    Vital signs: (most recent): Blood pressure 109/74, pulse 94, temperature 98 °F (36.7 °C), temperature source Oral, resp. rate 12, height 177.8 cm (70\"), weight 62.5 kg (137 lb 12.6 oz), SpO2 96%.  Vital signs are normal.  No fever.    Output: Producing urine and no stool output.    HEENT: (NG tube sutured)    Lungs:  Normal effort and normal respiratory rate.  He is not in respiratory distress.  There are decreased breath sounds.  No rales, wheezes or rhonchi.    Heart: Normal rate.  Regular rhythm.    Chest: Symmetric chest wall expansion. Chest wall tenderness present.    Abdomen: Abdomen is soft and non-distended.  (J-tube intact).  Hypoactive bowel sounds.   There is incisional tenderness.    Extremities: Normal range of motion.    Neurological: Patient is alert and oriented to person, place and time.    Skin:  Warm and dry.              REYNALDO Drain: 40ml  NG tube: 450ml    Results Review:     I reviewed the patient's new clinical results.  I reviewed the patient's new imaging results and agree with the interpretation.  Discussed with patient, RN, family at bedside and  Dr. Hurtado.    Assessment & Plan     Mr. Lebron is POD 9 s/p [place]-Shayne esophagectomy.    He underwent EGD with stent placement yesterday due to some concern for possible defect at the anastomosis.  Appreciate assistance from infectious disease " service regarding antibiotic management.  Continue with Flagyl, levofloxacin and diflucan.  Patient is improved today.  He has decreased drainage from his NG tube and REYNALDO drain.  Continue NG tube to intermittent low wall suction.  REYNALDO to drain to gravity (do not compress bulb and allow to drain passively).  Is having bowel movements and passing gas.  Continue cyclic tube feeds.  Going to restart his home dose of Xanax via jejunostomy to help with his anxiety.  Continue aspiration precautions and ensure head of bed remains greater than 30 degrees at all times given extreme risk for aspiration.  This was discussed the patient and his primary RN.  Trend a.m. labs and obtain a.m. chest x-ray, replace electrolytes per protocol if needed.  Final surgical pathology ypT3 N0 poorly differentiated adenocarcinoma with near complete response.    RUBINA Rome  [facility]  02/18/24  10:31 EST    Patient was seen and assessed while wearing personal protective equipment including facemask, protective eyewear and gloves.  Hand hygiene performed prior to entering the room and upon exiting with doffing of gloves.

## 2024-02-18 NOTE — PLAN OF CARE
Goal Outcome Evaluation:  Plan of Care Reviewed With: patient        Progress: no change  Outcome Evaluation: Monitor pain,labs,and vitals. VSS. O2 2L NC. IV ABX. NGT,REYNALDO drain, and j tube. Pain controlled with PRN medications per orders. Patient ambulated the unit multiple times on current shift. Will continue to monitor.

## 2024-02-19 ENCOUNTER — APPOINTMENT (OUTPATIENT)
Dept: GENERAL RADIOLOGY | Facility: HOSPITAL | Age: 54
DRG: 375 | End: 2024-02-19
Payer: COMMERCIAL

## 2024-02-19 LAB
ANION GAP SERPL CALCULATED.3IONS-SCNC: 9.3 MMOL/L (ref 5–15)
BUN SERPL-MCNC: 28 MG/DL (ref 6–20)
BUN/CREAT SERPL: 54.9 (ref 7–25)
CALCIUM SPEC-SCNC: 9.2 MG/DL (ref 8.6–10.5)
CHLORIDE SERPL-SCNC: 108 MMOL/L (ref 98–107)
CO2 SERPL-SCNC: 27.7 MMOL/L (ref 22–29)
CREAT SERPL-MCNC: 0.51 MG/DL (ref 0.76–1.27)
DEPRECATED RDW RBC AUTO: 53.9 FL (ref 37–54)
EGFRCR SERPLBLD CKD-EPI 2021: 122.8 ML/MIN/1.73
ERYTHROCYTE [DISTWIDTH] IN BLOOD BY AUTOMATED COUNT: 15.2 % (ref 12.3–15.4)
GLUCOSE BLDC GLUCOMTR-MCNC: 108 MG/DL (ref 70–130)
GLUCOSE BLDC GLUCOMTR-MCNC: 122 MG/DL (ref 70–130)
GLUCOSE BLDC GLUCOMTR-MCNC: 163 MG/DL (ref 70–130)
GLUCOSE BLDC GLUCOMTR-MCNC: 83 MG/DL (ref 70–130)
GLUCOSE BLDC GLUCOMTR-MCNC: 97 MG/DL (ref 70–130)
GLUCOSE SERPL-MCNC: 113 MG/DL (ref 65–99)
HCT VFR BLD AUTO: 29.1 % (ref 37.5–51)
HGB BLD-MCNC: 9.7 G/DL (ref 13–17.7)
MAGNESIUM SERPL-MCNC: 2.4 MG/DL (ref 1.6–2.6)
MCH RBC QN AUTO: 32.4 PG (ref 26.6–33)
MCHC RBC AUTO-ENTMCNC: 33.3 G/DL (ref 31.5–35.7)
MCV RBC AUTO: 97.3 FL (ref 79–97)
PLATELET # BLD AUTO: 366 10*3/MM3 (ref 140–450)
PMV BLD AUTO: 8.7 FL (ref 6–12)
POTASSIUM SERPL-SCNC: 4 MMOL/L (ref 3.5–5.2)
RBC # BLD AUTO: 2.99 10*6/MM3 (ref 4.14–5.8)
SODIUM SERPL-SCNC: 145 MMOL/L (ref 136–145)
WBC NRBC COR # BLD AUTO: 13.78 10*3/MM3 (ref 3.4–10.8)

## 2024-02-19 PROCEDURE — 85027 COMPLETE CBC AUTOMATED: CPT | Performed by: SURGERY

## 2024-02-19 PROCEDURE — 83735 ASSAY OF MAGNESIUM: CPT | Performed by: SURGERY

## 2024-02-19 PROCEDURE — 99232 SBSQ HOSP IP/OBS MODERATE 35: CPT | Performed by: STUDENT IN AN ORGANIZED HEALTH CARE EDUCATION/TRAINING PROGRAM

## 2024-02-19 PROCEDURE — 25010000002 LEVOFLOXACIN PER 250 MG: Performed by: SURGERY

## 2024-02-19 PROCEDURE — 71045 X-RAY EXAM CHEST 1 VIEW: CPT

## 2024-02-19 PROCEDURE — 25010000002 METRONIDAZOLE 500 MG/100ML SOLUTION: Performed by: SURGERY

## 2024-02-19 PROCEDURE — 25010000002 FLUCONAZOLE PER 200 MG: Performed by: SURGERY

## 2024-02-19 PROCEDURE — 25010000002 ENOXAPARIN PER 10 MG: Performed by: SURGERY

## 2024-02-19 PROCEDURE — 25010000002 METOCLOPRAMIDE PER 10 MG: Performed by: SURGERY

## 2024-02-19 PROCEDURE — 82948 REAGENT STRIP/BLOOD GLUCOSE: CPT

## 2024-02-19 PROCEDURE — 25010000002 HYDROMORPHONE PER 4 MG: Performed by: SURGERY

## 2024-02-19 PROCEDURE — 99024 POSTOP FOLLOW-UP VISIT: CPT | Performed by: NURSE PRACTITIONER

## 2024-02-19 PROCEDURE — 80048 BASIC METABOLIC PNL TOTAL CA: CPT | Performed by: SURGERY

## 2024-02-19 RX ADMIN — OXYCODONE HYDROCHLORIDE 7.5 MG: 5 SOLUTION ORAL at 01:10

## 2024-02-19 RX ADMIN — METOCLOPRAMIDE 10 MG: 5 INJECTION, SOLUTION INTRAMUSCULAR; INTRAVENOUS at 01:10

## 2024-02-19 RX ADMIN — ALPRAZOLAM 0.25 MG: 0.25 TABLET ORAL at 03:37

## 2024-02-19 RX ADMIN — DIPHENHYDRAMINE HYDROCHLORIDE AND LIDOCAINE HYDROCHLORIDE AND ALUMINUM HYDROXIDE AND MAGNESIUM HYDRO 5 ML: KIT at 05:24

## 2024-02-19 RX ADMIN — Medication 3 ML: at 08:22

## 2024-02-19 RX ADMIN — METRONIDAZOLE 500 MG: 500 INJECTION, SOLUTION INTRAVENOUS at 05:25

## 2024-02-19 RX ADMIN — OXYCODONE HYDROCHLORIDE 7.5 MG: 5 SOLUTION ORAL at 18:00

## 2024-02-19 RX ADMIN — OXYCODONE HYDROCHLORIDE 7.5 MG: 5 SOLUTION ORAL at 05:24

## 2024-02-19 RX ADMIN — CETIRIZINE HYDROCHLORIDE 5 MG: 5 SOLUTION ORAL at 08:22

## 2024-02-19 RX ADMIN — Medication 3 ML: at 21:26

## 2024-02-19 RX ADMIN — FLUOXETINE HYDROCHLORIDE 20 MG: 20 SOLUTION ORAL at 08:21

## 2024-02-19 RX ADMIN — FLUCONAZOLE IN SODIUM CHLORIDE 400 MG: 2 INJECTION, SOLUTION INTRAVENOUS at 22:42

## 2024-02-19 RX ADMIN — DIPHENHYDRAMINE HYDROCHLORIDE AND LIDOCAINE HYDROCHLORIDE AND ALUMINUM HYDROXIDE AND MAGNESIUM HYDRO 5 ML: KIT at 17:56

## 2024-02-19 RX ADMIN — ACETAMINOPHEN 1000 MG: 160 SOLUTION ORAL at 14:11

## 2024-02-19 RX ADMIN — ALPRAZOLAM 0.25 MG: 0.25 TABLET ORAL at 22:46

## 2024-02-19 RX ADMIN — ACETAMINOPHEN 1000 MG: 160 SOLUTION ORAL at 08:22

## 2024-02-19 RX ADMIN — METOCLOPRAMIDE 10 MG: 5 INJECTION, SOLUTION INTRAMUSCULAR; INTRAVENOUS at 08:22

## 2024-02-19 RX ADMIN — ENOXAPARIN SODIUM 40 MG: 100 INJECTION SUBCUTANEOUS at 08:22

## 2024-02-19 RX ADMIN — METOPROLOL TARTRATE 5 MG: 1 INJECTION, SOLUTION INTRAVENOUS at 12:04

## 2024-02-19 RX ADMIN — METRONIDAZOLE 500 MG: 500 INJECTION, SOLUTION INTRAVENOUS at 12:04

## 2024-02-19 RX ADMIN — PANTOPRAZOLE SODIUM 40 MG: 40 INJECTION, POWDER, FOR SOLUTION INTRAVENOUS at 05:24

## 2024-02-19 RX ADMIN — OXYCODONE HYDROCHLORIDE 7.5 MG: 5 SOLUTION ORAL at 21:33

## 2024-02-19 RX ADMIN — ALPRAZOLAM 0.25 MG: 0.25 TABLET ORAL at 14:23

## 2024-02-19 RX ADMIN — HYDROMORPHONE HYDROCHLORIDE 0.5 MG: 1 INJECTION, SOLUTION INTRAMUSCULAR; INTRAVENOUS; SUBCUTANEOUS at 14:11

## 2024-02-19 RX ADMIN — DIPHENHYDRAMINE HYDROCHLORIDE AND LIDOCAINE HYDROCHLORIDE AND ALUMINUM HYDROXIDE AND MAGNESIUM HYDRO 5 ML: KIT at 12:04

## 2024-02-19 RX ADMIN — METOCLOPRAMIDE 10 MG: 5 INJECTION, SOLUTION INTRAMUSCULAR; INTRAVENOUS at 16:12

## 2024-02-19 RX ADMIN — ACETAMINOPHEN 1000 MG: 160 SOLUTION ORAL at 21:25

## 2024-02-19 RX ADMIN — OXYCODONE HYDROCHLORIDE 7.5 MG: 5 SOLUTION ORAL at 12:09

## 2024-02-19 RX ADMIN — LIDOCAINE 1 PATCH: 4 PATCH TOPICAL at 08:22

## 2024-02-19 RX ADMIN — LEVOFLOXACIN 750 MG: 5 INJECTION, SOLUTION INTRAVENOUS at 22:42

## 2024-02-19 RX ADMIN — METRONIDAZOLE 500 MG: 500 INJECTION, SOLUTION INTRAVENOUS at 21:25

## 2024-02-19 NOTE — CONSULTS
"Nutrition Services    Patient Name:  Jourdan Lebron  YOB: 1970  MRN: [id number]  Admit Date:  2/9/2024    Assessment Date:  02/19/24    CLINICAL NUTRITION     Comments: Nutrition follow up.   Per RN, pt tolerating Impact Peptide at goal of 75mL//hr plus water flushes  Labs: glu 108-122, chloride 108, BUN 28, cr.51, Hgb/Hct  BM 2/19, liquid.  NG tube remains in place to intermit LWS, 550ml today.   S/p EGD today small amount of breakdown at the anastomotic junction however no acute signs of abscess formation, small amount of pus drained from erythematous area around his J-tube site     Per [facility] home infusion, they are unable to get Impact Peptide for home use. Alternatives would be:  Pivot, ([place])  Peptamen 1.5 ([place])    Recommendations:  Cyclic feedings (x 18 hrs)  from 5p-11a, goal rate of 75mL/hr.    Free water per MD  RD to follow tolerance, labs and clinical course.     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, balloon dilation of pylorus on 2/9, GERD, diverticulosis, stroke    Current Issues Not safe for swallowing yet     Current Nutrition Orders & Evaluation of Intake       Oral Nutrition     Food Allergies    Current PO Diet NPO Diet NPO Type: Strict NPO   Supplement    PO Evaluation      % PO Intake       Enteral Nutrition     Enteral Route Jejunostomy    TF Delivery Method Cyclic    Propofol Rate/Kcal     Current TF Order/Rate  Impact Peptide 1.5 @ 75 mL/hr    TF Goal Rate 75 mL/hr    Current Water Flush 30 mL Q 4 hr    Modular None    TF Residual  n/a - tube is small bowel    TF Tolerance tolerating    TF Observation Verified correct TF and water flush infusing per orders     Anthropometrics          Height    Weight Height: 177.8 cm (70\")  Weight: 63.5 kg (139 lb 15.9 oz) (02/19/24 0600)    BMI kg/m2 " Body mass index is 20.09 kg/m².    BMI Category Normal/Healthy (18.4 - 24.9)     Estimated/Assessed Needs        Energy Requirements    Weight for Calculation 69.5 kg   Method for Estimation  30 kcal/kg   EST Needs (kcal/day) 2,085kcal        Protein Requirements    Weight for Calculation 69.5 kg   EST Protein Needs (g/kg) 1.2 - 1.5 gm/kg   EST Daily Needs (g/day)  g        Fluid Requirements     Method for Estimation 1 mL/kcal, Defer to physician    Estimated Needs (mL/day)          Physical Findings          Physical Appearance alert, oriented, 2L oxygen   Oral/Mouth Cavity dry mouth, tooth or teeth missing   Edema  no edema   Gastrointestinal WDL, last bowel movement: 2/19  liquid   Skin  surgical incision: abdomen, chest   Tubes/Drains/Lines chest tube, jejunostomy , NG tube   NFPE Not indicated at this time     Labs        Pertinent Labs Reviewed, listed below     Results from last 7 days   Lab Units 02/19/24 0332 02/18/24 0523 02/17/24  0437   SODIUM mmol/L 145 143 142   POTASSIUM mmol/L 4.0 4.0 4.2   CHLORIDE mmol/L 108* 107 107   CO2 mmol/L 27.7 27.0 25.0   BUN mg/dL 28* 27* 29*   CREATININE mg/dL 0.51* 0.49* 0.61*   CALCIUM mg/dL 9.2 8.7 8.9   GLUCOSE mg/dL 113* 118* 148*     Results from last 7 days   Lab Units 02/19/24 0332 02/18/24  0523 02/17/24  1154 02/17/24  0437   MAGNESIUM mg/dL 2.4 2.3  --  2.1   PHOSPHORUS mg/dL  --  3.1  --  2.8   HEMOGLOBIN g/dL 9.7* 10.0*   < > 10.7*   HEMATOCRIT % 29.1* 29.6*   < > 31.2*   WBC 10*3/mm3 13.78* 18.83*   < > 15.49*   ALBUMIN g/dL  --   --   --  3.0*    < > = values in this interval not displayed.     Results from last 7 days   Lab Units 02/19/24 0332 02/18/24  0523 02/17/24  1154 02/17/24  0437 02/16/24  1752   PLATELETS 10*3/mm3 366 321 305 308 304     COVID19   Date Value Ref Range Status   03/01/2022 Not Detected Not Detected - Ref. Range Final     Lab Results   Component Value Date    HGBA1C 5.3 10/29/2021          Medications            Scheduled  Medications acetaminophen, 1,000 mg, Per J Tube, TID  Cetirizine HCl, 5 mg, Per J Tube, Daily  enoxaparin, 40 mg, Subcutaneous, Daily  First Mouthwash (Magic Mouthwash), 5 mL, Swish & Spit, Q6H  fluconazole, 400 mg, Intravenous, Q24H  FLUoxetine, 20 mg, Per J Tube, Daily  levoFLOXacin, 750 mg, Intravenous, Q24H  Lidocaine, 1 patch, Transdermal, Q24H  metoclopramide, 10 mg, Intravenous, Q8H  metoprolol tartrate, 5 mg, Intravenous, Q6H  metroNIDAZOLE, 500 mg, Intravenous, Q8H  pantoprazole, 40 mg, Intravenous, Q AM  sodium chloride, 3 mL, Intravenous, Q12H        Infusions sodium chloride, 30 mL/hr        PRN Medications   ALPRAZolam    Calcium Replacement - Follow Nurse / BPA Driven Protocol    HYDROmorphone    ipratropium    Magnesium Standard Dose Replacement - Follow Nurse / BPA Driven Protocol    naloxone    nitroglycerin    oxyCODONE    Phosphorus Replacement - Follow Nurse / BPA Driven Protocol    Potassium Replacement - Follow Nurse / BPA Driven Protocol    sodium chloride    sodium chloride    sodium chloride     PES STATEMENT / NUTRITION DIAGNOSIS      Nutrition Dx Problem  Problem: Altered GI Function  Etiology: Medical Diagnosis - esophagus cancer of the lower 3rd  Signs/Symptoms: EN Intake/Delivery    Comment: s/p jejunostomy     PLAN OF CARE  Intervention Goal        Intervention Goal(s) Meet estimated needs, Disease management/therapy, Tolerate TF/PN at goal, Maintain weight, and No significant weight loss     Nutrition Intervention         RD Action Await initiation/advancement of PO diet, Continue to monitor, Care plan reviewed, and Recommend/order: cyclic TF's     Nutrition Prescription          Recommendation       Enteral Prescription:     Enteral Route Jejunostomy    TF Delivery Method Cyclic    Enteral Product Impact Peptide 1.5    Modular None    Propofol Rate/Kcal     TF Start Rate/Volume  55mL/hr from 5pm-11am    TF Goal Rate/Volume 75 mL/hr    Free Water Flush 30 mL before and after cyclic  feeds and x2 during cyclic feeds  MD to manage    TF Provision at Goal:          Calories 2025 kcal, meets 97 % needs         Protein  126 gm protein, meets 122 % needs         Fluid (mL) 1039 mL free water + flushes    Prescription Ordered Yes     Monitor/Evaluation        Monitor Per protocol     RD to follow up per protocol.    Electronically signed by:  Acacai Felipe RD  02/19/24 12:38 EST

## 2024-02-19 NOTE — PLAN OF CARE
Problem: Adult Inpatient Plan of Care  Goal: Plan of Care Review  Outcome: Ongoing, Progressing  Flowsheets (Taken 2/19/2024 1518)  Progress: improving  Plan of Care Reviewed With: patient   Goal Outcome Evaluation:  Plan of Care Reviewed With: patient        Progress: improving          Pt had right side REYNALDO drain removed. Pt still has a left nare NG-tube to intermittent suction for 2 hours and then off for 2 hours. Pt is getting IV antibiotics. Tube feeds on cyclic tube feeds from 5pm-11am. Pain seems well controlled with PRN pain medications. VSS

## 2024-02-19 NOTE — PROGRESS NOTES
LOS: 10 days     Chief Complaint: Leukocytosis    Interval History: Patient reports doing well this morning.  Denies any fevers or chills.  Tolerating antibiotics.  On 2 L nasal cannula.  WBC down to 13.    Vital Signs  Temp:  [97.6 °F (36.4 °C)-98.2 °F (36.8 °C)] 97.6 °F (36.4 °C)  Heart Rate:  [88-99] 96  Resp:  [10-16] 16  BP: ()/(67-77) 120/77    Physical Exam:  General: In no acute distress  HEENT: NG tube in place  Respiratory: Normal work of breathing  GI: Soft, NT  Skin: PEG tube site clean and intact with no surrounding erythema  Extremities: No edema, cyanosis  Access: Peripheral IV.  Implantable port.    Antibiotics:  Anti-Infectives (From admission, onward)      Ordered     Dose/Rate Route Frequency Start Stop    02/16/24 1838  levoFLOXacin (LEVAQUIN) 750 mg/150 mL D5W (premix) (LEVAQUIN) 750 mg        Ordering Provider: Saurabh Hurtado MD    750 mg  100 mL/hr over 90 Minutes Intravenous Every 24 Hours 02/16/24 1930 02/23/24 2159 02/16/24 1838  metroNIDAZOLE (FLAGYL) IVPB 500 mg        Ordering Provider: Saurabh Hurtado MD    500 mg  200 mL/hr over 30 Minutes Intravenous Every 8 Hours 02/16/24 1930 02/23/24 2059 02/16/24 1815  fluconazole (DIFLUCAN) IVPB 400 mg        Ordering Provider: Saurabh Hurtado MD    400 mg  100 mL/hr over 120 Minutes Intravenous Every 24 Hours 02/16/24 1915 02/23/24 2159 02/09/24 0611  vancomycin (VANCOCIN) 1000 mg/200 mL dextrose 5% IVPB        Ordering Provider: Saurabh Hurtado MD    15 mg/kg × 72.1 kg Intravenous Once 02/09/24 0613 02/09/24 0703             Results Review:     I reviewed the patient's new clinical results.    Lab Results   Component Value Date    WBC 13.78 (H) 02/19/2024    HGB 9.7 (L) 02/19/2024    HCT 29.1 (L) 02/19/2024    MCV 97.3 (H) 02/19/2024     02/19/2024     Lab Results   Component Value Date    GLUCOSE 113 (H) 02/19/2024    BUN 28 (H) 02/19/2024    CREATININE 0.51 (L) 02/19/2024    EGFRIFNONA 67 02/24/2022    BCR 54.9 (H) 02/19/2024     CO2 27.7 02/19/2024    CALCIUM 9.2 02/19/2024    ALBUMIN 3.0 (L) 02/17/2024    LABIL2 1.3 09/25/2018    AST 18 02/01/2024    ALT 17 02/01/2024       Microbiology:  2/16 MRSA nares negative  2/16 blood cultures no growth to date    Assessment    #Leukocytosis  #Esophageal carcinoma status post esophagectomy 2/9  #Abdominal wall cellulitis at G-tube site    Blood cultures no growth to date.  Leukocytosis improving.  Continue levofloxacin 750 mg daily plus Flagyl 500 mg 3 times daily.    ID will follow.

## 2024-02-19 NOTE — PLAN OF CARE
Goal Outcome Evaluation:      VSS. 2L O2 NC. Ax standby to toilet. NG tube @ 50 cm, on Int. LWS, flushed Q8 hr w/ 30cc water. J-tube CDI, drx changed, Impact Peptide 1.5 on cyclic tube feeds from 5PM-11AM, @ 75mL/hr, water flush @ 30 mL/4hr. R REYNALDO drain to open bulb suction. R flank & abdomen incisions AMOL, CDI. Prn brittany x2, dilaudid x1, xanax x1. NPO. Wife @ bedside.

## 2024-02-20 ENCOUNTER — APPOINTMENT (OUTPATIENT)
Dept: GENERAL RADIOLOGY | Facility: HOSPITAL | Age: 54
DRG: 375 | End: 2024-02-20
Payer: COMMERCIAL

## 2024-02-20 LAB
ANION GAP SERPL CALCULATED.3IONS-SCNC: 8.4 MMOL/L (ref 5–15)
BUN SERPL-MCNC: 27 MG/DL (ref 6–20)
BUN/CREAT SERPL: 56.3 (ref 7–25)
CALCIUM SPEC-SCNC: 9 MG/DL (ref 8.6–10.5)
CHLORIDE SERPL-SCNC: 107 MMOL/L (ref 98–107)
CO2 SERPL-SCNC: 27.6 MMOL/L (ref 22–29)
CREAT SERPL-MCNC: 0.48 MG/DL (ref 0.76–1.27)
DEPRECATED RDW RBC AUTO: 57.5 FL (ref 37–54)
EGFRCR SERPLBLD CKD-EPI 2021: 125 ML/MIN/1.73
ERYTHROCYTE [DISTWIDTH] IN BLOOD BY AUTOMATED COUNT: 15.7 % (ref 12.3–15.4)
GLUCOSE BLDC GLUCOMTR-MCNC: 100 MG/DL (ref 70–130)
GLUCOSE BLDC GLUCOMTR-MCNC: 107 MG/DL (ref 70–130)
GLUCOSE BLDC GLUCOMTR-MCNC: 130 MG/DL (ref 70–130)
GLUCOSE SERPL-MCNC: 128 MG/DL (ref 65–99)
HCT VFR BLD AUTO: 31.8 % (ref 37.5–51)
HGB BLD-MCNC: 10.6 G/DL (ref 13–17.7)
MAGNESIUM SERPL-MCNC: 2.5 MG/DL (ref 1.6–2.6)
MCH RBC QN AUTO: 33.5 PG (ref 26.6–33)
MCHC RBC AUTO-ENTMCNC: 33.3 G/DL (ref 31.5–35.7)
MCV RBC AUTO: 100.6 FL (ref 79–97)
PLATELET # BLD AUTO: 393 10*3/MM3 (ref 140–450)
PMV BLD AUTO: 8.9 FL (ref 6–12)
POTASSIUM SERPL-SCNC: 4 MMOL/L (ref 3.5–5.2)
RBC # BLD AUTO: 3.16 10*6/MM3 (ref 4.14–5.8)
SODIUM SERPL-SCNC: 143 MMOL/L (ref 136–145)
WBC NRBC COR # BLD AUTO: 11.62 10*3/MM3 (ref 3.4–10.8)

## 2024-02-20 PROCEDURE — 25010000002 METOCLOPRAMIDE PER 10 MG: Performed by: SURGERY

## 2024-02-20 PROCEDURE — 25010000002 LEVOFLOXACIN PER 250 MG: Performed by: STUDENT IN AN ORGANIZED HEALTH CARE EDUCATION/TRAINING PROGRAM

## 2024-02-20 PROCEDURE — 99232 SBSQ HOSP IP/OBS MODERATE 35: CPT | Performed by: STUDENT IN AN ORGANIZED HEALTH CARE EDUCATION/TRAINING PROGRAM

## 2024-02-20 PROCEDURE — 80048 BASIC METABOLIC PNL TOTAL CA: CPT | Performed by: SURGERY

## 2024-02-20 PROCEDURE — 82948 REAGENT STRIP/BLOOD GLUCOSE: CPT

## 2024-02-20 PROCEDURE — 85027 COMPLETE CBC AUTOMATED: CPT | Performed by: SURGERY

## 2024-02-20 PROCEDURE — 25010000002 ENOXAPARIN PER 10 MG: Performed by: SURGERY

## 2024-02-20 PROCEDURE — 25010000002 METRONIDAZOLE 500 MG/100ML SOLUTION: Performed by: STUDENT IN AN ORGANIZED HEALTH CARE EDUCATION/TRAINING PROGRAM

## 2024-02-20 PROCEDURE — 83735 ASSAY OF MAGNESIUM: CPT | Performed by: SURGERY

## 2024-02-20 PROCEDURE — 25010000002 HYDROMORPHONE PER 4 MG: Performed by: SURGERY

## 2024-02-20 PROCEDURE — 99024 POSTOP FOLLOW-UP VISIT: CPT | Performed by: NURSE PRACTITIONER

## 2024-02-20 PROCEDURE — 25010000002 METRONIDAZOLE 500 MG/100ML SOLUTION: Performed by: SURGERY

## 2024-02-20 PROCEDURE — 71045 X-RAY EXAM CHEST 1 VIEW: CPT

## 2024-02-20 RX ADMIN — DIPHENHYDRAMINE HYDROCHLORIDE AND LIDOCAINE HYDROCHLORIDE AND ALUMINUM HYDROXIDE AND MAGNESIUM HYDRO 5 ML: KIT at 12:52

## 2024-02-20 RX ADMIN — ACETAMINOPHEN 1000 MG: 160 SOLUTION ORAL at 16:25

## 2024-02-20 RX ADMIN — Medication 3 ML: at 20:23

## 2024-02-20 RX ADMIN — ENOXAPARIN SODIUM 40 MG: 100 INJECTION SUBCUTANEOUS at 08:25

## 2024-02-20 RX ADMIN — METRONIDAZOLE 500 MG: 500 INJECTION, SOLUTION INTRAVENOUS at 12:52

## 2024-02-20 RX ADMIN — ALPRAZOLAM 0.25 MG: 0.25 TABLET ORAL at 22:06

## 2024-02-20 RX ADMIN — METRONIDAZOLE 500 MG: 500 INJECTION, SOLUTION INTRAVENOUS at 06:00

## 2024-02-20 RX ADMIN — METOCLOPRAMIDE 10 MG: 5 INJECTION, SOLUTION INTRAMUSCULAR; INTRAVENOUS at 00:19

## 2024-02-20 RX ADMIN — METOPROLOL TARTRATE 5 MG: 1 INJECTION, SOLUTION INTRAVENOUS at 12:52

## 2024-02-20 RX ADMIN — DIPHENHYDRAMINE HYDROCHLORIDE AND LIDOCAINE HYDROCHLORIDE AND ALUMINUM HYDROXIDE AND MAGNESIUM HYDRO 5 ML: KIT at 06:04

## 2024-02-20 RX ADMIN — ACETAMINOPHEN 1000 MG: 160 SOLUTION ORAL at 20:22

## 2024-02-20 RX ADMIN — ALPRAZOLAM 0.25 MG: 0.25 TABLET ORAL at 08:24

## 2024-02-20 RX ADMIN — DIPHENHYDRAMINE HYDROCHLORIDE AND LIDOCAINE HYDROCHLORIDE AND ALUMINUM HYDROXIDE AND MAGNESIUM HYDRO 5 ML: KIT at 20:22

## 2024-02-20 RX ADMIN — OXYCODONE HYDROCHLORIDE 7.5 MG: 5 SOLUTION ORAL at 10:23

## 2024-02-20 RX ADMIN — FLUOXETINE HYDROCHLORIDE 20 MG: 20 SOLUTION ORAL at 08:26

## 2024-02-20 RX ADMIN — DIPHENHYDRAMINE HYDROCHLORIDE AND LIDOCAINE HYDROCHLORIDE AND ALUMINUM HYDROXIDE AND MAGNESIUM HYDRO 5 ML: KIT at 00:22

## 2024-02-20 RX ADMIN — HYDROMORPHONE HYDROCHLORIDE 0.5 MG: 1 INJECTION, SOLUTION INTRAMUSCULAR; INTRAVENOUS; SUBCUTANEOUS at 06:00

## 2024-02-20 RX ADMIN — METOCLOPRAMIDE 10 MG: 5 INJECTION, SOLUTION INTRAMUSCULAR; INTRAVENOUS at 08:25

## 2024-02-20 RX ADMIN — METOPROLOL TARTRATE 5 MG: 1 INJECTION, SOLUTION INTRAVENOUS at 06:03

## 2024-02-20 RX ADMIN — METOCLOPRAMIDE 10 MG: 5 INJECTION, SOLUTION INTRAMUSCULAR; INTRAVENOUS at 16:25

## 2024-02-20 RX ADMIN — OXYCODONE HYDROCHLORIDE 7.5 MG: 5 SOLUTION ORAL at 20:22

## 2024-02-20 RX ADMIN — HYDROMORPHONE HYDROCHLORIDE 0.5 MG: 1 INJECTION, SOLUTION INTRAMUSCULAR; INTRAVENOUS; SUBCUTANEOUS at 12:58

## 2024-02-20 RX ADMIN — PANTOPRAZOLE SODIUM 40 MG: 40 INJECTION, POWDER, FOR SOLUTION INTRAVENOUS at 05:59

## 2024-02-20 RX ADMIN — METOPROLOL TARTRATE 5 MG: 1 INJECTION, SOLUTION INTRAVENOUS at 18:22

## 2024-02-20 RX ADMIN — LEVOFLOXACIN 750 MG: 5 INJECTION, SOLUTION INTRAVENOUS at 22:06

## 2024-02-20 RX ADMIN — CETIRIZINE HYDROCHLORIDE 5 MG: 5 SOLUTION ORAL at 08:26

## 2024-02-20 RX ADMIN — OXYCODONE HYDROCHLORIDE 7.5 MG: 5 SOLUTION ORAL at 16:25

## 2024-02-20 RX ADMIN — Medication 3 ML: at 08:26

## 2024-02-20 RX ADMIN — OXYCODONE HYDROCHLORIDE 7.5 MG: 5 SOLUTION ORAL at 02:56

## 2024-02-20 RX ADMIN — ACETAMINOPHEN 1000 MG: 160 SOLUTION ORAL at 08:25

## 2024-02-20 RX ADMIN — LIDOCAINE 1 PATCH: 4 PATCH TOPICAL at 08:24

## 2024-02-20 RX ADMIN — METOPROLOL TARTRATE 5 MG: 1 INJECTION, SOLUTION INTRAVENOUS at 00:20

## 2024-02-20 RX ADMIN — METRONIDAZOLE 500 MG: 500 INJECTION, SOLUTION INTRAVENOUS at 20:22

## 2024-02-20 NOTE — CONSULTS
"Nutrition Services    Patient Name:  Jourdan Lebron  YOB: 1970  MRN: [id number]  Admit Date:  2/9/2024    Assessment Date:  02/20/24    CLINICAL NUTRITION     Comments: Pt is tolerating Impact Peptide at goal of 75mL//hr plus water flushes x 18 hrs. C/o loose liquidy stools. Plan is home with Peptamen 1.5 sicne Impact Peptide not available for home.  Labs: glu 128/100/107, BUN 27, Cr 0.48, Hgb 10.6  BM 2/20, liquidy.  NG tube remains in place to intermit LWS, 500ml x 24 hrs.     Recommendations:  Try Peptamen 1.5 Cyclic feedings (x 18 hrs)  from 5p-11a, goal rate of 75mL/hr.    30mL Free water q 4hr    RD to follow tolerance, labs and clinical course.     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, balloon dilation of pylorus on 2/9, GERD, diverticulosis, stroke    Current Issues Not safe for swallowing yet     Current Nutrition Orders & Evaluation of Intake       Oral Nutrition     Food Allergies    Current PO Diet NPO Diet NPO Type: Strict NPO   Supplement    PO Evaluation      % PO Intake       Enteral Nutrition     Enteral Route Jejunostomy    TF Delivery Method Cyclic    Propofol Rate/Kcal     Current TF Order/Rate  Impact Peptide 1.5 @ 75 mL/hr    TF Goal Rate 75 mL/hr    Current Water Flush 30 mL Q 4 hr    Modular None    TF Residual  n/a - tube is small bowel    TF Tolerance tolerating    TF Observation Verified correct TF and water flush infusing per orders     Anthropometrics          Height    Weight Height: 177.8 cm (70\")  Weight: 62.3 kg (137 lb 5.6 oz) (02/20/24 0500)    BMI kg/m2 Body mass index is 19.71 kg/m².    BMI Category Normal/Healthy (18.4 - 24.9)     Estimated/Assessed Needs        Energy Requirements    Weight for Calculation 69.5 kg   Method for Estimation  30 kcal/kg   EST Needs (kcal/day) " 2,085kcal        Protein Requirements    Weight for Calculation 69.5 kg   EST Protein Needs (g/kg) 1.2 - 1.5 gm/kg   EST Daily Needs (g/day)  g        Fluid Requirements     Method for Estimation 1 mL/kcal, Defer to physician    Estimated Needs (mL/day)          Physical Findings          Physical Appearance alert, oriented, 2L oxygen   Oral/Mouth Cavity dry mouth, tooth or teeth missing   Edema  no edema   Gastrointestinal WDL, last bowel movement: 2/20  liquid   Skin  surgical incision: abdomen, chest   Tubes/Drains/Lines jejunostomy , NG tube   NFPE Not indicated at this time     Labs        Pertinent Labs Reviewed, listed below     Results from last 7 days   Lab Units 02/20/24 0416 02/19/24 0332 02/18/24 0523   SODIUM mmol/L 143 145 143   POTASSIUM mmol/L 4.0 4.0 4.0   CHLORIDE mmol/L 107 108* 107   CO2 mmol/L 27.6 27.7 27.0   BUN mg/dL 27* 28* 27*   CREATININE mg/dL 0.48* 0.51* 0.49*   CALCIUM mg/dL 9.0 9.2 8.7   GLUCOSE mg/dL 128* 113* 118*     Results from last 7 days   Lab Units 02/20/24 0416 02/19/24 0332 02/18/24 0523 02/17/24 1154 02/17/24  0437   MAGNESIUM mg/dL 2.5 2.4 2.3  --  2.1   PHOSPHORUS mg/dL  --   --  3.1  --  2.8   HEMOGLOBIN g/dL 10.6* 9.7* 10.0*   < > 10.7*   HEMATOCRIT % 31.8* 29.1* 29.6*   < > 31.2*   WBC 10*3/mm3 11.62* 13.78* 18.83*   < > 15.49*   ALBUMIN g/dL  --   --   --   --  3.0*    < > = values in this interval not displayed.     Results from last 7 days   Lab Units 02/20/24 0416 02/19/24 0332 02/18/24 0523 02/17/24 1154 02/17/24  0437   PLATELETS 10*3/mm3 393 366 321 305 308     COVID19   Date Value Ref Range Status   03/01/2022 Not Detected Not Detected - Ref. Range Final     Lab Results   Component Value Date    HGBA1C 5.3 10/29/2021          Medications            Scheduled Medications acetaminophen, 1,000 mg, Per J Tube, TID  Cetirizine HCl, 5 mg, Per J Tube, Daily  enoxaparin, 40 mg, Subcutaneous, Daily  First Mouthwash (Magic Mouthwash), 5 mL, Swish & Spit,  Q6H  FLUoxetine, 20 mg, Per J Tube, Daily  levoFLOXacin, 750 mg, Intravenous, Q24H  Lidocaine, 1 patch, Transdermal, Q24H  metoclopramide, 10 mg, Intravenous, Q8H  metoprolol tartrate, 5 mg, Intravenous, Q6H  metroNIDAZOLE, 500 mg, Intravenous, Q8H  pantoprazole, 40 mg, Intravenous, Q AM  sodium chloride, 3 mL, Intravenous, Q12H        Infusions sodium chloride, 30 mL/hr        PRN Medications   ALPRAZolam    Calcium Replacement - Follow Nurse / BPA Driven Protocol    HYDROmorphone    ipratropium    Magnesium Standard Dose Replacement - Follow Nurse / BPA Driven Protocol    naloxone    nitroglycerin    oxyCODONE    Phosphorus Replacement - Follow Nurse / BPA Driven Protocol    Potassium Replacement - Follow Nurse / BPA Driven Protocol    sodium chloride    sodium chloride    sodium chloride     PES STATEMENT / NUTRITION DIAGNOSIS      Nutrition Dx Problem  Problem: Altered GI Function  Etiology: Medical Diagnosis - esophagus cancer of the lower 3rd  Signs/Symptoms: EN Intake/Delivery    Comment: s/p jejunostomy     PLAN OF CARE  Intervention Goal        Intervention Goal(s) Meet estimated needs, Disease management/therapy, Tolerate TF/PN at goal, Maintain weight, and No significant weight loss     Nutrition Intervention         RD Action Await initiation/advancement of PO diet, Continue to monitor, Care plan reviewed, and Recommend/order: cyclic TF's     Nutrition Prescription          Recommendation       Enteral Prescription:     Enteral Route Jejunostomy    TF Delivery Method Cyclic    Enteral Product Peptamen 1.5    Modular None    Propofol Rate/Kcal     TF Start Rate/Volume  55mL/hr from 5pm-11am    TF Goal Rate/Volume 75 mL/hr    Free Water Flush 30 mL before and after cyclic feeds and x2 during cyclic feeds    TF Provision at Goal:          Calories 2025 kcal, meets 97 % needs         Protein  92 gm protein, meets 100 % needs         Fluid (mL) 1040 mL free water + flushes    Prescription Ordered Yes      Monitor/Evaluation        Monitor Per protocol     RD to follow up per protocol.    Electronically signed by:  Cassandra Robles RD  02/20/24 13:43 EST

## 2024-02-20 NOTE — PROGRESS NOTES
LOS: 11 days     Chief Complaint: Leukocytosis    Interval History: Patient remains afebrile with improving WBC down to 11.  Tolerating antibiotics.    Vital Signs  Temp:  [97.4 °F (36.3 °C)-98 °F (36.7 °C)] 98 °F (36.7 °C)  Heart Rate:  [] 81  Resp:  [16-18] 18  BP: ()/(67-85) 105/70    Physical Exam:  General: In no acute distress  HEENT: NG tube in place  Respiratory: Normal work of breathing  GI: Soft, NT  Skin: PEG tube site clean and intact with no surrounding erythema  Extremities: No edema, cyanosis  Access: Peripheral IV.  Implantable port.    Antibiotics:  Anti-Infectives (From admission, onward)      Ordered     Dose/Rate Route Frequency Start Stop    02/16/24 1838  levoFLOXacin (LEVAQUIN) 750 mg/150 mL D5W (premix) (LEVAQUIN) 750 mg        Ordering Provider: Saurabh Hurtado MD    750 mg  100 mL/hr over 90 Minutes Intravenous Every 24 Hours 02/16/24 1930 02/23/24 2159 02/16/24 1838  metroNIDAZOLE (FLAGYL) IVPB 500 mg        Ordering Provider: Saurabh Hurtado MD    500 mg  200 mL/hr over 30 Minutes Intravenous Every 8 Hours 02/16/24 1930 02/23/24 2059 02/16/24 1815  fluconazole (DIFLUCAN) IVPB 400 mg        Ordering Provider: Saurabh Hurtado MD    400 mg  100 mL/hr over 120 Minutes Intravenous Every 24 Hours 02/16/24 1915 02/23/24 2159 02/09/24 0611  vancomycin (VANCOCIN) 1000 mg/200 mL dextrose 5% IVPB        Ordering Provider: Saurabh Hurtado MD    15 mg/kg × 72.1 kg Intravenous Once 02/09/24 0613 02/09/24 0703             Results Review:     I reviewed the patient's new clinical results.    Lab Results   Component Value Date    WBC 11.62 (H) 02/20/2024    HGB 10.6 (L) 02/20/2024    HCT 31.8 (L) 02/20/2024    .6 (H) 02/20/2024     02/20/2024     Lab Results   Component Value Date    GLUCOSE 128 (H) 02/20/2024    BUN 27 (H) 02/20/2024    CREATININE 0.48 (L) 02/20/2024    EGFRIFNONA 67 02/24/2022    BCR 56.3 (H) 02/20/2024    CO2 27.6 02/20/2024    CALCIUM 9.0 02/20/2024    ALBUMIN  3.0 (L) 02/17/2024    LABIL2 1.3 09/25/2018    AST 18 02/01/2024    ALT 17 02/01/2024       Microbiology:  2/16 MRSA nares negative  2/16 blood cultures no growth to date    Assessment    #Leukocytosis  #Esophageal carcinoma status post esophagectomy 2/9  #Abdominal wall cellulitis at G-tube site    Blood cultures have remained no growth and clinically improving with resolving leukocytosis.  Continue levofloxacin 750 mg daily plus Flagyl 500 mg 3 times daily through 2/22.  Could be transition to oral formulations of discharge.    Thank you for allowing me to be involved in the care of this patient. Infectious diseases will sign off at this time with antibiotics plan in place, but please call me at [phone] if any further ID questions or new ID concerns.

## 2024-02-20 NOTE — CASE MANAGEMENT/SOCIAL WORK
Continued Stay Note  [facility]     Patient Name: Jourdan Lebron  MRN: [id number]  Today's Date: 2/20/2024    Admit Date: 2/9/2024    Plan: Home with  infusion   Discharge Plan       Row Name 02/20/24 1346       Plan    Plan Home with  infusion    Patient/Family in Agreement with Plan yes    Plan Comments Met with pt and wife at bedside who confirm plan to return home at discharge.  Tube feeds at bedside from  infusion.  No accepting HH agency.  Discussed with pt and wife who states they feel they can manage tube feed without HH.  No further needs identified.  CCP continues to follow.  PARIS Kirby RN                   Discharge Codes    No documentation.                 Expected Discharge Date and Time       Expected Discharge Date Expected Discharge Time    Feb 21, 2024               Shae Kirby, RN

## 2024-02-20 NOTE — PROGRESS NOTES
"  POST-OPERATIVE NOTE     Chief Complaint: esophageal carcinoma, postoperative care  S/P: Vinicio Shayne esophagectomy  POD # 10  S/P: Esophagogastroduodenoscopy with stent placement  POD # 3    Subjective:  Symptoms:  Stable.  He reports anxiety.    Diet:  NPO.  No nausea or vomiting.    Pain:  He complains of pain that is mild.        Objective:  General Appearance:  Comfortable and in no acute distress.    Vital signs: (most recent): Blood pressure 107/69, pulse 90, temperature 97.9 °F (36.6 °C), temperature source Oral, resp. rate 18, height 177.8 cm (70\"), weight 62.3 kg (137 lb 5.6 oz), SpO2 98%.  Vital signs are normal.    Output: Producing urine and producing stool.    HEENT: Normal HEENT exam.    Lungs:  Normal effort and normal respiratory rate.  No rales, wheezes or rhonchi.    Heart: Normal rate.  Regular rhythm.    Chest: Chest wall tenderness present.    Abdomen: Abdomen is non-distended.  Bowel sounds are normal.   There is incisional tenderness.    Extremities: Normal range of motion.    Neurological: Patient is alert and oriented to person, place and time.    Skin:  Warm and dry.                NG Tube:   24 Hour Total: 500 cc    Results Review:     I reviewed the patient's new clinical results.  I reviewed the patient's new imaging results and agree with the interpretation.  I reviewed the patient's other test results and agree with the interpretation  Discussed with patient, spouse at bedside, RN and Dr. Noonan.    Assessment & Plan     Final surgical pathology ypT3 N0 poorly differentiated adenocarcinoma with near complete response.   CXR reviewed. Stent similar position.  NGT output decreased today.  Continue with same regimen of 2 hours on intermittent low wall suction with 2 hours clamped.  Continue cyclic feeds.   HOB 30 degrees at all times. Aspiration precautions.  Antibiotics per infectious disease service. Appreciate their assistance.    RUBINA Rome  [facility]  02/20/24  10:11 EST    Patient was seen and assessed while wearing personal protective equipment including facemask, protective eyewear and gloves.  Hand hygiene performed prior to entering the room and upon exiting with doffing of gloves.

## 2024-02-20 NOTE — PLAN OF CARE
Goal Outcome Evaluation:  Plan of Care Reviewed With: patient        Progress: no change  Outcome Evaluation: sinus rhythm, room air, A/Ox4, stand-by assist to bathroom. NG tube in L nare to Int LWS. NG clamped q2h per MD orders. J-tube infusing Peptamen 1.5 @ 75mL/hr, 30mL water flush q4h. d/c Diflucan; Oxy, Levaquin, and Flagyl changed per MD orders. PRN Xanax, Diludad, and Oxy given during shift. Plans to d/c home with HH and Infusions pending medical clearance, family to transport.

## 2024-02-20 NOTE — PLAN OF CARE
Goal Outcome Evaluation:  Plan of Care Reviewed With: patient        Progress: no change  Outcome Evaluation: VSS. Alert and oriented x4. NG tube left nare alternating between intermittent LWS and clamped q2h. Up to toilet with stand-by assist. BM x1. Pain managed with PRN dilaudid and oxy. IV abx continued. Cyclic TF infusing until 11AM. Will continue to provide supportive care.

## 2024-02-20 NOTE — PROGRESS NOTES
"  POST-OPERATIVE NOTE     Chief Complaint: esophageal carcinoma, postoperative care  S/P: Vinicio Shayne esophagectomy  POD # 9  S/P: Esophagogastroduodenoscopy with stent placement  POD # 2    Subjective:  Symptoms:  Stable.  He reports anxiety.    Diet:  NPO.  No nausea or vomiting.    Pain:  He complains of pain that is mild.        Objective:  General Appearance:  Comfortable and in no acute distress.    Vital signs: (most recent): Blood pressure 105/70, pulse 81, temperature 98 °F (36.7 °C), temperature source Oral, resp. rate 18, height 177.8 cm (70\"), weight 62.3 kg (137 lb 5.6 oz), SpO2 97%.  Vital signs are normal.    Output: Producing urine and producing stool.    HEENT: Normal HEENT exam.    Lungs:  Normal effort and normal respiratory rate.  No rales, wheezes or rhonchi.    Heart: Normal rate.  Regular rhythm.    Chest: Chest wall tenderness present.    Abdomen: Abdomen is non-distended.  Bowel sounds are normal.   There is incisional tenderness.    Extremities: Normal range of motion.    Neurological: Patient is alert and oriented to person, place and time.    Skin:  Warm and dry.                NG Tube:   24 Hour Total: 900 cc    REYNALDO Drain:  24 Hour Total: 25 cc    Results Review:     I reviewed the patient's new clinical results.  I reviewed the patient's new imaging results and agree with the interpretation.  I reviewed the patient's other test results and agree with the interpretation  Discussed with patient, spouse at bedside, RN and Dr. Noonan.    Assessment & Plan     Final surgical pathology ypT3 N0 poorly differentiated adenocarcinoma with near complete response.   CXR reviewed. Stent similar position, possible with slight distal migration. Follow up with AM CXR.   REYNALDO drain out today.   NGT to intermittent LWS 2 hours on, 2 hours clamped. Trend output.   Continue cyclic feeds.   HOB 30 degrees at all times. Aspiration precautions.  Antibiotics per infectious disease service. Appreciate their " assistance.    RUBINA Rome  [facility]  02/20/24  10:01 EST    Patient was seen and assessed while wearing personal protective equipment including facemask, protective eyewear and gloves.  Hand hygiene performed prior to entering the room and upon exiting with doffing of gloves.

## 2024-02-21 ENCOUNTER — TELEPHONE (OUTPATIENT)
Dept: SURGERY | Facility: CLINIC | Age: 54
End: 2024-02-21
Payer: COMMERCIAL

## 2024-02-21 ENCOUNTER — APPOINTMENT (OUTPATIENT)
Dept: GENERAL RADIOLOGY | Facility: HOSPITAL | Age: 54
DRG: 375 | End: 2024-02-21
Payer: COMMERCIAL

## 2024-02-21 LAB
ANION GAP SERPL CALCULATED.3IONS-SCNC: 12 MMOL/L (ref 5–15)
BACTERIA SPEC AEROBE CULT: NORMAL
BACTERIA SPEC AEROBE CULT: NORMAL
BUN SERPL-MCNC: 27 MG/DL (ref 6–20)
BUN/CREAT SERPL: 49.1 (ref 7–25)
CALCIUM SPEC-SCNC: 8.9 MG/DL (ref 8.6–10.5)
CHLORIDE SERPL-SCNC: 105 MMOL/L (ref 98–107)
CO2 SERPL-SCNC: 25 MMOL/L (ref 22–29)
CREAT SERPL-MCNC: 0.55 MG/DL (ref 0.76–1.27)
DEPRECATED RDW RBC AUTO: 53.8 FL (ref 37–54)
EGFRCR SERPLBLD CKD-EPI 2021: 120 ML/MIN/1.73
ERYTHROCYTE [DISTWIDTH] IN BLOOD BY AUTOMATED COUNT: 15.3 % (ref 12.3–15.4)
GLUCOSE BLDC GLUCOMTR-MCNC: 106 MG/DL (ref 70–130)
GLUCOSE BLDC GLUCOMTR-MCNC: 126 MG/DL (ref 70–130)
GLUCOSE BLDC GLUCOMTR-MCNC: 127 MG/DL (ref 70–130)
GLUCOSE BLDC GLUCOMTR-MCNC: 142 MG/DL (ref 70–130)
GLUCOSE BLDC GLUCOMTR-MCNC: 90 MG/DL (ref 70–130)
GLUCOSE SERPL-MCNC: 129 MG/DL (ref 65–99)
HCT VFR BLD AUTO: 34.5 % (ref 37.5–51)
HGB BLD-MCNC: 11.5 G/DL (ref 13–17.7)
MAGNESIUM SERPL-MCNC: 2.4 MG/DL (ref 1.6–2.6)
MCH RBC QN AUTO: 32.8 PG (ref 26.6–33)
MCHC RBC AUTO-ENTMCNC: 33.3 G/DL (ref 31.5–35.7)
MCV RBC AUTO: 98.3 FL (ref 79–97)
PLATELET # BLD AUTO: 473 10*3/MM3 (ref 140–450)
PMV BLD AUTO: 8.8 FL (ref 6–12)
POTASSIUM SERPL-SCNC: 4.3 MMOL/L (ref 3.5–5.2)
RBC # BLD AUTO: 3.51 10*6/MM3 (ref 4.14–5.8)
SODIUM SERPL-SCNC: 142 MMOL/L (ref 136–145)
WBC NRBC COR # BLD AUTO: 10.62 10*3/MM3 (ref 3.4–10.8)

## 2024-02-21 PROCEDURE — 25010000002 METOCLOPRAMIDE PER 10 MG: Performed by: SURGERY

## 2024-02-21 PROCEDURE — 25010000002 LEVOFLOXACIN PER 250 MG: Performed by: STUDENT IN AN ORGANIZED HEALTH CARE EDUCATION/TRAINING PROGRAM

## 2024-02-21 PROCEDURE — 85027 COMPLETE CBC AUTOMATED: CPT | Performed by: SURGERY

## 2024-02-21 PROCEDURE — 25010000002 HYDROMORPHONE PER 4 MG: Performed by: SURGERY

## 2024-02-21 PROCEDURE — 25010000002 ENOXAPARIN PER 10 MG: Performed by: SURGERY

## 2024-02-21 PROCEDURE — 25010000002 METRONIDAZOLE 500 MG/100ML SOLUTION: Performed by: STUDENT IN AN ORGANIZED HEALTH CARE EDUCATION/TRAINING PROGRAM

## 2024-02-21 PROCEDURE — 82948 REAGENT STRIP/BLOOD GLUCOSE: CPT

## 2024-02-21 PROCEDURE — 83735 ASSAY OF MAGNESIUM: CPT | Performed by: SURGERY

## 2024-02-21 PROCEDURE — 99024 POSTOP FOLLOW-UP VISIT: CPT | Performed by: NURSE PRACTITIONER

## 2024-02-21 PROCEDURE — 71045 X-RAY EXAM CHEST 1 VIEW: CPT

## 2024-02-21 PROCEDURE — 80048 BASIC METABOLIC PNL TOTAL CA: CPT | Performed by: SURGERY

## 2024-02-21 RX ADMIN — HYDROMORPHONE HYDROCHLORIDE 0.5 MG: 1 INJECTION, SOLUTION INTRAMUSCULAR; INTRAVENOUS; SUBCUTANEOUS at 10:19

## 2024-02-21 RX ADMIN — ALPRAZOLAM 0.25 MG: 0.25 TABLET ORAL at 08:13

## 2024-02-21 RX ADMIN — ALPRAZOLAM 0.25 MG: 0.25 TABLET ORAL at 16:31

## 2024-02-21 RX ADMIN — LIDOCAINE 1 PATCH: 4 PATCH TOPICAL at 08:13

## 2024-02-21 RX ADMIN — METOPROLOL TARTRATE 5 MG: 1 INJECTION, SOLUTION INTRAVENOUS at 05:48

## 2024-02-21 RX ADMIN — METOPROLOL TARTRATE 5 MG: 1 INJECTION, SOLUTION INTRAVENOUS at 12:45

## 2024-02-21 RX ADMIN — METRONIDAZOLE 500 MG: 500 INJECTION, SOLUTION INTRAVENOUS at 05:48

## 2024-02-21 RX ADMIN — DIPHENHYDRAMINE HYDROCHLORIDE AND LIDOCAINE HYDROCHLORIDE AND ALUMINUM HYDROXIDE AND MAGNESIUM HYDRO 5 ML: KIT at 05:48

## 2024-02-21 RX ADMIN — Medication 3 ML: at 08:13

## 2024-02-21 RX ADMIN — DIPHENHYDRAMINE HYDROCHLORIDE AND LIDOCAINE HYDROCHLORIDE AND ALUMINUM HYDROXIDE AND MAGNESIUM HYDRO 5 ML: KIT at 00:26

## 2024-02-21 RX ADMIN — LEVOFLOXACIN 750 MG: 5 INJECTION, SOLUTION INTRAVENOUS at 22:51

## 2024-02-21 RX ADMIN — OXYCODONE HYDROCHLORIDE 7.5 MG: 5 SOLUTION ORAL at 00:25

## 2024-02-21 RX ADMIN — OXYCODONE HYDROCHLORIDE 7.5 MG: 5 SOLUTION ORAL at 21:22

## 2024-02-21 RX ADMIN — HYDROMORPHONE HYDROCHLORIDE 0.5 MG: 1 INJECTION, SOLUTION INTRAMUSCULAR; INTRAVENOUS; SUBCUTANEOUS at 03:42

## 2024-02-21 RX ADMIN — ACETAMINOPHEN 1000 MG: 160 SOLUTION ORAL at 16:28

## 2024-02-21 RX ADMIN — METOPROLOL TARTRATE 5 MG: 1 INJECTION, SOLUTION INTRAVENOUS at 18:14

## 2024-02-21 RX ADMIN — PANTOPRAZOLE SODIUM 40 MG: 40 INJECTION, POWDER, FOR SOLUTION INTRAVENOUS at 05:48

## 2024-02-21 RX ADMIN — METOCLOPRAMIDE 10 MG: 5 INJECTION, SOLUTION INTRAMUSCULAR; INTRAVENOUS at 00:25

## 2024-02-21 RX ADMIN — METOCLOPRAMIDE 10 MG: 5 INJECTION, SOLUTION INTRAMUSCULAR; INTRAVENOUS at 08:13

## 2024-02-21 RX ADMIN — METOCLOPRAMIDE 10 MG: 5 INJECTION, SOLUTION INTRAMUSCULAR; INTRAVENOUS at 16:28

## 2024-02-21 RX ADMIN — ENOXAPARIN SODIUM 40 MG: 100 INJECTION SUBCUTANEOUS at 08:13

## 2024-02-21 RX ADMIN — ACETAMINOPHEN 1000 MG: 160 SOLUTION ORAL at 08:12

## 2024-02-21 RX ADMIN — CETIRIZINE HYDROCHLORIDE 5 MG: 5 SOLUTION ORAL at 08:13

## 2024-02-21 RX ADMIN — FLUOXETINE HYDROCHLORIDE 20 MG: 20 SOLUTION ORAL at 08:13

## 2024-02-21 RX ADMIN — METRONIDAZOLE 500 MG: 500 INJECTION, SOLUTION INTRAVENOUS at 21:22

## 2024-02-21 RX ADMIN — HYDROMORPHONE HYDROCHLORIDE 0.5 MG: 1 INJECTION, SOLUTION INTRAMUSCULAR; INTRAVENOUS; SUBCUTANEOUS at 12:44

## 2024-02-21 RX ADMIN — Medication 3 ML: at 21:24

## 2024-02-21 RX ADMIN — OXYCODONE HYDROCHLORIDE 7.5 MG: 5 SOLUTION ORAL at 14:37

## 2024-02-21 RX ADMIN — METRONIDAZOLE 500 MG: 500 INJECTION, SOLUTION INTRAVENOUS at 12:45

## 2024-02-21 RX ADMIN — DIPHENHYDRAMINE HYDROCHLORIDE AND LIDOCAINE HYDROCHLORIDE AND ALUMINUM HYDROXIDE AND MAGNESIUM HYDRO 5 ML: KIT at 21:22

## 2024-02-21 RX ADMIN — DIPHENHYDRAMINE HYDROCHLORIDE AND LIDOCAINE HYDROCHLORIDE AND ALUMINUM HYDROXIDE AND MAGNESIUM HYDRO 5 ML: KIT at 12:45

## 2024-02-21 RX ADMIN — OXYCODONE HYDROCHLORIDE 7.5 MG: 5 SOLUTION ORAL at 05:48

## 2024-02-21 RX ADMIN — ACETAMINOPHEN 1000 MG: 160 SOLUTION ORAL at 21:22

## 2024-02-21 NOTE — TELEPHONE ENCOUNTER
Pt wife notified me that pt is still in the hospital and is supposed to be released tomorrow. Pt wife also requested a letter from us for pt employer which was sent through my chart. Pt wife confirmed appt for 2/27/2024 and will receive CXR prior to arrival    DB 1:08  2/27/2024

## 2024-02-21 NOTE — DISCHARGE INSTRUCTIONS
Post-op Esophagectomy Discharge Instructions    1. Activity:  Climb stairs as tolerated  May drive car after 2 weeks or as directed by Physician  Walk at least 3-4 times a day  Limit lifting for first 6 weeks. No lifting, pushing, or pulling greater than 10 pounds till seen by MD.  Continue to use your incentive spirometer at least 4 times per day.    2. Nutrition:  Tube feeds only. Strict nothing by mouth.  All meds should be crushed or given in liquid formulation via j-tube.      3. Incision (wound) Care:  Remove dressing after 48 hours  If continued drainage, change dressing every 24 hours and as needed.  May shower after discharge.  Wash around incision area with soap and water daily.  No lotions or creams on incision area.  Take temperature daily for the first week after discharge.     4. When to call for Medical Advise:  Fever greater than 101 degrees  Unusual or severe pain  Difficulty breathing  Incision changes (redness, swelling, or increased drainage)  Any questions or problems    5. Medication Instructions:  Take Pain medications as directed to stay comfortable  No driving or drinking alcohol when taking prescribed pain meds  Laxative of choice if needed for constipation.    6. Medication and dosages:  See discharge medication instruction form

## 2024-02-21 NOTE — PLAN OF CARE
Goal Outcome Evaluation:      VSS. RA. Wife @ bedside. Ax standby to toilet. L NG tube @ 53 cm, Q2 hour switch btwn clamped & int. LWS. Flushed w/ 30cc sterile water Q8 hr. LLQ J-tube drx changed, purulent tan drainge, redness around opening, cleansed w/ sterile water. Peptamen 1.5 TF @ 75 mL/hr w/ water flush @ 30 mL/4 hr, on cyclic TF from 5PM-11AM. L flank and abdomen incisions AMOL, CDI, painted / peroxide & betadine. PRN brittany x2, dilaudid x1, xanax x1.

## 2024-02-21 NOTE — PLAN OF CARE
Goal Outcome Evaluation:  Plan of Care Reviewed With: patient        Progress: no change  Outcome Evaluation: sinus rhythm, room air, A/Ox4, stand-by assist to bathroom. J-tube in LLQ infusing Peptamen 1.5 @ 75mL/hr, 30mL water flush q4h, cyclic feeding from [id number]. d/c L NG tube per APRN. Plans to swallow eval tomorrow per [facility]. PRN Oxycodone, Diludad, and Xanax given during shift. Plans to d/c home with Infusion pending medical clearance, family to transport.

## 2024-02-21 NOTE — PROGRESS NOTES
"  POST-OPERATIVE NOTE     Chief Complaint: esophageal carcinoma, postoperative care  S/P: Vinicio Shayne esophagectomy  POD # 11  S/P: Esophagogastroduodenoscopy with stent placement  POD # 4    Subjective:  Symptoms:  Stable.  He reports anxiety.  No cough.    Diet:  NPO.  No nausea or vomiting.    Pain:  He complains of pain that is mild.    Resting comfortably in bed.  Pain appropriately controlled.    Objective:  General Appearance:  Comfortable and in no acute distress.    Vital signs: (most recent): Blood pressure 103/71, pulse 87, temperature 97.5 °F (36.4 °C), temperature source Oral, resp. rate 16, height 177.8 cm (70\"), weight 62.4 kg (137 lb 9.1 oz), SpO2 96%.  Vital signs are normal.    Output: Producing urine and producing stool.    HEENT: Normal HEENT exam.    Lungs:  Normal effort and normal respiratory rate.  No rales, wheezes or rhonchi.    Heart: Normal rate.  Regular rhythm.    Chest: Chest wall tenderness present.    Abdomen: Abdomen is non-distended.  Bowel sounds are normal.   There is incisional tenderness.    Extremities: Normal range of motion.    Neurological: Patient is alert and oriented to person, place and time.    Skin:  Warm and dry.                NG Tube:   24 Hour Total: 100 cc    Results Review:     I reviewed the patient's new clinical results.  I reviewed the patient's new imaging results and agree with the interpretation.  I reviewed the patient's other test results and agree with the interpretation  Discussed with patient, spouse at bedside, RN and Dr. Noonan.  As well as Dr. Hurtado    Assessment & Plan     Final surgical pathology ypT3 N0 poorly differentiated adenocarcinoma with near complete response.   CXR reviewed. Stent is in similar position.  NGT output decreased today.  Tube removed at the bedside without difficulty.  We will plan for bedside swallow tomorrow.    Continue cyclic feeds.   HOB 30 degrees at all times. Aspiration precautions.  Antibiotics per infectious disease " service. Appreciate their assistance.    Hopefully home in the next 1 to 2 days.    Dorothy Calero DNP, APRN  [facility]  02/21/24  10:40 EST    Patient was seen and assessed while wearing personal protective equipment including facemask, protective eyewear and gloves.  Hand hygiene performed prior to entering the room and upon exiting with doffing of gloves.

## 2024-02-21 NOTE — PAYOR COMM NOTE
"Alfreda Lebron (53 y.o. Male)     PLEASE SEE ATTACHED FOR CONTINUED INPT STAY DAYS    REF #   [id number]     PLEASE CALL AGNES SAMAYOA RN/ DEPT @ [phone]   OR [phone]    THANK YOU  AGNES SAMAYOA RN  [facility]        Date of Birth   1970    Social Security Number       Address   [address] [place] [address]    Home Phone   [phone]    MRN   [id number]       Scientologist   Mosque    Marital Status                               Admission Date   2/9/24    Admission Type   Elective    Admitting Provider   Saurabh Hurtado MD    Attending Provider   Saurabh Hurtado MD    Department, Room/Bed   [facility] 5 EAST, E559/1       Discharge Date       Discharge Disposition       Discharge Destination                                 Attending Provider: Saurabh Hurtado MD    Allergies: Cephalosporins, Dye  [Contrast Dye (Echo Or Unknown Ct/mr)], Iodinated Contrast Media, Sulfa Antibiotics, Sulfate, Zetia [Ezetimibe], Gabapentin, Statins    Isolation: None   Infection: None   Code Status: CPR    Ht: 177.8 cm (70\")   Wt: 62.4 kg (137 lb 9.1 oz)    Admission Cmt: None   Principal Problem: Malignant neoplasm of lower third of esophagus [C15.5]                   Active Insurance as of 2/9/2024       Primary Coverage       Payor Plan Insurance Group Employer/Plan Group    [facility] [employer] [facility] [employer] [facility] PPO [id number]       Payor Plan Address Payor Plan Phone Number Payor Plan Fax Number Effective Dates    [id number] [phone]  1/1/2013 - None Entered    [place] [address]         Subscriber Name Subscriber Birth Date Member ID       ALFREDA LEBRON 1970 [id number]                     Emergency Contacts        (Rel.) Home Phone Work Phone Mobile Phone    MINERVA LEBRON (Spouse) [phone] -- [phone]    Roopa Lott (Sister) [phone] -- [phone]              [place]: NPI [id number]  Tax ID [id number]     Physician " "Progress Notes (last 72 hours)        Hero Sury MARIO, APRN at 02/20/24 1011       Attestation signed by Mika Noonan MD PhD at 02/20/24 1602    I have reviewed this documentation and agree.                    POST-OPERATIVE NOTE     Chief Complaint: esophageal carcinoma, postoperative care  S/P: [place] Shayne esophagectomy  POD # 10  S/P: Esophagogastroduodenoscopy with stent placement  POD # 3    Subjective:  Symptoms:  Stable.  He reports anxiety.    Diet:  NPO.  No nausea or vomiting.    Pain:  He complains of pain that is mild.        Objective:  General Appearance:  Comfortable and in no acute distress.    Vital signs: (most recent): Blood pressure 107/69, pulse 90, temperature 97.9 °F (36.6 °C), temperature source Oral, resp. rate 18, height 177.8 cm (70\"), weight 62.3 kg (137 lb 5.6 oz), SpO2 98%.  Vital signs are normal.    Output: Producing urine and producing stool.    HEENT: Normal HEENT exam.    Lungs:  Normal effort and normal respiratory rate.  No rales, wheezes or rhonchi.    Heart: Normal rate.  Regular rhythm.    Chest: Chest wall tenderness present.    Abdomen: Abdomen is non-distended.  Bowel sounds are normal.   There is incisional tenderness.    Extremities: Normal range of motion.    Neurological: Patient is alert and oriented to person, place and time.    Skin:  Warm and dry.                NG Tube:   24 Hour Total: 500 cc    Results Review:     I reviewed the patient's new clinical results.  I reviewed the patient's new imaging results and agree with the interpretation.  I reviewed the patient's other test results and agree with the interpretation  Discussed with patient, spouse at bedside, RN and Dr. Noonan.    Assessment & Plan     Final surgical pathology ypT3 N0 poorly differentiated adenocarcinoma with near complete response.   CXR reviewed. Stent similar position.  NGT output decreased today.  Continue with same regimen of 2 hours on intermittent low wall suction with 2 hours " clamped.  Continue cyclic feeds.   HOB 30 degrees at all times. Aspiration precautions.  Antibiotics per infectious disease service. Appreciate their assistance.    RUBINA Rome  [facility]  02/20/24  10:11 EST    Patient was seen and assessed while wearing personal protective equipment including facemask, protective eyewear and gloves.  Hand hygiene performed prior to entering the room and upon exiting with doffing of gloves.         Electronically signed by Mika Noonan MD PhD at 02/20/24 1602       Marin Varela DO at 02/20/24 1008           LOS: 11 days     Chief Complaint: Leukocytosis    Interval History: Patient remains afebrile with improving WBC down to 11.  Tolerating antibiotics.    Vital Signs  Temp:  [97.4 °F (36.3 °C)-98 °F (36.7 °C)] 98 °F (36.7 °C)  Heart Rate:  [] 81  Resp:  [16-18] 18  BP: ()/(67-85) 105/70    Physical Exam:  General: In no acute distress  HEENT: NG tube in place  Respiratory: Normal work of breathing  GI: Soft, NT  Skin: PEG tube site clean and intact with no surrounding erythema  Extremities: No edema, cyanosis  Access: Peripheral IV.  Implantable port.    Antibiotics:  Anti-Infectives (From admission, onward)      Ordered     Dose/Rate Route Frequency Start Stop    02/16/24 1838  levoFLOXacin (LEVAQUIN) 750 mg/150 mL D5W (premix) (LEVAQUIN) 750 mg        Ordering Provider: Saurabh Hurtado MD    750 mg  100 mL/hr over 90 Minutes Intravenous Every 24 Hours 02/16/24 1930 02/23/24 2159 02/16/24 1838  metroNIDAZOLE (FLAGYL) IVPB 500 mg        Ordering Provider: Saurabh Hurtado MD    500 mg  200 mL/hr over 30 Minutes Intravenous Every 8 Hours 02/16/24 1930 02/23/24 2059 02/16/24 1815  fluconazole (DIFLUCAN) IVPB 400 mg        Ordering Provider: Saurabh Hurtado MD    400 mg  100 mL/hr over 120 Minutes Intravenous Every 24 Hours 02/16/24 1915 02/23/24 2159 02/09/24 0611  vancomycin (VANCOCIN) 1000 mg/200 mL dextrose 5% IVPB         Ordering Provider: Saurabh Hurtado MD    15 mg/kg × 72.1 kg Intravenous Once 02/09/24 0613 02/09/24 0703             Results Review:     I reviewed the patient's new clinical results.    Lab Results   Component Value Date    WBC 11.62 (H) 02/20/2024    HGB 10.6 (L) 02/20/2024    HCT 31.8 (L) 02/20/2024    .6 (H) 02/20/2024     02/20/2024     Lab Results   Component Value Date    GLUCOSE 128 (H) 02/20/2024    BUN 27 (H) 02/20/2024    CREATININE 0.48 (L) 02/20/2024    EGFRIFNONA 67 02/24/2022    BCR 56.3 (H) 02/20/2024    CO2 27.6 02/20/2024    CALCIUM 9.0 02/20/2024    ALBUMIN 3.0 (L) 02/17/2024    LABIL2 1.3 09/25/2018    AST 18 02/01/2024    ALT 17 02/01/2024       Microbiology:  2/16 MRSA nares negative  2/16 blood cultures no growth to date    Assessment    #Leukocytosis  #Esophageal carcinoma status post esophagectomy 2/9  #Abdominal wall cellulitis at G-tube site    Blood cultures have remained no growth and clinically improving with resolving leukocytosis.  Continue levofloxacin 750 mg daily plus Flagyl 500 mg 3 times daily through 2/22.  Could be transition to oral formulations of discharge.    Thank you for allowing me to be involved in the care of this patient. Infectious diseases will sign off at this time with antibiotics plan in place, but please call me at [phone] if any further ID questions or new ID concerns.        Electronically signed by Marin Varela DO at 02/20/24 1010       Sury Monahan APRN at 02/19/24 1150       Attestation signed by Mika Noonan MD PhD at 02/20/24 1247    I have reviewed this documentation and agree.                    POST-OPERATIVE NOTE     Chief Complaint: esophageal carcinoma, postoperative care  S/P: [place] Shayne esophagectomy  POD # 9  S/P: Esophagogastroduodenoscopy with stent placement  POD # 2    Subjective:  Symptoms:  Stable.  He reports anxiety.    Diet:  NPO.  No nausea or vomiting.    Pain:  He complains of pain that is mild.   "      Objective:  General Appearance:  Comfortable and in no acute distress.    Vital signs: (most recent): Blood pressure 105/70, pulse 81, temperature 98 °F (36.7 °C), temperature source Oral, resp. rate 18, height 177.8 cm (70\"), weight 62.3 kg (137 lb 5.6 oz), SpO2 97%.  Vital signs are normal.    Output: Producing urine and producing stool.    HEENT: Normal HEENT exam.    Lungs:  Normal effort and normal respiratory rate.  No rales, wheezes or rhonchi.    Heart: Normal rate.  Regular rhythm.    Chest: Chest wall tenderness present.    Abdomen: Abdomen is non-distended.  Bowel sounds are normal.   There is incisional tenderness.    Extremities: Normal range of motion.    Neurological: Patient is alert and oriented to person, place and time.    Skin:  Warm and dry.                NG Tube:   24 Hour Total: 900 cc    REYNALDO Drain:  24 Hour Total: 25 cc    Results Review:     I reviewed the patient's new clinical results.  I reviewed the patient's new imaging results and agree with the interpretation.  I reviewed the patient's other test results and agree with the interpretation  Discussed with patient, spouse at bedside, RN and Dr. Noonan.    Assessment & Plan     Final surgical pathology ypT3 N0 poorly differentiated adenocarcinoma with near complete response.   CXR reviewed. Stent similar position, possible with slight distal migration. Follow up with AM CXR.   REYNALDO drain out today.   NGT to intermittent LWS 2 hours on, 2 hours clamped. Trend output.   Continue cyclic feeds.   HOB 30 degrees at all times. Aspiration precautions.  Antibiotics per infectious disease service. Appreciate their assistance.    RUBINA Rome  [facility]  02/20/24  10:01 EST    Patient was seen and assessed while wearing personal protective equipment including facemask, protective eyewear and gloves.  Hand hygiene performed prior to entering the room and upon exiting with doffing of gloves.         Electronically signed " by Mika Noonan MD PhD at 02/20/24 1247       Marin Varela, DO at 02/19/24 0911           LOS: 10 days     Chief Complaint: Leukocytosis    Interval History: Patient reports doing well this morning.  Denies any fevers or chills.  Tolerating antibiotics.  On 2 L nasal cannula.  WBC down to 13.    Vital Signs  Temp:  [97.6 °F (36.4 °C)-98.2 °F (36.8 °C)] 97.6 °F (36.4 °C)  Heart Rate:  [88-99] 96  Resp:  [10-16] 16  BP: ()/(67-77) 120/77    Physical Exam:  General: In no acute distress  HEENT: NG tube in place  Respiratory: Normal work of breathing  GI: Soft, NT  Skin: PEG tube site clean and intact with no surrounding erythema  Extremities: No edema, cyanosis  Access: Peripheral IV.  Implantable port.    Antibiotics:  Anti-Infectives (From admission, onward)      Ordered     Dose/Rate Route Frequency Start Stop    02/16/24 1838  levoFLOXacin (LEVAQUIN) 750 mg/150 mL D5W (premix) (LEVAQUIN) 750 mg        Ordering Provider: Saurabh Hurtado MD    750 mg  100 mL/hr over 90 Minutes Intravenous Every 24 Hours 02/16/24 1930 02/23/24 2159 02/16/24 1838  metroNIDAZOLE (FLAGYL) IVPB 500 mg        Ordering Provider: Saurabh Hurtado MD    500 mg  200 mL/hr over 30 Minutes Intravenous Every 8 Hours 02/16/24 1930 02/23/24 2059 02/16/24 1815  fluconazole (DIFLUCAN) IVPB 400 mg        Ordering Provider: Saurabh Hurtado MD    400 mg  100 mL/hr over 120 Minutes Intravenous Every 24 Hours 02/16/24 1915 02/23/24 2159 02/09/24 0611  vancomycin (VANCOCIN) 1000 mg/200 mL dextrose 5% IVPB        Ordering Provider: Saurabh Hurtado MD    15 mg/kg × 72.1 kg Intravenous Once 02/09/24 0613 02/09/24 0703             Results Review:     I reviewed the patient's new clinical results.    Lab Results   Component Value Date    WBC 13.78 (H) 02/19/2024    HGB 9.7 (L) 02/19/2024    HCT 29.1 (L) 02/19/2024    MCV 97.3 (H) 02/19/2024     02/19/2024     Lab Results   Component Value Date    GLUCOSE 113 (H) 02/19/2024    BUN 28 (H)  "02/19/2024    CREATININE 0.51 (L) 02/19/2024    EGFRIFNONA 67 02/24/2022    BCR 54.9 (H) 02/19/2024    CO2 27.7 02/19/2024    CALCIUM 9.2 02/19/2024    ALBUMIN 3.0 (L) 02/17/2024    LABIL2 1.3 09/25/2018    AST 18 02/01/2024    ALT 17 02/01/2024       Microbiology:  2/16 MRSA nares negative  2/16 blood cultures no growth to date    Assessment    #Leukocytosis  #Esophageal carcinoma status post esophagectomy 2/9  #Abdominal wall cellulitis at G-tube site    Blood cultures no growth to date.  Leukocytosis improving.  Continue levofloxacin 750 mg daily plus Flagyl 500 mg 3 times daily.    ID will follow.       Electronically signed by Marin Varela, DO at 02/19/24 0913       Sury Monahan APRN at 02/18/24 1031       Attestation signed by Mika Noonan MD PhD at 02/18/24 1107    I have reviewed this documentation and agree.                    POST-OPERATIVE NOTE     Chief Complaint: esophageal carcinoma, postoperative care  S/P: [place] Shayne esophagectomy  POD # 8  S/P: Esophagogastroduodenoscopy with stent placement  POD # 1    Subjective:  Symptoms:  Stable.  He reports weakness and anxiety.  No shortness of breath or chest pressure.    Diet:  NPO.  No nausea or vomiting.    Activity level: Impaired due to weakness.    Pain:  He complains of pain that is mild.  He reports pain is improving.  Pain is well controlled.    More anxious today.    Objective:  General Appearance:  Comfortable, well-appearing, in no acute distress and not in pain.    Vital signs: (most recent): Blood pressure 109/74, pulse 94, temperature 98 °F (36.7 °C), temperature source Oral, resp. rate 12, height 177.8 cm (70\"), weight 62.5 kg (137 lb 12.6 oz), SpO2 96%.  Vital signs are normal.  No fever.    Output: Producing urine and no stool output.    HEENT: (NG tube sutured)    Lungs:  Normal effort and normal respiratory rate.  He is not in respiratory distress.  There are decreased breath sounds.  No rales, wheezes or rhonchi.  "   Heart: Normal rate.  Regular rhythm.    Chest: Symmetric chest wall expansion. Chest wall tenderness present.    Abdomen: Abdomen is soft and non-distended.  (J-tube intact).  Hypoactive bowel sounds.   There is incisional tenderness.    Extremities: Normal range of motion.    Neurological: Patient is alert and oriented to person, place and time.    Skin:  Warm and dry.              REYNALDO Drain: 40ml  NG tube: 450ml    Results Review:     I reviewed the patient's new clinical results.  I reviewed the patient's new imaging results and agree with the interpretation.  Discussed with patient, RN, family at bedside and  Dr. Hurtado.    Assessment & Plan     Mr. Lebron is POD 9 s/p [place]-Shayne esophagectomy.    He underwent EGD with stent placement yesterday due to some concern for possible defect at the anastomosis.  Appreciate assistance from infectious disease service regarding antibiotic management.  Continue with Flagyl, levofloxacin and diflucan.  Patient is improved today.  He has decreased drainage from his NG tube and REYNALDO drain.  Continue NG tube to intermittent low wall suction.  REYNALDO to drain to gravity (do not compress bulb and allow to drain passively).  Is having bowel movements and passing gas.  Continue cyclic tube feeds.  Going to restart his home dose of Xanax via jejunostomy to help with his anxiety.  Continue aspiration precautions and ensure head of bed remains greater than 30 degrees at all times given extreme risk for aspiration.  This was discussed the patient and his primary RN.  Trend a.m. labs and obtain a.m. chest x-ray, replace electrolytes per protocol if needed.  Final surgical pathology ypT3 N0 poorly differentiated adenocarcinoma with near complete response.    RUBINA Rome  [facility]  02/18/24  10:31 EST    Patient was seen and assessed while wearing personal protective equipment including facemask, protective eyewear and gloves.  Hand hygiene performed prior to entering  the room and upon exiting with doffing of gloves.         Electronically signed by Mika Noonan MD PhD at 02/18/24 3332

## 2024-02-22 ENCOUNTER — APPOINTMENT (OUTPATIENT)
Dept: GENERAL RADIOLOGY | Facility: HOSPITAL | Age: 54
DRG: 375 | End: 2024-02-22
Payer: COMMERCIAL

## 2024-02-22 LAB
ANION GAP SERPL CALCULATED.3IONS-SCNC: 7 MMOL/L (ref 5–15)
BUN SERPL-MCNC: 25 MG/DL (ref 6–20)
BUN/CREAT SERPL: 48.1 (ref 7–25)
CALCIUM SPEC-SCNC: 8.9 MG/DL (ref 8.6–10.5)
CHLORIDE SERPL-SCNC: 104 MMOL/L (ref 98–107)
CO2 SERPL-SCNC: 28 MMOL/L (ref 22–29)
CREAT SERPL-MCNC: 0.52 MG/DL (ref 0.76–1.27)
DEPRECATED RDW RBC AUTO: 56.4 FL (ref 37–54)
EGFRCR SERPLBLD CKD-EPI 2021: 122 ML/MIN/1.73
ERYTHROCYTE [DISTWIDTH] IN BLOOD BY AUTOMATED COUNT: 15.6 % (ref 12.3–15.4)
GLUCOSE BLDC GLUCOMTR-MCNC: 112 MG/DL (ref 70–130)
GLUCOSE BLDC GLUCOMTR-MCNC: 112 MG/DL (ref 70–130)
GLUCOSE BLDC GLUCOMTR-MCNC: 131 MG/DL (ref 70–130)
GLUCOSE SERPL-MCNC: 141 MG/DL (ref 65–99)
HCT VFR BLD AUTO: 33.7 % (ref 37.5–51)
HGB BLD-MCNC: 11.4 G/DL (ref 13–17.7)
MAGNESIUM SERPL-MCNC: 2.3 MG/DL (ref 1.6–2.6)
MCH RBC QN AUTO: 33.8 PG (ref 26.6–33)
MCHC RBC AUTO-ENTMCNC: 33.8 G/DL (ref 31.5–35.7)
MCV RBC AUTO: 100 FL (ref 79–97)
PLATELET # BLD AUTO: 437 10*3/MM3 (ref 140–450)
PMV BLD AUTO: 8.8 FL (ref 6–12)
POTASSIUM SERPL-SCNC: 4.4 MMOL/L (ref 3.5–5.2)
RBC # BLD AUTO: 3.37 10*6/MM3 (ref 4.14–5.8)
SODIUM SERPL-SCNC: 139 MMOL/L (ref 136–145)
WBC NRBC COR # BLD AUTO: 10.25 10*3/MM3 (ref 3.4–10.8)

## 2024-02-22 PROCEDURE — 82948 REAGENT STRIP/BLOOD GLUCOSE: CPT

## 2024-02-22 PROCEDURE — 85027 COMPLETE CBC AUTOMATED: CPT | Performed by: SURGERY

## 2024-02-22 PROCEDURE — 25010000002 ENOXAPARIN PER 10 MG: Performed by: SURGERY

## 2024-02-22 PROCEDURE — 25010000002 LEVOFLOXACIN PER 250 MG: Performed by: STUDENT IN AN ORGANIZED HEALTH CARE EDUCATION/TRAINING PROGRAM

## 2024-02-22 PROCEDURE — 71045 X-RAY EXAM CHEST 1 VIEW: CPT

## 2024-02-22 PROCEDURE — 25010000002 METRONIDAZOLE 500 MG/100ML SOLUTION: Performed by: STUDENT IN AN ORGANIZED HEALTH CARE EDUCATION/TRAINING PROGRAM

## 2024-02-22 PROCEDURE — 80048 BASIC METABOLIC PNL TOTAL CA: CPT | Performed by: SURGERY

## 2024-02-22 PROCEDURE — 25010000002 METOCLOPRAMIDE PER 10 MG: Performed by: SURGERY

## 2024-02-22 PROCEDURE — 25010000002 HYDROMORPHONE PER 4 MG: Performed by: SURGERY

## 2024-02-22 PROCEDURE — 99024 POSTOP FOLLOW-UP VISIT: CPT | Performed by: NURSE PRACTITIONER

## 2024-02-22 PROCEDURE — 0 DIATRIZOATE MEGLUMINE & SODIUM PER 1 ML: Performed by: SURGERY

## 2024-02-22 PROCEDURE — 74220 X-RAY XM ESOPHAGUS 1CNTRST: CPT

## 2024-02-22 PROCEDURE — 83735 ASSAY OF MAGNESIUM: CPT | Performed by: SURGERY

## 2024-02-22 RX ADMIN — FLUOXETINE HYDROCHLORIDE 20 MG: 20 SOLUTION ORAL at 08:57

## 2024-02-22 RX ADMIN — METRONIDAZOLE 500 MG: 500 INJECTION, SOLUTION INTRAVENOUS at 14:37

## 2024-02-22 RX ADMIN — METRONIDAZOLE 500 MG: 500 INJECTION, SOLUTION INTRAVENOUS at 21:56

## 2024-02-22 RX ADMIN — PANTOPRAZOLE SODIUM 40 MG: 40 INJECTION, POWDER, FOR SOLUTION INTRAVENOUS at 06:05

## 2024-02-22 RX ADMIN — CETIRIZINE HYDROCHLORIDE 5 MG: 5 SOLUTION ORAL at 08:57

## 2024-02-22 RX ADMIN — OXYCODONE HYDROCHLORIDE 7.5 MG: 5 SOLUTION ORAL at 02:03

## 2024-02-22 RX ADMIN — OXYCODONE HYDROCHLORIDE 7.5 MG: 5 SOLUTION ORAL at 21:48

## 2024-02-22 RX ADMIN — LEVOFLOXACIN 750 MG: 5 INJECTION, SOLUTION INTRAVENOUS at 22:00

## 2024-02-22 RX ADMIN — ACETAMINOPHEN 1000 MG: 160 SOLUTION ORAL at 08:57

## 2024-02-22 RX ADMIN — DIPHENHYDRAMINE HYDROCHLORIDE AND LIDOCAINE HYDROCHLORIDE AND ALUMINUM HYDROXIDE AND MAGNESIUM HYDRO 5 ML: KIT at 06:08

## 2024-02-22 RX ADMIN — METOCLOPRAMIDE 10 MG: 5 INJECTION, SOLUTION INTRAMUSCULAR; INTRAVENOUS at 00:29

## 2024-02-22 RX ADMIN — ENOXAPARIN SODIUM 40 MG: 100 INJECTION SUBCUTANEOUS at 08:59

## 2024-02-22 RX ADMIN — DIPHENHYDRAMINE HYDROCHLORIDE AND LIDOCAINE HYDROCHLORIDE AND ALUMINUM HYDROXIDE AND MAGNESIUM HYDRO 5 ML: KIT at 14:36

## 2024-02-22 RX ADMIN — METOCLOPRAMIDE 10 MG: 5 INJECTION, SOLUTION INTRAMUSCULAR; INTRAVENOUS at 17:29

## 2024-02-22 RX ADMIN — METOPROLOL TARTRATE 5 MG: 1 INJECTION, SOLUTION INTRAVENOUS at 21:48

## 2024-02-22 RX ADMIN — METRONIDAZOLE 500 MG: 500 INJECTION, SOLUTION INTRAVENOUS at 06:07

## 2024-02-22 RX ADMIN — DIATRIZOATE MEGLUMINE AND DIATRIZOATE SODIUM 120 ML: 660; 100 LIQUID ORAL; RECTAL at 12:53

## 2024-02-22 RX ADMIN — OXYCODONE HYDROCHLORIDE 7.5 MG: 5 SOLUTION ORAL at 06:05

## 2024-02-22 RX ADMIN — ALPRAZOLAM 0.25 MG: 0.25 TABLET ORAL at 02:03

## 2024-02-22 RX ADMIN — HYDROMORPHONE HYDROCHLORIDE 0.5 MG: 1 INJECTION, SOLUTION INTRAMUSCULAR; INTRAVENOUS; SUBCUTANEOUS at 00:29

## 2024-02-22 RX ADMIN — METOCLOPRAMIDE 10 MG: 5 INJECTION, SOLUTION INTRAMUSCULAR; INTRAVENOUS at 08:58

## 2024-02-22 RX ADMIN — OXYCODONE HYDROCHLORIDE 7.5 MG: 5 SOLUTION ORAL at 11:16

## 2024-02-22 RX ADMIN — ALPRAZOLAM 0.25 MG: 0.25 TABLET ORAL at 21:47

## 2024-02-22 RX ADMIN — METOPROLOL TARTRATE 5 MG: 1 INJECTION, SOLUTION INTRAVENOUS at 14:37

## 2024-02-22 RX ADMIN — METOCLOPRAMIDE 10 MG: 5 INJECTION, SOLUTION INTRAMUSCULAR; INTRAVENOUS at 23:45

## 2024-02-22 RX ADMIN — Medication 3 ML: at 21:49

## 2024-02-22 RX ADMIN — LIDOCAINE 1 PATCH: 4 PATCH TOPICAL at 08:58

## 2024-02-22 RX ADMIN — ALPRAZOLAM 0.25 MG: 0.25 TABLET ORAL at 11:16

## 2024-02-22 RX ADMIN — ACETAMINOPHEN 1000 MG: 160 SOLUTION ORAL at 16:11

## 2024-02-22 RX ADMIN — OXYCODONE HYDROCHLORIDE 7.5 MG: 5 SOLUTION ORAL at 16:11

## 2024-02-22 RX ADMIN — DIPHENHYDRAMINE HYDROCHLORIDE AND LIDOCAINE HYDROCHLORIDE AND ALUMINUM HYDROXIDE AND MAGNESIUM HYDRO 5 ML: KIT at 00:32

## 2024-02-22 RX ADMIN — ACETAMINOPHEN 1000 MG: 160 SOLUTION ORAL at 21:46

## 2024-02-22 RX ADMIN — Medication 3 ML: at 09:00

## 2024-02-22 RX ADMIN — METOPROLOL TARTRATE 5 MG: 1 INJECTION, SOLUTION INTRAVENOUS at 00:29

## 2024-02-22 NOTE — CONSULTS
"Nutrition Services    Patient Name:  Jourdan Lebron  YOB: 1970  MRN: [id number]  Admit Date:  2/9/2024    Assessment Date:  02/22/24    CLINICAL NUTRITION     Comments: NG is out.  Pt is tolerating Peptamen 1.5 at goal of 75mL//hr plus water flushes x 18 hrs. Yesterday said loose liquidy stools are maybe a little better. Currently off the floor for esophagram. Plan is home with Peptamen 1.5 possibly tomorrow.  Labs: glu 141/112, Hgb 11.4  Last BM 2/21, liquidy.    Recommendations:  Continue Peptamen 1.5 Cyclic feedings (x 18 hrs)  from 5p-11a, goal rate of 75mL/hr.    30mL Free water q 4hr - may need more free water if unable to take po when he goes home  Diet per surgeons    RD to follow tolerance, labs and clinical course.     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, balloon dilation of pylorus on 2/9, GERD, diverticulosis, stroke    Current Issues Not safe for swallowing yet     Current Nutrition Orders & Evaluation of Intake       Oral Nutrition     Food Allergies    Current PO Diet NPO Diet NPO Type: Strict NPO   Supplement    PO Evaluation      % PO Intake       Enteral Nutrition     Enteral Route Jejunostomy    TF Delivery Method Cyclic    Propofol Rate/Kcal     Current TF Order/Rate  Peptamen 1.5 @ 75 mL/hr    TF Goal Rate 75 mL/hr    Current Water Flush 30 mL Q 4 hr    Modular None    TF Residual  n/a - tube is small bowel    TF Tolerance tolerating    TF Observation Verified correct TF and water flush infusing per orders     Anthropometrics          Height    Weight Height: 177.8 cm (70\")  Weight: 62.9 kg (138 lb 10.7 oz) (02/22/24 0500)    BMI kg/m2 Body mass index is 19.9 kg/m².    BMI Category Normal/Healthy (18.4 - 24.9)     Estimated/Assessed Needs        Energy Requirements    Weight for Calculation " 69.5 kg   Method for Estimation  30 kcal/kg   EST Needs (kcal/day) 2,085kcal        Protein Requirements    Weight for Calculation 69.5 kg   EST Protein Needs (g/kg) 1.2 - 1.5 gm/kg   EST Daily Needs (g/day)  g        Fluid Requirements     Method for Estimation 1 mL/kcal, Defer to physician    Estimated Needs (mL/day)          Physical Findings          Physical Appearance alert, oriented,   Oral/Mouth Cavity dry mouth, tooth or teeth missing   Edema  no edema   Gastrointestinal WDL, last bowel movement: 2/20  liquid   Skin  surgical incision: abdomen, chest   Tubes/Drains/Lines jejunostomy    NFPE Not indicated at this time     Labs        Pertinent Labs Reviewed, listed below     Results from last 7 days   Lab Units 02/22/24 0404 02/21/24 0614 02/20/24 0416   SODIUM mmol/L 139 142 143   POTASSIUM mmol/L 4.4 4.3 4.0   CHLORIDE mmol/L 104 105 107   CO2 mmol/L 28.0 25.0 27.6   BUN mg/dL 25* 27* 27*   CREATININE mg/dL 0.52* 0.55* 0.48*   CALCIUM mg/dL 8.9 8.9 9.0   GLUCOSE mg/dL 141* 129* 128*     Results from last 7 days   Lab Units 02/22/24 0404 02/21/24 0614 02/20/24 0416 02/19/24 0332 02/18/24  0523 02/17/24  1154 02/17/24  0437   MAGNESIUM mg/dL 2.3 2.4 2.5   < > 2.3  --  2.1   PHOSPHORUS mg/dL  --   --   --   --  3.1  --  2.8   HEMOGLOBIN g/dL 11.4* 11.5* 10.6*   < > 10.0*   < > 10.7*   HEMATOCRIT % 33.7* 34.5* 31.8*   < > 29.6*   < > 31.2*   WBC 10*3/mm3 10.25 10.62 11.62*   < > 18.83*   < > 15.49*   ALBUMIN g/dL  --   --   --   --   --   --  3.0*    < > = values in this interval not displayed.     Results from last 7 days   Lab Units 02/22/24 0404 02/21/24 0614 02/20/24 0416 02/19/24  0332 02/18/24  0523   PLATELETS 10*3/mm3 437 473* 393 366 321     COVID19   Date Value Ref Range Status   03/01/2022 Not Detected Not Detected - Ref. Range Final     Lab Results   Component Value Date    HGBA1C 5.3 10/29/2021          Medications            Scheduled Medications acetaminophen, 1,000 mg, Per J  Tube, TID  Cetirizine HCl, 5 mg, Per J Tube, Daily  enoxaparin, 40 mg, Subcutaneous, Daily  First Mouthwash (Magic Mouthwash), 5 mL, Swish & Spit, Q6H  FLUoxetine, 20 mg, Per J Tube, Daily  levoFLOXacin, 750 mg, Intravenous, Q24H  Lidocaine, 1 patch, Transdermal, Q24H  metoclopramide, 10 mg, Intravenous, Q8H  metoprolol tartrate, 5 mg, Intravenous, Q6H  metroNIDAZOLE, 500 mg, Intravenous, Q8H  pantoprazole, 40 mg, Intravenous, Q AM  sodium chloride, 3 mL, Intravenous, Q12H        Infusions sodium chloride, 30 mL/hr        PRN Medications   ALPRAZolam    Calcium Replacement - Follow Nurse / BPA Driven Protocol    HYDROmorphone    ipratropium    Magnesium Standard Dose Replacement - Follow Nurse / BPA Driven Protocol    naloxone    nitroglycerin    oxyCODONE    Phosphorus Replacement - Follow Nurse / BPA Driven Protocol    Potassium Replacement - Follow Nurse / BPA Driven Protocol    sodium chloride    sodium chloride    sodium chloride     PES STATEMENT / NUTRITION DIAGNOSIS      Nutrition Dx Problem  Problem: Altered GI Function  Etiology: Medical Diagnosis - esophagus cancer of the lower 3rd  Signs/Symptoms: EN Intake/Delivery    Comment: s/p jejunostomy     PLAN OF CARE  Intervention Goal        Intervention Goal(s) Meet estimated needs, Disease management/therapy, Tolerate TF/PN at goal, Maintain weight, and No significant weight loss     Nutrition Intervention         RD Action Await initiation/advancement of PO diet, Continue to monitor, Care plan reviewed, and Recommend/order: cyclic TF's     Nutrition Prescription          Recommendation       Enteral Prescription:     Enteral Route Jejunostomy    TF Delivery Method Cyclic    Enteral Product Peptamen 1.5    Modular None    Propofol Rate/Kcal     TF Start Rate/Volume  55mL/hr from 5pm-11am    TF Goal Rate/Volume 75 mL/hr    Free Water Flush 30 mL before and after cyclic feeds and x2 during cyclic feeds    TF Provision at Goal:          Calories 2025 kcal,  meets 97 % needs         Protein  92 gm protein, meets 100 % needs         Fluid (mL) 1040 mL free water + 120mL flushes    Prescription Ordered Yes     Monitor/Evaluation        Monitor Per protocol     RD to follow up per protocol.    Electronically signed by:  Cassandra Robles RD  02/22/24 13:05 EST

## 2024-02-22 NOTE — PROGRESS NOTES
"  POST-OPERATIVE NOTE     Chief Complaint: esophageal carcinoma, postoperative care  S/P: Vinicio Shayne esophagectomy  POD # 12  S/P: Esophagogastroduodenoscopy with stent placement  POD # 5    Subjective:  Symptoms:  Stable.  He reports anxiety.  No cough.    Diet:  NPO.  No nausea or vomiting.    Pain:  He complains of pain that is mild.    Resting comfortably in bed.  Pain appropriately controlled.    Objective:  General Appearance:  Comfortable, in no acute distress and well-appearing.    Vital signs: (most recent): Blood pressure 110/77, pulse 83, temperature 97.8 °F (36.6 °C), temperature source Oral, resp. rate 16, height 177.8 cm (70\"), weight 62.9 kg (138 lb 10.7 oz), SpO2 96%.  Vital signs are normal.    Output: Producing urine and producing stool.    HEENT: Normal HEENT exam.    Lungs:  Normal effort and normal respiratory rate.  He is not in respiratory distress.  No rales, wheezes or rhonchi.    Heart: Normal rate.  Regular rhythm.    Chest: Chest wall tenderness present.    Abdomen: Abdomen is non-distended.  (J-tube intact; no purulence or erythema).  Bowel sounds are normal.   There is incisional tenderness.    Extremities: Normal range of motion.    Neurological: Patient is alert and oriented to person, place and time.    Skin:  Warm and dry.                    Results Review:     I reviewed the patient's new clinical results.  I reviewed the patient's new imaging results and agree with the interpretation.  I reviewed the patient's other test results and agree with the interpretation  Discussed with patient, spouse at bedside, RN and Dr. Noonan.  As well as Dr. Hurtado    Assessment & Plan     Final surgical pathology ypT3 N0 poorly differentiated adenocarcinoma with near complete response.   CXR reviewed. Stent is in similar position.  Plan for esophagram this afternoon.  Leukocytosis and improved.  Last dose of levofloxacin and Flagyl is today.  Appreciate ID input in regards to plan for " discharge.  Continue cyclic feeds.   HOB 30 degrees at all times. Aspiration precautions.      Hopefully home tomorrow pending stable course.  Home health has been ordered to assist with tube feeds at home.    Dorothy Calero DNP, APRN  [facility]  02/22/24  11:46 EST    Patient was seen and assessed while wearing personal protective equipment including facemask, protective eyewear and gloves.  Hand hygiene performed prior to entering the room and upon exiting with doffing of gloves.

## 2024-02-22 NOTE — PLAN OF CARE
Goal Outcome Evaluation:  Plan of Care Reviewed With: patient        Progress: no change  Outcome Evaluation: Monitor pain,labs,and vitals. VSS. Room air. IV ABX. Cyclic tube feeds 5p-11am. patient tolerating tube feeds well. Pain controlled with PRN medications per orders. Esophagram completed today. Possible DC home tomorrow. Will continue to monitor.

## 2024-02-22 NOTE — PLAN OF CARE
Goal Outcome Evaluation:   A&O X4, VSS, tolerating cyclic TF. C/O pain intermittently and not sleeping well. PRN medications administered, refer to MAR. Incisional sites CDI. Standby assist, wife at bedside.

## 2024-02-23 ENCOUNTER — APPOINTMENT (OUTPATIENT)
Dept: GENERAL RADIOLOGY | Facility: HOSPITAL | Age: 54
DRG: 375 | End: 2024-02-23
Payer: COMMERCIAL

## 2024-02-23 ENCOUNTER — READMISSION MANAGEMENT (OUTPATIENT)
Dept: CALL CENTER | Facility: HOSPITAL | Age: 54
End: 2024-02-23
Payer: COMMERCIAL

## 2024-02-23 VITALS
DIASTOLIC BLOOD PRESSURE: 74 MMHG | BODY MASS INDEX: 20.2 KG/M2 | HEIGHT: 70 IN | HEART RATE: 85 BPM | OXYGEN SATURATION: 96 % | RESPIRATION RATE: 18 BRPM | WEIGHT: 141.09 LBS | TEMPERATURE: 97.9 F | SYSTOLIC BLOOD PRESSURE: 108 MMHG

## 2024-02-23 LAB
ANION GAP SERPL CALCULATED.3IONS-SCNC: 8.3 MMOL/L (ref 5–15)
BUN SERPL-MCNC: 20 MG/DL (ref 6–20)
BUN/CREAT SERPL: 33.9 (ref 7–25)
CALCIUM SPEC-SCNC: 8.9 MG/DL (ref 8.6–10.5)
CHLORIDE SERPL-SCNC: 100 MMOL/L (ref 98–107)
CO2 SERPL-SCNC: 28.7 MMOL/L (ref 22–29)
CREAT SERPL-MCNC: 0.59 MG/DL (ref 0.76–1.27)
DEPRECATED RDW RBC AUTO: 57.6 FL (ref 37–54)
EGFRCR SERPLBLD CKD-EPI 2021: 117.5 ML/MIN/1.73
ERYTHROCYTE [DISTWIDTH] IN BLOOD BY AUTOMATED COUNT: 15.7 % (ref 12.3–15.4)
GLUCOSE BLDC GLUCOMTR-MCNC: 140 MG/DL (ref 70–130)
GLUCOSE SERPL-MCNC: 107 MG/DL (ref 65–99)
HCT VFR BLD AUTO: 37.2 % (ref 37.5–51)
HGB BLD-MCNC: 12.3 G/DL (ref 13–17.7)
MAGNESIUM SERPL-MCNC: 2.6 MG/DL (ref 1.6–2.6)
MCH RBC QN AUTO: 33.2 PG (ref 26.6–33)
MCHC RBC AUTO-ENTMCNC: 33.1 G/DL (ref 31.5–35.7)
MCV RBC AUTO: 100.5 FL (ref 79–97)
PLATELET # BLD AUTO: 506 10*3/MM3 (ref 140–450)
PMV BLD AUTO: 8.8 FL (ref 6–12)
POTASSIUM SERPL-SCNC: 4.9 MMOL/L (ref 3.5–5.2)
RBC # BLD AUTO: 3.7 10*6/MM3 (ref 4.14–5.8)
SODIUM SERPL-SCNC: 137 MMOL/L (ref 136–145)
WBC NRBC COR # BLD AUTO: 10.6 10*3/MM3 (ref 3.4–10.8)

## 2024-02-23 PROCEDURE — 99024 POSTOP FOLLOW-UP VISIT: CPT | Performed by: NURSE PRACTITIONER

## 2024-02-23 PROCEDURE — 83735 ASSAY OF MAGNESIUM: CPT | Performed by: SURGERY

## 2024-02-23 PROCEDURE — 80048 BASIC METABOLIC PNL TOTAL CA: CPT | Performed by: SURGERY

## 2024-02-23 PROCEDURE — 85027 COMPLETE CBC AUTOMATED: CPT | Performed by: SURGERY

## 2024-02-23 PROCEDURE — 82948 REAGENT STRIP/BLOOD GLUCOSE: CPT

## 2024-02-23 PROCEDURE — 25010000002 ENOXAPARIN PER 10 MG: Performed by: SURGERY

## 2024-02-23 PROCEDURE — 71045 X-RAY EXAM CHEST 1 VIEW: CPT

## 2024-02-23 PROCEDURE — 25010000002 METOCLOPRAMIDE PER 10 MG: Performed by: SURGERY

## 2024-02-23 RX ORDER — OXYCODONE HCL 5 MG/5 ML
7.5 SOLUTION, ORAL ORAL EVERY 4 HOURS PRN
Qty: 90 ML | Refills: 0 | Status: SHIPPED | OUTPATIENT
Start: 2024-02-23 | End: 2024-02-26 | Stop reason: SDUPTHER

## 2024-02-23 RX ORDER — METOCLOPRAMIDE HYDROCHLORIDE 5 MG/5ML
10 SOLUTION ORAL 3 TIMES DAILY
Qty: 900 ML | Refills: 0 | Status: SHIPPED | OUTPATIENT
Start: 2024-02-23 | End: 2024-02-23 | Stop reason: SDUPTHER

## 2024-02-23 RX ORDER — METOCLOPRAMIDE HYDROCHLORIDE 5 MG/5ML
10 SOLUTION ORAL 3 TIMES DAILY
Qty: 900 ML | Refills: 0 | Status: SHIPPED | OUTPATIENT
Start: 2024-02-23 | End: 2024-03-24

## 2024-02-23 RX ORDER — METOCLOPRAMIDE HYDROCHLORIDE 5 MG/5ML
10 SOLUTION ORAL 3 TIMES DAILY
Status: DISCONTINUED | OUTPATIENT
Start: 2024-02-23 | End: 2024-02-23 | Stop reason: HOSPADM

## 2024-02-23 RX ORDER — ASPIRIN 81 MG/1
81 TABLET, CHEWABLE ORAL DAILY
Start: 2024-02-23

## 2024-02-23 RX ORDER — FLUOXETINE HYDROCHLORIDE 20 MG/1
20 CAPSULE ORAL DAILY
Qty: 30 CAPSULE | Refills: 0 | Status: SHIPPED | OUTPATIENT
Start: 2024-02-23 | End: 2024-03-24

## 2024-02-23 RX ORDER — FLUOXETINE 20 MG/5ML
20 SOLUTION ORAL DAILY
Qty: 150 ML | Refills: 0 | Status: SHIPPED | OUTPATIENT
Start: 2024-02-23 | End: 2024-02-23 | Stop reason: HOSPADM

## 2024-02-23 RX ORDER — ACETAMINOPHEN 160 MG/5ML
1000 SOLUTION ORAL 3 TIMES DAILY
Start: 2024-02-23

## 2024-02-23 RX ORDER — FLUOXETINE 20 MG/5ML
20 SOLUTION ORAL DAILY
Qty: 150 ML | Refills: 0 | Status: SHIPPED | OUTPATIENT
Start: 2024-02-23 | End: 2024-02-23 | Stop reason: SDUPTHER

## 2024-02-23 RX ORDER — ALPRAZOLAM 0.25 MG/1
0.25 TABLET ORAL 3 TIMES DAILY PRN
Qty: 90 TABLET | Refills: 3 | Status: SHIPPED | OUTPATIENT
Start: 2024-02-23

## 2024-02-23 RX ADMIN — ACETAMINOPHEN 1000 MG: 160 SOLUTION ORAL at 09:51

## 2024-02-23 RX ADMIN — Medication 3 ML: at 09:53

## 2024-02-23 RX ADMIN — OXYCODONE HYDROCHLORIDE 7.5 MG: 5 SOLUTION ORAL at 01:40

## 2024-02-23 RX ADMIN — CETIRIZINE HYDROCHLORIDE 5 MG: 5 SOLUTION ORAL at 09:51

## 2024-02-23 RX ADMIN — LIDOCAINE 1 PATCH: 4 PATCH TOPICAL at 09:50

## 2024-02-23 RX ADMIN — FLUOXETINE HYDROCHLORIDE 20 MG: 20 SOLUTION ORAL at 09:51

## 2024-02-23 RX ADMIN — ENOXAPARIN SODIUM 40 MG: 100 INJECTION SUBCUTANEOUS at 09:52

## 2024-02-23 RX ADMIN — METOCLOPRAMIDE 10 MG: 5 INJECTION, SOLUTION INTRAMUSCULAR; INTRAVENOUS at 09:51

## 2024-02-23 RX ADMIN — DIPHENHYDRAMINE HYDROCHLORIDE AND LIDOCAINE HYDROCHLORIDE AND ALUMINUM HYDROXIDE AND MAGNESIUM HYDRO 5 ML: KIT at 06:07

## 2024-02-23 RX ADMIN — OXYCODONE HYDROCHLORIDE 7.5 MG: 5 SOLUTION ORAL at 10:12

## 2024-02-23 RX ADMIN — OXYCODONE HYDROCHLORIDE 7.5 MG: 5 SOLUTION ORAL at 05:50

## 2024-02-23 RX ADMIN — ALPRAZOLAM 0.25 MG: 0.25 TABLET ORAL at 06:07

## 2024-02-23 RX ADMIN — PANTOPRAZOLE SODIUM 40 MG: 40 INJECTION, POWDER, FOR SOLUTION INTRAVENOUS at 05:50

## 2024-02-23 RX ADMIN — DIPHENHYDRAMINE HYDROCHLORIDE AND LIDOCAINE HYDROCHLORIDE AND ALUMINUM HYDROXIDE AND MAGNESIUM HYDRO 5 ML: KIT at 01:40

## 2024-02-23 RX ADMIN — METOPROLOL TARTRATE 5 MG: 1 INJECTION, SOLUTION INTRAVENOUS at 05:51

## 2024-02-23 NOTE — OUTREACH NOTE
Prep Survey      Flowsheet Row Responses   [facility] patient discharged from? [place]   Is LACE score < 7 ? No   Eligibility [facility]   Date of Admission 02/09/24   Date of Discharge 02/23/24   Discharge Disposition Home or Self Care   Discharge diagnosis LAPAROSCOPY, RIGHT THORACOSCOPY CONVERTED TO THORACOTOMY, FEEDING JEJUNOSTOMY TUBE PLACEMENT, INTERCOSTAL NERVE BLOCKS   Does the patient have one of the following disease processes/diagnoses(primary or secondary)? General Surgery   Does the patient have Home health ordered? Yes   What is the Home health agency?  No accepting  agency   Is there a DME ordered? Yes   What DME was ordered? [facility] INFUSION- Tube feeds   Prep survey completed? Yes            Lidia CARMICHAEL - Registered Nurse

## 2024-02-23 NOTE — DISCHARGE SUMMARY
Patient Care Team:  Justa Castano MD as PCP - General (Family Medicine)  Luz Sepulveda PA-C as Physician Assistant (Neurosurgery)  Gonzalo Cruz MD as Consulting Physician (Surgical Oncology)  Anabela Crenshaw MD as Consulting Physician (Endocrinology)  Oliverio Mueller MD as Consulting Physician (Hematology and Oncology)  Nidhi Hollins APRN as Nurse Practitioner (Nurse Practitioner)  Saurabh Hurtado MD as Surgeon (Thoracic Surgery)  Kelsea Carreno RN as Nurse Navigator  Geovany Sabillon MD as Consulting Physician (Radiation Oncology)  Tristan Hayward MD as Cardiologist (Cardiology)    Date of Admission: 2/9/2024   Date of Discharge:  2/23/2024    Discharge Diagnosis: esophageal cancer    Presenting Problem  Malignant neoplasm of lower third of esophagus [C15.5]  Esophageal cancer [C15.9]     History of Present Illness  Jourdan Lebron is a 53 y.o. male who presented with esophageal adenocarcinoma.  He underwent neoadjuvant treatment and follow-up PET imaging was negative for metastatic disease.  He was cleared by cardiology and endocrinology prior to surgery.  Dr. Hurtado recommended surgical resection and the patient agreed to proceed.     Hospital Course  Patient was admitted status post Vinicio Shayne esophagectomy.  For further details, please refer to the operative note.  He initially was sent to the ICU and had a stable course where he was then transferred to  E for convalescence.    Pulmonology and infectious disease were consulted during his stay.  He had increasing leukocytosis on 2/17/2024 and remained afebrile.  He underwent EGD with stent placement on this day due to some concern for defect at the anastomosis.  Infectious disease started him on fluconazole levofloxacin, metronidazole and vancomycin.  He completed full course of antibiotics.  REYNALDO drain was removed on POD 9 and NG tube was removed on POD 11.  He is tolerating cyclic feeds of Peptamen 1.5 at 75 cc an hour from 5 PM to 11 AM.  He  will need to continue on high protein formula given his recent surgery secondary to malignancy.    Esophagram performed on 2/22/2024 and was negative for leak, but did demonstrate retrograde flow of contrast along the distal aspect of the stent making him high risk for anastomotic leak with oral feeds.  He will remain n.p.o. and continue tube feeds as stated above.    Postoperative care instructions have been discussed at length with him and his wife.  Medications have been sent to the pharmacy.  He is hemodynamically stable on room air.  He will follow-up with Dr. Hurtado in clinic on Tuesday, February 27 with a chest x-ray and labs.  Final pathology is ypT3 N0 poorly differentiated adenocarcinoma.    Procedures Performed  Procedure(s):  ESOPHAGOGASTRODUODENOSCOPY WITH STENTING UNDER FLUROSCOPY GUIDENCE WITH ESOPHOGRAM       Consults:   Consults       Date and Time Order Name Status Description    2/17/2024  9:09 AM Inpatient Infectious Diseases Consult Completed     2/9/2024  6:00 PM Inpatient Pulmonology Consult Completed             Pertinent Test Results:     Imaging Results (Last 24 Hours)       Procedure Component Value Units Date/Time    XR Chest 1 View [[id number]] Collected: 02/23/24 0952     Updated: 02/23/24 0957    Narrative:      Portable chest radiograph     HISTORY: Status post esophagectomy and stent placement     TECHNIQUE: Single AP portable radiograph of the chest     COMPARISON: Multiple chest radiographs dating back to 2/21/2024       Impression:      FINDINGS AND IMPRESSION:  A stent overlies the mid upper mediastinum and appears grossly similar  in position to that seen on multiple prior chest radiographs.     Right Port-A-Cath tip terminates over the superior vena cava.     Suggestion of a small right pleural effusion. No pneumothorax is seen.  Previously seen asymmetric right pulmonary mid to lower opacification  has mildly improved. Cardiac silhouette is within normal limits for  size.     This  report was finalized on 2/23/2024 9:54 AM by Dr. Luc Hatch M.D  on Workstation: [id number]       FL ESOPHAGRAM SINGLE CONTRAST [[id number]] Collected: 02/22/24 1319     Updated: 02/22/24 1718    Addenda:        By electronically signing this report, I, the supervising radiologist,  attest that I was not present for the procedure(s) but agree with the  final edited report.     This report was finalized on 2/22/2024 5:15 PM by Dr. Luc Hatch M.D  on Workstation: [id number]     Signed: 02/22/24 1715 by Luc Hatch MD    Narrative:      EXAMINATION: Esophagram Complete Fluoroscopy.     DATE: 2/22/2024     COMPARISON: Chest 1 view 2/22/2024     CLINICAL HISTORY: 53-year-old male with adenocarcinoma of the esophagus  status post distal lower one third esophagectomy 2/9/2024 with  subsequent esophagogastric anastomotic leak status post esophageal stent  2/17/2024.     DETAILS: Administration of Gastrografin contrast was provided to the  patient. Rapid sequence video clips and spot films of the esophagus were  obtained.     *  TOTAL FLUOROSCOPY DOSE: Reference Air Kerma: 36 mGy     FINDINGS:   Esophagus:  Esophageal stent is noted in the distal two thirds of the esophagus.  Gastrografin contrast is seen flowing through the stent without  obstruction. No contrast extravasation is observed, however a retrograde  flow of contrast is demonstrated around the both the anterior and  posterior aspects.     Stomach:  Barium flows readily through the esophagus and into the stomach.          Impression:      1.  The esophageal stent is patent with contrast opacification passing  into the gastric pull-through. However, significant contrast pools at  the distal aspect of the stent with demonstrated retrograde flow of  contrast along the distal aspect of the stent, both posteriorly and  anteriorly, and given patient history, these findings place the patient  at significant risk for anastomotic leak with oral feeds. The  above  findings were discussed with Shae Calero by telephone by Luc Hatch at 1:45 p.m.   on  2/22/2024  .        This report was finalized on 2/22/2024 5:02 PM by Dr. Luc Hatch M.D  on Workstation: [id number]               Lab Results (last 24 hours)       Procedure Component Value Units Date/Time    Magnesium [[id number]]  (Normal) Collected: 02/23/24 0614    Specimen: Blood Updated: 02/23/24 0701     Magnesium 2.6 mg/dL     Basic Metabolic Panel [[id number]]  (Abnormal) Collected: 02/23/24 0614    Specimen: Blood Updated: 02/23/24 0701     Glucose 107 mg/dL      BUN 20 mg/dL      Creatinine 0.59 mg/dL      Sodium 137 mmol/L      Potassium 4.9 mmol/L      Chloride 100 mmol/L      CO2 28.7 mmol/L      Calcium 8.9 mg/dL      BUN/Creatinine Ratio 33.9     Anion Gap 8.3 mmol/L      eGFR 117.5 mL/min/1.73     Narrative:      GFR Normal >60  Chronic Kidney Disease <60  Kidney Failure <15      CBC (No Diff) [[id number]]  (Abnormal) Collected: 02/23/24 0614    Specimen: Blood Updated: 02/23/24 0700     WBC 10.60 10*3/mm3      RBC 3.70 10*6/mm3      Hemoglobin 12.3 g/dL      Hematocrit 37.2 %      .5 fL      MCH 33.2 pg      MCHC 33.1 g/dL      RDW 15.7 %      RDW-SD 57.6 fl      MPV 8.8 fL      Platelets 506 10*3/mm3     POC Glucose Once [[id number]]  (Abnormal) Collected: 02/23/24 0656    Specimen: Blood Updated: 02/23/24 0658     Glucose 140 mg/dL     POC Glucose Once [[id number]]  (Abnormal) Collected: 02/22/24 2321    Specimen: Blood Updated: 02/22/24 2323     Glucose 131 mg/dL     POC Glucose Once [[id number]]  (Normal) Collected: 02/22/24 1147    Specimen: Blood Updated: 02/22/24 1148     Glucose 112 mg/dL               Condition on Discharge:  stable    Vital Signs  Temp:  [97.8 °F (36.6 °C)-98.3 °F (36.8 °C)] 97.9 °F (36.6 °C)  Heart Rate:  [80-94] 85  Resp:  [16-18] 18  BP: ()/(69-77) 108/74    Physical Exam:    General Appearance:  Alert, cooperative, in no acute distress   Head:   Normocephalic, without obvious abnormality, atraumatic   Eyes:  Lids and lashes normal, conjunctivae and sclerae normal, no icterus, no pallor, corneas clear, PERRLA   Ears:  Ears appear intact with no abnormalities noted   Throat:  No oral lesions, no thrush, oral mucosa moist   Neck:  No adenopathy, supple, trachea midline, no thyromegaly, no carotid bruit, no JVD   Back:  No kyphosis present, no scoliosis present, no skin lesions, erythema or scars, no tenderness to percussion or palpation, range of motion normal   Lungs:  Clear to auscultation, respirations regular, even and unlabored    Heart:  Regular rhythm and normal rate, normal S1 and S2, no murmur, no gallop, no rub, no click   Chest Wall:  No abnormalities observed   Abdomen:  Normal bowel sounds, no masses, no organomegaly, soft non-tender, non-distended, no guarding, no rebound tenderness   Rectal:  Deferred   Extremities:  Moves all extremities well, no edema, no cyanosis, no redness   Pulses:  Pulses palpable and equal bilaterally   Skin:  No bleeding, bruising or rash.  Postoperative incisions are well-approximated with Dermabond intact.  J-tube without purulence or erythema.   Lymph nodes:  No palpable adenopathy   Neurologic:  Cranial nerves 2 - 12 grossly intact, sensation intact, DTR present and equal bilaterally                                                                               Discharge Disposition  Home today    Discharge Medications     Discharge Medications        New Medications        Instructions Start Date   acetaminophen 160 MG/5ML solution  Commonly known as: TYLENOL   1,000 mg, Per J Tube, 3 times daily      FLUoxetine 20 MG/5ML solution  Commonly known as: PROzac  Replaces: FLUoxetine 20 MG capsule   20 mg, Per J Tube, Daily      metoclopramide 10 MG/10ML solution solution  Commonly known as: REGLAN   10 mg, Per J Tube, 3 times daily      oxyCODONE 5 MG/5ML solution  Commonly known as: ROXICODONE   7.5 mg, Per J Tube,  Every 4 Hours PRN             Changes to Medications        Instructions Start Date   ALPRAZolam 0.25 MG tablet  Commonly known as: XANAX  What changed: how to take this   0.25 mg, Per J Tube, 3 Times Daily PRN, for anxiety      aspirin 81 MG chewable tablet  What changed: how to take this   81 mg, Per J Tube, Daily      magic mouthwash  What changed:   when to take this  reasons to take this   5 mL, Swish & Swallow, 4 Times Daily Before Meals & Nightly             Continue These Medications        Instructions Start Date   Evolocumab solution prefilled syringe injection  Commonly known as: REPATHA   140 mg, Subcutaneous, Every 14 Days      fluticasone 50 MCG/ACT nasal spray  Commonly known as: FLONASE   2 sprays, Nasal, Daily      lidocaine-prilocaine 2.5-2.5 % cream  Commonly known as: EMLA   1 application , Topical, As Needed      ondansetron 8 MG tablet  Commonly known as: ZOFRAN   8 mg, Oral, 3 Times Daily PRN             Stop These Medications      FLUoxetine 20 MG capsule  Commonly known as: PROzac  Replaced by: FLUoxetine 20 MG/5ML solution     traMADol 50 MG tablet  Commonly known as: ULTRAM              Discharge Instructions:  No heavy lifting, pushing, pulling greater than 10 pounds.  No driving up until 2 weeks after surgery and no longer taking narcotics.  Resume home diet as tolerated.  Continue incentive spirometer at least 4 times per day.  Remove dressing from post chest tube site after 48 hours, may shower and clean surgical sites with antibacterial soap or hydrogen peroxide, and apply gauze dressing or band-aid as needed for any drainage.  No dressing needed once no longer draining.          Follow-up Appointments  Future Appointments   Date Time Provider Department Center   2/27/2024 10:45 AM Saurabh Hurtado MD [facility]   2/28/2024 10:05 AM LAB MD [facility] ONC LAB NA [facility] ONAL TO[facility]   2/28/2024 10:15 AM Oliverio Mueller MD [facility]C NA [facility]   3/26/2024  3:45 PM Clair Lyman MD [facility] NA  [facility]   4/29/2024  7:30 AM [facility] US 2 [facility] US TOY[facility]   6/24/2024  3:00 PM Justa Castano MD [facility]   7/22/2024  4:00 PM Tristan Hayward MD [facility] NA     Additional Instructions for the Follow-ups that You Need to Schedule       Ambulatory Referral to Home Health (Hospital)   As directed      Please ensure area around J-tube is clean and dry. Apply dry 4x4 and desitin as needed to prevent skin breakdown    Order Comments: Please ensure area around J-tube is clean and dry. Apply dry 4x4 and desitin as needed to prevent skin breakdown    Face to Face Visit Date: 2/14/2024   Follow-up provider for Plan of Care?: I treated the patient in an acute care facility and will not continue treatment after discharge.   Follow-up provider: DONOVAN BROOKE [[id number]]   Reason/Clinical Findings: recent esophagectomy for esophageal cancer   Describe mobility limitations that make leaving home difficult: recent esophagectomy for esophageal cancer   Nursing/Therapeutic Services Requested: Skilled Nursing   Skilled nursing orders: Other Medication education Pain management Cardiopulmonary assessments Enteral therapy Wound care dressing/changes   Frequency: 1 Week 1        XR Chest 2 View   Feb 27, 2024      Exam reason: post-op   Release to patient: Routine Release        CBC (No Diff)    Feb 28, 2024 (Approximate)      Release to patient: Routine Release        Comprehensive Metabolic Panel    Feb 28, 2024 (Approximate)      Release to patient: Routine Release                Test Results Pending at Discharge      For any questions regarding patient's stay, please refer to patient's chart.    Dorothy Calero, EMILY, APRN  [facility]  02/23/24  11:34 EST    Greater than 30 minutes spent on the unit discharging this patient, with more than 50% of time spent assessing the patient, counseling the patient on postoperative care and discharge planning.

## 2024-02-23 NOTE — PLAN OF CARE
Goal Outcome Evaluation:   A&OX4. Standby assist to bathroom. Pain controlled throughout night with roxicodone via J tube, no IVP pain meds administered this shift. Tolerating cyclic TF, denies N/V. FREDO, NSR, RA. Wife at bedside.

## 2024-02-23 NOTE — DISCHARGE INSTR - DIET
Diabetic Visit    Date of Service: 11/2/2020     Katharina PINEDO Jeffery presents to the clinic regarding the following conditions:   Chief Complaint   Patient presents with   • Medicare Wellness Visit     subsequnt   • Follow-up     MWV, Diabetes, Lipids, HTN, ASHD, Anemia.       I have reviewed the MA's notes and agree.  Other Issues:  · Doing well.   · Physical Activity: none.     DIABETIC Monitoring PARAMETERS:  Diabetes Intake Worksheet    How are you doing?  1.  How often are you checking your sugars?:   2.  What times of day do you check your blood sugars?  Check all that apply.:   3.  What are your sugars most of the time?:   4.  Have you had any low sugar levels?  (less than 70 mg/dL):   5.  How many days a week are you moderately or strenuously active--that you do activities that make you breathe harder or have a faster heart beat?:   6.  How often to you eat?:   7.  It's not uncommon for people to miss their medications from time to time.  How often do you miss taking your medicine?:   8.  Do you have any concerns about:   9.  Where did you have your last eye exam done and when?:   10.  PHQ2 score:  PHQ9 score:    __________________________________________________  The following educational material(s) were printed and given to the patient during this visit.  __________________________________________________  The following educational material(s) were given to the patient during this visit.  __________________________________________________  Instructions for the following [place] module(s) were given to the patient during this visit.          She reports that she is following a diabetic diet.    REVIEW OF SYSTEMS:  No chest pain/unusual shortness of breath.   No nausea, vomiting, diarrhea, or constipation. No blood per rectum.   Not dizzy/lightheaded.  Appetite: good      PAST MEDICAL HISTORY/PAST SURGICAL HISTORY, ALLERGIES, SOCIAL HISTORY, FAMILY HISTORY:  I have reviewed the patient's medications and  Strict NPO  -   nothing by mouth x 2 weeks     Peptamen Tube feeding full strength per J tube at 75 cc / hr from 5 pm to 11 am daily. Flush J tube before and after medications. Also flush J tube every 4 hours during the day with 40 to 50 cc water.    allergies, past medical, surgical, social and family history, updating these as appropriate.  See Histories section of the EMR (electronic medical record) for a display of this information.    Patient Active Problem List    Diagnosis Date Noted   • Discogenic low back pain 01/09/2017     Priority: Medium   • Mixed dyslipidemia 04/15/2016     Priority: Medium   • Preventative health care 05/06/2014     Priority: Medium     5/6/2014 Pt dropped off 3 hemoccult cards today. All three cards were negative.      • Essential (primary) hypertension 10/14/2013     Priority: Medium   • Unspecified vitamin D deficiency 05/10/2013     Priority: Medium   • Chronic pain 02/13/2013     Priority: Medium   • Type 2 diabetes mellitus without complication, without long-term current use of insulin (CMS/[facility]) 06/25/2012     Priority: Medium     Last Monofilament Exam:  10/14/2015 , 9/22/14  Last Diabetic Eye Exam:  2014** informed her to make an appt .       • Other and unspecified hyperlipidemia 09/29/2011     Priority: Medium   • Acquired hypothyroidism 09/29/2011     Priority: Medium     Normal TSH 3/2013.     • Diarrhea 12/20/2019     Priority: Low   • Major depressive disorder, recurrent episode, mild (CMS/[facility]) 07/09/2019     Priority: Low   • Presence of permanent cardiac pacemaker( dual) 7/2014 04/16/2019     Priority: Low   • LVEF 53%, 3/2017 10/16/2018     Priority: Low   • CHB (complete heart block) (CMS/[facility]) 04/17/2018     Priority: Low   • Normal left ventricular function, ejection fraction 53% per echo 3/21/2017 04/15/2018     Priority: Low   • Acute UTI (urinary tract infection) 08/23/2017     Priority: Low   • Chest pain 03/19/2017     Priority: Low   • Frequent PVCs 03/19/2017     Priority: Low   • Long term current use of anticoagulant, on Xarelto 03/19/2017     Priority: Low   • Chronic right-sided low back pain without sciatica 08/22/2016     Priority: Low   • Xarelto therapy 08/21/2016     Priority: Low   • Mild  persistent asthma without complication 10/20/2015     Priority: Low   • Abnormal PFTs (pulmonary function tests) 10/08/2015     Priority: Low   • Anemia 10/08/2015     Priority: Low   • Chronic Atrial Fibrillation - s/p AVN ablation, CHADsVASc score is 3, on Xarelto 08/31/2015     Priority: Low     On Warfarin     • Osteopenia 01/14/2015     Priority: Low     Failed fosamax due to emesis.      •  AV aspen ablation 8/29/2014 10/02/2014     Priority: Low     EF = 50% per Echo 5/31/2016.     • Medtronic Dual Chamber Pacemaker. 08/01/2014     Priority: Low     Implanted 7/8/2014 for Tachy-Yang Syndrome.     • Chest pain 07/21/2014     Priority: Low   • Tachycardia-bradycardia syndrome (CMS/HCC) 07/08/2014     Priority: Low   • Bradycardia 07/08/2014     Priority: Low   • Vertigo 07/08/2014     Priority: Low   • Atrial flutter (CMS/[facility]) 05/23/2014     Priority: Low   • Angina, class III (CMS/[facility]) 10/15/2013     Priority: Low   • Psoriasis-eczema overlap condition 03/14/2013     Priority: Low   • Hot flashes 06/25/2012     Priority: Low   • Panic disorder without agoraphobia with mild panic attacks 02/06/2012     Priority: Low   • Postsurgical percutaneous transluminal coronary angioplasty status 09/29/2011     Priority: Low   • Atherosclerosis of native coronary artery of native heart without angina pectoris 09/29/2011     Priority: Low   • Edema 09/29/2011     Priority: Low   • Knee pain 09/29/2011     Priority: Low       Physical Exam:  VITALS:    Vitals:    11/02/20 1344   BP: 120/60   Pulse: 68   SpO2: 97%   Weight: 83.3 kg   ;  Body mass index is 29.62 kg/m².   Wt Readings from Last 4 Encounters:   11/02/20 83.3 kg   10/26/20 81.5 kg   10/06/20 81.5 kg   09/11/20 81.8 kg     General:  Well-dressed, well-nourished, no acute distress, cooperative.  Carotid:  No carotid bruits heard bilaterally.   Cardiovascular: IRREGULARLY IRREGULAR.  No murmur.     Edema:  no edema  Lungs:  Clear to auscultation; normal  effort.  Abdomen:  Nontender, nondistended, positive bowel sounds.  Extremities:  Warm, dry.  +2 DP (dorsalis pedis) pulses bilaterally   Neurologic & Psychiatric:  Alert, oriented x3.  Normal mood and affect. Speech and behavior appropriate.    Extremities:  Diabetic Foot Exam:  Normal Bilateral Foot Exam:  Skin integrity is normal. Dorsalis pedis and posterior tibial pulses are present.  Pressure sensation using the [place] monofilament is present.   Bilateral feet with significant callosities.     Laboratory Results:  CBC   Recent Labs   Lab 10/26/20  1101 09/03/20  0800 06/09/20  0850  12/21/19  0731   WBC 8.1 7.0 6.2   < > 7.8   HGB 14.2 12.8 13.6   < > 12.9   HCT 43.7 39.2 41.5   < > 37.8    250 232   < > 159   Neutrophil  --   --   --    < >  --    Neutrophil, Percent 69 66 62   < >  --    LYMPH  --   --   --   --  8    < > = values in this interval not displayed.     CMP   Recent Labs   Lab 10/26/20  1101 09/04/20  0517 09/03/20  1219  09/03/20  0800 06/09/20  0850   Sodium 139 139 138  --  140 143   Potassium 3.8 3.5 4.0  --  4.0 3.7   Chloride 107 107 107  --  108* 105   BUN 19 15 13  --  15 15   Creatinine 0.80 0.83 0.76   < > 0.86 0.87   Glucose 156* 140* 132*  --  169* 171*   GOT/AST 13  --   --   --  24 23   Bilirubin, Total 0.8  --   --   --  0.7 0.7   Albumin 4.2  --   --   --  3.9 4.1    < > = values in this interval not displayed.     Diabetic Labs:  Lab Results   Component Value Date    RAPDTR <0.02 09/03/2020    PTT 36 (H) 03/08/2020    TSH 4.850 10/26/2020     (H) 01/01/2018    GLUCOSE 156 (H) 10/26/2020    CHOLESTEROL 175 09/03/2020    HDL 50 09/03/2020    CALCLDL 101 09/03/2020    TRIGLYCERIDE 118 09/03/2020    MG 2.2 09/04/2020    TRUDY 1.26 03/19/2017    MRSAPC NOT DETECTED 12/20/2019    HGBA1C 7.4 (H) 10/26/2020       Recent Labs   Lab 10/26/20  1101 09/04/20  0517 09/03/20  1219 09/03/20  0800 06/09/20  0850  02/19/20  1028   TSH 4.850  --   --   --  3.437  --   --     Glucose 156* 140* 132* 169* 171*   < > 144*   Cholesterol  --   --   --  175 194  --  185   HDL  --   --   --  50 61  --  59   Triglycerides  --   --   --  118 162*  --  141   Hemoglobin A1C 7.4*  --   --   --  7.4*  --   --     < > = values in this interval not displayed.        All recent labs were reviewed with Katharina Gil.    Over the last 2 weeks, how often have you been bothered by the following problems?          PHQ2 Score: 1  PHQ2 Score Interpretation: No further screening needed  1. Little interest or pleasure in activity?: 0  2. Feeling down, depressed, or hopeless?: 1     PHQ9 Score: 5  PHQ9 Score Interpretation: Mild Depression  3. Trouble falling, staying asleep or sleeping all the time?: 0  4. Feeling tired or having little energy?: 1  5. Poor appetite or overeating?: 0  6. Feeling bad about yourself - or that you are a failure or that you have let yourself or your family down?: 0  7. Trouble concentrating on things such as reading a newspaper or watching television?: 3  8. Moving or speaking so slowly that other people could have noticed? Or the opposite - being so fidgety or restless that you were moving around a lot more than usual?: 0  9. Thoughts that you would be better off dead, or of hurting yourself in some way?: 0         DEPRESSION ASSESSMENT/PLAN:  Mild symptoms, will monitor and reevaluate.     VISIT DIAGNOSES:  1. Type 2 diabetes mellitus without complication, without long-term current use of insulin (CMS/[facility])    2. Psoriasis-eczema overlap condition    3. Mixed dyslipidemia    4. Acquired hypothyroidism    5. Essential (primary) hypertension    6. ASHD (arteriosclerotic heart disease)    7. Chronic Atrial Fibrillation - s/p AVN ablation, CHADsVASc score is 3, on Xarelto    8. Medicare annual wellness visit, subsequent         ASSESSMENT/PLAN:  · ALL ABOVE conditions in assessment stable.  Continue present management.     Return in about 4 months (around 3/2/2021) for Diabetes,  Lipids, HTN, ASHD, A Fib, VIDEO POSSIBLE.     Health Maintenance:  - Topics discussed are recorded in the Health Maintenance section of the EMR.      Also see patient instructions.     Katharina Gil agrees with the plan as listed above.    The patient was properly masked during the visit (no valved mask, bandanna, gaiter, or face shield without mask).     This clinician utilized the following PPE during this visit: face shield/goggles and level 3 procedure mask      Christiano Brooks MD  11/2/2020     MEDICARE WELLNESS VISIT NOTE      HISTORY OF PRESENT ILLNESS:   Katharina Gil presents for her Subsequent Annual Medicare Wellness Visit.   She has no current complaints or concerns.      Patient Care Team:  Christiano Brooks MD as PCP - General (Family Practice)  Vick Beckham MD as Pulmonary Medicine (Pulmonary Medicine)  Tacos Dietz, PHD as Psychologist- Counseling (Psychologist -Counseling)  Mady Ceballos MD as Psychiatrist (Psychiatry)  Derek Parisi MD as Electrophysiologist (Internal Medicine - Clinical Cardiac Electrophysiology)  Gordon Finley MD as Cardiologist (Internal Medicine- Interventional Cardiology)        Patient Active Problem List   Diagnosis   • Other and unspecified hyperlipidemia   • Postsurgical percutaneous transluminal coronary angioplasty status   • Atherosclerosis of native coronary artery of native heart without angina pectoris   • Acquired hypothyroidism   • Edema   • Knee pain   • Panic disorder without agoraphobia with mild panic attacks   • Hot flashes   • Type 2 diabetes mellitus without complication, without long-term current use of insulin (CMS/HCC)   • Chronic pain   • Psoriasis-eczema overlap condition   • Unspecified vitamin D deficiency   • Essential (primary) hypertension   • Angina, class III (CMS/HCC)   • Preventative health care   • Atrial flutter (CMS/HCC)   • Tachycardia-bradycardia syndrome (CMS/HCC)   • Bradycardia   • Vertigo   • Chest pain   • Medtronic Dual  Chamber Pacemaker.   •  AV aspen ablation 8/29/2014   • Osteopenia   • Chronic Atrial Fibrillation - s/p AVN ablation, CHADsVASc score is 3, on Xarelto   • Abnormal PFTs (pulmonary function tests)   • Anemia   • Mild persistent asthma without complication   • Mixed dyslipidemia   • Xarelto therapy   • Chronic right-sided low back pain without sciatica   • Discogenic low back pain   • Chest pain   • Frequent PVCs   • Long term current use of anticoagulant, on Xarelto   • Acute UTI (urinary tract infection)   • Normal left ventricular function, ejection fraction 53% per echo 3/21/2017   • CHB (complete heart block) (CMS/HCC)   • LVEF 53%, 3/2017   • Presence of permanent cardiac pacemaker( dual) 7/2014   • Major depressive disorder, recurrent episode, mild (CMS/HCC)   • Diarrhea         Past Medical History:   Diagnosis Date   • Anemia    • Anxiety Disorder 1998   • Asthma     Followed by Dr. Vick [phone] [facility]   • Bronchitis    • Chronic kidney disease 2009    kidney stones   • Chronic pain     back   • Chronic sinusitis     ever since i was a kid   • Coronary Artery Disease    • Degenerative arthritis of spine 2002    legs, back, arms   • Depression 1988   • Diabetes mellitus (CMS/HCC)     borderline per pt   • Fracture     right and left big and little toes   • Ganglion cyst 12/2017    left ankle   • GERD (gastroesophageal reflux disease)    • Hyperlipidemia    • Hypothyroidism    • IBS (irritable bowel syndrome)    • Otitis media     when i was young   • Pneumonia    • Thyroiditis 1992   • Urinary tract infection          Past Surgical History:   Procedure Laterality Date   • Ablation av node - cv  08/29/2014    AV junction ablation. Device programmed VVIR 80 - 110.   • Cardiac catherization  4/17/2009    left heart, moderate left anterior descending artery disease totally occluded RCA   • Cardiac catherization  12/5/2008    right and left heart, stable CAD,  patent grafts   • Cardiac catherization  9/26/2007    left heart, severe native CAD, patent LIMA, LAD, AO-PDA, AO-ramus itermedius, LVEF 60%   • Cardiac catherization  3/13/2007    left heart, severe disease of Ramus intermedius and LAD with in-stent restenosis.  Patent LIMA-LAD   • Cardiac catheterization/possible ptca/possible stent  06/17/2019   • Cardiac surgery     • Coronary angioplasty  9/2002   • Coronary angioplasty with stent placement  9/10/2009    ostium of vein graft using 3.5 x18 Xience stent   • Coronary angioplasty with stent placement  11/2005   • Coronary angioplasty with stent placement  5/2003   • Coronary angioplasty with stent placement  03/11/2020    s/p successful deployment of overlapping 3 x 15 mm, 3 x 40 mm and 3 x 35 mm Orsiro TOBIAS from distal to proximal   • Coronary artery bypass graft  3/21/2007    Redo   • Coronary artery bypass graft  6/2002   • Echo heart limited  07/20/2014    EF 62%. Originally scheduled for DSE but HR changed very little. Reschedule for nuclear STT.   • Echo heart resting  10/22/2013    Very technically difficult study. EF 60%. Grade II/IV DD. Sev incr LA size. Trace MR, TR. No AR.   • Echo heart resting  06/01/2014    EF 60%. Mild LA enlargement. Trace TR.   • Echocardiogram  5/30/2008    EF 52%   • Ep pacer  07/08/2014    Claudine. Dual chamber Medtronic   • Left heart cath including left venticulography w md sup and interp  10/16/2013    Known severely diseased LAD & chronically occluded RCA in past. Patent SVG to PDA, RI, & LIMA to LAD. Normal LVSF, EF 55%.   • Myocardl perf rest stress  06/24/2012    Normal   • Myocardl perf rest stress  07/21/2014    Normal regadenoson   • Removal gallbladder  1998   • Stress echo  3/8/2006    normal maximal stress test   • Stress echocardiogram  02/29/2012    Normal treadmill   • Stress test  1/9/2007    adenosine stress test- normal   • Us carotid duplex bilateral  3/13/2007    Scattered bilateral plaquing   • Vascular  surgery           Social History     Tobacco Use   • Smoking status: Former Smoker     Packs/day: 0.25     Years: 4.00     Pack years: 1.00     Types: Cigarettes     Quit date: 10/24/1971     Years since quittin.0   • Smokeless tobacco: Never Used   • Tobacco comment: had a lot of second hand smoke exposure due to her prior job as a     Substance Use Topics   • Alcohol use: No     Alcohol/week: 0.0 standard drinks     Frequency: Never     Binge frequency: Never     Comment: quit 25 years ago   • Drug use: No     Drug use:    Drug Use:    No              Family History - Reviewed    Current Outpatient Medications   Medication Sig Dispense Refill   • hydrOXYzine (ATARAX) 25 MG tablet Take 1 tablet by mouth every 6 hours as needed for Itching. 90 tablet 0   • atorvastatin (LIPITOR) 80 MG tablet Take 1 tablet by mouth nightly. 90 tablet 3   • cefUROXime (Ceftin) 500 MG tablet Take 1 tablet by mouth 2 times daily for 5 days. 10 tablet 0   • levothyroxine 75 MCG tablet Take 1 tablet by mouth daily. 90 tablet 3   • HYDROcodone-acetaminophen (NORCO) 5-325 MG per tablet Take 1 tablet by mouth every 8 hours as needed for Pain. 40 tablet 0   • Effexor XR 37.5 MG 24 hr capsule TAKE 3 CAPSULES BY MOUTH DAILY 90 capsule 0   • pantoprazole (PROTONIX) 40 MG tablet Take 1 tablet by mouth daily. 90 tablet 1   • fluticasone (FLONASE) 50 MCG/ACT nasal spray Spray 2 sprays in each nostril daily. 16 g 3   • rivaroxaban (Xarelto) 20 MG Tab Take 1 tablet by mouth daily. 90 tablet 3   • Icosapent Ethyl (Vascepa) 1 g Cap Take 1 g by mouth nightly. 90 capsule 0   • iron polysaccharide complex (NIFEREX-150) 150 MG capsule Take 1 capsule by mouth daily (with dinner). 90 capsule 0   • ezetimibe (ZETIA) 10 MG tablet TAKE 1 TABLET BY MOUTH EVERY EVENING 90 tablet 1   • blood glucose (FREESTYLE LITE) test strip USE AS DIRECTED TO TEST BLOOD SUGAR 1-2 TIMES DAILY 100 strip 3   • Lancets (FREESTYLE) Misc USE AS DIRECTED 1-2 TIMES DAILY  TO TEST BLOOD SUGAR 100 each 3   • ALPRAZolam (XANAX) 0.5 MG tablet Take 1 tablet by mouth 3 times daily as needed for Anxiety. 90 tablet 2   • cilostazol (PLETAL) 50 MG tablet Take 1 tablet by mouth 2 times daily (before meals). 60 tablet 5   • PLAVIX 75 MG tablet Take 1 tablet by mouth daily. 90 tablet 1   • Vitamin D, Ergocalciferol, 1.25 mg (50,000 units) capsule TAKE 1 CAPSULE BY MOUTH 1 TIME A WEEK 12 capsule 3   • cyclobenzaprine (FLEXERIL) 10 MG tablet TAKE 1/2 TO 1 TABLET BY MOUTH THREE TIMES DAILY AS NEEDED FOR MUSCLE SPASMS. NO DRIVING WITH MEDICATION ON BOARD 60 tablet 0   • blood glucose meter Test blood sugar 1-2 times daily as directed. Diagnosis: DM 2. Meter: best fit for patient/insurance. 1 kit 0   • albuterol 108 (90 Base) MCG/ACT inhaler INHALE 2 PUFFS INTO THE LUNGS EVERY 4 HOURS AS NEEDED FOR SHORTNESS OF BREATH OR WHEEZING 18 g 5   • nitroGLYcerin (NITROSTAT) 0.4 MG sublingual tablet PLACE 1 TABLET UNDER THE TONGUE EVERY 5 MINUTES AS NEEDED FOR CHEST PAIN. GO TO ER OR CALL 911 IF SYMPTOMS PERSIST AFTER 3 DOSES 25 tablet 3   • tiZANidine (ZANAFLEX) 4 MG tablet Take 1 tablet by mouth every 8 hours as needed (neck spasm). May cause drowsiness. Use with caution. Do not drive 15 tablet 0   • diphenoxylate-atropine (LOMOTIL) 2.5-0.025 MG tablet Take 1 tablet by mouth 2 times daily as needed for Diarrhea. 30 tablet 0     No current facility-administered medications for this visit.         The following items on the Medicare Health Risk Assessment were found to be positive  6 a.) How many servings of Fruits and Vegetables do you have each day ( 1 serving = 1 piece of fruit, 1/2 cup fruits or vegetables): 1 per day         Vision and Hearing screens: not performed    Advance Directive:   The patient has the following documents:  Power of  for Health Care    Cognitive Assessment: no evidence of cognitive dysfunction by direct observation    Recent PHQ 2/9 Score    PHQ 2:  Date Adult PHQ 2 Score  Adult PHQ 2 Interpretation   11/2/2020 1 No further screening needed       PHQ 9:  Date Adult PHQ 9 Score Adult PHQ 9 Interpretation   11/2/2020 5 Mild Depression       DEPRESSION ASSESSMENT/PLAN:  Depression screening is negative no further plan needed. related to physical distancing with global pandemic.     Body mass index is 29.62 kg/m².    BMI ASSESSMENT/PLAN:  Patient is overweight.    30+  minutes of physical activity a day        Needed follow up:  None    See orders.   See Patient Instructions section.   Return in about 4 months (around 3/2/2021) for Diabetes, Lipids, HTN, ASHD, A Fib, VIDEO POSSIBLE.

## 2024-02-23 NOTE — CASE MANAGEMENT/SOCIAL WORK
Continued Stay Note  [facility]     Patient Name: Jourdan Lebron  MRN: [id number]  Today's Date: 2/23/2024    Admit Date: 2/9/2024    Plan: Home with  infusion   Discharge Plan       Row Name 02/23/24 1123       Plan    Plan Home with  infusion    Patient/Family in Agreement with Plan yes    Plan Comments discharging home with wife and  infusion for tube feed.  No accepting HH agency. Wife feels comfortable with managing tube feeds without HH.  No further needs identified. PARIS kirby RN                   Discharge Codes    No documentation.                 Expected Discharge Date and Time       Expected Discharge Date Expected Discharge Time    Feb 23, 2024               Shae Kirby, RN

## 2024-02-23 NOTE — PLAN OF CARE
Goal Outcome Evaluation:     Vital signs stable. Alert and oriented x 4. Spouse at bedside. Prn oxycodone 7.5 mg solution given via J tube for complaint of right flank and right chest incisional soreness. Strict NPO maintained. J tube patent with cyclic tube feedings infusing at 75 cc / hr. Up with stand by assistance. Voiding without difficulty. Normal sinus cardiac rhythm with BBB. On room air. Discharged home in stable condition. Spouse will provide transportation home. Call light within patient's reach. Resting quietly in bed.

## 2024-02-24 NOTE — CASE MANAGEMENT/SOCIAL WORK
Case Management Discharge Note      Final Note: dc home with BH infusion, no HH         Selected Continued Care - Discharged on 2/23/2024 Admission date: 2/9/2024 - Discharge disposition: Home or Self Care      Destination    No services have been selected for the patient.                Durable Medical Equipment    No services have been selected for the patient.                Dialysis/Infusion    No services have been selected for the patient.                Home Medical Care    No services have been selected for the patient.                Therapy    No services have been selected for the patient.                Community Resources    No services have been selected for the patient.                Community & DME    No services have been selected for the patient.                         Final Discharge Disposition Code: 01 - home or self-care

## 2024-02-26 ENCOUNTER — TRANSITIONAL CARE MANAGEMENT TELEPHONE ENCOUNTER (OUTPATIENT)
Dept: CALL CENTER | Facility: HOSPITAL | Age: 54
End: 2024-02-26
Payer: COMMERCIAL

## 2024-02-26 ENCOUNTER — TELEPHONE (OUTPATIENT)
Dept: SURGERY | Facility: CLINIC | Age: 54
End: 2024-02-26
Payer: COMMERCIAL

## 2024-02-26 DIAGNOSIS — C15.5 MALIGNANT NEOPLASM OF LOWER THIRD OF ESOPHAGUS: ICD-10-CM

## 2024-02-26 RX ORDER — FAMOTIDINE 40 MG/5ML
20 POWDER, FOR SUSPENSION ORAL 2 TIMES DAILY
Qty: 150 ML | Refills: 2 | Status: SHIPPED | OUTPATIENT
Start: 2024-02-26 | End: 2024-03-27

## 2024-02-26 RX ORDER — OXYCODONE HCL 5 MG/5 ML
7.5 SOLUTION, ORAL ORAL EVERY 8 HOURS PRN
Qty: 250 ML | Refills: 0 | Status: SHIPPED | OUTPATIENT
Start: 2024-02-26 | End: 2024-03-04

## 2024-02-26 NOTE — OUTREACH NOTE
Call Center TCM Note      Flowsheet Row Responses   [facility] patient discharged from? [place]   Does the patient have one of the following disease processes/diagnoses(primary or secondary)? General Surgery   TCM attempt successful? Yes   Call start time 0850   Call end time 0854   Discharge diagnosis LAPAROSCOPY, RIGHT THORACOSCOPY CONVERTED TO THORACOTOMY, FEEDING JEJUNOSTOMY TUBE PLACEMENT, INTERCOSTAL NERVE BLOCKS   Meds reviewed with patient/caregiver? Yes   Is the patient having any side effects they believe may be caused by any medication additions or changes? No   Does the patient have all medications related to this admission filled (includes all antibiotics, pain medications, etc.) Yes   Is the patient taking all medications as directed (includes completed medication regime)? Yes   Comments HOSP DC FU appt 3/8/24 1130 am.   Does the patient have an appointment with their PCP within 7-14 days of discharge? Yes   Has home health visited the patient within 72 hours of discharge? N/A   What DME was ordered? [facility]- Tube feeds   Psychosocial issues? No   Did the patient receive a copy of their discharge instructions? Yes   Nursing interventions Reviewed instructions with patient   What is the patient's perception of their health status since discharge? Improving   Nursing interventions Nurse provided patient education   Is the patient /caregiver able to teach back basic post-op care? Lifting as instructed by MD in discharge instructions, Take showers only when approved by MD-sponge bathe until then, No tub bath, swimming, or hot tub until instructed by MD, Drive as instructed by MD in discharge instructions   Is the patient/caregiver able to teach back signs and symptoms of incisional infection? Fever, Pus or odor from incision, Incisional warmth, Increased drainage or bleeding, Increased redness, swelling or pain at the incisonal site   Is the patient/caregiver able to teach  back the hierarchy of who to call/visit for symptoms/problems? PCP, Specialist, Home health nurse, Urgent Care, ED, 911 Yes   TCM call completed? Yes   Wrap up additional comments Pt reports everything going well with tube feeds. Pt does report he will needs more pain meds and will call surgeon office for this.   Call end time [id number]            Shey Marte RN    2/26/2024, 08:54 EST

## 2024-02-26 NOTE — TELEPHONE ENCOUNTER
Pt called regarding refill on pain medication. I sent a message to provider    DB 11:57  2/26/2024   1

## 2024-02-27 ENCOUNTER — HOSPITAL ENCOUNTER (OUTPATIENT)
Dept: GENERAL RADIOLOGY | Facility: HOSPITAL | Age: 54
Discharge: HOME | End: 2024-02-27
Payer: COMMERCIAL

## 2024-02-27 ENCOUNTER — PREP FOR SURGERY (OUTPATIENT)
Dept: OTHER | Facility: HOSPITAL | Age: 54
End: 2024-02-27
Payer: COMMERCIAL

## 2024-02-27 ENCOUNTER — APPOINTMENT (OUTPATIENT)
Dept: LAB | Facility: HOSPITAL | Age: 54
End: 2024-02-27
Payer: COMMERCIAL

## 2024-02-27 ENCOUNTER — OFFICE VISIT (OUTPATIENT)
Dept: SURGERY | Facility: CLINIC | Age: 54
End: 2024-02-27
Payer: COMMERCIAL

## 2024-02-27 ENCOUNTER — LAB (OUTPATIENT)
Dept: LAB | Facility: HOSPITAL | Age: 54
End: 2024-02-27
Payer: COMMERCIAL

## 2024-02-27 VITALS
WEIGHT: 140 LBS | BODY MASS INDEX: 20.04 KG/M2 | OXYGEN SATURATION: 98 % | SYSTOLIC BLOOD PRESSURE: 114 MMHG | HEART RATE: 110 BPM | DIASTOLIC BLOOD PRESSURE: 70 MMHG | HEIGHT: 70 IN

## 2024-02-27 DIAGNOSIS — C15.5 MALIGNANT NEOPLASM OF LOWER THIRD OF ESOPHAGUS: ICD-10-CM

## 2024-02-27 DIAGNOSIS — C15.5 MALIGNANT NEOPLASM OF LOWER THIRD OF ESOPHAGUS: Primary | ICD-10-CM

## 2024-02-27 LAB
DEPRECATED RDW RBC AUTO: 59.1 FL (ref 37–54)
ERYTHROCYTE [DISTWIDTH] IN BLOOD BY AUTOMATED COUNT: 16.8 % (ref 12.3–15.4)
HCT VFR BLD AUTO: 38.5 % (ref 37.5–51)
HGB BLD-MCNC: 13 G/DL (ref 13–17.7)
MCH RBC QN AUTO: 34.6 PG (ref 26.6–33)
MCHC RBC AUTO-ENTMCNC: 33.8 G/DL (ref 31.5–35.7)
MCV RBC AUTO: 102.4 FL (ref 79–97)
PLATELET # BLD AUTO: 656 10*3/MM3 (ref 140–450)
PMV BLD AUTO: 7.1 FL (ref 6–12)
RBC # BLD AUTO: 3.76 10*6/MM3 (ref 4.14–5.8)
WBC NRBC COR # BLD AUTO: 12.3 10*3/MM3 (ref 3.4–10.8)

## 2024-02-27 PROCEDURE — 71045 X-RAY EXAM CHEST 1 VIEW: CPT

## 2024-02-27 PROCEDURE — 85027 COMPLETE CBC AUTOMATED: CPT

## 2024-02-27 PROCEDURE — 36415 COLL VENOUS BLD VENIPUNCTURE: CPT

## 2024-02-27 RX ORDER — GABAPENTIN 100 MG/1
100 CAPSULE ORAL 3 TIMES DAILY
Qty: 30 CAPSULE | Refills: 0 | Status: SHIPPED | OUTPATIENT
Start: 2024-02-27

## 2024-02-27 RX ORDER — DOXYCYCLINE HYCLATE 100 MG
100 TABLET ORAL 2 TIMES DAILY
Qty: 14 TABLET | Refills: 0 | Status: SHIPPED | OUTPATIENT
Start: 2024-02-27 | End: 2024-03-05

## 2024-02-27 NOTE — PROGRESS NOTES
"                     HEMATOLOGY ONCOLOGY OUTPATIENT FOLLOW UP       Patient name: Jourdan Lebron  : 1970  MRN: [id number]  Primary Care Physician: Justa Castano MD  Referring Physician: No ref. 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 male who presented to our office on 2021 for consultation regarding elevated hematocrit    On 2021 Mr. Lebron was referred for the investigation of macrocytosis and elevated hematocrit.  He gave a history of a stroke in .  He also described a long history of anxiety and \"panic attacks\".  Finally he had undergone an adrenalectomy, apparently because of an adrenal adenoma.  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  In spite of that he felt poorly, mainly because of fatigue and had several investigations.  For a long time, at least since , he had been noted to have an elevated MCV and MCH.  A clear cause for this had not been identified and generally speaking he was asymptomatic at that time.  In , September and 2021 his blood counts had reported a hematocrit of 51.3, 51.6 and 53.8% respectively.  This was in the setting of a normal high hemoglobin.  He gave a history of prolonged and intense, at times of up to 3 packs of cigarettes per day, cigarette smoking.  Close to the time of the initial visit, he was smoking only cigars but did admit to inhaling the smoke.  He, as well, admitted to dyspnea with some exertion.    2022 Mr. Lebron was back in the office for follow-up.  At the time of his initial evaluation his blood count had been found to be within normal limits.  His folic acid was found to be immediately below the normal range of normalcy.  He started taking folic acid replacement.    7/15/2022: " Back in the office for follow-up after 6 months.  Reported he had had some changes to his medications and he was feeling somewhat better.  In particular, he discussed changes to his antidepressant, that seemed to have made a difference.  He also had stopped smoking.  The physical exam was unchanged.  The laboratory exams revealed normal hemoglobin and hematocrit, but he did persist with mild macrocytosis at 97.8 fL.  A clear explanation for this was not identified.  A decision was made to observe as it was unlikely to represent a serious problem.    10/10/2023: Seen in the office after an absence of more than one year. He reported that for a few months he had been experiencing dysphagia and weight loss. He was initially treated with a proton pump inhibitor, but as there was no response and rather he reported worsening of the symptoms, he underwent upper and lower gastrointestinal endoscopies. In the colon multiple polyps were identified in the cecum, the ascending colon, the transverse colon and the descending colon. In the esophagus a protruding lesion that seemed infiltrative and was fungating and ulcerated was found in the lower third of the esophagus. It extended from 36 to 46 cm from the incisors. Biopsy reported invasive moderate to poorly differentiated adenocarcinoma.     11/3/2023: In the office to review the PET scan. His symptoms have not changed much. He continues to have dysphagia and it is severe enough that he has been able to eat much; he has some success with soft foods but there fare some foods that he can definitely not swallow. He has lost about 10 lbs but none recently. He remains afebrile and has not had any cough. He has not had any pain. On exam no jaundice or pallor. Lungs diminished and heart regular. Abdomen soft and not tender. No edema. Reviewed the images of the PET scan. Reviewed the result of the endoscopic ultrasound and the FNA of the lymph nodes, at least one of which is positive  for metastatic disease. This makes the tumor T2N1 disease. Neoadjuvant chemotherapy and radiation was discussed with him and his spouse and I made referrals to Dr. Sabillon in Radiation Oncology and to Dr. Hurtado in Thoracic surgery. He is to have next generation sequencing, Her2 expression and PD-L1 expression on tumor tissue. Will present in tumor conference.     11/17/2023: Not any different than at the time of the last visit.  No further weight loss.  Able to swallow some foods without any difficulties.  However, other foods are very difficult to swallow, if not impossible.  He has not had any fevers.  He underwent placement of the port without any difficulties.  On exam alert, conversant and in no distress.  Port site clean and healing well.  Lungs diminished bilaterally.  Heart regular.  No edema.  Laboratory exams were reviewed.  Discussed with him concurrent chemotherapy and radiation.  Treatment with carboplatin and paclitaxel was discussed and a treatment plan was placed.  Discussed with Dr. Sabillon.  To begin on the week of November 27, 2023.    12/8/2023: Feeling reasonably well. Has experienced occasional headaches and facial pain. As well feels the lower extremities to be heavy and had some aching pain. Has been able to eat some though his appetite is poor. He may be swallowing better and does not have odynophagia. No abdominal pain or diarrhea and no dysuria. Has not lost weight. On exam no changes. The laboratory exams were reviewed. Discussed with him. To continue with the same therapy.     12/27/2023: Without new symptoms.  Reasonably active.  Not eating as well because of early satiety but no dysphagia.  No chest pains.  As well not coughing or more dyspneic than before.  On exam no changes.  No palpable supraclavicular adenopathy.  Lungs are diminished bilaterally.  Heart regular.  Abdomen soft and nontender.  There is no edema.  Small petechiae are present in the lower extremities.  The laboratory exams  were reviewed.  He has thrombocytopenia today that does not require transfusion but that will keep him from receiving the last dose of the chemotherapy.  To finish radiation tomorrow.  He will have a PET scan and will see me with the results in approximately 4 weeks.  Discussed with him.    1/25/2024: Feeling reasonably well.  Anxious about surgery.  Undergoing preoperative investigations he was found to have a thyroid nodule.  He is eating reasonably well and does not have major dysphagia although sometimes he needs to belch before he can continue to swallow.  No odynophagia.  He has also been free of dyspnea.  Denies abdominal pain and has maintained regular bowel activity.  No dysuria or hematuria.  No peripheral edema.  On exam alert, conversant and in good spirits.  No jaundice and no pallor.  Lungs diminished but clear.  Heart regular.  Abdomen soft.  Port site clean.  Laboratory exams reviewed.  Awaiting authorization from the insurance company to do the PET scan.  Continue to see Dr. Hurtado.  See me in approximately 4 weeks.    2/28/2024: Underwent an [place] Shayne type esophagectomy without immediate complications.  There was some concern over dehiscence of the anastomosis and he received a stent.  He was also asked to not consume any food by mouth and was receiving nutrition through a jejunostomy tube.  At the time of this visit feeling progressively better but still poorly.  Very tired and lacking energy to do anything.  Sleeping poorly.  Has started to take clear liquids by mouth.  On exam he appears chronically ill but he does not seem in any distress.  He is pale but not jaundiced.  All surgical incisions are healing well and the lungs are diminished bilaterally but clear.  Heart regular.  Abdomen soft.  No edema.  Laboratory exams were reviewed.  He has macrocytic anemia with a hemoglobin of 11.9 g/dL and thrombocytosis with a platelet count of 562,000/mm³.  He also has monocytosis and basophilia but no  leukocytosis at this time.  The final review of pathology confirmed scattered foci of residual moderate to poorly differentiated adenocarcinoma at the gastroesophageal junction with changes consistent with prior neoadjuvant therapy.  There were ASSO stated dissecting pools of mucin with changes again consistent with previous therapy.  Scattered individual tumor cells and small clusters were present near the gastroesophageal junction and extending through the muscularis propria into the adventitial soft tissue/fat and spanning an area of 21 x 18 x 7 mm.  No definitive lymphovascular space invasion was identified.  All margins were negative.  Of 20 lymph nodes analyzed known had metastatic disease.  A discussion was had with him in regards to immunotherapy given in the adjuvant setting.  Explained side effects and potential complications.  I asked him to return in 3 weeks.    The following portions of the patient's history were reviewed and updated as appropriate: allergies, current medications, past family history, past medical history, past social history, past surgical history and problem list.    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    Anxiety     Brain fog     COVID 02/2023    Diverticulosis of large intestine without perforation or abscess without bleeding 10/02/2023    Esophageal cancer 10/2023    GERD (gastroesophageal reflux disease)     Hand tingling     History of blood clot in brain 2020    had thrombectomy    History of cancer chemotherapy 12/2023    History of radiation therapy 11/2023    Hyperlipidemia     Stroke 05/30/2020    Tiredness      Past Surgical History:   Procedure Laterality Date    ADRENALECTOMY      COLONOSCOPY      ENDOSCOPY      ENDOSCOPY N/A 2/17/2024    Procedure: ESOPHAGOGASTRODUODENOSCOPY WITH STENTING  UNDER FLUROSCOPY GUIDENCE WITH ESOPHOGRAM;  Surgeon: Saurabh Hurtado MD;  Location: [facility] MAIN OR;  Service: Gastroenterology;  Laterality: N/A;    ESOPHAGOGASTRECTOMY N/A 2/9/2024    Procedure: BRONCHOSCOPY, EGD, ESOPHAGECTOMY ALTAGRACIA-FABIOLA WITH DAVINCI ROBOT, LAPAROSCOPY, RIGHT THORACOSCOPY CONVERTED TO THORACOTOMY, FEEDING JEJUNOSTOMY TUBE PLACEMENT, INTERCOSTAL NERVE BLOCKS;  Surgeon: Saurabh Hurtado MD;  Location: [facility] MAIN OR;  Service: Robotics - DaVinci;  Laterality: N/A;    ESOPHAGUS SURGERY  10/2023    biopsy    KIDNEY STONE SURGERY      OTHER SURGICAL HISTORY  2020    loop recorder    THROMBECTOMY  2020    UPPER ENDOSCOPIC ULTRASOUND W/ FNA N/A 10/27/2023    Procedure: ENDOSCOPIC ULTRASOUND WITH STAGING AND FINE NEEDLE ASPIRATION X2 AREAS;  Surgeon: Joselyn Savage MD;  Location: [facility] ENDOSCOPY;  Service: Gastroenterology;  Laterality: N/A;  POST:    VENOUS ACCESS DEVICE (PORT) INSERTION Right 11/13/2023    Procedure: INSERTION VENOUS ACCESS DEVICE;  Surgeon: Saurabh Hurtado MD;  Location: [facility] MAIN OR;  Service: Thoracic;  Laterality: Right;       Current Outpatient Medications:     acetaminophen (TYLENOL) 160 MG/5ML solution, Administer 31.23 mL per J tube 3 times a day., Disp: , Rfl:     ALPRAZolam (XANAX) 0.25 MG tablet, Administer 1 tablet per J tube 3 (Three) Times a Day As Needed for Anxiety. for anxiety, Disp: 90 tablet, Rfl: 3    aspirin 81 MG chewable tablet, Administer 1 tablet per J tube Daily., Disp: , Rfl:     doxycycline (VIBRAMYICN) 100 MG tablet, Take 1 tablet by mouth 2 (Two) Times a Day for 7 days. Crush and dissolve it in water, Disp: 14 tablet, Rfl: 0    Evolocumab (REPATHA) solution prefilled syringe injection, Inject 1 mL under the skin into the appropriate area as directed Every 14 (Fourteen) Days., Disp: 6 mL, Rfl: 3    famotidine (PEPCID) 40 mg/5 mL suspension, Take 2.5 mL by mouth 2 (Two) Times a Day for 30 days., Disp: 150 mL, Rfl: 2    FLUoxetine (PROzac) 20 MG capsule, Administer 1  capsule per J tube Daily for 30 days., Disp: 30 capsule, Rfl: 0    fluticasone (FLONASE) 50 MCG/ACT nasal spray, 2 sprays into the nostril(s) as directed by provider Daily., Disp: , Rfl:     gabapentin (NEURONTIN) 100 MG capsule, Take 1 capsule by mouth 3 (Three) Times a Day., Disp: 30 capsule, Rfl: 0    lidocaine-prilocaine (EMLA) 2.5-2.5 % cream, Apply 1 application  topically to the appropriate area as directed As Needed (apply 60 minutes prior to port access)., Disp: 30 g, Rfl: 2    metoclopramide (REGLAN) 10 MG/10ML solution solution, Administer 10 mL per J tube 3 times a day for 30 days., Disp: 900 mL, Rfl: 0    metoprolol tartrate (LOPRESSOR) 25 MG tablet, Administer 0.5 tablets per J tube Every 12 (Twelve) Hours for 30 days., Disp: 30 tablet, Rfl: 0    Nystatin (magic mouthwash), Swish and swallow 5 mL 4 (Four) Times a Day Before Meals & at Bedtime., Disp: 1 bottle, Rfl: 4    ondansetron (ZOFRAN) 8 MG tablet, Take 1 tablet by mouth 3 (Three) Times a Day As Needed for Nausea or Vomiting., Disp: 30 tablet, Rfl: 5    oxyCODONE (ROXICODONE) 5 MG/5ML solution, Administer 7.5 mL per J tube Every 8 (Eight) Hours As Needed for Moderate Pain or Severe Pain for up to 7 days., Disp: 250 mL, Rfl: 0    Allergies   Allergen Reactions    Cephalosporins Anaphylaxis     Throat swelling    Dye  [Contrast Dye (Echo Or Unknown Ct/Mr)] Hives    Iodinated Contrast Media Hives    Sulfa Antibiotics Hives    Sulfate Hives    Zetia [Ezetimibe] Other (See Comments) and Myalgia     Made tired    Gabapentin Other (See Comments)     Cognitive status decreased-fuzzy and excessive sleepiness    Statins Myalgia     Myalgia and fatique     Family History   Problem Relation Age of Onset    Arthritis Mother     Hypertension Mother     Heart failure Mother 73        Cause of death    Arthritis Father     Hypertension Father     Kidney cancer Father 57        Cause of death. Was a smoker    Heart disease Brother     Esophageal cancer Brother 59         Cause of death. Has a heavy drinker    Malig Hyperthermia Neg Hx      Cancer-related family history includes Esophageal cancer (age of onset: 59) in his brother; Kidney cancer (age of onset: 57) in his father.    Social History     Tobacco Use    Smoking status: Former     Packs/day: 1.00     Years: 37.00     Additional pack years: 0.00     Total pack years: 37.00     Types: Cigars, Cigarettes     Start date: 1985     Quit date: 2022     Years since quittin.1     Passive exposure: Past    Smokeless tobacco: Never    Tobacco comments:     1 packs= stopped 10 years ago and continued cigars   2 cigars a day; has stopped cigars as of    Vaping Use    Vaping Use: Never used   Substance Use Topics    Alcohol use: Not Currently     Comment: Has now been abstinent for about one year    Drug use: Never     Social History     Social History Narrative    Not on file      ROS:     Review of Systems   Constitutional:  Positive for fatigue. Negative for activity change, appetite change, chills, diaphoresis, fever and unexpected weight change.   HENT:  Negative for congestion, dental problem, drooling, ear discharge, ear pain, facial swelling, hearing loss, mouth sores, nosebleeds, postnasal drip, rhinorrhea, sinus pressure, sinus pain, sneezing, sore throat, tinnitus, trouble swallowing and voice change.    Eyes:  Negative for photophobia, pain, discharge, redness, itching and visual disturbance.   Respiratory:  Negative for apnea, cough, choking, chest tightness, shortness of breath, wheezing and stridor.    Cardiovascular:  Negative for chest pain, palpitations and leg swelling.   Gastrointestinal:  Negative for abdominal distention, abdominal pain, anal bleeding, blood in stool, constipation, diarrhea, nausea, rectal pain and vomiting.   Endocrine: Negative for cold intolerance, heat intolerance, polydipsia and polyuria.   Genitourinary:  Negative for decreased urine volume, difficulty urinating,  "dysuria, flank pain, frequency, genital sores, hematuria and urgency.   Musculoskeletal:  Negative for arthralgias, back pain, gait problem, joint swelling, myalgias, neck pain and neck stiffness.   Skin:  Negative for color change, pallor and rash.   Neurological:  Negative for dizziness, tremors, seizures, syncope, facial asymmetry, speech difficulty, weakness, light-headedness, numbness and headaches.   Hematological:  Negative for adenopathy. Does not bruise/bleed easily.   Psychiatric/Behavioral:  Negative for agitation, behavioral problems, confusion, decreased concentration, hallucinations, self-injury, sleep disturbance and suicidal ideas. The patient is nervous/anxious.      Objective:    Vitals:    02/28/24 0945   BP: 96/68   Pulse: 102   Temp: 97.8 °F (36.6 °C)   TempSrc: Oral   SpO2: 99%   Weight: 64 kg (141 lb)   Height: 177.8 cm (70\")   PainSc: 0-No pain     Body mass index is 20.23 kg/m².  ECOG  (0) Fully active, able to carry on all predisease performance without restriction    Physical Exam:     Physical Exam  Constitutional:       General: He is not in acute distress.     Appearance: Normal appearance. He is not ill-appearing, toxic-appearing or diaphoretic.      Comments: Slender, well-built.  Seems older than the stated age.  No distress.   HENT:      Head: Normocephalic and atraumatic.      Right Ear: External ear normal. There is no impacted cerumen.      Left Ear: External ear normal. There is no impacted cerumen.      Nose: Nose normal. No congestion or rhinorrhea.      Mouth/Throat:      Mouth: Mucous membranes are moist.      Pharynx: Oropharynx is clear. No oropharyngeal exudate or posterior oropharyngeal erythema.      Comments: Oral cavity reveals poor dentition and poor dental hygiene.  No mucosal ulcerations are present.  Eyes:      General: No scleral icterus.        Right eye: No discharge.         Left eye: No discharge.      Conjunctiva/sclera: Conjunctivae normal.      Pupils: " Pupils are equal, round, and reactive to light.   Neck:      Vascular: No carotid bruit.   Cardiovascular:      Rate and Rhythm: Normal rate and regular rhythm.      Pulses: Normal pulses.      Heart sounds: Normal heart sounds. No murmur heard.     No friction rub. No gallop.   Pulmonary:      Effort: No respiratory distress.      Breath sounds: No stridor. No wheezing, rhonchi or rales.      Comments: Breath sounds are diminished bilaterally.  Chest:      Chest wall: No tenderness.      Comments: A right thoracic surgical incision is healing well.  Smaller surgical incisions for drains also healing well.  Abdominal:      General: Abdomen is flat. Bowel sounds are normal. There is no distension.      Palpations: Abdomen is soft. There is no mass.      Tenderness: There is no abdominal tenderness. There is no right CVA tenderness, left CVA tenderness, guarding or rebound.   Musculoskeletal:         General: No swelling, tenderness, deformity or signs of injury.      Cervical back: No rigidity or tenderness.      Right lower leg: No edema.      Left lower leg: No edema.      Comments: There is clubbing of the fingers in both hands   Lymphadenopathy:      Cervical: No cervical adenopathy.   Skin:     General: Skin is warm and dry.      Coloration: Skin is not jaundiced or pale.      Findings: No bruising, erythema or rash.   Neurological:      General: No focal deficit present.      Mental Status: He is alert and oriented to person, place, and time.      Cranial Nerves: No cranial nerve deficit.      Motor: No weakness.      Coordination: Coordination normal.      Gait: Gait normal.   Psychiatric:         Mood and Affect: Mood normal.         Behavior: Behavior normal.         Thought Content: Thought content normal.         Judgment: Judgment normal.     I Oliverio Mueller MD performed a physical exam on 2/28/2024 as documented above.    Lab Results - Last 18 Months   Lab Units 02/28/24  0954 02/27/24  0949  02/23/24  0614   WBC 10*3/mm3 10.37 12.30* 10.60   HEMOGLOBIN g/dL 11.9* 13.0 12.3*   HEMATOCRIT % 34.2* 38.5 37.2*   PLATELETS 10*3/mm3 562* 656* 506*   MCV fL 100.6* 102.4* 100.5*     Lab Results - Last 18 Months   Lab Units 02/23/24  0614 02/22/24  0404 02/21/24  0614 02/09/24  1631 02/01/24  0657 01/25/24  0932 12/27/23  0932 12/27/23  0924   SODIUM mmol/L 137 139 142   < > 141 139  --  139   POTASSIUM mmol/L 4.9 4.4 4.3   < > 4.4 4.3  --  4.4   CHLORIDE mmol/L 100 104 105   < > 104 102  --  103   CO2 mmol/L 28.7 28.0 25.0   < > 29.0 31.0*  --  26.0   BUN mg/dL 20 25* 27*   < > 9 12  --  12   CREATININE mg/dL 0.59* 0.52* 0.55*   < > 0.94 0.88   < > 0.67*   CALCIUM mg/dL 8.9 8.9 8.9   < > 9.6 9.6  --  8.9   BILIRUBIN mg/dL  --   --   --   --  <0.2 <0.2  --  <0.2   ALK PHOS U/L  --   --   --   --  61 67  --  57   ALT (SGPT) U/L  --   --   --   --  17 15  --  14   AST (SGOT) U/L  --   --   --   --  18 15  --  16   GLUCOSE mg/dL 107* 141* 129*   < > 69 78  --  89    < > = values in this interval not displayed.     Lab Results   Component Value Date    GLUCOSE 107 (H) 02/23/2024    BUN 20 02/23/2024    CREATININE 0.59 (L) 02/23/2024    EGFRIFNONA 67 02/24/2022    BCR 33.9 (H) 02/23/2024    K 4.9 02/23/2024    CO2 28.7 02/23/2024    CALCIUM 8.9 02/23/2024    ALBUMIN 3.0 (L) 02/17/2024    LABIL2 1.3 09/25/2018    AST 18 02/01/2024    ALT 17 02/01/2024     Lab Results   Component Value Date    FOLATE 4.11 (L) 11/12/2021     Lab Results   Component Value Date    ZZYYEWHJ84 1,108 (H) 12/22/2023     Uric Acid   Date Value Ref Range Status   12/22/2023 3.9 3.4 - 7.0 mg/dL Final     Lab Results   Component Value Date    SEDRATE 11 04/16/2021     Lab Results   Component Value Date    PSA 0.163 04/06/2021     Assessment & Plan     Assessment:  Adenocarcinoma of the gastroesophageal junction T2N1M0, microsatellite stable, low mutation burden, Her2 positive and PD-L1 expression negative: Completed neoadjuvant concurrent  carboplatin and paclitaxel with radiation and underwent [place] Shayne type esophagectomy.  Recovering well.  Discussed the use of adjuvant immunotherapy, based on the Checkmate 577 study that demonstrated benefit regardless of PD-L1 expression, with less benefit for PD-L1 negative tumors.  Reviewed the final report of pathology.  Reviewed the laboratory exams including blood counts and chemistries.  Reviewed records from the recent admission to the hospital.  3.  Tobacco smoker: Abstinent at this time.  3.  He is to see me in approximately 3 weeks.    Plan:  As above.    Oliverio Mueller MD on 2/28/2024 at 10:42 AM.

## 2024-02-27 NOTE — PAYOR COMM NOTE
"Alfreda Lebron (53 y.o. Male)      PATIENT DISCHARGED 02/23/2024:  REF# [id number]     PLEASE REPLY WITH ALL DAYS APPROVED    [facility] DEPT: [phone], [phone]    [facility]: NPI [id number] [facility]# [id number]    ANNIE NERI RN,[facility]       Date of Birth   1970    Social Security Number       Address   [address] [facility] [place] [address]    Home Phone   [phone]    MRN   [id number]       [employer]    Marital Status                               Admission Date   2/9/24    Admission Type   Elective    Admitting Provider   Saurabh Hurtado MD    Attending Provider       Department, Room/Bed   [address], E559/1       Discharge Date   2/23/2024    Discharge Disposition   Home or Self Care    Discharge Destination                                 Attending Provider: (none)   Allergies: Cephalosporins, Dye  [Contrast Dye (Echo Or Unknown Ct/mr)], Iodinated Contrast Media, Sulfa Antibiotics, Sulfate, Zetia [Ezetimibe], Gabapentin, Statins    Isolation: None   Infection: None   Code Status: Prior    Ht: 177.8 cm (70\")   Wt: 64 kg (141 lb 1.5 oz)    Admission Cmt: None   Principal Problem: Malignant neoplasm of lower third of esophagus [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.5]                   Active Insurance as of 2/9/2024       Primary Coverage       Payor Plan Insurance Group Employer/Plan Group    [employer] BLUE CROSS AN[employer]M BLUE CROSS BLUE SHIELD PPO [id number]       Payor Plan Address Payor Plan Phone Number Payor Plan Fax Number Effective Dates    [address] [phone]  1/1/2013 - None Entered    [facility] [address]         Subscriber Name Subscriber Birth Date Member ID       ALFREDA LEBRON 1970 [id number]                     Emergency Contacts        (Rel.) Home Phone Work Phone Mobile Phone    NATHANMINERVA (Spouse) [phone] -- [phone]    Roopa Lott (Sister) [phone] -- [phone]            Vital Signs (last 2 days) before discharge  "      Date/Time Temp Temp src Pulse Resp BP Patient Position SpO2    02/23/24 1100 97.9 (36.6) Oral 85 18 108/74 Lying 96    02/23/24 0700 97.8 (36.6) Oral 80 18 98/72 Lying 96    02/23/24 0136 -- -- 94 16 99/73 Lying 97    02/22/24 2318 98.2 (36.8) Oral -- 16 -- Lying --    02/22/24 1932 98.3 (36.8) Oral -- 16 110/77 Lying --    02/22/24 1500 97.9 (36.6) Oral 80 18 104/69 Lying 99    02/22/24 0400 -- -- 83 -- -- -- 96    02/22/24 0000 -- -- 93 -- -- -- 97    02/21/24 2349 97.8 (36.6) Oral -- 16 110/77 Lying --    02/21/24 2000 -- -- 85 -- -- -- 97    02/21/24 1923 98.1 (36.7) Oral -- 16 101/75 Lying --    02/21/24 1500 97.6 (36.4) Oral -- 16 105/78 Lying --    02/21/24 1100 98.6 (37) Oral -- 16 107/72 Lying --    02/21/24 0712 97.5 (36.4) Oral 87 16 103/71 Lying 96    02/21/24 0400 -- -- 91 -- -- -- 95    02/21/24 0000 97.9 (36.6) Oral 82 18 100/74 Lying 98          Oxygen Therapy (last 2 days) before discharge       Date/Time SpO2 Device (Oxygen Therapy) Flow (L/min) Oxygen Concentration (%) ETCO2 (mmHg)    02/23/24 1100 96 room air -- -- --    02/23/24 0951 -- room air -- -- --    02/23/24 0700 96 room air -- -- --    02/23/24 0136 97 -- -- -- --    02/22/24 1500 99 room air -- -- --    02/22/24 1407 -- room air -- -- --    02/22/24 0950 -- room air -- -- --    02/22/24 0400 96 -- -- -- --    02/22/24 0210 -- room air -- -- --    02/22/24 0000 97 -- -- -- --    02/21/24 2000 97 -- -- -- --    02/21/24 1500 -- room air -- -- --    02/21/24 1437 -- room air -- -- --    02/21/24 1300 -- room air -- -- --    02/21/24 1100 -- room air -- -- --    02/21/24 0813 -- room air -- -- --    02/21/24 0712 96 room air -- -- --    02/21/24 0400 95 -- -- -- --    02/21/24 0000 98 room air -- -- --          Medication Administration Report for Jourdan Lebron as of 02/27/24 4454     Legend:    Given Hold Not Given Due Canceled Entry Other Actions    Time Time (Time) Time Time-Action         Discontinued     Completed     Future      MAR Hold     Linked             Medications 02/22/24 02/23/24     Completed Medications    bisacodyl (DULCOLAX) suppository 10 mg  Dose: 10 mg  Freq: Once Route: RE  Start: 02/14/24 1030   End: 02/14/24 2015   Admin Instructions:   Hold for diarrhea         bupivacaine liposome (EXPAREL) 1.3 % injection  - ADS Override Pull  Start: 02/09/24 0637   End: 02/09/24 0739   Admin Instructions:   Created by cabinet override         diatrizoate meglumine-sodium (GASTROGRAFIN) 66-10 % oral solution 120 mL  Dose: 120 mL  Freq: Once in Imaging Route: PO  Start: 02/22/24 1345   End: 02/22/24 1253   Admin Instructions:   Administer per local procedures per radiology exam.    1253-Given                diatrizoate meglumine-sodium (GASTROGRAFIN) 66-10 % oral solution 30 mL  Dose: 30 mL  Freq: Once Route: PO  Start: 02/17/24 1000   End: 02/17/24 1412   Admin Instructions:   Administer per local procedures per radiology exam.         diatrizoate meglumine-sodium (GASTROGRAFIN) 66-10 % oral solution 30 mL  Dose: 30 mL  Freq: Once Route: PO  Start: 02/16/24 0845   End: 02/16/24 0828   Admin Instructions:   Administer per local procedures per radiology exam.         famotidine (PEPCID) injection 20 mg  Dose: 20 mg  Freq: Once Route: IV  Start: 02/09/24 0613   End: 02/09/24 0634   Admin Instructions:   Give IV push over 2 minutes.         fentaNYL citrate (PF) (SUBLIMAZE) injection  Freq: Code / Trauma / Sedation Medication Route: IV  Start: 02/09/24 0646   End: 02/09/24 0646         heparin (porcine) 5000 UNIT/ML injection 5,000 Units  Dose: 5,000 Units  Freq: Once Route: SC  Indications of Use: Post Surgical - Other  Start: 02/09/24 0711   End: 02/09/24 0722         HYDROmorphone (DILAUDID) injection 0.5 mg  Dose: 0.5 mg  Freq: Once Route: IV  Start: 02/10/24 0800   End: 02/10/24 0710   Admin Instructions:   Based on patient request - if ordered for moderate or severe pain, provider allows for administration of a medication prescribed  for a lower pain scale.  If given for pain, use the following pain scale:  Mild Pain = Pain Score of 1-3, CPOT 1-2  Moderate Pain = Pain Score of 4-6, CPOT 3-4  Severe Pain = Pain Score of 7-10, CPOT 5-8         ketorolac (TORADOL) injection 15 mg  Dose: 15 mg  Freq: Once Route: IV  Start: 02/10/24 1000   End: 02/10/24 1046   Admin Instructions:   Based on patient request - if ordered for moderate or severe pain, provider allows for administration of a medication prescribed for a lower pain scale.        If given for pain, use the following pain scale:  Mild Pain = Pain Score of 1-3, CPOT 1-2  Moderate Pain = Pain Score of 4-6, CPOT 3-4  Severe Pain = Pain Score of 7-10, CPOT 5-8         lactated ringers bolus 500 mL  Dose: 500 mL  Freq: Once Route: IV  Start: 02/16/24 1930   End: 02/16/24 2305         levoFLOXacin (LEVAQUIN) 750 mg/150 mL D5W (premix) (LEVAQUIN) 750 mg  Dose: 750 mg  Freq: Every 24 Hours Route: IV  Indications of Use: SEPSIS  Start: 02/16/24 1930   End: 02/22/24 2330   Admin Instructions:   Caution: Look alike/sound alike drug alert. Protect from light. Do NOT refrigerate.    2200-New Bag                metroNIDAZOLE (FLAGYL) IVPB 500 mg  Dose: 500 mg  Freq: Every 8 Hours Route: IV  Indications of Use: SEPSIS  Start: 02/16/24 1930   End: 02/22/24 2226   Admin Instructions:   Caution: Look alike/sound alike drug alert.  Do not refrigerate.    0607-New Bag     1437-New Bag     2156-New Bag              sodium phosphates 15 mmol in 250 mL 0.9% sodium chloride IVPB  Dose: 15 mmol  Freq: Once Route: IV  Start: 02/12/24 1000   End: 02/12/24 1243         sodium phosphates 15 mmol in 250 mL 0.9% sodium chloride IVPB  Dose: 15 mmol  Freq: Once Route: IV  Start: 02/11/24 1800   End: 02/11/24 2330         sodium phosphates 15 mmol in 250 mL 0.9% sodium chloride IVPB  Dose: 15 mmol  Freq: Once Route: IV  Start: 02/11/24 0500   End: 02/11/24 0901         vancomycin (VANCOCIN) 1000 mg/200 mL dextrose 5%  IVPB  Dose: 15 mg/kg  Weight Dosing Info: 72.1 kg  Freq: Once Route: IV  Indications of Use: PERIOPERATIVE PHARMACOPROPHYLAXIS  Start: 02/09/24 0613   End: 02/09/24 0703   Admin Instructions:   Administer Within 2 Hours of Incision or Surgery Start        Discontinued Medications  Medications 02/22/24 02/23/24       acetaminophen (TYLENOL) 160 MG/5ML oral solution 1,000 mg  Dose: 1,000 mg  Freq: 3 times daily Route: PER J TUBE  Start: 02/11/24 0915   End: 02/23/24 1550   Admin Instructions:   If given for fever, use fever parameter: fever greater than 100.4 °F  Based on patient request - if ordered for moderate or severe pain, provider allows for administration of a medication prescribed for a lower pain scale.    Do not exceed 4 grams of acetaminophen in a 24 hr period. Max dose of 2gm for AST/ALT greater than 120 units/L.    If given for pain, use the following pain scale:   Mild Pain = Pain Score of 1-3, CPOT 1-2  Moderate Pain = Pain Score of 4-6, CPOT 3-4  Severe Pain = Pain Score of 7-10, CPOT 5-8    0857-Given     1611-Given     2146-Given          0951-Given               acetaminophen (TYLENOL) 160 MG/5ML oral solution 500 mg  Dose: 500 mg  Freq: Every 6 Hours Route: PO  Start: 02/10/24 1000   End: 02/11/24 0721   Admin Instructions:   If given for fever, use fever parameter: fever greater than 100.4 °F  Based on patient request - if ordered for moderate or severe pain, provider allows for administration of a medication prescribed for a lower pain scale.    Do not exceed 4 grams of acetaminophen in a 24 hr period. Max dose of 2gm for AST/ALT greater than 120 units/L.    If given for pain, use the following pain scale:   Mild Pain = Pain Score of 1-3, CPOT 1-2  Moderate Pain = Pain Score of 4-6, CPOT 3-4  Severe Pain = Pain Score of 7-10, CPOT 5-8         acetaminophen (TYLENOL) 160 MG/5ML oral solution 650 mg  Dose: 650 mg  Freq: Every 6 Hours Route: PO  Start: 02/11/24 1000   End: 02/11/24 0818   Admin  "Instructions:   If given for fever, use fever parameter: fever greater than 100.4 °F  Based on patient request - if ordered for moderate or severe pain, provider allows for administration of a medication prescribed for a lower pain scale.    Do not exceed 4 grams of acetaminophen in a 24 hr period. Max dose of 2gm for AST/ALT greater than 120 units/L.    If given for pain, use the following pain scale:   Mild Pain = Pain Score of 1-3, CPOT 1-2  Moderate Pain = Pain Score of 4-6, CPOT 3-4  Severe Pain = Pain Score of 7-10, CPOT 5-8         acetylcysteine (MUCOMYST) 20 % nebulizer solution 3 mL  Dose: 3 mL  Freq: 3 Times Daily - RT Route: NEBULIZATION  Start: 02/10/24 1000   End: 02/11/24 1104   Admin Instructions:   Include Respiratory Treatment Education  For INHALATION USE ONLY.  NOT for IV use.         ALPRAZolam (XANAX) tablet 0.25 mg  Dose: 0.25 mg  Freq: 3 Times Daily PRN Route: PER J TUBE  PRN Reason: Anxiety  Start: 02/18/24 0950   End: 02/23/24 0949   Admin Instructions:   Flush with 30cc water to ensure j tube doesn’t clog with crushed medicine     Caution: Look alike/sound alike drug alert.   Avoid grapefruit juice    0203-Given     1116-Given     2147-Given          0607-Given               bupivacaine (PF) (MARCAINE) 0.25 % injection  Freq: As Needed  Start: 02/09/24 0931   End: 02/09/24 1606         Calcium Replacement - Follow Nurse / BPA Driven Protocol  Freq: As Needed Route: XX  PRN Reason: Other  Start: 02/11/24 0404   End: 02/23/24 1550   Admin Instructions:   Open Order & Select \"[facility] Electrolyte Replacement Protocol Algorithm\" to View Details         Cetirizine HCl (zyrTEC) syrup 5 mg  Dose: 5 mg  Freq: Daily Route: PER J TUBE  Start: 02/14/24 1515   End: 02/23/24 1550    0857-Given            0951-Given               dextrose 5 % and sodium chloride 0.45 % with KCl 20 mEq/L infusion  Rate: 50 mL/hr Dose: 50 mL/hr  Freq: Continuous Route: IV  Start: 02/09/24 1845   End: 02/13/24 0734         " diatrizoate meglumine-sodium (GASTROGRAFIN) 66-10 % oral solution  Freq: As Needed  Start: 02/17/24 1103   End: 02/17/24 1133         diazePAM (VALIUM) injection 2.5 mg  Dose: 2.5 mg  Freq: Every 4 Hours PRN Route: IV  PRN Reason: Muscle Spasms  Start: 02/10/24 0903   End: 02/13/24 1107   Admin Instructions:      May give each 5 mg IV push over 1 minute. May be injected through infusion tubing using port closest to vein insertion. Do not mix or dilute with other solutions.         diphenhydrAMINE (BENADRYL) injection 12.5 mg  Dose: 12.5 mg  Freq: Every 15 Minutes PRN Route: IV  PRN Reason: Itching  PRN Comment: May repeat x 1  Start: 02/17/24 1122   End: 02/17/24 1338   Admin Instructions:   Caution: Look alike/sound alike drug alert. This med may be ordered in other forms and routes. Before giving verify the last time the drug was given by any route/form.         diphenhydrAMINE (BENADRYL) injection 12.5 mg  Dose: 12.5 mg  Freq: Every 15 Minutes PRN Route: IV  PRN Reason: Itching  PRN Comment: May repeat x 1  Start: 02/09/24 1512   End: 02/09/24 1756   Admin Instructions:   Caution: Look alike/sound alike drug alert. This med may be ordered in other forms and routes. Before giving verify the last time the drug was given by any route/form.         droperidol (INAPSINE) injection 0.625 mg  Dose: 0.625 mg  Freq: Every 20 Minutes PRN Route: IV  PRN Reasons: Nausea,Vomiting  Indications of Use: NAUSEA AND VOMITING  Start: 02/17/24 1122   End: 02/17/24 1338   Admin Instructions:   Use ondansetron first; proceed to droperidol for nausea refractory to ondansetron.        Or  droperidol (INAPSINE) injection 0.625 mg  Dose: 0.625 mg  Freq: Every 20 Minutes PRN Route: IM  PRN Reasons: Nausea,Vomiting  Start: 02/17/24 1122   End: 02/17/24 1338   Admin Instructions:   Use ondansetron first; proceed to droperidol for nausea refractory to ondansetron.         droperidol (INAPSINE) injection 0.625 mg  Dose: 0.625 mg  Freq: Every  20 Minutes PRN Route: IV  PRN Reasons: Nausea,Vomiting  Indications of Use: NAUSEA AND VOMITING  Start: 02/09/24 1512   End: 02/09/24 1756   Admin Instructions:   Use ondansetron first; proceed to droperidol for nausea refractory to ondansetron.        Or  droperidol (INAPSINE) injection 0.625 mg  Dose: 0.625 mg  Freq: Every 20 Minutes PRN Route: IM  PRN Reasons: Nausea,Vomiting  Start: 02/09/24 1512   End: 02/09/24 1756   Admin Instructions:   Use ondansetron first; proceed to droperidol for nausea refractory to ondansetron.         Enoxaparin Sodium (LOVENOX) syringe 40 mg  Dose: 40 mg  Freq: Daily Route: SC  Indications of Use: PROPHYLAXIS OF VENOUS THROMBOEMBOLISM  Start: 02/10/24 0900   End: 02/23/24 1550   Admin Instructions:   Give subcutaneous in abdomen only. Do not massage site after injection.    0859-Given            0952-Given               ePHEDrine injection 5 mg  Dose: 5 mg  Freq: Once As Needed Route: IV  PRN Comment: symptomatic hypotension - Notify attending anesthesiologist if this needs to be given  Start: 02/17/24 1122   End: 02/17/24 1338   Admin Instructions:   Caution: Look alike/sound alike drug alert   Dilute with NS to 5-10 mg/mL.  Central line preferred, if unavailable use large bore IV access with frequent nurse monitoring of IV site.         ePHEDrine injection 5 mg  Dose: 5 mg  Freq: Once As Needed Route: IV  PRN Comment: symptomatic hypotension - Notify attending anesthesiologist if this needs to be given  Start: 02/09/24 1512   End: 02/09/24 1756   Admin Instructions:   Caution: Look alike/sound alike drug alert   Dilute with NS to 5-10 mg/mL.  Central line preferred, if unavailable use large bore IV access with frequent nurse monitoring of IV site.         fentaNYL citrate (PF) (SUBLIMAZE) injection 50 mcg  Dose: 50 mcg  Freq: Every 5 Minutes PRN Route: IV  PRN Reason: Severe Pain  Start: 02/17/24 1122   End: 02/17/24 1338   Admin Instructions:   Maximum total dose of fentanyl is  200 mcg.  If given for pain, use the following pain scale:  Mild Pain = Pain Score of 1-3, CPOT 1-2  Moderate Pain = Pain Score of 4-6, CPOT 3-4  Severe Pain = Pain Score of 7-10, CPOT 5-8         fentaNYL citrate (PF) (SUBLIMAZE) injection 50 mcg  Dose: 50 mcg  Freq: Every 5 Minutes PRN Route: IV  PRN Reason: Severe Pain  Start: 02/09/24 1512   End: 02/09/24 1756   Admin Instructions:   Maximum total dose of fentanyl is 200 mcg.  If given for pain, use the following pain scale:  Mild Pain = Pain Score of 1-3, CPOT 1-2  Moderate Pain = Pain Score of 4-6, CPOT 3-4  Severe Pain = Pain Score of 7-10, CPOT 5-8         fentaNYL citrate (PF) (SUBLIMAZE) injection 50 mcg  Dose: 50 mcg  Freq: Once As Needed Route: IV  PRN Reason: Severe Pain  Start: 02/09/24 0610   End: 02/09/24 1620   Admin Instructions:   Maximum total dose of fentanyl is 50 mcg without additional orders from physician. May be used for preoperative pain or procedural sedation.  If given for pain, use the following pain scale:  Mild Pain = Pain Score of 1-3, CPOT 1-2  Moderate Pain = Pain Score of 4-6, CPOT 3-4  Severe Pain = Pain Score of 7-10, CPOT 5-8         First Mouthwash (Magic Mouthwash) 5 mL  Dose: 5 mL  Freq: Every 6 Hours Route: SWISH & SPIT  Start: 02/14/24 2215   End: 02/23/24 1550   Admin Instructions:   Shake Well Before Each Use. Stable 180 days at room temp after mixing.    0032-Given     0608-Given     1436-Given     (1727)-Not Given         0140-Given     0607-Given     1300             fluconazole (DIFLUCAN) IVPB 400 mg  Dose: 400 mg  Freq: Every 24 Hours Route: IV  Indications Comment: Esophageal leak  Start: 02/16/24 1915   End: 02/20/24 1008   Admin Instructions:   Group 2 (Pink) Hazardous Drug - Reproductive Risk Only - See Handling Guide         flumazenil (ROMAZICON) injection 0.2 mg  Dose: 0.2 mg  Freq: As Needed Route: IV  PRN Comment: for benzodiazepine induced unresponsiveness or sedation  Indications of Use:  BENZODIAZEPINE-INDUCED SEDATION  Start: 02/17/24 1122   End: 02/17/24 1338   Admin Instructions:   Notify Anesthesia if given  ** give IV over 15-30 seconds **         flumazenil (ROMAZICON) injection 0.2 mg  Dose: 0.2 mg  Freq: As Needed Route: IV  PRN Comment: for benzodiazepine induced unresponsiveness or sedation  Indications of Use: BENZODIAZEPINE-INDUCED SEDATION  Start: 02/09/24 1512   End: 02/09/24 1756   Admin Instructions:   Notify Anesthesia if given  ** give IV over 15-30 seconds **         FLUoxetine (PROzac) 20 MG/5ML solution 20 mg  Dose: 20 mg  Freq: Daily Route: PER J TUBE  Start: 02/12/24 1400   End: 02/23/24 1550   Admin Instructions:   Caution: Look alike/sound alike drug alert    0857-Given            0951-Given               FLUoxetine (PROzac) capsule 20 mg  Dose: 20 mg  Freq: Daily Route: PER J TUBE  Start: 02/12/24 1130   End: 02/12/24 1037   Admin Instructions:   Caution: Look alike/sound alike drug alert         gabapentin (NEURONTIN) capsule 300 mg  Dose: 300 mg  Freq: Every 12 Hours Scheduled Route: PER J TUBE  Start: 02/14/24 2100   End: 02/18/24 1647   Admin Instructions:              gabapentin (NEURONTIN) capsule 300 mg  Dose: 300 mg  Freq: 3 Times Daily Route: PER J TUBE  Start: 02/12/24 1015   End: 02/14/24 0943   Admin Instructions:              hydrALAZINE (APRESOLINE) injection 5 mg  Dose: 5 mg  Freq: Every 10 Minutes PRN Route: IV  PRN Reason: High Blood Pressure  PRN Comment: for systolic blood pressure greater than 180 mmHg or diastolic blood pressure greater than 105 mmHg  Start: 02/17/24 1122   End: 02/17/24 1338   Admin Instructions:   Up to 20 mg. If labetalol and hydralazine are both ordered, use labetalol first.  Caution: Look alike/sound alike drug alert         hydrALAZINE (APRESOLINE) injection 5 mg  Dose: 5 mg  Freq: Every 10 Minutes PRN Route: IV  PRN Reason: High Blood Pressure  PRN Comment: for systolic blood pressure greater than 180 mmHg or diastolic blood  pressure greater than 105 mmHg  Start: 02/09/24 1512   End: 02/09/24 1756   Admin Instructions:   Up to 20 mg. If labetalol and hydralazine are both ordered, use labetalol first.  Caution: Look alike/sound alike drug alert         HYDROcodone-acetaminophen (NORCO) 5-325 MG per tablet 1 tablet  Dose: 1 tablet  Freq: Once As Needed Route: PO  PRN Reason: Moderate Pain  Start: 02/17/24 1122   End: 02/17/24 1338   Admin Instructions:   Based on patient request - if ordered for moderate or severe pain, provider allows for administration of a medication prescribed for a lower pain scale.  [CHIP]    Do not exceed 4 grams of acetaminophen in a 24 hr period. Max dose of 2gm for AST/ALT greater than 120 units/L        If given for pain, use the following pain scale:   Mild Pain = Pain Score of 1-3, CPOT 1-2  Moderate Pain = Pain Score of 4-6, CPOT 3-4  Severe Pain = Pain Score of 7-10, CPOT 5-8         HYDROcodone-acetaminophen (NORCO) 5-325 MG per tablet 1 tablet  Dose: 1 tablet  Freq: Once As Needed Route: PO  PRN Reason: Moderate Pain  Start: 02/09/24 1512   End: 02/09/24 1756   Admin Instructions:   Based on patient request - if ordered for moderate or severe pain, provider allows for administration of a medication prescribed for a lower pain scale.  [CHIP]    Do not exceed 4 grams of acetaminophen in a 24 hr period. Max dose of 2gm for AST/ALT greater than 120 units/L        If given for pain, use the following pain scale:   Mild Pain = Pain Score of 1-3, CPOT 1-2  Moderate Pain = Pain Score of 4-6, CPOT 3-4  Severe Pain = Pain Score of 7-10, CPOT 5-8         HYDROmorphone (DILAUDID) injection 0.25 mg  Dose: 0.25 mg  Freq: Every 2 Hours PRN Route: IV  PRN Reason: Severe Pain  PRN Comment: For breakthrough 10/10 pain  Start: 02/10/24 0905   End: 02/13/24 1157   Admin Instructions:   Based on patient request - if ordered for moderate or severe pain, provider allows for administration of a medication prescribed for a lower  pain scale.  If given for pain, use the following pain scale:  Mild Pain = Pain Score of 1-3, CPOT 1-2  Moderate Pain = Pain Score of 4-6, CPOT 3-4  Severe Pain = Pain Score of 7-10, CPOT 5-8         HYDROmorphone (DILAUDID) injection 0.5 mg  Dose: 0.5 mg  Freq: Every 5 Minutes PRN Route: IV  PRN Reason: Moderate Pain  Start: 02/17/24 1122   End: 02/17/24 1338   Admin Instructions:   Maximum total dose of hydromorphone is 2 mg.  If given for pain, use the following pain scale:  Mild Pain = Pain Score of 1-3, CPOT 1-2  Moderate Pain = Pain Score of 4-6, CPOT 3-4  Severe Pain = Pain Score of 7-10, CPOT 5-8         HYDROmorphone (DILAUDID) injection 0.5 mg  Dose: 0.5 mg  Freq: Every 2 Hours PRN Route: IV  PRN Reason: Severe Pain  Start: 02/15/24 1318   End: 02/23/24 1550   Admin Instructions:   Based on patient request - if ordered for moderate or severe pain, provider allows for administration of a medication prescribed for a lower pain scale.  If given for pain, use the following pain scale:  Mild Pain = Pain Score of 1-3, CPOT 1-2  Moderate Pain = Pain Score of 4-6, CPOT 3-4  Severe Pain = Pain Score of 7-10, CPOT 5-8    0029-Given                HYDROmorphone (DILAUDID) injection 0.5 mg  Dose: 0.5 mg  Freq: Every 5 Minutes PRN Route: IV  PRN Reason: Moderate Pain  Start: 02/09/24 1512   End: 02/09/24 1756   Admin Instructions:   Maximum total dose of hydromorphone is 2 mg.  If given for pain, use the following pain scale:  Mild Pain = Pain Score of 1-3, CPOT 1-2  Moderate Pain = Pain Score of 4-6, CPOT 3-4  Severe Pain = Pain Score of 7-10, CPOT 5-8         HYDROmorphone (DILAUDID) PCA 0.2 mg/ml 50 mL syringe  Nurse Loading Dose: 0 mg  Patient Bolus Dose: 0.2 mg  Lockout Interval: 8 Minutes  Basal Rate: 0 mg/hr  One Hour Dose Limit: 2 mg  Freq: Continuous Route: IV  Start: 02/15/24 0845   End: 02/15/24 1319   Admin Instructions:   Based on patient request - if ordered for moderate or severe pain, provider allows  for administration of a medication prescribed for a lower pain scale.  If given for pain, use the following pain scale:  Mild Pain = Pain Score of 1-3, CPOT 1-2  Moderate Pain = Pain Score of 4-6, CPOT 3-4  Severe Pain = Pain Score of 7-10, CPOT 5-8         HYDROmorphone (DILAUDID) PCA 0.2 mg/ml 50 mL syringe  Nurse Loading Dose: 0 mg  Patient Bolus Dose: 0.2 mg  Lockout Interval: 8 Minutes  Basal Rate: 0 mg/hr  One Hour Dose Limit: 2 mg  Freq: Continuous Route: IV  Start: 02/13/24 0830   End: 02/15/24 1152   Admin Instructions:   Based on patient request - if ordered for moderate or severe pain, provider allows for administration of a medication prescribed for a lower pain scale.  If given for pain, use the following pain scale:  Mild Pain = Pain Score of 1-3, CPOT 1-2  Moderate Pain = Pain Score of 4-6, CPOT 3-4  Severe Pain = Pain Score of 7-10, CPOT 5-8         HYDROmorphone (DILAUDID) PCA 0.2 mg/ml 50 mL syringe  Nurse Loading Dose: 0 mg  Patient Bolus Dose: 0.2 mg  Lockout Interval: 8 Minutes  Basal Rate: 0 mg/hr  One Hour Dose Limit: 2 mg  Freq: Continuous Route: IV  Start: 02/12/24 1600   End: 02/13/24 0738   Admin Instructions:   Based on patient request - if ordered for moderate or severe pain, provider allows for administration of a medication prescribed for a lower pain scale.  If given for pain, use the following pain scale:  Mild Pain = Pain Score of 1-3, CPOT 1-2  Moderate Pain = Pain Score of 4-6, CPOT 3-4  Severe Pain = Pain Score of 7-10, CPOT 5-8         HYDROmorphone (DILAUDID) PCA 0.2 mg/ml 50 mL syringe  Nurse Loading Dose: 0.2 mg  Patient Bolus Dose: 0.2 mg  Lockout Interval: 8 Minutes  Basal Rate: 0 mg/hr  One Hour Dose Limit: 2 mg  Freq: Continuous Route: IV  Start: 02/10/24 1000   End: 02/12/24 1514   Admin Instructions:   Based on patient request - if ordered for moderate or severe pain, provider allows for administration of a medication prescribed for a lower pain scale.  If given for  pain, use the following pain scale:  Mild Pain = Pain Score of 1-3, CPOT 1-2  Moderate Pain = Pain Score of 4-6, CPOT 3-4  Severe Pain = Pain Score of 7-10, CPOT 5-8         HYDROmorphone (DILAUDID) PCA 0.2 mg/ml 50 mL syringe  Nurse Loading Dose: 0.2 mg  Patient Bolus Dose: 0.1 mg  Lockout Interval: 8 Minutes  Basal Rate: 0 mg/hr  One Hour Dose Limit: 0.75 mg  Freq: Continuous Route: IV  Start: 02/09/24 1612   End: 02/10/24 0904   Admin Instructions:   Based on patient request - if ordered for moderate or severe pain, provider allows for administration of a medication prescribed for a lower pain scale.  If given for pain, use the following pain scale:  Mild Pain = Pain Score of 1-3, CPOT 1-2  Moderate Pain = Pain Score of 4-6, CPOT 3-4  Severe Pain = Pain Score of 7-10, CPOT 5-8         ipratropium (ATROVENT) nebulizer solution 0.5 mg  Dose: 0.5 mg  Freq: Every 6 Hours PRN Route: NEBULIZATION  PRN Reason: Shortness of Air  Start: 02/11/24 1115   End: 02/23/24 1550   Admin Instructions:   Include Respiratory Treatment Education             ipratropium (ATROVENT) nebulizer solution 0.5 mg  Dose: 0.5 mg  Freq: 4 Times Daily - RT Route: NEBULIZATION  Start: 02/10/24 1230   End: 02/11/24 1104   Admin Instructions:   Include Respiratory Treatment Education             ipratropium-albuterol (DUO-NEB) nebulizer solution 3 mL  Dose: 3 mL  Freq: Once As Needed Route: NEBULIZATION  PRN Reasons: Wheezing,Shortness of Air  PRN Comment: bronchospasm  Start: 02/17/24 1122   End: 02/17/24 1338   Admin Instructions:   Notify Anesthesia if minineb is given         ipratropium-albuterol (DUO-NEB) nebulizer solution 3 mL  Dose: 3 mL  Freq: Once As Needed Route: NEBULIZATION  PRN Reasons: Wheezing,Shortness of Air  PRN Comment: bronchospasm  Start: 02/09/24 1512   End: 02/09/24 1756   Admin Instructions:   Notify Anesthesia if minineb is given         labetalol (NORMODYNE,TRANDATE) injection 5 mg  Dose: 5 mg  Freq: Every 5 Minutes PRN  Route: IV  PRN Reason: High Blood Pressure  PRN Comment: for systolic blood pressure greater than 180 mmHg or diastolic blood pressure greater than 105 mmHg  Start: 02/17/24 1122   End: 02/17/24 1338   Admin Instructions:   Hold for heart rate less than 60.    If labetalol and hydralazine are both ordered, use labetalol first. Maximum dose of labetalol is 20mg IV while in PACU, notify anesthesiologist for further orders if needed.  Give IV Push over 2 minutes.         labetalol (NORMODYNE,TRANDATE) injection 5 mg  Dose: 5 mg  Freq: Every 5 Minutes PRN Route: IV  PRN Reason: High Blood Pressure  PRN Comment: for systolic blood pressure greater than 180 mmHg or diastolic blood pressure greater than 105 mmHg  Start: 02/09/24 1512   End: 02/09/24 1756   Admin Instructions:   Hold for heart rate less than 60.    If labetalol and hydralazine are both ordered, use labetalol first. Maximum dose of labetalol is 20mg IV while in PACU, notify anesthesiologist for further orders if needed.  Give IV Push over 2 minutes.         lactated ringers infusion  Rate: 9 mL/hr Dose: 9 mL/hr  Freq: Continuous Route: IV  Start: 02/09/24 0613   End: 02/15/24 1319         lidocaine (XYLOCAINE) 1 % injection 0.5 mL  Dose: 0.5 mL  Freq: Once As Needed Route: ID  PRN Comment: IV Start  Start: 02/09/24 0610   End: 02/09/24 1620         Lidocaine 4 % 1 patch  Dose: 1 patch  Freq: Every 24 Hours Scheduled Route: TD  Start: 02/10/24 1000   End: 02/23/24 1550   Admin Instructions:   Apply to skin on right chest . Remove patch in 12 hours. Apply patch only once for up to 12 hours in 24 hour period. Based on patient request -  if ordered for moderate or severe pain, provider allows for administration of a medication prescribed for a lower pain scale.    0858-Medication Applied [C]     2148-Medication Removed           0950-Medication Applied [C]     2150 (Medication Removed)              Magnesium Standard Dose Replacement - Follow Nurse / BPA Driven  "Protocol  Freq: As Needed Route: XX  PRN Reason: Other  Start: 02/11/24 0404   End: 02/23/24 1550   Admin Instructions:   Open Order & Select \"[facility] Electrolyte Replacement Protocol Algorithm\" to View Details         metoclopramide (REGLAN) injection 10 mg  Dose: 10 mg  Freq: Every 8 Hours Route: IV  Start: 02/15/24 0845   End: 02/23/24 1102   Admin Instructions:   Doses of 10 mg or less can be given IV push undiluted over 1 to 2 minutes    0029-Given     0858-Given     1729-Given     2345-Given         0951-Given               metoclopramide (REGLAN) solution 10 mg  Dose: 10 mg  Freq: 3 times daily Route: PER J TUBE  Start: 02/23/24 1500   End: 02/23/24 1550   Admin Instructions:              metoprolol tartrate (LOPRESSOR) injection 2.5 mg  Dose: 2.5 mg  Freq: Every 6 Hours Route: IV  Start: 02/14/24 1924   End: 02/16/24 1539   Admin Instructions:   Hold for SBP less than 100, DBP less than 60, or heart rate less than 60. If a dose is held, please contact the provider.         metoprolol tartrate (LOPRESSOR) injection 2.5 mg  Dose: 2.5 mg  Freq: Every 8 Hours Route: IV  Start: 02/12/24 2204   End: 02/14/24 1454   Admin Instructions:   Hold for SBP less than 100, DBP less than 60, or heart rate less than 60. If a dose is held, please contact the provider.         metoprolol tartrate (LOPRESSOR) injection 2.5 mg  Dose: 2.5 mg  Freq: Every 6 Hours Route: IV  Start: 02/09/24 1845   End: 02/12/24 1730   Admin Instructions:   Hold for SBP less than 100, DBP less than 60, or heart rate less than 60. If a dose is held, please contact the provider.         metoprolol tartrate (LOPRESSOR) injection 5 mg  Dose: 5 mg  Freq: Every 6 Hours Route: IV  Start: 02/16/24 1832   End: 02/23/24 1550   Admin Instructions:   Hold for SBP less than 100, DBP less than 60, or heart rate less than 60. If a dose is held, please contact the provider.    0029-Given     (0637)-Not Given     1437-Given     2148-Given         (0205)-Not Given     " 0551-Given     1245             metoprolol tartrate (LOPRESSOR) tablet 12.5 mg  Dose: 12.5 mg  Freq: Every 12 Hours Scheduled Route: PER J TUBE  Start: 02/23/24 1215   End: 02/23/24 1550   Admin Instructions:   Hold for SBP less than 100, DBP less than 60, or heart rate less than 50. If a dose is held, please contact the provider.     1215               midazolam (VERSED) injection 1 mg  Dose: 1 mg  Freq: Every 5 Minutes PRN Route: IV  PRN Comment: Anxiety prophylaxis, Pre-op comfort  Start: 02/09/24 0610   End: 02/09/24 1620   Admin Instructions:   May repeat dose in 5 minutes one time then contact provider for additional orders.             naloxone (NARCAN) injection 0.1 mg  Dose: 0.1 mg  Freq: Every 5 Minutes PRN Route: IV  PRN Reasons: Opioid Reversal,Respiratory Depression  PRN Comment: see administration instructions  Start: 02/09/24 1758   End: 02/23/24 1550   Admin Instructions:   For respiratory rate less than 8 per minute and if the patient is difficult arouse:  D/C PCA.  Give naloxone (NARCAN) 0.1 mg slow IV push.  May repeat x 1 if needed in 5 minutes. Notify physician.    Mix naloxone 0.4 mg/1 mL ampule in 9 mL normal saline.         naloxone (NARCAN) injection 0.2 mg  Dose: 0.2 mg  Freq: As Needed Route: IV  PRN Reasons: Opioid Reversal,Respiratory Depression  PRN Comment: unresponsiveness, decrease oxygen saturation  Indications of Use: ACUTE RESPIRATORY FAILURE,OPIOID-INDUCED RESPIRATORY DEPRESSION  Start: 02/17/24 1122   End: 02/17/24 1338   Admin Instructions:   Notify Anesthesia if given         naloxone (NARCAN) injection 0.2 mg  Dose: 0.2 mg  Freq: As Needed Route: IV  PRN Reasons: Opioid Reversal,Respiratory Depression  PRN Comment: unresponsiveness, decrease oxygen saturation  Indications of Use: ACUTE RESPIRATORY FAILURE,OPIOID-INDUCED RESPIRATORY DEPRESSION  Start: 02/09/24 1512   End: 02/09/24 1756   Admin Instructions:   Notify Anesthesia if given         nitroglycerin (NITROSTAT) SL  tablet 0.4 mg  Dose: 0.4 mg  Freq: Every 5 Minutes PRN Route: SL  PRN Reason: Chest Pain  PRN Comment: if systolic BP greater than 100 mm/Hg.  Start: 02/09/24 1758   End: 02/23/24 1550   Admin Instructions:   If pain unrelieved after 3 doses, notify MD.  May administer up to 3 doses per episode.         ondansetron (ZOFRAN) injection 4 mg  Dose: 4 mg  Freq: Once As Needed Route: IV  PRN Reasons: Nausea,Vomiting  Indications of Use: POSTOPERATIVE NAUSEA AND VOMITING  Start: 02/17/24 1122   End: 02/17/24 1338   Admin Instructions:   If BOTH ondansetron (ZOFRAN) and promethazine (PHENERGAN) are ordered use ondansetron first and THEN promethazine IF ondansetron is ineffective.         ondansetron (ZOFRAN) injection 4 mg  Dose: 4 mg  Freq: Once As Needed Route: IV  PRN Reasons: Nausea,Vomiting  Indications of Use: POSTOPERATIVE NAUSEA AND VOMITING  Start: 02/09/24 1512   End: 02/09/24 1756   Admin Instructions:   If BOTH ondansetron (ZOFRAN) and promethazine (PHENERGAN) are ordered use ondansetron first and THEN promethazine IF ondansetron is ineffective.         oxyCODONE (ROXICODONE) 5 MG/5ML solution 7.5 mg  Dose: 7.5 mg  Freq: Every 4 Hours PRN Route: PER J TUBE  PRN Reason: Moderate Pain  Start: 02/12/24 1028   End: 02/23/24 1550   Admin Instructions:   Based on patient request - if ordered for moderate or severe pain, provider allows for administration of a medication prescribed for a lower pain scale.        If given for pain, use the following pain scale:  Mild Pain = Pain Score of 1-3, CPOT 1-2  Moderate Pain = Pain Score of 4-6, CPOT 3-4  Severe Pain = Pain Score of 7-10, CPOT 5-8    0203-Given     0605-Given     1116-Given     1611-Given     2148-Given        0140-Given     0550-Given     1012-Given             oxyCODONE (ROXICODONE) 5 MG/5ML solution 7.5 mg  Dose: 7.5 mg  Freq: Every 4 Hours PRN Route: PO  PRN Reason: Moderate Pain  Start: 02/11/24 0721   End: 02/12/24 1029   Admin Instructions:   Based on  patient request - if ordered for moderate or severe pain, provider allows for administration of a medication prescribed for a lower pain scale.        If given for pain, use the following pain scale:  Mild Pain = Pain Score of 1-3, CPOT 1-2  Moderate Pain = Pain Score of 4-6, CPOT 3-4  Severe Pain = Pain Score of 7-10, CPOT 5-8         oxyCODONE-acetaminophen (PERCOCET) 7.5-325 MG per tablet 1 tablet  Dose: 1 tablet  Freq: Every 4 Hours PRN Route: PO  PRN Reason: Severe Pain  Start: 02/17/24 1122   End: 02/17/24 1338   Admin Instructions:   [CHIP]    Do not exceed 4 grams of acetaminophen in a 24 hr period. Max dose of 2gm for AST/ALT greater than 120 units/L        If given for pain, use the following pain scale:   Mild Pain = Pain Score of 1-3, CPOT 1-2  Moderate Pain = Pain Score of 4-6, CPOT 3-4  Severe Pain = Pain Score of 7-10, CPOT 5-8         oxyCODONE-acetaminophen (PERCOCET) 7.5-325 MG per tablet 1 tablet  Dose: 1 tablet  Freq: Every 4 Hours PRN Route: PO  PRN Reason: Severe Pain  Start: 02/09/24 1512   End: 02/09/24 1756   Admin Instructions:   [CHIP]    Do not exceed 4 grams of acetaminophen in a 24 hr period. Max dose of 2gm for AST/ALT greater than 120 units/L        If given for pain, use the following pain scale:   Mild Pain = Pain Score of 1-3, CPOT 1-2  Moderate Pain = Pain Score of 4-6, CPOT 3-4  Severe Pain = Pain Score of 7-10, CPOT 5-8         pantoprazole (PROTONIX) injection 40 mg  Dose: 40 mg  Freq: Every Early Morning Route: IV  Indications of Use: GASTROESOPHAGEAL REFLUX DISEASE  Start: 02/10/24 0600   End: 02/23/24 1550   Admin Instructions:   Dilute with 10 mL of 0.9% NaCl and give IV push over 2 minutes.    0605-Given            0550-Given               Pharmacy Consult  Freq: Continuous PRN Route: XX  PRN Reason: Consult  Start: 02/09/24 1758   End: 02/13/24 0734   Admin Instructions:   Pharmacist to discontinue PRN opioid pain medications outside of the PCA order set or not associated  "with breakthrough pain at connection of PCA. If there is no basal rate on PCA, long-acting opioids may be continued. Scheduled opioids for pain are allowed while weaning patient off of PCA but PRN opioids for pain are not. If PCA is only providing basal rate PRN meds may remain.   Order specific questions:   Consult for Pharmacist to review/discontinue PRN opioid pain medications at connection of PCA.           Pharmacy to dose vancomycin  Freq: Continuous PRN Route: XX  PRN Reason: Consult  Indications Comment: Post esophagectomy, concern for mediastinitis   Start: 02/16/24 1825   End: 02/17/24 0926         Pharmacy to Dose Zosyn  Freq: Continuous PRN Route: XX  PRN Reason: Consult  Indications of Use: SEPSIS  Start: 02/16/24 1819   End: 02/16/24 1839         Phosphorus Replacement - Follow Nurse / BPA Driven Protocol  Freq: As Needed Route: XX  PRN Reason: Other  Start: 02/11/24 0404   End: 02/23/24 1550   Admin Instructions:   Open Order & Select \"[facility] Electrolyte Replacement Protocol Algorithm\" to View Details         Potassium Replacement - Follow Nurse / BPA Driven Protocol  Freq: As Needed Route: XX  PRN Reason: Other  Start: 02/11/24 0404   End: 02/23/24 1550   Admin Instructions:   Open Order & Select \"[facility] Electrolyte Replacement Protocol Algorithm\" to View Details         promethazine (PHENERGAN) suppository 25 mg  Dose: 25 mg  Freq: Once As Needed Route: RE  PRN Reasons: Nausea,Vomiting  Start: 02/17/24 1122   End: 02/17/24 1338   Admin Instructions:   If BOTH ondansetron (ZOFRAN) and promethazine (PHENERGAN) are ordered use ondansetron first and THEN promethazine IF ondansetron is ineffective.        Or  promethazine (PHENERGAN) tablet 25 mg  Dose: 25 mg  Freq: Once As Needed Route: PO  PRN Reasons: Nausea,Vomiting  Start: 02/17/24 1122   End: 02/17/24 1338   Admin Instructions:   If BOTH ondansetron (ZOFRAN) and promethazine (PHENERGAN) are ordered use ondansetron first and THEN promethazine IF " ondansetron is ineffective.             promethazine (PHENERGAN) suppository 25 mg  Dose: 25 mg  Freq: Once As Needed Route: RE  PRN Reasons: Nausea,Vomiting  Start: 02/09/24 1512   End: 02/09/24 1756   Admin Instructions:   If BOTH ondansetron (ZOFRAN) and promethazine (PHENERGAN) are ordered use ondansetron first and THEN promethazine IF ondansetron is ineffective.        Or  promethazine (PHENERGAN) tablet 25 mg  Dose: 25 mg  Freq: Once As Needed Route: PO  PRN Reasons: Nausea,Vomiting  Start: 02/09/24 1512   End: 02/09/24 1756   Admin Instructions:   If BOTH ondansetron (ZOFRAN) and promethazine (PHENERGAN) are ordered use ondansetron first and THEN promethazine IF ondansetron is ineffective.             sodium chloride (NS) irrigation solution  Freq: As Needed  Start: 02/09/24 0931   End: 02/09/24 1606         sodium chloride 0.9 % flush 10 mL  Dose: 10 mL  Freq: As Needed Route: IV  PRN Reason: Line Care  Start: 02/09/24 0611   End: 02/09/24 1620         sodium chloride 0.9 % flush 10 mL  Dose: 10 mL  Freq: Every 12 Hours Scheduled Route: IV  Start: 02/09/24 0900   End: 02/09/24 1620         sodium chloride 0.9 % flush 3 mL  Dose: 3 mL  Freq: Every 12 Hours Scheduled Route: IV  Start: 02/09/24 2100   End: 02/23/24 1550    0900-Given     2149-Given           0953-Given               sodium chloride 0.9 % flush 3 mL  Dose: 3 mL  Freq: Every 12 Hours Scheduled Route: IV  Start: 02/09/24 0900   End: 02/09/24 1620         sodium chloride 0.9 % flush 3-10 mL  Dose: 3-10 mL  Freq: As Needed Route: IV  PRN Reason: Line Care  Start: 02/09/24 1758   End: 02/23/24 1550         sodium chloride 0.9 % flush 3-10 mL  Dose: 3-10 mL  Freq: As Needed Route: IV  PRN Reason: Line Care  Start: 02/09/24 0610   End: 02/09/24 1620         sodium chloride 0.9 % infusion  Rate: 30 mL/hr Dose: 30 mL/hr  Freq: Continuous PRN Route: IV  PRN Comment: To maintain IV patency while on PCA pump if no other IV fluid present  Start: 02/09/24  1758   End: 02/23/24 1550         sodium chloride 0.9 % infusion 40 mL  Dose: 40 mL  Freq: As Needed Route: IV  PRN Reason: Line Care  Start: 02/09/24 1758   End: 02/23/24 1550   Admin Instructions:   Following administration of an IV intermittent medication, flush line with 40mL NS at 100mL/hr.         sodium chloride 0.9 % infusion 40 mL  Dose: 40 mL  Freq: As Needed Route: IV  PRN Reason: Line Care  Start: 02/09/24 0611   End: 02/09/24 1620   Admin Instructions:   Following administration of an IV intermittent medication, flush line with 40mL NS at 100mL/hr.         sterile water irrigation solution  Freq: As Needed  Start: 02/09/24 0931   End: 02/09/24 1606         vancomycin (VANCOCIN) 1000 mg/200 mL dextrose 5% IVPB  Dose: 1,000 mg  Freq: Every 12 Hours Route: IV  Indications Comment: Mediastinitis  Start: 02/16/24 1945   End: 02/17/24 0926               Lab Results (last 48 hours)       Procedure Component Value Units Date/Time    Magnesium [[id number]]  (Normal) Collected: 02/23/24 0614    Specimen: Blood Updated: 02/23/24 0701     Magnesium 2.6 mg/dL     Basic Metabolic Panel [[id number]]  (Abnormal) Collected: 02/23/24 0614    Specimen: Blood Updated: 02/23/24 0701     Glucose 107 mg/dL      BUN 20 mg/dL      Creatinine 0.59 mg/dL      Sodium 137 mmol/L      Potassium 4.9 mmol/L      Chloride 100 mmol/L      CO2 28.7 mmol/L      Calcium 8.9 mg/dL      BUN/Creatinine Ratio 33.9     Anion Gap 8.3 mmol/L      eGFR 117.5 mL/min/1.73     Narrative:      GFR Normal >60  Chronic Kidney Disease <60  Kidney Failure <15      CBC (No Diff) [[id number]]  (Abnormal) Collected: 02/23/24 0614    Specimen: Blood Updated: 02/23/24 0700     WBC 10.60 10*3/mm3      RBC 3.70 10*6/mm3      Hemoglobin 12.3 g/dL      Hematocrit 37.2 %      .5 fL      MCH 33.2 pg      MCHC 33.1 g/dL      RDW 15.7 %      RDW-SD 57.6 fl      MPV 8.8 fL      Platelets 506 10*3/mm3     POC Glucose Once [[id number]]  (Abnormal) Collected: 02/23/24  0656    Specimen: Blood Updated: 02/23/24 0658     Glucose 140 mg/dL     POC Glucose Once [[id number]]  (Abnormal) Collected: 02/22/24 2321    Specimen: Blood Updated: 02/22/24 2323     Glucose 131 mg/dL     POC Glucose Once [[id number]]  (Normal) Collected: 02/22/24 1147    Specimen: Blood Updated: 02/22/24 1148     Glucose 112 mg/dL     POC Glucose Once [[id number]]  (Normal) Collected: 02/22/24 0548    Specimen: Blood Updated: 02/22/24 0549     Glucose 112 mg/dL     Magnesium [[id number]]  (Normal) Collected: 02/22/24 0404    Specimen: Blood Updated: 02/22/24 0459     Magnesium 2.3 mg/dL     Basic Metabolic Panel [[id number]]  (Abnormal) Collected: 02/22/24 0404    Specimen: Blood Updated: 02/22/24 0459     Glucose 141 mg/dL      BUN 25 mg/dL      Creatinine 0.52 mg/dL      Sodium 139 mmol/L      Potassium 4.4 mmol/L      Chloride 104 mmol/L      CO2 28.0 mmol/L      Calcium 8.9 mg/dL      BUN/Creatinine Ratio 48.1     Anion Gap 7.0 mmol/L      eGFR 122.0 mL/min/1.73     Narrative:      GFR Normal >60  Chronic Kidney Disease <60  Kidney Failure <15      CBC (No Diff) [[id number]]  (Abnormal) Collected: 02/22/24 0404    Specimen: Blood Updated: 02/22/24 0440     WBC 10.25 10*3/mm3      RBC 3.37 10*6/mm3      Hemoglobin 11.4 g/dL      Hematocrit 33.7 %      .0 fL      MCH 33.8 pg      MCHC 33.8 g/dL      RDW 15.6 %      RDW-SD 56.4 fl      MPV 8.8 fL      Platelets 437 10*3/mm3     POC Glucose Once [[id number]]  (Abnormal) Collected: 02/21/24 2351    Specimen: Blood Updated: 02/21/24 2352     Glucose 142 mg/dL     Blood Culture - Blood, Arm, Right [[id number]]  (Normal) Collected: 02/16/24 1910    Specimen: Blood from Arm, Right Updated: 02/21/24 1932     Blood Culture No growth at 5 days    Blood Culture - Blood, Hand, Left [[id number]]  (Normal) Collected: 02/16/24 1906    Specimen: Blood from Hand, Left Updated: 02/21/24 1932     Blood Culture No growth at 5 days    POC Glucose Once [[id number]]  (Normal)  Collected: 02/21/24 1739    Specimen: Blood Updated: 02/21/24 1744     Glucose 126 mg/dL           Imaging Results (Last 48 Hours)       Procedure Component Value Units Date/Time    XR Chest 1 View [[id number]] Collected: 02/23/24 0952     Updated: 02/23/24 0957    Narrative:      Portable chest radiograph     HISTORY: Status post esophagectomy and stent placement     TECHNIQUE: Single AP portable radiograph of the chest     COMPARISON: Multiple chest radiographs dating back to 2/21/2024       Impression:      FINDINGS AND IMPRESSION:  A stent overlies the mid upper mediastinum and appears grossly similar  in position to that seen on multiple prior chest radiographs.     Right Port-A-Cath tip terminates over the superior vena cava.     Suggestion of a small right pleural effusion. No pneumothorax is seen.  Previously seen asymmetric right pulmonary mid to lower opacification  has mildly improved. Cardiac silhouette is within normal limits for  size.     This report was finalized on 2/23/2024 9:54 AM by Dr. Luc Hatch M.D  on Workstation: [id number]       FL ESOPHAGRAM SINGLE CONTRAST [[id number]] Collected: 02/22/24 1319     Updated: 02/22/24 1718    Addenda:        By electronically signing this report, I, the supervising radiologist,  attest that I was not present for the procedure(s) but agree with the  final edited report.     This report was finalized on 2/22/2024 5:15 PM by Dr. Luc Hatch M.D  on Workstation: [id number]     Signed: 02/22/24 1715 by Luc Hatch MD    Narrative:      EXAMINATION: Esophagram Complete Fluoroscopy.     DATE: 2/22/2024     COMPARISON: Chest 1 view 2/22/2024     CLINICAL HISTORY: 53-year-old male with adenocarcinoma of the esophagus  status post distal lower one third esophagectomy 2/9/2024 with  subsequent esophagogastric anastomotic leak status post esophageal stent  2/17/2024.     DETAILS: Administration of Gastrografin contrast was provided to the  patient. Rapid  sequence video clips and spot films of the esophagus were  obtained.     *  TOTAL FLUOROSCOPY DOSE: Reference Air Kerma: 36 mGy     FINDINGS:   Esophagus:  Esophageal stent is noted in the distal two thirds of the esophagus.  Gastrografin contrast is seen flowing through the stent without  obstruction. No contrast extravasation is observed, however a retrograde  flow of contrast is demonstrated around the both the anterior and  posterior aspects.     Stomach:  Barium flows readily through the esophagus and into the stomach.          Impression:      1.  The esophageal stent is patent with contrast opacification passing  into the gastric pull-through. However, significant contrast pools at  the distal aspect of the stent with demonstrated retrograde flow of  contrast along the distal aspect of the stent, both posteriorly and  anteriorly, and given patient history, these findings place the patient  at significant risk for anastomotic leak with oral feeds. The above  findings were discussed with Shae Calero by telephone by Luc Hatch at 1:45 p.m.   on  2/22/2024  .        This report was finalized on 2/22/2024 5:02 PM by Dr. Luc Hatch M.D  on Workstation: [id number]       XR Chest 1 View [[id number]] Collected: 02/22/24 0758     Updated: 02/22/24 0834    Narrative:      CHEST SINGLE VIEW     HISTORY: Postoperative esophagectomy and stent placement.     COMPARISON: AP chest 02/21/2024, 02/20/2024.     FINDINGS: Right Mediport tip is in the SVC. An esophageal stent is  present. The heart size is normal. Lungs appear clear. There is no  evidence for pulmonary edema or pleural effusion or infiltrate.       Impression:      No evidence for active disease in the chest.     This report was finalized on 2/22/2024 8:31 AM by Dr. Gregorio Parikh M.D on Workstation: [id number]             ECG/EMG Results (last 48 hours)       Procedure Component Value Units Date/Time    SCANNED - TELEMETRY   [[id number]] Resulted:  02/09/24     Updated: 02/21/24 1417    SCANNED - TELEMETRY   [[id number]] Resulted: 02/09/24     Updated: 02/21/24 2116    SCANNED - TELEMETRY   [[id number]] Resulted: 02/09/24     Updated: 02/22/24 0302    SCANNED - TELEMETRY   [[id number]] Resulted: 02/09/24     Updated: 02/22/24 1059    SCANNED - TELEMETRY   [[id number]] Resulted: 02/09/24     Updated: 02/22/24 1540    SCANNED - TELEMETRY   [[id number]] Resulted: 02/09/24     Updated: 02/22/24 2304    SCANNED - TELEMETRY   [[id number]] Resulted: 02/09/24     Updated: 02/23/24 0428    SCANNED - TELEMETRY   [[id number]] Resulted: 02/09/24     Updated: 02/23/24 0428    SCANNED - TELEMETRY   [[id number]] Resulted: 02/09/24     Updated: 02/23/24 0926               Discharge Summary        Dorothy Calero DNP, APRN at 02/23/24 0908       Attestation signed by Cynthia Sexton MD at 02/23/24 1500    I have reviewed this documentation and agree.                       Patient Care Team:  Justa Castano MD as PCP - General (Family Medicine)  Luz Sepulveda PA-C as Physician Assistant (Neurosurgery)  Gonzalo Cruz MD as Consulting Physician (Surgical Oncology)  Anabela Crenshaw MD as Consulting Physician (Endocrinology)  Oliverio Mueller MD as Consulting Physician (Hematology and Oncology)  Nidhi Hollins APRN as Nurse Practitioner (Nurse Practitioner)  Saurabh Hurtado MD as Surgeon (Thoracic Surgery)  Kelsea Carreno RN as Nurse Navigator  Geovany Sabillon MD as Consulting Physician (Radiation Oncology)  Tristan Hayward MD as Cardiologist (Cardiology)    Date of Admission: 2/9/2024   Date of Discharge:  2/23/2024    Discharge Diagnosis: esophageal cancer    Presenting Problem  Malignant neoplasm of lower third of esophagus [C15.5]  Esophageal cancer [C15.9]     History of Present Illness  Jourdan Lebron is a 53 y.o. male who presented with esophageal adenocarcinoma.  He underwent neoadjuvant treatment and follow-up PET imaging was negative for metastatic disease.  He  was cleared by cardiology and endocrinology prior to surgery.  Dr. Hurtado recommended surgical resection and the patient agreed to proceed.     Hospital Course  Patient was admitted status post Vinicio Shayne esophagectomy.  For further details, please refer to the operative note.  He initially was sent to the ICU and had a stable course where he was then transferred to [facility] for convalescence.    Pulmonology and infectious disease were consulted during his stay.  He had increasing leukocytosis on 2/17/2024 and remained afebrile.  He underwent EGD with stent placement on this day due to some concern for defect at the anastomosis.  Infectious disease started him on fluconazole levofloxacin, metronidazole and vancomycin.  He completed full course of antibiotics.  REYNALDO drain was removed on POD 9 and NG tube was removed on POD 11.  He is tolerating cyclic feeds of Peptamen 1.5 at 75 cc an hour from 5 PM to 11 AM.  He will need to continue on high protein formula given his recent surgery secondary to malignancy.    Esophagram performed on 2/22/2024 and was negative for leak, but did demonstrate retrograde flow of contrast along the distal aspect of the stent making him high risk for anastomotic leak with oral feeds.  He will remain n.p.o. and continue tube feeds as stated above.    Postoperative care instructions have been discussed at length with him and his wife.  Medications have been sent to the pharmacy.  He is hemodynamically stable on room air.  He will follow-up with Dr. Hurtado in clinic on Tuesday, February 27 with a chest x-ray and labs.  Final pathology is ypT3 N0 poorly differentiated adenocarcinoma.    Procedures Performed  Procedure(s):  ESOPHAGOGASTRODUODENOSCOPY WITH STENTING UNDER FLUROSCOPY GUIDENCE WITH ESOPHOGRAM       Consults:   Consults       Date and Time Order Name Status Description    2/17/2024  9:09 AM Inpatient Infectious Diseases Consult Completed     2/9/2024  6:00 PM Inpatient Pulmonology Consult  Completed             Pertinent Test Results:     Imaging Results (Last 24 Hours)       Procedure Component Value Units Date/Time    XR Chest 1 View [[id number]] Collected: 02/23/24 0952     Updated: 02/23/24 0957    Narrative:      Portable chest radiograph     HISTORY: Status post esophagectomy and stent placement     TECHNIQUE: Single AP portable radiograph of the chest     COMPARISON: Multiple chest radiographs dating back to 2/21/2024       Impression:      FINDINGS AND IMPRESSION:  A stent overlies the mid upper mediastinum and appears grossly similar  in position to that seen on multiple prior chest radiographs.     Right Port-A-Cath tip terminates over the superior vena cava.     Suggestion of a small right pleural effusion. No pneumothorax is seen.  Previously seen asymmetric right pulmonary mid to lower opacification  has mildly improved. Cardiac silhouette is within normal limits for  size.     This report was finalized on 2/23/2024 9:54 AM by Dr. Luc Hatch M.D  on Workstation: [id number]       FL ESOPHAGRAM SINGLE CONTRAST [[id number]] Collected: 02/22/24 1319     Updated: 02/22/24 1718    Addenda:        By electronically signing this report, I, the supervising radiologist,  attest that I was not present for the procedure(s) but agree with the  final edited report.     This report was finalized on 2/22/2024 5:15 PM by Dr. Luc Hatch M.D  on Workstation: [id number]     Signed: 02/22/24 1715 by Luc Hatch MD    Narrative:      EXAMINATION: Esophagram Complete Fluoroscopy.     DATE: 2/22/2024     COMPARISON: Chest 1 view 2/22/2024     CLINICAL HISTORY: 53-year-old male with adenocarcinoma of the esophagus  status post distal lower one third esophagectomy 2/9/2024 with  subsequent esophagogastric anastomotic leak status post esophageal stent  2/17/2024.     DETAILS: Administration of Gastrografin contrast was provided to the  patient. Rapid sequence video clips and spot films of the  esophagus were  obtained.     *  TOTAL FLUOROSCOPY DOSE: Reference Air Kerma: 36 mGy     FINDINGS:   Esophagus:  Esophageal stent is noted in the distal two thirds of the esophagus.  Gastrografin contrast is seen flowing through the stent without  obstruction. No contrast extravasation is observed, however a retrograde  flow of contrast is demonstrated around the both the anterior and  posterior aspects.     Stomach:  Barium flows readily through the esophagus and into the stomach.          Impression:      1.  The esophageal stent is patent with contrast opacification passing  into the gastric pull-through. However, significant contrast pools at  the distal aspect of the stent with demonstrated retrograde flow of  contrast along the distal aspect of the stent, both posteriorly and  anteriorly, and given patient history, these findings place the patient  at significant risk for anastomotic leak with oral feeds. The above  findings were discussed with Shae Calero by telephone by Luc Hatch at 1:45 p.m.   on  2/22/2024  .        This report was finalized on 2/22/2024 5:02 PM by Dr. Luc Hatch M.D  on Workstation: [id number]               Lab Results (last 24 hours)       Procedure Component Value Units Date/Time    Magnesium [[id number]]  (Normal) Collected: 02/23/24 0614    Specimen: Blood Updated: 02/23/24 0701     Magnesium 2.6 mg/dL     Basic Metabolic Panel [[id number]]  (Abnormal) Collected: 02/23/24 0614    Specimen: Blood Updated: 02/23/24 0701     Glucose 107 mg/dL      BUN 20 mg/dL      Creatinine 0.59 mg/dL      Sodium 137 mmol/L      Potassium 4.9 mmol/L      Chloride 100 mmol/L      CO2 28.7 mmol/L      Calcium 8.9 mg/dL      BUN/Creatinine Ratio 33.9     Anion Gap 8.3 mmol/L      eGFR 117.5 mL/min/1.73     Narrative:      GFR Normal >60  Chronic Kidney Disease <60  Kidney Failure <15      CBC (No Diff) [[id number]]  (Abnormal) Collected: 02/23/24 0614    Specimen: Blood Updated: 02/23/24 0700      WBC 10.60 10*3/mm3      RBC 3.70 10*6/mm3      Hemoglobin 12.3 g/dL      Hematocrit 37.2 %      .5 fL      MCH 33.2 pg      MCHC 33.1 g/dL      RDW 15.7 %      RDW-SD 57.6 fl      MPV 8.8 fL      Platelets 506 10*3/mm3     POC Glucose Once [[id number]]  (Abnormal) Collected: 02/23/24 0656    Specimen: Blood Updated: 02/23/24 0658     Glucose 140 mg/dL     POC Glucose Once [[id number]]  (Abnormal) Collected: 02/22/24 2321    Specimen: Blood Updated: 02/22/24 2323     Glucose 131 mg/dL     POC Glucose Once [[id number]]  (Normal) Collected: 02/22/24 1147    Specimen: Blood Updated: 02/22/24 1148     Glucose 112 mg/dL               Condition on Discharge:  stable    Vital Signs  Temp:  [97.8 °F (36.6 °C)-98.3 °F (36.8 °C)] 97.9 °F (36.6 °C)  Heart Rate:  [80-94] 85  Resp:  [16-18] 18  BP: ()/(69-77) 108/74    Physical Exam:    General Appearance:  Alert, cooperative, in no acute distress   Head:  Normocephalic, without obvious abnormality, atraumatic   Eyes:  Lids and lashes normal, conjunctivae and sclerae normal, no icterus, no pallor, corneas clear, PERRLA   Ears:  Ears appear intact with no abnormalities noted   Throat:  No oral lesions, no thrush, oral mucosa moist   Neck:  No adenopathy, supple, trachea midline, no thyromegaly, no carotid bruit, no JVD   Back:  No kyphosis present, no scoliosis present, no skin lesions, erythema or scars, no tenderness to percussion or palpation, range of motion normal   Lungs:  Clear to auscultation, respirations regular, even and unlabored    Heart:  Regular rhythm and normal rate, normal S1 and S2, no murmur, no gallop, no rub, no click   Chest Wall:  No abnormalities observed   Abdomen:  Normal bowel sounds, no masses, no organomegaly, soft non-tender, non-distended, no guarding, no rebound tenderness   Rectal:  Deferred   Extremities:  Moves all extremities well, no edema, no cyanosis, no redness   Pulses:  Pulses palpable and equal bilaterally   Skin:  No  bleeding, bruising or rash.  Postoperative incisions are well-approximated with Dermabond intact.  J-tube without purulence or erythema.   Lymph nodes:  No palpable adenopathy   Neurologic:  Cranial nerves 2 - 12 grossly intact, sensation intact, DTR present and equal bilaterally                                                                               Discharge Disposition  Home today    Discharge Medications     Discharge Medications        New Medications        Instructions Start Date   acetaminophen 160 MG/5ML solution  Commonly known as: TYLENOL   1,000 mg, Per J Tube, 3 times daily      FLUoxetine 20 MG/5ML solution  Commonly known as: PROzac  Replaces: FLUoxetine 20 MG capsule   20 mg, Per J Tube, Daily      metoclopramide 10 MG/10ML solution solution  Commonly known as: REGLAN   10 mg, Per J Tube, 3 times daily      oxyCODONE 5 MG/5ML solution  Commonly known as: ROXICODONE   7.5 mg, Per J Tube, Every 4 Hours PRN             Changes to Medications        Instructions Start Date   ALPRAZolam 0.25 MG tablet  Commonly known as: XANAX  What changed: how to take this   0.25 mg, Per J Tube, 3 Times Daily PRN, for anxiety      aspirin 81 MG chewable tablet  What changed: how to take this   81 mg, Per J Tube, Daily      magic mouthwash  What changed:   when to take this  reasons to take this   5 mL, Swish & Swallow, 4 Times Daily Before Meals & Nightly             Continue These Medications        Instructions Start Date   Evolocumab solution prefilled syringe injection  Commonly known as: REPATHA   140 mg, Subcutaneous, Every 14 Days      fluticasone 50 MCG/ACT nasal spray  Commonly known as: FLONASE   2 sprays, Nasal, Daily      lidocaine-prilocaine 2.5-2.5 % cream  Commonly known as: EMLA   1 application , Topical, As Needed      ondansetron 8 MG tablet  Commonly known as: ZOFRAN   8 mg, Oral, 3 Times Daily PRN             Stop These Medications      FLUoxetine 20 MG capsule  Commonly known as:  PROzac  Replaced by: FLUoxetine 20 MG/5ML solution     traMADol 50 MG tablet  Commonly known as: ULTRAM              Discharge Instructions:  No heavy lifting, pushing, pulling greater than 10 pounds.  No driving up until 2 weeks after surgery and no longer taking narcotics.  Resume home diet as tolerated.  Continue incentive spirometer at least 4 times per day.  Remove dressing from post chest tube site after 48 hours, may shower and clean surgical sites with antibacterial soap or hydrogen peroxide, and apply gauze dressing or band-aid as needed for any drainage.  No dressing needed once no longer draining.          Follow-up Appointments  Future Appointments   Date Time Provider Department Center   2/27/2024 10:45 AM Donovan Brooke MD [facility]   2/28/2024 10:05 AM LAB MD  [facility]   2/28/2024 10:15 AM Oliverio Mueller MD [facility]   3/26/2024  3:45 PM Clair Lyman MD [facility]   4/29/2024  7:30 AM [facility] US 2 [facility]   6/24/2024  3:00 PM Justa Castano MD [facility]   7/22/2024  4:00 PM Tristan Hayward MD [facility]     Additional Instructions for the Follow-ups that You Need to Schedule       Ambulatory Referral to Home Health (Hospital)   As directed      Please ensure area around J-tube is clean and dry. Apply dry 4x4 and desitin as needed to prevent skin breakdown    Order Comments: Please ensure area around J-tube is clean and dry. Apply dry 4x4 and desitin as needed to prevent skin breakdown    Face to Face Visit Date: 2/14/2024   Follow-up provider for Plan of Care?: I treated the patient in an acute care facility and will not continue treatment after discharge.   Follow-up provider: DONOVAN BROOKE [[id number]]   Reason/Clinical Findings: recent esophagectomy for esophageal cancer   Describe mobility limitations that make leaving home difficult: recent esophagectomy for esophageal cancer   Nursing/Therapeutic Services Requested: Skilled Nursing    Skilled nursing orders: Other Medication education Pain management Cardiopulmonary assessments Enteral therapy Wound care dressing/changes   Frequency: 1 Week 1        XR Chest 2 View   Feb 27, 2024      Exam reason: post-op   Release to patient: Routine Release        CBC (No Diff)    Feb 28, 2024 (Approximate)      Release to patient: Routine Release        Comprehensive Metabolic Panel    Feb 28, 2024 (Approximate)      Release to patient: Routine Release                Test Results Pending at Discharge      For any questions regarding patient's stay, please refer to patient's chart.    Dorothy Calero DNP, APRN  [facility]  02/23/24  11:34 EST    Greater than 30 minutes spent on the unit discharging this patient, with more than 50% of time spent assessing the patient, counseling the patient on postoperative care and discharge planning.        Electronically signed by Cynthia Sexton MD at 02/23/24 1500       Discharge Order (From admission, onward)       Start     Ordered    02/23/24 1014  Discharge patient  Once        Expected Discharge Date: 02/23/24   Discharge Disposition: Home or Self Care   Physician of Record for Attribution - Please select from Treatment Team: DONOVAN BROOKE [[id number]]   Review needed by CMO to determine Physician of Record: No      Question Answer Comment   Physician of Record for Attribution - Please select from Treatment Team DONOVAN BROOKE    Review needed by CMO to determine Physician of Record No        02/23/24 1014

## 2024-02-27 NOTE — H&P (VIEW-ONLY)
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    Subjective   HISTORY OF PRESENTING ILLNESS:   Jourdan Lebron is a 53-year-old male who has significant medical problems as mentioned in the medical chart.      He was having intermittent dysphagia for over a year which became progressively worse.  On 10/2/2023, he underwent EGD and colonoscopy by Dr. Ozzy Abdalla at [facility].  He was found to have a 6 cm mass at the lower third of the esophagus (36 to 42 cm) (biopsied), mild diverticulosis of the sigmoid colon, 5 polyps noted in the colon and cecum that were removed.  The pathology of the esophageal mass revealed invasive moderate to poorly differentiated adenocarcinoma. All polypectomy samples were negative for malignancy (fragments of tubular adenoma).  CT scan of the chest, abdomen and pelvis on 10/9/2023 at [facility] showed 4 cm abnormal thickening of the lower thoracic esophagus extending to the GE junction thought to represent patient's known primary lung malignancy.  There were no convincing evidence of metastatic disease elsewhere in the chest, abdomen, pelvis, or skeleton.  There were stable left adrenalectomy changes, right adrenal adenoma unchanged.     He was seen in the office by Dr. Tacos Osuna ([facility]) for new diagnosis of esophageal cancer. He was an established patient of Dr. Harinder Mueller for erythrocytosis and macrocytosis since 11/2021 (resolved).      PET/CT on 10/20/2023 at [facility] showed hypermetabolic (SUV 7.7) activity within the distal esophagus extending to the GE junction compatible with patient's known malignancy.  He then underwent EGD with EUS by Dr. Joselyn Savage on 10/27/2023. Findings included close to 6 cm ulcerated mass extending from 36 to 42 cm consistent with poorly  differentiated adenocarcinoma.  Mass penetrated through muscularis propria without involving the adventitia consistent with T2 lesion. Two small round hypodense lymph nodes in close proximity to the mass measuring 5 and 6 mm concerning for malignancy. Other lymph nodes around the GE junction measuring 7 and 8 mm were also concerning for malignancy but immediate cytology was negative for malignancy. Pathology of the gastrohepatic lymph node was positive for metastatic adenocarcinoma; however, the lymph node at 36 cm was negative for malignancy.     He was referred to thoracic surgery for further evaluation.  At the time of initial consultation, he could eat and swallow quite a bit, but it depends. He avoids a lot of bread because it gets stuck lower down his chest. He went down from 200 pounds to 170 pounds 3 years ago after he had a stroke. He had memory loss and general fatigue after the stroke. He lost his appetite but began to add some weight. He then noticed he gets bloated and was having dyspepsia. He had adrenalectomy performed at the [facility] after an adrenal biopsy was done on an adrenal mass, and he went into adrenal crisis. He took hydrocortisone for 1 year and discontinued it on 09/2022. He developed abdominal discomfort afterwards. He denies having any past chest surgeries or myocardial infarction.  His wife mentioned the cause of the previous stroke was never known despite having a complete work up and a loop recorder inserted which was removed in the spring. He denies any abnormal heart rhythms or pedal edema. The patient quit smoking cigarettes 8 to 9 years ago and started smoking cigars.      He had good functional status.  Based on the clinical presentation, he was considered a candidate for trimodality treatment. I placed Mediport on 11/13/2023.  He received concurrent chemoradiation therapy.  His last dose of chemotherapy and radiation was on 12/28/2023. He tolerated chemoradiation  without much difficulty.  Restaging PET/CT scan on 2/2/2024 showed wall thickening involving the distal esophagus extending to the GE junction consistent with patient's known esophageal malignancy.  There was no evidence of distant metastases.  There was significant decreased uptake in the distal esophagus consistent with treatment effect.     Due to his history of stroke, ultrasound bilateral carotid were obtained which showed no significant carotid stenosis.  There was incidentally noted right thyroid nodule for which follow-up ultrasound of the thyroid was done which showed multiple nodules but not concerning for malignancy.  Transthoracic echo was performed on 1/22/2024 and noted ejection fraction of 51 to 55%.  He was sent to Dr. Hayward for cardiology clearance.  He had 0.8% risk of myocardial infarction or cardiac arrest, intraoperatively or up to 30 days postoperatively.  No further workup was recommended.  He had a history of adrenal crisis and was sent to evaluation by his primary endocrinologist at [facility], Anabela Crenshaw MD.  Complete ACTH stimulation test was performed which was reported normal.     On 2/9/2024, he underwent surgical resection. Flexible esophagogastroduodenoscopy was notable for mucosal changes at the distal esophagus and Olivera's esophagus.  These findings were consistent with treatment effect. Rather than a gastric emptying procedure, I performed a balloon dilatation of the pylorus to 20 mm with a CRE balloon I performed a robot-assisted minimally-invasive [place] Shayne esophagectomy via a robot-assisted laparoscopic and robot assisted right thoracoscopic approach converted to thoracotomy. I constructed a 4 cm gastric tube and performed an esophagogastrostomy approximately 2 cm above the azygos vein using a 28 EEA stapler.  After firing the stapler, 1 complete esophageal anastomotic ring and a partial gastric anastomotic ring was noted. The anastomosis was  checked under water with air insufflation and leak was identified which was oversewn. The anastomosis was partially imbricated and no leak was identified under water with air insufflation afterwards. A feeding jejunostomy tube was placed 40 cm distal to ligament of Treitz. Repeat endoscopy was notable for a widely patent anastomosis at 22-24 cm.  The conduit was healthy appearing.  It was mildly tortuous and non redundant.  He remained hemodynamically stable throughout the case.  He received 4000 cc of crystalloid and made 1000 cc of urine.  He required small doses of phenylephrine throughout the case.  He was extubated at the end of procedure.       The pathology reported scattered foci of residual moderate to poorly differentiated adenocarcinoma occurring at the GE junction with changes consistent with prior therapy.  There were scattered individual tumor cells and small clusters near GE junction and extending through muscularis propria into adventitia spanning an area of 2.1 x 1.8 x 0.7 cm.  There is no lymphovascular space invasion.  The proximal and distal surgical margins were negative for malignancy.  20 lymph nodes were negative for metastatic carcinoma. There was also presence of low and high-grade squamous dysplasia.  He had near complete response to chemo and radiation therapy.     Postprocedure he was transferred to the ICU.  His initial postop care was unremarkable.  He was started on tube feeds and was slowly advanced to goal rate which he tolerated well.  He demonstrated return of bowel function.  The chest tube was removed after amylase from the drain was checked which was not concerning for esophageal leak.  His white blood count were slowly trending up and had mild leukocytosis.  This was concerning for unidentified infection.  He had mild cellulitis around the jejunostomy tube site.  He was started on empiric antibiotics.  Infectious disease was consulted.  CT scan of the chest abdomen pelvis  without contrast did not reveal any clear source of infection.  I offered EGD to evaluate for anastomotic leak and conduit.    On 2/17/2024, he underwent EGD.  He had oropharyngeal candidiasis.  There was approximately 20% defect at the anastomotic ring likely contained by the reinforcing stitch placed intraoperatively.  There was fibrinous plaque covering the anastomosis.  Intraoperative esophagram revealed blind pouch anteriorly at the site of anastomotic defect.  The gastric conduit appeared viable but was slightly redundant.  The conduit orientation was straight.  I placed a 18 mm X 25 mm X 103 mm fully covered Wallflex stent deployed at the anastomosis.  He tolerated the procedure well.    Esophagram performed on 2/22/2024 and was negative for leak, but did demonstrate retrograde flow of contrast along the distal aspect of the stent making him high risk for anastomotic leak with oral feeds.  He was kept nothing by mouth and discharged home on tube feeds.    He came to clinic for follow-up visit.  He has lost weight. He weighed 160 pounds before surgery. His weight after surgery was 138 to 140 pounds. He had an x-ray and labs completed today. He has been feeling hot, but his temperature has been normal. He has been sweating frequently, especially at night and mornings. He is getting feeding from 5 PM to 11 AM. He has noticed that his stool has been green. He has regular bowel movements every day. He is urinating fine. He experiences pain in his back. As soon as he gets up, he can not sleep. He is taking Tylenol. He is taking Xanax. The 7.5 mg of oxycodone wears off. He takes it at 11 PM and it lasts until 7 AM. His chest and back hurt the most. When he wakes up in the morning, his whole side hurts and is numb to the touch. He did not tolerate gabapentin well. He has been using a heating blanket. He has been sleeping in a recliner. He has an appointment with Dr. Woods tomorrow on 02/28/2024.     He was getting  metoprolol in the hospital. Once he got home, his blood pressure was 90/64 mmHg. His pulse seems to still be on the higher side. His blood pressure at home has been 90/60 mmHg and his pulse would still be in the 90's. He has experienced dyspnea. He woke up this morning and was experiencing dyspnea.     He was getting metoprolol in the hospital. Once he got home, his blood pressure was 90/64 mmHg. His pulse seems to still be on the higher side. His blood pressure at home has been 90/60 mmHg and his pulse would still be in the 90's. He has experienced dyspnea. He woke up this morning and was experiencing dyspnea.     He is allergic to GABAPENTIN.    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    Anxiety     Brain fog     COVID 02/2023    Diverticulosis of large intestine without perforation or abscess without bleeding 10/02/2023    Esophageal cancer 10/2023    Generalized headaches     GERD (gastroesophageal reflux disease)     Hand tingling     History of blood clot in brain 2020    had thrombectomy    History of cancer chemotherapy 12/2023    History of COVID-19     History of kidney stones     History of radiation therapy 11/2023    Hyperlipidemia     Stroke 05/30/2020    Thyroid nodule     Tiredness     Trouble swallowing        Past Surgical History:   Procedure Laterality Date    ADRENALECTOMY      COLONOSCOPY      ENDOSCOPY      ENDOSCOPY N/A 02/17/2024    Procedure: ESOPHAGOGASTRODUODENOSCOPY WITH STENTING UNDER FLUROSCOPY GUIDENCE WITH ESOPHOGRAM;  Surgeon: Saurabh Hurtado MD;  Location: [facility];  Service: Gastroenterology;  Laterality: N/A;    ESOPHAGOGASTRECTOMY N/A 02/09/2024    Procedure: BRONCHOSCOPY, EGD, ESOPHAGECTOMY ALTAGRACIA-FABIOLA WITH DAVINCI ROBOT, LAPAROSCOPY, RIGHT THORACOSCOPY CONVERTED TO THORACOTOMY, FEEDING JEJUNOSTOMY  TUBE PLACEMENT, INTERCOSTAL NERVE BLOCKS;  Surgeon: Saurabh Hurtado MD;  Location: [facility] MAIN OR;  Service: Robotics - DaVinci;  Laterality: N/A;    ESOPHAGUS SURGERY  10/2023    biopsy    KIDNEY STONE SURGERY      OTHER SURGICAL HISTORY      loop recorder PLACED AND REMOVED    THROMBECTOMY      UPPER ENDOSCOPIC ULTRASOUND W/ FNA N/A 10/27/2023    Procedure: ENDOSCOPIC ULTRASOUND WITH STAGING AND FINE NEEDLE ASPIRATION X2 AREAS;  Surgeon: Joselyn Savage MD;  Location: [facility] ENDOSCOPY;  Service: Gastroenterology;  Laterality: N/A;  POST:    VENOUS ACCESS DEVICE (PORT) INSERTION Right 2023    Procedure: INSERTION VENOUS ACCESS DEVICE;  Surgeon: Saurabh Hurtado MD;  Location: [facility] MAIN OR;  Service: Thoracic;  Laterality: Right;       Family History   Problem Relation Age of Onset    Arthritis Mother     Hypertension Mother     Heart failure Mother 73        Cause of death    Arthritis Father     Hypertension Father     Kidney cancer Father 57        Cause of death. Was a smoker    Heart disease Brother     Esophageal cancer Brother 59        Cause of death. Has a heavy drinker    Malig Hyperthermia Neg Hx        Social History     Socioeconomic History    Marital status:    Tobacco Use    Smoking status: Former     Current packs/day: 0.00     Average packs/day: 1 pack/day for 37.0 years (37.0 ttl pk-yrs)     Types: Cigars, Cigarettes     Start date: 1985     Quit date: 2022     Years since quittin.1     Passive exposure: Past    Smokeless tobacco: Never    Tobacco comments:     1 packs= stopped 10 years ago and continued cigars   2 cigars a day; has stopped cigars as of    Vaping Use    Vaping status: Never Used   Substance and Sexual Activity    Alcohol use: Not Currently     Comment: Has now been abstinent for about one year    Drug use: Never    Sexual activity: Yes     Partners: Female     Birth control/protection: None         Current Outpatient Medications:     acetaminophen  (TYLENOL) 160 MG/5ML solution, Administer 31.23 mL per J tube 3 times a day., Disp: , Rfl:     ALPRAZolam (XANAX) 0.25 MG tablet, Administer 1 tablet per J tube 3 (Three) Times a Day As Needed for Anxiety. for anxiety, Disp: 90 tablet, Rfl: 3    aspirin 81 MG chewable tablet, Administer 1 tablet per J tube Daily., Disp: , Rfl:     Evolocumab (REPATHA) solution prefilled syringe injection, Inject 1 mL under the skin into the appropriate area as directed Every 14 (Fourteen) Days., Disp: 6 mL, Rfl: 3    famotidine (PEPCID) 40 mg/5 mL suspension, Take 2.5 mL by mouth 2 (Two) Times a Day for 30 days., Disp: 150 mL, Rfl: 2    FLUoxetine (PROzac) 20 MG capsule, Administer 1 capsule per J tube Daily for 30 days., Disp: 30 capsule, Rfl: 0    fluticasone (FLONASE) 50 MCG/ACT nasal spray, 2 sprays into the nostril(s) as directed by provider Daily., Disp: , Rfl:     metoclopramide (REGLAN) 10 MG/10ML solution solution, Administer 10 mL per J tube 3 times a day for 30 days., Disp: 900 mL, Rfl: 0    metoprolol tartrate (LOPRESSOR) 25 MG tablet, Administer 0.5 tablets per J tube Every 12 (Twelve) Hours for 30 days., Disp: 30 tablet, Rfl: 0    ondansetron (ZOFRAN) 8 MG tablet, Take 1 tablet by mouth 3 (Three) Times a Day As Needed for Nausea or Vomiting. (Patient taking differently: Administer 1 tablet per J tube 3 (Three) Times a Day As Needed for Nausea or Vomiting.), Disp: 30 tablet, Rfl: 5    gabapentin (NEURONTIN) 100 MG capsule, Take 1 capsule by mouth 3 (Three) Times a Day., Disp: 30 capsule, Rfl: 0    lidocaine-prilocaine (EMLA) 2.5-2.5 % cream, Apply 1 application  topically to the appropriate area as directed As Needed (apply 60 minutes prior to port access)., Disp: 30 g, Rfl: 2    Nystatin (magic mouthwash), Swish and swallow 5 mL 4 (Four) Times a Day Before Meals & at Bedtime., Disp: 1 bottle, Rfl: 4    oxyCODONE (Roxicodone) 5 MG immediate release tablet, Take 1 tablet by mouth Every 8 (Eight) Hours As Needed for  "Severe Pain for up to 10 days., Disp: 30 tablet, Rfl: 0  No current facility-administered medications for this visit.     Allergies   Allergen Reactions    Cephalosporins Anaphylaxis     Throat swelling    Dye  [Contrast Dye (Echo Or Unknown Ct/Mr)] Hives    Iodinated Contrast Media Hives    Sulfa Antibiotics Hives    Sulfate Hives    Zetia [Ezetimibe] Other (See Comments) and Myalgia     Made tired    Statins Myalgia     Myalgia and fatique             Objective    OBJECTIVE:     VITAL SIGNS:  /70 (BP Location: Left arm, Patient Position: Sitting, Cuff Size: Adult)   Pulse 110   Ht 177.8 cm (70\")   Wt 63.5 kg (140 lb)   SpO2 98%   BMI 20.09 kg/m²     PHYSICAL EXAM:  Constitutional:       Appearance: Normal appearance.   HENT:      Head: Normocephalic.      Right Ear: External ear normal.      Left Ear: External ear normal.      Nose: Nose normal.      Mouth/Throat: No obvious deformity     Pharynx: Oropharynx is clear.   Eyes:      Conjunctiva/sclera: Conjunctivae normal.   Cardiovascular:      Rate and Rhythm: Normal rate.      Pulses: Normal pulses.   Pulmonary:      Effort: Pulmonary effort is normal.   Abdominal:      Palpations: Abdomen is soft.   Musculoskeletal:         General: Normal range of motion.      Cervical back: Normal range of motion.   Skin:     General: Skin is warm.   Neurological:      General: No focal deficit present.      Mental Status: He is alert and oriented to person, place, and time.     Vital Signs  Weight is 140 pounds.    LAB RESULTS:  I have reviewed all the available laboratory results in the chart.    RESULTS REVIEW:  I have reviewed the patient's all relevant laboratory and imaging findings.     PET/ CT of skull base to mid-thigh done 10/20/2023.  Abnormal metabolic activity within the distal esophagus extending to the GE junction compatible with patient's known malignancy.    PET scan from 02/02/2024 was reviewed with the patient.  The tumor has decreased in size. It " still shows some activity, which could be some radiation changes.     Ultrasound of the carotid arteries from 01/24/2024 did not see any significant blockage.     Ultrasound of the thyroid from 01/29/2024 revealed a small nodule on the thyroid that was not concerning for cancer.         ASSESSMENT & PLAN:  Jourdan Lebron is a 53 y.o. male with significant medical conditions as mentioned above presented to my clinic.    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  Staging: Stage III-A ([id number])    He is making slow recovery.  He can have liquid water, Gatorade, and avoid any citrus juice. He can keep a bottle of water next to him throughout the day. I will schedule him for an EGD in 1 week. He can still be on the oxycodone for the time being, but after a month, we will try to get him off these medications. He can try oxycodone every 6 hours. He can put some ice packs on it. I will start him on a small dose of gabapentin 3 times a day. He can try cold compresses. He can try Xanax before sleeping. He can try putting some moisturizer on the incision.    2. Back pain.  He is 2.5 weeks out from surgery. He can try gabapentin every 6 hours.    3. Hypertension.  He will continue metoprolol.    4. Shortness of breath.  He is deconditioned from the surgery. This will get better with time.    5. Nerve pain.  He can try gabapentin every 6 hours. He can try cold compresses.    Follow-up  If he has any problems, he will call the clinic.     I discussed the patients findings and my recommendations with the patient. The patient was given adequate time to ask questions and all questions were answered to patient satisfaction.     Saurabh Hurtado MD  Thoracic Surgeon  [facility]        Dictated utilizing Dragon dictation    I spent 40 minutes caring for Jourdan on this date of service. This time includes time  spent by me in the following activities:preparing for the visit, reviewing tests, obtaining and/or reviewing a separately obtained history, performing a medically appropriate examination and/or evaluation , counseling and educating the patient/family/caregiver, ordering medications, tests, or procedures, referring and communicating with other health care professionals , documenting information in the medical record, independently interpreting results and communicating that information with the patient/family/caregiver, and care coordination and more than half the time was spent in direct face to face evaluation and decision making.       Transcribed from ambient dictation for Saurabh Hurtado MD by Frannie Xavier.  02/27/24   13:06 EST    Patient or patient representative verbalized consent to the visit recording.  I have personally performed the services described in this document as transcribed by the above individual, and it is both accurate and complete.

## 2024-02-27 NOTE — PROGRESS NOTES
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    Subjective   HISTORY OF PRESENTING ILLNESS:   Jourdan Lebron is a 53-year-old male who has significant medical problems as mentioned in the medical chart.      He was having intermittent dysphagia for over a year which became progressively worse.  On 10/2/2023, he underwent EGD and colonoscopy by Dr. Ozzy Abdalla at [facility].  He was found to have a 6 cm mass at the lower third of the esophagus (36 to 42 cm) (biopsied), mild diverticulosis of the sigmoid colon, 5 polyps noted in the colon and cecum that were removed.  The pathology of the esophageal mass revealed invasive moderate to poorly differentiated adenocarcinoma. All polypectomy samples were negative for malignancy (fragments of tubular adenoma).  CT scan of the chest, abdomen and pelvis on 10/9/2023 at [facility] showed 4 cm abnormal thickening of the lower thoracic esophagus extending to the GE junction thought to represent patient's known primary lung malignancy.  There were no convincing evidence of metastatic disease elsewhere in the chest, abdomen, pelvis, or skeleton.  There were stable left adrenalectomy changes, right adrenal adenoma unchanged.     He was seen in the office by Dr. Tacos Osuna ([facility]) for new diagnosis of esophageal cancer. He was an established patient of Dr. Harinder Mueller for erythrocytosis and macrocytosis since 11/2021 (resolved).      PET/CT on 10/20/2023 at [facility] showed hypermetabolic (SUV 7.7) activity within the distal esophagus extending to the GE junction compatible with patient's known malignancy.  He then underwent EGD with EUS by Dr. Joselyn Savage on 10/27/2023. Findings included close to 6 cm ulcerated mass extending from 36 to 42 cm consistent with poorly  differentiated adenocarcinoma.  Mass penetrated through muscularis propria without involving the adventitia consistent with T2 lesion. Two small round hypodense lymph nodes in close proximity to the mass measuring 5 and 6 mm concerning for malignancy. Other lymph nodes around the GE junction measuring 7 and 8 mm were also concerning for malignancy but immediate cytology was negative for malignancy. Pathology of the gastrohepatic lymph node was positive for metastatic adenocarcinoma; however, the lymph node at 36 cm was negative for malignancy.     He was referred to thoracic surgery for further evaluation.  At the time of initial consultation, he could eat and swallow quite a bit, but it depends. He avoids a lot of bread because it gets stuck lower down his chest. He went down from 200 pounds to 170 pounds 3 years ago after he had a stroke. He had memory loss and general fatigue after the stroke. He lost his appetite but began to add some weight. He then noticed he gets bloated and was having dyspepsia. He had adrenalectomy performed at the [facility] after an adrenal biopsy was done on an adrenal mass, and he went into adrenal crisis. He took hydrocortisone for 1 year and discontinued it on 09/2022. He developed abdominal discomfort afterwards. He denies having any past chest surgeries or myocardial infarction.  His wife mentioned the cause of the previous stroke was never known despite having a complete work up and a loop recorder inserted which was removed in the spring. He denies any abnormal heart rhythms or pedal edema. The patient quit smoking cigarettes 8 to 9 years ago and started smoking cigars.      He had good functional status.  Based on the clinical presentation, he was considered a candidate for trimodality treatment. I placed Mediport on 11/13/2023.  He received concurrent chemoradiation therapy.  His last dose of chemotherapy and radiation was on 12/28/2023. He tolerated chemoradiation  without much difficulty.  Restaging PET/CT scan on 2/2/2024 showed wall thickening involving the distal esophagus extending to the GE junction consistent with patient's known esophageal malignancy.  There was no evidence of distant metastases.  There was significant decreased uptake in the distal esophagus consistent with treatment effect.     Due to his history of stroke, ultrasound bilateral carotid were obtained which showed no significant carotid stenosis.  There was incidentally noted right thyroid nodule for which follow-up ultrasound of the thyroid was done which showed multiple nodules but not concerning for malignancy.  Transthoracic echo was performed on 1/22/2024 and noted ejection fraction of 51 to 55%.  He was sent to Dr. Hayward for cardiology clearance.  He had 0.8% risk of myocardial infarction or cardiac arrest, intraoperatively or up to 30 days postoperatively.  No further workup was recommended.  He had a history of adrenal crisis and was sent to evaluation by his primary endocrinologist at [facility], Anabela Crenshaw MD.  Complete ACTH stimulation test was performed which was reported normal.     On 2/9/2024, he underwent surgical resection. Flexible esophagogastroduodenoscopy was notable for mucosal changes at the distal esophagus and Olivera's esophagus.  These findings were consistent with treatment effect. Rather than a gastric emptying procedure, I performed a balloon dilatation of the pylorus to 20 mm with a CRE balloon I performed a robot-assisted minimally-invasive [place] Shayne esophagectomy via a robot-assisted laparoscopic and robot assisted right thoracoscopic approach converted to thoracotomy. I constructed a 4 cm gastric tube and performed an esophagogastrostomy approximately 2 cm above the azygos vein using a 28 EEA stapler.  After firing the stapler, 1 complete esophageal anastomotic ring and a partial gastric anastomotic ring was noted. The anastomosis was  checked under water with air insufflation and leak was identified which was oversewn. The anastomosis was partially imbricated and no leak was identified under water with air insufflation afterwards. A feeding jejunostomy tube was placed 40 cm distal to ligament of Treitz. Repeat endoscopy was notable for a widely patent anastomosis at 22-24 cm.  The conduit was healthy appearing.  It was mildly tortuous and non redundant.  He remained hemodynamically stable throughout the case.  He received 4000 cc of crystalloid and made 1000 cc of urine.  He required small doses of phenylephrine throughout the case.  He was extubated at the end of procedure.       The pathology reported scattered foci of residual moderate to poorly differentiated adenocarcinoma occurring at the GE junction with changes consistent with prior therapy.  There were scattered individual tumor cells and small clusters near GE junction and extending through muscularis propria into adventitia spanning an area of 2.1 x 1.8 x 0.7 cm.  There is no lymphovascular space invasion.  The proximal and distal surgical margins were negative for malignancy.  20 lymph nodes were negative for metastatic carcinoma. There was also presence of low and high-grade squamous dysplasia.  He had near complete response to chemo and radiation therapy.     Postprocedure he was transferred to the ICU.  His initial postop care was unremarkable.  He was started on tube feeds and was slowly advanced to goal rate which he tolerated well.  He demonstrated return of bowel function.  The chest tube was removed after amylase from the drain was checked which was not concerning for esophageal leak.  His white blood count were slowly trending up and had mild leukocytosis.  This was concerning for unidentified infection.  He had mild cellulitis around the jejunostomy tube site.  He was started on empiric antibiotics.  Infectious disease was consulted.  CT scan of the chest abdomen pelvis  without contrast did not reveal any clear source of infection.  I offered EGD to evaluate for anastomotic leak and conduit.    On 2/17/2024, he underwent EGD.  He had oropharyngeal candidiasis.  There was approximately 20% defect at the anastomotic ring likely contained by the reinforcing stitch placed intraoperatively.  There was fibrinous plaque covering the anastomosis.  Intraoperative esophagram revealed blind pouch anteriorly at the site of anastomotic defect.  The gastric conduit appeared viable but was slightly redundant.  The conduit orientation was straight.  I placed a 18 mm X 25 mm X 103 mm fully covered Wallflex stent deployed at the anastomosis.  He tolerated the procedure well.    Esophagram performed on 2/22/2024 and was negative for leak, but did demonstrate retrograde flow of contrast along the distal aspect of the stent making him high risk for anastomotic leak with oral feeds.  He was kept nothing by mouth and discharged home on tube feeds.    He came to clinic for follow-up visit.  He has lost weight. He weighed 160 pounds before surgery. His weight after surgery was 138 to 140 pounds. He had an x-ray and labs completed today. He has been feeling hot, but his temperature has been normal. He has been sweating frequently, especially at night and mornings. He is getting feeding from 5 PM to 11 AM. He has noticed that his stool has been green. He has regular bowel movements every day. He is urinating fine. He experiences pain in his back. As soon as he gets up, he can not sleep. He is taking Tylenol. He is taking Xanax. The 7.5 mg of oxycodone wears off. He takes it at 11 PM and it lasts until 7 AM. His chest and back hurt the most. When he wakes up in the morning, his whole side hurts and is numb to the touch. He did not tolerate gabapentin well. He has been using a heating blanket. He has been sleeping in a recliner. He has an appointment with Dr. Woods tomorrow on 02/28/2024.     He was getting  metoprolol in the hospital. Once he got home, his blood pressure was 90/64 mmHg. His pulse seems to still be on the higher side. His blood pressure at home has been 90/60 mmHg and his pulse would still be in the 90's. He has experienced dyspnea. He woke up this morning and was experiencing dyspnea.     He was getting metoprolol in the hospital. Once he got home, his blood pressure was 90/64 mmHg. His pulse seems to still be on the higher side. His blood pressure at home has been 90/60 mmHg and his pulse would still be in the 90's. He has experienced dyspnea. He woke up this morning and was experiencing dyspnea.     He is allergic to GABAPENTIN.    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    Anxiety     Brain fog     COVID 02/2023    Diverticulosis of large intestine without perforation or abscess without bleeding 10/02/2023    Esophageal cancer 10/2023    Generalized headaches     GERD (gastroesophageal reflux disease)     Hand tingling     History of blood clot in brain 2020    had thrombectomy    History of cancer chemotherapy 12/2023    History of COVID-19     History of kidney stones     History of radiation therapy 11/2023    Hyperlipidemia     Stroke 05/30/2020    Thyroid nodule     Tiredness     Trouble swallowing        Past Surgical History:   Procedure Laterality Date    ADRENALECTOMY      COLONOSCOPY      ENDOSCOPY      ENDOSCOPY N/A 02/17/2024    Procedure: ESOPHAGOGASTRODUODENOSCOPY WITH STENTING UNDER FLUROSCOPY GUIDENCE WITH ESOPHOGRAM;  Surgeon: Saurabh Hurtado MD;  Location: [facility];  Service: Gastroenterology;  Laterality: N/A;    ESOPHAGOGASTRECTOMY N/A 02/09/2024    Procedure: BRONCHOSCOPY, EGD, ESOPHAGECTOMY ALTAGRACIA-FABIOLA WITH DAVINCI ROBOT, LAPAROSCOPY, RIGHT THORACOSCOPY CONVERTED TO THORACOTOMY, FEEDING JEJUNOSTOMY  TUBE PLACEMENT, INTERCOSTAL NERVE BLOCKS;  Surgeon: Saurabh Hurtado MD;  Location: [facility] MAIN OR;  Service: Robotics - DaVinci;  Laterality: N/A;    ESOPHAGUS SURGERY  10/2023    biopsy    KIDNEY STONE SURGERY      OTHER SURGICAL HISTORY      loop recorder PLACED AND REMOVED    THROMBECTOMY      UPPER ENDOSCOPIC ULTRASOUND W/ FNA N/A 10/27/2023    Procedure: ENDOSCOPIC ULTRASOUND WITH STAGING AND FINE NEEDLE ASPIRATION X2 AREAS;  Surgeon: Joselyn Savage MD;  Location: [facility] ENDOSCOPY;  Service: Gastroenterology;  Laterality: N/A;  POST:    VENOUS ACCESS DEVICE (PORT) INSERTION Right 2023    Procedure: INSERTION VENOUS ACCESS DEVICE;  Surgeon: Saurabh Hurtado MD;  Location: [facility] MAIN OR;  Service: Thoracic;  Laterality: Right;       Family History   Problem Relation Age of Onset    Arthritis Mother     Hypertension Mother     Heart failure Mother 73        Cause of death    Arthritis Father     Hypertension Father     Kidney cancer Father 57        Cause of death. Was a smoker    Heart disease Brother     Esophageal cancer Brother 59        Cause of death. Has a heavy drinker    Malig Hyperthermia Neg Hx        Social History     Socioeconomic History    Marital status:    Tobacco Use    Smoking status: Former     Current packs/day: 0.00     Average packs/day: 1 pack/day for 37.0 years (37.0 ttl pk-yrs)     Types: Cigars, Cigarettes     Start date: 1985     Quit date: 2022     Years since quittin.1     Passive exposure: Past    Smokeless tobacco: Never    Tobacco comments:     1 packs= stopped 10 years ago and continued cigars   2 cigars a day; has stopped cigars as of    Vaping Use    Vaping status: Never Used   Substance and Sexual Activity    Alcohol use: Not Currently     Comment: Has now been abstinent for about one year    Drug use: Never    Sexual activity: Yes     Partners: Female     Birth control/protection: None         Current Outpatient Medications:     acetaminophen  (TYLENOL) 160 MG/5ML solution, Administer 31.23 mL per J tube 3 times a day., Disp: , Rfl:     ALPRAZolam (XANAX) 0.25 MG tablet, Administer 1 tablet per J tube 3 (Three) Times a Day As Needed for Anxiety. for anxiety, Disp: 90 tablet, Rfl: 3    aspirin 81 MG chewable tablet, Administer 1 tablet per J tube Daily., Disp: , Rfl:     Evolocumab (REPATHA) solution prefilled syringe injection, Inject 1 mL under the skin into the appropriate area as directed Every 14 (Fourteen) Days., Disp: 6 mL, Rfl: 3    famotidine (PEPCID) 40 mg/5 mL suspension, Take 2.5 mL by mouth 2 (Two) Times a Day for 30 days., Disp: 150 mL, Rfl: 2    FLUoxetine (PROzac) 20 MG capsule, Administer 1 capsule per J tube Daily for 30 days., Disp: 30 capsule, Rfl: 0    fluticasone (FLONASE) 50 MCG/ACT nasal spray, 2 sprays into the nostril(s) as directed by provider Daily., Disp: , Rfl:     metoclopramide (REGLAN) 10 MG/10ML solution solution, Administer 10 mL per J tube 3 times a day for 30 days., Disp: 900 mL, Rfl: 0    metoprolol tartrate (LOPRESSOR) 25 MG tablet, Administer 0.5 tablets per J tube Every 12 (Twelve) Hours for 30 days., Disp: 30 tablet, Rfl: 0    ondansetron (ZOFRAN) 8 MG tablet, Take 1 tablet by mouth 3 (Three) Times a Day As Needed for Nausea or Vomiting. (Patient taking differently: Administer 1 tablet per J tube 3 (Three) Times a Day As Needed for Nausea or Vomiting.), Disp: 30 tablet, Rfl: 5    gabapentin (NEURONTIN) 100 MG capsule, Take 1 capsule by mouth 3 (Three) Times a Day., Disp: 30 capsule, Rfl: 0    lidocaine-prilocaine (EMLA) 2.5-2.5 % cream, Apply 1 application  topically to the appropriate area as directed As Needed (apply 60 minutes prior to port access)., Disp: 30 g, Rfl: 2    Nystatin (magic mouthwash), Swish and swallow 5 mL 4 (Four) Times a Day Before Meals & at Bedtime., Disp: 1 bottle, Rfl: 4    oxyCODONE (Roxicodone) 5 MG immediate release tablet, Take 1 tablet by mouth Every 8 (Eight) Hours As Needed for  "Severe Pain for up to 10 days., Disp: 30 tablet, Rfl: 0  No current facility-administered medications for this visit.     Allergies   Allergen Reactions    Cephalosporins Anaphylaxis     Throat swelling    Dye  [Contrast Dye (Echo Or Unknown Ct/Mr)] Hives    Iodinated Contrast Media Hives    Sulfa Antibiotics Hives    Sulfate Hives    Zetia [Ezetimibe] Other (See Comments) and Myalgia     Made tired    Statins Myalgia     Myalgia and fatique             Objective    OBJECTIVE:     VITAL SIGNS:  /70 (BP Location: Left arm, Patient Position: Sitting, Cuff Size: Adult)   Pulse 110   Ht 177.8 cm (70\")   Wt 63.5 kg (140 lb)   SpO2 98%   BMI 20.09 kg/m²     PHYSICAL EXAM:  Constitutional:       Appearance: Normal appearance.   HENT:      Head: Normocephalic.      Right Ear: External ear normal.      Left Ear: External ear normal.      Nose: Nose normal.      Mouth/Throat: No obvious deformity     Pharynx: Oropharynx is clear.   Eyes:      Conjunctiva/sclera: Conjunctivae normal.   Cardiovascular:      Rate and Rhythm: Normal rate.      Pulses: Normal pulses.   Pulmonary:      Effort: Pulmonary effort is normal.   Abdominal:      Palpations: Abdomen is soft.   Musculoskeletal:         General: Normal range of motion.      Cervical back: Normal range of motion.   Skin:     General: Skin is warm.   Neurological:      General: No focal deficit present.      Mental Status: He is alert and oriented to person, place, and time.     Vital Signs  Weight is 140 pounds.    LAB RESULTS:  I have reviewed all the available laboratory results in the chart.    RESULTS REVIEW:  I have reviewed the patient's all relevant laboratory and imaging findings.     PET/ CT of skull base to mid-thigh done 10/20/2023.  Abnormal metabolic activity within the distal esophagus extending to the GE junction compatible with patient's known malignancy.    PET scan from 02/02/2024 was reviewed with the patient.  The tumor has decreased in size. It " still shows some activity, which could be some radiation changes.     Ultrasound of the carotid arteries from 01/24/2024 did not see any significant blockage.     Ultrasound of the thyroid from 01/29/2024 revealed a small nodule on the thyroid that was not concerning for cancer.         ASSESSMENT & PLAN:  Jourdan Lebron is a 53 y.o. male with significant medical conditions as mentioned above presented to my clinic.    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  Staging: Stage III-A ([id number])    He is making slow recovery.  He can have liquid water, Gatorade, and avoid any citrus juice. He can keep a bottle of water next to him throughout the day. I will schedule him for an EGD in 1 week. He can still be on the oxycodone for the time being, but after a month, we will try to get him off these medications. He can try oxycodone every 6 hours. He can put some ice packs on it. I will start him on a small dose of gabapentin 3 times a day. He can try cold compresses. He can try Xanax before sleeping. He can try putting some moisturizer on the incision.    2. Back pain.  He is 2.5 weeks out from surgery. He can try gabapentin every 6 hours.    3. Hypertension.  He will continue metoprolol.    4. Shortness of breath.  He is deconditioned from the surgery. This will get better with time.    5. Nerve pain.  He can try gabapentin every 6 hours. He can try cold compresses.    Follow-up  If he has any problems, he will call the clinic.     I discussed the patients findings and my recommendations with the patient. The patient was given adequate time to ask questions and all questions were answered to patient satisfaction.     Saurabh Hurtado MD  Thoracic Surgeon  [facility]        Dictated utilizing Dragon dictation    I spent 40 minutes caring for Jourdan on this date of service. This time includes time  spent by me in the following activities:preparing for the visit, reviewing tests, obtaining and/or reviewing a separately obtained history, performing a medically appropriate examination and/or evaluation , counseling and educating the patient/family/caregiver, ordering medications, tests, or procedures, referring and communicating with other health care professionals , documenting information in the medical record, independently interpreting results and communicating that information with the patient/family/caregiver, and care coordination and more than half the time was spent in direct face to face evaluation and decision making.       Transcribed from ambient dictation for Saurabh Hurtado MD by Frannie Xavier.  02/27/24   13:06 EST    Patient or patient representative verbalized consent to the visit recording.  I have personally performed the services described in this document as transcribed by the above individual, and it is both accurate and complete.

## 2024-02-28 ENCOUNTER — OFFICE VISIT (OUTPATIENT)
Dept: ONCOLOGY | Facility: CLINIC | Age: 54
End: 2024-02-28
Payer: COMMERCIAL

## 2024-02-28 ENCOUNTER — LAB (OUTPATIENT)
Dept: LAB | Facility: HOSPITAL | Age: 54
End: 2024-02-28
Payer: COMMERCIAL

## 2024-02-28 VITALS
HEIGHT: 70 IN | OXYGEN SATURATION: 99 % | SYSTOLIC BLOOD PRESSURE: 96 MMHG | DIASTOLIC BLOOD PRESSURE: 68 MMHG | BODY MASS INDEX: 20.19 KG/M2 | TEMPERATURE: 97.8 F | HEART RATE: 102 BPM | WEIGHT: 141 LBS

## 2024-02-28 DIAGNOSIS — C15.5 MALIGNANT NEOPLASM OF LOWER THIRD OF ESOPHAGUS: Primary | ICD-10-CM

## 2024-02-28 DIAGNOSIS — C15.9 ADENOCARCINOMA OF ESOPHAGUS METASTATIC TO INTRA-ABDOMINAL LYMPH NODE: ICD-10-CM

## 2024-02-28 DIAGNOSIS — C77.2 ADENOCARCINOMA OF ESOPHAGUS METASTATIC TO INTRA-ABDOMINAL LYMPH NODE: Primary | ICD-10-CM

## 2024-02-28 DIAGNOSIS — C15.9 ADENOCARCINOMA OF ESOPHAGUS METASTATIC TO INTRA-ABDOMINAL LYMPH NODE: Primary | ICD-10-CM

## 2024-02-28 DIAGNOSIS — C77.2 ADENOCARCINOMA OF ESOPHAGUS METASTATIC TO INTRA-ABDOMINAL LYMPH NODE: ICD-10-CM

## 2024-02-28 LAB
ALBUMIN SERPL-MCNC: 3.7 G/DL (ref 3.5–5.2)
ALBUMIN/GLOB SERPL: 1.2 G/DL
ALP SERPL-CCNC: 160 U/L (ref 39–117)
ALT SERPL W P-5'-P-CCNC: 38 U/L (ref 1–41)
ANION GAP SERPL CALCULATED.3IONS-SCNC: 8 MMOL/L (ref 5–15)
AST SERPL-CCNC: 26 U/L (ref 1–40)
BASOPHILS # BLD AUTO: 0.08 10*3/MM3 (ref 0–0.2)
BASOPHILS NFR BLD AUTO: 0.8 % (ref 0–1.5)
BILIRUB SERPL-MCNC: 0.2 MG/DL (ref 0–1.2)
BUN SERPL-MCNC: 24 MG/DL (ref 6–20)
BUN/CREAT SERPL: 35.8 (ref 7–25)
CALCIUM SPEC-SCNC: 9.5 MG/DL (ref 8.6–10.5)
CHLORIDE SERPL-SCNC: 94 MMOL/L (ref 98–107)
CO2 SERPL-SCNC: 29 MMOL/L (ref 22–29)
CREAT SERPL-MCNC: 0.67 MG/DL (ref 0.76–1.27)
DEPRECATED RDW RBC AUTO: 55.5 FL (ref 37–54)
EGFRCR SERPLBLD CKD-EPI 2021: 113 ML/MIN/1.73
EOSINOPHIL # BLD AUTO: 0.82 10*3/MM3 (ref 0–0.4)
EOSINOPHIL NFR BLD AUTO: 7.9 % (ref 0.3–6.2)
ERYTHROCYTE [DISTWIDTH] IN BLOOD BY AUTOMATED COUNT: 15.1 % (ref 12.3–15.4)
FERRITIN SERPL-MCNC: 740.7 NG/ML (ref 30–400)
GLOBULIN UR ELPH-MCNC: 3.2 GM/DL
GLUCOSE SERPL-MCNC: 86 MG/DL (ref 65–99)
HCT VFR BLD AUTO: 34.2 % (ref 37.5–51)
HGB BLD-MCNC: 11.9 G/DL (ref 13–17.7)
HOLD SPECIMEN: NORMAL
IRON 24H UR-MRATE: 58 MCG/DL (ref 59–158)
IRON SATN MFR SERPL: 18 % (ref 20–50)
LYMPHOCYTES # BLD AUTO: 1.01 10*3/MM3 (ref 0.7–3.1)
LYMPHOCYTES NFR BLD AUTO: 9.7 % (ref 19.6–45.3)
MCH RBC QN AUTO: 35 PG (ref 26.6–33)
MCHC RBC AUTO-ENTMCNC: 34.8 G/DL (ref 31.5–35.7)
MCV RBC AUTO: 100.6 FL (ref 79–97)
MONOCYTES # BLD AUTO: 1.49 10*3/MM3 (ref 0.1–0.9)
MONOCYTES NFR BLD AUTO: 14.4 % (ref 5–12)
NEUTROPHILS NFR BLD AUTO: 6.97 10*3/MM3 (ref 1.7–7)
NEUTROPHILS NFR BLD AUTO: 67.2 % (ref 42.7–76)
PLATELET # BLD AUTO: 562 10*3/MM3 (ref 140–450)
PMV BLD AUTO: 8.2 FL (ref 6–12)
POTASSIUM SERPL-SCNC: 4.4 MMOL/L (ref 3.5–5.2)
PROT SERPL-MCNC: 6.9 G/DL (ref 6–8.5)
RBC # BLD AUTO: 3.4 10*6/MM3 (ref 4.14–5.8)
SODIUM SERPL-SCNC: 131 MMOL/L (ref 136–145)
TIBC SERPL-MCNC: 322 MCG/DL (ref 298–536)
TRANSFERRIN SERPL-MCNC: 216 MG/DL (ref 200–360)
WBC NRBC COR # BLD AUTO: 10.37 10*3/MM3 (ref 3.4–10.8)

## 2024-02-28 PROCEDURE — 83540 ASSAY OF IRON: CPT | Performed by: INTERNAL MEDICINE

## 2024-02-28 PROCEDURE — 84466 ASSAY OF TRANSFERRIN: CPT | Performed by: INTERNAL MEDICINE

## 2024-02-28 PROCEDURE — 36415 COLL VENOUS BLD VENIPUNCTURE: CPT

## 2024-02-28 PROCEDURE — 85025 COMPLETE CBC W/AUTO DIFF WBC: CPT

## 2024-02-28 PROCEDURE — 80053 COMPREHEN METABOLIC PANEL: CPT | Performed by: INTERNAL MEDICINE

## 2024-02-28 PROCEDURE — 82728 ASSAY OF FERRITIN: CPT | Performed by: INTERNAL MEDICINE

## 2024-03-01 ENCOUNTER — TELEPHONE (OUTPATIENT)
Dept: FAMILY MEDICINE CLINIC | Facility: CLINIC | Age: 54
End: 2024-03-01

## 2024-03-01 NOTE — TELEPHONE ENCOUNTER
Hub staff attempted to follow warm transfer process and was unsuccessful     Caller: MINERVA EVANS    Relationship to patient: Emergency Contact    Best call back number: [phone]     Type of visit: HOSPITAL FOLLOW UP     Requested date: 03/04/2024-03/07/2024    Additional notes: PATIENT'S WIFE IS CALLING TO RESCHEDULE PATIENT'S APPOINTMENT. PATIENT'S WIFE STATED HE WAS DISCHARGED ON 02/24 OR 02/25, SO IT IS OUTSIDE OF 7 DAY WINDOW HUB HAS TO SCHEDULE. PLEASE ADVISE.

## 2024-03-04 ENCOUNTER — READMISSION MANAGEMENT (OUTPATIENT)
Dept: CALL CENTER | Facility: HOSPITAL | Age: 54
End: 2024-03-04
Payer: COMMERCIAL

## 2024-03-04 NOTE — OUTREACH NOTE
General Surgery Week 2 Survey      Flowsheet Row Responses   [facility] patient discharged from? [facility]   Does the patient have one of the following disease processes/diagnoses(primary or secondary)? General Surgery   Week 2 attempt successful? Yes   Call start time 1525   Call end time 1532   Is patient permission given to speak with other caregiver? Yes   Person spoke with today (if not patient) and relationship Claudia-spouse.   Meds reviewed with patient/caregiver? Yes   Is the patient having any side effects they believe may be caused by any medication additions or changes? No   Does the patient have all medications related to this admission filled (includes all antibiotics, pain medications, etc.) Yes   Is the patient taking all medications as directed (includes completed medication regime)? Yes   Does the patient have a follow up appointment scheduled with their surgeon? Yes   Has the patient kept scheduled appointments due by today? Yes   Comments Wife states has another appt with Dr Hurtado 03/08/24.   Has home health visited the patient within 72 hours of discharge? N/A   DME comments Continues with tube feedings.   Psychosocial issues? No   Did the patient receive a copy of their discharge instructions? Yes   Nursing interventions Reviewed instructions with patient   What is the patient's perception of their health status since discharge? Improving   Nursing interventions Nurse provided patient education   Is the patient/caregiver able to teach back signs and symptoms of incisional infection? Increased redness, swelling or pain at the incisonal site, Increased drainage or bleeding, Incisional warmth, Pus or odor from incision, Fever   Is the patient/caregiver able to teach back the hierarchy of who to call/visit for symptoms/problems? PCP, Specialist, Home health nurse, Urgent Care, ED, 911 Yes   Week 2 call completed? Yes   Graduated Yes   Is the patient interested in additional calls from an  ambulatory ? No   Would this patient benefit from a Referral to [facility] Work? No   Wrap up additional comments Brief call with spouse who states patient is improving. Continues with tube feedings. States does have minimal bloody drainage around feeding tube, and has talked with surgeon. Denies any s/s of infection at incision. Denies any needs today.   Call end time 1532            Rhea CARMICHAEL - Registered Nurse

## 2024-03-06 ENCOUNTER — TELEPHONE (OUTPATIENT)
Dept: SURGERY | Facility: CLINIC | Age: 54
End: 2024-03-06
Payer: COMMERCIAL

## 2024-03-06 DIAGNOSIS — C15.5 MALIGNANT NEOPLASM OF LOWER THIRD OF ESOPHAGUS: Primary | ICD-10-CM

## 2024-03-06 RX ORDER — OXYCODONE HYDROCHLORIDE 5 MG/1
5 TABLET ORAL EVERY 8 HOURS PRN
Qty: 30 TABLET | Refills: 0 | Status: SHIPPED | OUTPATIENT
Start: 2024-03-06 | End: 2024-03-16

## 2024-03-06 NOTE — TELEPHONE ENCOUNTER
I informed pt that I requested that Dr Hurtado change his prescription for pain medication from tablet to solution.      DB 3:28  3/6/2024

## 2024-03-06 NOTE — TELEPHONE ENCOUNTER
I called  Bernardino today and changed his arrival time for 3/8 to 10:15 am with him.  He is okay with the arrival time for the procedure.

## 2024-03-07 ENCOUNTER — TELEPHONE (OUTPATIENT)
Dept: SURGERY | Facility: CLINIC | Age: 54
End: 2024-03-07
Payer: COMMERCIAL

## 2024-03-07 NOTE — TELEPHONE ENCOUNTER
Pt wife called to clarify location for  surgery tomorrow. Pt wife received instructions and understood.    RONNI 11:51  3/7/2024

## 2024-03-08 ENCOUNTER — APPOINTMENT (OUTPATIENT)
Dept: GENERAL RADIOLOGY | Facility: HOSPITAL | Age: 54
End: 2024-03-08
Payer: COMMERCIAL

## 2024-03-08 ENCOUNTER — ANESTHESIA EVENT (OUTPATIENT)
Dept: GASTROENTEROLOGY | Facility: HOSPITAL | Age: 54
End: 2024-03-08
Payer: COMMERCIAL

## 2024-03-08 ENCOUNTER — HOSPITAL ENCOUNTER (OUTPATIENT)
Facility: HOSPITAL | Age: 54
Setting detail: HOSPITAL OUTPATIENT SURGERY
Discharge: HOME | End: 2024-03-08
Attending: SURGERY | Admitting: SURGERY
Payer: COMMERCIAL

## 2024-03-08 ENCOUNTER — ANESTHESIA (OUTPATIENT)
Dept: GASTROENTEROLOGY | Facility: HOSPITAL | Age: 54
End: 2024-03-08
Payer: COMMERCIAL

## 2024-03-08 VITALS
DIASTOLIC BLOOD PRESSURE: 70 MMHG | SYSTOLIC BLOOD PRESSURE: 106 MMHG | HEIGHT: 67 IN | WEIGHT: 141 LBS | HEART RATE: 70 BPM | RESPIRATION RATE: 16 BRPM | OXYGEN SATURATION: 98 % | BODY MASS INDEX: 22.13 KG/M2

## 2024-03-08 LAB
ALBUMIN SERPL-MCNC: 3.6 G/DL (ref 3.5–5.2)
ALBUMIN/GLOB SERPL: 1.3 G/DL
ALP SERPL-CCNC: 92 U/L (ref 39–117)
ALT SERPL W P-5'-P-CCNC: 20 U/L (ref 1–41)
ANION GAP SERPL CALCULATED.3IONS-SCNC: 9.7 MMOL/L (ref 5–15)
AST SERPL-CCNC: 14 U/L (ref 1–40)
BASOPHILS # BLD AUTO: 0.02 10*3/MM3 (ref 0–0.2)
BASOPHILS NFR BLD AUTO: 0.2 % (ref 0–1.5)
BILIRUB SERPL-MCNC: 0.2 MG/DL (ref 0–1.2)
BUN SERPL-MCNC: 16 MG/DL (ref 6–20)
BUN/CREAT SERPL: 22.5 (ref 7–25)
CALCIUM SPEC-SCNC: 9.3 MG/DL (ref 8.6–10.5)
CHLORIDE SERPL-SCNC: 100 MMOL/L (ref 98–107)
CO2 SERPL-SCNC: 27.3 MMOL/L (ref 22–29)
CREAT SERPL-MCNC: 0.71 MG/DL (ref 0.76–1.27)
DEPRECATED RDW RBC AUTO: 55.9 FL (ref 37–54)
EGFRCR SERPLBLD CKD-EPI 2021: 111.1 ML/MIN/1.73
EOSINOPHIL # BLD AUTO: 0.72 10*3/MM3 (ref 0–0.4)
EOSINOPHIL NFR BLD AUTO: 8.2 % (ref 0.3–6.2)
ERYTHROCYTE [DISTWIDTH] IN BLOOD BY AUTOMATED COUNT: 14.9 % (ref 12.3–15.4)
GLOBULIN UR ELPH-MCNC: 2.8 GM/DL
GLUCOSE SERPL-MCNC: 93 MG/DL (ref 65–99)
HCT VFR BLD AUTO: 33.2 % (ref 37.5–51)
HGB BLD-MCNC: 10.7 G/DL (ref 13–17.7)
IMM GRANULOCYTES # BLD AUTO: 0.03 10*3/MM3 (ref 0–0.05)
IMM GRANULOCYTES NFR BLD AUTO: 0.3 % (ref 0–0.5)
LYMPHOCYTES # BLD AUTO: 0.68 10*3/MM3 (ref 0.7–3.1)
LYMPHOCYTES NFR BLD AUTO: 7.7 % (ref 19.6–45.3)
MCH RBC QN AUTO: 32.6 PG (ref 26.6–33)
MCHC RBC AUTO-ENTMCNC: 32.2 G/DL (ref 31.5–35.7)
MCV RBC AUTO: 101.2 FL (ref 79–97)
MONOCYTES # BLD AUTO: 0.99 10*3/MM3 (ref 0.1–0.9)
MONOCYTES NFR BLD AUTO: 11.3 % (ref 5–12)
NEUTROPHILS NFR BLD AUTO: 6.34 10*3/MM3 (ref 1.7–7)
NEUTROPHILS NFR BLD AUTO: 72.3 % (ref 42.7–76)
NRBC BLD AUTO-RTO: 0 /100 WBC (ref 0–0.2)
PLATELET # BLD AUTO: 415 10*3/MM3 (ref 140–450)
PMV BLD AUTO: 8 FL (ref 6–12)
POTASSIUM SERPL-SCNC: 4.2 MMOL/L (ref 3.5–5.2)
PREALB SERPL-MCNC: 36.1 MG/DL (ref 20–40)
PROT SERPL-MCNC: 6.4 G/DL (ref 6–8.5)
RBC # BLD AUTO: 3.28 10*6/MM3 (ref 4.14–5.8)
SODIUM SERPL-SCNC: 137 MMOL/L (ref 136–145)
WBC NRBC COR # BLD AUTO: 8.78 10*3/MM3 (ref 3.4–10.8)

## 2024-03-08 PROCEDURE — 25510000001 IOPAMIDOL 61 % SOLUTION: Performed by: SURGERY

## 2024-03-08 PROCEDURE — 76000 FLUOROSCOPY <1 HR PHYS/QHP: CPT

## 2024-03-08 PROCEDURE — 84134 ASSAY OF PREALBUMIN: CPT | Performed by: SURGERY

## 2024-03-08 PROCEDURE — 85025 COMPLETE CBC W/AUTO DIFF WBC: CPT | Performed by: SURGERY

## 2024-03-08 PROCEDURE — 80053 COMPREHEN METABOLIC PANEL: CPT | Performed by: SURGERY

## 2024-03-08 PROCEDURE — 25010000002 PHENYLEPHRINE 10 MG/ML SOLUTION

## 2024-03-08 PROCEDURE — 0 DIATRIZOATE MEGLUMINE & SODIUM PER 1 ML: Performed by: SURGERY

## 2024-03-08 PROCEDURE — 25810000003 LACTATED RINGERS PER 1000 ML: Performed by: SURGERY

## 2024-03-08 PROCEDURE — 25010000002 PROPOFOL 10 MG/ML EMULSION

## 2024-03-08 PROCEDURE — 25010000002 SUCCINYLCHOLINE PER 20 MG

## 2024-03-08 PROCEDURE — 74220 X-RAY XM ESOPHAGUS 1CNTRST: CPT

## 2024-03-08 RX ORDER — IPRATROPIUM BROMIDE AND ALBUTEROL SULFATE 2.5; .5 MG/3ML; MG/3ML
3 SOLUTION RESPIRATORY (INHALATION) ONCE AS NEEDED
Status: DISCONTINUED | OUTPATIENT
Start: 2024-03-08 | End: 2024-03-08 | Stop reason: HOSPADM

## 2024-03-08 RX ORDER — LIDOCAINE HYDROCHLORIDE 20 MG/ML
INJECTION, SOLUTION INFILTRATION; PERINEURAL AS NEEDED
Status: DISCONTINUED | OUTPATIENT
Start: 2024-03-08 | End: 2024-03-08 | Stop reason: SURG

## 2024-03-08 RX ORDER — HYDROMORPHONE HYDROCHLORIDE 2 MG/ML
0.5 INJECTION, SOLUTION INTRAMUSCULAR; INTRAVENOUS; SUBCUTANEOUS
Status: DISCONTINUED | OUTPATIENT
Start: 2024-03-08 | End: 2024-03-08 | Stop reason: HOSPADM

## 2024-03-08 RX ORDER — OXYCODONE AND ACETAMINOPHEN 7.5; 325 MG/1; MG/1
1 TABLET ORAL EVERY 4 HOURS PRN
Status: DISCONTINUED | OUTPATIENT
Start: 2024-03-08 | End: 2024-03-08 | Stop reason: HOSPADM

## 2024-03-08 RX ORDER — DIPHENHYDRAMINE HYDROCHLORIDE 50 MG/ML
12.5 INJECTION INTRAMUSCULAR; INTRAVENOUS
Status: DISCONTINUED | OUTPATIENT
Start: 2024-03-08 | End: 2024-03-08 | Stop reason: HOSPADM

## 2024-03-08 RX ORDER — HYDROCODONE BITARTRATE AND ACETAMINOPHEN 5; 325 MG/1; MG/1
1 TABLET ORAL ONCE AS NEEDED
Status: DISCONTINUED | OUTPATIENT
Start: 2024-03-08 | End: 2024-03-08 | Stop reason: HOSPADM

## 2024-03-08 RX ORDER — DROPERIDOL 2.5 MG/ML
0.62 INJECTION, SOLUTION INTRAMUSCULAR; INTRAVENOUS
Status: DISCONTINUED | OUTPATIENT
Start: 2024-03-08 | End: 2024-03-08 | Stop reason: HOSPADM

## 2024-03-08 RX ORDER — NALOXONE HCL 0.4 MG/ML
0.2 VIAL (ML) INJECTION AS NEEDED
Status: DISCONTINUED | OUTPATIENT
Start: 2024-03-08 | End: 2024-03-08 | Stop reason: HOSPADM

## 2024-03-08 RX ORDER — EPHEDRINE SULFATE 50 MG/ML
5 INJECTION, SOLUTION INTRAVENOUS ONCE AS NEEDED
Status: DISCONTINUED | OUTPATIENT
Start: 2024-03-08 | End: 2024-03-08 | Stop reason: HOSPADM

## 2024-03-08 RX ORDER — SUCCINYLCHOLINE CHLORIDE 20 MG/ML
INJECTION INTRAMUSCULAR; INTRAVENOUS AS NEEDED
Status: DISCONTINUED | OUTPATIENT
Start: 2024-03-08 | End: 2024-03-08 | Stop reason: SURG

## 2024-03-08 RX ORDER — LABETALOL HYDROCHLORIDE 5 MG/ML
5 INJECTION, SOLUTION INTRAVENOUS
Status: DISCONTINUED | OUTPATIENT
Start: 2024-03-08 | End: 2024-03-08 | Stop reason: HOSPADM

## 2024-03-08 RX ORDER — ROCURONIUM BROMIDE 10 MG/ML
INJECTION, SOLUTION INTRAVENOUS AS NEEDED
Status: DISCONTINUED | OUTPATIENT
Start: 2024-03-08 | End: 2024-03-08 | Stop reason: SURG

## 2024-03-08 RX ORDER — PROMETHAZINE HYDROCHLORIDE 25 MG/1
25 TABLET ORAL ONCE AS NEEDED
Status: DISCONTINUED | OUTPATIENT
Start: 2024-03-08 | End: 2024-03-08 | Stop reason: HOSPADM

## 2024-03-08 RX ORDER — ONDANSETRON 2 MG/ML
4 INJECTION INTRAMUSCULAR; INTRAVENOUS ONCE AS NEEDED
Status: DISCONTINUED | OUTPATIENT
Start: 2024-03-08 | End: 2024-03-08 | Stop reason: HOSPADM

## 2024-03-08 RX ORDER — FENTANYL CITRATE 50 UG/ML
50 INJECTION, SOLUTION INTRAMUSCULAR; INTRAVENOUS
Status: DISCONTINUED | OUTPATIENT
Start: 2024-03-08 | End: 2024-03-08 | Stop reason: HOSPADM

## 2024-03-08 RX ORDER — PHENYLEPHRINE HYDROCHLORIDE 10 MG/ML
INJECTION INTRAVENOUS AS NEEDED
Status: DISCONTINUED | OUTPATIENT
Start: 2024-03-08 | End: 2024-03-08 | Stop reason: SURG

## 2024-03-08 RX ORDER — PROPOFOL 10 MG/ML
VIAL (ML) INTRAVENOUS CONTINUOUS PRN
Status: DISCONTINUED | OUTPATIENT
Start: 2024-03-08 | End: 2024-03-08 | Stop reason: SURG

## 2024-03-08 RX ORDER — SODIUM CHLORIDE, SODIUM LACTATE, POTASSIUM CHLORIDE, CALCIUM CHLORIDE 600; 310; 30; 20 MG/100ML; MG/100ML; MG/100ML; MG/100ML
30 INJECTION, SOLUTION INTRAVENOUS CONTINUOUS PRN
Status: DISCONTINUED | OUTPATIENT
Start: 2024-03-08 | End: 2024-03-08 | Stop reason: HOSPADM

## 2024-03-08 RX ORDER — PROMETHAZINE HYDROCHLORIDE 25 MG/1
25 SUPPOSITORY RECTAL ONCE AS NEEDED
Status: DISCONTINUED | OUTPATIENT
Start: 2024-03-08 | End: 2024-03-08 | Stop reason: HOSPADM

## 2024-03-08 RX ORDER — HYDRALAZINE HYDROCHLORIDE 20 MG/ML
5 INJECTION INTRAMUSCULAR; INTRAVENOUS
Status: DISCONTINUED | OUTPATIENT
Start: 2024-03-08 | End: 2024-03-08 | Stop reason: HOSPADM

## 2024-03-08 RX ORDER — FLUMAZENIL 0.1 MG/ML
0.2 INJECTION INTRAVENOUS AS NEEDED
Status: DISCONTINUED | OUTPATIENT
Start: 2024-03-08 | End: 2024-03-08 | Stop reason: HOSPADM

## 2024-03-08 RX ADMIN — ROCURONIUM BROMIDE 5 MG: 10 INJECTION, SOLUTION INTRAVENOUS at 13:11

## 2024-03-08 RX ADMIN — PROPOFOL 200 MG: 10 INJECTION, EMULSION INTRAVENOUS at 13:11

## 2024-03-08 RX ADMIN — SODIUM CHLORIDE, POTASSIUM CHLORIDE, SODIUM LACTATE AND CALCIUM CHLORIDE 30 ML/HR: 600; 310; 30; 20 INJECTION, SOLUTION INTRAVENOUS at 11:10

## 2024-03-08 RX ADMIN — SUCCINYLCHOLINE CHLORIDE 200 MG: 20 INJECTION, SOLUTION INTRAMUSCULAR; INTRAVENOUS; PARENTERAL at 13:11

## 2024-03-08 RX ADMIN — LIDOCAINE HYDROCHLORIDE 100 MG: 20 INJECTION, SOLUTION INFILTRATION; PERINEURAL at 13:11

## 2024-03-08 RX ADMIN — PROPOFOL 200 MCG/KG/MIN: 10 INJECTION, EMULSION INTRAVENOUS at 13:13

## 2024-03-08 RX ADMIN — DIATRIZOATE MEGLUMINE AND DIATRIZOATE SODIUM 30 ML: 660; 100 LIQUID ORAL; RECTAL at 15:29

## 2024-03-08 RX ADMIN — PHENYLEPHRINE HYDROCHLORIDE 100 MCG: 10 INJECTION INTRAVENOUS at 13:18

## 2024-03-08 NOTE — NURSING NOTE
Call from Dr Hurtado to patient. Patient may go home.     2440 Patient taken to patient discharge area via wheelchair. Wife driving patient home.

## 2024-03-08 NOTE — DISCHARGE INSTRUCTIONS

## 2024-03-08 NOTE — ANESTHESIA POSTPROCEDURE EVALUATION
Patient: Jourdan Lebron    Procedure Summary       Date: 03/08/24 Room / Location:  [facility]    Anesthesia Start: 1307 Anesthesia Stop: 1342    Procedure: ESOPHAGOGASTRODUODENOSCOPY WITH STENT REMOVAL Diagnosis:       Malignant neoplasm of lower third of esophagus      (Malignant neoplasm of lower third of esophagus [C15.5])    Surgeons: Saurabh Hurtado MD Provider: Aparna Arrieta MD    Anesthesia Type: MAC ASA Status: 3            Anesthesia Type: MAC    Vitals  Vitals Value Taken Time   /70 03/08/24 1402   Temp     Pulse 69 03/08/24 1411   Resp 16 03/08/24 1401   SpO2 98 % 03/08/24 1411   Vitals shown include unfiled device data.        Post Anesthesia Care and Evaluation    Patient location during evaluation: PHASE II  Patient participation: complete - patient participated  Level of consciousness: awake  Pain management: adequate    Airway patency: patent  Anesthetic complications: No anesthetic complications  PONV Status: none  Cardiovascular status: stable  Respiratory status: acceptable  Hydration status: acceptable

## 2024-03-08 NOTE — ANESTHESIA PROCEDURE NOTES
Airway  Date/Time: 3/8/2024 1:13 PM  Airway not difficult    General Information and Staff    Patient location: endo.  Anesthesiologist: Aparna Arrieta MD  CRNA/CAA: Siddharth Rodgers CRNA    Indications and Patient Condition  Indications for airway management: airway protection    Preoxygenated: yes  MILS maintained throughout  Mask difficulty assessment: 0 - not attempted    Final Airway Details  Final airway type: endotracheal airway      Successful airway: ETT  Cuffed: yes   Successful intubation technique: RSI and direct laryngoscopy  Facilitating devices/methods: intubating stylet  Endotracheal tube insertion site: oral  Blade: Calderón  Blade size: 2  ETT size (mm): 8.0  Cormack-Lehane Classification: grade I - full view of glottis  Placement verified by: chest auscultation, bronchoscopy and capnometry   Measured from: lips  ETT/EBT  to lips (cm): 22  Number of attempts at approach: 1  Assessment: lips, teeth, and gum same as pre-op and atraumatic intubation

## 2024-03-08 NOTE — ANESTHESIA PREPROCEDURE EVALUATION
" Anesthesia Evaluation     Patient summary reviewed and Nursing notes reviewed   no history of anesthetic complications:   NPO Solid Status: > 8 hours  NPO Liquid Status: > 2 hours           Airway   Mallampati: I  TM distance: >3 FB  No difficulty expected  Dental - normal exam     Pulmonary - normal exam   (+) a smoker Former,  (-) COPD, asthma  Cardiovascular   Exercise tolerance: poor (<4 METS)    ECG reviewed  Rhythm: regular    (+) dysrhythmias (RBBB), hyperlipidemia  (-) past MI, angina, cardiac stents    ROS comment: Echo 01/20224:   Left ventricular ejection fraction appears to be 51 - 55%.  ·  Left ventricular diastolic function was normal.  ·  Estimated right ventricular systolic pressure from tricuspid regurgitation is mildly elevated (35-45 mmHg).  ·  There is mild aortic insufficiency and mild tricuspid valve regurgitation.         Neuro/Psych  (+) CVA (2020), headaches, psychiatric history Anxiety  (-) seizures  GI/Hepatic/Renal/Endo    (+) GERD, renal disease- stones, thyroid problem thyroid nodules  (-) liver disease    ROS Comment: Adrenal adenoma s/p resection, now \"Adrenal cortical hypofunction\"    Musculoskeletal     (+) neck pain (cervical stenosis), radiculopathy  Abdominal    Substance History - negative use     OB/GYN          Other      history of cancer (Esophageal ca s/p esophagectomy)                  Anesthesia Plan    ASA 3     general     (I have reviewed the patient's history and chart with the patient, including all pertinent laboratory results and imaging. I have explained the risks of anesthesia including but not limited to dental or airway injury, nausea, cardio-pulmonary complications, such as aspiration, MI, stroke, or death.     VITALS:  BP 98/70 (BP Location: Left arm, Patient Position: Sitting)   Pulse 77   Resp 11   Ht 170.2 cm (67\")   Wt 64 kg (141 lb)   SpO2 99%   BMI 22.08 kg/m² )  intravenous induction     Anesthetic plan, risks, benefits, and alternatives have " been provided, discussed and informed consent has been obtained with: patient.  Pre-procedure education provided      CODE STATUS:

## 2024-03-08 NOTE — OP NOTE
Operative Note     Date of procedure: 3/8/2024     Patient name: Jourdan Lebron  MRN: [id number]    Pre OP diagnosis:  Adenocarcinoma of the lower third of the esophagus s/p neoadjuvant chemoradiotherapy followed by [place] Shayne esophagectomy.  History of esophagogastric anastomotic leak.  History of sepsis.   History of abdominal wall cellulitis.  Dysphagia.  History of cerebrovascular accident.  History of tobacco abuse.  History of left adrenalectomy.  History of adrenal crisis.  Cervical radiculopathy.  Cervical stenosis of spinal canal.  Near syncope.  Generalized hyperhidrosis.  Renal calculus.  Abnormal electrocardiogram.  Vitamin B12 deficiency.  Seasonal allergic rhinitis.  Erythrocytosis.  Macrocytosis.  Generalized anxiety disorder.  Major depressive disorder.    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  History of esophagogastric anastomotic leak.  History of sepsis.   History of abdominal wall cellulitis.  Dysphagia.  History of cerebrovascular accident.  History of tobacco abuse.  History of left adrenalectomy.  History of adrenal crisis.  Cervical radiculopathy.  Cervical stenosis of spinal canal.  Near syncope.  Generalized hyperhidrosis.  Renal calculus.  Abnormal electrocardiogram.  Vitamin B12 deficiency.  Seasonal allergic rhinitis.  Erythrocytosis.  Macrocytosis.  Generalized anxiety disorder.  Major depressive disorder.    Procedure performed:   Flexible esophagogastroduodenoscopy.  Removal of esophageal stent.  Intraoperative esophagram.  Interpretation of fluoroscopy images.    Indications:   Jourdan Lebron is a 53-year-old male who has significant medical problems as mentioned in the medical chart.      He was having intermittent dysphagia for over a year which became progressively worse.  On 10/2/2023, he underwent EGD and colonoscopy by Dr. Ozzy Abdalla at [facility].  He was found  to have a 6 cm mass at the lower third of the esophagus (36 to 42 cm) (biopsied), mild diverticulosis of the sigmoid colon, 5 polyps noted in the colon and cecum that were removed.  The pathology of the esophageal mass revealed invasive moderate to poorly differentiated adenocarcinoma. All polypectomy samples were negative for malignancy (fragments of tubular adenoma).  CT scan of the chest, abdomen and pelvis on 10/9/2023 at [facility] showed 4 cm abnormal thickening of the lower thoracic esophagus extending to the GE junction thought to represent patient's known primary lung malignancy.  There were no convincing evidence of metastatic disease elsewhere in the chest, abdomen, pelvis, or skeleton.  There were stable left adrenalectomy changes, right adrenal adenoma unchanged.     He was seen in the office by Dr. Tacos Osuna ([facility]) for new diagnosis of esophageal cancer. He was an established patient of Dr. Harinder Mueller for erythrocytosis and macrocytosis since 11/2021 (resolved).      PET/CT on 10/20/2023 at [facility] showed hypermetabolic (SUV 7.7) activity within the distal esophagus extending to the GE junction compatible with patient's known malignancy.  He then underwent EGD with EUS by Dr. Joselyn Savage on 10/27/2023. Findings included close to 6 cm ulcerated mass extending from 36 to 42 cm consistent with poorly differentiated adenocarcinoma.  Mass penetrated through muscularis propria without involving the adventitia consistent with T2 lesion. Two small round hypodense lymph nodes in close proximity to the mass measuring 5 and 6 mm concerning for malignancy. Other lymph nodes around the GE junction measuring 7 and 8 mm were also concerning for malignancy but immediate cytology was negative for malignancy. Pathology of the gastrohepatic lymph node was positive for metastatic adenocarcinoma; however, the lymph node at 36 cm was negative for malignancy.     He was referred to thoracic  surgery for further evaluation.  At the time of initial consultation, he could eat and swallow quite a bit, but it depends. He avoids a lot of bread because it gets stuck lower down his chest. He went down from 200 pounds to 170 pounds 3 years ago after he had a stroke. He had memory loss and general fatigue after the stroke. He lost his appetite but began to add some weight. He then noticed he gets bloated and was having dyspepsia. He had adrenalectomy performed at the [facility] after an adrenal biopsy was done on an adrenal mass, and he went into adrenal crisis. He took hydrocortisone for 1 year and discontinued it on 09/2022. He developed abdominal discomfort afterwards. He denies having any past chest surgeries or myocardial infarction.  His wife mentioned the cause of the previous stroke was never known despite having a complete work up and a loop recorder inserted which was removed in the spring. He denies any abnormal heart rhythms or pedal edema. The patient quit smoking cigarettes 8 to 9 years ago and started smoking cigars.      He had good functional status.  Based on the clinical presentation, he was considered a candidate for trimodality treatment. I placed Mediport on 11/13/2023.  He received concurrent chemoradiation therapy.  His last dose of chemotherapy and radiation was on 12/28/2023. He tolerated chemoradiation without much difficulty.  Restaging PET/CT scan on 2/2/2024 showed wall thickening involving the distal esophagus extending to the GE junction consistent with patient's known esophageal malignancy.  There was no evidence of distant metastases.  There was significant decreased uptake in the distal esophagus consistent with treatment effect.     Due to his history of stroke, ultrasound bilateral carotid were obtained which showed no significant carotid stenosis.  There was incidentally noted right thyroid nodule for which follow-up ultrasound of the thyroid was done which  showed multiple nodules but not concerning for malignancy.  Transthoracic echo was performed on 1/22/2024 and noted ejection fraction of 51 to 55%.  He was sent to Dr. Hayward for cardiology clearance.  He had 0.8% risk of myocardial infarction or cardiac arrest, intraoperatively or up to 30 days postoperatively.  No further workup was recommended.  He had a history of adrenal crisis and was sent to evaluation by his primary endocrinologist at [facility], Anabela Crenshaw MD.  Complete ACTH stimulation test was performed which was reported normal.     On 2/9/2024, he underwent surgical resection. Flexible esophagogastroduodenoscopy was notable for mucosal changes at the distal esophagus and Olivera's esophagus.  These findings were consistent with treatment effect. Rather than a gastric emptying procedure, I performed a balloon dilatation of the pylorus to 20 mm with a CRE balloon I performed a robot-assisted minimally-invasive [place] Shayne esophagectomy via a robot-assisted laparoscopic and robot assisted right thoracoscopic approach converted to thoracotomy. I constructed a 4 cm gastric tube and performed an esophagogastrostomy approximately 2 cm above the azygos vein using a 28 EEA stapler.  After firing the stapler, 1 complete esophageal anastomotic ring and a partial gastric anastomotic ring was noted. The anastomosis was checked under water with air insufflation and leak was identified which was oversewn. The anastomosis was partially imbricated and no leak was identified under water with air insufflation afterwards. A feeding jejunostomy tube was placed 40 cm distal to ligament of Treitz. Repeat endoscopy was notable for a widely patent anastomosis at 22-24 cm.  The conduit was healthy appearing.  It was mildly tortuous and non redundant.  He remained hemodynamically stable throughout the case.  He received 4000 cc of crystalloid and made 1000 cc of urine.  He required small doses of  phenylephrine throughout the case.  He was extubated at the end of procedure.       The pathology reported scattered foci of residual moderate to poorly differentiated adenocarcinoma occurring at the GE junction with changes consistent with prior therapy.  There were scattered individual tumor cells and small clusters near GE junction and extending through muscularis propria into adventitia spanning an area of 2.1 x 1.8 x 0.7 cm.  There is no lymphovascular space invasion.  The proximal and distal surgical margins were negative for malignancy.  20 lymph nodes were negative for metastatic carcinoma. There was also presence of low and high-grade squamous dysplasia.  He had near complete response to chemo and radiation therapy.     Postprocedure he was transferred to the ICU.  His initial postop care was unremarkable.  He was started on tube feeds and was slowly advanced to goal rate which he tolerated well.  He demonstrated return of bowel function.  The chest tube was removed after amylase from the drain was checked which was not concerning for esophageal leak.  His white blood count were slowly trending up and had mild leukocytosis.  This was concerning for unidentified infection.  He had mild cellulitis around the jejunostomy tube site.  He was started on empiric antibiotics.  Infectious disease was consulted.  CT scan of the chest abdomen pelvis without contrast did not reveal any clear source of infection.  I offered EGD to evaluate for anastomotic leak and conduit.    On 2/17/2024, he underwent EGD.  He had oropharyngeal candidiasis.  There was approximately 20% defect at the anastomotic ring likely contained by the reinforcing stitch placed intraoperatively.  There was fibrinous plaque covering the anastomosis.  Intraoperative esophagram revealed blind pouch anteriorly at the site of anastomotic defect.  The gastric conduit appeared viable but was slightly redundant.  The conduit orientation was straight.  I  placed a 18 mm X 25 mm X 103 mm fully covered Wallflex stent deployed at the anastomosis.  He tolerated the procedure well.    Esophagram performed on 2/22/2024 and was negative for leak, but did demonstrate retrograde flow of contrast along the distal aspect of the stent making him high risk for anastomotic leak with oral feeds.  He was kept nothing by mouth and discharged home on tube feeds.    He came to clinic for follow-up visit.  He reported losing weight after surgery.  He was tolerating ice chips without difficulty.  He had ongoing incisional pain requiring narcotic pain medications.  Labs were drawn and he had mild leukocytosis.  There was mild erythema around the incision and I started him on short course of antibiotic.  He was seen by Dr. Mueller who recommended adjuvant immunotherapy.     I offered EGD to remove the esophageal stent and evaluate for anastomotic leak and conduit.  The risk and benefit of the procedure were discussed in detail.  He agreed and signed the consent form.    Surgeon: Saurabh Hurtado MD     Anesthesia: General Anesthesia    ASA Class: 3    Procedure Details   On 3/8/2024, the patient was brought to the endoscopy suite. Jourdan Lebron was positioned in the supine position.  General anesthesia was provided by anesthesiologist.  He was intubated with a single-lumen endotracheal tube.  There was no evidence of aspiration at the time of intubation.  A bite-block was placed.  Antibiotics would not indicated for EGD. Prior to beginning the procedure, a time-out was conducted during which all members of the surgical team were present.      I began by performing a flexible esophagogastroduodenoscopy.  The cricopharyngeus was located at 16 cm.  The cervical esophagus appeared normal.  The esophageal stent was grasped with forcep and pulled out.  The endoscope was inserted again.  The esophagogastric anastomosis was located at 22-24 cm.  There is no defect undefined at the anastomosis.  The  anastomosis was widely patent and the adult endoscope was advanced without difficulty.  I injected 50 cc of Isovue water-soluble contrast at the anastomosis.  The contrast esophagram failed to reveal anastomotic defect on the two-dimensional fluoroscopy image. I continued with examination of the conduit.  The gastric conduit appeared viable distal to the anastomosis.  The diaphragmatic pinch was located at 40 cm from the incisor.  The antrum was below the diaphragm.  The pylorus was located at 45 cm.  It was patent and the adult endoscope was advanced without difficulty.  The first and second part of the duodenum appeared normal.    The patient was awakened from sedation and was transported to the post-anesthesia care unit in stable condition.    Findings:  Esophagogastric anastomosis located at 24 cm.  No evidence of anastomotic leak.  Intraoperative esophagram failed to reveal contrast extravasation on two-dimensional imaging.  The gastric conduit appeared viable.  The conduit orientation was straight and had mild redundancy.  The diaphragmatic pinch was at 40 cm.  The infradiaphragmatic antrum was normal.  The pylorus located at 45 cm.  It was patent and allowed adult endoscope to pass without difficulty.    Estimated Blood Loss:  None           Specimens: None           Complications: None           Disposition: PACU - hemodynamically stable.           Condition: Stable    Post-procedure recommendations:   Esophagram to evaluate for anastomotic leak.     Saurabh Hurtaod MD   Thoracic Surgeon  [facility]

## 2024-03-11 ENCOUNTER — OFFICE VISIT (OUTPATIENT)
Dept: FAMILY MEDICINE CLINIC | Facility: CLINIC | Age: 54
End: 2024-03-11
Payer: COMMERCIAL

## 2024-03-11 VITALS
SYSTOLIC BLOOD PRESSURE: 102 MMHG | OXYGEN SATURATION: 95 % | WEIGHT: 148.6 LBS | HEIGHT: 67 IN | TEMPERATURE: 98 F | HEART RATE: 78 BPM | DIASTOLIC BLOOD PRESSURE: 70 MMHG | BODY MASS INDEX: 23.32 KG/M2

## 2024-03-11 DIAGNOSIS — Z86.73 HISTORY OF CEREBROVASCULAR ACCIDENT: ICD-10-CM

## 2024-03-11 DIAGNOSIS — C15.4 MALIGNANT NEOPLASM OF MIDDLE THIRD OF ESOPHAGUS: Primary | ICD-10-CM

## 2024-03-11 DIAGNOSIS — F33.1 MAJOR DEPRESSIVE DISORDER, RECURRENT EPISODE, MODERATE: Chronic | ICD-10-CM

## 2024-03-11 NOTE — PROGRESS NOTES
Subjective   Jourdan Lebron is a 53 y.o. male presents for follow up on his malignant neoplasm of the esophagus, major depressive disorder; moderate recurrent episode, and history of CVA.    Chief Complaint   Patient presents with    Hypertension    Anxiety    Depression    Transitional Care Management     Not fasting       Health Maintenance Due   Topic Date Due    ZOSTER VACCINE (1 of 2) Never done    COVID-19 Vaccine (3 - Pfizer risk series) 10/15/2021    Pneumococcal Vaccine 0-64 (3 of 3 - PCV) 01/28/2022       The patient has been home for 2 weeks, and reports that he feels better. Yesterday he began to feel hot after getting off his feeding tube. He was sweating profusely, which lasted for about 1.5 hours. His blood glucose was 57 mg/dL at that time and improved after consuming popsicles.     He has been taking metoclopramide since 02/09/2024 and complains of loose stools since he started taking it. He discontinued metoclopramide yesterday and had a normal bowel movement this morning. His diet is limited to soft foods including pudding and applesauce. He can still swallow soft foods. He did not take his pain medication today and is currently in pain. He follows Dr. Hurtado and Dr. Mueller but did not inform them that he discontinued metoclopramide. The patient had a barium swallow evaluation on 03/08/2024 which caused diarrhea over the weekend. He was on a liquid diet for over a month. He is no longer dehydrated, and his complexion started coming back. His urine was clear this morning before his appointment. He was hoarse for a couple of weeks because of the tube and the anesthesia, but he denies any current cough or sore throat. He had 2 drain tubes, and they deflated his right lung. He still has a lot of pain that radiates around the right side. He was told not to eat any citrus. His weight decreased to 138 pounds in the hospital. Patient is taking Zofran and gabapentin. He is trying to get used to sleeping at a  "30-degree angle.     His blood pressure is lower than normal today. 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 His average systolic is in the 90s or 100s mmHg.    He denies any stroke symptoms.     His mood is well controlled. He gets frustrated but tries to keep a positive mood. He is taking liquid fluoxetine.    He had blood work done on Friday, 03/08/2024. He has an appointment with Dr. Hurtado on 03/19/2024, Dr. Mueller on 03/22/2024, and Dr. Lyman on 03/26/2024. He is also scheduled to see Dr. Crenshaw, endocrinologist, and a cardiologist in 07/2024.       He is allergic to CEPHALOSPORIN, IODINE DYES, SULFA, ZETIA, and STATINS.    He is due for the pneumonia vaccine.     Vitals:    03/11/24 1108 03/11/24 1109   BP: (!) 89/62 102/70   BP Location: Left arm Right arm   Patient Position: Sitting Sitting   Cuff Size: Adult Adult   Pulse: 78    Temp: 98 °F (36.7 °C)    TempSrc: Tympanic    SpO2: 95%    Weight: 67.4 kg (148 lb 9.6 oz)    Height: 170.2 cm (67.01\")      Body mass index is 23.27 kg/m².    Current Outpatient Medications on File Prior to Visit   Medication Sig Dispense Refill    acetaminophen (TYLENOL) 160 MG/5ML solution Administer 31.23 mL per J tube 3 times a day.      ALPRAZolam (XANAX) 0.25 MG tablet Administer 1 tablet per J tube 3 (Three) Times a Day As Needed for Anxiety. for anxiety 90 tablet 3    aspirin 81 MG chewable tablet Administer 1 tablet per J tube Daily.      Evolocumab (REPATHA) solution prefilled syringe injection Inject 1 mL under the skin into the appropriate area as directed Every 14 (Fourteen) Days. 6 mL 3    famotidine (PEPCID) 40 mg/5 mL suspension Take 2.5 mL by mouth 2 (Two) Times a Day for 30 days. 150 mL 2    FLUoxetine (PROzac) 20 MG capsule Administer 1 capsule per J tube Daily for 30 days. 30 capsule 0    fluticasone (FLONASE) 50 MCG/ACT nasal spray 2 sprays into the nostril(s) as directed by provider Daily.      gabapentin (NEURONTIN) 100 MG capsule Take " 1 capsule by mouth 3 (Three) Times a Day. 30 capsule 0    lidocaine-prilocaine (EMLA) 2.5-2.5 % cream Apply 1 application  topically to the appropriate area as directed As Needed (apply 60 minutes prior to port access). 30 g 2    metoclopramide (REGLAN) 10 MG/10ML solution solution Administer 10 mL per J tube 3 times a day for 30 days. 900 mL 0    metoprolol tartrate (LOPRESSOR) 25 MG tablet Administer 0.5 tablets per J tube Every 12 (Twelve) Hours for 30 days. 30 tablet 0    ondansetron (ZOFRAN) 8 MG tablet Take 1 tablet by mouth 3 (Three) Times a Day As Needed for Nausea or Vomiting. (Patient taking differently: Administer 1 tablet per J tube 3 (Three) Times a Day As Needed for Nausea or Vomiting.) 30 tablet 5    oxyCODONE (Roxicodone) 5 MG immediate release tablet Take 1 tablet by mouth Every 8 (Eight) Hours As Needed for Severe Pain for up to 10 days. 30 tablet 0    [DISCONTINUED] Nystatin (magic mouthwash) Swish and swallow 5 mL 4 (Four) Times a Day Before Meals & at Bedtime. (Patient not taking: Reported on 3/11/2024) 1 bottle 4     No current facility-administered medications on file prior to visit.       The following portions of the patient's history were reviewed and updated as appropriate: allergies, current medications, past family history, past medical history, past social history, past surgical history, and problem list.    Review of Systems   Constitutional:  Negative for chills, diaphoresis and fever.   HENT:  Negative for sore throat and trouble swallowing.    Respiratory:  Negative for cough, shortness of breath and wheezing.    Cardiovascular:  Negative for chest pain and palpitations.   Gastrointestinal:  Negative for blood in stool, constipation and diarrhea.   Genitourinary:  Negative for dysuria and hematuria.       Objective   Physical Exam  HENT:      Right Ear: Tympanic membrane normal.      Left Ear: Tympanic membrane normal.      Nose: Nose normal.      Mouth/Throat:      Mouth: Mucous  membranes are moist.      Pharynx: Oropharynx is clear.   Eyes:      Pupils: Pupils are equal, round, and reactive to light.   Cardiovascular:      Rate and Rhythm: Normal rate and regular rhythm.      Heart sounds: No murmur heard.  Pulmonary:      Effort: Pulmonary effort is normal.      Breath sounds: Normal breath sounds. No wheezing, rhonchi or rales.   Abdominal:      General: Bowel sounds are normal.      Tenderness: There is abdominal tenderness.      Comments: Generalized tenderness. Did not check for masses.    Musculoskeletal:      Right lower leg: No edema.      Left lower leg: No edema.   Lymphadenopathy:      Cervical: No cervical adenopathy.         PHQ-9 Total Score:      Assessment & Plan   Diagnoses and all orders for this visit:    1. Malignant neoplasm of middle third of esophagus (Primary)    2. Major depressive disorder, recurrent episode, moderate    3. History of cerebrovascular accident      Both surgeon and oncologist were texted about metoclopramide stoppage    Patient Instructions     Health Maintenance Due   Topic Date Due    ZOSTER VACCINE (1 of 2) Never done    COVID-19 Vaccine (3 - Pfizer risk series) 10/15/2021    Pneumococcal Vaccine 0-64 (3 of 3 - PCV) 01/28/2022               Transcribed from ambient dictation for Justa Castano MD by Dung Marte.  03/11/24   12:26 EDT    Patient or patient representative verbalized consent to the visit recording.  I have personally performed the services described in this document as transcribed by the above individual, and it is both accurate and complete.

## 2024-03-11 NOTE — PATIENT INSTRUCTIONS
Health Maintenance Due   Topic Date Due    ZOSTER VACCINE (1 of 2) Never done    COVID-19 Vaccine (3 - Pfizer risk series) 10/15/2021    Pneumococcal Vaccine 0-64 (3 of 3 - PCV) 01/28/2022

## 2024-03-12 ENCOUNTER — TELEPHONE (OUTPATIENT)
Dept: SURGERY | Facility: CLINIC | Age: 54
End: 2024-03-12
Payer: COMMERCIAL

## 2024-03-12 DIAGNOSIS — C15.5 MALIGNANT NEOPLASM OF LOWER THIRD OF ESOPHAGUS: Primary | ICD-10-CM

## 2024-03-12 RX ORDER — GABAPENTIN 100 MG/1
100 CAPSULE ORAL 3 TIMES DAILY
Qty: 30 CAPSULE | Refills: 0 | Status: SHIPPED | OUTPATIENT
Start: 2024-03-12

## 2024-03-18 ENCOUNTER — TELEPHONE (OUTPATIENT)
Dept: SURGERY | Facility: CLINIC | Age: 54
End: 2024-03-18
Payer: COMMERCIAL

## 2024-03-18 DIAGNOSIS — C15.5 MALIGNANT NEOPLASM OF LOWER THIRD OF ESOPHAGUS: Primary | ICD-10-CM

## 2024-03-18 RX ORDER — OXYCODONE HYDROCHLORIDE 5 MG/1
5 TABLET ORAL EVERY 12 HOURS PRN
Qty: 20 TABLET | Refills: 0 | Status: SHIPPED | OUTPATIENT
Start: 2024-03-18 | End: 2024-03-28

## 2024-03-18 NOTE — TELEPHONE ENCOUNTER
PT CALLED TO REFILL RX, INFORMED PT WE WOULD LET HIM KNOW IF WE REFILL IT OR NOT, PT STATED UNDERSTANDING AND WAS AGREEABLE

## 2024-03-19 ENCOUNTER — OFFICE VISIT (OUTPATIENT)
Dept: SURGERY | Facility: CLINIC | Age: 54
End: 2024-03-19
Payer: COMMERCIAL

## 2024-03-19 VITALS
DIASTOLIC BLOOD PRESSURE: 70 MMHG | BODY MASS INDEX: 23.56 KG/M2 | WEIGHT: 150.1 LBS | HEIGHT: 67 IN | OXYGEN SATURATION: 98 % | HEART RATE: 74 BPM | SYSTOLIC BLOOD PRESSURE: 118 MMHG

## 2024-03-19 DIAGNOSIS — C15.5 MALIGNANT NEOPLASM OF LOWER THIRD OF ESOPHAGUS: Primary | ICD-10-CM

## 2024-03-19 NOTE — PROGRESS NOTES
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    Subjective   HISTORY OF PRESENTING ILLNESS:   Jourdan Lebron is a 53-year-old male who has significant medical problems as mentioned in the medical chart.      He was having intermittent dysphagia for over a year which became progressively worse.  On 10/2/2023, he underwent EGD and colonoscopy by Dr. Ozzy Abdalla at [facility].  He was found to have a 6 cm mass at the lower third of the esophagus (36 to 42 cm) (biopsied), mild diverticulosis of the sigmoid colon, 5 polyps noted in the colon and cecum that were removed.  The pathology of the esophageal mass revealed invasive moderate to poorly differentiated adenocarcinoma. All polypectomy samples were negative for malignancy (fragments of tubular adenoma).  CT scan of the chest, abdomen and pelvis on 10/9/2023 at [facility] showed 4 cm abnormal thickening of the lower thoracic esophagus extending to the GE junction thought to represent patient's known primary lung malignancy.  There were no convincing evidence of metastatic disease elsewhere in the chest, abdomen, pelvis, or skeleton.  There were stable left adrenalectomy changes, right adrenal adenoma unchanged.     He was seen in the office by Dr. Tacos Osuna ([facility]) for new diagnosis of esophageal cancer. He was an established patient of Dr. Harinder Mueller for erythrocytosis and macrocytosis since 11/2021 (resolved).      PET/CT on 10/20/2023 at [facility] showed hypermetabolic (SUV 7.7) activity within the distal esophagus extending to the GE junction compatible with patient's known malignancy.  He then underwent EGD with EUS by Dr. Joselyn Savage on 10/27/2023. Findings included close to 6 cm ulcerated mass extending from 36 to 42 cm consistent with poorly  differentiated adenocarcinoma.  Mass penetrated through muscularis propria without involving the adventitia consistent with T2 lesion. Two small round hypodense lymph nodes in close proximity to the mass measuring 5 and 6 mm concerning for malignancy. Other lymph nodes around the GE junction measuring 7 and 8 mm were also concerning for malignancy but immediate cytology was negative for malignancy. Pathology of the gastrohepatic lymph node was positive for metastatic adenocarcinoma; however, the lymph node at 36 cm was negative for malignancy.     He was referred to thoracic surgery for further evaluation.  At the time of initial consultation, he could eat and swallow quite a bit, but it depends. He avoids a lot of bread because it gets stuck lower down his chest. He went down from 200 pounds to 170 pounds 3 years ago after he had a stroke. He had memory loss and general fatigue after the stroke. He lost his appetite but began to add some weight. He then noticed he gets bloated and was having dyspepsia. He had adrenalectomy performed at the [facility] after an adrenal biopsy was done on an adrenal mass, and he went into adrenal crisis. He took hydrocortisone for 1 year and discontinued it on 09/2022. He developed abdominal discomfort afterwards. He denies having any past chest surgeries or myocardial infarction.  His wife mentioned the cause of the previous stroke was never known despite having a complete work up and a loop recorder inserted which was removed in the spring. He denies any abnormal heart rhythms or pedal edema. The patient quit smoking cigarettes 8 to 9 years ago and started smoking cigars.      He had good functional status.  Based on the clinical presentation, he was considered a candidate for trimodality treatment. I placed Mediport on 11/13/2023.  He received concurrent chemoradiation therapy.  His last dose of chemotherapy and radiation was on 12/28/2023. He tolerated chemoradiation  without much difficulty.  Restaging PET/CT scan on 2/2/2024 showed wall thickening involving the distal esophagus extending to the GE junction consistent with patient's known esophageal malignancy.  There was no evidence of distant metastases.  There was significant decreased uptake in the distal esophagus consistent with treatment effect.     Due to his history of stroke, ultrasound bilateral carotid were obtained which showed no significant carotid stenosis.  There was incidentally noted right thyroid nodule for which follow-up ultrasound of the thyroid was done which showed multiple nodules but not concerning for malignancy.  Transthoracic echo was performed on 1/22/2024 and noted ejection fraction of 51 to 55%.  He was sent to Dr. Hayward for cardiology clearance.  He had 0.8% risk of myocardial infarction or cardiac arrest, intraoperatively or up to 30 days postoperatively.  No further workup was recommended.  He had a history of adrenal crisis and was sent to evaluation by his primary endocrinologist at [facility], Anabela Crenshaw MD.  Complete ACTH stimulation test was performed which was reported normal.     On 2/9/2024, he underwent surgical resection. Flexible esophagogastroduodenoscopy was notable for mucosal changes at the distal esophagus and Olivera's esophagus.  These findings were consistent with treatment effect. Rather than a gastric emptying procedure, I performed a balloon dilatation of the pylorus to 20 mm with a CRE balloon I performed a robot-assisted minimally-invasive [place] Shayne esophagectomy via a robot-assisted laparoscopic and robot assisted right thoracoscopic approach converted to thoracotomy. I constructed a 4 cm gastric tube and performed an esophagogastrostomy approximately 2 cm above the azygos vein using a 28 EEA stapler.  After firing the stapler, 1 complete esophageal anastomotic ring and a partial gastric anastomotic ring was noted. The anastomosis was  checked under water with air insufflation and leak was identified which was oversewn. The anastomosis was partially imbricated and no leak was identified under water with air insufflation afterwards. A feeding jejunostomy tube was placed 40 cm distal to ligament of Treitz. Repeat endoscopy was notable for a widely patent anastomosis at 22-24 cm.  The conduit was healthy appearing.  It was mildly tortuous and non redundant.  He remained hemodynamically stable throughout the case.  He received 4000 cc of crystalloid and made 1000 cc of urine.  He required small doses of phenylephrine throughout the case.  He was extubated at the end of procedure.       The pathology reported scattered foci of residual moderate to poorly differentiated adenocarcinoma occurring at the GE junction with changes consistent with prior therapy.  There were scattered individual tumor cells and small clusters near GE junction and extending through muscularis propria into adventitia spanning an area of 2.1 x 1.8 x 0.7 cm.  There is no lymphovascular space invasion.  The proximal and distal surgical margins were negative for malignancy.  20 lymph nodes were negative for metastatic carcinoma. There was also presence of low and high-grade squamous dysplasia.  He had near complete response to chemo and radiation therapy.     Postprocedure he was transferred to the ICU.  His initial postop care was unremarkable.  He was started on tube feeds and was slowly advanced to goal rate which he tolerated well.  He demonstrated return of bowel function.  The chest tube was removed after amylase from the drain was checked which was not concerning for esophageal leak.  His white blood count were slowly trending up and had mild leukocytosis.  This was concerning for unidentified infection.  He had mild cellulitis around the jejunostomy tube site.  He was started on empiric antibiotics.  Infectious disease was consulted.  CT scan of the chest abdomen pelvis  without contrast did not reveal any clear source of infection.  I offered EGD to evaluate for anastomotic leak and conduit.    On 2/17/2024, he underwent EGD.  He had oropharyngeal candidiasis.  There was approximately 20% defect at the anastomotic ring likely contained by the reinforcing stitch placed intraoperatively.  There was fibrinous plaque covering the anastomosis.  Intraoperative esophagram revealed blind pouch anteriorly at the site of anastomotic defect.  The gastric conduit appeared viable but was slightly redundant.  The conduit orientation was straight.  I placed a 18 mm X 25 mm X 103 mm fully covered Wallflex stent deployed at the anastomosis.  He tolerated the procedure well.    Esophagram performed on 2/22/2024 and was negative for leak, but did demonstrate retrograde flow of contrast along the distal aspect of the stent making him high risk for anastomotic leak with oral feeds.  He was kept nothing by mouth and discharged home on tube feeds.    He came to clinic for follow-up visit.     Hypoglycemic event  He has been experiencing sweating and the feeling as if his blood sugar has suddenly dropped. The last episode occurred yesterday, 03/18/2024, after eating a non-dairy yogurt and 1 hour after his tube feeding finished. He broke out in a sweat, became hot, and was very hungry. He also had an episode where he was awakened from nocturnal hyperhidrosis. He has been trying to eat at regular intervals and confirms that he is still on a liquid diet. The adult female adds that the patient has been drinking smoothies, eating apple sauce, pudding, chicken noodle soup, and tolerating them well; he is easily satiated. He denies any history of diabetes mellitus and was last seen by his primary care provider the week of 03/11/2024. He checked his blood glucose level yesterday, and it was 107 mg/dL.     Feeding tube  His feedings are currently set at 75 mL/hr. He does not have home health services in his area  and the adult female has been consulting with a [facility] nurse at the infusion pharmacy for the patient's needs and questions.     Adrenal insufficiency  He sees Dr. Uriel Crenshaw for adrenal insufficiency and has a follow-up appointment in 04/2024 with a CT scan of the thyroid. He has been hypotensive, and experiences fatigue when he is hypotensive, but denies dizziness or syncope. He confirms that he is more active compared to 2 weeks ago and the adult female agrees.    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    Anxiety     Brain fog     COVID 02/2023    Diverticulosis of large intestine without perforation or abscess without bleeding 10/02/2023    Esophageal cancer 10/2023    Generalized headaches     GERD (gastroesophageal reflux disease)     Hand tingling     History of blood clot in brain 2020    had thrombectomy    History of cancer chemotherapy 12/2023    History of kidney stones     History of radiation therapy 11/2023    Hyperlipidemia     Port-A-Cath in place     Stroke 05/30/2020    Thyroid nodule     Tiredness     Trouble swallowing        Past Surgical History:   Procedure Laterality Date    ADRENALECTOMY      COLONOSCOPY      ENDOSCOPY      ENDOSCOPY N/A 02/17/2024    Procedure: ESOPHAGOGASTRODUODENOSCOPY WITH STENTING UNDER FLUROSCOPY GUIDENCE WITH ESOPHOGRAM;  Surgeon: Saurabh Hurtado MD;  Location: [facility] MAIN OR;  Service: Gastroenterology;  Laterality: N/A;    ENDOSCOPY N/A 3/8/2024    Procedure: ESOPHAGOGASTRODUODENOSCOPY WITH STENT REMOVAL;  Surgeon: Saurabh Hurtado MD;  Location: [facility] ENDOSCOPY;  Service: Gastroenterology;  Laterality: N/A;  Esophageal leak    ESOPHAGOGASTRECTOMY N/A 02/09/2024    Procedure: BRONCHOSCOPY, EGD, ESOPHAGECTOMY ALTAGRACIA-FABIOLA WITH DAVINCI ROBOT, LAPAROSCOPY, RIGHT THORACOSCOPY CONVERTED TO  THORACOTOMY, FEEDING JEJUNOSTOMY TUBE PLACEMENT, INTERCOSTAL NERVE BLOCKS;  Surgeon: Saurabh Hurtado MD;  Location:  [facility] OR;  Service: Robotics - DaVinci;  Laterality: N/A;    ESOPHAGUS SURGERY  10/2023    biopsy    KIDNEY STONE SURGERY      OTHER SURGICAL HISTORY      loop recorder PLACED AND REMOVED    THROMBECTOMY      UPPER ENDOSCOPIC ULTRASOUND W/ FNA N/A 10/27/2023    Procedure: ENDOSCOPIC ULTRASOUND WITH STAGING AND FINE NEEDLE ASPIRATION X2 AREAS;  Surgeon: Joselyn Savage MD;  Location: [facility] ENDOSCOPY;  Service: Gastroenterology;  Laterality: N/A;  POST:    VENOUS ACCESS DEVICE (PORT) INSERTION Right 2023    Procedure: INSERTION VENOUS ACCESS DEVICE;  Surgeon: Saurabh Hurtado MD;  Location: [facility] MAIN OR;  Service: Thoracic;  Laterality: Right;       Family History   Problem Relation Age of Onset    Arthritis Mother     Hypertension Mother     Heart failure Mother 73        Cause of death    Arthritis Father     Hypertension Father     Kidney cancer Father 57        Cause of death. Was a smoker    Heart disease Brother     Esophageal cancer Brother 59        Cause of death. Has a heavy drinker    Malig Hyperthermia Neg Hx        Social History     Socioeconomic History    Marital status:    Tobacco Use    Smoking status: Former     Current packs/day: 0.00     Average packs/day: 1 pack/day for 37.0 years (37.0 ttl pk-yrs)     Types: Cigars, Cigarettes     Start date: 1985     Quit date: 2022     Years since quittin.2     Passive exposure: Past    Smokeless tobacco: Never    Tobacco comments:     1 packs= stopped 10 years ago and continued cigars   2 cigars a day; has stopped cigars as of    Vaping Use    Vaping status: Never Used   Substance and Sexual Activity    Alcohol use: Not Currently     Comment: Has now been abstinent for about one year    Drug use: Never    Sexual activity: Yes     Partners: Female     Birth control/protection: None         Current Outpatient  Medications:     aspirin 81 MG chewable tablet, Administer 1 tablet per J tube Daily., Disp: , Rfl:     Evolocumab (REPATHA) solution prefilled syringe injection, Inject 1 mL under the skin into the appropriate area as directed Every 14 (Fourteen) Days., Disp: 6 mL, Rfl: 3    fluticasone (FLONASE) 50 MCG/ACT nasal spray, 2 sprays into the nostril(s) as directed by provider Daily., Disp: , Rfl:     gabapentin (NEURONTIN) 100 MG capsule, Take 1 capsule by mouth 3 (Three) Times a Day., Disp: 30 capsule, Rfl: 0    lidocaine-prilocaine (EMLA) 2.5-2.5 % cream, Apply 1 application  topically to the appropriate area as directed As Needed (apply 60 minutes prior to port access)., Disp: 30 g, Rfl: 2    ondansetron (ZOFRAN) 8 MG tablet, Take 1 tablet by mouth 3 (Three) Times a Day As Needed for Nausea or Vomiting. (Patient taking differently: Administer 1 tablet per J tube 3 (Three) Times a Day As Needed for Nausea or Vomiting.), Disp: 30 tablet, Rfl: 5    acetaminophen (TYLENOL) 160 MG/5ML solution, Administer 31.23 mL per J tube 3 times a day. (Patient not taking: Reported on 3/19/2024), Disp: , Rfl:     ALPRAZolam (XANAX) 0.25 MG tablet, Take 1 tablet by mouth 3 (Three) Times a Day As Needed for Anxiety. for anxiety, Disp: 90 tablet, Rfl: 3    FLUoxetine (PROzac) 20 MG/5ML solution, Take 5 mL by mouth Daily., Disp: 150 mL, Rfl: 1    oxyCODONE (Roxicodone) 5 MG immediate release tablet, Take 1 tablet by mouth Daily for 15 days., Disp: 15 tablet, Rfl: 0     Allergies   Allergen Reactions    Cephalosporins Anaphylaxis     Throat swelling    Dye  [Contrast Dye (Echo Or Unknown Ct/Mr)] Hives    Iodinated Contrast Media Hives    Sulfa Antibiotics Hives    Sulfate Hives    Zetia [Ezetimibe] Other (See Comments) and Myalgia     Made tired    Statins Myalgia     Myalgia and fatique             Objective    OBJECTIVE:     VITAL SIGNS:  /70 (BP Location: Left arm, Patient Position: Sitting, Cuff Size: Adult)   Pulse 74   Ht  "170.2 cm (67.01\")   Wt 68.1 kg (150 lb 1.6 oz)   SpO2 98%   BMI 23.50 kg/m²     PHYSICAL EXAM:  Constitutional:       Appearance: Normal appearance.   HENT:      Head: Normocephalic.      Right Ear: External ear normal.      Left Ear: External ear normal.      Nose: Nose normal.      Mouth/Throat: No obvious deformity     Pharynx: Oropharynx is clear.   Eyes:      Conjunctiva/sclera: Conjunctivae normal.   Cardiovascular:      Rate and Rhythm: Normal rate.      Pulses: Normal pulses.   Pulmonary:      Effort: Pulmonary effort is normal.   Abdominal:      Palpations: Abdomen is soft.   Musculoskeletal:         General: Normal range of motion.      Cervical back: Normal range of motion.   Skin:     General: Skin is warm.   Neurological:      General: No focal deficit present.      Mental Status: He is alert and oriented to person, place, and time.     Vital Signs  Weight is 140 pounds.    LAB RESULTS:  I have reviewed all the available laboratory results in the chart.    RESULTS REVIEW:  I have reviewed the patient's all relevant laboratory and imaging findings.     PET/ CT of skull base to mid-thigh done 10/20/2023.  Abnormal metabolic activity within the distal esophagus extending to the GE junction compatible with patient's known malignancy.    PET scan from 02/02/2024 was reviewed with the patient.  The tumor has decreased in size. It still shows some activity, which could be some radiation changes.     Ultrasound of the carotid arteries from 01/24/2024 did not see any significant blockage.     Ultrasound of the thyroid from 01/29/2024 revealed a small nodule on the thyroid that was not concerning for cancer.         ASSESSMENT & PLAN:  Jourdan Lebron is a 53 y.o. male with significant medical conditions as mentioned above presented to my clinic.    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  Staging: " Stage III-A ([id number])    The patient is status post upper GI endoscopy, 03/08/2024, with feeding tube placement. He has improved over the last 2 weeks, and I will change his tube feedings to 24-hour feedings. He is placed on a soft diet for the next 2 weeks and once he is reliably on a consistent diet, he can begin transitioning to a regular diet with decreased tube feedings. He was advised to flush the feeding tube before and after each feeding and he was advised to report to the emergency room if his feeding tube should become dislodged. I will consult with a dietician for the appropriate rate per hour for his 24-hour tube feedings. The goal is to have him wean off the pain medications before his 2-week follow-up appointment. He will follow up with his primary care provider regarding any hypotensive or hypoglycemic events.     Follow up in 2 weeks.    I discussed the patients findings and my recommendations with the patient. The patient was given adequate time to ask questions and all questions were answered to patient satisfaction.     Saurabh Hurtado MD  Thoracic Surgeon  [facility]        Dictated utilizing Dragon dictation    I spent 40 minutes caring for Jourdan on this date of service. This time includes time spent by me in the following activities:preparing for the visit, reviewing tests, obtaining and/or reviewing a separately obtained history, performing a medically appropriate examination and/or evaluation , counseling and educating the patient/family/caregiver, ordering medications, tests, or procedures, referring and communicating with other health care professionals , documenting information in the medical record, independently interpreting results and communicating that information with the patient/family/caregiver, and care coordination and more than half the time was spent in direct face to face evaluation and decision making.     Transcribed from ambient dictation for Saurabh GOULD  MD [facility] by Barbara Louie.  03/19/24   13:08 EDT     Patient or patient representative verbalized consent to the visit recording.  I have personally performed the services described in this document as transcribed by the above individual, and it is both accurate and complete.

## 2024-03-20 ENCOUNTER — TELEPHONE (OUTPATIENT)
Dept: ONCOLOGY | Facility: CLINIC | Age: 54
End: 2024-03-20
Payer: COMMERCIAL

## 2024-03-20 ENCOUNTER — PATIENT OUTREACH (OUTPATIENT)
Dept: ONCOLOGY | Facility: CLINIC | Age: 54
End: 2024-03-20
Payer: COMMERCIAL

## 2024-03-20 DIAGNOSIS — C15.5 MALIGNANT NEOPLASM OF LOWER THIRD OF ESOPHAGUS: Primary | ICD-10-CM

## 2024-03-20 NOTE — TELEPHONE ENCOUNTER
I called the pt per Dr. Hurtado's request. Pt has been experiencing very low blood sugars and needs assistance to manage them. Pt is running his feeds from 5 PM - 11 AM, and experiencing lows during the time his feeds are off.    I recommended the pt check his BG after turning off his feeds, followed by having a snack consisting of a carbohydrate and protein immediately after. The pt is to have a snack or meal 1-2 hours after, and again 2-3 hours after that, pt verbalized understanding.    Pt prefers to keep his cyclic feeds at this time bc it allows him to work and maintain his usual lifestyle. Pt understands having snacks during the day will allow him to maintain his energy. Pt also required to have soft foods at this time. A food list and blood sugar guideline was discussed with the pt and emailed to him, pt verbalized understanding.     All questions and concerns were addressed and answered. I provided my contact information and encouraged him to call or schedule an appointment as needed.    Chandler Ruby, MS,RD,LD-[place],CD-[place]  Registered Dietitian

## 2024-03-22 ENCOUNTER — LAB (OUTPATIENT)
Dept: LAB | Facility: HOSPITAL | Age: 54
End: 2024-03-22
Payer: COMMERCIAL

## 2024-03-22 ENCOUNTER — OFFICE VISIT (OUTPATIENT)
Dept: ONCOLOGY | Facility: CLINIC | Age: 54
End: 2024-03-22
Payer: COMMERCIAL

## 2024-03-22 VITALS
TEMPERATURE: 97.8 F | DIASTOLIC BLOOD PRESSURE: 72 MMHG | BODY MASS INDEX: 23.67 KG/M2 | HEART RATE: 68 BPM | HEIGHT: 67 IN | SYSTOLIC BLOOD PRESSURE: 106 MMHG | OXYGEN SATURATION: 100 % | WEIGHT: 150.8 LBS

## 2024-03-22 DIAGNOSIS — C15.5 MALIGNANT NEOPLASM OF LOWER THIRD OF ESOPHAGUS: Primary | ICD-10-CM

## 2024-03-22 DIAGNOSIS — C15.5 MALIGNANT NEOPLASM OF LOWER THIRD OF ESOPHAGUS: ICD-10-CM

## 2024-03-22 LAB
ANION GAP SERPL CALCULATED.3IONS-SCNC: 10 MMOL/L (ref 5–15)
BASOPHILS # BLD AUTO: 0.02 10*3/MM3 (ref 0–0.2)
BASOPHILS NFR BLD AUTO: 0.3 % (ref 0–1.5)
BUN SERPL-MCNC: 19 MG/DL (ref 6–20)
BUN/CREAT SERPL: 27.1 (ref 7–25)
CALCIUM SPEC-SCNC: 9.7 MG/DL (ref 8.6–10.5)
CHLORIDE SERPL-SCNC: 100 MMOL/L (ref 98–107)
CO2 SERPL-SCNC: 30 MMOL/L (ref 22–29)
CREAT SERPL-MCNC: 0.7 MG/DL (ref 0.76–1.27)
DEPRECATED RDW RBC AUTO: 51.4 FL (ref 37–54)
EGFRCR SERPLBLD CKD-EPI 2021: 111.6 ML/MIN/1.73
EOSINOPHIL # BLD AUTO: 0.42 10*3/MM3 (ref 0–0.4)
EOSINOPHIL NFR BLD AUTO: 6 % (ref 0.3–6.2)
ERYTHROCYTE [DISTWIDTH] IN BLOOD BY AUTOMATED COUNT: 13.4 % (ref 12.3–15.4)
GLUCOSE SERPL-MCNC: 121 MG/DL (ref 65–99)
HCT VFR BLD AUTO: 36.6 % (ref 37.5–51)
HGB BLD-MCNC: 11.9 G/DL (ref 13–17.7)
LYMPHOCYTES # BLD AUTO: 0.78 10*3/MM3 (ref 0.7–3.1)
LYMPHOCYTES NFR BLD AUTO: 11.2 % (ref 19.6–45.3)
MCH RBC QN AUTO: 34.7 PG (ref 26.6–33)
MCHC RBC AUTO-ENTMCNC: 32.5 G/DL (ref 31.5–35.7)
MCV RBC AUTO: 106.7 FL (ref 79–97)
MONOCYTES # BLD AUTO: 0.81 10*3/MM3 (ref 0.1–0.9)
MONOCYTES NFR BLD AUTO: 11.6 % (ref 5–12)
NEUTROPHILS NFR BLD AUTO: 4.96 10*3/MM3 (ref 1.7–7)
NEUTROPHILS NFR BLD AUTO: 70.9 % (ref 42.7–76)
PLATELET # BLD AUTO: 361 10*3/MM3 (ref 140–450)
PMV BLD AUTO: 7.8 FL (ref 6–12)
POTASSIUM SERPL-SCNC: 4.9 MMOL/L (ref 3.5–5.2)
PREALB SERPL-MCNC: 44.1 MG/DL (ref 20–40)
RBC # BLD AUTO: 3.43 10*6/MM3 (ref 4.14–5.8)
SODIUM SERPL-SCNC: 140 MMOL/L (ref 136–145)
WBC NRBC COR # BLD AUTO: 6.99 10*3/MM3 (ref 3.4–10.8)

## 2024-03-22 PROCEDURE — 36415 COLL VENOUS BLD VENIPUNCTURE: CPT

## 2024-03-22 PROCEDURE — 80048 BASIC METABOLIC PNL TOTAL CA: CPT | Performed by: SURGERY

## 2024-03-22 PROCEDURE — 85025 COMPLETE CBC W/AUTO DIFF WBC: CPT

## 2024-03-22 PROCEDURE — 84134 ASSAY OF PREALBUMIN: CPT | Performed by: SURGERY

## 2024-03-26 ENCOUNTER — OFFICE VISIT (OUTPATIENT)
Dept: PSYCHIATRY | Facility: CLINIC | Age: 54
End: 2024-03-26
Payer: COMMERCIAL

## 2024-03-26 DIAGNOSIS — F41.1 GENERALIZED ANXIETY DISORDER: Chronic | ICD-10-CM

## 2024-03-26 DIAGNOSIS — F33.1 MAJOR DEPRESSIVE DISORDER, RECURRENT EPISODE, MODERATE: Primary | Chronic | ICD-10-CM

## 2024-03-26 RX ORDER — FLUOXETINE 20 MG/5ML
20 SOLUTION ORAL DAILY
Qty: 150 ML | Refills: 1 | Status: SHIPPED | OUTPATIENT
Start: 2024-03-26

## 2024-03-26 RX ORDER — ALPRAZOLAM 0.25 MG/1
0.25 TABLET ORAL 3 TIMES DAILY PRN
Qty: 90 TABLET | Refills: 3 | Status: SHIPPED | OUTPATIENT
Start: 2024-03-26

## 2024-03-28 ENCOUNTER — TELEPHONE (OUTPATIENT)
Dept: SURGERY | Facility: CLINIC | Age: 54
End: 2024-03-28
Payer: COMMERCIAL

## 2024-04-02 ENCOUNTER — PREP FOR SURGERY (OUTPATIENT)
Dept: OTHER | Facility: HOSPITAL | Age: 54
End: 2024-04-02
Payer: COMMERCIAL

## 2024-04-02 ENCOUNTER — OFFICE VISIT (OUTPATIENT)
Dept: SURGERY | Facility: CLINIC | Age: 54
End: 2024-04-02
Payer: COMMERCIAL

## 2024-04-02 VITALS
HEIGHT: 67 IN | WEIGHT: 151.6 LBS | SYSTOLIC BLOOD PRESSURE: 102 MMHG | DIASTOLIC BLOOD PRESSURE: 60 MMHG | HEART RATE: 78 BPM | BODY MASS INDEX: 23.79 KG/M2 | OXYGEN SATURATION: 98 %

## 2024-04-02 DIAGNOSIS — C15.5 MALIGNANT NEOPLASM OF LOWER THIRD OF ESOPHAGUS: Primary | ICD-10-CM

## 2024-04-02 RX ORDER — OXYCODONE HYDROCHLORIDE 5 MG/1
5 TABLET ORAL EVERY 24 HOURS
Qty: 15 TABLET | Refills: 0 | Status: SHIPPED | OUTPATIENT
Start: 2024-04-02 | End: 2024-04-17

## 2024-04-02 NOTE — H&P (VIEW-ONLY)
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    Subjective   HISTORY OF PRESENTING ILLNESS:   Jourdan Lebron is a 53-year-old male who has significant medical problems as mentioned in the medical chart.      He was having intermittent dysphagia for over a year which became progressively worse.  On 10/2/2023, he underwent EGD and colonoscopy by Dr. Ozzy Abdalla at [facility].  He was found to have a 6 cm mass at the lower third of the esophagus (36 to 42 cm) (biopsied), mild diverticulosis of the sigmoid colon, 5 polyps noted in the colon and cecum that were removed.  The pathology of the esophageal mass revealed invasive moderate to poorly differentiated adenocarcinoma. All polypectomy samples were negative for malignancy (fragments of tubular adenoma).  CT scan of the chest, abdomen and pelvis on 10/9/2023 at [facility] showed 4 cm abnormal thickening of the lower thoracic esophagus extending to the GE junction thought to represent patient's known primary lung malignancy.  There were no convincing evidence of metastatic disease elsewhere in the chest, abdomen, pelvis, or skeleton.  There were stable left adrenalectomy changes, right adrenal adenoma unchanged.     He was seen in the office by Dr. Tacos Osuna ([facility]) for new diagnosis of esophageal cancer. He was an established patient of Dr. Harinder Mueller for erythrocytosis and macrocytosis since 11/2021 (resolved).      PET/CT on 10/20/2023 at [facility] showed hypermetabolic (SUV 7.7) activity within the distal esophagus extending to the GE junction compatible with patient's known malignancy.  He then underwent EGD with EUS by Dr. Joselyn Savage on 10/27/2023. Findings included close to 6 cm ulcerated mass extending from 36 to 42 cm consistent with poorly  differentiated adenocarcinoma.  Mass penetrated through muscularis propria without involving the adventitia consistent with T2 lesion. Two small round hypodense lymph nodes in close proximity to the mass measuring 5 and 6 mm concerning for malignancy. Other lymph nodes around the GE junction measuring 7 and 8 mm were also concerning for malignancy but immediate cytology was negative for malignancy. Pathology of the gastrohepatic lymph node was positive for metastatic adenocarcinoma; however, the lymph node at 36 cm was negative for malignancy.     He was referred to thoracic surgery for further evaluation.  At the time of initial consultation, he could eat and swallow quite a bit, but it depends. He avoids a lot of bread because it gets stuck lower down his chest. He went down from 200 pounds to 170 pounds 3 years ago after he had a stroke. He had memory loss and general fatigue after the stroke. He lost his appetite but began to add some weight. He then noticed he gets bloated and was having dyspepsia. He had adrenalectomy performed at the [facility] after an adrenal biopsy was done on an adrenal mass, and he went into adrenal crisis. He took hydrocortisone for 1 year and discontinued it on 09/2022. He developed abdominal discomfort afterwards. He denies having any past chest surgeries or myocardial infarction.  His wife mentioned the cause of the previous stroke was never known despite having a complete work up and a loop recorder inserted which was removed in the spring. He denies any abnormal heart rhythms or pedal edema. The patient quit smoking cigarettes 8 to 9 years ago and started smoking cigars.      He had good functional status.  Based on the clinical presentation, he was considered a candidate for trimodality treatment. I placed Mediport on 11/13/2023.  He received concurrent chemoradiation therapy.  His last dose of chemotherapy and radiation was on 12/28/2023. He tolerated chemoradiation  without much difficulty.  Restaging PET/CT scan on 2/2/2024 showed wall thickening involving the distal esophagus extending to the GE junction consistent with patient's known esophageal malignancy.  There was no evidence of distant metastases.  There was significant decreased uptake in the distal esophagus consistent with treatment effect.     Due to his history of stroke, ultrasound bilateral carotid were obtained which showed no significant carotid stenosis.  There was incidentally noted right thyroid nodule for which follow-up ultrasound of the thyroid was done which showed multiple nodules but not concerning for malignancy.  Transthoracic echo was performed on 1/22/2024 and noted ejection fraction of 51 to 55%.  He was sent to Dr. Hayward for cardiology clearance.  He had 0.8% risk of myocardial infarction or cardiac arrest, intraoperatively or up to 30 days postoperatively.  No further workup was recommended.  He had a history of adrenal crisis and was sent to evaluation by his primary endocrinologist at [facility], Anabela Crenshaw MD.  Complete ACTH stimulation test was performed which was reported normal.     On 2/9/2024, he underwent surgical resection. Flexible esophagogastroduodenoscopy was notable for mucosal changes at the distal esophagus and Olivera's esophagus.  These findings were consistent with treatment effect. Rather than a gastric emptying procedure, I performed a balloon dilatation of the pylorus to 20 mm with a CRE balloon I performed a robot-assisted minimally-invasive [place] Shayne esophagectomy via a robot-assisted laparoscopic and robot assisted right thoracoscopic approach converted to thoracotomy. I constructed a 4 cm gastric tube and performed an esophagogastrostomy approximately 2 cm above the azygos vein using a 28 EEA stapler.  After firing the stapler, 1 complete esophageal anastomotic ring and a partial gastric anastomotic ring was noted. The anastomosis was  checked under water with air insufflation and leak was identified which was oversewn. The anastomosis was partially imbricated and no leak was identified under water with air insufflation afterwards. A feeding jejunostomy tube was placed 40 cm distal to ligament of Treitz. Repeat endoscopy was notable for a widely patent anastomosis at 22-24 cm.  The conduit was healthy appearing.  It was mildly tortuous and non redundant.  He remained hemodynamically stable throughout the case.  He received 4000 cc of crystalloid and made 1000 cc of urine.  He required small doses of phenylephrine throughout the case.  He was extubated at the end of procedure.       The pathology reported scattered foci of residual moderate to poorly differentiated adenocarcinoma occurring at the GE junction with changes consistent with prior therapy.  There were scattered individual tumor cells and small clusters near GE junction and extending through muscularis propria into adventitia spanning an area of 2.1 x 1.8 x 0.7 cm.  There is no lymphovascular space invasion.  The proximal and distal surgical margins were negative for malignancy.  20 lymph nodes were negative for metastatic carcinoma. There was also presence of low and high-grade squamous dysplasia.  He had near complete response to chemo and radiation therapy.     Postprocedure he was transferred to the ICU.  His initial postop care was unremarkable.  He was started on tube feeds and was slowly advanced to goal rate which he tolerated well.  He demonstrated return of bowel function.  The chest tube was removed after amylase from the drain was checked which was not concerning for esophageal leak.  His white blood count were slowly trending up and had mild leukocytosis.  This was concerning for unidentified infection.  He had mild cellulitis around the jejunostomy tube site.  He was started on empiric antibiotics.  Infectious disease was consulted.  CT scan of the chest abdomen pelvis  without contrast did not reveal any clear source of infection.  I offered EGD to evaluate for anastomotic leak and conduit.    On 2/17/2024, he underwent EGD.  He had oropharyngeal candidiasis.  There was approximately 20% defect at the anastomotic ring likely contained by the reinforcing stitch placed intraoperatively.  There was fibrinous plaque covering the anastomosis.  Intraoperative esophagram revealed blind pouch anteriorly at the site of anastomotic defect.  The gastric conduit appeared viable but was slightly redundant.  The conduit orientation was straight.  I placed a 18 mm X 25 mm X 103 mm fully covered Wallflex stent deployed at the anastomosis.  He tolerated the procedure well.    Esophagram performed on 2/22/2024 and was negative for leak, but did demonstrate retrograde flow of contrast along the distal aspect of the stent making him high risk for anastomotic leak with oral feeds.  He was kept nothing by mouth and discharged home on tube feeds.    He came to clinic for follow-up visit.     He has experienced several episodes of hypoglycemia, prompting a consultation with a dietitian who advised him to increase his protein intake. He reported an incident of hypoglycemia following consumption of carrots, green beans, banana pudding, and cherry soda-pop, which resulted in a drop in her blood glucose to 55 mg/dL. He has been monitoring her blood glucose levels and has found Tylenol to be ineffective in managing her symptoms.    He has been experiencing dysphagia, often with food becoming lodged during meals. This has led him to reduce her food intake. Despite not having consumed any breakfast today or yesterday, she has experienced some weight gain. He reports mild soreness at the incision site, which she attributes to her pain medication. He has been managing her pain with oxycodone, taking it only at night, and taking it every other day. He has recently started swallowing acetaminophen tablets.     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    Anxiety     Brain fog     COVID 02/2023    Diverticulosis of large intestine without perforation or abscess without bleeding 10/02/2023    Esophageal cancer 10/2023    Generalized headaches     GERD (gastroesophageal reflux disease)     Hand tingling     History of blood clot in brain 2020    had thrombectomy    History of cancer chemotherapy 12/2023    History of kidney stones     History of radiation therapy 11/2023    Hyperlipidemia     Port-A-Cath in place     Stroke 05/30/2020    Thyroid nodule     Tiredness     Trouble swallowing        Past Surgical History:   Procedure Laterality Date    ADRENALECTOMY      COLONOSCOPY      ENDOSCOPY      ENDOSCOPY N/A 02/17/2024    Procedure: ESOPHAGOGASTRODUODENOSCOPY WITH STENTING UNDER FLUROSCOPY GUIDENCE WITH ESOPHOGRAM;  Surgeon: Saurabh Hurtado MD;  Location: [facility] OR;  Service: Gastroenterology;  Laterality: N/A;    ENDOSCOPY N/A 3/8/2024    Procedure: ESOPHAGOGASTRODUODENOSCOPY WITH STENT REMOVAL;  Surgeon: Saurabh Hurtado MD;  Location: [facility] ENDOSCOPY;  Service: Gastroenterology;  Laterality: N/A;  Esophageal leak    ESOPHAGOGASTRECTOMY N/A 02/09/2024    Procedure: BRONCHOSCOPY, EGD, ESOPHAGECTOMY ALTAGRACIA-FABIOLA WITH DAVINCI ROBOT, LAPAROSCOPY, RIGHT THORACOSCOPY CONVERTED TO THORACOTOMY, FEEDING JEJUNOSTOMY TUBE PLACEMENT, INTERCOSTAL NERVE BLOCKS;  Surgeon: Saurabh Hurtado MD;  Location: [facility] OR;  Service: Robotics - DaVinci;  Laterality: N/A;    ESOPHAGUS SURGERY  10/2023    biopsy    KIDNEY STONE SURGERY      OTHER SURGICAL HISTORY  2020    loop recorder PLACED AND REMOVED    THROMBECTOMY  2020    UPPER ENDOSCOPIC ULTRASOUND W/ FNA N/A 10/27/2023    Procedure: ENDOSCOPIC ULTRASOUND WITH STAGING AND FINE NEEDLE ASPIRATION X2 AREAS;  Surgeon: Hussein  MD Joselyn;  Location: [facility] ENDOSCOPY;  Service: Gastroenterology;  Laterality: N/A;  POST:    VENOUS ACCESS DEVICE (PORT) INSERTION Right 2023    Procedure: INSERTION VENOUS ACCESS DEVICE;  Surgeon: Saurabh Hurtado MD;  Location: [facility] MAIN OR;  Service: Thoracic;  Laterality: Right;       Family History   Problem Relation Age of Onset    Arthritis Mother     Hypertension Mother     Heart failure Mother 73        Cause of death    Arthritis Father     Hypertension Father     Kidney cancer Father 57        Cause of death. Was a smoker    Heart disease Brother     Esophageal cancer Brother 59        Cause of death. Has a heavy drinker    Malig Hyperthermia Neg Hx        Social History     Socioeconomic History    Marital status:    Tobacco Use    Smoking status: Former     Current packs/day: 0.00     Average packs/day: 1 pack/day for 37.0 years (37.0 ttl pk-yrs)     Types: Cigars, Cigarettes     Start date: 1985     Quit date: 2022     Years since quittin.2     Passive exposure: Past    Smokeless tobacco: Never    Tobacco comments:     1 packs= stopped 10 years ago and continued cigars   2 cigars a day; has stopped cigars as of    Vaping Use    Vaping status: Never Used   Substance and Sexual Activity    Alcohol use: Not Currently     Comment: Has now been abstinent for about one year    Drug use: Never    Sexual activity: Yes     Partners: Female     Birth control/protection: None         Current Outpatient Medications:     ALPRAZolam (XANAX) 0.25 MG tablet, Take 1 tablet by mouth 3 (Three) Times a Day As Needed for Anxiety. for anxiety, Disp: 90 tablet, Rfl: 3    aspirin 81 MG chewable tablet, Administer 1 tablet per J tube Daily., Disp: , Rfl:     Evolocumab (REPATHA) solution prefilled syringe injection, Inject 1 mL under the skin into the appropriate area as directed Every 14 (Fourteen) Days., Disp: 6 mL, Rfl: 3    FLUoxetine (PROzac) 20 MG/5ML solution, Take 5 mL by mouth Daily.,  "Disp: 150 mL, Rfl: 1    fluticasone (FLONASE) 50 MCG/ACT nasal spray, 2 sprays into the nostril(s) as directed by provider Daily., Disp: , Rfl:     gabapentin (NEURONTIN) 100 MG capsule, Take 1 capsule by mouth 3 (Three) Times a Day., Disp: 30 capsule, Rfl: 0    lidocaine-prilocaine (EMLA) 2.5-2.5 % cream, Apply 1 application  topically to the appropriate area as directed As Needed (apply 60 minutes prior to port access)., Disp: 30 g, Rfl: 2    ondansetron (ZOFRAN) 8 MG tablet, Take 1 tablet by mouth 3 (Three) Times a Day As Needed for Nausea or Vomiting. (Patient taking differently: Administer 1 tablet per J tube 3 (Three) Times a Day As Needed for Nausea or Vomiting.), Disp: 30 tablet, Rfl: 5    acetaminophen (TYLENOL) 160 MG/5ML solution, Administer 31.23 mL per J tube 3 times a day. (Patient not taking: Reported on 3/19/2024), Disp: , Rfl:     oxyCODONE (Roxicodone) 5 MG immediate release tablet, Take 1 tablet by mouth Daily for 15 days., Disp: 15 tablet, Rfl: 0     Allergies   Allergen Reactions    Cephalosporins Anaphylaxis     Throat swelling    Dye  [Contrast Dye (Echo Or Unknown Ct/Mr)] Hives    Iodinated Contrast Media Hives    Sulfa Antibiotics Hives    Sulfate Hives    Zetia [Ezetimibe] Other (See Comments) and Myalgia     Made tired    Statins Myalgia     Myalgia and fatique             Objective    OBJECTIVE:     VITAL SIGNS:  /60 (BP Location: Right arm, Patient Position: Sitting, Cuff Size: Adult)   Pulse 78   Ht 170.2 cm (67.01\")   Wt 68.8 kg (151 lb 9.6 oz)   SpO2 98%   BMI 23.74 kg/m²     PHYSICAL EXAM:  Constitutional:       Appearance: Normal appearance.   HENT:      Head: Normocephalic.      Right Ear: External ear normal.      Left Ear: External ear normal.      Nose: Nose normal.      Mouth/Throat: No obvious deformity     Pharynx: Oropharynx is clear.   Eyes:      Conjunctiva/sclera: Conjunctivae normal.   Cardiovascular:      Rate and Rhythm: Normal rate.      Pulses: Normal " pulses.   Pulmonary:      Effort: Pulmonary effort is normal.   Abdominal:      Palpations: Abdomen is soft.   Musculoskeletal:         General: Normal range of motion.      Cervical back: Normal range of motion.   Skin:     General: Skin is warm.   Neurological:      General: No focal deficit present.      Mental Status: He is alert and oriented to person, place, and time.     Vital Signs  Weight is 140 pounds.    LAB RESULTS:  I have reviewed all the available laboratory results in the chart.    RESULTS REVIEW:  I have reviewed the patient's all relevant laboratory and imaging findings.     PET/ CT of skull base to mid-thigh done 10/20/2023.  Abnormal metabolic activity within the distal esophagus extending to the GE junction compatible with patient's known malignancy.    PET scan from 02/02/2024 was reviewed with the patient.  The tumor has decreased in size. It still shows some activity, which could be some radiation changes.     Ultrasound of the carotid arteries from 01/24/2024 did not see any significant blockage.     Ultrasound of the thyroid from 01/29/2024 revealed a small nodule on the thyroid that was not concerning for cancer.         ASSESSMENT & PLAN:  Jourdan Lebron is a 53 y.o. male with significant medical conditions as mentioned above presented to my clinic.    Diagnosis: Adenocarcinoma of the lower third of the esophagus s/p neoadjuvant concurrent chemoradiotherapy followed by hybrid Vinicio Shayne esophagectomy.  Staging: Stage III-A ([id number])    The patient is nearly 2 months post-surgery. An endoscopy with dilation will be scheduled for the patient. Until the endoscopy results are available, the current tube feed regimen will be maintained. The patient has been advised to crush and dissolve acetaminophen tablets with water. A prescription for oxycodone has been provided, with instructions to take one tablet daily every other day. Should the endoscopy reveal narrowing, gentle stretching may be  necessary. If the esophagus is open and the swallowing is pectoral, the patient will be transitioned off the feeding tube.    I discussed the patients findings and my recommendations with the patient. The patient was given adequate time to ask questions and all questions were answered to patient satisfaction.     Saurabh Hurtado MD  Thoracic Surgeon  [facility]        Dictated utilizing Dragon dictation    I spent 40 minutes caring for Jourdan on this date of service. This time includes time spent by me in the following activities:preparing for the visit, reviewing tests, obtaining and/or reviewing a separately obtained history, performing a medically appropriate examination and/or evaluation , counseling and educating the patient/family/caregiver, ordering medications, tests, or procedures, referring and communicating with other health care professionals , documenting information in the medical record, independently interpreting results and communicating that information with the patient/family/caregiver, and care coordination and more than half the time was spent in direct face to face evaluation and decision making.       Transcribed from ambient dictation for Saurabh Hurtado MD by Frannie Xavier.  04/02/24   11:26 EDT    Patient or patient representative verbalized consent to the visit recording.  I have personally performed the services described in this document as transcribed by the above individual, and it is both accurate and complete.

## 2024-04-02 NOTE — PROGRESS NOTES
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    Subjective   HISTORY OF PRESENTING ILLNESS:   Jourdan Lebron is a 53-year-old male who has significant medical problems as mentioned in the medical chart.      He was having intermittent dysphagia for over a year which became progressively worse.  On 10/2/2023, he underwent EGD and colonoscopy by Dr. Ozzy Abdalla at [facility]  He was found to have a 6 cm mass at the lower third of the esophagus (36 to 42 cm) (biopsied), mild diverticulosis of the sigmoid colon, 5 polyps noted in the colon and cecum that were removed.  The pathology of the esophageal mass revealed invasive moderate to poorly differentiated adenocarcinoma. All polypectomy samples were negative for malignancy (fragments of tubular adenoma).  CT scan of the chest, abdomen and pelvis on 10/9/2023 at [facility] showed 4 cm abnormal thickening of the lower thoracic esophagus extending to the GE junction thought to represent patient's known primary lung malignancy.  There were no convincing evidence of metastatic disease elsewhere in the chest, abdomen, pelvis, or skeleton.  There were stable left adrenalectomy changes, right adrenal adenoma unchanged.     He was seen in the office by Dr. Tacos Osuna ([facility]) for new diagnosis of esophageal cancer. He was an established patient of Dr. Harinder Mueller for erythrocytosis and macrocytosis since 11/2021 (resolved).      PET/CT on 10/20/2023 at [facility] showed hypermetabolic (SUV 7.7) activity within the distal esophagus extending to the GE junction compatible with patient's known malignancy.  He then underwent EGD with EUS by Dr. Joselyn Savage on 10/27/2023. Findings included close to 6 cm ulcerated mass extending from 36 to 42 cm consistent with poorly  differentiated adenocarcinoma.  Mass penetrated through muscularis propria without involving the adventitia consistent with T2 lesion. Two small round hypodense lymph nodes in close proximity to the mass measuring 5 and 6 mm concerning for malignancy. Other lymph nodes around the GE junction measuring 7 and 8 mm were also concerning for malignancy but immediate cytology was negative for malignancy. Pathology of the gastrohepatic lymph node was positive for metastatic adenocarcinoma; however, the lymph node at 36 cm was negative for malignancy.     He was referred to thoracic surgery for further evaluation.  At the time of initial consultation, he could eat and swallow quite a bit, but it depends. He avoids a lot of bread because it gets stuck lower down his chest. He went down from 200 pounds to 170 pounds 3 years ago after he had a stroke. He had memory loss and general fatigue after the stroke. He lost his appetite but began to add some weight. He then noticed he gets bloated and was having dyspepsia. He had adrenalectomy performed at the [facility] after an adrenal biopsy was done on an adrenal mass, and he went into adrenal crisis. He took hydrocortisone for 1 year and discontinued it on 09/2022. He developed abdominal discomfort afterwards. He denies having any past chest surgeries or myocardial infarction.  His wife mentioned the cause of the previous stroke was never known despite having a complete work up and a loop recorder inserted which was removed in the spring. He denies any abnormal heart rhythms or pedal edema. The patient quit smoking cigarettes 8 to 9 years ago and started smoking cigars.      He had good functional status.  Based on the clinical presentation, he was considered a candidate for trimodality treatment. I placed Mediport on 11/13/2023.  He received concurrent chemoradiation therapy.  His last dose of chemotherapy and radiation was on 12/28/2023. He tolerated chemoradiation  without much difficulty.  Restaging PET/CT scan on 2/2/2024 showed wall thickening involving the distal esophagus extending to the GE junction consistent with patient's known esophageal malignancy.  There was no evidence of distant metastases.  There was significant decreased uptake in the distal esophagus consistent with treatment effect.     Due to his history of stroke, ultrasound bilateral carotid were obtained which showed no significant carotid stenosis.  There was incidentally noted right thyroid nodule for which follow-up ultrasound of the thyroid was done which showed multiple nodules but not concerning for malignancy.  Transthoracic echo was performed on 1/22/2024 and noted ejection fraction of 51 to 55%.  He was sent to Dr. Hayward for cardiology clearance.  He had 0.8% risk of myocardial infarction or cardiac arrest, intraoperatively or up to 30 days postoperatively.  No further workup was recommended.  He had a history of adrenal crisis and was sent to evaluation by his primary endocrinologist at [facility], Anabela Crenshaw MD.  Complete ACTH stimulation test was performed which was reported normal.     On 2/9/2024, he underwent surgical resection. Flexible esophagogastroduodenoscopy was notable for mucosal changes at the distal esophagus and Olivera's esophagus.  These findings were consistent with treatment effect. Rather than a gastric emptying procedure, I performed a balloon dilatation of the pylorus to 20 mm with a CRE balloon I performed a robot-assisted minimally-invasive [place] Shayne esophagectomy via a robot-assisted laparoscopic and robot assisted right thoracoscopic approach converted to thoracotomy. I constructed a 4 cm gastric tube and performed an esophagogastrostomy approximately 2 cm above the azygos vein using a 28 EEA stapler.  After firing the stapler, 1 complete esophageal anastomotic ring and a partial gastric anastomotic ring was noted. The anastomosis was  checked under water with air insufflation and leak was identified which was oversewn. The anastomosis was partially imbricated and no leak was identified under water with air insufflation afterwards. A feeding jejunostomy tube was placed 40 cm distal to ligament of Treitz. Repeat endoscopy was notable for a widely patent anastomosis at 22-24 cm.  The conduit was healthy appearing.  It was mildly tortuous and non redundant.  He remained hemodynamically stable throughout the case.  He received 4000 cc of crystalloid and made 1000 cc of urine.  He required small doses of phenylephrine throughout the case.  He was extubated at the end of procedure.       The pathology reported scattered foci of residual moderate to poorly differentiated adenocarcinoma occurring at the GE junction with changes consistent with prior therapy.  There were scattered individual tumor cells and small clusters near GE junction and extending through muscularis propria into adventitia spanning an area of 2.1 x 1.8 x 0.7 cm.  There is no lymphovascular space invasion.  The proximal and distal surgical margins were negative for malignancy.  20 lymph nodes were negative for metastatic carcinoma. There was also presence of low and high-grade squamous dysplasia.  He had near complete response to chemo and radiation therapy.     Postprocedure he was transferred to the ICU.  His initial postop care was unremarkable.  He was started on tube feeds and was slowly advanced to goal rate which he tolerated well.  He demonstrated return of bowel function.  The chest tube was removed after amylase from the drain was checked which was not concerning for esophageal leak.  His white blood count were slowly trending up and had mild leukocytosis.  This was concerning for unidentified infection.  He had mild cellulitis around the jejunostomy tube site.  He was started on empiric antibiotics.  Infectious disease was consulted.  CT scan of the chest abdomen pelvis  without contrast did not reveal any clear source of infection.  I offered EGD to evaluate for anastomotic leak and conduit.    On 2/17/2024, he underwent EGD.  He had oropharyngeal candidiasis.  There was approximately 20% defect at the anastomotic ring likely contained by the reinforcing stitch placed intraoperatively.  There was fibrinous plaque covering the anastomosis.  Intraoperative esophagram revealed blind pouch anteriorly at the site of anastomotic defect.  The gastric conduit appeared viable but was slightly redundant.  The conduit orientation was straight.  I placed a 18 mm X 25 mm X 103 mm fully covered Wallflex stent deployed at the anastomosis.  He tolerated the procedure well.    Esophagram performed on 2/22/2024 and was negative for leak, but did demonstrate retrograde flow of contrast along the distal aspect of the stent making him high risk for anastomotic leak with oral feeds.  He was kept nothing by mouth and discharged home on tube feeds.    He came to clinic for follow-up visit.     He has experienced several episodes of hypoglycemia, prompting a consultation with a dietitian who advised him to increase his protein intake. He reported an incident of hypoglycemia following consumption of carrots, green beans, banana pudding, and cherry soda-pop, which resulted in a drop in her blood glucose to 55 mg/dL. He has been monitoring her blood glucose levels and has found Tylenol to be ineffective in managing her symptoms.    He has been experiencing dysphagia, often with food becoming lodged during meals. This has led him to reduce her food intake. Despite not having consumed any breakfast today or yesterday, she has experienced some weight gain. He reports mild soreness at the incision site, which she attributes to her pain medication. He has been managing her pain with oxycodone, taking it only at night, and taking it every other day. He has recently started swallowing acetaminophen tablets.     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    Anxiety     Brain fog     COVID 02/2023    Diverticulosis of large intestine without perforation or abscess without bleeding 10/02/2023    Esophageal cancer 10/2023    Generalized headaches     GERD (gastroesophageal reflux disease)     Hand tingling     History of blood clot in brain 2020    had thrombectomy    History of cancer chemotherapy 12/2023    History of kidney stones     History of radiation therapy 11/2023    Hyperlipidemia     Port-A-Cath in place     Stroke 05/30/2020    Thyroid nodule     Tiredness     Trouble swallowing        Past Surgical History:   Procedure Laterality Date    ADRENALECTOMY      COLONOSCOPY      ENDOSCOPY      ENDOSCOPY N/A 02/17/2024    Procedure: ESOPHAGOGASTRODUODENOSCOPY WITH STENTING UNDER FLUROSCOPY GUIDENCE WITH ESOPHOGRAM;  Surgeon: Saurabh Hurtado MD;  Location: [facility] OR;  Service: Gastroenterology;  Laterality: N/A;    ENDOSCOPY N/A 3/8/2024    Procedure: ESOPHAGOGASTRODUODENOSCOPY WITH STENT REMOVAL;  Surgeon: Saurabh Hurtado MD;  Location: [facility] ENDOSCOPY;  Service: Gastroenterology;  Laterality: N/A;  Esophageal leak    ESOPHAGOGASTRECTOMY N/A 02/09/2024    Procedure: BRONCHOSCOPY, EGD, ESOPHAGECTOMY ALTAGRACIA-FABIOLA WITH DAVINCI ROBOT, LAPAROSCOPY, RIGHT THORACOSCOPY CONVERTED TO THORACOTOMY, FEEDING JEJUNOSTOMY TUBE PLACEMENT, INTERCOSTAL NERVE BLOCKS;  Surgeon: Saurabh Hurtado MD;  Location: [facility] OR;  Service: Robotics - DaVinci;  Laterality: N/A;    ESOPHAGUS SURGERY  10/2023    biopsy    KIDNEY STONE SURGERY      OTHER SURGICAL HISTORY  2020    loop recorder PLACED AND REMOVED    THROMBECTOMY  2020    UPPER ENDOSCOPIC ULTRASOUND W/ FNA N/A 10/27/2023    Procedure: ENDOSCOPIC ULTRASOUND WITH STAGING AND FINE NEEDLE ASPIRATION X2 AREAS;  Surgeon: Hussein  MD Joselyn;  Location: [facility] ENDOSCOPY;  Service: Gastroenterology;  Laterality: N/A;  POST:    VENOUS ACCESS DEVICE (PORT) INSERTION Right 2023    Procedure: INSERTION VENOUS ACCESS DEVICE;  Surgeon: Saurabh Hurtado MD;  Location: [facility] MAIN OR;  Service: Thoracic;  Laterality: Right;       Family History   Problem Relation Age of Onset    Arthritis Mother     Hypertension Mother     Heart failure Mother 73        Cause of death    Arthritis Father     Hypertension Father     Kidney cancer Father 57        Cause of death. Was a smoker    Heart disease Brother     Esophageal cancer Brother 59        Cause of death. Has a heavy drinker    Malig Hyperthermia Neg Hx        Social History     Socioeconomic History    Marital status:    Tobacco Use    Smoking status: Former     Current packs/day: 0.00     Average packs/day: 1 pack/day for 37.0 years (37.0 ttl pk-yrs)     Types: Cigars, Cigarettes     Start date: 1985     Quit date: 2022     Years since quittin.2     Passive exposure: Past    Smokeless tobacco: Never    Tobacco comments:     1 packs= stopped 10 years ago and continued cigars   2 cigars a day; has stopped cigars as of    Vaping Use    Vaping status: Never Used   Substance and Sexual Activity    Alcohol use: Not Currently     Comment: Has now been abstinent for about one year    Drug use: Never    Sexual activity: Yes     Partners: Female     Birth control/protection: None         Current Outpatient Medications:     ALPRAZolam (XANAX) 0.25 MG tablet, Take 1 tablet by mouth 3 (Three) Times a Day As Needed for Anxiety. for anxiety, Disp: 90 tablet, Rfl: 3    aspirin 81 MG chewable tablet, Administer 1 tablet per J tube Daily., Disp: , Rfl:     Evolocumab (REPATHA) solution prefilled syringe injection, Inject 1 mL under the skin into the appropriate area as directed Every 14 (Fourteen) Days., Disp: 6 mL, Rfl: 3    FLUoxetine (PROzac) 20 MG/5ML solution, Take 5 mL by mouth Daily.,  "Disp: 150 mL, Rfl: 1    fluticasone (FLONASE) 50 MCG/ACT nasal spray, 2 sprays into the nostril(s) as directed by provider Daily., Disp: , Rfl:     gabapentin (NEURONTIN) 100 MG capsule, Take 1 capsule by mouth 3 (Three) Times a Day., Disp: 30 capsule, Rfl: 0    lidocaine-prilocaine (EMLA) 2.5-2.5 % cream, Apply 1 application  topically to the appropriate area as directed As Needed (apply 60 minutes prior to port access)., Disp: 30 g, Rfl: 2    ondansetron (ZOFRAN) 8 MG tablet, Take 1 tablet by mouth 3 (Three) Times a Day As Needed for Nausea or Vomiting. (Patient taking differently: Administer 1 tablet per J tube 3 (Three) Times a Day As Needed for Nausea or Vomiting.), Disp: 30 tablet, Rfl: 5    acetaminophen (TYLENOL) 160 MG/5ML solution, Administer 31.23 mL per J tube 3 times a day. (Patient not taking: Reported on 3/19/2024), Disp: , Rfl:     oxyCODONE (Roxicodone) 5 MG immediate release tablet, Take 1 tablet by mouth Daily for 15 days., Disp: 15 tablet, Rfl: 0     Allergies   Allergen Reactions    Cephalosporins Anaphylaxis     Throat swelling    Dye  [Contrast Dye (Echo Or Unknown Ct/Mr)] Hives    Iodinated Contrast Media Hives    Sulfa Antibiotics Hives    Sulfate Hives    Zetia [Ezetimibe] Other (See Comments) and Myalgia     Made tired    Statins Myalgia     Myalgia and fatique             Objective    OBJECTIVE:     VITAL SIGNS:  /60 (BP Location: Right arm, Patient Position: Sitting, Cuff Size: Adult)   Pulse 78   Ht 170.2 cm (67.01\")   Wt 68.8 kg (151 lb 9.6 oz)   SpO2 98%   BMI 23.74 kg/m²     PHYSICAL EXAM:  Constitutional:       Appearance: Normal appearance.   HENT:      Head: Normocephalic.      Right Ear: External ear normal.      Left Ear: External ear normal.      Nose: Nose normal.      Mouth/Throat: No obvious deformity     Pharynx: Oropharynx is clear.   Eyes:      Conjunctiva/sclera: Conjunctivae normal.   Cardiovascular:      Rate and Rhythm: Normal rate.      Pulses: Normal " pulses.   Pulmonary:      Effort: Pulmonary effort is normal.   Abdominal:      Palpations: Abdomen is soft.   Musculoskeletal:         General: Normal range of motion.      Cervical back: Normal range of motion.   Skin:     General: Skin is warm.   Neurological:      General: No focal deficit present.      Mental Status: He is alert and oriented to person, place, and time.     Vital Signs  Weight is 140 pounds.    LAB RESULTS:  I have reviewed all the available laboratory results in the chart.    RESULTS REVIEW:  I have reviewed the patient's all relevant laboratory and imaging findings.     PET/ CT of skull base to mid-thigh done 10/20/2023.  Abnormal metabolic activity within the distal esophagus extending to the GE junction compatible with patient's known malignancy.    PET scan from 02/02/2024 was reviewed with the patient.  The tumor has decreased in size. It still shows some activity, which could be some radiation changes.     Ultrasound of the carotid arteries from 01/24/2024 did not see any significant blockage.     Ultrasound of the thyroid from 01/29/2024 revealed a small nodule on the thyroid that was not concerning for cancer.         ASSESSMENT & PLAN:  Jourdan Lebron is a 53 y.o. male with significant medical conditions as mentioned above presented to my clinic.    Diagnosis: Adenocarcinoma of the lower third of the esophagus s/p neoadjuvant concurrent chemoradiotherapy followed by hybrid [place] Shayne esophagectomy.  Staging: Stage III-A ([id number])    The patient is nearly 2 months post-surgery. An endoscopy with dilation will be scheduled for the patient. Until the endoscopy results are available, the current tube feed regimen will be maintained. The patient has been advised to crush and dissolve acetaminophen tablets with water. A prescription for oxycodone has been provided, with instructions to take one tablet daily every other day. Should the endoscopy reveal narrowing, gentle stretching may be  necessary. If the esophagus is open and the swallowing is pectoral, the patient will be transitioned off the feeding tube.    I discussed the patients findings and my recommendations with the patient. The patient was given adequate time to ask questions and all questions were answered to patient satisfaction.     Saurabh Hurtado MD  Thoracic Surgeon  [facility]        Dictated utilizing Dragon dictation    I spent 40 minutes caring for Jourdan on this date of service. This time includes time spent by me in the following activities:preparing for the visit, reviewing tests, obtaining and/or reviewing a separately obtained history, performing a medically appropriate examination and/or evaluation , counseling and educating the patient/family/caregiver, ordering medications, tests, or procedures, referring and communicating with other health care professionals , documenting information in the medical record, independently interpreting results and communicating that information with the patient/family/caregiver, and care coordination and more than half the time was spent in direct face to face evaluation and decision making.       Transcribed from ambient dictation for Saurabh Hurtado MD by Frannie Xavier.  04/02/24   11:26 EDT    Patient or patient representative verbalized consent to the visit recording.  I have personally performed the services described in this document as transcribed by the above individual, and it is both accurate and complete.

## 2024-04-04 ENCOUNTER — TELEPHONE (OUTPATIENT)
Dept: SURGERY | Facility: CLINIC | Age: 54
End: 2024-04-04
Payer: COMMERCIAL

## 2024-04-04 NOTE — TELEPHONE ENCOUNTER
I called  Bernardino with his new arrival time for his procedure tomorrow.  He is to arrive at 7:15 am now.  He is good with the new time.

## 2024-04-05 ENCOUNTER — APPOINTMENT (OUTPATIENT)
Dept: GENERAL RADIOLOGY | Facility: HOSPITAL | Age: 54
End: 2024-04-05
Payer: COMMERCIAL

## 2024-04-05 ENCOUNTER — ANESTHESIA (OUTPATIENT)
Dept: GASTROENTEROLOGY | Facility: HOSPITAL | Age: 54
End: 2024-04-05
Payer: COMMERCIAL

## 2024-04-05 ENCOUNTER — HOSPITAL ENCOUNTER (OUTPATIENT)
Facility: HOSPITAL | Age: 54
Setting detail: HOSPITAL OUTPATIENT SURGERY
Discharge: HOME | End: 2024-04-05
Attending: SURGERY | Admitting: SURGERY
Payer: COMMERCIAL

## 2024-04-05 ENCOUNTER — ANESTHESIA EVENT (OUTPATIENT)
Dept: GASTROENTEROLOGY | Facility: HOSPITAL | Age: 54
End: 2024-04-05
Payer: COMMERCIAL

## 2024-04-05 VITALS
BODY MASS INDEX: 20.82 KG/M2 | SYSTOLIC BLOOD PRESSURE: 111 MMHG | WEIGHT: 148.7 LBS | DIASTOLIC BLOOD PRESSURE: 71 MMHG | OXYGEN SATURATION: 100 % | RESPIRATION RATE: 17 BRPM | HEIGHT: 71 IN | HEART RATE: 53 BPM

## 2024-04-05 PROCEDURE — 76000 FLUOROSCOPY <1 HR PHYS/QHP: CPT

## 2024-04-05 PROCEDURE — 25010000002 SUCCINYLCHOLINE PER 20 MG

## 2024-04-05 PROCEDURE — C1726 CATH, BAL DIL, NON-VASCULAR: HCPCS | Performed by: SURGERY

## 2024-04-05 PROCEDURE — 25810000003 LACTATED RINGERS PER 1000 ML: Performed by: SURGERY

## 2024-04-05 PROCEDURE — 25010000002 DEXAMETHASONE SODIUM PHOSPHATE 20 MG/5ML SOLUTION

## 2024-04-05 PROCEDURE — 43249 ESOPH EGD DILATION <30 MM: CPT | Performed by: SURGERY

## 2024-04-05 PROCEDURE — 0 DIATRIZOATE MEGLUMINE & SODIUM PER 1 ML: Performed by: SURGERY

## 2024-04-05 PROCEDURE — 25010000002 PROPOFOL 10 MG/ML EMULSION

## 2024-04-05 PROCEDURE — 71045 X-RAY EXAM CHEST 1 VIEW: CPT

## 2024-04-05 PROCEDURE — 25010000002 GLYCOPYRROLATE 0.2 MG/ML SOLUTION

## 2024-04-05 RX ORDER — GLYCOPYRROLATE 0.2 MG/ML
INJECTION INTRAMUSCULAR; INTRAVENOUS AS NEEDED
Status: DISCONTINUED | OUTPATIENT
Start: 2024-04-05 | End: 2024-04-05 | Stop reason: SURG

## 2024-04-05 RX ORDER — SODIUM CHLORIDE, SODIUM LACTATE, POTASSIUM CHLORIDE, CALCIUM CHLORIDE 600; 310; 30; 20 MG/100ML; MG/100ML; MG/100ML; MG/100ML
1000 INJECTION, SOLUTION INTRAVENOUS CONTINUOUS
Status: DISCONTINUED | OUTPATIENT
Start: 2024-04-05 | End: 2024-04-05 | Stop reason: HOSPADM

## 2024-04-05 RX ORDER — SUCCINYLCHOLINE CHLORIDE 20 MG/ML
INJECTION INTRAMUSCULAR; INTRAVENOUS AS NEEDED
Status: DISCONTINUED | OUTPATIENT
Start: 2024-04-05 | End: 2024-04-05 | Stop reason: SURG

## 2024-04-05 RX ORDER — SODIUM CHLORIDE 0.9 % (FLUSH) 0.9 %
10 SYRINGE (ML) INJECTION AS NEEDED
Status: DISCONTINUED | OUTPATIENT
Start: 2024-04-05 | End: 2024-04-05 | Stop reason: HOSPADM

## 2024-04-05 RX ORDER — DEXAMETHASONE SODIUM PHOSPHATE 4 MG/ML
INJECTION, SOLUTION INTRA-ARTICULAR; INTRALESIONAL; INTRAMUSCULAR; INTRAVENOUS; SOFT TISSUE AS NEEDED
Status: DISCONTINUED | OUTPATIENT
Start: 2024-04-05 | End: 2024-04-05 | Stop reason: SURG

## 2024-04-05 RX ORDER — PROPOFOL 10 MG/ML
VIAL (ML) INTRAVENOUS AS NEEDED
Status: DISCONTINUED | OUTPATIENT
Start: 2024-04-05 | End: 2024-04-05 | Stop reason: SURG

## 2024-04-05 RX ORDER — LIDOCAINE HYDROCHLORIDE 20 MG/ML
INJECTION, SOLUTION INFILTRATION; PERINEURAL AS NEEDED
Status: DISCONTINUED | OUTPATIENT
Start: 2024-04-05 | End: 2024-04-05 | Stop reason: SURG

## 2024-04-05 RX ADMIN — SODIUM CHLORIDE, POTASSIUM CHLORIDE, SODIUM LACTATE AND CALCIUM CHLORIDE 1000 ML: 600; 310; 30; 20 INJECTION, SOLUTION INTRAVENOUS at 08:24

## 2024-04-05 RX ADMIN — LIDOCAINE HYDROCHLORIDE 100 MG: 20 INJECTION, SOLUTION INFILTRATION; PERINEURAL at 09:35

## 2024-04-05 RX ADMIN — SUCCINYLCHOLINE CHLORIDE 140 MG: 20 INJECTION, SOLUTION INTRAMUSCULAR; INTRAVENOUS; PARENTERAL at 09:35

## 2024-04-05 RX ADMIN — PROPOFOL 200 MG: 10 INJECTION, EMULSION INTRAVENOUS at 09:35

## 2024-04-05 RX ADMIN — PROPOFOL 200 MCG/KG/MIN: 10 INJECTION, EMULSION INTRAVENOUS at 09:35

## 2024-04-05 RX ADMIN — DEXAMETHASONE SODIUM PHOSPHATE 8 MG: 4 INJECTION, SOLUTION INTRAMUSCULAR; INTRAVENOUS at 09:35

## 2024-04-05 RX ADMIN — GLYCOPYRROLATE 0.1 MG: 0.2 INJECTION INTRAMUSCULAR; INTRAVENOUS at 09:35

## 2024-04-05 NOTE — ANESTHESIA PREPROCEDURE EVALUATION
Anesthesia Evaluation     Patient summary reviewed and Nursing notes reviewed   NPO Solid Status: > 8 hours             Airway   Mallampati: II  TM distance: >3 FB  Neck ROM: full  no difficulty expected  Dental - normal exam     Pulmonary - normal exam   Cardiovascular - normal exam    (+) hyperlipidemia      Neuro/Psych  (+) CVA, headaches, numbness, psychiatric history Anxiety and Depression  GI/Hepatic/Renal/Endo    (+) GERD, thyroid problem     Musculoskeletal     (+) neck pain  Abdominal  - normal exam   Substance History      OB/GYN          Other      history of cancer (Esopheal)                Anesthesia Plan    ASA 3     general     intravenous induction     Anesthetic plan, risks, benefits, and alternatives have been provided, discussed and informed consent has been obtained with: patient.    CODE STATUS:

## 2024-04-05 NOTE — ANESTHESIA POSTPROCEDURE EVALUATION
"Patient: Jourdan Lebron    Procedure Summary       Date: 04/05/24 Room / [facility]:  [facility]    Anesthesia Start: 0929 Anesthesia Stop: 1017    Procedure: ESOPHAGOGASTRODUODENOSCOPY WITH BALLOON DILATATION #6, #7, #8, #9, #10, #11, #12  AND GASTROGRAFIN WITH FLOURO (Esophagus) Diagnosis:       Malignant neoplasm of lower third of esophagus      (Malignant neoplasm of lower third of esophagus [C15.5])    Surgeons: Saurabh Hurtado MD Provider: Geovany Jackson MD    Anesthesia Type: MAC ASA Status: 3            Anesthesia Type: MAC    Vitals  Vitals Value Taken Time   /71 04/05/24 1037   Temp     Pulse 54 04/05/24 1042   Resp 17 04/05/24 1036   SpO2 100 % 04/05/24 1042   Vitals shown include unfiled device data.        Post Anesthesia Care and Evaluation    Patient location during evaluation: bedside  Patient participation: complete - patient participated  Level of consciousness: awake and alert  Pain management: adequate    Airway patency: patent  Anesthetic complications: No anesthetic complications    Cardiovascular status: acceptable  Respiratory status: acceptable  Hydration status: acceptable    Comments: /71 (BP Location: Left arm, Patient Position: Sitting)   Pulse 53   Resp 17   Ht 180.3 cm (71\")   Wt 67.4 kg (148 lb 11.2 oz)   SpO2 100%   BMI 20.74 kg/m²     "

## 2024-04-05 NOTE — ANESTHESIA PROCEDURE NOTES
Airway  Urgency: elective    Date/Time: 4/5/2024 9:37 AM  Airway not difficult    General Information and Staff    Patient location during procedure: OR  Anesthesiologist: Geovany Jackson MD  CRNA/CAA: Amanda Anderson CRNA    Indications and Patient Condition  Indications for airway management: airway protection    Preoxygenated: yes  MILS not maintained throughout  Mask difficulty assessment: 0 - not attempted    Final Airway Details  Final airway type: endotracheal airway      Successful airway: ETT  Cuffed: yes   Successful intubation technique: direct laryngoscopy, RSI and video laryngoscopy  Facilitating devices/methods: intubating stylet and cricoid pressure  Endotracheal tube insertion site: oral  Blade: CMAC  ETT size (mm): 8.0  Cormack-Lehane Classification: grade I - full view of glottis  Placement verified by: chest auscultation and capnometry   Cuff volume (mL): 8  Measured from: lips  ETT/EBT  to lips (cm): 22  Number of attempts at approach: 1  Assessment: lips, teeth, and gum same as pre-op and atraumatic intubation    Additional Comments  Airway exam prior to DL, teeth/lips inspected. Preoxygenated with 100% O2; sniffing position, easy mask ventilation. Eyes taped. Atraumatic intubation. Lips and teeth intact, no damage. ETT connected to vent. Confirmed EBBS, +EtCO2.

## 2024-04-09 NOTE — PROGRESS NOTES
"                     HEMATOLOGY ONCOLOGY OUTPATIENT FOLLOW UP       Patient name: Jourdan Lebron  : 1970  MRN: [id number]  Primary Care Physician: Justa Castano MD  Referring Physician: No ref. provider found  Reason For Consult:     Chief Complaint   Patient presents with    Follow-up     Malignant neoplasm of lower third of esophagus         HPI:   History of Present Illness:  Jourdan Lebron is 53 y.o. male who presented to our office on 2021 for consultation regarding elevated hematocrit    On 2021 Mr. Lebron was referred for the investigation of macrocytosis and elevated hematocrit.  He gave a history of a stroke in .  He also described a long history of anxiety and \"panic attacks\".  Finally he had undergone an adrenalectomy, apparently because of an adrenal adenoma.  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  In spite of that he felt poorly, mainly because of fatigue and had several investigations.  For a long time, at least since , he had been noted to have an elevated MCV and MCH.  A clear cause for this had not been identified and generally speaking he was asymptomatic at that time.  In , September and 2021 his blood counts had reported a hematocrit of 51.3, 51.6 and 53.8% respectively.  This was in the setting of a normal high hemoglobin.  He gave a history of prolonged and intense, at times of up to 3 packs of cigarettes per day, cigarette smoking.  Close to the time of the initial visit, he was smoking only cigars but did admit to inhaling the smoke.  He, as well, admitted to dyspnea with some exertion.    2022 Mr. Lebron was back in the office for follow-up.  At the time of his initial evaluation his blood count had been found to be within normal limits.  His folic acid was found to be immediately below the normal range of normalcy.  He started taking folic acid replacement.    7/15/2022: Back in the " office for follow-up after 6 months.  Reported he had had some changes to his medications and he was feeling somewhat better.  In particular, he discussed changes to his antidepressant, that seemed to have made a difference.  He also had stopped smoking.  The physical exam was unchanged.  The laboratory exams revealed normal hemoglobin and hematocrit, but he did persist with mild macrocytosis at 97.8 fL.  A clear explanation for this was not identified.  A decision was made to observe as it was unlikely to represent a serious problem.    10/10/2023: Seen in the office after an absence of more than one year. He reported that for a few months he had been experiencing dysphagia and weight loss. He was initially treated with a proton pump inhibitor, but as there was no response and rather he reported worsening of the symptoms, he underwent upper and lower gastrointestinal endoscopies. In the colon multiple polyps were identified in the cecum, the ascending colon, the transverse colon and the descending colon. In the esophagus a protruding lesion that seemed infiltrative and was fungating and ulcerated was found in the lower third of the esophagus. It extended from 36 to 46 cm from the incisors. Biopsy reported invasive moderate to poorly differentiated adenocarcinoma.     11/3/2023: In the office to review the PET scan. His symptoms have not changed much. He continues to have dysphagia and it is severe enough that he has been able to eat much; he has some success with soft foods but there fare some foods that he can definitely not swallow. He has lost about 10 lbs but none recently. He remains afebrile and has not had any cough. He has not had any pain. On exam no jaundice or pallor. Lungs diminished and heart regular. Abdomen soft and not tender. No edema. Reviewed the images of the PET scan. Reviewed the result of the endoscopic ultrasound and the FNA of the lymph nodes, at least one of which is positive for  metastatic disease. This makes the tumor T2N1 disease. Neoadjuvant chemotherapy and radiation was discussed with him and his spouse and I made referrals to Dr. Sabillon in Radiation Oncology and to Dr. Hurtado in Thoracic surgery. He is to have next generation sequencing, Her2 expression and PD-L1 expression on tumor tissue. Will present in tumor conference.     11/17/2023: Not any different than at the time of the last visit.  No further weight loss.  Able to swallow some foods without any difficulties.  However, other foods are very difficult to swallow, if not impossible.  He has not had any fevers.  He underwent placement of the port without any difficulties.  On exam alert, conversant and in no distress.  Port site clean and healing well.  Lungs diminished bilaterally.  Heart regular.  No edema.  Laboratory exams were reviewed.  Discussed with him concurrent chemotherapy and radiation.  Treatment with carboplatin and paclitaxel was discussed and a treatment plan was placed.  Discussed with Dr. Sabillon.  To begin on the week of November 27, 2023.    12/8/2023: Feeling reasonably well. Has experienced occasional headaches and facial pain. As well feels the lower extremities to be heavy and had some aching pain. Has been able to eat some though his appetite is poor. He may be swallowing better and does not have odynophagia. No abdominal pain or diarrhea and no dysuria. Has not lost weight. On exam no changes. The laboratory exams were reviewed. Discussed with him. To continue with the same therapy.     12/27/2023: Without new symptoms.  Reasonably active.  Not eating as well because of early satiety but no dysphagia.  No chest pains.  As well not coughing or more dyspneic than before.  On exam no changes.  No palpable supraclavicular adenopathy.  Lungs are diminished bilaterally.  Heart regular.  Abdomen soft and nontender.  There is no edema.  Small petechiae are present in the lower extremities.  The laboratory exams were  reviewed.  He has thrombocytopenia today that does not require transfusion but that will keep him from receiving the last dose of the chemotherapy.  To finish radiation tomorrow.  He will have a PET scan and will see me with the results in approximately 4 weeks.  Discussed with him.    1/25/2024: Feeling reasonably well.  Anxious about surgery.  Undergoing preoperative investigations he was found to have a thyroid nodule.  He is eating reasonably well and does not have major dysphagia although sometimes he needs to belch before he can continue to swallow.  No odynophagia.  He has also been free of dyspnea.  Denies abdominal pain and has maintained regular bowel activity.  No dysuria or hematuria.  No peripheral edema.  On exam alert, conversant and in good spirits.  No jaundice and no pallor.  Lungs diminished but clear.  Heart regular.  Abdomen soft.  Port site clean.  Laboratory exams reviewed.  Awaiting authorization from the insurance company to do the PET scan.  Continue to see Dr. Hurtado.  See me in approximately 4 weeks.    2/28/2024: Underwent an Temperance Shayne type esophagectomy without immediate complications.  There was some concern over dehiscence of the anastomosis and he received a stent.  He was also asked to not consume any food by mouth and was receiving nutrition through a jejunostomy tube.  At the time of this visit feeling progressively better but still poorly.  Very tired and lacking energy to do anything.  Sleeping poorly.  Has started to take clear liquids by mouth.  On exam he appears chronically ill but he does not seem in any distress.  He is pale but not jaundiced.  All surgical incisions are healing well and the lungs are diminished bilaterally but clear.  Heart regular.  Abdomen soft.  No edema.  Laboratory exams were reviewed.  He has macrocytic anemia with a hemoglobin of 11.9 g/dL and thrombocytosis with a platelet count of 562,000/mm³.  He also has monocytosis and basophilia but no  leukocytosis at this time.  The final review of pathology confirmed scattered foci of residual moderate to poorly differentiated adenocarcinoma at the gastroesophageal junction with changes consistent with prior neoadjuvant therapy.  There were ASSO stated dissecting pools of mucin with changes again consistent with previous therapy.  Scattered individual tumor cells and small clusters were present near the gastroesophageal junction and extending through the muscularis propria into the adventitial soft tissue/fat and spanning an area of 21 x 18 x 7 mm.  No definitive lymphovascular space invasion was identified.  All margins were negative.  Of 20 lymph nodes analyzed known had metastatic disease.  A discussion was had with him in regards to immunotherapy given in the adjuvant setting.  Explained side effects and potential complications.  I asked him to return in 3 weeks.    3/22/2024: Not feeling back to normal yet.  Still not eating much.  He had an episode of nausea and emesis this morning.  He has been afebrile.  He denies chest pains but does complain of some epigastric discomfort that seems unrelated to the previous discomfort that he had after the surgery.  He has been without dyspnea and does not have any more cough than usual.  As well no abdominal pain.  He continues to tolerate the enteral nutrition without difficulties.  No edema.  No skin rash.  On exam no distress.  Conversant and oriented.  Lungs diminished bilaterally.  Heart regular.  Abdomen with the jejunostomy tube in place.  Opening looks clean.  It is soft and without palpable tumors.  No edema.  Laboratory exams reviewed.  Discussed with him.  Offered him immunotherapy.  He remains somewhat reluctant to consider treatment with immunotherapy.  He has asked for more time to think about it.    4/12/2024: Somewhat better.  Perhaps more energetic.  Able to eat better after he had dilatation of the esophagus.  He has some pain that he localizes to  the substernal area and the mid epigastrium and right upper quadrant.  It is not as intense as before.  He continues to use his jejunostomy without any difficulties.  Defecating regularly.  Urinating without dysuria.  On exam alert, conversant and oriented.  No distress.  No jaundice.  Lungs are diminished bilaterally.  Heart is regular.  Abdomen is soft.  No edema.  Laboratory exams reviewed.  Discussed again the use of immunotherapy.  This time he is interested in pursuing.  Placed the treatment plan.  Discussed side effects and potential complications of the treatment with immunotherapy.  Discussed with him the rationale of treatment.  He is to begin as it is approved by insurance.  He will see me in approximately 5 weeks.    The following portions of the patient's history were reviewed and updated as appropriate: allergies, current medications, past family history, past medical history, past social history, past surgical history and problem list.    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    Anxiety     Brain fog     COVID 02/2023    Diverticulosis of large intestine without perforation or abscess without bleeding 10/02/2023    Esophageal cancer 10/2023    Generalized headaches     GERD (gastroesophageal reflux disease)     Hand tingling     History of blood clot in brain 2020    had thrombectomy    History of cancer chemotherapy 12/2023    History of kidney stones     History of radiation therapy 11/2023    Hyperlipidemia     Port-A-Cath in place     Stroke 05/30/2020    Thyroid nodule     Tiredness     Trouble swallowing      Past Surgical History:   Procedure Laterality Date    ADRENALECTOMY      COLONOSCOPY      ENDOSCOPY      ENDOSCOPY N/A 02/17/2024    Procedure: ESOPHAGOGASTRODUODENOSCOPY WITH STENTING UNDER FLUROSCOPY GUIDENCE  WITH ESOPHOGRAM;  Surgeon: Saurabh Hurtado MD;  Location: [facility] MAIN OR;  Service: Gastroenterology;  Laterality: N/A;    ENDOSCOPY N/A 3/8/2024    Procedure: ESOPHAGOGASTRODUODENOSCOPY WITH STENT REMOVAL;  Surgeon: Saurabh Hurtado MD;  Location: [facility] ENDOSCOPY;  Service: Gastroenterology;  Laterality: N/A;  Esophageal leak    ENDOSCOPY N/A 4/5/2024    Procedure: ESOPHAGOGASTRODUODENOSCOPY WITH BALLOON DILATATION #6, #7, #8, #9, #10, #11, #12  AND GASTROGRAFIN WITH FLOURO;  Surgeon: Saurabh Hurtado MD;  Location: [facility] ENDOSCOPY;  Service: Gastroenterology;  Laterality: N/A;  PRE OP - ESOPHAGEAL CANCER  POST OP - ANASTOMOSIS STRICTURE    ESOPHAGOGASTRECTOMY N/A 02/09/2024    Procedure: BRONCHOSCOPY, EGD, ESOPHAGECTOMY ALTAGRACIA-FABIOLA WITH [employer] ROBOT, LAPAROSCOPY, RIGHT THORACOSCOPY CONVERTED TO THORACOTOMY, FEEDING JEJUNOSTOMY TUBE PLACEMENT, INTERCOSTAL NERVE BLOCKS;  Surgeon: Saurabh Hurtado MD;  Location: [facility] MAIN OR;  Service: Robotics - DaVinci;  Laterality: N/A;    ESOPHAGUS SURGERY  10/2023    biopsy    KIDNEY STONE SURGERY      OTHER SURGICAL HISTORY  2020    loop recorder PLACED AND REMOVED    THROMBECTOMY  2020    UPPER ENDOSCOPIC ULTRASOUND W/ FNA N/A 10/27/2023    Procedure: ENDOSCOPIC ULTRASOUND WITH STAGING AND FINE NEEDLE ASPIRATION X2 AREAS;  Surgeon: Joselyn Savage MD;  Location: [facility] ENDOSCOPY;  Service: Gastroenterology;  Laterality: N/A;  POST:    VENOUS ACCESS DEVICE (PORT) INSERTION Right 11/13/2023    Procedure: INSERTION VENOUS ACCESS DEVICE;  Surgeon: Saurabh Hurtado MD;  Location: [facility] MAIN OR;  Service: Thoracic;  Laterality: Right;       Current Outpatient Medications:     acetaminophen (TYLENOL) 160 MG/5ML solution, Administer 31.23 mL per J tube 3 times a day., Disp: , Rfl:     ALPRAZolam (XANAX) 0.25 MG tablet, Take 1 tablet by mouth 3 (Three) Times a Day As Needed for Anxiety. for anxiety, Disp: 90 tablet, Rfl: 3    aspirin 81 MG chewable tablet, Administer 1 tablet per J tube Daily., Disp:  , Rfl:     Evolocumab (REPATHA) solution prefilled syringe injection, Inject 1 mL under the skin into the appropriate area as directed Every 14 (Fourteen) Days., Disp: 6 mL, Rfl: 3    FLUoxetine (PROzac) 20 MG/5ML solution, Take 5 mL by mouth Daily., Disp: 150 mL, Rfl: 1    fluticasone (FLONASE) 50 MCG/ACT nasal spray, 2 sprays into the nostril(s) as directed by provider Daily., Disp: , Rfl:     gabapentin (NEURONTIN) 100 MG capsule, Take 1 capsule by mouth 3 (Three) Times a Day., Disp: 30 capsule, Rfl: 0    lidocaine-prilocaine (EMLA) 2.5-2.5 % cream, Apply 1 application  topically to the appropriate area as directed As Needed (apply 60 minutes prior to port access)., Disp: 30 g, Rfl: 2    ondansetron (ZOFRAN) 8 MG tablet, Take 1 tablet by mouth 3 (Three) Times a Day As Needed for Nausea or Vomiting. (Patient taking differently: Administer 1 tablet per J tube 3 (Three) Times a Day As Needed for Nausea or Vomiting.), Disp: 30 tablet, Rfl: 5    oxyCODONE (Roxicodone) 5 MG immediate release tablet, Take 1 tablet by mouth Daily for 15 days., Disp: 15 tablet, Rfl: 0    Allergies   Allergen Reactions    Cephalosporins Anaphylaxis     Throat swelling    Dye  [Contrast Dye (Echo Or Unknown Ct/Mr)] Hives    Iodinated Contrast Media Hives    Sulfa Antibiotics Hives    Sulfate Hives    Zetia [Ezetimibe] Other (See Comments) and Myalgia     Made tired    Statins Myalgia     Myalgia and fatique     Family History   Problem Relation Age of Onset    Arthritis Mother     Hypertension Mother     Heart failure Mother 73        Cause of death    Arthritis Father     Hypertension Father     Kidney cancer Father 57        Cause of death. Was a smoker    Heart disease Brother     Esophageal cancer Brother 59        Cause of death. Has a heavy drinker    Malig Hyperthermia Neg Hx      Cancer-related family history includes Esophageal cancer (age of onset: 59) in his brother; Kidney cancer (age of onset: 57) in his father.    Social  History     Tobacco Use    Smoking status: Former     Current packs/day: 0.00     Average packs/day: 1 pack/day for 37.0 years (37.0 ttl pk-yrs)     Types: Cigars, Cigarettes     Start date: 1985     Quit date: 2022     Years since quittin.2     Passive exposure: Past    Smokeless tobacco: Never    Tobacco comments:     1 packs= stopped 10 years ago and continued cigars   2 cigars a day; has stopped cigars as of    Vaping Use    Vaping status: Never Used   Substance Use Topics    Alcohol use: Not Currently     Comment: Has now been abstinent for about one year    Drug use: Never     Social History     Social History Narrative    Not on file      ROS:     Review of Systems   Constitutional:  Positive for fatigue. Negative for activity change, appetite change, chills, diaphoresis, fever and unexpected weight change.   HENT:  Negative for congestion, dental problem, drooling, ear discharge, ear pain, facial swelling, hearing loss, mouth sores, nosebleeds, postnasal drip, rhinorrhea, sinus pressure, sinus pain, sneezing, sore throat, tinnitus, trouble swallowing and voice change.    Eyes:  Negative for photophobia, pain, discharge, redness, itching and visual disturbance.   Respiratory:  Negative for apnea, cough, choking, chest tightness, shortness of breath, wheezing and stridor.    Cardiovascular:  Negative for chest pain, palpitations and leg swelling.   Gastrointestinal:  Negative for abdominal distention, abdominal pain, anal bleeding, blood in stool, constipation, diarrhea, nausea, rectal pain and vomiting.   Endocrine: Negative for cold intolerance, heat intolerance, polydipsia and polyuria.   Genitourinary:  Negative for decreased urine volume, difficulty urinating, dysuria, flank pain, frequency, genital sores, hematuria and urgency.   Musculoskeletal:  Negative for arthralgias, back pain, gait problem, joint swelling, myalgias, neck pain and neck stiffness.   Skin:  Negative for color change,  "pallor and rash.   Neurological:  Negative for dizziness, tremors, seizures, syncope, facial asymmetry, speech difficulty, weakness, light-headedness, numbness and headaches.   Hematological:  Negative for adenopathy. Does not bruise/bleed easily.   Psychiatric/Behavioral:  Negative for agitation, behavioral problems, confusion, decreased concentration, hallucinations, self-injury, sleep disturbance and suicidal ideas. The patient is nervous/anxious.      Objective:    Vitals:    04/12/24 0955   BP: 100/69   Pulse: 74   Resp: 14   Temp: 97.4 °F (36.3 °C)   SpO2: 99%   Weight: 70.7 kg (155 lb 12.8 oz)   Height: 180.3 cm (71\")   PainSc: 0-No pain       Body mass index is 21.73 kg/m².  ECOG  (0) Fully active, able to carry on all predisease performance without restriction    Physical Exam:     Physical Exam  Constitutional:       General: He is not in acute distress.     Appearance: Normal appearance. He is not ill-appearing, toxic-appearing or diaphoretic.      Comments: Slender, well-built.  Seems older than the stated age.  No distress.   HENT:      Head: Normocephalic and atraumatic.      Right Ear: External ear normal. There is no impacted cerumen.      Left Ear: External ear normal. There is no impacted cerumen.      Nose: Nose normal. No congestion or rhinorrhea.      Mouth/Throat:      Mouth: Mucous membranes are moist.      Pharynx: Oropharynx is clear. No oropharyngeal exudate or posterior oropharyngeal erythema.      Comments: Oral cavity reveals poor dentition and poor dental hygiene.  No mucosal ulcerations are present.  Eyes:      General: No scleral icterus.        Right eye: No discharge.         Left eye: No discharge.      Conjunctiva/sclera: Conjunctivae normal.      Pupils: Pupils are equal, round, and reactive to light.   Neck:      Vascular: No carotid bruit.   Cardiovascular:      Rate and Rhythm: Normal rate and regular rhythm.      Pulses: Normal pulses.      Heart sounds: Normal heart sounds. " No murmur heard.     No friction rub. No gallop.   Pulmonary:      Effort: No respiratory distress.      Breath sounds: No stridor. No wheezing, rhonchi or rales.      Comments: Breath sounds are diminished bilaterally.  Chest:      Chest wall: No tenderness.      Comments: A right thoracic surgical incision is healing well.  Smaller surgical incisions for drains also healing well.  Abdominal:      General: Abdomen is flat. Bowel sounds are normal. There is no distension.      Palpations: Abdomen is soft. There is no mass.      Tenderness: There is no abdominal tenderness. There is no right CVA tenderness, left CVA tenderness, guarding or rebound.      Comments: Jejunostomy in place.  Opening clean.   Musculoskeletal:         General: No swelling, tenderness, deformity or signs of injury.      Cervical back: No rigidity or tenderness.      Right lower leg: No edema.      Left lower leg: No edema.      Comments: There is clubbing of the fingers in both hands   Lymphadenopathy:      Cervical: No cervical adenopathy.   Skin:     General: Skin is warm and dry.      Coloration: Skin is not jaundiced or pale.      Findings: No bruising, erythema or rash.   Neurological:      General: No focal deficit present.      Mental Status: He is alert and oriented to person, place, and time.      Cranial Nerves: No cranial nerve deficit.      Motor: No weakness.      Coordination: Coordination normal.      Gait: Gait normal.   Psychiatric:         Mood and Affect: Mood normal.         Behavior: Behavior normal.         Thought Content: Thought content normal.         Judgment: Judgment normal.     YOSELIN Mueller MD performed a physical exam on 4/12/2024 as documented above.    Lab Results - Last 18 Months   Lab Units 04/12/24  0951 03/22/24  1347 03/08/24  1124   WBC 10*3/mm3 8.17 6.99 8.78   HEMOGLOBIN g/dL 12.3* 11.9* 10.7*   HEMATOCRIT % 38.4 36.6* 33.2*   PLATELETS 10*3/mm3 331 361 415   MCV fL 106.1* 106.7* 101.2*     Lab  Results - Last 18 Months   Lab Units 03/22/24  1347 03/08/24  1124 02/28/24  0954 02/09/24  1631 02/01/24  0657   SODIUM mmol/L 140 137 131*   < > 141   POTASSIUM mmol/L 4.9 4.2 4.4   < > 4.4   CHLORIDE mmol/L 100 100 94*   < > 104   CO2 mmol/L 30.0* 27.3 29.0   < > 29.0   BUN mg/dL 19 16 24*   < > 9   CREATININE mg/dL 0.70* 0.71* 0.67*   < > 0.94   CALCIUM mg/dL 9.7 9.3 9.5   < > 9.6   BILIRUBIN mg/dL  --  0.2 0.2  --  <0.2   ALK PHOS U/L  --  92 160*  --  61   ALT (SGPT) U/L  --  20 38  --  17   AST (SGOT) U/L  --  14 26  --  18   GLUCOSE mg/dL 121* 93 86   < > 69    < > = values in this interval not displayed.     Lab Results   Component Value Date    GLUCOSE 121 (H) 03/22/2024    BUN 19 03/22/2024    CREATININE 0.70 (L) 03/22/2024    EGFRIFNONA 67 02/24/2022    BCR 27.1 (H) 03/22/2024    K 4.9 03/22/2024    CO2 30.0 (H) 03/22/2024    CALCIUM 9.7 03/22/2024    ALBUMIN 3.6 03/08/2024    LABIL2 1.3 09/25/2018    AST 14 03/08/2024    ALT 20 03/08/2024     Lab Results   Component Value Date    FOLATE 4.11 (L) 11/12/2021     Lab Results   Component Value Date    NGHGSOOO46 1,108 (H) 12/22/2023     Uric Acid   Date Value Ref Range Status   12/22/2023 3.9 3.4 - 7.0 mg/dL Final     Lab Results   Component Value Date    SEDRATE 11 04/16/2021     Lab Results   Component Value Date    PSA 0.163 04/06/2021     Assessment & Plan     Assessment:  Adenocarcinoma of the gastroesophageal junction T2N1M0, microsatellite stable, low mutation burden, Her2 positive and PD-L1 expression negative: Completed neoadjuvant concurrent carboplatin and paclitaxel with radiation and underwent Vinicio Shayne type esophagectomy.  Recovering well.  Discussed the use of adjuvant immunotherapy, based on the Checkmate 577 study that demonstrated benefit regardless of PD-L1 expression, with less benefit for PD-L1 negative tumors.  He is now interested in pursuing treatment.  Discussed side effects and potential complications of the treatment.  The  rationale of the procedures was discussed.  Reviewed all laboratory exams and discussed with him and with his spouse.  3.  Tobacco smoker: Remains abstinent.  Encouraged him to remain so.  3.  He will return to see me in approximately 5 weeks.    Plan:  As above.    Oliverio Mueller MD on 4/12/2024 at 10:35 AM.

## 2024-04-11 ENCOUNTER — TELEPHONE (OUTPATIENT)
Dept: SURGERY | Facility: CLINIC | Age: 54
End: 2024-04-11
Payer: COMMERCIAL

## 2024-04-11 NOTE — OP NOTE
Operative Note     Date of procedure: 4/11/2024     Patient name: Jourdan Lebron  MRN: [id number]    Pre OP diagnosis:  Esophageal dysphagia.   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  History of esophagogastric anastomotic leak.  History of sepsis.   History of abdominal wall cellulitis.  Dysphagia.  History of cerebrovascular accident.  History of tobacco abuse.  History of left adrenalectomy.  History of adrenal crisis.  Cervical radiculopathy.  Cervical stenosis of spinal canal.  Near syncope.  Generalized hyperhidrosis.  Renal calculus.  Abnormal electrocardiogram.  Vitamin B12 deficiency.  Seasonal allergic rhinitis.  Erythrocytosis.  Macrocytosis.  Generalized anxiety disorder.  Major depressive disorder.    Post OP diagnosis:  Gastroesophageal anastomotic stricture.   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  History of esophagogastric anastomotic leak.  History of sepsis.   History of abdominal wall cellulitis.  Dysphagia.  History of cerebrovascular accident.  History of tobacco abuse.  History of left adrenalectomy.  History of adrenal crisis.  Cervical radiculopathy.  Cervical stenosis of spinal canal.  Near syncope.  Generalized hyperhidrosis.  Renal calculus.  Abnormal electrocardiogram.  Vitamin B12 deficiency.  Seasonal allergic rhinitis.  Erythrocytosis.  Macrocytosis.  Generalized anxiety disorder.  Major depressive disorder.    Procedure performed:   Flexible esophagogastroduodenoscopy.  CRE balloon dilation of the esophageal stricture.  Intraoperative esophagram.  Interpretation of fluoroscopy images.    Indications:   Jourdan Lebron is a 53-year-old male who has significant medical problems as mentioned in the medical chart.      He was having intermittent dysphagia for over a year which became progressively worse.  On 10/2/2023, he  underwent EGD and colonoscopy by Dr. Ozzy Abdalla at [facility].  He was found to have a 6 cm mass at the lower third of the esophagus (36 to 42 cm) (biopsied), mild diverticulosis of the sigmoid colon, 5 polyps noted in the colon and cecum that were removed.  The pathology of the esophageal mass revealed invasive moderate to poorly differentiated adenocarcinoma. All polypectomy samples were negative for malignancy (fragments of tubular adenoma).  CT scan of the chest, abdomen and pelvis on 10/9/2023 at [facility] showed 4 cm abnormal thickening of the lower thoracic esophagus extending to the GE junction thought to represent patient's known primary lung malignancy.  There were no convincing evidence of metastatic disease elsewhere in the chest, abdomen, pelvis, or skeleton.  There were stable left adrenalectomy changes, right adrenal adenoma unchanged.     He was seen in the office by Dr. Tacos Osuna ([facility]) for new diagnosis of esophageal cancer. He was an established patient of Dr. Harinder Mueller for erythrocytosis and macrocytosis since 11/2021 (resolved).      PET/CT on 10/20/2023 at [facility] showed hypermetabolic (SUV 7.7) activity within the distal esophagus extending to the GE junction compatible with patient's known malignancy.  He then underwent EGD with EUS by Dr. Joselyn Savage on 10/27/2023. Findings included close to 6 cm ulcerated mass extending from 36 to 42 cm consistent with poorly differentiated adenocarcinoma.  Mass penetrated through muscularis propria without involving the adventitia consistent with T2 lesion. Two small round hypodense lymph nodes in close proximity to the mass measuring 5 and 6 mm concerning for malignancy. Other lymph nodes around the GE junction measuring 7 and 8 mm were also concerning for malignancy but immediate cytology was negative for malignancy. Pathology of the gastrohepatic lymph node was positive for metastatic adenocarcinoma;  however, the lymph node at 36 cm was negative for malignancy.     He was referred to thoracic surgery for further evaluation.  At the time of initial consultation, he could eat and swallow quite a bit, but it depends. He avoids a lot of bread because it gets stuck lower down his chest. He went down from 200 pounds to 170 pounds 3 years ago after he had a stroke. He had memory loss and general fatigue after the stroke. He lost his appetite but began to add some weight. He then noticed he gets bloated and was having dyspepsia. He had adrenalectomy performed at the [facility] after an adrenal biopsy was done on an adrenal mass, and he went into adrenal crisis. He took hydrocortisone for 1 year and discontinued it on 09/2022. He developed abdominal discomfort afterwards. He denies having any past chest surgeries or myocardial infarction.  His wife mentioned the cause of the previous stroke was never known despite having a complete work up and a loop recorder inserted which was removed in the spring. He denies any abnormal heart rhythms or pedal edema. The patient quit smoking cigarettes 8 to 9 years ago and started smoking cigars.      He had good functional status.  Based on the clinical presentation, he was considered a candidate for trimodality treatment. I placed Mediport on 11/13/2023.  He received concurrent chemoradiation therapy.  His last dose of chemotherapy and radiation was on 12/28/2023. He tolerated chemoradiation without much difficulty.  Restaging PET/CT scan on 2/2/2024 showed wall thickening involving the distal esophagus extending to the GE junction consistent with patient's known esophageal malignancy.  There was no evidence of distant metastases.  There was significant decreased uptake in the distal esophagus consistent with treatment effect.     Due to his history of stroke, ultrasound bilateral carotid were obtained which showed no significant carotid stenosis.  There was  incidentally noted right thyroid nodule for which follow-up ultrasound of the thyroid was done which showed multiple nodules but not concerning for malignancy.  Transthoracic echo was performed on 1/22/2024 and noted ejection fraction of 51 to 55%.  He was sent to Dr. Hayward for cardiology clearance.  He had 0.8% risk of myocardial infarction or cardiac arrest, intraoperatively or up to 30 days postoperatively.  No further workup was recommended.  He had a history of adrenal crisis and was sent to evaluation by his primary endocrinologist at [facility], Anabela Crenshaw MD.  Complete ACTH stimulation test was performed which was reported normal.     On 2/9/2024, he underwent surgical resection. Flexible esophagogastroduodenoscopy was notable for mucosal changes at the distal esophagus and Olivera's esophagus.  These findings were consistent with treatment effect. Rather than a gastric emptying procedure, I performed a balloon dilatation of the pylorus to 20 mm with a CRE balloon I performed a robot-assisted minimally-invasive Vinicio Shayne esophagectomy via a robot-assisted laparoscopic and robot assisted right thoracoscopic approach converted to thoracotomy. I constructed a 4 cm gastric tube and performed an esophagogastrostomy approximately 2 cm above the azygos vein using a 28 EEA stapler.  After firing the stapler, 1 complete esophageal anastomotic ring and a partial gastric anastomotic ring was noted. The anastomosis was checked under water with air insufflation and leak was identified which was oversewn. The anastomosis was partially imbricated and no leak was identified under water with air insufflation afterwards. A feeding jejunostomy tube was placed 40 cm distal to ligament of Treitz. Repeat endoscopy was notable for a widely patent anastomosis at 22-24 cm.  The conduit was healthy appearing.  It was mildly tortuous and non redundant.  He remained hemodynamically stable throughout the  case.  He received 4000 cc of crystalloid and made 1000 cc of urine.  He required small doses of phenylephrine throughout the case.  He was extubated at the end of procedure.       The pathology reported scattered foci of residual moderate to poorly differentiated adenocarcinoma occurring at the GE junction with changes consistent with prior therapy.  There were scattered individual tumor cells and small clusters near GE junction and extending through muscularis propria into adventitia spanning an area of 2.1 x 1.8 x 0.7 cm.  There is no lymphovascular space invasion.  The proximal and distal surgical margins were negative for malignancy.  20 lymph nodes were negative for metastatic carcinoma. There was also presence of low and high-grade squamous dysplasia.  He had near complete response to chemo and radiation therapy.     Postprocedure he was transferred to the ICU.  His initial postop care was unremarkable.  He was started on tube feeds and was slowly advanced to goal rate which he tolerated well.  He demonstrated return of bowel function.  The chest tube was removed after amylase from the drain was checked which was not concerning for esophageal leak.  His white blood count were slowly trending up and had mild leukocytosis.  This was concerning for unidentified infection.  He had mild cellulitis around the jejunostomy tube site.  He was started on empiric antibiotics.  Infectious disease was consulted.  CT scan of the chest abdomen pelvis without contrast did not reveal any clear source of infection.  I offered EGD to evaluate for anastomotic leak and conduit.     On 2/17/2024, he underwent EGD.  He had oropharyngeal candidiasis.  There was approximately 20% defect at the anastomotic ring likely contained by the reinforcing stitch placed intraoperatively.  There was fibrinous plaque covering the anastomosis.  Intraoperative esophagram revealed blind pouch anteriorly at the site of anastomotic defect.  The  gastric conduit appeared viable but was slightly redundant.  The conduit orientation was straight.  I placed a 18 mm X 25 mm X 103 mm fully covered Wallflex stent deployed at the anastomosis.  He tolerated the procedure well.     Esophagram performed on 2/22/2024 and was negative for leak, but did demonstrate retrograde flow of contrast along the distal aspect of the stent making him high risk for anastomotic leak with oral feeds.  He was kept nothing by mouth and discharged home on tube feeds.     He came to clinic for follow-up visit.      He has experienced several episodes of hypoglycemia, prompting a consultation with a dietitian who advised him to increase his protein intake. He reported an incident of hypoglycemia following consumption of carrots, green beans, banana pudding, and cherry soda-pop, which resulted in a drop in her blood glucose to 55 mg/dL. He has been monitoring her blood glucose levels and has found Tylenol to be ineffective in managing her symptoms.     He has been experiencing dysphagia, often with food becoming lodged during meals. This has led him to reduce her food intake. Despite not having consumed any breakfast today or yesterday, she has experienced some weight gain. He reports mild soreness at the incision site, which she attributes to her pain medication. He has been managing her pain with oxycodone, taking it only at night, and taking it every other day. He has recently started swallowing acetaminophen tablets.      I offered EGD to evaluate for anastomotic stricture and possible dilation.  The risk and benefit of the procedure were discussed in detail.  He agreed and signed the consent form.    Surgeon: Saurabh Hurtado MD     Anesthesia: General Anesthesia    ASA Class: 3    Procedure Details   On 4/11/2024, the patient was brought to the endoscopy suite. Jourdan Lebron was positioned in the supine position.  General anesthesia was provided by anesthesiologist.  He was intubated with  a single-lumen endotracheal tube.  There was no evidence of aspiration at the time of intubation.  A bite-block was placed.  Antibiotics would not indicated for EGD. Prior to beginning the procedure, a time-out was conducted during which all members of the surgical team were present.      I began by performing a flexible esophagogastroduodenoscopy.  The cricopharyngeus was located at 16 cm.  The cervical esophagus appeared normal. The esophagogastric anastomosis was located at 22-24 cm. There was severe narrowing at the anastomosis and I was unable to traverse the stricture. I passed the CRE™ balloon dilator through the working channel of the endoscope. The endoscope was retracted and the balloon dilator was parked at the anastomotic stricture.  The manufacture pressure guidelines were used to achieve desired dilation diameter.  The stenosis was serially dilated up to 18 mm starting from 8 mm.  Sustained pressures were maintained for a minute with each dilation to allow breaking the scar tissue.  The balloon dilator was deflated and retracted.  The area of concern was re-examined.  There was no evidence of deep injury or perforation.  I continued with examination of the conduit.  The gastric conduit appeared viable distal to the anastomosis.  The diaphragmatic pinch was located at 40 cm from the incisor.  The antrum was below the diaphragm.  The pylorus was located at 45 cm.  It was patent and the adult endoscope was advanced without difficulty.  The first and second part of the duodenum appeared normal. I injected 50 cc of Isovue water-soluble contrast at the anastomosis.  The contrast esophagram failed to reveal anastomotic defect on the two-dimensional fluoroscopy image.     The patient was extubated and was transported to the post-anesthesia care unit in stable condition.    Findings:  Esophagogastric anastomosis located at 24 cm.  Esophageal stricture up to ~ 8 mm.  Serial CRE balloon dilation of the  anastomotic stricture from 8 mm up to 18 mm.  Superficial mucosal bleeding. No evidence of deep tissue injury after dilation.  Intraoperative esophagram failed to reveal contrast extravasation on two-dimensional imaging.  The gastric conduit appeared viable.  The conduit orientation was straight and had mild redundancy.  The diaphragmatic pinch was at 40 cm.  The infradiaphragmatic antrum was normal.  The pylorus located at 45 cm.  It was patent and allowed adult endoscope to pass without difficulty.    Estimated Blood Loss:  None           Specimens: None           Complications: None           Disposition: PACU - hemodynamically stable.           Condition: Stable    Post-procedure recommendations:   Follow up in a month for repeat EGD and dilation.      Saurabh Hurtado MD   Thoracic Surgeon  [facility]

## 2024-04-12 ENCOUNTER — LAB (OUTPATIENT)
Dept: LAB | Facility: HOSPITAL | Age: 54
End: 2024-04-12
Payer: COMMERCIAL

## 2024-04-12 ENCOUNTER — OFFICE VISIT (OUTPATIENT)
Dept: ONCOLOGY | Facility: CLINIC | Age: 54
End: 2024-04-12
Payer: COMMERCIAL

## 2024-04-12 VITALS
DIASTOLIC BLOOD PRESSURE: 69 MMHG | WEIGHT: 155.8 LBS | HEIGHT: 71 IN | OXYGEN SATURATION: 99 % | HEART RATE: 74 BPM | BODY MASS INDEX: 21.81 KG/M2 | TEMPERATURE: 97.4 F | SYSTOLIC BLOOD PRESSURE: 100 MMHG | RESPIRATION RATE: 14 BRPM

## 2024-04-12 DIAGNOSIS — C15.5 MALIGNANT NEOPLASM OF LOWER THIRD OF ESOPHAGUS: Primary | ICD-10-CM

## 2024-04-12 LAB
ALBUMIN SERPL-MCNC: 4 G/DL (ref 3.5–5.2)
ALBUMIN/GLOB SERPL: 1.5 G/DL
ALP SERPL-CCNC: 81 U/L (ref 39–117)
ALT SERPL W P-5'-P-CCNC: 39 U/L (ref 1–41)
ANION GAP SERPL CALCULATED.3IONS-SCNC: 6 MMOL/L (ref 5–15)
AST SERPL-CCNC: 28 U/L (ref 1–40)
BASOPHILS # BLD AUTO: 0.02 10*3/MM3 (ref 0–0.2)
BASOPHILS NFR BLD AUTO: 0.2 % (ref 0–1.5)
BILIRUB SERPL-MCNC: <0.2 MG/DL (ref 0–1.2)
BUN SERPL-MCNC: 19 MG/DL (ref 6–20)
BUN/CREAT SERPL: 28.8 (ref 7–25)
CALCIUM SPEC-SCNC: 9.5 MG/DL (ref 8.6–10.5)
CHLORIDE SERPL-SCNC: 95 MMOL/L (ref 98–107)
CO2 SERPL-SCNC: 32 MMOL/L (ref 22–29)
CREAT SERPL-MCNC: 0.66 MG/DL (ref 0.76–1.27)
DEPRECATED RDW RBC AUTO: 46.5 FL (ref 37–54)
EGFRCR SERPLBLD CKD-EPI 2021: 113.6 ML/MIN/1.73
EOSINOPHIL # BLD AUTO: 0.37 10*3/MM3 (ref 0–0.4)
EOSINOPHIL NFR BLD AUTO: 4.5 % (ref 0.3–6.2)
ERYTHROCYTE [DISTWIDTH] IN BLOOD BY AUTOMATED COUNT: 12.4 % (ref 12.3–15.4)
GLOBULIN UR ELPH-MCNC: 2.7 GM/DL
GLUCOSE SERPL-MCNC: 104 MG/DL (ref 65–99)
HCT VFR BLD AUTO: 38.4 % (ref 37.5–51)
HGB BLD-MCNC: 12.3 G/DL (ref 13–17.7)
HOLD SPECIMEN: NORMAL
LYMPHOCYTES # BLD AUTO: 0.9 10*3/MM3 (ref 0.7–3.1)
LYMPHOCYTES NFR BLD AUTO: 11 % (ref 19.6–45.3)
MCH RBC QN AUTO: 34 PG (ref 26.6–33)
MCHC RBC AUTO-ENTMCNC: 32 G/DL (ref 31.5–35.7)
MCV RBC AUTO: 106.1 FL (ref 79–97)
MONOCYTES # BLD AUTO: 0.98 10*3/MM3 (ref 0.1–0.9)
MONOCYTES NFR BLD AUTO: 12 % (ref 5–12)
NEUTROPHILS NFR BLD AUTO: 5.9 10*3/MM3 (ref 1.7–7)
NEUTROPHILS NFR BLD AUTO: 72.3 % (ref 42.7–76)
PLATELET # BLD AUTO: 331 10*3/MM3 (ref 140–450)
PMV BLD AUTO: 8 FL (ref 6–12)
POTASSIUM SERPL-SCNC: 4.5 MMOL/L (ref 3.5–5.2)
PROT SERPL-MCNC: 6.7 G/DL (ref 6–8.5)
RBC # BLD AUTO: 3.62 10*6/MM3 (ref 4.14–5.8)
SODIUM SERPL-SCNC: 133 MMOL/L (ref 136–145)
WBC NRBC COR # BLD AUTO: 8.17 10*3/MM3 (ref 3.4–10.8)

## 2024-04-12 PROCEDURE — 85025 COMPLETE CBC W/AUTO DIFF WBC: CPT

## 2024-04-12 PROCEDURE — 36415 COLL VENOUS BLD VENIPUNCTURE: CPT

## 2024-04-12 PROCEDURE — 80053 COMPREHEN METABOLIC PANEL: CPT | Performed by: INTERNAL MEDICINE

## 2024-04-12 NOTE — PROGRESS NOTES
Glucose was 104 so continue to watch carbs and increase walking the anemia is improving.  Call if any other questions or concerns ABG - pH=7.16 & ZmX38=81 mmHg ABG- pH=7.18 & PaC02= 80mmHg

## 2024-04-12 NOTE — ADDENDUM NOTE
Addended by: CLEVELAND ASHTON on: 4/12/2024 10:42 AM     Modules accepted: Orders     PATIENT NAME: Bharat Fischer     YOB: 1962     TODAY'S DATE: 5/23/2019    Reason for Visit:  Soft tissue mass of the left lower leg    Requesting Physician:  Dr. Altagracia Huntley:              The patient is a 49 y.o. female with a PMHx as delineated below who presents after recently noting a soft tissue mass of the right lower leg. She denies any pain. Noted this when placing lotion on her leg. She denies erythema or recent bruising.  This   Chief Complaint   Patient presents with    Surgical Consult     Mass of left lower leg    Establish Care       REVIEW OF SYSTEMS:  CONSTITUTIONAL:  negative  HEENT:  negative  RESPIRATORY:  negative  CARDIOVASCULAR:  negative  GASTROINTESTINAL:  negative  GENITOURINARY:  negative  HEMATOLOGIC/LYMPHATIC:  negative  NEUROLOGICAL:  negative    PMH  Past Medical History:   Diagnosis Date    Allergic rhinitis     Iron deficiency     Iron deficiency anemia 9/26/2014    Uterine fibroid     pelvic ultrasound 2014    Vitamin D deficiency 1/21/2019       [facility]  Past Surgical History:   Procedure Laterality Date    BUNIONECTOMY Right 12/01/2016    COLONOSCOPY  6/19/15    HEMORRHOID SURGERY  1997       Social History  Social History     Socioeconomic History    Marital status:      Spouse name: Not on file    Number of children: Not on file    Years of education: Not on file    Highest education level: Not on file   Occupational History    Not on file   Social Needs    Financial resource strain: Not on file    Food insecurity:     Worry: Not on file     Inability: Not on file    Transportation needs:     Medical: Not on file     Non-medical: Not on file   Tobacco Use    Smoking status: Never Smoker    Smokeless tobacco: Never Used   Substance and Sexual Activity    Alcohol use: Yes     Comment: 2 drinks per month    Drug use: No    Sexual activity: Yes     Partners: Male   Lifestyle    Physical activity:     Days per week: vague soft tissue mass of the left lower leg. This appears to be decreasing in size. This does not feel like a lipoma or sebaceous cyst. I am suspicious of a resolving hematoma. We will follow this for now. She will return to the office if she not any change in this lesion.      Johnnie Estrada

## 2024-04-16 ENCOUNTER — OFFICE VISIT (OUTPATIENT)
Dept: SURGERY | Facility: CLINIC | Age: 54
End: 2024-04-16
Payer: COMMERCIAL

## 2024-04-16 ENCOUNTER — PATIENT OUTREACH (OUTPATIENT)
Dept: ONCOLOGY | Facility: CLINIC | Age: 54
End: 2024-04-16
Payer: COMMERCIAL

## 2024-04-16 ENCOUNTER — PREP FOR SURGERY (OUTPATIENT)
Dept: OTHER | Facility: HOSPITAL | Age: 54
End: 2024-04-16
Payer: COMMERCIAL

## 2024-04-16 VITALS
WEIGHT: 155.6 LBS | HEIGHT: 71 IN | BODY MASS INDEX: 21.78 KG/M2 | HEART RATE: 80 BPM | SYSTOLIC BLOOD PRESSURE: 116 MMHG | OXYGEN SATURATION: 99 % | DIASTOLIC BLOOD PRESSURE: 60 MMHG

## 2024-04-16 DIAGNOSIS — C15.5 MALIGNANT NEOPLASM OF LOWER THIRD OF ESOPHAGUS: Primary | ICD-10-CM

## 2024-04-16 NOTE — PROGRESS NOTES
TREATMENT  PREPARATION    Jourdan Lebron  [id number]  1970    Chief Complaint: Treatment preparation and needs assessment    History of present illness:  Jourdan Lebron is a 53 y.o. year old male who is here today for treatment preparation and needs assessment.  The patient has been diagnosed with   Encounter Diagnoses   Name Primary?    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 Yes    Encounter for education     and is scheduled to begin treatment with:     Oncology History:    Oncology/Hematology History   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   11/6/2023 Initial Diagnosis    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     11/27/2023 -  Chemotherapy    OP GE PACLitaxel / CARBOplatin (weekly) + XRT     4/19/2024 Biopsy    OP GE Nivolumab  Plan Provider: Oliverio Mueller MD  Treatment goal: Curative  Line of treatment: First Line         The current medication list and allergy list were reviewed and reconciled.     Past Medical History, Past Surgical History, Social History, Family History have been reviewed and are without significant changes except as mentioned.    Physical Exam:    Vitals:    04/17/24 0824   BP: 99/62   Pulse: 75   SpO2: 98%     Vitals:    04/17/24 0824   PainSc: 0-No pain        ECOG score: 1             Physical Exam  Vitals reviewed.   Constitutional:       General: He is not in acute distress.     Appearance: Normal appearance.   HENT:      Head: Normocephalic and atraumatic.   Eyes:      Extraocular Movements: Extraocular movements intact.   Pulmonary:      Effort: Pulmonary effort is normal. No respiratory distress.   Musculoskeletal:         General: Normal range of motion.      Cervical back: Normal range of motion.   Skin:     General: Skin is warm.   Neurological:      General: No focal deficit present.      Mental Status: He is alert and oriented to person, place, and time.   Psychiatric:         Mood  and Affect: Mood normal.         Behavior: Behavior normal.           NEEDS ASSESSMENTS    Genetics  The patient's new diagnosis and family history have been reviewed for genetic counseling needs. The patient will not be referred..     Psychosocial and Barriers to care  The patient has completed a PHQ-9 Depression Screening and the Distress Thermometer (DT) today.  PHQ-9 results show PHQ-2 Total Score: 0 PHQ-9 Total Score: PHQ-9 Total Score: 0    Results were reviewed along with psychosocial resources offered by our cancer center.  Our Supportive Oncology team will be flagged for a score of 4 or above, and a same day call will be made for a score of 9 or 10.  A mental health referral is offered at that time. Patients who score less than 4 have been educated on our support services and can be referred to our  upon request.  The patient will not be referred to our .       Nutrition   Patients beginning at risk treatment regimens or who have dietary concerns will also be referred to our oncology dietitian. The patient will be referred. Established patient.     Functional Assessment  Persons who are age 70 or greater will be screened for qualification of a comprehensive geriatric assessment by our survivorship nurse practitioner.  Older adults with cancer face unique challenges. These may include an increased risk of drug reactions, financial burdens, and caregiver stress. The patient scored   . Patients scoring 14 or lower will referred for an older adult functional assessment with the survivorship advanced practice registered nurse to ensure all needed support is provided as patients plan for their treatments. NOT APPLICABLE    Intravenous Access Assessment  The patient and I discussed planned intravenous chemo/biotherapy as well as other IV treatments that are often needed throughout the course of treatment. These may include, but are not limited to blood transfusions, antibiotics, and IV  "hydration. Discussed that depending on selected treatment and vein assessment, patient may require venous access device (VAD) which could include but not limited to a Mediport or PICC line. Risks and benefits of VADs reviewed. The patient will be treated via Port.    Reproductive/Sexual Activity   People should avoid becoming pregnant and should not get a partner pregnant while undergoing chemo/biotherapy.  People of childbearing age should use effective contraception during active therapy. The best recommendation for all people is to use a barrier method for a minimum of 1 week after the last infusion of chemo/biotherapy to prevent your partner being exposed to byproducts from treatment medications in bodily fluids. Effective contraception should be discussed with your oncology team to make sure it is safe to take based on your diagnosis. Possible options include oral contraceptives, barrier methods. Chemo/biotherapy can change your ability to reproduce children in the future.  There are options for fertility preservation. NOT APPLICABLE    Advanced Care Planning  Advance Care Planning   The patient and I discussed advanced care planning, \"Conversations that Matter\".   This service is offered for development of advance directives with a certified ACP facilitator.  The patient does not have an up-to-date advanced directive. This document is not on file with our office. The patient is not interested in an appointment with one of our facilitators to create or update their advanced directives.               Smoking cessation  Tobacco Use: Medium Risk (4/17/2024)    Patient History     Smoking Tobacco Use: Former     Smokeless Tobacco Use: Never     Passive Exposure: Past       Patient and I discussed their tobacco use history. Referral will not be made for smoking cessation.      Palliative Care  When appropriate, the patient and I discussed the availability palliative care services and when appropriate Hospice care. " Palliative care is not the same as Hospice care which was explained to the patient.NOT APPLICABLE.    Survivorship   When appropriate, we discussed that we will refer the patient to survivorship clinic to discuss next steps following completion of planned treatment.  Reviewed this visit will include assessment of your physical, psychological, functional, and spiritual needs as a survivor and the need at attend this visit when scheduled.    TREATMENT EDUCATION    Today I met with the patient to discuss the chemo/biotherapy regimen recommended for treatment of 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  - ondansetron (ZOFRAN) 8 MG tablet    Encounter for education  .  The patient was given explanation of treatment premed side effects including office policy that prohibits patients to drive if sedating medications are administered, MD explanation given regarding benefits, side effects, toxicities and goals of treatment.  The patient received a Chemotherapy/Biotherapy Plan Summary including diagnosis and explanation of specific treatment plan.    SIDE EFFECTS:  Common side effects were discussed with the patient and/or significant other.  Discussion included where applicable hair loss/discoloration, anemia/fatigue, infection/chills/fever, appetite, bleeding risk/precautions, constipation, diarrhea, mouth sores, taste alteration, loss of appetite, nausea/vomiting, peripheral neuropathy, skin/nail changes, rash, muscle aches/weakness, photosensitivity, weight gain/loss, hearing loss, dizziness, menopausal symptoms, menstrual irregularity, sterility, high blood pressure, heart damage, liver damage, lung damage, kidney damage, DVT/PE risk, fluid retention, pleural/pericardial effusion, somnolence, electrolyte/LFT imbalance, vein exercises and/or the possible need for vascular access/port placement.  The patient was advised that although uncommon, leakage of an infused medication from the vein or  venous access device may lead to skin breakdown and/or other tissue damage.  The patient was advised that he/she may have pain, bleeding, and/or bruising from the insertion of a needle in their vein or venous access device (port).  The patient was further advised that, in spite of proper technique, infection with redness and irritation may rarely occur at the site where the needle was inserted.  The patient was advised that if complications occur, additional medical treatment is available.  Finally, where applicable we have reviewed rare but potential immune mediated side effects including shortness of breath, cough, chest pain (pneumonitis), abdominal pain, diarrhea (colitis), thyroiditis (hypothyroid or hyperthyroid), hepatitis and liver dysfunction, nephritis and renal dysfunction.    Discussion also included side effects specific to drugs in the treatment plan, specifically:    Treatment Plans       Name Type Hold Status Plan Dates Plan Provider       Active    OP GE PACLitaxel / CARBOplatin (weekly) + XRT ONCOLOGY TREATMENT On Automatic Hold  11/26/2023 - Present Oliverio Mueller MD    OP GE Nivolumab ONCOLOGY TREATMENT 2 Not on Hold  4/18/2024 - Present Oliverio Mueller MD                     Questions answered and additional information discussed on topics including:  Nutrition and appetite changes, Diarrhea, Nausea & vomiting, Intimate activity, Nervous system changes, Skin & nail changes, Organ toxicities, Survivorship, Home care, and immune-mediated toxicities        Assessment and Plan:    Diagnoses and all orders for this visit:    1. 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 (Primary)  -     ondansetron (ZOFRAN) 8 MG tablet; Take 1 tablet by mouth 3 (Three) Times a Day As Needed for Nausea or Vomiting.  Dispense: 30 tablet; Refill: 5    2. Encounter for education      No orders of the defined types were placed in this encounter.        The patient and I have reviewed their  diagnosis and scheduled treatment plan. Needs assessment was completed where applicable including genetics, psychosocial needs, barriers to care, VAD evaluation, advanced care planning, survivorship, and palliative care services where indicated. Referrals have been ordered as appropriate based upon evaluation today and patient desires.   Chemo/biotherapy teaching was completed today and consent obtained. See separate documentation for further details.  Adequate time was given to answer questions.  Patient made aware of their care team members and contact information if they have questions or problems throughout the treatment course.  Discussion held and written information provided describing frequency of office visits and ongoing monitoring throughout the treatment plan.     Reviewed with patient any prescribed medication sent to pharmacy.  Education provided regarding proper storage, safe handling, and proper disposal of unused medication.  Proper handling of body fluids and waste discussed and written information provided.  If appropriate, patient had pretreatment labs drawn today.    Learning assessment completed at initial patient encounter. See separate flowsheet. Chemo/biotherapy education comprehension assessed at today's visit.    I spent 40 minutes caring for Jourdan on this date of service. This time includes time spent by me in the following activities: preparing for the visit, obtaining and/or reviewing a separately obtained history, performing a medically appropriate examination and/or evaluation, counseling and educating the patient/family/caregiver, ordering medications, tests, or procedures, and documenting information in the medical record.     Pam Mireles, APRN   04/17/24

## 2024-04-16 NOTE — SIGNIFICANT NOTE
I accompanied patient to Dr. Hurtado's office visit.     Dr. Hurtado reviewed current ability to eat and drink. Patient still doing 18 hours a day feeds. Able to eat a little better since EDG. Will schedule another one for 4 weeks. Dr. Hurtado wants patient to be off tube feeds in 3 weeks. Chandler has been consulted. All questions answered and patient has my number for any questions or concerns.

## 2024-04-17 ENCOUNTER — TELEPHONE (OUTPATIENT)
Dept: SURGERY | Facility: CLINIC | Age: 54
End: 2024-04-17
Payer: COMMERCIAL

## 2024-04-17 ENCOUNTER — OFFICE VISIT (OUTPATIENT)
Dept: ONCOLOGY | Facility: CLINIC | Age: 54
End: 2024-04-17
Payer: COMMERCIAL

## 2024-04-17 VITALS
HEART RATE: 75 BPM | BODY MASS INDEX: 21.95 KG/M2 | SYSTOLIC BLOOD PRESSURE: 99 MMHG | DIASTOLIC BLOOD PRESSURE: 62 MMHG | OXYGEN SATURATION: 98 % | WEIGHT: 156.8 LBS | HEIGHT: 71 IN

## 2024-04-17 DIAGNOSIS — Z71.9 ENCOUNTER FOR EDUCATION: ICD-10-CM

## 2024-04-17 DIAGNOSIS — C77.2 ADENOCARCINOMA OF ESOPHAGUS METASTATIC TO INTRA-ABDOMINAL LYMPH NODE: Primary | ICD-10-CM

## 2024-04-17 DIAGNOSIS — C15.9 ADENOCARCINOMA OF ESOPHAGUS METASTATIC TO INTRA-ABDOMINAL LYMPH NODE: Primary | ICD-10-CM

## 2024-04-17 RX ORDER — ONDANSETRON HYDROCHLORIDE 8 MG/1
8 TABLET, FILM COATED ORAL 3 TIMES DAILY PRN
Qty: 30 TABLET | Refills: 5 | Status: SHIPPED | OUTPATIENT
Start: 2024-04-17

## 2024-04-17 NOTE — TELEPHONE ENCOUNTER
Called patient regarding procedure scheduled for 5/7/24 Patient was instructed to hold 81mg aspirin starting 5/2/24. Patient was given arrival time of 10:30am. Patient was agreeable and stated understanding

## 2024-04-18 ENCOUNTER — HOSPITAL ENCOUNTER (OUTPATIENT)
Dept: ONCOLOGY | Facility: HOSPITAL | Age: 54
Discharge: HOME | End: 2024-04-18
Payer: COMMERCIAL

## 2024-04-18 ENCOUNTER — TELEPHONE (OUTPATIENT)
Dept: ONCOLOGY | Facility: CLINIC | Age: 54
End: 2024-04-18
Payer: COMMERCIAL

## 2024-04-18 VITALS
BODY MASS INDEX: 21.91 KG/M2 | OXYGEN SATURATION: 99 % | WEIGHT: 156.5 LBS | SYSTOLIC BLOOD PRESSURE: 113 MMHG | HEART RATE: 77 BPM | HEIGHT: 71 IN | TEMPERATURE: 97.9 F | RESPIRATION RATE: 14 BRPM | DIASTOLIC BLOOD PRESSURE: 66 MMHG

## 2024-04-18 DIAGNOSIS — C15.9 ADENOCARCINOMA OF ESOPHAGUS METASTATIC TO INTRA-ABDOMINAL LYMPH NODE: Primary | ICD-10-CM

## 2024-04-18 DIAGNOSIS — C15.9 MALIGNANT NEOPLASM OF ESOPHAGUS, UNSPECIFIED LOCATION: ICD-10-CM

## 2024-04-18 DIAGNOSIS — C15.5 MALIGNANT NEOPLASM OF LOWER THIRD OF ESOPHAGUS: ICD-10-CM

## 2024-04-18 DIAGNOSIS — Z45.2 ENCOUNTER FOR CARE RELATED TO PORT-A-CATH: ICD-10-CM

## 2024-04-18 DIAGNOSIS — C77.2 ADENOCARCINOMA OF ESOPHAGUS METASTATIC TO INTRA-ABDOMINAL LYMPH NODE: Primary | ICD-10-CM

## 2024-04-18 LAB
ALP BLD-CCNC: 77 U/L (ref 53–128)
BASOPHILS # BLD AUTO: 0.02 10*3/MM3 (ref 0–0.2)
BASOPHILS NFR BLD AUTO: 0.2 % (ref 0–1.5)
BUN BLDA-MCNC: 16 MG/DL (ref 7–22)
CALCIUM BLD QL: 9.8 MG/DL (ref 8–10.3)
CHLORIDE BLDA-SCNC: 103 MMOL/L (ref 98–108)
CO2 BLDA-SCNC: 29 MMOL/L (ref 18–33)
CREAT BLDA-MCNC: 0.7 MG/DL (ref 0.6–1.2)
DEPRECATED RDW RBC AUTO: 45 FL (ref 37–54)
EGFRCR SERPLBLD CKD-EPI 2021: 111.6 ML/MIN/1.73
EOSINOPHIL # BLD AUTO: 0.29 10*3/MM3 (ref 0–0.4)
EOSINOPHIL NFR BLD AUTO: 2.8 % (ref 0.3–6.2)
ERYTHROCYTE [DISTWIDTH] IN BLOOD BY AUTOMATED COUNT: 12.3 % (ref 12.3–15.4)
GLUCOSE BLDC GLUCOMTR-MCNC: 89 MG/DL (ref 73–118)
HCT VFR BLD AUTO: 36.4 % (ref 37.5–51)
HGB BLD-MCNC: 11.7 G/DL (ref 13–17.7)
LYMPHOCYTES # BLD AUTO: 0.92 10*3/MM3 (ref 0.7–3.1)
LYMPHOCYTES NFR BLD AUTO: 8.9 % (ref 19.6–45.3)
MCH RBC QN AUTO: 33.5 PG (ref 26.6–33)
MCHC RBC AUTO-ENTMCNC: 32.1 G/DL (ref 31.5–35.7)
MCV RBC AUTO: 104.3 FL (ref 79–97)
MONOCYTES # BLD AUTO: 1.09 10*3/MM3 (ref 0.1–0.9)
MONOCYTES NFR BLD AUTO: 10.5 % (ref 5–12)
NEUTROPHILS NFR BLD AUTO: 77.6 % (ref 42.7–76)
NEUTROPHILS NFR BLD AUTO: 8.03 10*3/MM3 (ref 1.7–7)
PLATELET # BLD AUTO: 384 10*3/MM3 (ref 140–450)
PMV BLD AUTO: 8 FL (ref 6–12)
POC ALBUMIN: 3.3 G/L (ref 3.3–5.5)
POC ALT (SGPT): 28 U/L (ref 10–47)
POC AST (SGOT): 23 U/L (ref 11–38)
POC TOTAL BILIRUBIN: 0.4 MG/DL (ref 0.2–1.6)
POC TOTAL PROTEIN: 7 G/DL (ref 6.4–8.1)
POTASSIUM BLDA-SCNC: 4.2 MMOL/L (ref 3.6–5.1)
RBC # BLD AUTO: 3.49 10*6/MM3 (ref 4.14–5.8)
SODIUM BLD-SCNC: 143 MMOL/L (ref 128–145)
T4 FREE SERPL-MCNC: 0.96 NG/DL (ref 0.93–1.7)
TSH SERPL DL<=0.05 MIU/L-ACNC: 1.17 UIU/ML (ref 0.27–4.2)
WBC NRBC COR # BLD AUTO: 10.35 10*3/MM3 (ref 3.4–10.8)

## 2024-04-18 PROCEDURE — 84439 ASSAY OF FREE THYROXINE: CPT | Performed by: INTERNAL MEDICINE

## 2024-04-18 PROCEDURE — 80050 GENERAL HEALTH PANEL: CPT | Performed by: INTERNAL MEDICINE

## 2024-04-18 PROCEDURE — 25010000002 HEPARIN LOCK FLUSH PER 10 UNITS: Performed by: INTERNAL MEDICINE

## 2024-04-18 PROCEDURE — 25010000002 NIVOLUMAB 240 MG/24ML SOLUTION 24 ML VIAL: Performed by: INTERNAL MEDICINE

## 2024-04-18 PROCEDURE — 96413 CHEMO IV INFUSION 1 HR: CPT

## 2024-04-18 PROCEDURE — 25810000003 SODIUM CHLORIDE 0.9 % SOLUTION: Performed by: INTERNAL MEDICINE

## 2024-04-18 RX ORDER — SODIUM CHLORIDE 9 MG/ML
20 INJECTION, SOLUTION INTRAVENOUS ONCE
OUTPATIENT
Start: 2024-05-02

## 2024-04-18 RX ORDER — SODIUM CHLORIDE 9 MG/ML
20 INJECTION, SOLUTION INTRAVENOUS ONCE
Status: COMPLETED | OUTPATIENT
Start: 2024-04-18 | End: 2024-04-18

## 2024-04-18 RX ORDER — SODIUM CHLORIDE 0.9 % (FLUSH) 0.9 %
10 SYRINGE (ML) INJECTION AS NEEDED
Status: DISCONTINUED | OUTPATIENT
Start: 2024-04-18 | End: 2024-04-19 | Stop reason: HOSPADM

## 2024-04-18 RX ORDER — HEPARIN SODIUM (PORCINE) LOCK FLUSH IV SOLN 100 UNIT/ML 100 UNIT/ML
500 SOLUTION INTRAVENOUS AS NEEDED
Status: DISCONTINUED | OUTPATIENT
Start: 2024-04-18 | End: 2024-04-19 | Stop reason: HOSPADM

## 2024-04-18 RX ORDER — SODIUM CHLORIDE 9 MG/ML
20 INJECTION, SOLUTION INTRAVENOUS ONCE
Status: CANCELLED | OUTPATIENT
Start: 2024-04-18

## 2024-04-18 RX ADMIN — SODIUM CHLORIDE 20 ML/HR: 9 INJECTION, SOLUTION INTRAVENOUS at 14:00

## 2024-04-18 RX ADMIN — HEPARIN 500 UNITS: 100 SYRINGE at 14:33

## 2024-04-18 RX ADMIN — SODIUM CHLORIDE 240 MG: 9 INJECTION, SOLUTION INTRAVENOUS at 14:00

## 2024-04-18 RX ADMIN — Medication 10 ML: at 14:33

## 2024-04-18 NOTE — TELEPHONE ENCOUNTER
I called the pt to provide assistance on weaning from his feeding tube per his surgeon's request. Pt currently on Peptamen 1.5, using cyclical feeds, running his tube at 75 ml/hr from 5 PM to 11 AM. His current regimen gives him 2025 kcals/day. I advised him to reduce his rate to 50 ml/hr for the next two days, this will give him 1350 kcals/day. After two days, he is to reduce to 25 ml/hr, providing him with 675 kcals/day. The pt is to wean completely off after another two days, pt verbalized understanding.    We discussed that he increase his oral intake as he reduces his enteral feeds. We discussed soft high-protein food balanced with carbohydrates that he should eat. The pt has been struggling to eat large portions of foods, stating he gets nauseous or has to vomit due to feeling too full. I recommended he have large, high-calorie snacks instead of bigger meals to help accommodate his ability to swallow, pt verbalized understanding. I recommended he stop drinking 30 minutes before eating and wait 30 minutes after eating before having a full drink to avoid feeling overly full during meals, pt verbalized understanding. I assured he can take sips of water while eating if heeds an aid to swallow.    The pt has been struggling to maintain a steady blood sugar since his esophagectomy, we discussed ways to manage this. I also suggested he see his endocrinologist to discuss the issues he's been having, he already has an appointment scheduled with them on April 29th.     Should the pt start to feel too weak, experience low blood sugars, or is not able to eat enough by mouth during his attempt at weaning, he should resume is regular feeding protocol, pt verbalized understanding       All questions and concerns were addressed and answered. I provided my contact information and encouraged him to call or schedule an appointment as needed.      Chandler Ruby, MS,RD,LD-[place],CD-[place]  Registered Dietitian

## 2024-04-18 NOTE — ADDENDUM NOTE
Encounter addended by: Marisol Rebollar RN on: 4/18/2024 3:11 PM   Actions taken: Clinical Note Signed

## 2024-04-18 NOTE — PROGRESS NOTES
Patient is here for C1D1 opdivo. Port accessed and flushed with good blood return noted. 10cc of blood wasted prior to specimen collection. Blood specimen obtained and sent to lab for processing per protocol.  Port flushed with saline and heparin prior to needle removal. Dr. Mueller sent: Patient is here for C1D1 opdivo. Patient stated he is still having the sporadic diarrhea but its the same as it has been since he had his feeding tube placed. His CBC is in parameters. WBC 10.35, hgb 11.7, plts 384 and ANC 8.03. still waiting on his sudhir results but if they are ok and he is ok to treat can you please sign the treatment plan? Dr. Mueller responded: please proceed. SUDHIR results were in parameters. Patient tolerated treatment and was discharged with AVS.

## 2024-04-22 ENCOUNTER — TELEPHONE (OUTPATIENT)
Dept: ONCOLOGY | Facility: CLINIC | Age: 54
End: 2024-04-22
Payer: COMMERCIAL

## 2024-04-22 NOTE — TELEPHONE ENCOUNTER
I called the pt to f/u regarding weaning from his feeding tube. Pt not available, I left a detailed VM and encouraged him to return my call.    Chandler Ruby, MS,RD,LD-[place],CD-[place]  Registered Dietitian

## 2024-04-22 NOTE — TELEPHONE ENCOUNTER
Pt returned my call to discuss his enteral feeds. Pt reduced his TF rate to 50 ml/hr and made an attempt to eat more, however, pt is having issues swallowing. Pt still unsure if he is getting full too quickly or if food is getting stuck, nonetheless, the pt is uncomfortable when he tries to increase intake. Pt said foods that get stuck or give him trouble is not a specific consistency of food and therefore it is difficult to predict which foods are problematic for him.    Due to the pt's issues with swallowing, I recommended he increase his enteral rate back to 75 ml/hr and maintain his usual enteral and eating regimen to prevent weight loss until he is able to figure out what is causing his swallowing issues.    Pt's surgeon is aware of his issues and he scheduled an EGD for the pt.    All questions and concerns were addressed and answered. I provided my contact information and encouraged him to call or schedule an appointment as needed.    Chandler Ruby, MS,RD,LD-[place],CD-[place]  Registered Dietitian

## 2024-04-23 ENCOUNTER — HOSPITAL ENCOUNTER (EMERGENCY)
Facility: HOSPITAL | Age: 54
Discharge: LEFT BEFORE DISCHARGE | End: 2024-04-24
Attending: EMERGENCY MEDICINE
Payer: COMMERCIAL

## 2024-04-23 VITALS
DIASTOLIC BLOOD PRESSURE: 58 MMHG | TEMPERATURE: 98.5 F | BODY MASS INDEX: 21.76 KG/M2 | RESPIRATION RATE: 16 BRPM | HEART RATE: 79 BPM | WEIGHT: 155.42 LBS | SYSTOLIC BLOOD PRESSURE: 93 MMHG | OXYGEN SATURATION: 98 % | HEIGHT: 71 IN

## 2024-04-23 PROCEDURE — 99211 OFF/OP EST MAY X REQ PHY/QHP: CPT | Performed by: EMERGENCY MEDICINE

## 2024-04-25 ENCOUNTER — TELEPHONE (OUTPATIENT)
Dept: SURGERY | Facility: CLINIC | Age: 54
End: 2024-04-25
Payer: COMMERCIAL

## 2024-04-25 RX ORDER — ACETAMINOPHEN 325 MG/1
650 TABLET ORAL EVERY 6 HOURS PRN
COMMUNITY

## 2024-04-25 NOTE — TELEPHONE ENCOUNTER
Pt and wife was made aware that I have re faxed a copy of [facility] paperwork to [employer] to confirm the suggested return to work date is 5/9/2024. Pt was agreeable and satisfied    DB 10:22  4/25/2024

## 2024-04-25 NOTE — TELEPHONE ENCOUNTER
Wife called about disability, stated they only had paperwork up to 3/19, but no other documentation. Told pt's wife I would message the ma and they would give a c/b PT STATED UNDERSTANDING AND WAS AGREEABLE

## 2024-04-29 ENCOUNTER — TELEPHONE (OUTPATIENT)
Dept: FAMILY MEDICINE CLINIC | Facility: CLINIC | Age: 54
End: 2024-04-29
Payer: COMMERCIAL

## 2024-04-29 ENCOUNTER — APPOINTMENT (OUTPATIENT)
Dept: ULTRASOUND IMAGING | Facility: HOSPITAL | Age: 54
End: 2024-04-29
Payer: COMMERCIAL

## 2024-04-29 DIAGNOSIS — E89.6: ICD-10-CM

## 2024-04-29 PROCEDURE — 76536 US EXAM OF HEAD AND NECK: CPT

## 2024-04-29 NOTE — PROGRESS NOTES
Radiology is advising repeat ultrasound in 1 year due to the size of the nodules.  You can get a second opinion with one of the endocrinologists if you would prefer.

## 2024-04-29 NOTE — TELEPHONE ENCOUNTER
RELAY----- Message from Annabel SAUNDERS sent at 4/29/2024  3:37 PM EDT -----  Radiology is advising repeat ultrasound in 1 year due to the size of the nodules.  You can get a second opinion with one of the endocrinologists if you would prefer.

## 2024-05-02 ENCOUNTER — HOSPITAL ENCOUNTER (OUTPATIENT)
Dept: ONCOLOGY | Facility: HOSPITAL | Age: 54
Discharge: HOME | End: 2024-05-02
Payer: COMMERCIAL

## 2024-05-02 ENCOUNTER — HOSPITAL ENCOUNTER (OUTPATIENT)
Dept: CARDIOLOGY | Facility: HOSPITAL | Age: 54
Discharge: HOME | End: 2024-05-02
Payer: COMMERCIAL

## 2024-05-02 VITALS
BODY MASS INDEX: 21.99 KG/M2 | RESPIRATION RATE: 14 BRPM | HEART RATE: 75 BPM | SYSTOLIC BLOOD PRESSURE: 119 MMHG | WEIGHT: 157.1 LBS | DIASTOLIC BLOOD PRESSURE: 73 MMHG | OXYGEN SATURATION: 96 % | HEIGHT: 71 IN | TEMPERATURE: 97.7 F

## 2024-05-02 DIAGNOSIS — C15.9 ADENOCARCINOMA OF ESOPHAGUS METASTATIC TO INTRA-ABDOMINAL LYMPH NODE: ICD-10-CM

## 2024-05-02 DIAGNOSIS — Z45.2 ENCOUNTER FOR CARE RELATED TO PORT-A-CATH: ICD-10-CM

## 2024-05-02 DIAGNOSIS — C15.9 MALIGNANT NEOPLASM OF ESOPHAGUS, UNSPECIFIED LOCATION: Primary | ICD-10-CM

## 2024-05-02 DIAGNOSIS — C77.2 ADENOCARCINOMA OF ESOPHAGUS METASTATIC TO INTRA-ABDOMINAL LYMPH NODE: ICD-10-CM

## 2024-05-02 LAB
ALP BLD-CCNC: 82 U/L (ref 53–128)
BASOPHILS # BLD AUTO: 0.02 10*3/MM3 (ref 0–0.2)
BASOPHILS NFR BLD AUTO: 0.3 % (ref 0–1.5)
BUN BLDA-MCNC: 15 MG/DL (ref 7–22)
CALCIUM BLD QL: 9.9 MG/DL (ref 8–10.3)
CHLORIDE BLDA-SCNC: 102 MMOL/L (ref 98–108)
CO2 BLDA-SCNC: 28 MMOL/L (ref 18–33)
CREAT BLDA-MCNC: 0.8 MG/DL (ref 0.6–1.2)
DEPRECATED RDW RBC AUTO: 44.6 FL (ref 37–54)
EGFRCR SERPLBLD CKD-EPI 2021: 106.5 ML/MIN/1.73
EOSINOPHIL # BLD AUTO: 0.38 10*3/MM3 (ref 0–0.4)
EOSINOPHIL NFR BLD AUTO: 5.6 % (ref 0.3–6.2)
ERYTHROCYTE [DISTWIDTH] IN BLOOD BY AUTOMATED COUNT: 12.1 % (ref 12.3–15.4)
GLUCOSE BLDC GLUCOMTR-MCNC: 155 MG/DL (ref 73–118)
HCT VFR BLD AUTO: 39.4 % (ref 37.5–51)
HGB BLD-MCNC: 12.7 G/DL (ref 13–17.7)
LYMPHOCYTES # BLD AUTO: 0.75 10*3/MM3 (ref 0.7–3.1)
LYMPHOCYTES NFR BLD AUTO: 11 % (ref 19.6–45.3)
MCH RBC QN AUTO: 33.1 PG (ref 26.6–33)
MCHC RBC AUTO-ENTMCNC: 32.2 G/DL (ref 31.5–35.7)
MCV RBC AUTO: 102.6 FL (ref 79–97)
MONOCYTES # BLD AUTO: 0.76 10*3/MM3 (ref 0.1–0.9)
MONOCYTES NFR BLD AUTO: 11.1 % (ref 5–12)
NEUTROPHILS NFR BLD AUTO: 4.93 10*3/MM3 (ref 1.7–7)
NEUTROPHILS NFR BLD AUTO: 72 % (ref 42.7–76)
PLATELET # BLD AUTO: 399 10*3/MM3 (ref 140–450)
PMV BLD AUTO: 8.5 FL (ref 6–12)
POC ALBUMIN: 3.3 G/L (ref 3.3–5.5)
POC ALT (SGPT): 19 U/L (ref 10–47)
POC AST (SGOT): 21 U/L (ref 11–38)
POC TOTAL BILIRUBIN: 0.4 MG/DL (ref 0.2–1.6)
POC TOTAL PROTEIN: 6.8 G/DL (ref 6.4–8.1)
POTASSIUM BLDA-SCNC: 4 MMOL/L (ref 3.6–5.1)
RBC # BLD AUTO: 3.84 10*6/MM3 (ref 4.14–5.8)
SODIUM BLD-SCNC: 145 MMOL/L (ref 128–145)
WBC NRBC COR # BLD AUTO: 6.84 10*3/MM3 (ref 3.4–10.8)

## 2024-05-02 PROCEDURE — 80053 COMPREHEN METABOLIC PANEL: CPT

## 2024-05-02 PROCEDURE — 25010000002 HEPARIN LOCK FLUSH PER 10 UNITS: Performed by: INTERNAL MEDICINE

## 2024-05-02 PROCEDURE — 25810000003 SODIUM CHLORIDE 0.9 % SOLUTION: Performed by: INTERNAL MEDICINE

## 2024-05-02 PROCEDURE — 96413 CHEMO IV INFUSION 1 HR: CPT

## 2024-05-02 PROCEDURE — 25010000002 NIVOLUMAB 240 MG/24ML SOLUTION 24 ML VIAL: Performed by: INTERNAL MEDICINE

## 2024-05-02 PROCEDURE — 85025 COMPLETE CBC W/AUTO DIFF WBC: CPT | Performed by: INTERNAL MEDICINE

## 2024-05-02 PROCEDURE — 93005 ELECTROCARDIOGRAM TRACING: CPT | Performed by: SURGERY

## 2024-05-02 RX ORDER — SODIUM CHLORIDE 0.9 % (FLUSH) 0.9 %
10 SYRINGE (ML) INJECTION AS NEEDED
Status: DISCONTINUED | OUTPATIENT
Start: 2024-05-02 | End: 2024-05-03 | Stop reason: HOSPADM

## 2024-05-02 RX ORDER — SODIUM CHLORIDE 9 MG/ML
20 INJECTION, SOLUTION INTRAVENOUS ONCE
Status: COMPLETED | OUTPATIENT
Start: 2024-05-02 | End: 2024-05-02

## 2024-05-02 RX ORDER — HEPARIN SODIUM (PORCINE) LOCK FLUSH IV SOLN 100 UNIT/ML 100 UNIT/ML
500 SOLUTION INTRAVENOUS AS NEEDED
Status: DISCONTINUED | OUTPATIENT
Start: 2024-05-02 | End: 2024-05-03 | Stop reason: HOSPADM

## 2024-05-02 RX ADMIN — SODIUM CHLORIDE 240 MG: 9 INJECTION, SOLUTION INTRAVENOUS at 14:41

## 2024-05-02 RX ADMIN — HEPARIN 500 UNITS: 100 SYRINGE at 15:16

## 2024-05-02 RX ADMIN — SODIUM CHLORIDE 20 ML/HR: 9 INJECTION, SOLUTION INTRAVENOUS at 14:41

## 2024-05-02 RX ADMIN — Medication 10 ML: at 15:16

## 2024-05-03 LAB
QT INTERVAL: 417 MS
QTC INTERVAL: 432 MS

## 2024-05-07 ENCOUNTER — APPOINTMENT (OUTPATIENT)
Dept: GENERAL RADIOLOGY | Facility: HOSPITAL | Age: 54
End: 2024-05-07
Payer: COMMERCIAL

## 2024-05-07 ENCOUNTER — ANESTHESIA EVENT (OUTPATIENT)
Dept: GASTROENTEROLOGY | Facility: HOSPITAL | Age: 54
End: 2024-05-07
Payer: COMMERCIAL

## 2024-05-07 ENCOUNTER — ANESTHESIA (OUTPATIENT)
Dept: GASTROENTEROLOGY | Facility: HOSPITAL | Age: 54
End: 2024-05-07
Payer: COMMERCIAL

## 2024-05-07 ENCOUNTER — LAB (OUTPATIENT)
Dept: LAB | Facility: HOSPITAL | Age: 54
End: 2024-05-07
Payer: COMMERCIAL

## 2024-05-07 ENCOUNTER — PREP FOR SURGERY (OUTPATIENT)
Dept: OTHER | Facility: HOSPITAL | Age: 54
End: 2024-05-07
Payer: COMMERCIAL

## 2024-05-07 ENCOUNTER — TRANSCRIBE ORDERS (OUTPATIENT)
Dept: ADMINISTRATIVE | Facility: HOSPITAL | Age: 54
End: 2024-05-07
Payer: COMMERCIAL

## 2024-05-07 ENCOUNTER — HOSPITAL ENCOUNTER (OUTPATIENT)
Facility: HOSPITAL | Age: 54
Setting detail: HOSPITAL OUTPATIENT SURGERY
Discharge: HOME | End: 2024-05-07
Attending: SURGERY | Admitting: SURGERY
Payer: COMMERCIAL

## 2024-05-07 VITALS
RESPIRATION RATE: 12 BRPM | DIASTOLIC BLOOD PRESSURE: 75 MMHG | WEIGHT: 156.8 LBS | HEIGHT: 71 IN | BODY MASS INDEX: 21.95 KG/M2 | OXYGEN SATURATION: 98 % | SYSTOLIC BLOOD PRESSURE: 118 MMHG | TEMPERATURE: 96.1 F | HEART RATE: 70 BPM

## 2024-05-07 DIAGNOSIS — F16.20: ICD-10-CM

## 2024-05-07 DIAGNOSIS — K22.2 ANASTOMOTIC STRICTURE AFTER ESOPHAGECTOMY: ICD-10-CM

## 2024-05-07 DIAGNOSIS — E04.2 NONTOXIC MULTINODULAR GOITER: Primary | ICD-10-CM

## 2024-05-07 DIAGNOSIS — C15.4 MALIGNANT NEOPLASM OF MIDDLE THIRD OF ESOPHAGUS: Primary | ICD-10-CM

## 2024-05-07 DIAGNOSIS — K91.89 ANASTOMOTIC STRICTURE AFTER ESOPHAGECTOMY: ICD-10-CM

## 2024-05-07 DIAGNOSIS — E04.2 NONTOXIC MULTINODULAR GOITER: ICD-10-CM

## 2024-05-07 DIAGNOSIS — C15.9 MALIGNANT NEOPLASM OF ESOPHAGUS, UNSPECIFIED LOCATION: ICD-10-CM

## 2024-05-07 PROCEDURE — 76000 FLUOROSCOPY <1 HR PHYS/QHP: CPT

## 2024-05-07 PROCEDURE — 25810000003 SODIUM CHLORIDE 0.9 % SOLUTION: Performed by: SURGERY

## 2024-05-07 PROCEDURE — 25010000002 SUCCINYLCHOLINE PER 20 MG: Performed by: NURSE ANESTHETIST, CERTIFIED REGISTERED

## 2024-05-07 PROCEDURE — 25510000001 IOPAMIDOL 61 % SOLUTION 30 ML VIAL: Performed by: SURGERY

## 2024-05-07 PROCEDURE — 25010000002 PROPOFOL 200 MG/20ML EMULSION: Performed by: NURSE ANESTHETIST, CERTIFIED REGISTERED

## 2024-05-07 PROCEDURE — 25010000002 ONDANSETRON PER 1 MG: Performed by: NURSE ANESTHETIST, CERTIFIED REGISTERED

## 2024-05-07 PROCEDURE — C1726 CATH, BAL DIL, NON-VASCULAR: HCPCS | Performed by: SURGERY

## 2024-05-07 PROCEDURE — 25810000003 SODIUM CHLORIDE 0.9 % SOLUTION: Performed by: NURSE ANESTHETIST, CERTIFIED REGISTERED

## 2024-05-07 RX ORDER — PHENYLEPHRINE HCL IN 0.9% NACL 1 MG/10 ML
SYRINGE (ML) INTRAVENOUS AS NEEDED
Status: DISCONTINUED | OUTPATIENT
Start: 2024-05-07 | End: 2024-05-07 | Stop reason: SURG

## 2024-05-07 RX ORDER — LIDOCAINE HYDROCHLORIDE 20 MG/ML
INJECTION, SOLUTION INFILTRATION; PERINEURAL AS NEEDED
Status: DISCONTINUED | OUTPATIENT
Start: 2024-05-07 | End: 2024-05-07 | Stop reason: SURG

## 2024-05-07 RX ORDER — SUCCINYLCHOLINE CHLORIDE 20 MG/ML
INJECTION INTRAMUSCULAR; INTRAVENOUS AS NEEDED
Status: DISCONTINUED | OUTPATIENT
Start: 2024-05-07 | End: 2024-05-07 | Stop reason: SURG

## 2024-05-07 RX ORDER — PROPOFOL 10 MG/ML
INJECTION, EMULSION INTRAVENOUS AS NEEDED
Status: DISCONTINUED | OUTPATIENT
Start: 2024-05-07 | End: 2024-05-07 | Stop reason: SURG

## 2024-05-07 RX ORDER — SODIUM CHLORIDE 9 MG/ML
INJECTION, SOLUTION INTRAVENOUS CONTINUOUS PRN
Status: DISCONTINUED | OUTPATIENT
Start: 2024-05-07 | End: 2024-05-07 | Stop reason: SURG

## 2024-05-07 RX ORDER — DEXAMETHASONE SODIUM PHOSPHATE 4 MG/ML
INJECTION, SOLUTION INTRA-ARTICULAR; INTRALESIONAL; INTRAMUSCULAR; INTRAVENOUS; SOFT TISSUE AS NEEDED
Status: DISCONTINUED | OUTPATIENT
Start: 2024-05-07 | End: 2024-05-07

## 2024-05-07 RX ORDER — SODIUM CHLORIDE 9 MG/ML
9 INJECTION, SOLUTION INTRAVENOUS ONCE
Status: COMPLETED | OUTPATIENT
Start: 2024-05-07 | End: 2024-05-07

## 2024-05-07 RX ORDER — ONDANSETRON 2 MG/ML
INJECTION INTRAMUSCULAR; INTRAVENOUS AS NEEDED
Status: DISCONTINUED | OUTPATIENT
Start: 2024-05-07 | End: 2024-05-07 | Stop reason: SURG

## 2024-05-07 RX ADMIN — SODIUM CHLORIDE: 9 INJECTION, SOLUTION INTRAVENOUS at 11:50

## 2024-05-07 RX ADMIN — Medication 100 MCG: at 12:17

## 2024-05-07 RX ADMIN — SUCCINYLCHOLINE CHLORIDE 100 MG: 20 INJECTION, SOLUTION INTRAMUSCULAR; INTRAVENOUS at 11:55

## 2024-05-07 RX ADMIN — ONDANSETRON 4 MG: 2 INJECTION INTRAMUSCULAR; INTRAVENOUS at 12:00

## 2024-05-07 RX ADMIN — PROPOFOL 200 MG: 10 INJECTION, EMULSION INTRAVENOUS at 11:55

## 2024-05-07 RX ADMIN — SODIUM CHLORIDE 9 ML/HR: 9 INJECTION, SOLUTION INTRAVENOUS at 11:29

## 2024-05-07 RX ADMIN — LIDOCAINE HYDROCHLORIDE 100 MG: 20 INJECTION, SOLUTION INFILTRATION; PERINEURAL at 11:55

## 2024-05-08 ENCOUNTER — PATIENT OUTREACH (OUTPATIENT)
Dept: ONCOLOGY | Facility: CLINIC | Age: 54
End: 2024-05-08
Payer: COMMERCIAL

## 2024-05-08 DIAGNOSIS — C15.5 MALIGNANT NEOPLASM OF LOWER THIRD OF ESOPHAGUS: Primary | ICD-10-CM

## 2024-05-08 DIAGNOSIS — M54.9 ACUTE BILATERAL BACK PAIN, UNSPECIFIED BACK LOCATION: ICD-10-CM

## 2024-05-08 DIAGNOSIS — R10.84 GENERALIZED ABDOMINAL PAIN: ICD-10-CM

## 2024-05-09 ENCOUNTER — HOSPITAL ENCOUNTER (OUTPATIENT)
Dept: PET IMAGING | Facility: HOSPITAL | Age: 54
Discharge: HOME | End: 2024-05-09
Admitting: INTERNAL MEDICINE
Payer: COMMERCIAL

## 2024-05-09 DIAGNOSIS — R10.84 GENERALIZED ABDOMINAL PAIN: ICD-10-CM

## 2024-05-09 DIAGNOSIS — C15.5 MALIGNANT NEOPLASM OF LOWER THIRD OF ESOPHAGUS: ICD-10-CM

## 2024-05-09 DIAGNOSIS — M54.9 ACUTE BILATERAL BACK PAIN, UNSPECIFIED BACK LOCATION: ICD-10-CM

## 2024-05-09 PROBLEM — K22.2: Status: ACTIVE | Noted: 2024-05-07

## 2024-05-09 PROBLEM — K91.89: Status: ACTIVE | Noted: 2024-05-07

## 2024-05-09 PROCEDURE — 74176 CT ABD & PELVIS W/O CONTRAST: CPT

## 2024-05-10 ENCOUNTER — TELEPHONE (OUTPATIENT)
Dept: ONCOLOGY | Facility: CLINIC | Age: 54
End: 2024-05-10
Payer: COMMERCIAL

## 2024-05-10 DIAGNOSIS — K52.9 ENTERITIS: Primary | ICD-10-CM

## 2024-05-10 RX ORDER — PREDNISONE 20 MG/1
40 TABLET ORAL DAILY
Qty: 60 TABLET | Refills: 1 | Status: SHIPPED | OUTPATIENT
Start: 2024-05-10

## 2024-05-15 DIAGNOSIS — C15.9 ADENOCARCINOMA OF ESOPHAGUS METASTATIC TO INTRA-ABDOMINAL LYMPH NODE: ICD-10-CM

## 2024-05-15 DIAGNOSIS — C15.5 MALIGNANT NEOPLASM OF LOWER THIRD OF ESOPHAGUS: Primary | ICD-10-CM

## 2024-05-15 DIAGNOSIS — C77.2 ADENOCARCINOMA OF ESOPHAGUS METASTATIC TO INTRA-ABDOMINAL LYMPH NODE: ICD-10-CM

## 2024-05-16 ENCOUNTER — TELEPHONE (OUTPATIENT)
Dept: ONCOLOGY | Facility: CLINIC | Age: 54
End: 2024-05-16
Payer: COMMERCIAL

## 2024-05-16 NOTE — TELEPHONE ENCOUNTER
"I called the pt to f/u regarding his enteriitis/colitis and weaning from his feeding tube. Pt has been drinking two Enterades/day and taking Prednisone for the past couple of days, pt said since he started this regimen he feels \"100% better.\"    Pt has also cut back his enteral rate from 50 ml/hr x 18 hrs/day to 35 ml/hr x 18 hrs/day. We discussed how he can continue to wean. Pt said he'd like to wean from the number of hours he runs his tube, I said this was fine as long as he has glucose tablets or food readily available to assure he's preventing/managing low blood sugars, pt verbalized understanding.    Pt said he is swallowing much more comfortably since having his esophagus stretched and denies struggling to eat any types of food at this time. I encouraged him to liberalize his diet and begin to enjoy a variety of foods and textures, pt said he can do this.     I praised the pt for his efforts in healing, managing his tube feeds as well as he has, and managing his blood glucose to the best of his ability. Pt was in good spirits today and appreciated the call.    All questions and concerns were addressed and answered. I provided my contact information and encouraged him to call or schedule an appointment as needed.    Chandler Sands-Isi, MS,RD,LD-[place],CD-[place]  Registered Dietitian  "

## 2024-05-17 ENCOUNTER — TELEPHONE (OUTPATIENT)
Dept: SURGERY | Facility: CLINIC | Age: 54
End: 2024-05-17
Payer: COMMERCIAL

## 2024-05-17 NOTE — TELEPHONE ENCOUNTER
I informed patient that we canceled his appointment and Dr Hurtado will see him at the time of EGD on 6/18/2024. Patient was agreeable and understood.    DB 10:30  5/17/2024

## 2024-05-21 ENCOUNTER — TELEPHONE (OUTPATIENT)
Dept: SURGERY | Facility: CLINIC | Age: 54
End: 2024-05-21
Payer: COMMERCIAL

## 2024-05-21 NOTE — TELEPHONE ENCOUNTER
I responded to patient phone call and it was determined that I need [employer]ui to mail the necessary paperwork for me to update his procedure and appointment information. Pt stated he would get the assistance of his wife and notify hui.    RONNI 10:18  5/21/2024

## 2024-05-22 ENCOUNTER — OFFICE VISIT (OUTPATIENT)
Dept: ONCOLOGY | Facility: CLINIC | Age: 54
End: 2024-05-22
Payer: COMMERCIAL

## 2024-05-22 ENCOUNTER — LAB (OUTPATIENT)
Dept: LAB | Facility: HOSPITAL | Age: 54
End: 2024-05-22
Payer: COMMERCIAL

## 2024-05-22 VITALS
DIASTOLIC BLOOD PRESSURE: 68 MMHG | BODY MASS INDEX: 22.15 KG/M2 | HEART RATE: 69 BPM | OXYGEN SATURATION: 97 % | WEIGHT: 158.2 LBS | TEMPERATURE: 97.8 F | SYSTOLIC BLOOD PRESSURE: 96 MMHG | HEIGHT: 71 IN

## 2024-05-22 DIAGNOSIS — C15.5 MALIGNANT NEOPLASM OF LOWER THIRD OF ESOPHAGUS: ICD-10-CM

## 2024-05-22 DIAGNOSIS — C15.9 ADENOCARCINOMA OF ESOPHAGUS METASTATIC TO INTRA-ABDOMINAL LYMPH NODE: ICD-10-CM

## 2024-05-22 DIAGNOSIS — K52.9 ENTERITIS, NONINFECTIOUS: ICD-10-CM

## 2024-05-22 DIAGNOSIS — C77.2 ADENOCARCINOMA OF ESOPHAGUS METASTATIC TO INTRA-ABDOMINAL LYMPH NODE: ICD-10-CM

## 2024-05-22 DIAGNOSIS — C15.9 MALIGNANT NEOPLASM OF ESOPHAGUS, UNSPECIFIED LOCATION: Primary | ICD-10-CM

## 2024-05-22 DIAGNOSIS — C15.5 MALIGNANT NEOPLASM OF LOWER THIRD OF ESOPHAGUS: Primary | ICD-10-CM

## 2024-05-22 LAB
ALBUMIN SERPL-MCNC: 3.8 G/DL (ref 3.5–5.2)
ALBUMIN/GLOB SERPL: 1.4 G/DL
ALP SERPL-CCNC: 83 U/L (ref 39–117)
ALT SERPL W P-5'-P-CCNC: 39 U/L (ref 1–41)
ANION GAP SERPL CALCULATED.3IONS-SCNC: 8.9 MMOL/L (ref 5–15)
AST SERPL-CCNC: 20 U/L (ref 1–40)
BASOPHILS # BLD AUTO: 0.02 10*3/MM3 (ref 0–0.2)
BASOPHILS NFR BLD AUTO: 0.1 % (ref 0–1.5)
BILIRUB SERPL-MCNC: <0.2 MG/DL (ref 0–1.2)
BUN SERPL-MCNC: 18 MG/DL (ref 6–20)
BUN/CREAT SERPL: 22 (ref 7–25)
CALCIUM SPEC-SCNC: 9.4 MG/DL (ref 8.6–10.5)
CHLORIDE SERPL-SCNC: 102 MMOL/L (ref 98–107)
CO2 SERPL-SCNC: 28.1 MMOL/L (ref 22–29)
CREAT SERPL-MCNC: 0.82 MG/DL (ref 0.76–1.27)
DEPRECATED RDW RBC AUTO: 47.6 FL (ref 37–54)
EGFRCR SERPLBLD CKD-EPI 2021: 105.7 ML/MIN/1.73
EOSINOPHIL # BLD AUTO: 0.26 10*3/MM3 (ref 0–0.4)
EOSINOPHIL NFR BLD AUTO: 1.7 % (ref 0.3–6.2)
ERYTHROCYTE [DISTWIDTH] IN BLOOD BY AUTOMATED COUNT: 13.1 % (ref 12.3–15.4)
GLOBULIN UR ELPH-MCNC: 2.7 GM/DL
GLUCOSE SERPL-MCNC: 90 MG/DL (ref 65–99)
HCT VFR BLD AUTO: 40.9 % (ref 37.5–51)
HGB BLD-MCNC: 13.1 G/DL (ref 13–17.7)
HOLD SPECIMEN: NORMAL
LYMPHOCYTES # BLD AUTO: 0.74 10*3/MM3 (ref 0.7–3.1)
LYMPHOCYTES NFR BLD AUTO: 4.8 % (ref 19.6–45.3)
MCH RBC QN AUTO: 32.7 PG (ref 26.6–33)
MCHC RBC AUTO-ENTMCNC: 32 G/DL (ref 31.5–35.7)
MCV RBC AUTO: 102 FL (ref 79–97)
MONOCYTES # BLD AUTO: 1.01 10*3/MM3 (ref 0.1–0.9)
MONOCYTES NFR BLD AUTO: 6.5 % (ref 5–12)
NEUTROPHILS NFR BLD AUTO: 13.44 10*3/MM3 (ref 1.7–7)
NEUTROPHILS NFR BLD AUTO: 86.9 % (ref 42.7–76)
PLATELET # BLD AUTO: 377 10*3/MM3 (ref 140–450)
PMV BLD AUTO: 8.3 FL (ref 6–12)
POTASSIUM SERPL-SCNC: 3.7 MMOL/L (ref 3.5–5.2)
PROT SERPL-MCNC: 6.5 G/DL (ref 6–8.5)
RBC # BLD AUTO: 4.01 10*6/MM3 (ref 4.14–5.8)
SODIUM SERPL-SCNC: 139 MMOL/L (ref 136–145)
WBC NRBC COR # BLD AUTO: 15.47 10*3/MM3 (ref 3.4–10.8)

## 2024-05-22 PROCEDURE — 36415 COLL VENOUS BLD VENIPUNCTURE: CPT

## 2024-05-22 PROCEDURE — 80053 COMPREHEN METABOLIC PANEL: CPT | Performed by: INTERNAL MEDICINE

## 2024-05-22 PROCEDURE — 85025 COMPLETE CBC W/AUTO DIFF WBC: CPT

## 2024-05-22 RX ORDER — PREDNISONE 5 MG/1
5 TABLET ORAL DAILY
Qty: 30 TABLET | Refills: 0 | Status: SHIPPED | OUTPATIENT
Start: 2024-05-22

## 2024-05-22 NOTE — PROGRESS NOTES
"                     HEMATOLOGY ONCOLOGY OUTPATIENT FOLLOW UP       Patient name: Jourdan Lebron  : 1970  MRN: [id number]  Primary Care Physician: Justa Castano MD  Referring Physician: Justa Castano MD  Reason For Consult:     Chief Complaint   Patient presents with    Follow-up     Malignant neoplasm of lower third of esophagus     HPI:   History of Present Illness:  Jourdan Lebron is 54 y.o. male who presented to our office on 2021 for consultation regarding elevated hematocrit    On 2021 Mr. Lebron was referred for the investigation of macrocytosis and elevated hematocrit.  He gave a history of a stroke in .  He also described a long history of anxiety and \"panic attacks\".  Finally he had undergone an adrenalectomy, apparently because of an adrenal adenoma.  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  In spite of that he felt poorly, mainly because of fatigue and had several investigations.  For a long time, at least since , he had been noted to have an elevated MCV and MCH.  A clear cause for this had not been identified and generally speaking he was asymptomatic at that time.  In , September and 2021 his blood counts had reported a hematocrit of 51.3, 51.6 and 53.8% respectively.  This was in the setting of a normal high hemoglobin.  He gave a history of prolonged and intense, at times of up to 3 packs of cigarettes per day, cigarette smoking.  Close to the time of the initial visit, he was smoking only cigars but did admit to inhaling the smoke.  He, as well, admitted to dyspnea with some exertion.    2022 Mr. Lebron was back in the office for follow-up.  At the time of his initial evaluation his blood count had been found to be within normal limits.  His folic acid was found to be immediately below the normal range of normalcy.  He started taking folic acid replacement.    7/15/2022: Back in the office " for follow-up after 6 months.  Reported he had had some changes to his medications and he was feeling somewhat better.  In particular, he discussed changes to his antidepressant, that seemed to have made a difference.  He also had stopped smoking.  The physical exam was unchanged.  The laboratory exams revealed normal hemoglobin and hematocrit, but he did persist with mild macrocytosis at 97.8 fL.  A clear explanation for this was not identified.  A decision was made to observe as it was unlikely to represent a serious problem.    10/10/2023: Seen in the office after an absence of more than one year. He reported that for a few months he had been experiencing dysphagia and weight loss. He was initially treated with a proton pump inhibitor, but as there was no response and rather he reported worsening of the symptoms, he underwent upper and lower gastrointestinal endoscopies. In the colon multiple polyps were identified in the cecum, the ascending colon, the transverse colon and the descending colon. In the esophagus a protruding lesion that seemed infiltrative and was fungating and ulcerated was found in the lower third of the esophagus. It extended from 36 to 46 cm from the incisors. Biopsy reported invasive moderate to poorly differentiated adenocarcinoma.     11/3/2023: In the office to review the PET scan. His symptoms have not changed much. He continues to have dysphagia and it is severe enough that he has been able to eat much; he has some success with soft foods but there fare some foods that he can definitely not swallow. He has lost about 10 lbs but none recently. He remains afebrile and has not had any cough. He has not had any pain. On exam no jaundice or pallor. Lungs diminished and heart regular. Abdomen soft and not tender. No edema. Reviewed the images of the PET scan. Reviewed the result of the endoscopic ultrasound and the FNA of the lymph nodes, at least one of which is positive for metastatic  disease. This makes the tumor T2N1 disease. Neoadjuvant chemotherapy and radiation was discussed with him and his spouse and I made referrals to Dr. Sabillon in Radiation Oncology and to Dr. Hurtado in Thoracic surgery. He is to have next generation sequencing, Her2 expression and PD-L1 expression on tumor tissue. Will present in tumor conference.     11/17/2023: Not any different than at the time of the last visit.  No further weight loss.  Able to swallow some foods without any difficulties.  However, other foods are very difficult to swallow, if not impossible.  He has not had any fevers.  He underwent placement of the port without any difficulties.  On exam alert, conversant and in no distress.  Port site clean and healing well.  Lungs diminished bilaterally.  Heart regular.  No edema.  Laboratory exams were reviewed.  Discussed with him concurrent chemotherapy and radiation.  Treatment with carboplatin and paclitaxel was discussed and a treatment plan was placed.  Discussed with Dr. Sabillon.  To begin on the week of November 27, 2023.    12/8/2023: Feeling reasonably well. Has experienced occasional headaches and facial pain. As well feels the lower extremities to be heavy and had some aching pain. Has been able to eat some though his appetite is poor. He may be swallowing better and does not have odynophagia. No abdominal pain or diarrhea and no dysuria. Has not lost weight. On exam no changes. The laboratory exams were reviewed. Discussed with him. To continue with the same therapy.     12/27/2023: Without new symptoms.  Reasonably active.  Not eating as well because of early satiety but no dysphagia.  No chest pains.  As well not coughing or more dyspneic than before.  On exam no changes.  No palpable supraclavicular adenopathy.  Lungs are diminished bilaterally.  Heart regular.  Abdomen soft and nontender.  There is no edema.  Small petechiae are present in the lower extremities.  The laboratory exams were reviewed.  He  has thrombocytopenia today that does not require transfusion but that will keep him from receiving the last dose of the chemotherapy.  To finish radiation tomorrow.  He will have a PET scan and will see me with the results in approximately 4 weeks.  Discussed with him.    1/25/2024: Feeling reasonably well.  Anxious about surgery.  Undergoing preoperative investigations he was found to have a thyroid nodule.  He is eating reasonably well and does not have major dysphagia although sometimes he needs to belch before he can continue to swallow.  No odynophagia.  He has also been free of dyspnea.  Denies abdominal pain and has maintained regular bowel activity.  No dysuria or hematuria.  No peripheral edema.  On exam alert, conversant and in good spirits.  No jaundice and no pallor.  Lungs diminished but clear.  Heart regular.  Abdomen soft.  Port site clean.  Laboratory exams reviewed.  Awaiting authorization from the insurance company to do the PET scan.  Continue to see Dr. Hurtado.  See me in approximately 4 weeks.    2/28/2024: Underwent an Vinicio Shayne type esophagectomy without immediate complications.  There was some concern over dehiscence of the anastomosis and he received a stent.  He was also asked to not consume any food by mouth and was receiving nutrition through a jejunostomy tube.  At the time of this visit feeling progressively better but still poorly.  Very tired and lacking energy to do anything.  Sleeping poorly.  Has started to take clear liquids by mouth.  On exam he appears chronically ill but he does not seem in any distress.  He is pale but not jaundiced.  All surgical incisions are healing well and the lungs are diminished bilaterally but clear.  Heart regular.  Abdomen soft.  No edema.  Laboratory exams were reviewed.  He has macrocytic anemia with a hemoglobin of 11.9 g/dL and thrombocytosis with a platelet count of 562,000/mm³.  He also has monocytosis and basophilia but no leukocytosis at this  time.  The final review of pathology confirmed scattered foci of residual moderate to poorly differentiated adenocarcinoma at the gastroesophageal junction with changes consistent with prior neoadjuvant therapy.  There were ASSO stated dissecting pools of mucin with changes again consistent with previous therapy.  Scattered individual tumor cells and small clusters were present near the gastroesophageal junction and extending through the muscularis propria into the adventitial soft tissue/fat and spanning an area of 21 x 18 x 7 mm.  No definitive lymphovascular space invasion was identified.  All margins were negative.  Of 20 lymph nodes analyzed known had metastatic disease.  A discussion was had with him in regards to immunotherapy given in the adjuvant setting.  Explained side effects and potential complications.  I asked him to return in 3 weeks.    3/22/2024: Not feeling back to normal yet.  Still not eating much.  He had an episode of nausea and emesis this morning.  He has been afebrile.  He denies chest pains but does complain of some epigastric discomfort that seems unrelated to the previous discomfort that he had after the surgery.  He has been without dyspnea and does not have any more cough than usual.  As well no abdominal pain.  He continues to tolerate the enteral nutrition without difficulties.  No edema.  No skin rash.  On exam no distress.  Conversant and oriented.  Lungs diminished bilaterally.  Heart regular.  Abdomen with the jejunostomy tube in place.  Opening looks clean.  It is soft and without palpable tumors.  No edema.  Laboratory exams reviewed.  Discussed with him.  Offered him immunotherapy.  He remains somewhat reluctant to consider treatment with immunotherapy.  He has asked for more time to think about it.    4/12/2024: Somewhat better.  Perhaps more energetic.  Able to eat better after he had dilatation of the esophagus.  He has some pain that he localizes to the substernal area and  the mid epigastrium and right upper quadrant.  It is not as intense as before.  He continues to use his jejunostomy without any difficulties.  Defecating regularly.  Urinating without dysuria.  On exam alert, conversant and oriented.  No distress.  No jaundice.  Lungs are diminished bilaterally.  Heart is regular.  Abdomen is soft.  No edema.  Laboratory exams reviewed.  Discussed again the use of immunotherapy.  This time he is interested in pursuing.  Placed the treatment plan.  Discussed side effects and potential complications of the treatment with immunotherapy.  Discussed with him the rationale of treatment.  He is to begin as it is approved by insurance.  He will see me in approximately 5 weeks.    5/22/2024: Feeling somewhat better since the commencement of the steroids. His abdominal pain and nausea have resolved. She has been eating well and has not had any nausea or vomiting. He has been afebrile. No chest pain or increase in dyspnea. On exam alert and conversant. No jaundice and no oral lesions. Respirations not labored. The lungs diminished bilaterally and heart regular. Abdomen soft and not tender. No edema. Reviewed and discussed with him the laboratory exams. Gave him instructions in writing to continue to taper down the prednisone. Hold the immunotherapy given the possibility of enteritis as a result of it.     The following portions of the patient's history were reviewed and updated as appropriate: allergies, current medications, past family history, past medical history, past social history, past surgical history and problem list.    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    Anxiety     Brain fog     COVID 02/2023    Diverticulosis of large intestine without perforation or abscess without bleeding 10/02/2023    Esophageal  cancer 10/2023    Generalized headaches     GERD (gastroesophageal reflux disease)     Hand tingling     History of blood clot in brain 2020    had thrombectomy    History of cancer chemotherapy 12/2023    History of kidney stones     History of radiation therapy 11/2023    Hyperlipidemia     Jejunostomy tube present     Port-A-Cath in place     Stroke 05/30/2020    Thyroid nodule     Tiredness     Trouble swallowing      Past Surgical History:   Procedure Laterality Date    ADRENALECTOMY      COLONOSCOPY      ENDOSCOPY      ENDOSCOPY N/A 02/17/2024    Procedure: ESOPHAGOGASTRODUODENOSCOPY WITH STENTING UNDER FLUROSCOPY GUIDENCE WITH ESOPHOGRAM;  Surgeon: Saurabh Hurtado MD;  Location: [facility] MAIN OR;  Service: Gastroenterology;  Laterality: N/A;    ENDOSCOPY N/A 3/8/2024    Procedure: ESOPHAGOGASTRODUODENOSCOPY WITH STENT REMOVAL;  Surgeon: Saurabh Hurtado MD;  Location: [facility] ENDOSCOPY;  Service: Gastroenterology;  Laterality: N/A;  Esophageal leak    ENDOSCOPY N/A 4/5/2024    Procedure: ESOPHAGOGASTRODUODENOSCOPY WITH BALLOON DILATATION #6, #7, #8, #9, #10, #11, #12  AND GASTROGRAFIN WITH FLOURO;  Surgeon: Saurabh Hurtado MD;  Location: [facility] ENDOSCOPY;  Service: Gastroenterology;  Laterality: N/A;  PRE OP - ESOPHAGEAL CANCER  POST OP - ANASTOMOSIS STRICTURE    ENDOSCOPY N/A 5/7/2024    Procedure: ESOPHAGOGASTRODUODENOSCOPY WITH DILATATION (BALLOON 10MM-12MM, 12MM-15MM, 15MM-18MM,UP TO 17MM) AND INTRAOPERATIVE ESOPHAGRAM;  Surgeon: Saurabh Hurtado MD;  Location: [facility] ENDOSCOPY;  Service: Gastroenterology;  Laterality: N/A;    ESOPHAGOGASTRECTOMY N/A 02/09/2024    Procedure: BRONCHOSCOPY, EGD, ESOPHAGECTOMY ALTAGRACIA-FABIOLA WITH DAVINCI ROBOT, LAPAROSCOPY, RIGHT THORACOSCOPY CONVERTED TO THORACOTOMY, FEEDING JEJUNOSTOMY TUBE PLACEMENT, INTERCOSTAL NERVE BLOCKS;  Surgeon: Saurabh Hurtado MD;  Location: [facility] MAIN OR;  Service: Robotics - DaVinci;  Laterality: N/A;    ESOPHAGUS SURGERY  10/2023    biopsy    KIDNEY STONE SURGERY       OTHER SURGICAL HISTORY  2020    loop recorder PLACED AND REMOVED    THROMBECTOMY  2020    UPPER ENDOSCOPIC ULTRASOUND W/ FNA N/A 10/27/2023    Procedure: ENDOSCOPIC ULTRASOUND WITH STAGING AND FINE NEEDLE ASPIRATION X2 AREAS;  Surgeon: Joselyn Savage MD;  Location: [facility] ENDOSCOPY;  Service: Gastroenterology;  Laterality: N/A;  POST:    VENOUS ACCESS DEVICE (PORT) INSERTION Right 11/13/2023    Procedure: INSERTION VENOUS ACCESS DEVICE;  Surgeon: Saurabh Hurtado MD;  Location: [facility] MAIN OR;  Service: Thoracic;  Laterality: Right;       Current Outpatient Medications:     acetaminophen (TYLENOL) 325 MG tablet, Take 2 tablets by mouth Every 6 (Six) Hours As Needed for Mild Pain., Disp: , Rfl:     ALPRAZolam (XANAX) 0.25 MG tablet, Take 1 tablet by mouth 3 (Three) Times a Day As Needed for Anxiety. for anxiety, Disp: 90 tablet, Rfl: 3    aspirin 81 MG chewable tablet, Administer 1 tablet per J tube Daily. (Patient taking differently: Chew 1 tablet Daily.), Disp: , Rfl:     Evolocumab (REPATHA) solution prefilled syringe injection, Inject 1 mL under the skin into the appropriate area as directed Every 14 (Fourteen) Days., Disp: 6 mL, Rfl: 3    FLUoxetine (PROzac) 20 MG/5ML solution, Take 5 mL by mouth Daily., Disp: 150 mL, Rfl: 1    fluticasone (FLONASE) 50 MCG/ACT nasal spray, 2 sprays into the nostril(s) as directed by provider Daily., Disp: , Rfl:     lidocaine-prilocaine (EMLA) 2.5-2.5 % cream, Apply 1 application  topically to the appropriate area as directed As Needed (apply 60 minutes prior to port access)., Disp: 30 g, Rfl: 2    ondansetron (ZOFRAN) 8 MG tablet, Take 1 tablet by mouth 3 (Three) Times a Day As Needed for Nausea or Vomiting., Disp: 30 tablet, Rfl: 5    predniSONE (DELTASONE) 20 MG tablet, Take 2 tablets by mouth Daily. Take for 5 days. Then reduce to one and one half tablets for 5 days and then to only one tablet for 5 days., Disp: 60 tablet, Rfl: 1    predniSONE (DELTASONE) 5 MG tablet, Take 1  tablet by mouth Daily. Start on , Disp: 30 tablet, Rfl: 0    Allergies   Allergen Reactions    Cephalosporins Anaphylaxis     Throat swelling    Dye  [Contrast Dye (Echo Or Unknown Ct/Mr)] Hives    Iodinated Contrast Media Hives    Sulfa Antibiotics Hives    Sulfate Hives    Zetia [Ezetimibe] Other (See Comments) and Myalgia     Made tired    Statins Myalgia     Myalgia and fatique     Family History   Problem Relation Age of Onset    Arthritis Mother     Hypertension Mother     Heart failure Mother 73        Cause of death    Arthritis Father     Hypertension Father     Kidney cancer Father 57        Cause of death. Was a smoker    Heart disease Brother     Esophageal cancer Brother 59        Cause of death. Has a heavy drinker    Malig Hyperthermia Neg Hx      Cancer-related family history includes Esophageal cancer (age of onset: 59) in his brother; Kidney cancer (age of onset: 57) in his father.    Social History     Tobacco Use    Smoking status: Former     Current packs/day: 0.00     Average packs/day: 1 pack/day for 37.0 years (37.0 ttl pk-yrs)     Types: Cigars, Cigarettes     Start date: 1985     Quit date: 2022     Years since quittin.3     Passive exposure: Past    Smokeless tobacco: Never    Tobacco comments:     1 packs= stopped 10 years ago and continued cigars   2 cigars a day; has stopped cigars as of    Vaping Use    Vaping status: Never Used   Substance Use Topics    Alcohol use: Not Currently     Comment: Has now been abstinent since     Drug use: Never     Social History     Social History Narrative    Not on file      ROS:     Review of Systems   Constitutional:  Positive for fatigue. Negative for activity change, appetite change, chills, diaphoresis, fever and unexpected weight change.   HENT:  Negative for congestion, dental problem, drooling, ear discharge, ear pain, facial swelling, hearing loss, mouth sores, nosebleeds, postnasal drip, rhinorrhea, sinus  "pressure, sinus pain, sneezing, sore throat, tinnitus, trouble swallowing and voice change.    Eyes:  Negative for photophobia, pain, discharge, redness, itching and visual disturbance.   Respiratory:  Negative for apnea, cough, choking, chest tightness, shortness of breath, wheezing and stridor.    Cardiovascular:  Negative for chest pain, palpitations and leg swelling.   Gastrointestinal:  Negative for abdominal distention, abdominal pain, anal bleeding, blood in stool, constipation, diarrhea, nausea, rectal pain and vomiting.   Endocrine: Negative for cold intolerance, heat intolerance, polydipsia and polyuria.   Genitourinary:  Negative for decreased urine volume, difficulty urinating, dysuria, flank pain, frequency, genital sores, hematuria and urgency.   Musculoskeletal:  Negative for arthralgias, back pain, gait problem, joint swelling, myalgias, neck pain and neck stiffness.   Skin:  Negative for color change, pallor and rash.   Neurological:  Negative for dizziness, tremors, seizures, syncope, facial asymmetry, speech difficulty, weakness, light-headedness, numbness and headaches.   Hematological:  Negative for adenopathy. Does not bruise/bleed easily.   Psychiatric/Behavioral:  Negative for agitation, behavioral problems, confusion, decreased concentration, hallucinations, self-injury, sleep disturbance and suicidal ideas. The patient is nervous/anxious.      Objective:    Vitals:    05/22/24 0904   BP: 96/68   Pulse: 69   Temp: 97.8 °F (36.6 °C)   TempSrc: Oral   SpO2: 97%   Weight: 71.8 kg (158 lb 3.2 oz)   Height: 180.3 cm (71\")   PainSc: 0-No pain     Body mass index is 22.06 kg/m².  ECOG  (0) Fully active, able to carry on all predisease performance without restriction    Physical Exam:     Physical Exam  Constitutional:       General: He is not in acute distress.     Appearance: Normal appearance. He is not ill-appearing, toxic-appearing or diaphoretic.      Comments: Slender, well-built.  Seems " older than the stated age.  No distress.   HENT:      Head: Normocephalic and atraumatic.      Right Ear: External ear normal. There is no impacted cerumen.      Left Ear: External ear normal. There is no impacted cerumen.      Nose: Nose normal. No congestion or rhinorrhea.      Mouth/Throat:      Mouth: Mucous membranes are moist.      Pharynx: Oropharynx is clear. No oropharyngeal exudate or posterior oropharyngeal erythema.      Comments: Oral cavity reveals poor dentition and poor dental hygiene.  No mucosal ulcerations are present.  Eyes:      General: No scleral icterus.        Right eye: No discharge.         Left eye: No discharge.      Conjunctiva/sclera: Conjunctivae normal.      Pupils: Pupils are equal, round, and reactive to light.   Neck:      Vascular: No carotid bruit.   Cardiovascular:      Rate and Rhythm: Normal rate and regular rhythm.      Pulses: Normal pulses.      Heart sounds: Normal heart sounds. No murmur heard.     No friction rub. No gallop.   Pulmonary:      Effort: No respiratory distress.      Breath sounds: No stridor. No wheezing, rhonchi or rales.      Comments: Breath sounds are diminished bilaterally.  Chest:      Chest wall: No tenderness.      Comments: A right thoracic surgical incision is healing well.  Smaller surgical incisions for drains also healing well.  Abdominal:      General: Abdomen is flat. Bowel sounds are normal. There is no distension.      Palpations: Abdomen is soft. There is no mass.      Tenderness: There is no abdominal tenderness. There is no right CVA tenderness, left CVA tenderness, guarding or rebound.      Comments: Jejunostomy in place.  Opening clean.   Musculoskeletal:         General: No swelling, tenderness, deformity or signs of injury.      Cervical back: No rigidity or tenderness.      Right lower leg: No edema.      Left lower leg: No edema.      Comments: There is clubbing of the fingers in both hands   Lymphadenopathy:      Cervical: No  cervical adenopathy.   Skin:     General: Skin is warm and dry.      Coloration: Skin is not jaundiced or pale.      Findings: No bruising, erythema or rash.   Neurological:      General: No focal deficit present.      Mental Status: He is alert and oriented to person, place, and time.      Cranial Nerves: No cranial nerve deficit.      Motor: No weakness.      Coordination: Coordination normal.      Gait: Gait normal.   Psychiatric:         Mood and Affect: Mood normal.         Behavior: Behavior normal.         Thought Content: Thought content normal.         Judgment: Judgment normal.     YOSELIN Mueller MD performed a physical exam on 5/22/2024 as documented above.     Lab Results - Last 18 Months   Lab Units 05/22/24  0913 05/06/24  1202 05/02/24  1343   WBC 10*3/mm3 15.47* 7.8 6.84   HEMOGLOBIN g/dL 13.1 13.0* 12.7*   HEMATOCRIT % 40.9 39.9 39.4   PLATELETS 10*3/mm3 377  --  399   EXTERNAL PLATELETS x10(3)/uL  --  405*  --    MCV fL 102.0* 99.7 102.6*     Lab Results - Last 18 Months   Lab Units 05/22/24  0913 05/02/24  1411 04/18/24  1328 04/12/24  0951 03/22/24  1347 03/08/24  1124   SODIUM mmol/L 139  --   --  133* 140 137   POTASSIUM mmol/L 3.7  --   --  4.5 4.9 4.2   CHLORIDE mmol/L 102  --   --  95* 100 100   CO2 mmol/L 28.1  --   --  32.0* 30.0* 27.3   BUN mg/dL 18  --   --  19 19 16   CREATININE mg/dL 0.82 0.80 0.70 0.66* 0.70* 0.71*   CALCIUM mg/dL 9.4  --   --  9.5 9.7 9.3   BILIRUBIN mg/dL <0.2  --   --  <0.2  --  0.2   ALK PHOS U/L 83  --   --  81  --  92   ALT (SGPT) U/L 39  --   --  39  --  20   AST (SGOT) U/L 20  --   --  28  --  14   GLUCOSE mg/dL 90  --   --  104* 121* 93     Lab Results   Component Value Date    GLUCOSE 90 05/22/2024    BUN 18 05/22/2024    CREATININE 0.82 05/22/2024    EGFRIFNONA 67 02/24/2022    BCR 22.0 05/22/2024    K 3.7 05/22/2024    CO2 28.1 05/22/2024    CALCIUM 9.4 05/22/2024    ALBUMIN 3.8 05/22/2024    LABIL2 1.3 09/25/2018    AST 20 05/22/2024    ALT 39  05/22/2024     Lab Results   Component Value Date    FOLATE 4.11 (L) 11/12/2021     Lab Results   Component Value Date    KFVZULLS96 1,108 (H) 12/22/2023     Uric Acid   Date Value Ref Range Status   12/22/2023 3.9 3.4 - 7.0 mg/dL Final     Lab Results   Component Value Date    SEDRATE 11 04/16/2021     Lab Results   Component Value Date    PSA 0.163 04/06/2021     Assessment & Plan     Assessment:  Adenocarcinoma of the gastroesophageal junction T2N1M0, microsatellite stable, low mutation burden, Her2 positive and PD-L1 expression negative: Completed neoadjuvant concurrent carboplatin and paclitaxel with radiation and underwent Vinicio Shayne type esophagectomy.  Recovering well.  Began treatment with nivolumab as discussed on the previous note. After two doses he developed persistent abdominal pain and diarrhea. Scans of the abdomen suggested enteritis. Treatment with dexamethasone has resulted in improvement. Continue steroids with plan to wean off in a short period of time since there is clear response.   Reviewed all the laboratory exams. Discussed with him.   3.  Tobacco smoker: Abstinent.   3.  He will return to see me in 4 weeks. Gave instructions in writing to wean prednisone off.     Plan:  As above.     Oliverio Mueller MD on 5/22/2024 at 12:36 PM.

## 2024-05-29 ENCOUNTER — TELEPHONE (OUTPATIENT)
Dept: ONCOLOGY | Facility: CLINIC | Age: 54
End: 2024-05-29

## 2024-05-29 NOTE — TELEPHONE ENCOUNTER
Caller: Jourdan Lebron    Relationship: Self    Best call back number: [phone]    Who are you requesting to speak with (clinical staff, provider,  specific staff member): scheduling    Do you know the name of the person who called: patient    What was the call regarding: pt advises that infusion treatments are on hold until further notice per last visit with dr. gabriel due to having a reaction. please cancel 05/30's appt

## 2024-06-10 RX ORDER — FAMOTIDINE 40 MG/5ML
40 POWDER, FOR SUSPENSION ORAL
COMMUNITY
Start: 2024-02-26

## 2024-06-11 ENCOUNTER — LAB (OUTPATIENT)
Dept: FAMILY MEDICINE CLINIC | Facility: CLINIC | Age: 54
End: 2024-06-11
Payer: COMMERCIAL

## 2024-06-11 ENCOUNTER — OFFICE VISIT (OUTPATIENT)
Dept: FAMILY MEDICINE CLINIC | Facility: CLINIC | Age: 54
End: 2024-06-11
Payer: COMMERCIAL

## 2024-06-11 VITALS
DIASTOLIC BLOOD PRESSURE: 68 MMHG | BODY MASS INDEX: 21.65 KG/M2 | TEMPERATURE: 97.8 F | OXYGEN SATURATION: 98 % | SYSTOLIC BLOOD PRESSURE: 102 MMHG | HEIGHT: 71 IN | HEART RATE: 69 BPM | WEIGHT: 154.6 LBS

## 2024-06-11 DIAGNOSIS — Z86.73 HISTORY OF CEREBROVASCULAR ACCIDENT: ICD-10-CM

## 2024-06-11 DIAGNOSIS — N20.0 CALCULUS OF KIDNEY: ICD-10-CM

## 2024-06-11 DIAGNOSIS — M48.02 SPINAL STENOSIS OF CERVICAL REGION: ICD-10-CM

## 2024-06-11 DIAGNOSIS — E78.5 HYPERLIPIDEMIA, UNSPECIFIED HYPERLIPIDEMIA TYPE: ICD-10-CM

## 2024-06-11 DIAGNOSIS — F33.1 MAJOR DEPRESSIVE DISORDER, RECURRENT EPISODE, MODERATE: Chronic | ICD-10-CM

## 2024-06-11 DIAGNOSIS — C15.5 MALIGNANT NEOPLASM OF LOWER THIRD OF ESOPHAGUS: Primary | ICD-10-CM

## 2024-06-11 DIAGNOSIS — E53.8 VITAMIN B12 DEFICIENCY: ICD-10-CM

## 2024-06-11 DIAGNOSIS — R11.0 NAUSEA: ICD-10-CM

## 2024-06-11 DIAGNOSIS — R21 RASH: ICD-10-CM

## 2024-06-11 DIAGNOSIS — Z87.891 HISTORY OF TOBACCO ABUSE: ICD-10-CM

## 2024-06-11 DIAGNOSIS — M54.6 THORACIC BACK PAIN, UNSPECIFIED BACK PAIN LATERALITY, UNSPECIFIED CHRONICITY: ICD-10-CM

## 2024-06-11 DIAGNOSIS — R53.83 OTHER FATIGUE: ICD-10-CM

## 2024-06-11 DIAGNOSIS — R94.31 ABNORMAL ELECTROCARDIOGRAM: ICD-10-CM

## 2024-06-11 LAB
ALBUMIN SERPL-MCNC: 4 G/DL (ref 3.5–5.2)
ALBUMIN/GLOB SERPL: 1.3 G/DL
ALP SERPL-CCNC: 80 U/L (ref 39–117)
ALT SERPL W P-5'-P-CCNC: 30 U/L (ref 1–41)
ANION GAP SERPL CALCULATED.3IONS-SCNC: 12.3 MMOL/L (ref 5–15)
AST SERPL-CCNC: 19 U/L (ref 1–40)
BASOPHILS # BLD AUTO: 0.03 10*3/MM3 (ref 0–0.2)
BASOPHILS NFR BLD AUTO: 0.3 % (ref 0–1.5)
BILIRUB SERPL-MCNC: 0.2 MG/DL (ref 0–1.2)
BUN SERPL-MCNC: 11 MG/DL (ref 6–20)
BUN/CREAT SERPL: 11.6 (ref 7–25)
CALCIUM SPEC-SCNC: 9.7 MG/DL (ref 8.6–10.5)
CHLORIDE SERPL-SCNC: 104 MMOL/L (ref 98–107)
CHOLEST SERPL-MCNC: 155 MG/DL (ref 0–200)
CO2 SERPL-SCNC: 26.7 MMOL/L (ref 22–29)
CREAT SERPL-MCNC: 0.95 MG/DL (ref 0.76–1.27)
DEPRECATED RDW RBC AUTO: 41.2 FL (ref 37–54)
EGFRCR SERPLBLD CKD-EPI 2021: 96.3 ML/MIN/1.73
EOSINOPHIL # BLD AUTO: 0.22 10*3/MM3 (ref 0–0.4)
EOSINOPHIL NFR BLD AUTO: 2.1 % (ref 0.3–6.2)
ERYTHROCYTE [DISTWIDTH] IN BLOOD BY AUTOMATED COUNT: 12.1 % (ref 12.3–15.4)
GLOBULIN UR ELPH-MCNC: 3 GM/DL
GLUCOSE SERPL-MCNC: 88 MG/DL (ref 65–99)
HCT VFR BLD AUTO: 42.8 % (ref 37.5–51)
HDLC SERPL-MCNC: 87 MG/DL (ref 40–60)
HGB BLD-MCNC: 14.5 G/DL (ref 13–17.7)
IMM GRANULOCYTES # BLD AUTO: 0.04 10*3/MM3 (ref 0–0.05)
IMM GRANULOCYTES NFR BLD AUTO: 0.4 % (ref 0–0.5)
LDLC SERPL CALC-MCNC: 48 MG/DL (ref 0–100)
LDLC/HDLC SERPL: 0.52 {RATIO}
LYMPHOCYTES # BLD AUTO: 0.62 10*3/MM3 (ref 0.7–3.1)
LYMPHOCYTES NFR BLD AUTO: 5.9 % (ref 19.6–45.3)
MAGNESIUM SERPL-MCNC: 2.2 MG/DL (ref 1.6–2.6)
MCH RBC QN AUTO: 32.4 PG (ref 26.6–33)
MCHC RBC AUTO-ENTMCNC: 33.9 G/DL (ref 31.5–35.7)
MCV RBC AUTO: 95.5 FL (ref 79–97)
MONOCYTES # BLD AUTO: 0.59 10*3/MM3 (ref 0.1–0.9)
MONOCYTES NFR BLD AUTO: 5.7 % (ref 5–12)
NEUTROPHILS NFR BLD AUTO: 8.93 10*3/MM3 (ref 1.7–7)
NEUTROPHILS NFR BLD AUTO: 85.6 % (ref 42.7–76)
NRBC BLD AUTO-RTO: 0 /100 WBC (ref 0–0.2)
PLATELET # BLD AUTO: 312 10*3/MM3 (ref 140–450)
PMV BLD AUTO: 9.6 FL (ref 6–12)
POTASSIUM SERPL-SCNC: 4.3 MMOL/L (ref 3.5–5.2)
PROT SERPL-MCNC: 7 G/DL (ref 6–8.5)
RBC # BLD AUTO: 4.48 10*6/MM3 (ref 4.14–5.8)
SODIUM SERPL-SCNC: 143 MMOL/L (ref 136–145)
TRIGL SERPL-MCNC: 114 MG/DL (ref 0–150)
TSH SERPL DL<=0.05 MIU/L-ACNC: 0.46 UIU/ML (ref 0.27–4.2)
URATE SERPL-MCNC: 5.2 MG/DL (ref 3.4–7)
VIT B12 BLD-MCNC: 784 PG/ML (ref 211–946)
VLDLC SERPL-MCNC: 20 MG/DL (ref 5–40)
WBC NRBC COR # BLD AUTO: 10.43 10*3/MM3 (ref 3.4–10.8)

## 2024-06-11 PROCEDURE — 36415 COLL VENOUS BLD VENIPUNCTURE: CPT

## 2024-06-11 PROCEDURE — 80061 LIPID PANEL: CPT | Performed by: PREVENTIVE MEDICINE

## 2024-06-11 PROCEDURE — 80050 GENERAL HEALTH PANEL: CPT | Performed by: PREVENTIVE MEDICINE

## 2024-06-11 PROCEDURE — 99214 OFFICE O/P EST MOD 30 MIN: CPT | Performed by: PREVENTIVE MEDICINE

## 2024-06-11 PROCEDURE — 82607 VITAMIN B-12: CPT | Performed by: PREVENTIVE MEDICINE

## 2024-06-11 PROCEDURE — 84550 ASSAY OF BLOOD/URIC ACID: CPT | Performed by: PREVENTIVE MEDICINE

## 2024-06-11 PROCEDURE — 83735 ASSAY OF MAGNESIUM: CPT | Performed by: PREVENTIVE MEDICINE

## 2024-06-12 ENCOUNTER — TELEPHONE (OUTPATIENT)
Dept: FAMILY MEDICINE CLINIC | Facility: CLINIC | Age: 54
End: 2024-06-12
Payer: COMMERCIAL

## 2024-06-12 NOTE — PROGRESS NOTES
Subjective   Jourdan Lebron is a 54 y.o. male presents for   Chief Complaint   Patient presents with    Hypertension     3 month follow up patient is fasting.  Sees endo next at [place].  Seeing Dr. Graves for an endoscope next week  [facility]         Health Maintenance Due   Topic Date Due    ZOSTER VACCINE (1 of 2) Never done    COVID-19 Vaccine (3 - Pfizer risk series) 10/15/2021    Pneumococcal Vaccine 0-64 (3 of 3 - PCV) 01/28/2022       Hypertension  Associated symptoms include chest pain. Pertinent negatives include no palpitations.      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    The patient has reintroduced solid foods into his diet and has successfully weaned himself from tube feeds. His weight has remained stable, however, he has been experiencing fluctuations in his blood glucose levels. He is scheduled to consult with an endocrinologist in [place]. He experiences intermittent throat soreness. His prednisone dosage was initially 40 mg, which was subsequently reduced to 5 mg by Dr. Woods. He experienced a reaction to immunotherapy, characterized by widespread itching, which he suspects may be a side effect of the prednisone. Concurrently, his blood glucose levels fluctuated, reaching up to 200, and dropping to 38. Despite being on 18 hours of tube feeding, he is uncertain if this contributed to this change. He denies hemoptysis, wheezing, or visual disturbances. He reports occasional chest discomfort, which he attributes to postprandial gas. His urinary and bowel functions are normal, with no hematuria or hematochezia. His back pain has improved, but  "he experiences intermittent pain. He is not currently taking vitamin B12 and inquires about the necessity of multivitamins due to his reduced food intake. His nausea is intermittent, with varying levels of discomfort. He is aware of his nutritional intake and is making efforts to increase his protein intake and limit his carbohydrate intake. He discontinued Repatha temporarily due to his surgery. His depression is intermittent, but he denies suicidal or homicidal ideations. He reports feeling down due to his decreased activity. He has a 8 mm kidney stone, which he believes has not passed. He denies significant pain in his cervical region, but experiences numbness in his hands during cold weather. He continues to experience fatigue and plans to return to work. His rash has improved, which he attributes to his immunotherapy. He discontinued the immunotherapy ointment due to severe side effects.    Vitals:    06/11/24 1056 06/11/24 1100   BP: 106/65 102/68   BP Location: Left arm Right arm   Patient Position: Sitting Sitting   Cuff Size: Adult Adult   Pulse: 65 69   Temp: 97.8 °F (36.6 °C)    TempSrc: Temporal    SpO2: 98%    Weight: 70.1 kg (154 lb 9.6 oz)    Height: 180.3 cm (71\")      Body mass index is 21.56 kg/m².    Current Outpatient Medications on File Prior to Visit   Medication Sig Dispense Refill    acetaminophen (TYLENOL) 325 MG tablet Take 2 tablets by mouth Every 6 (Six) Hours As Needed for Mild Pain.      ALPRAZolam (XANAX) 0.25 MG tablet Take 1 tablet by mouth 3 (Three) Times a Day As Needed for Anxiety. for anxiety 90 tablet 3    aspirin 81 MG chewable tablet Administer 1 tablet per J tube Daily. (Patient taking differently: Chew 1 tablet Daily.)      Evolocumab (REPATHA) solution prefilled syringe injection Inject 1 mL under the skin into the appropriate area as directed Every 14 (Fourteen) Days. 6 mL 3    famotidine (PEPCID) 40 mg/5 mL suspension Take 5 mL by mouth.      FLUoxetine (PROzac) 20 MG/5ML " solution Take 5 mL by mouth Daily. 150 mL 1    fluticasone (FLONASE) 50 MCG/ACT nasal spray 2 sprays into the nostril(s) as directed by provider Daily.      lidocaine-prilocaine (EMLA) 2.5-2.5 % cream Apply 1 application  topically to the appropriate area as directed As Needed (apply 60 minutes prior to port access). 30 g 2    ondansetron (ZOFRAN) 8 MG tablet Take 1 tablet by mouth 3 (Three) Times a Day As Needed for Nausea or Vomiting. 30 tablet 5    predniSONE (DELTASONE) 20 MG tablet Take 2 tablets by mouth Daily. Take for 5 days. Then reduce to one and one half tablets for 5 days and then to only one tablet for 5 days. 60 tablet 1    predniSONE (DELTASONE) 5 MG tablet Take 1 tablet by mouth Daily. Start on June 8 of 2024 30 tablet 0     No current facility-administered medications on file prior to visit.       The following portions of the patient's history were reviewed and updated as appropriate: allergies, current medications, past family history, past medical history, past social history, past surgical history, and problem list.    Review of Systems   Constitutional:  Positive for activity change, appetite change and fatigue. Negative for fever.   Cardiovascular:  Positive for chest pain. Negative for palpitations and leg swelling.   Gastrointestinal:  Positive for abdominal pain, nausea and indigestion.   Musculoskeletal:  Positive for arthralgias, back pain and myalgias.   Skin:  Positive for rash.   Neurological:  Positive for weakness.   Psychiatric/Behavioral:  Positive for dysphoric mood.        Objective   Physical Exam  Vitals reviewed.   Constitutional:       General: He is not in acute distress.     Appearance: Normal appearance. He is well-developed. He is not ill-appearing or toxic-appearing.   HENT:      Head: Normocephalic and atraumatic.      Right Ear: Tympanic membrane, ear canal and external ear normal.      Left Ear: Tympanic membrane, ear canal and external ear normal.      Nose: Nose  normal.      Mouth/Throat:      Mouth: Mucous membranes are moist.   Eyes:      Extraocular Movements: Extraocular movements intact.      Conjunctiva/sclera: Conjunctivae normal.      Pupils: Pupils are equal, round, and reactive to light.   Cardiovascular:      Rate and Rhythm: Normal rate and regular rhythm.      Heart sounds: Normal heart sounds.   Pulmonary:      Effort: Pulmonary effort is normal.      Comments: Breath sounds decreased bilaterally  Abdominal:      General: Bowel sounds are normal. There is no distension.      Palpations: Abdomen is soft. There is no mass.      Tenderness: There is abdominal tenderness.      Comments: G-tube in still in place and patient has multiple scars with some mild tenderness in the right upper quadrant and epigastric area.  No masses were palpated   Musculoskeletal:         General: Tenderness present. Normal range of motion.      Cervical back: Neck supple.   Skin:     General: Skin is warm.   Neurological:      General: No focal deficit present.      Mental Status: He is alert and oriented to person, place, and time.      Motor: Weakness present.   Psychiatric:         Mood and Affect: Mood normal.         Behavior: Behavior normal.       Physical Exam      PHQ-9 Total Score:    Results           Assessment & Plan   Diagnoses and all orders for this visit:    1. Malignant neoplasm of lower third of esophagus (Primary)    2. Vitamin B12 deficiency  -     CBC Auto Differential; Future  -     Vitamin B12; Future    3. Nausea    4. Major depressive disorder, recurrent episode, moderate  -     Magnesium; Future  -     TSH Rfx On Abnormal To Free T4; Future    5. Hyperlipidemia, unspecified hyperlipidemia type  -     Comprehensive Metabolic Panel; Future  -     Lipid Panel; Future    6. History of tobacco abuse    7. History of cerebrovascular accident    8. Calculus of kidney  -     Uric Acid; Future    9. Abnormal electrocardiogram    10. Spinal stenosis of cervical  region    11. Thoracic back pain, unspecified back pain laterality, unspecified chronicity    12. Rash    13. Other fatigue      Assessment & Plan      Patient Instructions     Health Maintenance Due   Topic Date Due    ZOSTER VACCINE (1 of 2) Never done    COVID-19 Vaccine (3 - Pfizer risk series) 10/15/2021    Pneumococcal Vaccine 0-64 (3 of 3 - PCV) 01/28/2022           Patient or patient representative verbalized consent for the use of Ambient Listening during the visit with  Justa Castano MD for chart documentation. 6/11/2024  20:56 EDT

## 2024-06-17 ENCOUNTER — ANESTHESIA EVENT (OUTPATIENT)
Dept: GASTROENTEROLOGY | Facility: HOSPITAL | Age: 54
End: 2024-06-17
Payer: COMMERCIAL

## 2024-06-17 ENCOUNTER — TELEPHONE (OUTPATIENT)
Dept: SURGERY | Facility: CLINIC | Age: 54
End: 2024-06-17
Payer: COMMERCIAL

## 2024-06-17 NOTE — ANESTHESIA PREPROCEDURE EVALUATION
Anesthesia Evaluation     Patient summary reviewed and Nursing notes reviewed   no history of anesthetic complications:   NPO Solid Status: > 8 hours  NPO Liquid Status: > 2 hours           Airway   Dental      Pulmonary    (+) a smoker Former,  Cardiovascular     ECG reviewed  PT is on anticoagulation therapy    (+) valvular problems/murmurs AI and TI, hyperlipidemia      Neuro/Psych  (+) CVA, headaches, numbness, psychiatric history Anxiety and Depression  GI/Hepatic/Renal/Endo    (+) GERD, renal disease- stones, thyroid problem thyroid nodules    Musculoskeletal     (+) neck pain, radiculopathy  Abdominal    Substance History      OB/GYN          Other      history of cancer    ROS/Med Hx Other: Additional History:  RBBB, esophageal cancer with lymph node mets, dysphagia/stricture, hypogonadism, allergies, fatigue, near syncope, hyperhidrosis, erythrocytosis, brain fog, h/o radiation    Echo:    Echocardiogram Findings    Left Ventricle Left ventricular ejection fraction appears to be 51 - 55%.     Normal left ventricular cavity size and wall thickness noted. All left ventricular wall segments contract normally. Left ventricular diastolic function was normal.  Right Ventricle Normal right ventricular cavity size, wall thickness, systolic function and septal motion noted.  Left Atrium Normal left atrial size and volume noted.  Right Atrium Normal right atrial cavity size noted.  Aortic Valve The aortic valve is grossly normal in structure. Mild aortic valve regurgitation is present. No aortic valve stenosis is present.  Mitral Valve The mitral valve is grossly normal in structure. Trace to mild mitral valve regurgitation is present. No significant mitral valve stenosis is present.  Tricuspid Valve The tricuspid valve is grossly normal in structure. Mild tricuspid valve regurgitation is present. Estimated right ventricular systolic pressure from tricuspid regurgitation is mildly elevated (35-45 mmHg). No evidence  of pulmonary hypertension is present. No tricuspid valve stenosis is present.  Pulmonic Valve The pulmonic valve is not well visualized. The pulmonic valve is grossly normal in structure. There is trace pulmonic valve regurgitation present. There is no pulmonic valve stenosis present.  Greater Vessels No dilation of the aortic root is present. No dilation of the sinuses of Valsalva is present.  Pericardium There is no evidence of pericardial effusion. .        PSH:  KIDNEY STONE SURGERY ADRENALECTOMY  THROMBECTOMY UPPER ENDOSCOPIC ULTRASOUND W/ FNA  VENOUS ACCESS DEVICE (PORT) INSERTION ENDOSCOPY  ESOPHAGUS SURGERY COLONOSCOPY  OTHER SURGICAL HISTORY ESOPHAGOGASTRECTOMY  ENDOSCOPY ENDOSCOPY  ENDOSCOPY ENDOSCOPY              Anesthesia Plan    ASA 3     general     (Patient identified; pre-operative vital signs, all relevant labs/studies, complete medical/surgical/anesthetic history, full medication list, full allergy list, and NPO status obtained/reviewed; physical assessment performed; anesthetic options, side effects, potential complications, risks, and benefits discussed; questions answered; written anesthesia consent obtained; patient cleared for procedure; anesthesia machine and equipment checked and functioning)  intravenous induction     Anesthetic plan, risks, benefits, and alternatives have been provided, discussed and informed consent has been obtained with: patient.    Plan discussed with CRNA and CAA.    CODE STATUS:

## 2024-06-17 NOTE — TELEPHONE ENCOUNTER
PT CALLED TO CONFIRM SURGERY ARRIVAL TIME, SAID HE REMEMBERED THE OTHER INSTRUCTIONS, PT STATED UNDERSTANDING AND WAS AGREEABLE

## 2024-06-18 ENCOUNTER — HOSPITAL ENCOUNTER (OUTPATIENT)
Facility: HOSPITAL | Age: 54
Setting detail: HOSPITAL OUTPATIENT SURGERY
Discharge: HOME | End: 2024-06-18
Attending: SURGERY | Admitting: SURGERY
Payer: COMMERCIAL

## 2024-06-18 ENCOUNTER — ANESTHESIA (OUTPATIENT)
Dept: GASTROENTEROLOGY | Facility: HOSPITAL | Age: 54
End: 2024-06-18
Payer: COMMERCIAL

## 2024-06-18 ENCOUNTER — PREP FOR SURGERY (OUTPATIENT)
Dept: OTHER | Facility: HOSPITAL | Age: 54
End: 2024-06-18
Payer: COMMERCIAL

## 2024-06-18 VITALS
SYSTOLIC BLOOD PRESSURE: 99 MMHG | HEIGHT: 71 IN | OXYGEN SATURATION: 100 % | RESPIRATION RATE: 18 BRPM | BODY MASS INDEX: 22.12 KG/M2 | TEMPERATURE: 97.9 F | WEIGHT: 158 LBS | DIASTOLIC BLOOD PRESSURE: 69 MMHG | HEART RATE: 56 BPM

## 2024-06-18 DIAGNOSIS — C15.4 MALIGNANT NEOPLASM OF MIDDLE THIRD OF ESOPHAGUS: Primary | ICD-10-CM

## 2024-06-18 PROCEDURE — 25010000002 ONDANSETRON PER 1 MG: Performed by: NURSE ANESTHETIST, CERTIFIED REGISTERED

## 2024-06-18 PROCEDURE — 25810000003 SODIUM CHLORIDE 0.9 % SOLUTION: Performed by: ANESTHESIOLOGY

## 2024-06-18 PROCEDURE — 25010000002 SUCCINYLCHOLINE PER 20 MG: Performed by: NURSE ANESTHETIST, CERTIFIED REGISTERED

## 2024-06-18 PROCEDURE — S0260 H&P FOR SURGERY: HCPCS | Performed by: SURGERY

## 2024-06-18 PROCEDURE — 25010000002 PROPOFOL 500 MG/50ML EMULSION: Performed by: NURSE ANESTHETIST, CERTIFIED REGISTERED

## 2024-06-18 PROCEDURE — C1726 CATH, BAL DIL, NON-VASCULAR: HCPCS | Performed by: SURGERY

## 2024-06-18 PROCEDURE — 25010000002 DEXAMETHASONE PER 1 MG: Performed by: NURSE ANESTHETIST, CERTIFIED REGISTERED

## 2024-06-18 PROCEDURE — 43249 ESOPH EGD DILATION <30 MM: CPT | Performed by: SURGERY

## 2024-06-18 RX ORDER — ONDANSETRON 2 MG/ML
INJECTION INTRAMUSCULAR; INTRAVENOUS AS NEEDED
Status: DISCONTINUED | OUTPATIENT
Start: 2024-06-18 | End: 2024-06-18 | Stop reason: SURG

## 2024-06-18 RX ORDER — LABETALOL HYDROCHLORIDE 5 MG/ML
5 INJECTION, SOLUTION INTRAVENOUS
Status: DISCONTINUED | OUTPATIENT
Start: 2024-06-18 | End: 2024-06-18 | Stop reason: HOSPADM

## 2024-06-18 RX ORDER — SUCCINYLCHOLINE CHLORIDE 20 MG/ML
INJECTION INTRAMUSCULAR; INTRAVENOUS AS NEEDED
Status: DISCONTINUED | OUTPATIENT
Start: 2024-06-18 | End: 2024-06-18 | Stop reason: SURG

## 2024-06-18 RX ORDER — SODIUM CHLORIDE 0.9 % (FLUSH) 0.9 %
10 SYRINGE (ML) INJECTION EVERY 12 HOURS SCHEDULED
Status: DISCONTINUED | OUTPATIENT
Start: 2024-06-18 | End: 2024-06-18 | Stop reason: HOSPADM

## 2024-06-18 RX ORDER — ONDANSETRON 2 MG/ML
4 INJECTION INTRAMUSCULAR; INTRAVENOUS ONCE AS NEEDED
Status: DISCONTINUED | OUTPATIENT
Start: 2024-06-18 | End: 2024-06-18 | Stop reason: HOSPADM

## 2024-06-18 RX ORDER — LIDOCAINE HYDROCHLORIDE 20 MG/ML
INJECTION, SOLUTION INFILTRATION; PERINEURAL AS NEEDED
Status: DISCONTINUED | OUTPATIENT
Start: 2024-06-18 | End: 2024-06-18 | Stop reason: SURG

## 2024-06-18 RX ORDER — NALOXONE HCL 0.4 MG/ML
0.4 VIAL (ML) INJECTION AS NEEDED
Status: DISCONTINUED | OUTPATIENT
Start: 2024-06-18 | End: 2024-06-18 | Stop reason: HOSPADM

## 2024-06-18 RX ORDER — DROPERIDOL 2.5 MG/ML
0.62 INJECTION, SOLUTION INTRAMUSCULAR; INTRAVENOUS ONCE AS NEEDED
Status: DISCONTINUED | OUTPATIENT
Start: 2024-06-18 | End: 2024-06-18 | Stop reason: HOSPADM

## 2024-06-18 RX ORDER — SODIUM CHLORIDE 0.9 % (FLUSH) 0.9 %
10 SYRINGE (ML) INJECTION AS NEEDED
Status: DISCONTINUED | OUTPATIENT
Start: 2024-06-18 | End: 2024-06-18 | Stop reason: HOSPADM

## 2024-06-18 RX ORDER — EPHEDRINE SULFATE 5 MG/ML
5 INJECTION INTRAVENOUS ONCE AS NEEDED
Status: DISCONTINUED | OUTPATIENT
Start: 2024-06-18 | End: 2024-06-18 | Stop reason: HOSPADM

## 2024-06-18 RX ORDER — ACETAMINOPHEN 325 MG/1
650 TABLET ORAL ONCE AS NEEDED
Status: DISCONTINUED | OUTPATIENT
Start: 2024-06-18 | End: 2024-06-18 | Stop reason: HOSPADM

## 2024-06-18 RX ORDER — PROPOFOL 10 MG/ML
INJECTION, EMULSION INTRAVENOUS AS NEEDED
Status: DISCONTINUED | OUTPATIENT
Start: 2024-06-18 | End: 2024-06-18 | Stop reason: SURG

## 2024-06-18 RX ORDER — HYDRALAZINE HYDROCHLORIDE 20 MG/ML
5 INJECTION INTRAMUSCULAR; INTRAVENOUS
Status: DISCONTINUED | OUTPATIENT
Start: 2024-06-18 | End: 2024-06-18 | Stop reason: HOSPADM

## 2024-06-18 RX ORDER — OXYCODONE HYDROCHLORIDE 5 MG/1
5 TABLET ORAL ONCE AS NEEDED
Status: DISCONTINUED | OUTPATIENT
Start: 2024-06-18 | End: 2024-06-18 | Stop reason: HOSPADM

## 2024-06-18 RX ORDER — IPRATROPIUM BROMIDE AND ALBUTEROL SULFATE 2.5; .5 MG/3ML; MG/3ML
3 SOLUTION RESPIRATORY (INHALATION) ONCE AS NEEDED
Status: DISCONTINUED | OUTPATIENT
Start: 2024-06-18 | End: 2024-06-18 | Stop reason: HOSPADM

## 2024-06-18 RX ORDER — DEXAMETHASONE SODIUM PHOSPHATE 4 MG/ML
INJECTION, SOLUTION INTRA-ARTICULAR; INTRALESIONAL; INTRAMUSCULAR; INTRAVENOUS; SOFT TISSUE AS NEEDED
Status: DISCONTINUED | OUTPATIENT
Start: 2024-06-18 | End: 2024-06-18 | Stop reason: SURG

## 2024-06-18 RX ORDER — FLUMAZENIL 0.1 MG/ML
0.2 INJECTION INTRAVENOUS AS NEEDED
Status: DISCONTINUED | OUTPATIENT
Start: 2024-06-18 | End: 2024-06-18 | Stop reason: HOSPADM

## 2024-06-18 RX ORDER — SODIUM CHLORIDE 9 MG/ML
9 INJECTION, SOLUTION INTRAVENOUS CONTINUOUS PRN
Status: DISCONTINUED | OUTPATIENT
Start: 2024-06-18 | End: 2024-06-18 | Stop reason: HOSPADM

## 2024-06-18 RX ADMIN — PROPOFOL 200 MG: 10 INJECTION, EMULSION INTRAVENOUS at 12:50

## 2024-06-18 RX ADMIN — ONDANSETRON 4 MG: 2 INJECTION INTRAMUSCULAR; INTRAVENOUS at 12:55

## 2024-06-18 RX ADMIN — SUCCINYLCHOLINE CHLORIDE 100 MG: 20 INJECTION, SOLUTION INTRAMUSCULAR; INTRAVENOUS at 12:50

## 2024-06-18 RX ADMIN — DEXAMETHASONE SODIUM PHOSPHATE 4 MG: 4 INJECTION, SOLUTION INTRAMUSCULAR; INTRAVENOUS at 12:55

## 2024-06-18 RX ADMIN — LIDOCAINE HYDROCHLORIDE 100 MG: 20 INJECTION, SOLUTION INFILTRATION; PERINEURAL at 12:50

## 2024-06-18 RX ADMIN — PROPOFOL 50 MG: 10 INJECTION, EMULSION INTRAVENOUS at 13:02

## 2024-06-18 RX ADMIN — SODIUM CHLORIDE 9 ML/HR: 9 INJECTION, SOLUTION INTRAVENOUS at 11:56

## 2024-06-18 NOTE — PROGRESS NOTES
"                     HEMATOLOGY ONCOLOGY OUTPATIENT FOLLOW UP       Patient name: Jourdan Lebron  : 1970  MRN: [id number]  Primary Care Physician: Justa Castano MD  Referring Physician: No ref. provider found  Reason For Consult:     Chief Complaint   Patient presents with    Follow-up     Malignant neoplasm of lower third of esophagus       History of Present Illness:  Jourdan Lebron is 54 y.o. male who presented to our office on 2021 for consultation regarding elevated hematocrit    On 2021 Mr. Lebron was referred for the investigation of macrocytosis and elevated hematocrit.  He gave a history of a stroke in .  He also described a long history of anxiety and \"panic attacks\".  Finally he had undergone an adrenalectomy, apparently because of an adrenal adenoma.  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  In spite of that he felt poorly, mainly because of fatigue and had several investigations.  For a long time, at least since , he had been noted to have an elevated MCV and MCH.  A clear cause for this had not been identified and generally speaking he was asymptomatic at that time.  In , September and 2021 his blood counts had reported a hematocrit of 51.3, 51.6 and 53.8% respectively.  This was in the setting of a normal high hemoglobin.  He gave a history of prolonged and intense, at times of up to 3 packs of cigarettes per day, cigarette smoking.  Close to the time of the initial visit, he was smoking only cigars but did admit to inhaling the smoke.  He, as well, admitted to dyspnea with some exertion.    2022 Mr. Lebron was back in the office for follow-up.  At the time of his initial evaluation his blood count had been found to be within normal limits.  His folic acid was found to be immediately below the normal range of normalcy.  He started taking folic acid replacement.    7/15/2022: Back in the office for " follow-up after 6 months.  Reported he had had some changes to his medications and he was feeling somewhat better.  In particular, he discussed changes to his antidepressant, that seemed to have made a difference.  He also had stopped smoking.  The physical exam was unchanged.  The laboratory exams revealed normal hemoglobin and hematocrit, but he did persist with mild macrocytosis at 97.8 fL.  A clear explanation for this was not identified.  A decision was made to observe as it was unlikely to represent a serious problem.    10/10/2023: Seen in the office after an absence of more than one year. He reported that for a few months he had been experiencing dysphagia and weight loss. He was initially treated with a proton pump inhibitor, but as there was no response and rather he reported worsening of the symptoms, he underwent upper and lower gastrointestinal endoscopies. In the colon multiple polyps were identified in the cecum, the ascending colon, the transverse colon and the descending colon. In the esophagus a protruding lesion that seemed infiltrative and was fungating and ulcerated was found in the lower third of the esophagus. It extended from 36 to 46 cm from the incisors. Biopsy reported invasive moderate to poorly differentiated adenocarcinoma.     11/3/2023: In the office to review the PET scan. His symptoms have not changed much. He continues to have dysphagia and it is severe enough that he has been able to eat much; he has some success with soft foods but there fare some foods that he can definitely not swallow. He has lost about 10 lbs but none recently. He remains afebrile and has not had any cough. He has not had any pain. On exam no jaundice or pallor. Lungs diminished and heart regular. Abdomen soft and not tender. No edema. Reviewed the images of the PET scan. Reviewed the result of the endoscopic ultrasound and the FNA of the lymph nodes, at least one of which is positive for metastatic disease.  This makes the tumor T2N1 disease. Neoadjuvant chemotherapy and radiation was discussed with him and his spouse and I made referrals to Dr. Sabillon in Radiation Oncology and to Dr. Hurtado in Thoracic surgery. He is to have next generation sequencing, Her2 expression and PD-L1 expression on tumor tissue. Will present in tumor conference.     11/17/2023: Not any different than at the time of the last visit.  No further weight loss.  Able to swallow some foods without any difficulties.  However, other foods are very difficult to swallow, if not impossible.  He has not had any fevers.  He underwent placement of the port without any difficulties.  On exam alert, conversant and in no distress.  Port site clean and healing well.  Lungs diminished bilaterally.  Heart regular.  No edema.  Laboratory exams were reviewed.  Discussed with him concurrent chemotherapy and radiation.  Treatment with carboplatin and paclitaxel was discussed and a treatment plan was placed.  Discussed with Dr. Sabillon.  To begin on the week of November 27, 2023.    12/8/2023: Feeling reasonably well. Has experienced occasional headaches and facial pain. As well feels the lower extremities to be heavy and had some aching pain. Has been able to eat some though his appetite is poor. He may be swallowing better and does not have odynophagia. No abdominal pain or diarrhea and no dysuria. Has not lost weight. On exam no changes. The laboratory exams were reviewed. Discussed with him. To continue with the same therapy.     12/27/2023: Without new symptoms.  Reasonably active.  Not eating as well because of early satiety but no dysphagia.  No chest pains.  As well not coughing or more dyspneic than before.  On exam no changes.  No palpable supraclavicular adenopathy.  Lungs are diminished bilaterally.  Heart regular.  Abdomen soft and nontender.  There is no edema.  Small petechiae are present in the lower extremities.  The laboratory exams were reviewed.  He has  thrombocytopenia today that does not require transfusion but that will keep him from receiving the last dose of the chemotherapy.  To finish radiation tomorrow.  He will have a PET scan and will see me with the results in approximately 4 weeks.  Discussed with him.    1/25/2024: Feeling reasonably well.  Anxious about surgery.  Undergoing preoperative investigations he was found to have a thyroid nodule.  He is eating reasonably well and does not have major dysphagia although sometimes he needs to belch before he can continue to swallow.  No odynophagia.  He has also been free of dyspnea.  Denies abdominal pain and has maintained regular bowel activity.  No dysuria or hematuria.  No peripheral edema.  On exam alert, conversant and in good spirits.  No jaundice and no pallor.  Lungs diminished but clear.  Heart regular.  Abdomen soft.  Port site clean.  Laboratory exams reviewed.  Awaiting authorization from the insurance company to do the PET scan.  Continue to see Dr. Hurtado.  See me in approximately 4 weeks.    2/28/2024: Underwent an Coventry Shayne type esophagectomy without immediate complications.  There was some concern over dehiscence of the anastomosis and he received a stent.  He was also asked to not consume any food by mouth and was receiving nutrition through a jejunostomy tube.  At the time of this visit feeling progressively better but still poorly.  Very tired and lacking energy to do anything.  Sleeping poorly.  Has started to take clear liquids by mouth.  On exam he appears chronically ill but he does not seem in any distress.  He is pale but not jaundiced.  All surgical incisions are healing well and the lungs are diminished bilaterally but clear.  Heart regular.  Abdomen soft.  No edema.  Laboratory exams were reviewed.  He has macrocytic anemia with a hemoglobin of 11.9 g/dL and thrombocytosis with a platelet count of 562,000/mm³.  He also has monocytosis and basophilia but no leukocytosis at this time.   The final review of pathology confirmed scattered foci of residual moderate to poorly differentiated adenocarcinoma at the gastroesophageal junction with changes consistent with prior neoadjuvant therapy.  There were ASSO stated dissecting pools of mucin with changes again consistent with previous therapy.  Scattered individual tumor cells and small clusters were present near the gastroesophageal junction and extending through the muscularis propria into the adventitial soft tissue/fat and spanning an area of 21 x 18 x 7 mm.  No definitive lymphovascular space invasion was identified.  All margins were negative.  Of 20 lymph nodes analyzed known had metastatic disease.  A discussion was had with him in regards to immunotherapy given in the adjuvant setting.  Explained side effects and potential complications.  I asked him to return in 3 weeks.    3/22/2024: Not feeling back to normal yet.  Still not eating much.  He had an episode of nausea and emesis this morning.  He has been afebrile.  He denies chest pains but does complain of some epigastric discomfort that seems unrelated to the previous discomfort that he had after the surgery.  He has been without dyspnea and does not have any more cough than usual.  As well no abdominal pain.  He continues to tolerate the enteral nutrition without difficulties.  No edema.  No skin rash.  On exam no distress.  Conversant and oriented.  Lungs diminished bilaterally.  Heart regular.  Abdomen with the jejunostomy tube in place.  Opening looks clean.  It is soft and without palpable tumors.  No edema.  Laboratory exams reviewed.  Discussed with him.  Offered him immunotherapy.  He remains somewhat reluctant to consider treatment with immunotherapy.  He has asked for more time to think about it.    4/12/2024: Somewhat better.  Perhaps more energetic.  Able to eat better after he had dilatation of the esophagus.  He has some pain that he localizes to the substernal area and the  mid epigastrium and right upper quadrant.  It is not as intense as before.  He continues to use his jejunostomy without any difficulties.  Defecating regularly.  Urinating without dysuria.  On exam alert, conversant and oriented.  No distress.  No jaundice.  Lungs are diminished bilaterally.  Heart is regular.  Abdomen is soft.  No edema.  Laboratory exams reviewed.  Discussed again the use of immunotherapy.  This time he is interested in pursuing.  Placed the treatment plan.  Discussed side effects and potential complications of the treatment with immunotherapy.  Discussed with him the rationale of treatment.  He is to begin as it is approved by insurance.  He will see me in approximately 5 weeks.    5/22/2024: Feeling somewhat better since the commencement of the steroids. His abdominal pain and nausea have resolved. She has been eating well and has not had any nausea or vomiting. He has been afebrile. No chest pain or increase in dyspnea. On exam alert and conversant. No jaundice and no oral lesions. Respirations not labored. The lungs diminished bilaterally and heart regular. Abdomen soft and not tender. No edema. Reviewed and discussed with him the laboratory exams. Gave him instructions in writing to continue to taper down the prednisone. Hold the immunotherapy given the possibility of enteritis as a result of it.     6/19/2024: Feeling better following steroid taper. He completed taper on 6/14/2024. States that diarrhea has resolved. Abdominal pain has resolved as well. He has been eating well and appetite is good. He did have EGD with dilatation yesterday so has some throat discomfort today. Jejunostomy tube was also removed yesterday. He states he has a pruritic rash to his upper back that has been present for about 4 weeks.  On exam he is alert and conversant.  No edema noted.    The following portions of the patient's history were reviewed and updated as appropriate: allergies, current medications, past  family history, past medical history, past social history, past surgical history and problem list.    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    Anxiety     Brain fog     COVID 02/2023    Diverticulosis of large intestine without perforation or abscess without bleeding 10/02/2023    Esophageal cancer 10/2023    Generalized headaches     GERD (gastroesophageal reflux disease)     Hand tingling     History of blood clot in brain 2020    had thrombectomy    History of cancer chemotherapy 12/2023    History of kidney stones     History of radiation therapy 11/2023    Hyperlipidemia     Jejunostomy tube present     Port-A-Cath in place     Stroke 05/30/2020    Thyroid nodule     Tiredness     Trouble swallowing      Past Surgical History:   Procedure Laterality Date    ADRENALECTOMY      COLONOSCOPY      ENDOSCOPY      ENDOSCOPY N/A 02/17/2024    Procedure: ESOPHAGOGASTRODUODENOSCOPY WITH STENTING UNDER FLUROSCOPY GUIDENCE WITH ESOPHOGRAM;  Surgeon: Saurabh Hurtado MD;  Location: [facility] MAIN OR;  Service: Gastroenterology;  Laterality: N/A;    ENDOSCOPY N/A 3/8/2024    Procedure: ESOPHAGOGASTRODUODENOSCOPY WITH STENT REMOVAL;  Surgeon: Saurabh Hurtado MD;  Location: [facility] ENDOSCOPY;  Service: Gastroenterology;  Laterality: N/A;  Esophageal leak    ENDOSCOPY N/A 4/5/2024    Procedure: ESOPHAGOGASTRODUODENOSCOPY WITH BALLOON DILATATION #6, #7, #8, #9, #10, #11, #12  AND GASTROGRAFIN WITH FLOURO;  Surgeon: Saurabh Hurtado MD;  Location: [facility] ENDOSCOPY;  Service: Gastroenterology;  Laterality: N/A;  PRE OP - ESOPHAGEAL CANCER  POST OP - ANASTOMOSIS STRICTURE    ENDOSCOPY N/A 5/7/2024    Procedure: ESOPHAGOGASTRODUODENOSCOPY WITH DILATATION (BALLOON 10MM-12MM, 12MM-15MM, 15MM-18MM,UP TO 17MM) AND INTRAOPERATIVE ESOPHAGRAM;  Surgeon: Saurabh Hurtado MD;   Location: [facility] ENDOSCOPY;  Service: Gastroenterology;  Laterality: N/A;    ESOPHAGOGASTRECTOMY N/A 02/09/2024    Procedure: BRONCHOSCOPY, EGD, ESOPHAGECTOMY ALTAGRACIA-FABIOLA WITH DAVINCI ROBOT, LAPAROSCOPY, RIGHT THORACOSCOPY CONVERTED TO THORACOTOMY, FEEDING JEJUNOSTOMY TUBE PLACEMENT, INTERCOSTAL NERVE BLOCKS;  Surgeon: Saurabh Hurtado MD;  Location: [facility] MAIN OR;  Service: Robotics - DaVinci;  Laterality: N/A;    ESOPHAGUS SURGERY  10/2023    biopsy    KIDNEY STONE SURGERY      OTHER SURGICAL HISTORY  2020    loop recorder PLACED AND REMOVED    THROMBECTOMY  2020    UPPER ENDOSCOPIC ULTRASOUND W/ FNA N/A 10/27/2023    Procedure: ENDOSCOPIC ULTRASOUND WITH STAGING AND FINE NEEDLE ASPIRATION X2 AREAS;  Surgeon: Joselyn Savage MD;  Location: [facility] ENDOSCOPY;  Service: Gastroenterology;  Laterality: N/A;  POST:    VENOUS ACCESS DEVICE (PORT) INSERTION Right 11/13/2023    Procedure: INSERTION VENOUS ACCESS DEVICE;  Surgeon: Saurabh Hurtado MD;  Location: [facility] MAIN OR;  Service: Thoracic;  Laterality: Right;       Current Outpatient Medications:     acetaminophen (TYLENOL) 325 MG tablet, Take 2 tablets by mouth Every 6 (Six) Hours As Needed for Mild Pain., Disp: , Rfl:     ALPRAZolam (XANAX) 0.25 MG tablet, Take 1 tablet by mouth 3 (Three) Times a Day As Needed for Anxiety. for anxiety, Disp: 90 tablet, Rfl: 3    aspirin 81 MG chewable tablet, Administer 1 tablet per J tube Daily. (Patient taking differently: Chew 1 tablet Daily.), Disp: , Rfl:     esomeprazole (nexIUM) 40 MG capsule, Take 1 capsule by mouth Every Morning Before Breakfast., Disp: , Rfl:     Evolocumab (REPATHA) solution prefilled syringe injection, Inject 1 mL under the skin into the appropriate area as directed Every 14 (Fourteen) Days., Disp: 6 mL, Rfl: 3    famotidine (PEPCID) 40 mg/5 mL suspension, Take 5 mL by mouth., Disp: , Rfl:     FLUoxetine (PROzac) 20 MG/5ML solution, Take 5 mL by mouth Daily., Disp: 150 mL, Rfl: 1    fluticasone (FLONASE) 50  MCG/ACT nasal spray, 2 sprays into the nostril(s) as directed by provider Daily., Disp: , Rfl:     lidocaine-prilocaine (EMLA) 2.5-2.5 % cream, Apply 1 application  topically to the appropriate area as directed As Needed (apply 60 minutes prior to port access)., Disp: 30 g, Rfl: 2    ondansetron (ZOFRAN) 8 MG tablet, Take 1 tablet by mouth 3 (Three) Times a Day As Needed for Nausea or Vomiting., Disp: 30 tablet, Rfl: 5    predniSONE (DELTASONE) 20 MG tablet, Take 2 tablets by mouth Daily. Take for 5 days. Then reduce to one and one half tablets for 5 days and then to only one tablet for 5 days., Disp: 60 tablet, Rfl: 1    predniSONE (DELTASONE) 5 MG tablet, Take 1 tablet by mouth Daily. Start on June 8 of 2024, Disp: 30 tablet, Rfl: 0    [place] 0.6 MG/0.6ML solution auto-injector, 0.6 mg as needed for severe hypoglycemia, Disp: , Rfl:     triamcinolone (KENALOG) 0.1 % ointment, Apply 1 Application topically to the appropriate area as directed 2 (Two) Times a Day., Disp: 454 g, Rfl: 1  No current facility-administered medications for this visit.    Allergies   Allergen Reactions    Cephalosporins Anaphylaxis     Throat swelling    Dye  [Contrast Dye (Echo Or Unknown Ct/Mr)] Hives    Iodinated Contrast Media Hives    Sulfa Antibiotics Hives    Sulfate Hives    Zetia [Ezetimibe] Other (See Comments) and Myalgia     Made tired    Statins Myalgia     Myalgia and fatique     Family History   Problem Relation Age of Onset    Arthritis Mother     Hypertension Mother     Heart failure Mother 73        Cause of death    Arthritis Father     Hypertension Father     Kidney cancer Father 57        Cause of death. Was a smoker    Heart disease Brother     Esophageal cancer Brother 59        Cause of death. Has a heavy drinker    Malig Hyperthermia Neg Hx      Cancer-related family history includes Esophageal cancer (age of onset: 59) in his brother; Kidney cancer (age of onset: 57) in his father.    Social History     Tobacco  "Use    Smoking status: Former     Current packs/day: 0.00     Average packs/day: 1 pack/day for 37.0 years (37.0 ttl pk-yrs)     Types: Cigars, Cigarettes     Start date: 1985     Quit date: 2022     Years since quittin.4     Passive exposure: Past    Smokeless tobacco: Never    Tobacco comments:     1 packs= stopped 10 years ago and continued cigars   2 cigars a day; has stopped cigars as of    Vaping Use    Vaping status: Never Used   Substance Use Topics    Alcohol use: Not Currently     Comment: Has now been abstinent since     Drug use: Never     Social History     Social History Narrative    Not on file      ROS:     Review of Systems   Constitutional:  Positive for fatigue. Negative for appetite change (Patient reports improvement in appetite), chills and fever.   HENT:  Positive for sore throat (EGD with dilation completed ). Negative for ear pain, mouth sores and nosebleeds.    Eyes:  Negative for photophobia and visual disturbance.   Respiratory:  Negative for shortness of breath, wheezing and stridor.    Cardiovascular:  Negative for chest pain, palpitations and leg swelling.   Gastrointestinal:  Negative for abdominal pain, diarrhea, nausea and vomiting.   Endocrine: Negative for cold intolerance and heat intolerance.   Genitourinary:  Negative for dysuria and hematuria.   Musculoskeletal:  Negative for joint swelling and neck stiffness.   Skin:  Positive for rash. Negative for color change.   Neurological:  Negative for seizures and syncope.   Hematological:  Negative for adenopathy.        No obvious bleeding   Psychiatric/Behavioral:  Negative for agitation, confusion and hallucinations.      Objective:    Vitals:    24 0941   BP: 107/73   Pulse: 68   SpO2: 100%   Weight: 71.2 kg (157 lb)   Height: 180.3 cm (70.98\")   PainSc: 0-No pain       Body mass index is 21.91 kg/m².  ECOG  0 -fully active, able to carry on all predisease performance without " restriction      Physical Exam:     Physical Exam  Vitals reviewed.   Constitutional:       Appearance: Normal appearance.      Comments: Slender, well built.   HENT:      Head: Normocephalic and atraumatic.      Mouth/Throat:      Pharynx: No oropharyngeal exudate.      Comments: Oral cavity with poor dentition. No oral/mucosal lesions or erythema noted.   Eyes:      General: No scleral icterus.     Pupils: Pupils are equal, round, and reactive to light.   Cardiovascular:      Rate and Rhythm: Normal rate and regular rhythm.      Pulses: Normal pulses.      Heart sounds: No murmur heard.  Pulmonary:      Effort: Pulmonary effort is normal.      Breath sounds: Normal breath sounds.   Abdominal:      General: There is no distension.      Palpations: Abdomen is soft. There is no mass.      Tenderness: There is no abdominal tenderness.      Comments: Jejunostomy tube has been removed.    Musculoskeletal:         General: No swelling. Normal range of motion.      Cervical back: Normal range of motion and neck supple.   Skin:     General: Skin is warm.      Findings: Rash present.   Neurological:      General: No focal deficit present.      Mental Status: He is alert and oriented to person, place, and time.      Motor: No weakness.   Psychiatric:         Mood and Affect: Mood normal.       Lab Results - Last 18 Months   Lab Units 06/19/24  0946 06/11/24  1059 05/22/24  0913   WBC 10*3/mm3 8.68 10.43 15.47*   HEMOGLOBIN g/dL 12.7* 14.5 13.1   HEMATOCRIT % 40.4 42.8 40.9   PLATELETS 10*3/mm3 288 312 377   MCV fL 100.5* 95.5 102.0*     Lab Results - Last 18 Months   Lab Units 06/11/24  1059 05/22/24  0913 05/02/24  1411 04/18/24  1328 04/12/24  0951   SODIUM mmol/L 143 139  --   --  133*   POTASSIUM mmol/L 4.3 3.7  --   --  4.5   CHLORIDE mmol/L 104 102  --   --  95*   CO2 mmol/L 26.7 28.1  --   --  32.0*   BUN mg/dL 11 18  --   --  19   CREATININE mg/dL 0.95 0.82 0.80   < > 0.66*   CALCIUM mg/dL 9.7 9.4  --   --  9.5    BILIRUBIN mg/dL 0.2 <0.2  --   --  <0.2   ALK PHOS U/L 80 83  --   --  81   ALT (SGPT) U/L 30 39  --   --  39   AST (SGOT) U/L 19 20  --   --  28   GLUCOSE mg/dL 88 90  --   --  104*    < > = values in this interval not displayed.     Lab Results   Component Value Date    GLUCOSE 88 06/11/2024    BUN 11 06/11/2024    CREATININE 0.95 06/11/2024    EGFRIFNONA 67 02/24/2022    BCR 11.6 06/11/2024    K 4.3 06/11/2024    CO2 26.7 06/11/2024    CALCIUM 9.7 06/11/2024    ALBUMIN 4.0 06/11/2024    LABIL2 1.3 09/25/2018    AST 19 06/11/2024    ALT 30 06/11/2024     Lab Results   Component Value Date    FOLATE 4.11 (L) 11/12/2021     Lab Results   Component Value Date    IAUESDAW38 784 06/11/2024     Uric Acid   Date Value Ref Range Status   06/11/2024 5.2 3.4 - 7.0 mg/dL Final     Lab Results   Component Value Date    SEDRATE 11 04/16/2021     Lab Results   Component Value Date    PSA 0.163 04/06/2021     Assessment & Plan     Assessment:  Adenocarcinoma of the gastroesophageal junction T2N1M0, microsatellite stable, low mutation burden, Her2 positive and PD-L1 expression negative: Completed neoadjuvant concurrent carboplatin and paclitaxel with radiation and underwent Vinicio Shayne type esophagectomy.  Recovering well.  Began treatment with nivolumab as discussed on the previous note. After two doses he developed persistent abdominal pain and diarrhea. Scans of the abdomen suggested enteritis. Treatment with dexamethasone has resulted in improvement. He has completed his steroid taper with improvement    Dermatitis: Discussed symptom management. Topical steroid sent to pharmacy. Continue to monitor  Reviewed laboratory exams and discussed with him.  Tobacco smoker: Abstinent  He will return to see Dr. Mueller in about 3 weeks for reassessment and further treatment discussion.      Plan:  As above    Electronically signed by Susan Dawson PA-C

## 2024-06-18 NOTE — ANESTHESIA POSTPROCEDURE EVALUATION
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        Post Anesthesia Care and Evaluation    Patient location during evaluation: PACU  Patient participation: complete - patient participated  Level of consciousness: awake  Pain scale: See nurse's notes for pain score.  Pain management: adequate    Airway patency: patent  Anesthetic complications: No anesthetic complications  PONV Status: none  Cardiovascular status: acceptable  Respiratory status: acceptable and spontaneous ventilation  Hydration status: acceptable    Comments: Patient seen and examined postoperatively; vital signs stable; SpO2 greater than or equal to 90%; cardiopulmonary status stable; nausea/vomiting adequately controlled; pain adequately controlled; no apparent anesthesia complications; patient discharged from anesthesia care when discharge criteria were met

## 2024-06-18 NOTE — DISCHARGE INSTRUCTIONS
A responsible adult should stay with you and you should rest quietly for the rest of the day.    Do not drink alcohol, drive, operate any heavy machinery or power tools or make any legal/important decisions for the next 24 hours.     Progress your diet as tolerated.  If you begin to experience severe pain, increased shortness of breath, racing heartbeat or a fever above 101 F, seek immediate medical attention.     Follow up with MD as instructed. Call office for results in 3 to 5 days if needed. [phone]    Follow up in two weeks, if PEG site still oozing, Dr. Hurtado will stitch it.

## 2024-06-18 NOTE — ANESTHESIA PROCEDURE NOTES
Airway  Urgency: elective    Date/Time: 6/18/2024 12:52 PM  Airway not difficult    General Information and Staff    Patient location during procedure: OR  Anesthesiologist: Luis Alberto Arizmendi MD  CRNA/CAA: Coleen Mclean CRNA    Indications and Patient Condition  Indications for airway management: airway protection    Preoxygenated: yes (pt pre-O2 with 100% O2)  Mask difficulty assessment: 0 - not attempted    Final Airway Details  Final airway type: endotracheal airway      Successful airway: ETT  Cuffed: yes   Successful intubation technique: video laryngoscopy and RSI  Facilitating devices/methods: intubating stylet and cricoid pressure  Endotracheal tube insertion site: oral  Blade: Saldana  Blade size: 4  ETT size (mm): 7.5  Cormack-Lehane Classification: grade I - full view of glottis  Placement verified by: chest auscultation and capnometry   Cuff volume (mL): 7  Measured from: lips  ETT/EBT  to lips (cm): 23  Number of attempts at approach: 1  Assessment: lips, teeth, and gum same as pre-op and atraumatic intubation    Additional Comments  ATOETx1. No change in dentition.

## 2024-06-19 ENCOUNTER — LAB (OUTPATIENT)
Dept: LAB | Facility: HOSPITAL | Age: 54
End: 2024-06-19
Payer: COMMERCIAL

## 2024-06-19 ENCOUNTER — OFFICE VISIT (OUTPATIENT)
Dept: ONCOLOGY | Facility: CLINIC | Age: 54
End: 2024-06-19
Payer: COMMERCIAL

## 2024-06-19 VITALS
HEART RATE: 68 BPM | BODY MASS INDEX: 21.98 KG/M2 | SYSTOLIC BLOOD PRESSURE: 107 MMHG | HEIGHT: 71 IN | DIASTOLIC BLOOD PRESSURE: 73 MMHG | OXYGEN SATURATION: 100 % | WEIGHT: 157 LBS

## 2024-06-19 DIAGNOSIS — C15.5 MALIGNANT NEOPLASM OF LOWER THIRD OF ESOPHAGUS: ICD-10-CM

## 2024-06-19 DIAGNOSIS — K52.9 ENTERITIS, NONINFECTIOUS: ICD-10-CM

## 2024-06-19 DIAGNOSIS — C15.9 MALIGNANT NEOPLASM OF ESOPHAGUS, UNSPECIFIED LOCATION: Primary | ICD-10-CM

## 2024-06-19 DIAGNOSIS — L30.9 DERMATITIS: ICD-10-CM

## 2024-06-19 DIAGNOSIS — C15.9 ADENOCARCINOMA OF ESOPHAGUS: Primary | ICD-10-CM

## 2024-06-19 LAB
ALBUMIN SERPL-MCNC: 4.2 G/DL (ref 3.5–5.2)
ALBUMIN/GLOB SERPL: 1.5 G/DL
ALP SERPL-CCNC: 80 U/L (ref 39–117)
ALT SERPL W P-5'-P-CCNC: 26 U/L (ref 1–41)
ANION GAP SERPL CALCULATED.3IONS-SCNC: 8.5 MMOL/L (ref 5–15)
AST SERPL-CCNC: 21 U/L (ref 1–40)
BASOPHILS # BLD AUTO: 0.02 10*3/MM3 (ref 0–0.2)
BASOPHILS NFR BLD AUTO: 0.2 % (ref 0–1.5)
BILIRUB SERPL-MCNC: <0.2 MG/DL (ref 0–1.2)
BUN SERPL-MCNC: 15 MG/DL (ref 6–20)
BUN/CREAT SERPL: 20.5 (ref 7–25)
CALCIUM SPEC-SCNC: 9.9 MG/DL (ref 8.6–10.5)
CHLORIDE SERPL-SCNC: 103 MMOL/L (ref 98–107)
CO2 SERPL-SCNC: 29.5 MMOL/L (ref 22–29)
CREAT SERPL-MCNC: 0.73 MG/DL (ref 0.76–1.27)
DEPRECATED RDW RBC AUTO: 45.1 FL (ref 37–54)
EGFRCR SERPLBLD CKD-EPI 2021: 109.5 ML/MIN/1.73
EOSINOPHIL # BLD AUTO: 0.14 10*3/MM3 (ref 0–0.4)
EOSINOPHIL NFR BLD AUTO: 1.6 % (ref 0.3–6.2)
ERYTHROCYTE [DISTWIDTH] IN BLOOD BY AUTOMATED COUNT: 12.4 % (ref 12.3–15.4)
GLOBULIN UR ELPH-MCNC: 2.8 GM/DL
GLUCOSE SERPL-MCNC: 83 MG/DL (ref 65–99)
HCT VFR BLD AUTO: 40.4 % (ref 37.5–51)
HGB BLD-MCNC: 12.7 G/DL (ref 13–17.7)
HOLD SPECIMEN: NORMAL
LYMPHOCYTES # BLD AUTO: 0.93 10*3/MM3 (ref 0.7–3.1)
LYMPHOCYTES NFR BLD AUTO: 10.7 % (ref 19.6–45.3)
MCH RBC QN AUTO: 31.6 PG (ref 26.6–33)
MCHC RBC AUTO-ENTMCNC: 31.4 G/DL (ref 31.5–35.7)
MCV RBC AUTO: 100.5 FL (ref 79–97)
MONOCYTES # BLD AUTO: 1.13 10*3/MM3 (ref 0.1–0.9)
MONOCYTES NFR BLD AUTO: 13 % (ref 5–12)
NEUTROPHILS NFR BLD AUTO: 6.46 10*3/MM3 (ref 1.7–7)
NEUTROPHILS NFR BLD AUTO: 74.5 % (ref 42.7–76)
PLATELET # BLD AUTO: 288 10*3/MM3 (ref 140–450)
PMV BLD AUTO: 8.7 FL (ref 6–12)
POTASSIUM SERPL-SCNC: 3.8 MMOL/L (ref 3.5–5.2)
PROT SERPL-MCNC: 7 G/DL (ref 6–8.5)
RBC # BLD AUTO: 4.02 10*6/MM3 (ref 4.14–5.8)
SODIUM SERPL-SCNC: 141 MMOL/L (ref 136–145)
WBC NRBC COR # BLD AUTO: 8.68 10*3/MM3 (ref 3.4–10.8)

## 2024-06-19 PROCEDURE — 36415 COLL VENOUS BLD VENIPUNCTURE: CPT

## 2024-06-19 PROCEDURE — 85025 COMPLETE CBC W/AUTO DIFF WBC: CPT

## 2024-06-19 PROCEDURE — 80053 COMPREHEN METABOLIC PANEL: CPT | Performed by: INTERNAL MEDICINE

## 2024-06-19 RX ORDER — TRIAMCINOLONE ACETONIDE 1 MG/G
1 OINTMENT TOPICAL 2 TIMES DAILY
Qty: 454 G | Refills: 1 | Status: SHIPPED | OUTPATIENT
Start: 2024-06-19

## 2024-06-19 RX ORDER — ESOMEPRAZOLE MAGNESIUM 40 MG/1
40 CAPSULE, DELAYED RELEASE ORAL
COMMUNITY
Start: 2024-06-07

## 2024-06-19 RX ORDER — DASIGLUCAGON 0.6 MG/.6ML
INJECTION, SOLUTION SUBCUTANEOUS
COMMUNITY
Start: 2024-06-17

## 2024-06-21 NOTE — OP NOTE
Operative Note     Date of procedure: 6/20/2024     Patient name: Jourdan Lebron  MRN: [id number]    Pre OP diagnosis:  Gastroesophageal anastomotic stricture s/p CRE balloon dilation.   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  History of esophagogastric anastomotic leak.  History of sepsis.   History of abdominal wall cellulitis.  Dysphagia.  History of cerebrovascular accident.  History of tobacco abuse.  History of left adrenalectomy.  History of adrenal crisis.  Cervical radiculopathy.  Cervical stenosis of spinal canal.  Near syncope.  Generalized hyperhidrosis.  Renal calculus.  Abnormal electrocardiogram.  Vitamin B12 deficiency.  Seasonal allergic rhinitis.  Erythrocytosis.  Macrocytosis.  Generalized anxiety disorder.  Major depressive disorder.    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  History of esophagogastric anastomotic leak.  History of sepsis.   History of abdominal wall cellulitis.  Dysphagia.  History of cerebrovascular accident.  History of tobacco abuse.  History of left adrenalectomy.  History of adrenal crisis.  Cervical radiculopathy.  Cervical stenosis of spinal canal.  Near syncope.  Generalized hyperhidrosis.  Renal calculus.  Abnormal electrocardiogram.  Vitamin B12 deficiency.  Seasonal allergic rhinitis.  Erythrocytosis.  Macrocytosis.  Generalized anxiety disorder.  Major depressive disorder.    Procedure performed:   Flexible esophagogastroduodenoscopy.  CRE balloon dilation of the esophageal stricture up to 18 mm.  Removal of feeding jejunostomy.    Indications:   Jourdan Lebron is a 54-year-old male who has significant medical problems as mentioned in the medical chart.      He was having intermittent dysphagia for over a year which  became progressively worse.  On 10/2/2023, he underwent EGD and colonoscopy by Dr. Ozzy Abdalla at [facility].  He was found to have a 6 cm mass at the lower third of the esophagus (36 to 42 cm) (biopsied), mild diverticulosis of the sigmoid colon, 5 polyps noted in the colon and cecum that were removed.  The pathology of the esophageal mass revealed invasive moderate to poorly differentiated adenocarcinoma. All polypectomy samples were negative for malignancy (fragments of tubular adenoma).  CT scan of the chest, abdomen and pelvis on 10/9/2023 at [facility] showed 4 cm abnormal thickening of the lower thoracic esophagus extending to the GE junction thought to represent patient's known primary lung malignancy.  There were no convincing evidence of metastatic disease elsewhere in the chest, abdomen, pelvis, or skeleton.  There were stable left adrenalectomy changes, right adrenal adenoma unchanged.     He was seen in the office by Dr. Tacos Osuna ([facility]) for new diagnosis of esophageal cancer. He was an established patient of Dr. Harinder Mueller for erythrocytosis and macrocytosis since 11/2021 (resolved).      PET/CT on 10/20/2023 at [facility] showed hypermetabolic (SUV 7.7) activity within the distal esophagus extending to the GE junction compatible with patient's known malignancy.  He then underwent EGD with EUS by Dr. Joselyn Savage on 10/27/2023. Findings included close to 6 cm ulcerated mass extending from 36 to 42 cm consistent with poorly differentiated adenocarcinoma.  Mass penetrated through muscularis propria without involving the adventitia consistent with T2 lesion. Two small round hypodense lymph nodes in close proximity to the mass measuring 5 and 6 mm concerning for malignancy. Other lymph nodes around the GE junction measuring 7 and 8 mm were also concerning for malignancy but immediate cytology was negative for malignancy. Pathology of the gastrohepatic lymph node was  positive for metastatic adenocarcinoma; however, the lymph node at 36 cm was negative for malignancy.     He was referred to thoracic surgery for further evaluation.  At the time of initial consultation, he could eat and swallow quite a bit, but it depends. He avoids a lot of bread because it gets stuck lower down his chest. He went down from 200 pounds to 170 pounds 3 years ago after he had a stroke. He had memory loss and general fatigue after the stroke. He lost his appetite but began to add some weight. He then noticed he gets bloated and was having dyspepsia. He had adrenalectomy performed at the [facility] after an adrenal biopsy was done on an adrenal mass, and he went into adrenal crisis. He took hydrocortisone for 1 year and discontinued it on 09/2022. He developed abdominal discomfort afterwards. He denies having any past chest surgeries or myocardial infarction.  His wife mentioned the cause of the previous stroke was never known despite having a complete work up and a loop recorder inserted which was removed in the spring. He denies any abnormal heart rhythms or pedal edema. The patient quit smoking cigarettes 8 to 9 years ago and started smoking cigars.      He had good functional status.  Based on the clinical presentation, he was considered a candidate for trimodality treatment. I placed Mediport on 11/13/2023.  He received concurrent chemoradiation therapy.  His last dose of chemotherapy and radiation was on 12/28/2023. He tolerated chemoradiation without much difficulty.  Restaging PET/CT scan on 2/2/2024 showed wall thickening involving the distal esophagus extending to the GE junction consistent with patient's known esophageal malignancy.  There was no evidence of distant metastases.  There was significant decreased uptake in the distal esophagus consistent with treatment effect.     Due to his history of stroke, ultrasound bilateral carotid were obtained which showed no significant  carotid stenosis.  There was incidentally noted right thyroid nodule for which follow-up ultrasound of the thyroid was done which showed multiple nodules but not concerning for malignancy.  Transthoracic echo was performed on 1/22/2024 and noted ejection fraction of 51 to 55%.  He was sent to Dr. Hayward for cardiology clearance.  He had 0.8% risk of myocardial infarction or cardiac arrest, intraoperatively or up to 30 days postoperatively.  No further workup was recommended.  He had a history of adrenal crisis and was sent to evaluation by his primary endocrinologist at [facility], Anabela Crenshaw MD.  Complete ACTH stimulation test was performed which was reported normal.     On 2/9/2024, he underwent surgical resection. Flexible esophagogastroduodenoscopy was notable for mucosal changes at the distal esophagus and Olivera's esophagus.  These findings were consistent with treatment effect. Rather than a gastric emptying procedure, I performed a balloon dilatation of the pylorus to 20 mm with a CRE balloon I performed a robot-assisted minimally-invasive Vinicio Shayne esophagectomy via a robot-assisted laparoscopic and robot assisted right thoracoscopic approach converted to thoracotomy. I constructed a 4 cm gastric tube and performed an esophagogastrostomy approximately 2 cm above the azygos vein using a 28 EEA stapler.  After firing the stapler, 1 complete esophageal anastomotic ring and a partial gastric anastomotic ring was noted. The anastomosis was checked under water with air insufflation and leak was identified which was oversewn. The anastomosis was partially imbricated and no leak was identified under water with air insufflation afterwards. A feeding jejunostomy tube was placed 40 cm distal to ligament of Treitz. Repeat endoscopy was notable for a widely patent anastomosis at 22-24 cm.  The conduit was healthy appearing.  It was mildly tortuous and non redundant.  He remained  hemodynamically stable throughout the case.  He received 4000 cc of crystalloid and made 1000 cc of urine.  He required small doses of phenylephrine throughout the case.  He was extubated at the end of procedure.       The pathology reported scattered foci of residual moderate to poorly differentiated adenocarcinoma occurring at the GE junction with changes consistent with prior therapy.  There were scattered individual tumor cells and small clusters near GE junction and extending through muscularis propria into adventitia spanning an area of 2.1 x 1.8 x 0.7 cm.  There is no lymphovascular space invasion.  The proximal and distal surgical margins were negative for malignancy.  20 lymph nodes were negative for metastatic carcinoma. There was also presence of low and high-grade squamous dysplasia.  He had near complete response to chemo and radiation therapy.     Postprocedure he was transferred to the ICU.  His initial postop care was unremarkable.  He was started on tube feeds and was slowly advanced to goal rate which he tolerated well.  He demonstrated return of bowel function.  The chest tube was removed after amylase from the drain was checked which was not concerning for esophageal leak.  His white blood count were slowly trending up and had mild leukocytosis.  This was concerning for unidentified infection.  He had mild cellulitis around the jejunostomy tube site.  He was started on empiric antibiotics.  Infectious disease was consulted.  CT scan of the chest abdomen pelvis without contrast did not reveal any clear source of infection.  I offered EGD to evaluate for anastomotic leak and conduit.     On 2/17/2024, he underwent EGD.  He had oropharyngeal candidiasis.  There was approximately 20% defect at the anastomotic ring likely contained by the reinforcing stitch placed intraoperatively.  There was fibrinous plaque covering the anastomosis.  Intraoperative esophagram revealed blind pouch anteriorly at the  site of anastomotic defect.  The gastric conduit appeared viable but was slightly redundant.  The conduit orientation was straight.  I placed a 18 mm X 25 mm X 103 mm fully covered Wallflex stent deployed at the anastomosis.  He tolerated the procedure well.     Esophagram performed on 2/22/2024 and was negative for leak, but did demonstrate retrograde flow of contrast along the distal aspect of the stent making him high risk for anastomotic leak with oral feeds.  He was kept nothing by mouth and discharged home on tube feeds.     He slowly improved over time.  His appetite improved.  He was able to tolerate liquid diet and slowly advance to soft diet. He was started on adjuvant immunotherapy.  He developed dysphagia and underwent EGD on 4/5/2024.  The esophagus was stricture up to 8 mm.  I serially dilated using CRE balloon up to 18 mm.  He was advised to follow-up for repeat EGD in a month.     I offered EGD to evaluate for anastomotic stricture and possible dilation.  The risk and benefit of the procedure were discussed in detail.  He agreed and signed the consent form.    Surgeon: Saurabh Hurtado MD     Anesthesia: General Anesthesia    ASA Class: 3    Procedure Details   On 6/20/2024, the patient was brought to the endoscopy suite. Jourdan Lebron was positioned in the supine position.  General anesthesia was provided by anesthesiologist.  He was intubated with a single-lumen endotracheal tube.  There was no evidence of aspiration at the time of intubation.  A bite-block was placed.  Antibiotics would not indicated for EGD. Prior to beginning the procedure, a time-out was conducted during which all members of the surgical team were present.      I began by performing a flexible esophagogastroduodenoscopy.  The cricopharyngeus was located at 16 cm.  The cervical esophagus appeared normal. The esophagogastric anastomosis was located at 22-24 cm. There was severe narrowing at the anastomosis and I was unable to  traverse the stricture. I passed the CRE™ balloon dilator through the working channel of the endoscope. The endoscope was retracted and the balloon dilator was parked at the anastomotic stricture.  The manufacture pressure guidelines were used to achieve desired dilation diameter.  The stenosis was serially dilated up to 18 mm starting from 10 mm.  Sustained pressures were maintained for a minute with each dilation to allow breaking the scar tissue.  The balloon dilator was deflated and retracted.  The area of concern was re-examined.  There was no evidence of deep injury or perforation.  I continued with examination of the conduit.  The gastric conduit appeared viable distal to the anastomosis.  The diaphragmatic pinch was located at 40 cm from the incisor.  The antrum was below the diaphragm.  The pylorus was located at 45 cm.  It was patent and the adult endoscope was advanced without difficulty.  The first and second part of the duodenum appeared normal.    The patient was extubated and was transported to the post-anesthesia care unit in stable condition.    Findings:  Esophagogastric anastomosis located at 24 cm.  Esophageal stricture up to ~ 10 mm.  Serial CRE balloon dilation of the anastomotic stricture from 10 mm up to 18 mm.  Superficial mucosal bleeding. No evidence of deep tissue injury after dilation.  The gastric conduit appeared viable.  The conduit orientation was straight and had mild redundancy.  The diaphragmatic pinch was at 40 cm.  The infradiaphragmatic antrum was normal.  The pylorus located at 45 cm.  It was patent and allowed adult endoscope to pass without difficulty.  The feeding jejunostomy was removed.    Estimated Blood Loss:  None           Specimens: None           Complications: None           Disposition: PACU - hemodynamically stable.           Condition: Stable    Post-procedure recommendations:   Follow up in 6 weeks for repeat EGD and dilation.      Saurabh Hurtado MD   Thoracic  Surgeon  [facility]

## 2024-06-21 NOTE — H&P
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    Subjective   HISTORY OF PRESENTING ILLNESS:   Jourdan Lebron is a 54-year-old male who has significant medical problems as mentioned in the medical chart.      He was having intermittent dysphagia for over a year which became progressively worse.  On 10/2/2023, he underwent EGD and colonoscopy by Dr. Ozzy Abdalla at [facility].  He was found to have a 6 cm mass at the lower third of the esophagus (36 to 42 cm) (biopsied), mild diverticulosis of the sigmoid colon, 5 polyps noted in the colon and cecum that were removed.  The pathology of the esophageal mass revealed invasive moderate to poorly differentiated adenocarcinoma. All polypectomy samples were negative for malignancy (fragments of tubular adenoma).  CT scan of the chest, abdomen and pelvis on 10/9/2023 at [facility] showed 4 cm abnormal thickening of the lower thoracic esophagus extending to the GE junction thought to represent patient's known primary lung malignancy.  There were no convincing evidence of metastatic disease elsewhere in the chest, abdomen, pelvis, or skeleton.  There were stable left adrenalectomy changes, right adrenal adenoma unchanged.     He was seen in the office by Dr. Tacos Osuna ([facility]) for new diagnosis of esophageal cancer. He was an established patient of Dr. Harinder Mueller for erythrocytosis and macrocytosis since 11/2021 (resolved).      PET/CT on 10/20/2023 at [facility] showed hypermetabolic (SUV 7.7) activity within the distal esophagus extending to the GE junction compatible with patient's known malignancy.  He then underwent EGD with EUS by Dr. Joselyn Savage on 10/27/2023. Findings included close to 6 cm ulcerated mass extending from 36 to 42 cm consistent with poorly  differentiated adenocarcinoma.  Mass penetrated through muscularis propria without involving the adventitia consistent with T2 lesion. Two small round hypodense lymph nodes in close proximity to the mass measuring 5 and 6 mm concerning for malignancy. Other lymph nodes around the GE junction measuring 7 and 8 mm were also concerning for malignancy but immediate cytology was negative for malignancy. Pathology of the gastrohepatic lymph node was positive for metastatic adenocarcinoma; however, the lymph node at 36 cm was negative for malignancy.     He was referred to thoracic surgery for further evaluation.  At the time of initial consultation, he could eat and swallow quite a bit, but it depends. He avoids a lot of bread because it gets stuck lower down his chest. He went down from 200 pounds to 170 pounds 3 years ago after he had a stroke. He had memory loss and general fatigue after the stroke. He lost his appetite but began to add some weight. He then noticed he gets bloated and was having dyspepsia. He had adrenalectomy performed at the [facility] after an adrenal biopsy was done on an adrenal mass, and he went into adrenal crisis. He took hydrocortisone for 1 year and discontinued it on 09/2022. He developed abdominal discomfort afterwards. He denies having any past chest surgeries or myocardial infarction.  His wife mentioned the cause of the previous stroke was never known despite having a complete work up and a loop recorder inserted which was removed in the spring. He denies any abnormal heart rhythms or pedal edema. The patient quit smoking cigarettes 8 to 9 years ago and started smoking cigars.      He had good functional status.  Based on the clinical presentation, he was considered a candidate for trimodality treatment. I placed Mediport on 11/13/2023.  He received concurrent chemoradiation therapy.  His last dose of chemotherapy and radiation was on 12/28/2023. He tolerated chemoradiation  without much difficulty.  Restaging PET/CT scan on 2/2/2024 showed wall thickening involving the distal esophagus extending to the GE junction consistent with patient's known esophageal malignancy.  There was no evidence of distant metastases.  There was significant decreased uptake in the distal esophagus consistent with treatment effect.     Due to his history of stroke, ultrasound bilateral carotid were obtained which showed no significant carotid stenosis.  There was incidentally noted right thyroid nodule for which follow-up ultrasound of the thyroid was done which showed multiple nodules but not concerning for malignancy.  Transthoracic echo was performed on 1/22/2024 and noted ejection fraction of 51 to 55%.  He was sent to Dr. Hayward for cardiology clearance.  He had 0.8% risk of myocardial infarction or cardiac arrest, intraoperatively or up to 30 days postoperatively.  No further workup was recommended.  He had a history of adrenal crisis and was sent to evaluation by his primary endocrinologist at [facility], Anabela Crenshaw MD.  Complete ACTH stimulation test was performed which was reported normal.     On 2/9/2024, he underwent surgical resection. Flexible esophagogastroduodenoscopy was notable for mucosal changes at the distal esophagus and Olivera's esophagus.  These findings were consistent with treatment effect. Rather than a gastric emptying procedure, I performed a balloon dilatation of the pylorus to 20 mm with a CRE balloon I performed a robot-assisted minimally-invasive [place] Shayne esophagectomy via a robot-assisted laparoscopic and robot assisted right thoracoscopic approach converted to thoracotomy. I constructed a 4 cm gastric tube and performed an esophagogastrostomy approximately 2 cm above the azygos vein using a 28 EEA stapler.  After firing the stapler, 1 complete esophageal anastomotic ring and a partial gastric anastomotic ring was noted. The anastomosis was  checked under water with air insufflation and leak was identified which was oversewn. The anastomosis was partially imbricated and no leak was identified under water with air insufflation afterwards. A feeding jejunostomy tube was placed 40 cm distal to ligament of Treitz. Repeat endoscopy was notable for a widely patent anastomosis at 22-24 cm.  The conduit was healthy appearing.  It was mildly tortuous and non redundant.  He remained hemodynamically stable throughout the case.  He received 4000 cc of crystalloid and made 1000 cc of urine.  He required small doses of phenylephrine throughout the case.  He was extubated at the end of procedure.       The pathology reported scattered foci of residual moderate to poorly differentiated adenocarcinoma occurring at the GE junction with changes consistent with prior therapy.  There were scattered individual tumor cells and small clusters near GE junction and extending through muscularis propria into adventitia spanning an area of 2.1 x 1.8 x 0.7 cm.  There is no lymphovascular space invasion.  The proximal and distal surgical margins were negative for malignancy.  20 lymph nodes were negative for metastatic carcinoma. There was also presence of low and high-grade squamous dysplasia.  He had near complete response to chemo and radiation therapy.     Postprocedure he was transferred to the ICU.  His initial postop care was unremarkable.  He was started on tube feeds and was slowly advanced to goal rate which he tolerated well.  He demonstrated return of bowel function.  The chest tube was removed after amylase from the drain was checked which was not concerning for esophageal leak.  His white blood count were slowly trending up and had mild leukocytosis.  This was concerning for unidentified infection.  He had mild cellulitis around the jejunostomy tube site.  He was started on empiric antibiotics.  Infectious disease was consulted.  CT scan of the chest abdomen pelvis  without contrast did not reveal any clear source of infection.  I offered EGD to evaluate for anastomotic leak and conduit.     On 2/17/2024, he underwent EGD.  He had oropharyngeal candidiasis.  There was approximately 20% defect at the anastomotic ring likely contained by the reinforcing stitch placed intraoperatively.  There was fibrinous plaque covering the anastomosis.  Intraoperative esophagram revealed blind pouch anteriorly at the site of anastomotic defect.  The gastric conduit appeared viable but was slightly redundant.  The conduit orientation was straight.  I placed a 18 mm X 25 mm X 103 mm fully covered Wallflex stent deployed at the anastomosis.  He tolerated the procedure well.     Esophagram performed on 2/22/2024 and was negative for leak, but did demonstrate retrograde flow of contrast along the distal aspect of the stent making him high risk for anastomotic leak with oral feeds.  He was kept nothing by mouth and discharged home on tube feeds.     He slowly improved over time.  His appetite improved.  He was able to tolerate liquid diet and slowly advance to soft diet. He was started on adjuvant immunotherapy.  He developed dysphagia and underwent EGD on 4/5/2024.  The esophagus was stricture up to 8 mm.  I serially dilated using CRE balloon up to 18 mm.  He was advised to follow-up for repeat EGD in a month.    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    Anxiety     Brain fog     COVID 02/2023    Diverticulosis of large intestine without perforation or abscess without bleeding 10/02/2023    Esophageal cancer 10/2023    Generalized headaches     GERD (gastroesophageal reflux disease)     Hand tingling     History of blood clot in brain 2020    had thrombectomy    History of cancer chemotherapy 12/2023    History of kidney  stones     History of radiation therapy 11/2023    Hyperlipidemia     Jejunostomy tube present     Port-A-Cath in place     Stroke 05/30/2020    Thyroid nodule     Tiredness     Trouble swallowing        Past Surgical History:   Procedure Laterality Date    ADRENALECTOMY      COLONOSCOPY      ENDOSCOPY      ENDOSCOPY N/A 02/17/2024    Procedure: ESOPHAGOGASTRODUODENOSCOPY WITH STENTING UNDER FLUROSCOPY GUIDENCE WITH ESOPHOGRAM;  Surgeon: Saurabh Hurtado MD;  Location: [facility] MAIN OR;  Service: Gastroenterology;  Laterality: N/A;    ENDOSCOPY N/A 3/8/2024    Procedure: ESOPHAGOGASTRODUODENOSCOPY WITH STENT REMOVAL;  Surgeon: Saurabh Hurtado MD;  [facility] END[facility]COPY;  Service: Gastroenterology;  Laterality: N/A;  Esophageal leak    ENDOSCOPY N/A 4/5/2024    Procedure: ESOPHAGOGASTRODUODENOSCOPY WITH BALLOON DILATATION #6, #7, #8, #9, #10, #11, #12  AND GASTROGRAFIN WITH FLOURO;  Surgeon: Saurabh Hurtado MD;  [facility] END[facility]COPY;  Service: Gastroenterology;  Laterality: N/A;  PRE OP - ESOPHAGEAL CANCER  POST OP - ANASTOMOSIS STRICTURE    ENDOSCOPY N/A 5/7/2024    Procedure: ESOPHAGOGASTRODUODENOSCOPY WITH DILATATION (BALLOON 10MM-12MM, 12MM-15MM, 15MM-18MM,UP TO 17MM) AND INTRAOPERATIVE ESOPHAGRAM;  Surgeon: Saurabh Hurtado MD;  Location: [facility] ENDOSCOPY;  Service: Gastroenterology;  Laterality: N/A;    ENDOSCOPY N/A 6/18/2024    Procedure: ESOPHAGOGASTRODUODENOSCOPY WITH balloon dilatation (10mm-12mm up to 12mm;15mm-18mm up to 18mm) and removal of PEG tube;  Surgeon: Saurabh Hurtado MD;  Location: [facility] ENDOSCOPY;  Service: Gastroenterology;  Laterality: N/A;    ESOPHAGOGASTRECTOMY N/A 02/09/2024    Procedure: BRONCHOSCOPY, EGD, ESOPHAGECTOMY ALTAGRACIA-FABIOLA WITH DAVINCI ROBOT, LAPAROSCOPY, RIGHT THORACOSCOPY CONVERTED TO THORACOTOMY, FEEDING JEJUNOSTOMY TUBE PLACEMENT, INTERCOSTAL NERVE BLOCKS;  Surgeon: Saurabh Hurtado MD;  Location: [facility] MAIN OR;  Service: Robotics - DaVinci;  Laterality: N/A;    ESOPHAGUS SURGERY   10/2023    biopsy    KIDNEY STONE SURGERY      OTHER SURGICAL HISTORY      loop recorder PLACED AND REMOVED    THROMBECTOMY      UPPER ENDOSCOPIC ULTRASOUND W/ FNA N/A 10/27/2023    Procedure: ENDOSCOPIC ULTRASOUND WITH STAGING AND FINE NEEDLE ASPIRATION X2 AREAS;  Surgeon: Joselyn Savage MD;  Location: [facility] ENDOSCOPY;  Service: Gastroenterology;  Laterality: N/A;  POST:    VENOUS ACCESS DEVICE (PORT) INSERTION Right 2023    Procedure: INSERTION VENOUS ACCESS DEVICE;  Surgeon: Saurabh Hurtado MD;  Location: [facility] MAIN OR;  Service: Thoracic;  Laterality: Right;       Family History   Problem Relation Age of Onset    Arthritis Mother     Hypertension Mother     Heart failure Mother 73        Cause of death    Arthritis Father     Hypertension Father     Kidney cancer Father 57        Cause of death. Was a smoker    Heart disease Brother     Esophageal cancer Brother 59        Cause of death. Has a heavy drinker    Malig Hyperthermia Neg Hx        Social History     Socioeconomic History    Marital status:    Tobacco Use    Smoking status: Former     Current packs/day: 0.00     Average packs/day: 1 pack/day for 37.0 years (37.0 ttl pk-yrs)     Types: Cigars, Cigarettes     Start date: 1985     Quit date: 2022     Years since quittin.4     Passive exposure: Past    Smokeless tobacco: Never    Tobacco comments:     1 packs= stopped 10 years ago and continued cigars   2 cigars a day; has stopped cigars as of    Vaping Use    Vaping status: Never Used   Substance and Sexual Activity    Alcohol use: Not Currently     Comment: Has now been abstinent since     Drug use: Never    Sexual activity: Defer       No current facility-administered medications for this encounter.    Current Outpatient Medications:     acetaminophen (TYLENOL) 325 MG tablet, Take 2 tablets by mouth Every 6 (Six) Hours As Needed for Mild Pain., Disp: , Rfl:     ALPRAZolam (XANAX) 0.25 MG tablet, Take 1 tablet by  mouth 3 (Three) Times a Day As Needed for Anxiety. for anxiety, Disp: 90 tablet, Rfl: 3    aspirin 81 MG chewable tablet, Administer 1 tablet per J tube Daily. (Patient taking differently: Chew 1 tablet Daily.), Disp: , Rfl:     Evolocumab (REPATHA) solution prefilled syringe injection, Inject 1 mL under the skin into the appropriate area as directed Every 14 (Fourteen) Days., Disp: 6 mL, Rfl: 3    famotidine (PEPCID) 40 mg/5 mL suspension, Take 5 mL by mouth., Disp: , Rfl:     FLUoxetine (PROzac) 20 MG/5ML solution, Take 5 mL by mouth Daily., Disp: 150 mL, Rfl: 1    fluticasone (FLONASE) 50 MCG/ACT nasal spray, 2 sprays into the nostril(s) as directed by provider Daily., Disp: , Rfl:     lidocaine-prilocaine (EMLA) 2.5-2.5 % cream, Apply 1 application  topically to the appropriate area as directed As Needed (apply 60 minutes prior to port access)., Disp: 30 g, Rfl: 2    ondansetron (ZOFRAN) 8 MG tablet, Take 1 tablet by mouth 3 (Three) Times a Day As Needed for Nausea or Vomiting., Disp: 30 tablet, Rfl: 5    predniSONE (DELTASONE) 20 MG tablet, Take 2 tablets by mouth Daily. Take for 5 days. Then reduce to one and one half tablets for 5 days and then to only one tablet for 5 days., Disp: 60 tablet, Rfl: 1    predniSONE (DELTASONE) 5 MG tablet, Take 1 tablet by mouth Daily. Start on June 8 of 2024, Disp: 30 tablet, Rfl: 0    esomeprazole (nexIUM) 40 MG capsule, Take 1 capsule by mouth Every Morning Before Breakfast., Disp: , Rfl:     triamcinolone (KENALOG) 0.1 % ointment, Apply 1 Application topically to the appropriate area as directed 2 (Two) Times a Day., Disp: 454 g, Rfl: 1    [place] 0.6 MG/0.6ML solution auto-injector, 0.6 mg as needed for severe hypoglycemia, Disp: , Rfl:      Allergies   Allergen Reactions    Cephalosporins Anaphylaxis     Throat swelling    Dye  [Contrast Dye (Echo Or Unknown Ct/Mr)] Hives    Iodinated Contrast Media Hives    Sulfa Antibiotics Hives    Sulfate Hives    Zetia [Ezetimibe]  "Other (See Comments) and Myalgia     Made tired    Statins Myalgia     Myalgia and fatique             Objective    OBJECTIVE:     VITAL SIGNS:  BP 99/69 (BP Location: Left arm, Patient Position: Lying)   Pulse 56   Temp 97.9 °F (36.6 °C) (Oral)   Resp 18   Ht 180.3 cm (71\")   Wt 71.7 kg (158 lb)   SpO2 100%   BMI 22.04 kg/m²     PHYSICAL EXAM:  Constitutional:       Appearance: Normal appearance.   HENT:      Head: Normocephalic.      Right Ear: External ear normal.      Left Ear: External ear normal.      Nose: Nose normal.      Mouth/Throat: No obvious deformity     Pharynx: Oropharynx is clear.   Eyes:      Conjunctiva/sclera: Conjunctivae normal.   Cardiovascular:      Rate and Rhythm: Normal rate.      Pulses: Normal pulses.   Pulmonary:      Effort: Pulmonary effort is normal.   Abdominal:      Palpations: Abdomen is soft.   Musculoskeletal:         General: Normal range of motion.      Cervical back: Normal range of motion.   Skin:     General: Skin is warm.   Neurological:      General: No focal deficit present.      Mental Status: He is alert and oriented to person, place, and time.     Vital Signs  Weight is 140 pounds.    LAB RESULTS:  I have reviewed all the available laboratory results in the chart.    RESULTS REVIEW:  I have reviewed the patient's all relevant laboratory and imaging findings.     PET/ CT of skull base to mid-thigh done 10/20/2023.  Abnormal metabolic activity within the distal esophagus extending to the GE junction compatible with patient's known malignancy.    PET scan from 02/02/2024 was reviewed with the patient.  The tumor has decreased in size. It still shows some activity, which could be some radiation changes.     Ultrasound of the carotid arteries from 01/24/2024 did not see any significant blockage.     Ultrasound of the thyroid from 01/29/2024 revealed a small nodule on the thyroid that was not concerning for cancer.         ASSESSMENT & PLAN:  Jourdan ESSENCE Lebron is a 54 " y.o. male with significant medical conditions as mentioned above presented to my clinic.    Diagnosis: Adenocarcinoma of the lower third of the esophagus s/p neoadjuvant concurrent chemoradiotherapy followed by hybrid [place] Shayne esophagectomy.  Staging: Stage III-A ([id number])    Anastomotic stricture  I offered EGD to evaluate for anastomotic stricture and possible dilation.     I discussed the patients findings and my recommendations with the patient. The patient was given adequate time to ask questions and all questions were answered to patient satisfaction.     Saurabh Hurtado MD  Thoracic Surgeon  [facility] [place]        Dictated utilizing Dragon dictation

## 2024-07-01 ENCOUNTER — TELEPHONE (OUTPATIENT)
Dept: SURGERY | Facility: CLINIC | Age: 54
End: 2024-07-01
Payer: COMMERCIAL

## 2024-07-01 NOTE — TELEPHONE ENCOUNTER
Called and spoke to patient about not needing to come in for his appointment per Dr Hurtado. Dr. Hurtado would see him the day of his procedure on 7/30/24. Patient was agreeable.

## 2024-07-05 NOTE — PROGRESS NOTES
Encounter Date:07/15/2024        Patient ID: Jourdan Lebron is a 54 y.o. male.      Chief Complaint:      History of Present Illness  54 years old male with history of cryptogenic stroke status post mechanical thrombectomy, hypertension, hyperlipidemia, ex-smoking who comes to cardiology office.  Due to complaints of worsening dysphagia he underwent EGD and colonoscopy which noted a mass in the lower third of esophagus.  Biopsy revealed adenocarcinoma for which he underwent resection.  He has previously undergone chemotherapy and radiation therapy.  Today he returns with multiple complaints of discomfort from surgery.     L LCA stroke on 5/27/2020 for which he received TPA and mechanical thrombectomy, leaving no residual deficits. He had an extensive workup during hospitalization, including CT, MRI, and KARINA which showed normal EF. No evidence of PFO, ASD or VSD, no significant valvular abnormalities, no thrombus in L atrial appendage.  He also had a loop recorder placed with stated him for 3 years.    He has had L adrenalectomy and stable right adrenaloma.   Current cardiac medications include aspirin, Repatha.  He has not been consistent with taking Repatha.      The following portions of the patient's history were reviewed and updated as appropriate: allergies, current medications, past family history, past medical history, past social history, past surgical history, and problem list.    Review of Systems   Constitutional: Negative for malaise/fatigue.   Cardiovascular:  Negative for chest pain, dyspnea on exertion, leg swelling and palpitations.   Respiratory:  Negative for cough and shortness of breath.    Gastrointestinal:  Positive for nausea. Negative for abdominal pain and vomiting.   Neurological:  Positive for dizziness and light-headedness. Negative for focal weakness, headaches and numbness.   All other systems reviewed and are negative.        Current Outpatient Medications:     acetaminophen (TYLENOL)  325 MG tablet, Take 2 tablets by mouth Every 6 (Six) Hours As Needed for Mild Pain., Disp: , Rfl:     ALPRAZolam (XANAX) 0.25 MG tablet, Take 1 tablet by mouth 3 (Three) Times a Day As Needed for Anxiety. for anxiety, Disp: 90 tablet, Rfl: 3    aspirin 81 MG chewable tablet, Administer 1 tablet per J tube Daily. (Patient taking differently: Chew 1 tablet Daily.), Disp: , Rfl:     esomeprazole (nexIUM) 40 MG capsule, Take 1 capsule by mouth Every Morning Before Breakfast., Disp: , Rfl:     Evolocumab (REPATHA) solution prefilled syringe injection, Inject 1 mL under the skin into the appropriate area as directed Every 14 (Fourteen) Days., Disp: 6 mL, Rfl: 3    famotidine (PEPCID) 40 mg/5 mL suspension, Take 5 mL by mouth., Disp: , Rfl:     FLUoxetine (PROzac) 20 MG/5ML solution, Take 5 mL by mouth Daily., Disp: 150 mL, Rfl: 1    fluticasone (FLONASE) 50 MCG/ACT nasal spray, 2 sprays into the nostril(s) as directed by provider Daily., Disp: , Rfl:     lidocaine-prilocaine (EMLA) 2.5-2.5 % cream, Apply 1 application  topically to the appropriate area as directed As Needed (apply 60 minutes prior to port access)., Disp: 30 g, Rfl: 2    ondansetron (ZOFRAN) 8 MG tablet, Take 1 tablet by mouth 3 (Three) Times a Day As Needed for Nausea or Vomiting., Disp: 30 tablet, Rfl: 5    predniSONE (DELTASONE) 20 MG tablet, Take 2 tablets by mouth Daily. Take for 5 days. Then reduce to one and one half tablets for 5 days and then to only one tablet for 5 days., Disp: 60 tablet, Rfl: 1    predniSONE (DELTASONE) 5 MG tablet, Take 1 tablet by mouth Daily. Start on June 8 of 2024, Disp: 30 tablet, Rfl: 0    triamcinolone (KENALOG) 0.1 % ointment, Apply 1 Application topically to the appropriate area as directed 2 (Two) Times a Day., Disp: 454 g, Rfl: 1    [place] 0.6 MG/0.6ML solution auto-injector, 0.6 mg as needed for severe hypoglycemia, Disp: , Rfl:     Current outpatient and discharge medications have been reconciled for the  patient.  Reviewed by: Tristan Hayward MD       Allergies   Allergen Reactions    Cephalosporins Anaphylaxis     Throat swelling    Dye  [Contrast Dye (Echo Or Unknown Ct/Mr)] Hives    Iodinated Contrast Media Hives    Sulfa Antibiotics Hives    Sulfate Hives    Zetia [Ezetimibe] Other (See Comments) and Myalgia     Made tired    Statins Myalgia     Myalgia and fatique       Family History   Problem Relation Age of Onset    Arthritis Mother     Hypertension Mother     Heart failure Mother 73        Cause of death    Arthritis Father     Hypertension Father     Kidney cancer Father 57        Cause of death. Was a smoker    Heart disease Brother     Esophageal cancer Brother 59        Cause of death. Has a heavy drinker    Malig Hyperthermia Neg Hx        Past Surgical History:   Procedure Laterality Date    ADRENALECTOMY      COLONOSCOPY      ENDOSCOPY      ENDOSCOPY N/A 02/17/2024    Procedure: ESOPHAGOGASTRODUODENOSCOPY WITH STENTING UNDER FLUROSCOPY GUIDENCE WITH ESOPHOGRAM;  Surgeon: Saurabh Hurtado MD;  Location: [facility] MAIN OR;  Service: Gastroenterology;  Laterality: N/A;    ENDOSCOPY N/A 3/8/2024    Procedure: ESOPHAGOGASTRODUODENOSCOPY WITH STENT REMOVAL;  Surgeon: Saurabh Hurtado MD;  Location: [facility] ENDOSCOPY;  Service: Gastroenterology;  Laterality: N/A;  Esophageal leak    ENDOSCOPY N/A 4/5/2024    Procedure: ESOPHAGOGASTRODUODENOSCOPY WITH BALLOON DILATATION #6, #7, #8, #9, #10, #11, #12  AND GASTROGRAFIN WITH FLOURO;  Surgeon: Saurabh Hurtado MD;  Location: [facility] ENDOSCOPY;  Service: Gastroenterology;  Laterality: N/A;  PRE OP - ESOPHAGEAL CANCER  POST OP - ANASTOMOSIS STRICTURE    ENDOSCOPY N/A 5/7/2024    Procedure: ESOPHAGOGASTRODUODENOSCOPY WITH DILATATION (BALLOON 10MM-12MM, 12MM-15MM, 15MM-18MM,UP TO 17MM) AND INTRAOPERATIVE ESOPHAGRAM;  Surgeon: Saurabh Hurtado MD;  Location: [facility] ENDOSCOPY;  Service: Gastroenterology;  Laterality: N/A;    ENDOSCOPY N/A 6/18/2024    Procedure: ESOPHAGOGASTRODUODENOSCOPY  WITH balloon dilatation (10mm-12mm up to 12mm;15mm-18mm up to 18mm) and removal of PEG tube;  Surgeon: Saurabh Hurtado MD;  Location: [facility] ENDOSCOPY;  Service: Gastroenterology;  Laterality: N/A;    ESOPHAGOGASTRECTOMY N/A 02/09/2024    Procedure: BRONCHOSCOPY, EGD, ESOPHAGECTOMY ALTAGRACIA-FABIOLA WITH DAVINCI ROBOT, LAPAROSCOPY, RIGHT THORACOSCOPY CONVERTED TO THORACOTOMY, FEEDING JEJUNOSTOMY TUBE PLACEMENT, INTERCOSTAL NERVE BLOCKS;  Surgeon: Saurabh Hurtado MD;  Location: [facility] MAIN OR;  Service: Robotics - DaVinci;  Laterality: N/A;    ESOPHAGUS SURGERY  10/2023    biopsy    KIDNEY STONE SURGERY      OTHER SURGICAL HISTORY  2020    loop recorder PLACED AND REMOVED    THROMBECTOMY  2020    UPPER ENDOSCOPIC ULTRASOUND W/ FNA N/A 10/27/2023    Procedure: ENDOSCOPIC ULTRASOUND WITH STAGING AND FINE NEEDLE ASPIRATION X2 AREAS;  Surgeon: Joselyn Savage MD;  Location: [facility] ENDOSCOPY;  Service: Gastroenterology;  Laterality: N/A;  POST:    VENOUS ACCESS DEVICE (PORT) INSERTION Right 11/13/2023    Procedure: INSERTION VENOUS ACCESS DEVICE;  Surgeon: Saurabh Hurtado MD;  Location: [facility] MAIN OR;  Service: Thoracic;  Laterality: Right;       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    Anxiety     Brain fog     COVID 02/2023    Diverticulosis of large intestine without perforation or abscess without bleeding 10/02/2023    Esophageal cancer 10/2023    Generalized headaches     GERD (gastroesophageal reflux disease)     Hand tingling     History of blood clot in brain 2020    had thrombectomy    History of cancer chemotherapy 12/2023    History of kidney stones     History of radiation therapy 11/2023    Hyperlipidemia     Jejunostomy tube present     Port-A-Cath in place     Stroke 05/30/2020    Thyroid nodule     Tiredness     Trouble swallowing        Family  History   Problem Relation Age of Onset    Arthritis Mother     Hypertension Mother     Heart failure Mother 73        Cause of death    Arthritis Father     Hypertension Father     Kidney cancer Father 57        Cause of death. Was a smoker    Heart disease Brother     Esophageal cancer Brother 59        Cause of death. Has a heavy drinker    Malig Hyperthermia Neg Hx        Social History     Socioeconomic History    Marital status:    Tobacco Use    Smoking status: Former     Current packs/day: 0.00     Average packs/day: 1 pack/day for 37.0 years (37.0 ttl pk-yrs)     Types: Cigars, Cigarettes     Start date: 1985     Quit date: 2022     Years since quittin.5     Passive exposure: Past    Smokeless tobacco: Never    Tobacco comments:     1 packs= stopped 10 years ago and continued cigars   2 cigars a day; has stopped cigars as of    Vaping Use    Vaping status: Never Used   Substance and Sexual Activity    Alcohol use: Not Currently     Comment: Has now been abstinent since     Drug use: Never    Sexual activity: Defer               Objective:       Physical Exam    There were no vitals taken for this visit.  The patient is alert, oriented and in no distress.    Vital signs as noted above.    Head and neck revealed no carotid bruits or jugular venous distension.  No thyromegaly or lymphadenopathy is present.    Lungs clear.  No wheezing.  Breath sounds are normal bilaterally.    Heart normal first and second heart sounds.  No murmur..  No pericardial rub is present.  No gallop is present.    Abdomen soft and nontender.  No organomegaly is present.    Extremities revealed good peripheral pulses without any pedal edema.    Skin warm and dry.    Musculoskeletal system is grossly normal.    CNS grossly normal.          No diagnosis found.LAB RESULTS (LAST 7 DAYS)    CBC        BMP        CMP         BNP        TROPONIN        CoAg        Creatinine Clearance  Estimated Creatinine  Clearance: 119.2 mL/min (A) (by C-G formula based on SCr of 0.73 mg/dL (L)).    ABG        Radiology  No radiology results for the last day    EKG  Procedures    Stress test      Echocardiogram  Results for orders placed during the hospital encounter of 01/22/24    Adult Transthoracic Echo Complete w/ Color, Spectral and Contrast if necessary per protocol    Interpretation Summary    Left ventricular ejection fraction appears to be 51 - 55%.    Left ventricular diastolic function was normal.    Estimated right ventricular systolic pressure from tricuspid regurgitation is mildly elevated (35-45 mmHg).    There is mild aortic insufficiency and mild tricuspid valve regurgitation.      Cardiac catheterization  No results found for this or any previous visit.          Assessment and Plan       There are no diagnoses linked to this encounter.     Adenocarcinoma of esophagus  Status post chemoradiation and surgical resection  Follow-up with oncology  Follow-up with thoracic surgery        Preoperative cardiovascular risk assessment     Facundo Perioperative Risk for Myocardial Infarction or Cardiac Arrest (THOMAS):  0.8% Risk of myocardial infarction or cardiac arrest, intraoperatively or up to 30 days post-op.     Revised Cardiac Risk Index for Pre-Operative Risk:  6% 30-day risk of death, MI, or cardiac arrest.     Given good functional capacity, absence of any symptoms of angina, absence of arrhythmia he may proceed with surgical resection of esophageal cancer with no further cardiac workup.  ECG shows sinus rhythm with right bundle branch block  Echocardiogram shows preserved LV function, normal diastolic function.  Not on a beta-blocker due to bradycardia and hypotension.  He is now s/p surgical resection     Cryptogenic stroke  Patient is currently on DAPT. He intolerance to high intensity statins and has stopped taking crestor. He is s/p mechanical thrombectomy and tPA with no residual deficits. Currently on ASA to 81  mg QD.    Ex-smoker  He has a 20 pack year history of smoking and quit smoking cigarettes 5 years ago, but he continues to smoke 2 cigars daily.  I have provided smoking cessation counseling for the patient today. I extensively discussed with the patient the cardiovascular risks associated with smoking and other tobacco products.    Anxiety/depression  Currently on Xanax and Prozac.    HLD  He has statin intolerance, even with low intensity statins. He also failed Zetia.   He intermittently takes Repatha   LDL 48, HDL 87, triglyceride 114 and total cholesterol 155  LDL is at goal    Left adrenal mass  S/p L adrenalectomy in Feb 2021. Currently on maintenance doses of steroids.      Underweight  BMI is 22.  He weighs 157 pounds.  He has lost significant weight  Will require a feeding tube after esophageal resection

## 2024-07-09 NOTE — PROGRESS NOTES
"                     HEMATOLOGY ONCOLOGY OUTPATIENT FOLLOW UP       Patient name: Jourdan Lebron  : 1970  MRN: [id number]  Primary Care Physician: Justa Castano MD  Referring Physician: No ref. provider found  Reason For Consult:     Chief Complaint   Patient presents with    Follow-up     Malignant neoplasm of lower third of esophagus     HPI:   History of Present Illness:  Jourdan Lebron is 54 y.o. male who presented to our office on 2021 for consultation regarding elevated hematocrit    On 2021 Mr. Lebron was referred for the investigation of macrocytosis and elevated hematocrit.  He gave a history of a stroke in .  He also described a long history of anxiety and \"panic attacks\".  Finally he had undergone an adrenalectomy, apparently because of an adrenal adenoma.  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  In spite of that he felt poorly, mainly because of fatigue and had several investigations.  For a long time, at least since , he had been noted to have an elevated MCV and MCH.  A clear cause for this had not been identified and generally speaking he was asymptomatic at that time.  In , September and 2021 his blood counts had reported a hematocrit of 51.3, 51.6 and 53.8% respectively.  This was in the setting of a normal high hemoglobin.  He gave a history of prolonged and intense, at times of up to 3 packs of cigarettes per day, cigarette smoking.  Close to the time of the initial visit, he was smoking only cigars but did admit to inhaling the smoke.  He, as well, admitted to dyspnea with some exertion.    2022 Mr. Lebron was back in the office for follow-up.  At the time of his initial evaluation his blood count had been found to be within normal limits.  His folic acid was found to be immediately below the normal range of normalcy.  He started taking folic acid replacement.    7/15/2022: Back in the office " for follow-up after 6 months.  Reported he had had some changes to his medications and he was feeling somewhat better.  In particular, he discussed changes to his antidepressant, that seemed to have made a difference.  He also had stopped smoking.  The physical exam was unchanged.  The laboratory exams revealed normal hemoglobin and hematocrit, but he did persist with mild macrocytosis at 97.8 fL.  A clear explanation for this was not identified.  A decision was made to observe as it was unlikely to represent a serious problem.    10/10/2023: Seen in the office after an absence of more than one year. He reported that for a few months he had been experiencing dysphagia and weight loss. He was initially treated with a proton pump inhibitor, but as there was no response and rather he reported worsening of the symptoms, he underwent upper and lower gastrointestinal endoscopies. In the colon multiple polyps were identified in the cecum, the ascending colon, the transverse colon and the descending colon. In the esophagus a protruding lesion that seemed infiltrative and was fungating and ulcerated was found in the lower third of the esophagus. It extended from 36 to 46 cm from the incisors. Biopsy reported invasive moderate to poorly differentiated adenocarcinoma.     11/3/2023: In the office to review the PET scan. His symptoms have not changed much. He continues to have dysphagia and it is severe enough that he has been able to eat much; he has some success with soft foods but there fare some foods that he can definitely not swallow. He has lost about 10 lbs but none recently. He remains afebrile and has not had any cough. He has not had any pain. On exam no jaundice or pallor. Lungs diminished and heart regular. Abdomen soft and not tender. No edema. Reviewed the images of the PET scan. Reviewed the result of the endoscopic ultrasound and the FNA of the lymph nodes, at least one of which is positive for metastatic  disease. This makes the tumor T2N1 disease. Neoadjuvant chemotherapy and radiation was discussed with him and his spouse and I made referrals to Dr. Sabillon in Radiation Oncology and to Dr. Hurtado in Thoracic surgery. He is to have next generation sequencing, Her2 expression and PD-L1 expression on tumor tissue. Will present in tumor conference.     11/17/2023: Not any different than at the time of the last visit.  No further weight loss.  Able to swallow some foods without any difficulties.  However, other foods are very difficult to swallow, if not impossible.  He has not had any fevers.  He underwent placement of the port without any difficulties.  On exam alert, conversant and in no distress.  Port site clean and healing well.  Lungs diminished bilaterally.  Heart regular.  No edema.  Laboratory exams were reviewed.  Discussed with him concurrent chemotherapy and radiation.  Treatment with carboplatin and paclitaxel was discussed and a treatment plan was placed.  Discussed with Dr. Sabillon.  To begin on the week of November 27, 2023.    12/8/2023: Feeling reasonably well. Has experienced occasional headaches and facial pain. As well feels the lower extremities to be heavy and had some aching pain. Has been able to eat some though his appetite is poor. He may be swallowing better and does not have odynophagia. No abdominal pain or diarrhea and no dysuria. Has not lost weight. On exam no changes. The laboratory exams were reviewed. Discussed with him. To continue with the same therapy.     12/27/2023: Without new symptoms.  Reasonably active.  Not eating as well because of early satiety but no dysphagia.  No chest pains.  As well not coughing or more dyspneic than before.  On exam no changes.  No palpable supraclavicular adenopathy.  Lungs are diminished bilaterally.  Heart regular.  Abdomen soft and nontender.  There is no edema.  Small petechiae are present in the lower extremities.  The laboratory exams were reviewed.  He  has thrombocytopenia today that does not require transfusion but that will keep him from receiving the last dose of the chemotherapy.  To finish radiation tomorrow.  He will have a PET scan and will see me with the results in approximately 4 weeks.  Discussed with him.    1/25/2024: Feeling reasonably well.  Anxious about surgery.  Undergoing preoperative investigations he was found to have a thyroid nodule.  He is eating reasonably well and does not have major dysphagia although sometimes he needs to belch before he can continue to swallow.  No odynophagia.  He has also been free of dyspnea.  Denies abdominal pain and has maintained regular bowel activity.  No dysuria or hematuria.  No peripheral edema.  On exam alert, conversant and in good spirits.  No jaundice and no pallor.  Lungs diminished but clear.  Heart regular.  Abdomen soft.  Port site clean.  Laboratory exams reviewed.  Awaiting authorization from the insurance company to do the PET scan.  Continue to see Dr. Hurtado.  See me in approximately 4 weeks.    2/28/2024: Underwent an Vinicio Shayne type esophagectomy without immediate complications.  There was some concern over dehiscence of the anastomosis and he received a stent.  He was also asked to not consume any food by mouth and was receiving nutrition through a jejunostomy tube.  At the time of this visit feeling progressively better but still poorly.  Very tired and lacking energy to do anything.  Sleeping poorly.  Has started to take clear liquids by mouth.  On exam he appears chronically ill but he does not seem in any distress.  He is pale but not jaundiced.  All surgical incisions are healing well and the lungs are diminished bilaterally but clear.  Heart regular.  Abdomen soft.  No edema.  Laboratory exams were reviewed.  He has macrocytic anemia with a hemoglobin of 11.9 g/dL and thrombocytosis with a platelet count of 562,000/mm³.  He also has monocytosis and basophilia but no leukocytosis at this  time.  The final review of pathology confirmed scattered foci of residual moderate to poorly differentiated adenocarcinoma at the gastroesophageal junction with changes consistent with prior neoadjuvant therapy.  There were ASSO stated dissecting pools of mucin with changes again consistent with previous therapy.  Scattered individual tumor cells and small clusters were present near the gastroesophageal junction and extending through the muscularis propria into the adventitial soft tissue/fat and spanning an area of 21 x 18 x 7 mm.  No definitive lymphovascular space invasion was identified.  All margins were negative.  Of 20 lymph nodes analyzed known had metastatic disease.  A discussion was had with him in regards to immunotherapy given in the adjuvant setting.  Explained side effects and potential complications.  I asked him to return in 3 weeks.    3/22/2024: Not feeling back to normal yet.  Still not eating much.  He had an episode of nausea and emesis this morning.  He has been afebrile.  He denies chest pains but does complain of some epigastric discomfort that seems unrelated to the previous discomfort that he had after the surgery.  He has been without dyspnea and does not have any more cough than usual.  As well no abdominal pain.  He continues to tolerate the enteral nutrition without difficulties.  No edema.  No skin rash.  On exam no distress.  Conversant and oriented.  Lungs diminished bilaterally.  Heart regular.  Abdomen with the jejunostomy tube in place.  Opening looks clean.  It is soft and without palpable tumors.  No edema.  Laboratory exams reviewed.  Discussed with him.  Offered him immunotherapy.  He remains somewhat reluctant to consider treatment with immunotherapy.  He has asked for more time to think about it.    4/12/2024: Somewhat better.  Perhaps more energetic.  Able to eat better after he had dilatation of the esophagus.  He has some pain that he localizes to the substernal area and  the mid epigastrium and right upper quadrant.  It is not as intense as before.  He continues to use his jejunostomy without any difficulties.  Defecating regularly.  Urinating without dysuria.  On exam alert, conversant and oriented.  No distress.  No jaundice.  Lungs are diminished bilaterally.  Heart is regular.  Abdomen is soft.  No edema.  Laboratory exams reviewed.  Discussed again the use of immunotherapy.  This time he is interested in pursuing.  Placed the treatment plan.  Discussed side effects and potential complications of the treatment with immunotherapy.  Discussed with him the rationale of treatment.  He is to begin as it is approved by insurance.  He will see me in approximately 5 weeks.    5/22/2024: Feeling somewhat better since the commencement of the steroids. His abdominal pain and nausea have resolved. She has been eating well and has not had any nausea or vomiting. He has been afebrile. No chest pain or increase in dyspnea. On exam alert and conversant. No jaundice and no oral lesions. Respirations not labored. The lungs diminished bilaterally and heart regular. Abdomen soft and not tender. No edema. Reviewed and discussed with him the laboratory exams. Gave him instructions in writing to continue to taper down the prednisone. Hold the immunotherapy given the possibility of enteritis as a result of it.     7/11/2024: Still not feeling very well.  Still tired and weak.  Also developed hypoglycemia with diaphoresis and profound weakness oftentimes after eating, particularly after eating large meals or carbohydrate rich meals.  He has been reasonably active.  He has no pain but has noted a persistent rash that is no longer pruritic.  He has no more dyspnea than before and no chest pains or abdominal pain.  On exam slender, well-built and not act acutely ill.  No jaundice.  The lungs are diminished bilaterally and the heart regular.  Abdomen soft with all surgical incisions well-healed.  No edema.   The laboratory exams were reviewed.  The recent scan of the abdomen, in May 2024, was reviewed as well.  He is to see me in approximately 6 weeks.  He will have scans prior to the next visit.    The following portions of the patient's history were reviewed and updated as appropriate: allergies, current medications, past family history, past medical history, past social history, past surgical history and problem list.    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    Anxiety     Brain fog     COVID 02/2023    Diverticulosis of large intestine without perforation or abscess without bleeding 10/02/2023    Esophageal cancer 10/2023    Generalized headaches     GERD (gastroesophageal reflux disease)     Hand tingling     History of blood clot in brain 2020    had thrombectomy    History of cancer chemotherapy 12/2023    History of kidney stones     History of radiation therapy 11/2023    Hyperlipidemia     Jejunostomy tube present     Port-A-Cath in place     Stroke 05/30/2020    Thyroid nodule     Tiredness     Trouble swallowing      Past Surgical History:   Procedure Laterality Date    ADRENALECTOMY      COLONOSCOPY      ENDOSCOPY      ENDOSCOPY N/A 02/17/2024    Procedure: ESOPHAGOGASTRODUODENOSCOPY WITH STENTING UNDER FLUROSCOPY GUIDENCE WITH ESOPHOGRAM;  Surgeon: Saurabh Hurtado MD;  Location: [facility] OR;  Service: Gastroenterology;  Laterality: N/A;    ENDOSCOPY N/A 3/8/2024    Procedure: ESOPHAGOGASTRODUODENOSCOPY WITH STENT REMOVAL;  Surgeon: Saurabh Hurtado MD;  Location: [facility] ENDOSCOPY;  Service: Gastroenterology;  Laterality: N/A;  Esophageal leak    ENDOSCOPY N/A 4/5/2024    Procedure: ESOPHAGOGASTRODUODENOSCOPY WITH BALLOON DILATATION #6, #7, #8, #9, #10, #11, #12  AND GASTROGRAFIN WITH FLOURO;  Surgeon: Saurabh Hurtado MD;  Location:   [facility] ENDOSCOPY;  Service: Gastroenterology;  Laterality: N/A;  PRE OP - ESOPHAGEAL CANCER  POST OP - ANASTOMOSIS STRICTURE    ENDOSCOPY N/A 5/7/2024    Procedure: ESOPHAGOGASTRODUODENOSCOPY WITH DILATATION (BALLOON 10MM-12MM, 12MM-15MM, 15MM-18MM,UP TO 17MM) AND INTRAOPERATIVE ESOPHAGRAM;  Surgeon: Saurabh Hurtado MD;  Location: [facility] ENDOSCOPY;  Service: Gastroenterology;  Laterality: N/A;    ENDOSCOPY N/A 6/18/2024    Procedure: ESOPHAGOGASTRODUODENOSCOPY WITH balloon dilatation (10mm-12mm up to 12mm;15mm-18mm up to 18mm) and removal of PEG tube;  Surgeon: Saurabh Hurtado MD;  Location: [facility] ENDOSCOPY;  Service: Gastroenterology;  Laterality: N/A;    ESOPHAGOGASTRECTOMY N/A 02/09/2024    Procedure: BRONCHOSCOPY, EGD, ESOPHAGECTOMY ALTAGRACIA-FABIOLA WITH Skinny MomI ROBOT, LAPAROSCOPY, RIGHT THORACOSCOPY CONVERTED TO THORACOTOMY, FEEDING JEJUNOSTOMY TUBE PLACEMENT, INTERCOSTAL NERVE BLOCKS;  Surgeon: Saurabh Hurtado MD;  Location: [facility] MAIN OR;  Service: Robotics - DaVinci;  Laterality: N/A;    ESOPHAGUS SURGERY  10/2023    biopsy    KIDNEY STONE SURGERY      OTHER SURGICAL HISTORY  2020    loop recorder PLACED AND REMOVED    THROMBECTOMY  2020    UPPER ENDOSCOPIC ULTRASOUND W/ FNA N/A 10/27/2023    Procedure: ENDOSCOPIC ULTRASOUND WITH STAGING AND FINE NEEDLE ASPIRATION X2 AREAS;  Surgeon: Joselyn Savage MD;  Location: [facility] ENDOSCOPY;  Service: Gastroenterology;  Laterality: N/A;  POST:    VENOUS ACCESS DEVICE (PORT) INSERTION Right 11/13/2023    Procedure: INSERTION VENOUS ACCESS DEVICE;  Surgeon: Saurabh Hurtado MD;  Location: [facility] MAIN OR;  Service: Thoracic;  Laterality: Right;       Current Outpatient Medications:     acetaminophen (TYLENOL) 325 MG tablet, Take 2 tablets by mouth Every 6 (Six) Hours As Needed for Mild Pain., Disp: , Rfl:     ALPRAZolam (XANAX) 0.25 MG tablet, Take 1 tablet by mouth 3 (Three) Times a Day As Needed for Anxiety. for anxiety, Disp: 90 tablet, Rfl: 3    aspirin 81 MG chewable tablet, Administer  1 tablet per J tube Daily. (Patient taking differently: Chew 1 tablet Daily.), Disp: , Rfl:     esomeprazole (nexIUM) 40 MG capsule, Take 1 capsule by mouth Every Morning Before Breakfast., Disp: , Rfl:     Evolocumab (REPATHA) solution prefilled syringe injection, Inject 1 mL under the skin into the appropriate area as directed Every 14 (Fourteen) Days., Disp: 6 mL, Rfl: 3    famotidine (PEPCID) 40 mg/5 mL suspension, Take 5 mL by mouth., Disp: , Rfl:     FLUoxetine (PROzac) 20 MG/5ML solution, Take 5 mL by mouth Daily., Disp: 150 mL, Rfl: 1    fluticasone (FLONASE) 50 MCG/ACT nasal spray, 2 sprays into the nostril(s) as directed by provider Daily., Disp: , Rfl:     lidocaine-prilocaine (EMLA) 2.5-2.5 % cream, Apply 1 application  topically to the appropriate area as directed As Needed (apply 60 minutes prior to port access)., Disp: 30 g, Rfl: 2    ondansetron (ZOFRAN) 8 MG tablet, Take 1 tablet by mouth 3 (Three) Times a Day As Needed for Nausea or Vomiting., Disp: 30 tablet, Rfl: 5    predniSONE (DELTASONE) 20 MG tablet, Take 2 tablets by mouth Daily. Take for 5 days. Then reduce to one and one half tablets for 5 days and then to only one tablet for 5 days., Disp: 60 tablet, Rfl: 1    predniSONE (DELTASONE) 5 MG tablet, Take 1 tablet by mouth Daily. Start on June 8 of 2024, Disp: 30 tablet, Rfl: 0    triamcinolone (KENALOG) 0.1 % ointment, Apply 1 Application topically to the appropriate area as directed 2 (Two) Times a Day., Disp: 454 g, Rfl: 1    [place] 0.6 MG/0.6ML solution auto-injector, 0.6 mg as needed for severe hypoglycemia, Disp: , Rfl:     Allergies   Allergen Reactions    Cephalosporins Anaphylaxis     Throat swelling    Dye  [Contrast Dye (Echo Or Unknown Ct/Mr)] Hives    Iodinated Contrast Media Hives    Sulfa Antibiotics Hives    Sulfate Hives    Zetia [Ezetimibe] Other (See Comments) and Myalgia     Made tired    Statins Myalgia     Myalgia and fatique     Family History   Problem Relation Age  of Onset    Arthritis Mother     Hypertension Mother     Heart failure Mother 73        Cause of death    Arthritis Father     Hypertension Father     Kidney cancer Father 57        Cause of death. Was a smoker    Heart disease Brother     Esophageal cancer Brother 59        Cause of death. Has a heavy drinker    Malig Hyperthermia Neg Hx      Cancer-related family history includes Esophageal cancer (age of onset: 59) in his brother; Kidney cancer (age of onset: 57) in his father.    Social History     Tobacco Use    Smoking status: Former     Current packs/day: 0.00     Average packs/day: 1 pack/day for 37.0 years (37.0 ttl pk-yrs)     Types: Cigars, Cigarettes     Start date: 1985     Quit date: 2022     Years since quittin.5     Passive exposure: Past    Smokeless tobacco: Never    Tobacco comments:     1 packs= stopped 10 years ago and continued cigars   2 cigars a day; has stopped cigars as of    Vaping Use    Vaping status: Never Used   Substance Use Topics    Alcohol use: Not Currently     Comment: Has now been abstinent since     Drug use: Never     Social History     Social History Narrative    Not on file      ROS:     Review of Systems   Constitutional:  Positive for fatigue. Negative for activity change, appetite change, chills, diaphoresis, fever and unexpected weight change.   HENT:  Negative for congestion, dental problem, drooling, ear discharge, ear pain, facial swelling, hearing loss, mouth sores, nosebleeds, postnasal drip, rhinorrhea, sinus pressure, sinus pain, sneezing, sore throat, tinnitus, trouble swallowing and voice change.    Eyes:  Negative for photophobia, pain, discharge, redness, itching and visual disturbance.   Respiratory:  Negative for apnea, cough, choking, chest tightness, shortness of breath, wheezing and stridor.    Cardiovascular:  Negative for chest pain, palpitations and leg swelling.   Gastrointestinal:  Negative for abdominal distention, abdominal  "pain, anal bleeding, blood in stool, constipation, diarrhea, nausea, rectal pain and vomiting.   Endocrine: Negative for cold intolerance, heat intolerance, polydipsia and polyuria.   Genitourinary:  Negative for decreased urine volume, difficulty urinating, dysuria, flank pain, frequency, genital sores, hematuria and urgency.   Musculoskeletal:  Negative for arthralgias, back pain, gait problem, joint swelling, myalgias, neck pain and neck stiffness.   Skin:  Negative for color change, pallor and rash.   Neurological:  Negative for dizziness, tremors, seizures, syncope, facial asymmetry, speech difficulty, weakness, light-headedness, numbness and headaches.   Hematological:  Negative for adenopathy. Does not bruise/bleed easily.   Psychiatric/Behavioral:  Negative for agitation, behavioral problems, confusion, decreased concentration, hallucinations, self-injury, sleep disturbance and suicidal ideas. The patient is nervous/anxious.      Objective:    Vitals:    07/11/24 1030   BP: 97/67   Pulse: 71   Temp: 98.2 °F (36.8 °C)   TempSrc: Oral   SpO2: 98%   Weight: 71.7 kg (158 lb)   Height: 180.3 cm (70.98\")   PainSc: 0-No pain     Body mass index is 22.05 kg/m².  ECOG  (0) Fully active, able to carry on all predisease performance without restriction    Physical Exam:     Physical Exam  Constitutional:       General: He is not in acute distress.     Appearance: Normal appearance. He is not ill-appearing, toxic-appearing or diaphoretic.      Comments: Slender, well-built.  Seems older than the stated age.  No distress.   HENT:      Head: Normocephalic and atraumatic.      Right Ear: External ear normal. There is no impacted cerumen.      Left Ear: External ear normal. There is no impacted cerumen.      Nose: Nose normal. No congestion or rhinorrhea.      Mouth/Throat:      Mouth: Mucous membranes are moist.      Pharynx: Oropharynx is clear. No oropharyngeal exudate or posterior oropharyngeal erythema.      Comments: " Oral cavity reveals poor dentition and poor dental hygiene.  No mucosal ulcerations are present.  Eyes:      General: No scleral icterus.        Right eye: No discharge.         Left eye: No discharge.      Conjunctiva/sclera: Conjunctivae normal.      Pupils: Pupils are equal, round, and reactive to light.   Neck:      Vascular: No carotid bruit.   Cardiovascular:      Rate and Rhythm: Normal rate and regular rhythm.      Pulses: Normal pulses.      Heart sounds: Normal heart sounds. No murmur heard.     No friction rub. No gallop.   Pulmonary:      Effort: No respiratory distress.      Breath sounds: No stridor. No wheezing, rhonchi or rales.      Comments: Breath sounds are diminished bilaterally.  Chest:      Chest wall: No tenderness.      Comments: A right thoracic surgical incision is healing well.  Smaller surgical incisions for drains also healing well.  Abdominal:      General: Abdomen is flat. Bowel sounds are normal. There is no distension.      Palpations: Abdomen is soft. There is no mass.      Tenderness: There is no abdominal tenderness. There is no right CVA tenderness, left CVA tenderness, guarding or rebound.      Comments: Jejunostomy in place.  Opening clean.   Musculoskeletal:         General: No swelling, tenderness, deformity or signs of injury.      Cervical back: No rigidity or tenderness.      Right lower leg: No edema.      Left lower leg: No edema.      Comments: There is clubbing of the fingers in both hands   Lymphadenopathy:      Cervical: No cervical adenopathy.   Skin:     General: Skin is warm and dry.      Coloration: Skin is not jaundiced or pale.      Findings: No bruising, erythema or rash.   Neurological:      General: No focal deficit present.      Mental Status: He is alert and oriented to person, place, and time.      Cranial Nerves: No cranial nerve deficit.      Motor: No weakness.      Coordination: Coordination normal.      Gait: Gait normal.   Psychiatric:         Mood  and Affect: Mood normal.         Behavior: Behavior normal.         Thought Content: Thought content normal.         Judgment: Judgment normal.     YOSELIN Mueller MD performed a physical exam on 7/11/2024 as documented above.    Lab Results - Last 18 Months   Lab Units 07/11/24  1039 06/19/24  0946 06/11/24  1059   WBC 10*3/mm3 4.49 8.68 10.43   HEMOGLOBIN g/dL 13.4 12.7* 14.5   HEMATOCRIT % 41.5 40.4 42.8   PLATELETS 10*3/mm3 277 288 312   MCV fL 99.5* 100.5* 95.5     Lab Results - Last 18 Months   Lab Units 06/19/24  0946 06/11/24  1059 05/22/24  0913   SODIUM mmol/L 141 143 139   POTASSIUM mmol/L 3.8 4.3 3.7   CHLORIDE mmol/L 103 104 102   CO2 mmol/L 29.5* 26.7 28.1   BUN mg/dL 15 11 18   CREATININE mg/dL 0.73* 0.95 0.82   CALCIUM mg/dL 9.9 9.7 9.4   BILIRUBIN mg/dL <0.2 0.2 <0.2   ALK PHOS U/L 80 80 83   ALT (SGPT) U/L 26 30 39   AST (SGOT) U/L 21 19 20   GLUCOSE mg/dL 83 88 90     Lab Results   Component Value Date    GLUCOSE 83 06/19/2024    BUN 15 06/19/2024    CREATININE 0.73 (L) 06/19/2024    EGFRIFNONA 67 02/24/2022    BCR 20.5 06/19/2024    K 3.8 06/19/2024    CO2 29.5 (H) 06/19/2024    CALCIUM 9.9 06/19/2024    ALBUMIN 4.2 06/19/2024    LABIL2 1.3 09/25/2018    AST 21 06/19/2024    ALT 26 06/19/2024     Lab Results   Component Value Date    FOLATE 4.11 (L) 11/12/2021     Lab Results   Component Value Date    GLQMBIYL03 784 06/11/2024     Uric Acid   Date Value Ref Range Status   06/11/2024 5.2 3.4 - 7.0 mg/dL Final     Lab Results   Component Value Date    SEDRATE 11 04/16/2021     Lab Results   Component Value Date    PSA 0.163 04/06/2021     Assessment & Plan     Assessment:  Adenocarcinoma of the gastroesophageal junction T2N1M0, microsatellite stable, low mutation burden, Her2 positive and PD-L1 expression negative: Completed neoadjuvant concurrent carboplatin and paclitaxel with radiation and underwent Vinicio Shayne type esophagectomy.  Recovering well.  Began treatment with nivolumab as discussed  on the previous note. After two doses he developed persistent abdominal pain and diarrhea.  Treatment with steroids resulted in resolution of the problem.  Now off of the steroids.  Dumping syndrome?  Discussed with him the of carbohydrate rich meals in the nader of dumping syndrome.  Given instructions.  Reviewed blood counts and recent scans.  Discussed with him.  3.  Tobacco smoker: Remains abstinent.  3.  He is to see me again in approximately 6 weeks.  He will see me with new scans.    Plan:  As above.    Oliverio Mueller MD on 7/11/2024 at 11:11 AM.

## 2024-07-11 ENCOUNTER — OFFICE VISIT (OUTPATIENT)
Dept: ONCOLOGY | Facility: CLINIC | Age: 54
End: 2024-07-11
Payer: COMMERCIAL

## 2024-07-11 ENCOUNTER — LAB (OUTPATIENT)
Dept: LAB | Facility: HOSPITAL | Age: 54
End: 2024-07-11
Payer: COMMERCIAL

## 2024-07-11 VITALS
SYSTOLIC BLOOD PRESSURE: 97 MMHG | HEART RATE: 71 BPM | BODY MASS INDEX: 22.12 KG/M2 | HEIGHT: 71 IN | OXYGEN SATURATION: 98 % | TEMPERATURE: 98.2 F | DIASTOLIC BLOOD PRESSURE: 67 MMHG | WEIGHT: 158 LBS

## 2024-07-11 DIAGNOSIS — C15.9 MALIGNANT NEOPLASM OF ESOPHAGUS, UNSPECIFIED LOCATION: Primary | ICD-10-CM

## 2024-07-11 DIAGNOSIS — C15.9 ADENOCARCINOMA OF ESOPHAGUS: ICD-10-CM

## 2024-07-11 DIAGNOSIS — C15.9 ADENOCARCINOMA OF ESOPHAGUS: Primary | ICD-10-CM

## 2024-07-11 LAB
ALBUMIN SERPL-MCNC: 4.1 G/DL (ref 3.5–5.2)
ALBUMIN/GLOB SERPL: 1.6 G/DL
ALP SERPL-CCNC: 83 U/L (ref 39–117)
ALT SERPL W P-5'-P-CCNC: 21 U/L (ref 1–41)
ANION GAP SERPL CALCULATED.3IONS-SCNC: 8.8 MMOL/L (ref 5–15)
AST SERPL-CCNC: 19 U/L (ref 1–40)
BASOPHILS # BLD AUTO: 0.03 10*3/MM3 (ref 0–0.2)
BASOPHILS NFR BLD AUTO: 0.7 % (ref 0–1.5)
BILIRUB SERPL-MCNC: 0.2 MG/DL (ref 0–1.2)
BUN SERPL-MCNC: 15 MG/DL (ref 6–20)
BUN/CREAT SERPL: 17.2 (ref 7–25)
CALCIUM SPEC-SCNC: 9.7 MG/DL (ref 8.6–10.5)
CHLORIDE SERPL-SCNC: 103 MMOL/L (ref 98–107)
CO2 SERPL-SCNC: 28.2 MMOL/L (ref 22–29)
CREAT SERPL-MCNC: 0.87 MG/DL (ref 0.76–1.27)
DEPRECATED RDW RBC AUTO: 45.9 FL (ref 37–54)
EGFRCR SERPLBLD CKD-EPI 2021: 103.8 ML/MIN/1.73
EOSINOPHIL # BLD AUTO: 0.27 10*3/MM3 (ref 0–0.4)
EOSINOPHIL NFR BLD AUTO: 6 % (ref 0.3–6.2)
ERYTHROCYTE [DISTWIDTH] IN BLOOD BY AUTOMATED COUNT: 12.8 % (ref 12.3–15.4)
GLOBULIN UR ELPH-MCNC: 2.6 GM/DL
GLUCOSE SERPL-MCNC: 89 MG/DL (ref 65–99)
HCT VFR BLD AUTO: 41.5 % (ref 37.5–51)
HGB BLD-MCNC: 13.4 G/DL (ref 13–17.7)
HOLD SPECIMEN: NORMAL
LYMPHOCYTES # BLD AUTO: 0.81 10*3/MM3 (ref 0.7–3.1)
LYMPHOCYTES NFR BLD AUTO: 18 % (ref 19.6–45.3)
MCH RBC QN AUTO: 32.1 PG (ref 26.6–33)
MCHC RBC AUTO-ENTMCNC: 32.3 G/DL (ref 31.5–35.7)
MCV RBC AUTO: 99.5 FL (ref 79–97)
MONOCYTES # BLD AUTO: 0.64 10*3/MM3 (ref 0.1–0.9)
MONOCYTES NFR BLD AUTO: 14.3 % (ref 5–12)
NEUTROPHILS NFR BLD AUTO: 2.74 10*3/MM3 (ref 1.7–7)
NEUTROPHILS NFR BLD AUTO: 61 % (ref 42.7–76)
PLATELET # BLD AUTO: 277 10*3/MM3 (ref 140–450)
PMV BLD AUTO: 8.4 FL (ref 6–12)
POTASSIUM SERPL-SCNC: 4.4 MMOL/L (ref 3.5–5.2)
PROT SERPL-MCNC: 6.7 G/DL (ref 6–8.5)
RBC # BLD AUTO: 4.17 10*6/MM3 (ref 4.14–5.8)
SODIUM SERPL-SCNC: 140 MMOL/L (ref 136–145)
WBC NRBC COR # BLD AUTO: 4.49 10*3/MM3 (ref 3.4–10.8)

## 2024-07-11 PROCEDURE — 85025 COMPLETE CBC W/AUTO DIFF WBC: CPT

## 2024-07-11 PROCEDURE — 36415 COLL VENOUS BLD VENIPUNCTURE: CPT

## 2024-07-11 PROCEDURE — 80053 COMPREHEN METABOLIC PANEL: CPT | Performed by: INTERNAL MEDICINE

## 2024-07-15 ENCOUNTER — OFFICE VISIT (OUTPATIENT)
Dept: CARDIOLOGY | Facility: CLINIC | Age: 54
End: 2024-07-15
Payer: COMMERCIAL

## 2024-07-15 VITALS
OXYGEN SATURATION: 96 % | WEIGHT: 157.8 LBS | BODY MASS INDEX: 22.09 KG/M2 | HEIGHT: 71 IN | HEART RATE: 88 BPM | DIASTOLIC BLOOD PRESSURE: 63 MMHG | SYSTOLIC BLOOD PRESSURE: 99 MMHG

## 2024-07-15 DIAGNOSIS — E78.2 MIXED HYPERLIPIDEMIA: ICD-10-CM

## 2024-07-15 DIAGNOSIS — Z87.891 HISTORY OF TOBACCO ABUSE: ICD-10-CM

## 2024-07-15 DIAGNOSIS — C15.5 MALIGNANT NEOPLASM OF LOWER THIRD OF ESOPHAGUS: Primary | ICD-10-CM

## 2024-07-15 DIAGNOSIS — E27.8 LEFT ADRENAL MASS: ICD-10-CM

## 2024-07-15 DIAGNOSIS — Z01.810 PRE-OPERATIVE CARDIOVASCULAR EXAMINATION: ICD-10-CM

## 2024-07-15 DIAGNOSIS — F41.9 ANXIETY AND DEPRESSION: ICD-10-CM

## 2024-07-15 DIAGNOSIS — I63.9 CRYPTOGENIC STROKE: ICD-10-CM

## 2024-07-15 DIAGNOSIS — R63.6 UNDERWEIGHT: ICD-10-CM

## 2024-07-15 DIAGNOSIS — Z87.891 EX-SMOKER: ICD-10-CM

## 2024-07-15 DIAGNOSIS — F32.A ANXIETY AND DEPRESSION: ICD-10-CM

## 2024-07-15 PROCEDURE — 99214 OFFICE O/P EST MOD 30 MIN: CPT | Performed by: INTERNAL MEDICINE

## 2024-07-16 NOTE — PAT
Pt is having EGD with dilation under GA per Dr Martinez.  Pt taking ASA 81 mg qd and Repatha inj q28 days.  Stop date needed was question to Dr martinez.  Awaiting response

## 2024-07-22 ENCOUNTER — LAB (OUTPATIENT)
Dept: LAB | Facility: HOSPITAL | Age: 54
End: 2024-07-22
Payer: COMMERCIAL

## 2024-07-22 ENCOUNTER — HOSPITAL ENCOUNTER (OUTPATIENT)
Dept: CARDIOLOGY | Facility: HOSPITAL | Age: 54
Discharge: HOME | End: 2024-07-22
Payer: COMMERCIAL

## 2024-07-22 LAB
ANION GAP SERPL CALCULATED.3IONS-SCNC: 10.9 MMOL/L (ref 5–15)
BASOPHILS # BLD AUTO: 0.05 10*3/MM3 (ref 0–0.2)
BASOPHILS NFR BLD AUTO: 1.3 % (ref 0–1.5)
BUN SERPL-MCNC: 13 MG/DL (ref 6–20)
BUN/CREAT SERPL: 14.3 (ref 7–25)
CALCIUM SPEC-SCNC: 9.6 MG/DL (ref 8.6–10.5)
CHLORIDE SERPL-SCNC: 104 MMOL/L (ref 98–107)
CO2 SERPL-SCNC: 25.1 MMOL/L (ref 22–29)
CREAT SERPL-MCNC: 0.91 MG/DL (ref 0.76–1.27)
DEPRECATED RDW RBC AUTO: 42.7 FL (ref 37–54)
EGFRCR SERPLBLD CKD-EPI 2021: 101.4 ML/MIN/1.73
EOSINOPHIL # BLD AUTO: 0.26 10*3/MM3 (ref 0–0.4)
EOSINOPHIL NFR BLD AUTO: 6.8 % (ref 0.3–6.2)
ERYTHROCYTE [DISTWIDTH] IN BLOOD BY AUTOMATED COUNT: 12.6 % (ref 12.3–15.4)
GLUCOSE SERPL-MCNC: 74 MG/DL (ref 65–99)
HCT VFR BLD AUTO: 41.3 % (ref 37.5–51)
HGB BLD-MCNC: 13.9 G/DL (ref 13–17.7)
IMM GRANULOCYTES # BLD AUTO: 0.01 10*3/MM3 (ref 0–0.05)
IMM GRANULOCYTES NFR BLD AUTO: 0.3 % (ref 0–0.5)
LYMPHOCYTES # BLD AUTO: 0.85 10*3/MM3 (ref 0.7–3.1)
LYMPHOCYTES NFR BLD AUTO: 22.3 % (ref 19.6–45.3)
MCH RBC QN AUTO: 31.7 PG (ref 26.6–33)
MCHC RBC AUTO-ENTMCNC: 33.7 G/DL (ref 31.5–35.7)
MCV RBC AUTO: 94.3 FL (ref 79–97)
MONOCYTES # BLD AUTO: 0.6 10*3/MM3 (ref 0.1–0.9)
MONOCYTES NFR BLD AUTO: 15.7 % (ref 5–12)
NEUTROPHILS NFR BLD AUTO: 2.05 10*3/MM3 (ref 1.7–7)
NEUTROPHILS NFR BLD AUTO: 53.6 % (ref 42.7–76)
NRBC BLD AUTO-RTO: 0 /100 WBC (ref 0–0.2)
PLATELET # BLD AUTO: 303 10*3/MM3 (ref 140–450)
PMV BLD AUTO: 9 FL (ref 6–12)
POTASSIUM SERPL-SCNC: 4.5 MMOL/L (ref 3.5–5.2)
RBC # BLD AUTO: 4.38 10*6/MM3 (ref 4.14–5.8)
SODIUM SERPL-SCNC: 140 MMOL/L (ref 136–145)
WBC NRBC COR # BLD AUTO: 3.82 10*3/MM3 (ref 3.4–10.8)

## 2024-07-22 PROCEDURE — 36415 COLL VENOUS BLD VENIPUNCTURE: CPT | Performed by: SURGERY

## 2024-07-22 PROCEDURE — 80048 BASIC METABOLIC PNL TOTAL CA: CPT

## 2024-07-22 PROCEDURE — 93005 ELECTROCARDIOGRAM TRACING: CPT | Performed by: SURGERY

## 2024-07-22 PROCEDURE — 85025 COMPLETE CBC W/AUTO DIFF WBC: CPT | Performed by: SURGERY

## 2024-07-27 LAB
QT INTERVAL: 436 MS
QTC INTERVAL: 411 MS

## 2024-07-30 ENCOUNTER — PREP FOR SURGERY (OUTPATIENT)
Dept: OTHER | Facility: HOSPITAL | Age: 54
End: 2024-07-30
Payer: COMMERCIAL

## 2024-07-30 ENCOUNTER — ANESTHESIA EVENT (OUTPATIENT)
Dept: GASTROENTEROLOGY | Facility: HOSPITAL | Age: 54
End: 2024-07-30
Payer: COMMERCIAL

## 2024-07-30 ENCOUNTER — ANESTHESIA (OUTPATIENT)
Dept: GASTROENTEROLOGY | Facility: HOSPITAL | Age: 54
End: 2024-07-30
Payer: COMMERCIAL

## 2024-07-30 ENCOUNTER — HOSPITAL ENCOUNTER (OUTPATIENT)
Facility: HOSPITAL | Age: 54
Setting detail: HOSPITAL OUTPATIENT SURGERY
Discharge: HOME | End: 2024-07-30
Attending: SURGERY | Admitting: SURGERY
Payer: COMMERCIAL

## 2024-07-30 VITALS
WEIGHT: 157 LBS | TEMPERATURE: 98.2 F | SYSTOLIC BLOOD PRESSURE: 101 MMHG | HEIGHT: 72 IN | RESPIRATION RATE: 18 BRPM | OXYGEN SATURATION: 96 % | DIASTOLIC BLOOD PRESSURE: 80 MMHG | BODY MASS INDEX: 21.26 KG/M2 | HEART RATE: 66 BPM

## 2024-07-30 DIAGNOSIS — C15.4 MALIGNANT NEOPLASM OF MIDDLE THIRD OF ESOPHAGUS: Primary | ICD-10-CM

## 2024-07-30 PROCEDURE — 43249 ESOPH EGD DILATION <30 MM: CPT | Performed by: SURGERY

## 2024-07-30 PROCEDURE — 25010000002 ONDANSETRON PER 1 MG: Performed by: NURSE ANESTHETIST, CERTIFIED REGISTERED

## 2024-07-30 PROCEDURE — 25010000002 SUCCINYLCHOLINE PER 20 MG: Performed by: NURSE ANESTHETIST, CERTIFIED REGISTERED

## 2024-07-30 PROCEDURE — 25810000003 LACTATED RINGERS PER 1000 ML: Performed by: NURSE ANESTHETIST, CERTIFIED REGISTERED

## 2024-07-30 PROCEDURE — C1726 CATH, BAL DIL, NON-VASCULAR: HCPCS | Performed by: SURGERY

## 2024-07-30 PROCEDURE — 25010000002 PROPOFOL 200 MG/20ML EMULSION: Performed by: NURSE ANESTHETIST, CERTIFIED REGISTERED

## 2024-07-30 PROCEDURE — 25810000003 SODIUM CHLORIDE 0.9 % SOLUTION: Performed by: SURGERY

## 2024-07-30 PROCEDURE — S0260 H&P FOR SURGERY: HCPCS | Performed by: SURGERY

## 2024-07-30 RX ORDER — PROPOFOL 10 MG/ML
INJECTION, EMULSION INTRAVENOUS AS NEEDED
Status: DISCONTINUED | OUTPATIENT
Start: 2024-07-30 | End: 2024-07-30 | Stop reason: SURG

## 2024-07-30 RX ORDER — LIDOCAINE HYDROCHLORIDE 20 MG/ML
INJECTION, SOLUTION EPIDURAL; INFILTRATION; INTRACAUDAL; PERINEURAL AS NEEDED
Status: DISCONTINUED | OUTPATIENT
Start: 2024-07-30 | End: 2024-07-30 | Stop reason: SURG

## 2024-07-30 RX ORDER — SODIUM CHLORIDE 9 MG/ML
9 INJECTION, SOLUTION INTRAVENOUS ONCE
Status: COMPLETED | OUTPATIENT
Start: 2024-07-30 | End: 2024-07-30

## 2024-07-30 RX ORDER — SODIUM CHLORIDE, SODIUM LACTATE, POTASSIUM CHLORIDE, CALCIUM CHLORIDE 600; 310; 30; 20 MG/100ML; MG/100ML; MG/100ML; MG/100ML
INJECTION, SOLUTION INTRAVENOUS CONTINUOUS PRN
Status: DISCONTINUED | OUTPATIENT
Start: 2024-07-30 | End: 2024-07-30 | Stop reason: SURG

## 2024-07-30 RX ORDER — ONDANSETRON 2 MG/ML
INJECTION INTRAMUSCULAR; INTRAVENOUS AS NEEDED
Status: DISCONTINUED | OUTPATIENT
Start: 2024-07-30 | End: 2024-07-30 | Stop reason: SURG

## 2024-07-30 RX ORDER — DEXMEDETOMIDINE HYDROCHLORIDE 100 UG/ML
INJECTION, SOLUTION INTRAVENOUS AS NEEDED
Status: DISCONTINUED | OUTPATIENT
Start: 2024-07-30 | End: 2024-07-30 | Stop reason: SURG

## 2024-07-30 RX ORDER — SUCCINYLCHOLINE CHLORIDE 20 MG/ML
INJECTION INTRAMUSCULAR; INTRAVENOUS AS NEEDED
Status: DISCONTINUED | OUTPATIENT
Start: 2024-07-30 | End: 2024-07-30 | Stop reason: SURG

## 2024-07-30 RX ADMIN — SUCCINYLCHOLINE CHLORIDE 100 MG: 20 INJECTION, SOLUTION INTRAMUSCULAR; INTRAVENOUS at 12:45

## 2024-07-30 RX ADMIN — SODIUM CHLORIDE, SODIUM LACTATE, POTASSIUM CHLORIDE, AND CALCIUM CHLORIDE: .6; .31; .03; .02 INJECTION, SOLUTION INTRAVENOUS at 12:33

## 2024-07-30 RX ADMIN — DEXMEDETOMIDINE HYDROCHLORIDE 10 MCG: 100 INJECTION, SOLUTION INTRAVENOUS at 12:42

## 2024-07-30 RX ADMIN — LIDOCAINE HYDROCHLORIDE 50 MG: 20 INJECTION, SOLUTION EPIDURAL; INFILTRATION; INTRACAUDAL; PERINEURAL at 12:45

## 2024-07-30 RX ADMIN — ONDANSETRON 4 MG: 2 INJECTION INTRAMUSCULAR; INTRAVENOUS at 12:53

## 2024-07-30 RX ADMIN — PROPOFOL 200 MG: 10 INJECTION, EMULSION INTRAVENOUS at 12:45

## 2024-07-30 RX ADMIN — SODIUM CHLORIDE 9 ML/HR: 9 INJECTION, SOLUTION INTRAVENOUS at 12:24

## 2024-07-30 NOTE — ANESTHESIA POSTPROCEDURE EVALUATION
Patient: Jourdan Lebron    Procedure Summary       Date: 07/30/24 Room / Location: [facility] ENDOSCOPY 4 / [facility] ENDOSCOPY    Anesthesia Start: 1233 Anesthesia Stop: 1307    Procedure: ESOPHAGOGASTRODUODENOSCOPY with balloon dialtion(15mm-18mm balloon up to 18mm) Diagnosis:       Malignant neoplasm of middle third of esophagus      (Malignant neoplasm of middle third of esophagus [C15.4])    Surgeons: Saurabh Hurtado MD Provider: Kali Chavira MD    Anesthesia Type: general ASA Status: 3            Anesthesia Type: general    Vitals  Vitals Value Taken Time   /80 07/30/24 1351   Temp 98.2 °F (36.8 °C) 07/30/24 1312   Pulse 66 07/30/24 1351   Resp 18 07/30/24 1351   SpO2 96 % 07/30/24 1351           Post Anesthesia Care and Evaluation    Patient location during evaluation: PACU  Patient participation: complete - patient participated  Level of consciousness: awake  Pain scale: See nurse's notes for pain score.  Pain management: adequate    Airway patency: patent  Anesthetic complications: No anesthetic complications  PONV Status: none  Cardiovascular status: acceptable  Respiratory status: acceptable and spontaneous ventilation  Hydration status: acceptable    Comments: Patient seen and examined postoperatively; vital signs stable; SpO2 greater than or equal to 90%; cardiopulmonary status stable; nausea/vomiting adequately controlled; pain adequately controlled; no apparent anesthesia complications; patient discharged from anesthesia care when discharge criteria were met

## 2024-07-30 NOTE — H&P
THORACIC SURGERY CLINIC FOLLOW UP NOTE    REASON FOR CONSULT: Stage III-A ([id number]) Adenocarcinoma of the lower third of the esophagus s/p neoadjuvant concurrent chemoradiotherapy followed by hybrid [place] Shayne esophagectomy.    Subjective   HISTORY OF PRESENTING ILLNESS:   Jourdan Lebron is a 54-year-old male who has significant medical problems as mentioned in the medical chart.      He was having intermittent dysphagia for over a year which became progressively worse.  On 10/2/2023, he underwent EGD and colonoscopy by Dr. Ozzy Abdalla at [facility].  He was found to have a 6 cm mass at the lower third of the esophagus (36 to 42 cm) (biopsied), mild diverticulosis of the sigmoid colon, 5 polyps noted in the colon and cecum that were removed.  The pathology of the esophageal mass revealed invasive moderate to poorly differentiated adenocarcinoma. All polypectomy samples were negative for malignancy (fragments of tubular adenoma).  CT scan of the chest, abdomen and pelvis on 10/9/2023 at [facility] showed 4 cm abnormal thickening of the lower thoracic esophagus extending to the GE junction thought to represent patient's known primary lung malignancy.  There were no convincing evidence of metastatic disease elsewhere in the chest, abdomen, pelvis, or skeleton.  There were stable left adrenalectomy changes, right adrenal adenoma unchanged.     He was seen in the office by Dr. Tacos Osuna ([facility]) for new diagnosis of esophageal cancer. He was an established patient of Dr. Harinder Mueller for erythrocytosis and macrocytosis since 11/2021 (resolved).      PET/CT on 10/20/2023 at [facility] showed hypermetabolic (SUV 7.7) activity within the distal esophagus extending to the GE junction compatible with patient's known malignancy.  He then underwent EGD with EUS by Dr. Joselyn Savage on 10/27/2023. Findings included close to 6 cm ulcerated mass extending from 36 to 42 cm consistent with poorly  differentiated adenocarcinoma.  Mass penetrated through muscularis propria without involving the adventitia consistent with T2 lesion. Two small round hypodense lymph nodes in close proximity to the mass measuring 5 and 6 mm concerning for malignancy. Other lymph nodes around the GE junction measuring 7 and 8 mm were also concerning for malignancy but immediate cytology was negative for malignancy. Pathology of the gastrohepatic lymph node was positive for metastatic adenocarcinoma; however, the lymph node at 36 cm was negative for malignancy.     He was referred to thoracic surgery for further evaluation.  At the time of initial consultation, he could eat and swallow quite a bit, but it depends. He avoids a lot of bread because it gets stuck lower down his chest. He went down from 200 pounds to 170 pounds 3 years ago after he had a stroke. He had memory loss and general fatigue after the stroke. He lost his appetite but began to add some weight. He then noticed he gets bloated and was having dyspepsia. He had adrenalectomy performed at the [facility] after an adrenal biopsy was done on an adrenal mass, and he went into adrenal crisis. He took hydrocortisone for 1 year and discontinued it on 09/2022. He developed abdominal discomfort afterwards. He denies having any past chest surgeries or myocardial infarction.  His wife mentioned the cause of the previous stroke was never known despite having a complete work up and a loop recorder inserted which was removed in the spring. He denies any abnormal heart rhythms or pedal edema. The patient quit smoking cigarettes 8 to 9 years ago and started smoking cigars.      He had good functional status.  Based on the clinical presentation, he was considered a candidate for trimodality treatment. I placed Mediport on 11/13/2023.  He received concurrent chemoradiation therapy.  His last dose of chemotherapy and radiation was on 12/28/2023. He tolerated chemoradiation  without much difficulty.  Restaging PET/CT scan on 2/2/2024 showed wall thickening involving the distal esophagus extending to the GE junction consistent with patient's known esophageal malignancy.  There was no evidence of distant metastases.  There was significant decreased uptake in the distal esophagus consistent with treatment effect.     Due to his history of stroke, ultrasound bilateral carotid were obtained which showed no significant carotid stenosis.  There was incidentally noted right thyroid nodule for which follow-up ultrasound of the thyroid was done which showed multiple nodules but not concerning for malignancy.  Transthoracic echo was performed on 1/22/2024 and noted ejection fraction of 51 to 55%.  He was sent to Dr. Hayward for cardiology clearance.  He had 0.8% risk of myocardial infarction or cardiac arrest, intraoperatively or up to 30 days postoperatively.  No further workup was recommended.  He had a history of adrenal crisis and was sent to evaluation by his primary endocrinologist at [facility], Anabela Crenshaw MD.  Complete ACTH stimulation test was performed which was reported normal.     On 2/9/2024, he underwent surgical resection. Flexible esophagogastroduodenoscopy was notable for mucosal changes at the distal esophagus and Olivera's esophagus.  These findings were consistent with treatment effect. Rather than a gastric emptying procedure, I performed a balloon dilatation of the pylorus to 20 mm with a CRE balloon I performed a robot-assisted minimally-invasive [place] Shayne esophagectomy via a robot-assisted laparoscopic and robot assisted right thoracoscopic approach converted to thoracotomy. I constructed a 4 cm gastric tube and performed an esophagogastrostomy approximately 2 cm above the azygos vein using a 28 EEA stapler.  After firing the stapler, 1 complete esophageal anastomotic ring and a partial gastric anastomotic ring was noted. The anastomosis was  checked under water with air insufflation and leak was identified which was oversewn. The anastomosis was partially imbricated and no leak was identified under water with air insufflation afterwards. A feeding jejunostomy tube was placed 40 cm distal to ligament of Treitz. Repeat endoscopy was notable for a widely patent anastomosis at 22-24 cm.  The conduit was healthy appearing.  It was mildly tortuous and non redundant.  He remained hemodynamically stable throughout the case.  He received 4000 cc of crystalloid and made 1000 cc of urine.  He required small doses of phenylephrine throughout the case.  He was extubated at the end of procedure.       The pathology reported scattered foci of residual moderate to poorly differentiated adenocarcinoma occurring at the GE junction with changes consistent with prior therapy.  There were scattered individual tumor cells and small clusters near GE junction and extending through muscularis propria into adventitia spanning an area of 2.1 x 1.8 x 0.7 cm.  There is no lymphovascular space invasion.  The proximal and distal surgical margins were negative for malignancy.  20 lymph nodes were negative for metastatic carcinoma. There was also presence of low and high-grade squamous dysplasia.  He had near complete response to chemo and radiation therapy.     Postprocedure he was transferred to the ICU.  His initial postop care was unremarkable.  He was started on tube feeds and was slowly advanced to goal rate which he tolerated well.  He demonstrated return of bowel function.  The chest tube was removed after amylase from the drain was checked which was not concerning for esophageal leak.  His white blood count were slowly trending up and had mild leukocytosis.  This was concerning for unidentified infection.  He had mild cellulitis around the jejunostomy tube site.  He was started on empiric antibiotics.  Infectious disease was consulted.  CT scan of the chest abdomen pelvis  without contrast did not reveal any clear source of infection.  I offered EGD to evaluate for anastomotic leak and conduit.     On 2/17/2024, he underwent EGD.  He had oropharyngeal candidiasis.  There was approximately 20% defect at the anastomotic ring likely contained by the reinforcing stitch placed intraoperatively.  There was fibrinous plaque covering the anastomosis.  Intraoperative esophagram revealed blind pouch anteriorly at the site of anastomotic defect.  The gastric conduit appeared viable but was slightly redundant.  The conduit orientation was straight.  I placed a 18 mm X 25 mm X 103 mm fully covered Wallflex stent deployed at the anastomosis.  He tolerated the procedure well.     Esophagram performed on 2/22/2024 and was negative for leak, but did demonstrate retrograde flow of contrast along the distal aspect of the stent making him high risk for anastomotic leak with oral feeds.  He was kept nothing by mouth and discharged home on tube feeds.     He slowly improved over time.  His appetite improved.  He was able to tolerate liquid diet and slowly advance to soft diet. He was started on adjuvant immunotherapy.  He developed dysphagia and underwent EGD on 4/5/2024.  The esophagus was stricture up to 8 mm.  I serially dilated using CRE balloon up to 18 mm.  He was advised to follow-up for repeat EGD in 6 weeks.    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    Anxiety     Brain fog     COVID 02/2023    Diverticulosis of large intestine without perforation or abscess without bleeding 10/02/2023    Esophageal cancer 10/2023    Generalized headaches     GERD (gastroesophageal reflux disease)     Hand tingling     History of blood clot in brain 2020    had thrombectomy    History of cancer chemotherapy 12/2023    History of kidney  stones     History of radiation therapy 11/2023    Hyperlipidemia     Jejunostomy tube present     Port-A-Cath in place     Stroke 05/30/2020    Thyroid nodule     Tiredness     Trouble swallowing        Past Surgical History:   Procedure Laterality Date    ADRENALECTOMY      COLONOSCOPY      ENDOSCOPY      ENDOSCOPY N/A 02/17/2024    Procedure: ESOPHAGOGASTRODUODENOSCOPY WITH STENTING UNDER FLUROSCOPY GUIDENCE WITH ESOPHOGRAM;  Surgeon: Saurabh Hurtado MD;  Location: [facility] MAIN OR;  Service: Gastroenterology;  Laterality: N/A;    ENDOSCOPY N/A 3/8/2024    Procedure: ESOPHAGOGASTRODUODENOSCOPY WITH STENT REMOVAL;  Surgeon: Saurabh Hurtado MD;  [facility] END[facility];  Service: Gastroenterology;  Laterality: N/A;  Esophageal leak    ENDOSCOPY N/A 4/5/2024    Procedure: ESOPHAGOGASTRODUODENOSCOPY WITH BALLOON DILATATION #6, #7, #8, #9, #10, #11, #12  AND GASTROGRAFIN WITH FLOURO;  Surgeon: Saurabh Hurtado MD;  [facility];  Service: Gastroenterology;  Laterality: N/A;  PRE OP - ESOPHAGEAL CANCER  POST OP - ANASTOMOSIS STRICTURE    ENDOSCOPY N/A 5/7/2024    Procedure: ESOPHAGOGASTRODUODENOSCOPY WITH DILATATION (BALLOON 10MM-12MM, 12MM-15MM, 15MM-18MM,UP TO 17MM) AND INTRAOPERATIVE ESOPHAGRAM;  Surgeon: Saurabh Hurtado MD;  Location: [facility] ENDOSCOPY;  Service: Gastroenterology;  Laterality: N/A;    ENDOSCOPY N/A 6/18/2024    Procedure: ESOPHAGOGASTRODUODENOSCOPY WITH balloon dilatation (10mm-12mm up to 12mm;15mm-18mm up to 18mm) and removal of PEG tube;  Surgeon: Saurabh Hurtado MD;  Location: [facility] ENDOSCOPY;  Service: Gastroenterology;  Laterality: N/A;    ESOPHAGOGASTRECTOMY N/A 02/09/2024    Procedure: BRONCHOSCOPY, EGD, ESOPHAGECTOMY ALTAGRACIA-FABIOLA WITH DAVINCI ROBOT, LAPAROSCOPY, RIGHT THORACOSCOPY CONVERTED TO THORACOTOMY, FEEDING JEJUNOSTOMY TUBE PLACEMENT, INTERCOSTAL NERVE BLOCKS;  Surgeon: Saurabh Hurtado MD;  Location: [facility] MAIN OR;  Service: Robotics - DaVinci;  Laterality: N/A;    ESOPHAGUS SURGERY   10/2023    biopsy    KIDNEY STONE SURGERY      OTHER SURGICAL HISTORY      loop recorder PLACED AND REMOVED    THROMBECTOMY      UPPER ENDOSCOPIC ULTRASOUND W/ FNA N/A 10/27/2023    Procedure: ENDOSCOPIC ULTRASOUND WITH STAGING AND FINE NEEDLE ASPIRATION X2 AREAS;  Surgeon: Joselyn Savage MD;  Location: [facility] ENDOSCOPY;  Service: Gastroenterology;  Laterality: N/A;  POST:    VENOUS ACCESS DEVICE (PORT) INSERTION Right 2023    Procedure: INSERTION VENOUS ACCESS DEVICE;  Surgeon: Saurabh Hurtado MD;  Location: [facility] MAIN OR;  Service: Thoracic;  Laterality: Right;       Family History   Problem Relation Age of Onset    Arthritis Mother     Hypertension Mother     Heart failure Mother 73        Cause of death    Arthritis Father     Hypertension Father     Kidney cancer Father 57        Cause of death. Was a smoker    Heart disease Brother     Esophageal cancer Brother 59        Cause of death. Has a heavy drinker    Malig Hyperthermia Neg Hx        Social History     Socioeconomic History    Marital status:    Tobacco Use    Smoking status: Former     Current packs/day: 0.00     Average packs/day: 1 pack/day for 37.0 years (37.0 ttl pk-yrs)     Types: Cigars, Cigarettes     Start date: 1985     Quit date: 2022     Years since quittin.5     Passive exposure: Past    Smokeless tobacco: Never    Tobacco comments:     1 packs= stopped 10 years ago and continued cigars   2 cigars a day; has stopped cigars as of    Vaping Use    Vaping status: Never Used   Substance and Sexual Activity    Alcohol use: Not Currently     Comment: Has now been abstinent since     Drug use: Never    Sexual activity: Defer       No current facility-administered medications for this encounter.     Allergies   Allergen Reactions    Cephalosporins Anaphylaxis     Throat swelling    Dye  [Contrast Dye (Echo Or Unknown Ct/Mr)] Hives    Iodinated Contrast Media Hives    Sulfa Antibiotics Hives    Sulfate Hives  "   Zetia [Ezetimibe] Other (See Comments) and Myalgia     Made tired    Statins Myalgia     Myalgia and fatique             Objective    OBJECTIVE:     VITAL SIGNS:  /80   Pulse 66   Temp 98.2 °F (36.8 °C) (Oral)   Resp 18   Ht 182.9 cm (72\")   Wt 71.2 kg (157 lb)   SpO2 96%   BMI 21.29 kg/m²     PHYSICAL EXAM:  Constitutional:       Appearance: Normal appearance.   HENT:      Head: Normocephalic.      Right Ear: External ear normal.      Left Ear: External ear normal.      Nose: Nose normal.      Mouth/Throat: No obvious deformity     Pharynx: Oropharynx is clear.   Eyes:      Conjunctiva/sclera: Conjunctivae normal.   Cardiovascular:      Rate and Rhythm: Normal rate.      Pulses: Normal pulses.   Pulmonary:      Effort: Pulmonary effort is normal.   Abdominal:      Palpations: Abdomen is soft.   Musculoskeletal:         General: Normal range of motion.      Cervical back: Normal range of motion.   Skin:     General: Skin is warm.   Neurological:      General: No focal deficit present.      Mental Status: He is alert and oriented to person, place, and time.     Vital Signs  Weight is 140 pounds.    LAB RESULTS:  I have reviewed all the available laboratory results in the chart.    RESULTS REVIEW:  I have reviewed the patient's all relevant laboratory and imaging findings.     PET/ CT of skull base to mid-thigh done 10/20/2023.  Abnormal metabolic activity within the distal esophagus extending to the GE junction compatible with patient's known malignancy.    PET scan from 02/02/2024 was reviewed with the patient.  The tumor has decreased in size. It still shows some activity, which could be some radiation changes.     Ultrasound of the carotid arteries from 01/24/2024 did not see any significant blockage.     Ultrasound of the thyroid from 01/29/2024 revealed a small nodule on the thyroid that was not concerning for cancer.         ASSESSMENT & PLAN:  Jourdan Lebron is a 54 y.o. male with significant " medical conditions as mentioned above presented to my clinic.    Diagnosis: Adenocarcinoma of the lower third of the esophagus s/p neoadjuvant concurrent chemoradiotherapy followed by hybrid Vinicio Shayne esophagectomy.  Staging: Stage III-A ([id number])    Anastomotic stricture  I offered EGD to evaluate for anastomotic stricture and dilation.     I discussed the patients findings and my recommendations with the patient. The patient was given adequate time to ask questions and all questions were answered to patient satisfaction.     Saurabh Hurtado MD  Thoracic Surgeon  [facility]        Dictated utilizing Dragon dictation

## 2024-07-30 NOTE — ANESTHESIA PROCEDURE NOTES
Airway  Urgency: elective    Date/Time: 7/30/2024 12:47 PM    General Information and Staff    Patient location during procedure: OR    Indications and Patient Condition  Indications for airway management: airway protection    Preoxygenated: yes  MILS maintained throughout  Mask difficulty assessment: 0 - not attempted    Final Airway Details  Final airway type: endotracheal airway      Successful airway: ETT     Successful intubation technique: video laryngoscopy  Facilitating devices/methods: intubating stylet  Endotracheal tube insertion site: oral  Blade: Saldana  Blade size: 4  ETT size (mm): 7.5  Cormack-Lehane Classification: grade I - full view of glottis  Placement verified by: capnometry   Measured from: lips  ETT/EBT  to lips (cm): 21  Number of attempts at approach: 1  Assessment: lips, teeth, and gum same as pre-op and atraumatic intubation    Additional Comments  Patient reported upper and lower lip damage after previous intubation. Unable to determine if due to intubation or bite block for procedure.This was an easy, smooth and atraumatic

## 2024-07-30 NOTE — DISCHARGE INSTRUCTIONS
A responsible adult should stay with you and you should rest quietly for the rest of the day.    Do not drink alcohol, drive, operate any heavy machinery or power tools or make any legal/important decisions for the next 24 hours.     Progress your diet as tolerated.  If you begin to experience severe pain, increased shortness of breath, racing heartbeat or a fever above 101 F, seek immediate medical attention.     Follow up with MD as instructed. Call office for results in 3 to 5 days if needed.    Next procedure in 8 weeks

## 2024-07-30 NOTE — OP NOTE
Operative Note     Date of procedure: 7/30/2024     Patient name: Jourdan Lebron  MRN: [id number]    Pre OP diagnosis:  Gastroesophageal anastomotic stricture s/p CRE balloon dilation.   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  History of esophagogastric anastomotic leak.  History of sepsis.   History of abdominal wall cellulitis.  Dysphagia.  History of cerebrovascular accident.  History of tobacco abuse.  History of left adrenalectomy.  History of adrenal crisis.  Cervical radiculopathy.  Cervical stenosis of spinal canal.  Near syncope.  Generalized hyperhidrosis.  Renal calculus.  Abnormal electrocardiogram.  Vitamin B12 deficiency.  Seasonal allergic rhinitis.  Erythrocytosis.  Macrocytosis.  Generalized anxiety disorder.  Major depressive disorder.    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  History of esophagogastric anastomotic leak.  History of sepsis.   History of abdominal wall cellulitis.  Dysphagia.  History of cerebrovascular accident.  History of tobacco abuse.  History of left adrenalectomy.  History of adrenal crisis.  Cervical radiculopathy.  Cervical stenosis of spinal canal.  Near syncope.  Generalized hyperhidrosis.  Renal calculus.  Abnormal electrocardiogram.  Vitamin B12 deficiency.  Seasonal allergic rhinitis.  Erythrocytosis.  Macrocytosis.  Generalized anxiety disorder.  Major depressive disorder.    Procedure performed:   Flexible esophagogastroduodenoscopy.  CRE balloon dilation of the esophageal stricture up to 18 mm.    Indications:   Jourdan Lebron is a 54-year-old male who has significant medical problems as mentioned in the medical chart.      He was having intermittent dysphagia for over a year which became progressively worse.  On  10/2/2023, he underwent EGD and colonoscopy by Dr. Ozzy Abdalla at [facility].  He was found to have a 6 cm mass at the lower third of the esophagus (36 to 42 cm) (biopsied), mild diverticulosis of the sigmoid colon, 5 polyps noted in the colon and cecum that were removed.  The pathology of the esophageal mass revealed invasive moderate to poorly differentiated adenocarcinoma. All polypectomy samples were negative for malignancy (fragments of tubular adenoma).  CT scan of the chest, abdomen and pelvis on 10/9/2023 at [facility] showed 4 cm abnormal thickening of the lower thoracic esophagus extending to the GE junction thought to represent patient's known primary lung malignancy.  There were no convincing evidence of metastatic disease elsewhere in the chest, abdomen, pelvis, or skeleton.  There were stable left adrenalectomy changes, right adrenal adenoma unchanged.     He was seen in the office by Dr. Tacos Osuna ([facility]) for new diagnosis of esophageal cancer. He was an established patient of Dr. Harinder Mueller for erythrocytosis and macrocytosis since 11/2021 (resolved).      PET/CT on 10/20/2023 at [facility] showed hypermetabolic (SUV 7.7) activity within the distal esophagus extending to the GE junction compatible with patient's known malignancy.  He then underwent EGD with EUS by Dr. Joselyn Savage on 10/27/2023. Findings included close to 6 cm ulcerated mass extending from 36 to 42 cm consistent with poorly differentiated adenocarcinoma.  Mass penetrated through muscularis propria without involving the adventitia consistent with T2 lesion. Two small round hypodense lymph nodes in close proximity to the mass measuring 5 and 6 mm concerning for malignancy. Other lymph nodes around the GE junction measuring 7 and 8 mm were also concerning for malignancy but immediate cytology was negative for malignancy. Pathology of the gastrohepatic lymph node was positive for metastatic  adenocarcinoma; however, the lymph node at 36 cm was negative for malignancy.     He was referred to thoracic surgery for further evaluation.  At the time of initial consultation, he could eat and swallow quite a bit, but it depends. He avoids a lot of bread because it gets stuck lower down his chest. He went down from 200 pounds to 170 pounds 3 years ago after he had a stroke. He had memory loss and general fatigue after the stroke. He lost his appetite but began to add some weight. He then noticed he gets bloated and was having dyspepsia. He had adrenalectomy performed at the [facility] after an adrenal biopsy was done on an adrenal mass, and he went into adrenal crisis. He took hydrocortisone for 1 year and discontinued it on 09/2022. He developed abdominal discomfort afterwards. He denies having any past chest surgeries or myocardial infarction.  His wife mentioned the cause of the previous stroke was never known despite having a complete work up and a loop recorder inserted which was removed in the spring. He denies any abnormal heart rhythms or pedal edema. The patient quit smoking cigarettes 8 to 9 years ago and started smoking cigars.      He had good functional status.  Based on the clinical presentation, he was considered a candidate for trimodality treatment. I placed Mediport on 11/13/2023.  He received concurrent chemoradiation therapy.  His last dose of chemotherapy and radiation was on 12/28/2023. He tolerated chemoradiation without much difficulty.  Restaging PET/CT scan on 2/2/2024 showed wall thickening involving the distal esophagus extending to the GE junction consistent with patient's known esophageal malignancy.  There was no evidence of distant metastases.  There was significant decreased uptake in the distal esophagus consistent with treatment effect.     Due to his history of stroke, ultrasound bilateral carotid were obtained which showed no significant carotid stenosis.  There  was incidentally noted right thyroid nodule for which follow-up ultrasound of the thyroid was done which showed multiple nodules but not concerning for malignancy.  Transthoracic echo was performed on 1/22/2024 and noted ejection fraction of 51 to 55%.  He was sent to Dr. Hayward for cardiology clearance.  He had 0.8% risk of myocardial infarction or cardiac arrest, intraoperatively or up to 30 days postoperatively.  No further workup was recommended.  He had a history of adrenal crisis and was sent to evaluation by his primary endocrinologist at [facility], Anabela Crenshaw MD.  Complete ACTH stimulation test was performed which was reported normal.     On 2/9/2024, he underwent surgical resection. Flexible esophagogastroduodenoscopy was notable for mucosal changes at the distal esophagus and Olivera's esophagus.  These findings were consistent with treatment effect. Rather than a gastric emptying procedure, I performed a balloon dilatation of the pylorus to 20 mm with a CRE balloon I performed a robot-assisted minimally-invasive [place] Shayne esophagectomy via a robot-assisted laparoscopic and robot assisted right thoracoscopic approach converted to thoracotomy. I constructed a 4 cm gastric tube and performed an esophagogastrostomy approximately 2 cm above the azygos vein using a 28 EEA stapler.  After firing the stapler, 1 complete esophageal anastomotic ring and a partial gastric anastomotic ring was noted. The anastomosis was checked under water with air insufflation and leak was identified which was oversewn. The anastomosis was partially imbricated and no leak was identified under water with air insufflation afterwards. A feeding jejunostomy tube was placed 40 cm distal to ligament of Treitz. Repeat endoscopy was notable for a widely patent anastomosis at 22-24 cm.  The conduit was healthy appearing.  It was mildly tortuous and non redundant.  He remained hemodynamically stable throughout the  case.  He received 4000 cc of crystalloid and made 1000 cc of urine.  He required small doses of phenylephrine throughout the case.  He was extubated at the end of procedure.       The pathology reported scattered foci of residual moderate to poorly differentiated adenocarcinoma occurring at the GE junction with changes consistent with prior therapy.  There were scattered individual tumor cells and small clusters near GE junction and extending through muscularis propria into adventitia spanning an area of 2.1 x 1.8 x 0.7 cm.  There is no lymphovascular space invasion.  The proximal and distal surgical margins were negative for malignancy.  20 lymph nodes were negative for metastatic carcinoma. There was also presence of low and high-grade squamous dysplasia.  He had near complete response to chemo and radiation therapy.     Postprocedure he was transferred to the ICU.  His initial postop care was unremarkable.  He was started on tube feeds and was slowly advanced to goal rate which he tolerated well.  He demonstrated return of bowel function.  The chest tube was removed after amylase from the drain was checked which was not concerning for esophageal leak.  His white blood count were slowly trending up and had mild leukocytosis.  This was concerning for unidentified infection.  He had mild cellulitis around the jejunostomy tube site.  He was started on empiric antibiotics.  Infectious disease was consulted.  CT scan of the chest abdomen pelvis without contrast did not reveal any clear source of infection.  I offered EGD to evaluate for anastomotic leak and conduit.     On 2/17/2024, he underwent EGD.  He had oropharyngeal candidiasis.  There was approximately 20% defect at the anastomotic ring likely contained by the reinforcing stitch placed intraoperatively.  There was fibrinous plaque covering the anastomosis.  Intraoperative esophagram revealed blind pouch anteriorly at the site of anastomotic defect.  The  gastric conduit appeared viable but was slightly redundant.  The conduit orientation was straight.  I placed a 18 mm X 25 mm X 103 mm fully covered Wallflex stent deployed at the anastomosis.  He tolerated the procedure well.     Esophagram performed on 2/22/2024 and was negative for leak, but did demonstrate retrograde flow of contrast along the distal aspect of the stent making him high risk for anastomotic leak with oral feeds.  He was kept nothing by mouth and discharged home on tube feeds.     He slowly improved over time.  His appetite improved.  He was able to tolerate liquid diet and slowly advance to soft diet. He was started on adjuvant immunotherapy.  He developed dysphagia and underwent EGD on 4/5/2024.  The esophagus was stricture up to 8 mm.  I serially dilated using CRE balloon up to 18 mm.  He was advised to follow-up for repeat EGD in 6 weeks.     I offered EGD to evaluate for anastomotic stricture and possible dilation.  The risk and benefit of the procedure were discussed in detail.  He agreed and signed the consent form.    Surgeon: Saurabh Hurtado MD     Anesthesia: General Anesthesia    ASA Class: 3    Procedure Details   On 7/30/2024, the patient was brought to the endoscopy suite. Jourdan Lebron was positioned in the supine position.  General anesthesia was provided by anesthesiologist.  He was intubated with a single-lumen endotracheal tube.  There was no evidence of aspiration at the time of intubation.  A bite-block was placed.  Antibiotics would not indicated for EGD. Prior to beginning the procedure, a time-out was conducted during which all members of the surgical team were present.      I began by performing a flexible esophagogastroduodenoscopy.  The cricopharyngeus was located at 16 cm.  The cervical esophagus appeared normal. The esophagogastric anastomosis was located at 22-24 cm. There was narrowing at the anastomosis and I was able to traverse the stricture. I passed the CRE™ balloon  dilator through the working channel of the endoscope. The endoscope was retracted and the balloon dilator was parked at the anastomotic stricture.  The manufacture pressure guidelines were used to achieve desired dilation diameter.  The stenosis was serially dilated up to 18 mm starting from 15 mm.  Sustained pressures were maintained for a minute with each dilation to allow breaking the scar tissue.  The balloon dilator was deflated and retracted.  The area of concern was re-examined.  There was no evidence of deep injury or perforation.  I continued with examination of the conduit.  The gastric conduit appeared viable distal to the anastomosis.  The diaphragmatic pinch was located at 40 cm from the incisor.  The antrum was below the diaphragm.  The pylorus was located at 45 cm.  It was patent and the adult endoscope was advanced without difficulty.  The first and second part of the duodenum appeared normal.    The patient was extubated and was transported to the post-anesthesia care unit in stable condition.    Findings:  Esophagogastric anastomosis located at 24 cm.  Esophageal stricture up to ~ 14 mm.  It appeared much improved from before.  I was able to pass the stricture without much resistance.  Serial CRE balloon dilation of the anastomotic stricture from 15 mm up to 18 mm.  Superficial mucosal bleeding. No evidence of deep tissue injury after dilation.  The gastric conduit appeared viable.  The conduit orientation was straight and had mild redundancy.  The diaphragmatic pinch was at 40 cm.  The infradiaphragmatic antrum was normal.  The pylorus located at 45 cm.  It was patent and allowed adult endoscope to pass without difficulty.  The feeding jejunostomy was removed.    Estimated Blood Loss:  None           Specimens: None           Complications: None           Disposition: PACU - hemodynamically stable.           Condition: Stable    Post-procedure recommendations:   Follow up in 8 weeks for repeat EGD  and dilation.      Saurabh Hurtado MD   Thoracic Surgeon  [facility]

## 2024-07-30 NOTE — ANESTHESIA PREPROCEDURE EVALUATION
Anesthesia Evaluation     Patient summary reviewed and Nursing notes reviewed   NPO Solid Status: > 8 hours  NPO Liquid Status: > 8 hours           Airway   Mallampati: II  TM distance: >3 FB  Neck ROM: full  No difficulty expected  Dental - normal exam     Pulmonary    Cardiovascular     (+) hyperlipidemia      Neuro/Psych  (+) CVA, headaches, numbness, psychiatric history  GI/Hepatic/Renal/Endo    (+) GERD, renal disease-, thyroid problem     Musculoskeletal     (+) neck pain  Abdominal    Substance History      OB/GYN          Other      history of cancer    ROS/Med Hx Other: Esophageal ca    Preoperative cardiovascular risk assessment     Facundo Perioperative Risk for Myocardial Infarction or Cardiac Arrest (THOMAS):  0.8% Risk of myocardial infarction or cardiac arrest, intraoperatively or up to 30 days post-op.     Revised Cardiac Risk Index for Pre-Operative Risk:  6% 30-day risk of death, MI, or cardiac arrest.     Given good functional capacity, absence of any symptoms of angina, absence of arrhythmia he may proceed with surgical resection of esophageal cancer with no further cardiac workup.  ECG shows sinus rhythm with right bundle branch block  Echocardiogram shows preserved LV function, normal diastolic function.  Not on a beta-blocker due to bradycardia and hypotension.  He is now s/p surgical resection                Anesthesia Plan    ASA 3     general   Rapid sequence  (GETA)  intravenous induction     Anesthetic plan, risks, benefits, and alternatives have been provided, discussed and informed consent has been obtained with: patient.    Plan discussed with CRNA.    CODE STATUS:

## 2024-08-16 ENCOUNTER — HOSPITAL ENCOUNTER (OUTPATIENT)
Dept: PET IMAGING | Facility: HOSPITAL | Age: 54
Discharge: HOME | End: 2024-08-16
Admitting: INTERNAL MEDICINE
Payer: COMMERCIAL

## 2024-08-16 DIAGNOSIS — C15.9 ADENOCARCINOMA OF ESOPHAGUS: ICD-10-CM

## 2024-08-16 PROCEDURE — 74176 CT ABD & PELVIS W/O CONTRAST: CPT

## 2024-08-16 PROCEDURE — 71250 CT THORAX DX C-: CPT

## 2024-08-19 ENCOUNTER — LAB (OUTPATIENT)
Dept: LAB | Facility: HOSPITAL | Age: 54
End: 2024-08-19
Payer: COMMERCIAL

## 2024-08-19 ENCOUNTER — TRANSCRIBE ORDERS (OUTPATIENT)
Dept: ADMINISTRATIVE | Facility: HOSPITAL | Age: 54
End: 2024-08-19
Payer: COMMERCIAL

## 2024-08-19 DIAGNOSIS — C15.9 MALIGNANT NEOPLASM OF ESOPHAGUS, UNSPECIFIED LOCATION: ICD-10-CM

## 2024-08-19 DIAGNOSIS — E16.1 IATROGENIC HYPERINSULINISM: ICD-10-CM

## 2024-08-19 DIAGNOSIS — E89.6 HISTORY OF TOTAL ADRENALECTOMY: Primary | ICD-10-CM

## 2024-08-19 DIAGNOSIS — E89.6 HISTORY OF TOTAL ADRENALECTOMY: ICD-10-CM

## 2024-08-19 LAB
ALBUMIN SERPL-MCNC: 4.1 G/DL (ref 3.5–5.2)
ALBUMIN/GLOB SERPL: 1.6 G/DL
ALP SERPL-CCNC: 79 U/L (ref 39–117)
ALT SERPL W P-5'-P-CCNC: 24 U/L (ref 1–41)
ANION GAP SERPL CALCULATED.3IONS-SCNC: 7.5 MMOL/L (ref 5–15)
AST SERPL-CCNC: 22 U/L (ref 1–40)
BILIRUB SERPL-MCNC: 0.3 MG/DL (ref 0–1.2)
BUN SERPL-MCNC: 13 MG/DL (ref 6–20)
BUN/CREAT SERPL: 14.3 (ref 7–25)
CALCIUM SPEC-SCNC: 9.9 MG/DL (ref 8.6–10.5)
CHLORIDE SERPL-SCNC: 104 MMOL/L (ref 98–107)
CO2 SERPL-SCNC: 29.5 MMOL/L (ref 22–29)
CORTIS SERPL-MCNC: 7.1 MCG/DL
CREAT SERPL-MCNC: 0.91 MG/DL (ref 0.76–1.27)
EGFRCR SERPLBLD CKD-EPI 2021: 101.4 ML/MIN/1.73
GLOBULIN UR ELPH-MCNC: 2.6 GM/DL
GLUCOSE SERPL-MCNC: 86 MG/DL (ref 65–99)
POTASSIUM SERPL-SCNC: 4.7 MMOL/L (ref 3.5–5.2)
PROT SERPL-MCNC: 6.7 G/DL (ref 6–8.5)
SODIUM SERPL-SCNC: 141 MMOL/L (ref 136–145)

## 2024-08-19 PROCEDURE — 80053 COMPREHEN METABOLIC PANEL: CPT

## 2024-08-19 PROCEDURE — 83525 ASSAY OF INSULIN: CPT

## 2024-08-19 PROCEDURE — 82533 TOTAL CORTISOL: CPT

## 2024-08-19 PROCEDURE — 84681 ASSAY OF C-PEPTIDE: CPT

## 2024-08-19 PROCEDURE — 36415 COLL VENOUS BLD VENIPUNCTURE: CPT

## 2024-08-19 PROCEDURE — 84305 ASSAY OF SOMATOMEDIN: CPT

## 2024-08-20 LAB
C PEPTIDE SERPL-MCNC: 1.9 NG/ML (ref 1.1–4.4)
INSULIN SERPL-ACNC: 5.1 UIU/ML (ref 2.6–24.9)

## 2024-08-22 LAB — IGF-I SERPL-MCNC: 153 NG/ML (ref 74–255)

## 2024-08-26 NOTE — PROGRESS NOTES
"                         HEMATOLOGY ONCOLOGY OUTPATIENT FOLLOW UP       Patient name: Jourdan Lebron  : 1970  MRN: [id number]  Primary Care Physician: Justa Castano MD  Referring Physician: No ref. provider found  Reason For Consult:     No chief complaint on file.    HPI:   History of Present Illness:  Jourdan Lebron is 54 y.o. male who presented to our office on 2021 for consultation regarding elevated hematocrit    On 2021 Mr. Lebron was referred for the investigation of macrocytosis and elevated hematocrit.  He gave a history of a stroke in .  He also described a long history of anxiety and \"panic attacks\".  Finally he had undergone an adrenalectomy, apparently because of an adrenal adenoma.  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  In spite of that he felt poorly, mainly because of fatigue and had several investigations.  For a long time, at least since , he had been noted to have an elevated MCV and MCH.  A clear cause for this had not been identified and generally speaking he was asymptomatic at that time.  In , September and 2021 his blood counts had reported a hematocrit of 51.3, 51.6 and 53.8% respectively.  This was in the setting of a normal high hemoglobin.  He gave a history of prolonged and intense, at times of up to 3 packs of cigarettes per day, cigarette smoking.  Close to the time of the initial visit, he was smoking only cigars but did admit to inhaling the smoke.  He, as well, admitted to dyspnea with some exertion.    2022 Mr. Lebron was back in the office for follow-up.  At the time of his initial evaluation his blood count had been found to be within normal limits.  His folic acid was found to be immediately below the normal range of normalcy.  He started taking folic acid replacement.    7/15/2022: Back in the office for follow-up after 6 months.  Reported he had had some changes to his " medications and he was feeling somewhat better.  In particular, he discussed changes to his antidepressant, that seemed to have made a difference.  He also had stopped smoking.  The physical exam was unchanged.  The laboratory exams revealed normal hemoglobin and hematocrit, but he did persist with mild macrocytosis at 97.8 fL.  A clear explanation for this was not identified.  A decision was made to observe as it was unlikely to represent a serious problem.    10/10/2023: Seen in the office after an absence of more than one year. He reported that for a few months he had been experiencing dysphagia and weight loss. He was initially treated with a proton pump inhibitor, but as there was no response and rather he reported worsening of the symptoms, he underwent upper and lower gastrointestinal endoscopies. In the colon multiple polyps were identified in the cecum, the ascending colon, the transverse colon and the descending colon. In the esophagus a protruding lesion that seemed infiltrative and was fungating and ulcerated was found in the lower third of the esophagus. It extended from 36 to 46 cm from the incisors. Biopsy reported invasive moderate to poorly differentiated adenocarcinoma.     11/3/2023: In the office to review the PET scan. His symptoms have not changed much. He continues to have dysphagia and it is severe enough that he has been able to eat much; he has some success with soft foods but there fare some foods that he can definitely not swallow. He has lost about 10 lbs but none recently. He remains afebrile and has not had any cough. He has not had any pain. On exam no jaundice or pallor. Lungs diminished and heart regular. Abdomen soft and not tender. No edema. Reviewed the images of the PET scan. Reviewed the result of the endoscopic ultrasound and the FNA of the lymph nodes, at least one of which is positive for metastatic disease. This makes the tumor T2N1 disease. Neoadjuvant chemotherapy and  radiation was discussed with him and his spouse and I made referrals to Dr. Sabillon in Radiation Oncology and to Dr. Hurtado in Thoracic surgery. He is to have next generation sequencing, Her2 expression and PD-L1 expression on tumor tissue. Will present in tumor conference.     11/17/2023: Not any different than at the time of the last visit.  No further weight loss.  Able to swallow some foods without any difficulties.  However, other foods are very difficult to swallow, if not impossible.  He has not had any fevers.  He underwent placement of the port without any difficulties.  On exam alert, conversant and in no distress.  Port site clean and healing well.  Lungs diminished bilaterally.  Heart regular.  No edema.  Laboratory exams were reviewed.  Discussed with him concurrent chemotherapy and radiation.  Treatment with carboplatin and paclitaxel was discussed and a treatment plan was placed.  Discussed with Dr. Sabillon.  To begin on the week of November 27, 2023.    12/8/2023: Feeling reasonably well. Has experienced occasional headaches and facial pain. As well feels the lower extremities to be heavy and had some aching pain. Has been able to eat some though his appetite is poor. He may be swallowing better and does not have odynophagia. No abdominal pain or diarrhea and no dysuria. Has not lost weight. On exam no changes. The laboratory exams were reviewed. Discussed with him. To continue with the same therapy.     12/27/2023: Without new symptoms.  Reasonably active.  Not eating as well because of early satiety but no dysphagia.  No chest pains.  As well not coughing or more dyspneic than before.  On exam no changes.  No palpable supraclavicular adenopathy.  Lungs are diminished bilaterally.  Heart regular.  Abdomen soft and nontender.  There is no edema.  Small petechiae are present in the lower extremities.  The laboratory exams were reviewed.  He has thrombocytopenia today that does not require transfusion but that  will keep him from receiving the last dose of the chemotherapy.  To finish radiation tomorrow.  He will have a PET scan and will see me with the results in approximately 4 weeks.  Discussed with him.    1/25/2024: Feeling reasonably well.  Anxious about surgery.  Undergoing preoperative investigations he was found to have a thyroid nodule.  He is eating reasonably well and does not have major dysphagia although sometimes he needs to belch before he can continue to swallow.  No odynophagia.  He has also been free of dyspnea.  Denies abdominal pain and has maintained regular bowel activity.  No dysuria or hematuria.  No peripheral edema.  On exam alert, conversant and in good spirits.  No jaundice and no pallor.  Lungs diminished but clear.  Heart regular.  Abdomen soft.  Port site clean.  Laboratory exams reviewed.  Awaiting authorization from the insurance company to do the PET scan.  Continue to see Dr. Hurtado.  See me in approximately 4 weeks.    2/28/2024: Underwent an Vinicio Shayne type esophagectomy without immediate complications.  There was some concern over dehiscence of the anastomosis and he received a stent.  He was also asked to not consume any food by mouth and was receiving nutrition through a jejunostomy tube.  At the time of this visit feeling progressively better but still poorly.  Very tired and lacking energy to do anything.  Sleeping poorly.  Has started to take clear liquids by mouth.  On exam he appears chronically ill but he does not seem in any distress.  He is pale but not jaundiced.  All surgical incisions are healing well and the lungs are diminished bilaterally but clear.  Heart regular.  Abdomen soft.  No edema.  Laboratory exams were reviewed.  He has macrocytic anemia with a hemoglobin of 11.9 g/dL and thrombocytosis with a platelet count of 562,000/mm³.  He also has monocytosis and basophilia but no leukocytosis at this time.  The final review of pathology confirmed scattered foci of  residual moderate to poorly differentiated adenocarcinoma at the gastroesophageal junction with changes consistent with prior neoadjuvant therapy.  There were ASSO stated dissecting pools of mucin with changes again consistent with previous therapy.  Scattered individual tumor cells and small clusters were present near the gastroesophageal junction and extending through the muscularis propria into the adventitial soft tissue/fat and spanning an area of 21 x 18 x 7 mm.  No definitive lymphovascular space invasion was identified.  All margins were negative.  Of 20 lymph nodes analyzed known had metastatic disease.  A discussion was had with him in regards to immunotherapy given in the adjuvant setting.  Explained side effects and potential complications.  I asked him to return in 3 weeks.    3/22/2024: Not feeling back to normal yet.  Still not eating much.  He had an episode of nausea and emesis this morning.  He has been afebrile.  He denies chest pains but does complain of some epigastric discomfort that seems unrelated to the previous discomfort that he had after the surgery.  He has been without dyspnea and does not have any more cough than usual.  As well no abdominal pain.  He continues to tolerate the enteral nutrition without difficulties.  No edema.  No skin rash.  On exam no distress.  Conversant and oriented.  Lungs diminished bilaterally.  Heart regular.  Abdomen with the jejunostomy tube in place.  Opening looks clean.  It is soft and without palpable tumors.  No edema.  Laboratory exams reviewed.  Discussed with him.  Offered him immunotherapy.  He remains somewhat reluctant to consider treatment with immunotherapy.  He has asked for more time to think about it.    4/12/2024: Somewhat better.  Perhaps more energetic.  Able to eat better after he had dilatation of the esophagus.  He has some pain that he localizes to the substernal area and the mid epigastrium and right upper quadrant.  It is not as  intense as before.  He continues to use his jejunostomy without any difficulties.  Defecating regularly.  Urinating without dysuria.  On exam alert, conversant and oriented.  No distress.  No jaundice.  Lungs are diminished bilaterally.  Heart is regular.  Abdomen is soft.  No edema.  Laboratory exams reviewed.  Discussed again the use of immunotherapy.  This time he is interested in pursuing.  Placed the treatment plan.  Discussed side effects and potential complications of the treatment with immunotherapy.  Discussed with him the rationale of treatment.  He is to begin as it is approved by insurance.  He will see me in approximately 5 weeks.    5/22/2024: Feeling somewhat better since the commencement of the steroids. His abdominal pain and nausea have resolved. She has been eating well and has not had any nausea or vomiting. He has been afebrile. No chest pain or increase in dyspnea. On exam alert and conversant. No jaundice and no oral lesions. Respirations not labored. The lungs diminished bilaterally and heart regular. Abdomen soft and not tender. No edema. Reviewed and discussed with him the laboratory exams. Gave him instructions in writing to continue to taper down the prednisone. Hold the immunotherapy given the possibility of enteritis as a result of it.     7/11/2024: Still not feeling very well.  Still tired and weak.  Also developed hypoglycemia with diaphoresis and profound weakness oftentimes after eating, particularly after eating large meals or carbohydrate rich meals.  He has been reasonably active.  He has no pain but has noted a persistent rash that is no longer pruritic.  He has no more dyspnea than before and no chest pains or abdominal pain.  On exam slender, well-built and not act acutely ill.  No jaundice.  The lungs are diminished bilaterally and the heart regular.  Abdomen soft with all surgical incisions well-healed.  No edema.  The laboratory exams were reviewed.  The recent scan of the  abdomen, in May 2024, was reviewed as well.  He is to see me in approximately 6 weeks.  He will have scans prior to the next visit.    8/28/2024: Still fatigued and not seemingly better.  Eating somewhat better and having less difficulties with swallowing but not gaining weight.  Denies pain.  Has been afebrile.  No dyspnea or cough.  No abdominal pain.  Was stung by a wasp a few days before and had some significant edema of the ankle that is now subsided.  On exam alert and conversant.  Oriented and in no distress.  Lungs diminished bilaterally.  Heart regular.  Abdomen soft.  No edema.  Laboratory exams reviewed.  Reviewed the images of the scans.  There is no suggestion of recurrent malignancy in the chest, abdomen or pelvis.  Discussed the results of the scans with him.  He is to see me in 4 months with new scans.    The following portions of the patient's history were reviewed and updated as appropriate: allergies, current medications, past family history, past medical history, past social history, past surgical history and problem list.    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    Anxiety     Brain fog     COVID 02/2023    Diverticulosis of large intestine without perforation or abscess without bleeding 10/02/2023    Esophageal cancer 10/2023    Generalized headaches     GERD (gastroesophageal reflux disease)     Hand tingling     History of blood clot in brain 2020    had thrombectomy    History of cancer chemotherapy 12/2023    History of kidney stones     History of radiation therapy 11/2023    Hyperlipidemia     Jejunostomy tube present     Port-A-Cath in place     Stroke 05/30/2020    Thyroid nodule     Tiredness     Trouble swallowing      Past Surgical History:   Procedure Laterality Date    ADRENALECTOMY      COLONOSCOPY       ENDOSCOPY      ENDOSCOPY N/A 02/17/2024    Procedure: ESOPHAGOGASTRODUODENOSCOPY WITH STENTING UNDER FLUROSCOPY GUIDENCE WITH ESOPHOGRAM;  Surgeon: Saurabh Hurtado MD;  Location: [facility] MAIN OR;  Service: Gastroenterology;  Laterality: N/A;    ENDOSCOPY N/A 3/8/2024    Procedure: ESOPHAGOGASTRODUODENOSCOPY WITH STENT REMOVAL;  Surgeon: Saurabh Hurtado MD;  Location: [facility] ENDOSCOPY;  Service: Gastroenterology;  Laterality: N/A;  Esophageal leak    ENDOSCOPY N/A 4/5/2024    Procedure: ESOPHAGOGASTRODUODENOSCOPY WITH BALLOON DILATATION #6, #7, #8, #9, #10, #11, #12  AND GASTROGRAFIN WITH FLOURO;  Surgeon: Saurabh Hurtado MD;  Location: [facility] ENDOSCOPY;  Service: Gastroenterology;  Laterality: N/A;  PRE OP - ESOPHAGEAL CANCER  POST OP - ANASTOMOSIS STRICTURE    ENDOSCOPY N/A 5/7/2024    Procedure: ESOPHAGOGASTRODUODENOSCOPY WITH DILATATION (BALLOON 10MM-12MM, 12MM-15MM, 15MM-18MM,UP TO 17MM) AND INTRAOPERATIVE ESOPHAGRAM;  Surgeon: Saurabh Hurtado MD;  Location: [facility] ENDOSCOPY;  Service: Gastroenterology;  Laterality: N/A;    ENDOSCOPY N/A 6/18/2024    Procedure: ESOPHAGOGASTRODUODENOSCOPY WITH balloon dilatation (10mm-12mm up to 12mm;15mm-18mm up to 18mm) and removal of PEG tube;  Surgeon: Saurabh Hurtado MD;  Location: [facility] ENDOSCOPY;  Service: Gastroenterology;  Laterality: N/A;    ENDOSCOPY N/A 7/30/2024    Procedure: ESOPHAGOGASTRODUODENOSCOPY with balloon dialtion(15mm-18mm balloon up to 18mm);  Surgeon: Saurabh Hurtado MD;  Location: [facility] ENDOSCOPY;  Service: Gastroenterology;  Laterality: N/A;    ESOPHAGOGASTRECTOMY N/A 02/09/2024    Procedure: BRONCHOSCOPY, EGD, ESOPHAGECTOMY ALTAGRACIA-FABIOLA WITH DAVINCI ROBOT, LAPAROSCOPY, RIGHT THORACOSCOPY CONVERTED TO THORACOTOMY, FEEDING JEJUNOSTOMY TUBE PLACEMENT, INTERCOSTAL NERVE BLOCKS;  Surgeon: Saurabh Hurtado MD;  Location: [facility] MAIN OR;  Service: Robotics - DaVinci;  Laterality: N/A;    ESOPHAGUS SURGERY  10/2023    biopsy    KIDNEY STONE SURGERY      OTHER SURGICAL HISTORY  2020     loop recorder PLACED AND REMOVED    THROMBECTOMY  2020    UPPER ENDOSCOPIC ULTRASOUND W/ FNA N/A 10/27/2023    Procedure: ENDOSCOPIC ULTRASOUND WITH STAGING AND FINE NEEDLE ASPIRATION X2 AREAS;  Surgeon: Joselyn Savage MD;  Location: [facility] ENDOSCOPY;  Service: Gastroenterology;  Laterality: N/A;  POST:    VENOUS ACCESS DEVICE (PORT) INSERTION Right 11/13/2023    Procedure: INSERTION VENOUS ACCESS DEVICE;  Surgeon: Saurabh Hurtado MD;  Location: [facility] MAIN OR;  Service: Thoracic;  Laterality: Right;       Current Outpatient Medications:     acetaminophen (TYLENOL) 325 MG tablet, Take 2 tablets by mouth Every 6 (Six) Hours As Needed for Mild Pain., Disp: , Rfl:     ALPRAZolam (XANAX) 0.25 MG tablet, Take 1 tablet by mouth 3 (Three) Times a Day As Needed for Anxiety. for anxiety, Disp: 90 tablet, Rfl: 3    aspirin 81 MG chewable tablet, Administer 1 tablet per J tube Daily. (Patient taking differently: Chew 1 tablet Daily.), Disp: , Rfl:     esomeprazole (nexIUM) 40 MG capsule, Take 1 capsule by mouth Every Morning Before Breakfast., Disp: , Rfl:     Evolocumab (REPATHA) solution prefilled syringe injection, Inject 1 mL under the skin into the appropriate area as directed Every 14 (Fourteen) Days., Disp: 6 mL, Rfl: 3    famotidine (PEPCID) 40 mg/5 mL suspension, Take 5 mL by mouth., Disp: , Rfl:     FLUoxetine (PROzac) 20 MG/5ML solution, Take 5 mL by mouth Daily., Disp: 150 mL, Rfl: 1    fluticasone (FLONASE) 50 MCG/ACT nasal spray, 2 sprays into the nostril(s) as directed by provider Daily., Disp: , Rfl:     lidocaine-prilocaine (EMLA) 2.5-2.5 % cream, Apply 1 application  topically to the appropriate area as directed As Needed (apply 60 minutes prior to port access)., Disp: 30 g, Rfl: 2    ondansetron (ZOFRAN) 8 MG tablet, Take 1 tablet by mouth 3 (Three) Times a Day As Needed for Nausea or Vomiting., Disp: 30 tablet, Rfl: 5    triamcinolone (KENALOG) 0.1 % ointment, Apply 1 Application topically to the  appropriate area as directed 2 (Two) Times a Day., Disp: 454 g, Rfl: 1    [place] 0.6 MG/0.6ML solution auto-injector, As Needed., Disp: , Rfl:     Allergies   Allergen Reactions    Cephalosporins Anaphylaxis     Throat swelling    Dye  [Contrast Dye (Echo Or Unknown Ct/Mr)] Hives    Iodinated Contrast Media Hives    Sulfa Antibiotics Hives    Sulfate Hives    Zetia [Ezetimibe] Other (See Comments) and Myalgia     Made tired    Statins Myalgia     Myalgia and fatique     Family History   Problem Relation Age of Onset    Arthritis Mother     Hypertension Mother     Heart failure Mother 73        Cause of death    Arthritis Father     Hypertension Father     Kidney cancer Father 57        Cause of death. Was a smoker    Heart disease Brother     Esophageal cancer Brother 59        Cause of death. Has a heavy drinker    Malig Hyperthermia Neg Hx      Cancer-related family history includes Esophageal cancer (age of onset: 59) in his brother; Kidney cancer (age of onset: 57) in his father.    Social History     Tobacco Use    Smoking status: Former     Current packs/day: 0.00     Average packs/day: 1 pack/day for 37.0 years (37.0 ttl pk-yrs)     Types: Cigars, Cigarettes     Start date: 1985     Quit date: 2022     Years since quittin.6     Passive exposure: Past    Smokeless tobacco: Never    Tobacco comments:     1 packs= stopped 10 years ago and continued cigars   2 cigars a day; has stopped cigars as of    Vaping Use    Vaping status: Never Used   Substance Use Topics    Alcohol use: Not Currently     Comment: Has now been abstinent since     Drug use: Never     Social History     Social History Narrative    Not on file      ROS:     Review of Systems   Constitutional:  Positive for fatigue. Negative for activity change, appetite change, chills, diaphoresis, fever and unexpected weight change.   HENT:  Negative for congestion, dental problem, drooling, ear discharge, ear pain, facial swelling,  hearing loss, mouth sores, nosebleeds, postnasal drip, rhinorrhea, sinus pressure, sinus pain, sneezing, sore throat, tinnitus, trouble swallowing and voice change.    Eyes:  Negative for photophobia, pain, discharge, redness, itching and visual disturbance.   Respiratory:  Negative for apnea, cough, choking, chest tightness, shortness of breath, wheezing and stridor.    Cardiovascular:  Negative for chest pain, palpitations and leg swelling.   Gastrointestinal:  Negative for abdominal distention, abdominal pain, anal bleeding, blood in stool, constipation, diarrhea, nausea, rectal pain and vomiting.   Endocrine: Negative for cold intolerance, heat intolerance, polydipsia and polyuria.   Genitourinary:  Negative for decreased urine volume, difficulty urinating, dysuria, flank pain, frequency, genital sores, hematuria and urgency.   Musculoskeletal:  Negative for arthralgias, back pain, gait problem, joint swelling, myalgias, neck pain and neck stiffness.   Skin:  Negative for color change, pallor and rash.   Neurological:  Negative for dizziness, tremors, seizures, syncope, facial asymmetry, speech difficulty, weakness, light-headedness, numbness and headaches.   Hematological:  Negative for adenopathy. Does not bruise/bleed easily.   Psychiatric/Behavioral:  Negative for agitation, behavioral problems, confusion, decreased concentration, hallucinations, self-injury, sleep disturbance and suicidal ideas. The patient is nervous/anxious.      Objective:    There were no vitals filed for this visit.  There is no height or weight on file to calculate BMI.  ECOG  (0) Fully active, able to carry on all predisease performance without restriction    Physical Exam:     Physical Exam  Constitutional:       General: He is not in acute distress.     Appearance: Normal appearance. He is not ill-appearing, toxic-appearing or diaphoretic.      Comments: Slender, well-built.  Seems older than the stated age.  No distress.   HENT:       Head: Normocephalic and atraumatic.      Right Ear: External ear normal. There is no impacted cerumen.      Left Ear: External ear normal. There is no impacted cerumen.      Nose: Nose normal. No congestion or rhinorrhea.      Mouth/Throat:      Mouth: Mucous membranes are moist.      Pharynx: Oropharynx is clear. No oropharyngeal exudate or posterior oropharyngeal erythema.      Comments: Oral cavity reveals poor dentition and poor dental hygiene.  No mucosal ulcerations are present.  Eyes:      General: No scleral icterus.        Right eye: No discharge.         Left eye: No discharge.      Conjunctiva/sclera: Conjunctivae normal.      Pupils: Pupils are equal, round, and reactive to light.   Neck:      Vascular: No carotid bruit.   Cardiovascular:      Rate and Rhythm: Normal rate and regular rhythm.      Pulses: Normal pulses.      Heart sounds: Normal heart sounds. No murmur heard.     No friction rub. No gallop.   Pulmonary:      Effort: No respiratory distress.      Breath sounds: No stridor. No wheezing, rhonchi or rales.      Comments: Breath sounds are diminished bilaterally.  Chest:      Chest wall: No tenderness.      Comments: A right thoracic surgical incision is healing well.  Smaller surgical incisions for drains also healing well.  Abdominal:      General: Abdomen is flat. Bowel sounds are normal. There is no distension.      Palpations: Abdomen is soft. There is no mass.      Tenderness: There is no abdominal tenderness. There is no right CVA tenderness, left CVA tenderness, guarding or rebound.      Comments: Jejunostomy in place.  Opening clean.   Musculoskeletal:         General: No swelling, tenderness, deformity or signs of injury.      Cervical back: No rigidity or tenderness.      Right lower leg: No edema.      Left lower leg: No edema.      Comments: There is clubbing of the fingers in both hands   Lymphadenopathy:      Cervical: No cervical adenopathy.   Skin:     General: Skin is  warm and dry.      Coloration: Skin is not jaundiced or pale.      Findings: No bruising, erythema or rash.   Neurological:      General: No focal deficit present.      Mental Status: He is alert and oriented to person, place, and time.      Cranial Nerves: No cranial nerve deficit.      Motor: No weakness.      Coordination: Coordination normal.      Gait: Gait normal.   Psychiatric:         Mood and Affect: Mood normal.         Behavior: Behavior normal.         Thought Content: Thought content normal.         Judgment: Judgment normal.     YOSELIN Mueller MD performed a physical exam on 8/28/2024 as documented above.    Lab Results - Last 18 Months   Lab Units 07/22/24  0739 07/11/24  1039 06/19/24  0946   WBC 10*3/mm3 3.82 4.49 8.68   HEMOGLOBIN g/dL 13.9 13.4 12.7*   HEMATOCRIT % 41.3 41.5 40.4   PLATELETS 10*3/mm3 303 277 288   MCV fL 94.3 99.5* 100.5*     Lab Results - Last 18 Months   Lab Units 08/19/24  0903 07/22/24  0739 07/11/24  1039 06/19/24  0946   SODIUM mmol/L 141 140 140 141   POTASSIUM mmol/L 4.7 4.5 4.4 3.8   CHLORIDE mmol/L 104 104 103 103   CO2 mmol/L 29.5* 25.1 28.2 29.5*   BUN mg/dL 13 13 15 15   CREATININE mg/dL 0.91 0.91 0.87 0.73*   CALCIUM mg/dL 9.9 9.6 9.7 9.9   BILIRUBIN mg/dL 0.3  --  0.2 <0.2   ALK PHOS U/L 79  --  83 80   ALT (SGPT) U/L 24  --  21 26   AST (SGOT) U/L 22  --  19 21   GLUCOSE mg/dL 86 74 89 83     Lab Results   Component Value Date    GLUCOSE 86 08/19/2024    BUN 13 08/19/2024    CREATININE 0.91 08/19/2024    EGFRIFNONA 67 02/24/2022    BCR 14.3 08/19/2024    K 4.7 08/19/2024    CO2 29.5 (H) 08/19/2024    CALCIUM 9.9 08/19/2024    ALBUMIN 4.1 08/19/2024    LABIL2 1.3 09/25/2018    AST 22 08/19/2024    ALT 24 08/19/2024     Lab Results   Component Value Date    FOLATE 4.11 (L) 11/12/2021     Lab Results   Component Value Date    SNTJEJWW80 784 06/11/2024     Uric Acid   Date Value Ref Range Status   06/11/2024 5.2 3.4 - 7.0 mg/dL Final     Lab Results   Component  Value Date    SEDRATE 11 04/16/2021     Lab Results   Component Value Date    PSA 0.163 04/06/2021     Assessment & Plan     Assessment:  Adenocarcinoma of the gastroesophageal junction T2N1M0, microsatellite stable, low mutation burden, Her2 positive and PD-L1 expression negative: Completed neoadjuvant concurrent carboplatin and paclitaxel with radiation and underwent Laceys [place] Shayne type esophagectomy.  Recovering well.  Began treatment with nivolumab as discussed on the previous note. After two doses he developed persistent abdominal pain and diarrhea.  Treatment with steroids resulted in resolution of the problem.  Problem is resolved at this point.  There is no suggestion of recurrent disease.  Dumping syndrome?  Symptoms are about the same.  Discussed with him again.  Reviewed the laboratory exams.  Discussed with him.  Reviewed the images and the report of the scans.  Explained the findings.  3.  Tobacco smoker: Remains abstinent.  3.  He will return to see me in approximately 4 months with new scans.    Plan:  As above.    Oliverio Mueller MD on 8/28/2024 at 11:43 AM.

## 2024-08-28 ENCOUNTER — LAB (OUTPATIENT)
Dept: LAB | Facility: HOSPITAL | Age: 54
End: 2024-08-28
Payer: COMMERCIAL

## 2024-08-28 ENCOUNTER — OFFICE VISIT (OUTPATIENT)
Dept: ONCOLOGY | Facility: CLINIC | Age: 54
End: 2024-08-28
Payer: COMMERCIAL

## 2024-08-28 VITALS
WEIGHT: 154 LBS | DIASTOLIC BLOOD PRESSURE: 69 MMHG | HEIGHT: 72 IN | BODY MASS INDEX: 20.86 KG/M2 | OXYGEN SATURATION: 97 % | SYSTOLIC BLOOD PRESSURE: 97 MMHG | HEART RATE: 73 BPM

## 2024-08-28 DIAGNOSIS — C15.9 ADENOCARCINOMA OF ESOPHAGUS: ICD-10-CM

## 2024-08-28 DIAGNOSIS — C15.9 ADENOCARCINOMA OF ESOPHAGUS: Primary | ICD-10-CM

## 2024-08-28 DIAGNOSIS — C77.2 ADENOCARCINOMA OF ESOPHAGUS METASTATIC TO INTRA-ABDOMINAL LYMPH NODE: Primary | ICD-10-CM

## 2024-08-28 DIAGNOSIS — C15.9 ADENOCARCINOMA OF ESOPHAGUS METASTATIC TO INTRA-ABDOMINAL LYMPH NODE: Primary | ICD-10-CM

## 2024-08-28 LAB
ALBUMIN SERPL-MCNC: 4.2 G/DL (ref 3.5–5.2)
ALBUMIN/GLOB SERPL: 1.7 G/DL
ALP SERPL-CCNC: 78 U/L (ref 39–117)
ALT SERPL W P-5'-P-CCNC: 23 U/L (ref 1–41)
ANION GAP SERPL CALCULATED.3IONS-SCNC: 10.2 MMOL/L (ref 5–15)
AST SERPL-CCNC: 22 U/L (ref 1–40)
BASOPHILS # BLD AUTO: 0.02 10*3/MM3 (ref 0–0.2)
BASOPHILS NFR BLD AUTO: 0.4 % (ref 0–1.5)
BILIRUB SERPL-MCNC: 0.2 MG/DL (ref 0–1.2)
BUN SERPL-MCNC: 19 MG/DL (ref 6–20)
BUN/CREAT SERPL: 21.8 (ref 7–25)
CALCIUM SPEC-SCNC: 9.9 MG/DL (ref 8.6–10.5)
CHLORIDE SERPL-SCNC: 104 MMOL/L (ref 98–107)
CO2 SERPL-SCNC: 26.8 MMOL/L (ref 22–29)
CREAT SERPL-MCNC: 0.87 MG/DL (ref 0.76–1.27)
DEPRECATED RDW RBC AUTO: 45.2 FL (ref 37–54)
EGFRCR SERPLBLD CKD-EPI 2021: 103.8 ML/MIN/1.73
EOSINOPHIL # BLD AUTO: 0.2 10*3/MM3 (ref 0–0.4)
EOSINOPHIL NFR BLD AUTO: 3.8 % (ref 0.3–6.2)
ERYTHROCYTE [DISTWIDTH] IN BLOOD BY AUTOMATED COUNT: 12.7 % (ref 12.3–15.4)
GLOBULIN UR ELPH-MCNC: 2.5 GM/DL
GLUCOSE SERPL-MCNC: 81 MG/DL (ref 65–99)
HCT VFR BLD AUTO: 42.2 % (ref 37.5–51)
HGB BLD-MCNC: 13.8 G/DL (ref 13–17.7)
HOLD SPECIMEN: NORMAL
LYMPHOCYTES # BLD AUTO: 0.98 10*3/MM3 (ref 0.7–3.1)
LYMPHOCYTES NFR BLD AUTO: 18.7 % (ref 19.6–45.3)
MCH RBC QN AUTO: 32.3 PG (ref 26.6–33)
MCHC RBC AUTO-ENTMCNC: 32.7 G/DL (ref 31.5–35.7)
MCV RBC AUTO: 98.8 FL (ref 79–97)
MONOCYTES # BLD AUTO: 0.66 10*3/MM3 (ref 0.1–0.9)
MONOCYTES NFR BLD AUTO: 12.6 % (ref 5–12)
NEUTROPHILS NFR BLD AUTO: 3.37 10*3/MM3 (ref 1.7–7)
NEUTROPHILS NFR BLD AUTO: 64.5 % (ref 42.7–76)
PLATELET # BLD AUTO: 253 10*3/MM3 (ref 140–450)
PMV BLD AUTO: 8.6 FL (ref 6–12)
POTASSIUM SERPL-SCNC: 4.4 MMOL/L (ref 3.5–5.2)
PROT SERPL-MCNC: 6.7 G/DL (ref 6–8.5)
RBC # BLD AUTO: 4.27 10*6/MM3 (ref 4.14–5.8)
SODIUM SERPL-SCNC: 141 MMOL/L (ref 136–145)
WBC NRBC COR # BLD AUTO: 5.23 10*3/MM3 (ref 3.4–10.8)

## 2024-08-28 PROCEDURE — 80053 COMPREHEN METABOLIC PANEL: CPT | Performed by: INTERNAL MEDICINE

## 2024-08-28 PROCEDURE — 85025 COMPLETE CBC W/AUTO DIFF WBC: CPT

## 2024-08-28 PROCEDURE — 99214 OFFICE O/P EST MOD 30 MIN: CPT | Performed by: INTERNAL MEDICINE

## 2024-08-28 PROCEDURE — 36415 COLL VENOUS BLD VENIPUNCTURE: CPT

## 2024-08-28 RX ORDER — ACARBOSE 25 MG/1
TABLET ORAL
COMMUNITY
Start: 2024-08-26

## 2024-09-06 ENCOUNTER — HOSPITAL ENCOUNTER (OUTPATIENT)
Dept: ONCOLOGY | Facility: HOSPITAL | Age: 54
Discharge: HOME | End: 2024-09-06
Payer: COMMERCIAL

## 2024-09-06 DIAGNOSIS — Z45.2 ENCOUNTER FOR CARE RELATED TO PORT-A-CATH: Primary | ICD-10-CM

## 2024-09-06 PROCEDURE — 25010000002 HEPARIN LOCK FLUSH PER 10 UNITS: Performed by: INTERNAL MEDICINE

## 2024-09-06 RX ORDER — SODIUM CHLORIDE 0.9 % (FLUSH) 0.9 %
10 SYRINGE (ML) INJECTION AS NEEDED
Status: DISCONTINUED | OUTPATIENT
Start: 2024-09-06 | End: 2024-09-07 | Stop reason: HOSPADM

## 2024-09-06 RX ORDER — HEPARIN SODIUM (PORCINE) LOCK FLUSH IV SOLN 100 UNIT/ML 100 UNIT/ML
500 SOLUTION INTRAVENOUS AS NEEDED
Status: DISCONTINUED | OUTPATIENT
Start: 2024-09-06 | End: 2024-09-07 | Stop reason: HOSPADM

## 2024-09-06 RX ADMIN — HEPARIN 500 UNITS: 100 SYRINGE at 11:40

## 2024-09-06 RX ADMIN — Medication 10 ML: at 11:38

## 2024-09-10 NOTE — PATIENT INSTRUCTIONS
Health Maintenance Due   Topic Date Due    ZOSTER VACCINE (1 of 2) Never done    COVID-19 Vaccine (3 - Pfizer risk series) 10/15/2021    Pneumococcal Vaccine 0-64 (3 of 3 - PCV) 01/28/2022    INFLUENZA VACCINE  08/01/2024

## 2024-09-12 ENCOUNTER — OFFICE VISIT (OUTPATIENT)
Dept: FAMILY MEDICINE CLINIC | Facility: CLINIC | Age: 54
End: 2024-09-12
Payer: COMMERCIAL

## 2024-09-12 VITALS
TEMPERATURE: 99.1 F | DIASTOLIC BLOOD PRESSURE: 66 MMHG | BODY MASS INDEX: 20.75 KG/M2 | WEIGHT: 153.2 LBS | OXYGEN SATURATION: 95 % | SYSTOLIC BLOOD PRESSURE: 100 MMHG | HEART RATE: 87 BPM | HEIGHT: 72 IN

## 2024-09-12 DIAGNOSIS — K52.9 ENTERITIS, NONINFECTIOUS: ICD-10-CM

## 2024-09-12 DIAGNOSIS — E53.8 VITAMIN B12 DEFICIENCY: ICD-10-CM

## 2024-09-12 DIAGNOSIS — N20.0 CALCULUS OF KIDNEY: ICD-10-CM

## 2024-09-12 DIAGNOSIS — C15.9 ADENOCARCINOMA OF ESOPHAGUS METASTATIC TO INTRA-ABDOMINAL LYMPH NODE: Primary | ICD-10-CM

## 2024-09-12 DIAGNOSIS — F33.1 MAJOR DEPRESSIVE DISORDER, RECURRENT EPISODE, MODERATE: Chronic | ICD-10-CM

## 2024-09-12 DIAGNOSIS — E78.5 HYPERLIPIDEMIA, UNSPECIFIED HYPERLIPIDEMIA TYPE: ICD-10-CM

## 2024-09-12 DIAGNOSIS — C15.5 MALIGNANT NEOPLASM OF LOWER THIRD OF ESOPHAGUS: ICD-10-CM

## 2024-09-12 DIAGNOSIS — C77.2 ADENOCARCINOMA OF ESOPHAGUS METASTATIC TO INTRA-ABDOMINAL LYMPH NODE: Primary | ICD-10-CM

## 2024-09-12 PROCEDURE — 99214 OFFICE O/P EST MOD 30 MIN: CPT | Performed by: PREVENTIVE MEDICINE

## 2024-09-12 RX ORDER — PREDNISONE 5 MG/1
TABLET ORAL
Qty: 30 TABLET | Refills: 0 | OUTPATIENT
Start: 2024-09-12

## 2024-09-12 RX ORDER — DIAZOXIDE 50 MG/ML
8 SUSPENSION ORAL
COMMUNITY
Start: 2024-09-12

## 2024-09-12 NOTE — PROGRESS NOTES
Subjective   Jourdan Lebron is a 54 y.o. male presents for   Chief Complaint   Patient presents with    Depression     3 month follow up    Hyperlipidemia       Health Maintenance Due   Topic Date Due    ZOSTER VACCINE (1 of 2) Never done    COVID-19 Vaccine (3 - Pfizer risk series) 10/15/2021    Pneumococcal Vaccine 0-64 (3 of 3 - PCV) 01/28/2022    INFLUENZA VACCINE  08/01/2024       Depression  His past medical history is significant for depression.   Hyperlipidemia  Associated symptoms include myalgias.      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    He is currently experiencing hypoglycemia and is awaiting his medication. He was previously on acarbose, but it was not effective and caused a further drop in his blood pressure. He has made dietary changes, including eliminating carbohydrates, and now eats six times a day. Despite these changes, he continues to experience low blood pressure and has lost some weight. He also reports feeling faint and tired. His endocrinologist suspects that his symptoms may be due to dumping syndrome or reactive hypoglycemia following surgery. He has tried various medications, including glucose tablets, but they have not been effective. He has also tried consuming simple carbohydrates like grapes and peanut butter, but this has not helped either. He has noticed that his blood sugar levels tend to drop after supper, sometimes as low as 40 or 30. He has been monitoring his blood sugar levels using fingersticks.    He has been undergoing esophagoscopy every 30 days due to persistent tightness in his esophagus.    He has been on Prozac for about 3 years and is considering weaning off it to see if it improves his blood sugar levels and  "overall well-being. He is considering reducing his fluoxetine dosage to 10 mg and eventually discontinuing it, as he believes it may be affecting his mood and concentration. He takes Xanax at night to help him sleep. He has an upcoming appointment with Dr. Garrido next week. He has been unable to work due to his condition and is seeking to extend his leave until October when he sees his endocrinologist.    He has not had his eyes or teeth checked recently. He reports no coughing up blood or sputum, wheezing, chest pressure, heart flutters, blood in urine, burning sensation during urination, fever, chills, or night sweats.    He has received the influenza and COVID-19 vaccines in the past. He has stopped taking aspirin as advised by Dr. Hayward.    Vitals:    09/12/24 1450 09/12/24 1456   BP: (!) 89/61 100/66   BP Location: Right arm Left arm   Patient Position: Sitting Sitting   Cuff Size: Adult Large Adult   Pulse: 87    Temp: 99.1 °F (37.3 °C)    TempSrc: Tympanic    SpO2: 95%    Weight: 69.5 kg (153 lb 3.2 oz)    Height: 182.9 cm (72.01\")      Body mass index is 20.77 kg/m².    Current Outpatient Medications on File Prior to Visit   Medication Sig Dispense Refill    acetaminophen (TYLENOL) 325 MG tablet Take 2 tablets by mouth Every 6 (Six) Hours As Needed for Mild Pain.      ALPRAZolam (XANAX) 0.25 MG tablet Take 1 tablet by mouth 3 (Three) Times a Day As Needed for Anxiety. for anxiety 90 tablet 3    diazoxide (PROGLYCEM) 50 MG/ML suspension Take 8 mg/kg/day by mouth Daily With Dinner.      Evolocumab (REPATHA) solution prefilled syringe injection Inject 1 mL under the skin into the appropriate area as directed Every 14 (Fourteen) Days. 6 mL 3    famotidine (PEPCID) 40 mg/5 mL suspension Take 5 mL by mouth.      FLUoxetine (PROzac) 20 MG/5ML solution Take 5 mL by mouth Daily. 150 mL 1    fluticasone (FLONASE) 50 MCG/ACT nasal spray 2 sprays into the nostril(s) as directed by provider Daily.      ondansetron " (ZOFRAN) 8 MG tablet Take 1 tablet by mouth 3 (Three) Times a Day As Needed for Nausea or Vomiting. 30 tablet 5    [place] 0.6 MG/0.6ML solution auto-injector As Needed.      [DISCONTINUED] acarbose (PRECOSE) 25 MG tablet One tablet three times a day before each meal (Patient not taking: Reported on 9/12/2024)      [DISCONTINUED] aspirin 81 MG chewable tablet Administer 1 tablet per J tube Daily. (Patient not taking: Reported on 9/12/2024)      [DISCONTINUED] esomeprazole (nexIUM) 40 MG capsule Take 1 capsule by mouth Every Morning Before Breakfast. (Patient not taking: Reported on 9/12/2024)      [DISCONTINUED] lidocaine-prilocaine (EMLA) 2.5-2.5 % cream Apply 1 application  topically to the appropriate area as directed As Needed (apply 60 minutes prior to port access). (Patient not taking: Reported on 9/12/2024) 30 g 2    [DISCONTINUED] triamcinolone (KENALOG) 0.1 % ointment Apply 1 Application topically to the appropriate area as directed 2 (Two) Times a Day. (Patient not taking: Reported on 9/12/2024) 454 g 1     No current facility-administered medications on file prior to visit.       The following portions of the patient's history were reviewed and updated as appropriate: allergies, current medications, past family history, past medical history, past social history, past surgical history, and problem list.    Review of Systems   Constitutional:  Positive for activity change, appetite change and fatigue.   HENT:  Positive for trouble swallowing.    Musculoskeletal:  Positive for myalgias.   Neurological:  Positive for weakness.       Objective   Physical Exam  Vitals reviewed.   Constitutional:       General: He is not in acute distress.     Appearance: He is well-developed. He is not ill-appearing or toxic-appearing.      Comments: Thin.   HENT:      Head: Normocephalic and atraumatic.      Right Ear: Tympanic membrane, ear canal and external ear normal.      Left Ear: Tympanic membrane, ear canal and  external ear normal.      Nose: Nose normal.      Mouth/Throat:      Mouth: Mucous membranes are moist.      Pharynx: No posterior oropharyngeal erythema.   Eyes:      Extraocular Movements: Extraocular movements intact.      Conjunctiva/sclera: Conjunctivae normal.      Pupils: Pupils are equal, round, and reactive to light.   Cardiovascular:      Rate and Rhythm: Normal rate and regular rhythm.      Heart sounds: Normal heart sounds.   Pulmonary:      Effort: Pulmonary effort is normal.      Breath sounds: Normal breath sounds.   Abdominal:      General: Bowel sounds are normal. There is no distension.      Palpations: Abdomen is soft. There is no mass.      Tenderness: There is no abdominal tenderness. There is no right CVA tenderness or left CVA tenderness.   Musculoskeletal:         General: Normal range of motion.      Cervical back: Neck supple.   Skin:     General: Skin is warm.   Neurological:      General: No focal deficit present.      Mental Status: He is alert and oriented to person, place, and time.   Psychiatric:         Mood and Affect: Mood normal.         Behavior: Behavior normal.       Physical Exam      PHQ-9 Total Score:    Results           Assessment & Plan   Diagnoses and all orders for this visit:    1. 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 (Primary)    2. Major depressive disorder, recurrent episode, moderate  -     Magnesium  -     TSH Rfx On Abnormal To Free T4    3. Vitamin B12 deficiency  -     CBC Auto Differential  -     Vitamin B12    4. Hyperlipidemia, unspecified hyperlipidemia type  -     Comprehensive Metabolic Panel  -     Lipid Panel    5. Calculus of kidney  -     Uric Acid    6. Body mass index (BMI) 20.0-20.9, adult      Assessment & Plan  1. 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  He is scheduled for another esophagoscopy at the end of the month to stretch the esophagus and monitor for any closure. He  reports intermittent tightness in the esophagus, which varies day to day. He will continue to monitor symptoms and report any significant changes.    2. Major depressive disorder, recurrent.  He has been on fluoxetine for about 3 years and wishes to wean off it due to mood and concentration issues. He will discuss tapering the medication with Dr. Goldman. He is advised to open the fluoxetine capsules and gradually reduce the dose by taking half or a quarter of the powder.    3. Vitamin B12 deficiency.  A blood test for B12 levels will be conducted to monitor and manage his deficiency.    4. Hyperlipidemia.  A blood test for cholesterol levels will be conducted to monitor and manage his condition.    5. Calculus of the kidney.  He reports no current symptoms related to kidney stones. No immediate intervention is planned.    6. Hypoglycemia.  He experiences reactive hypoglycemia, likely due to dumping syndrome post-surgery. He has been advised to take his new medication around suppertime to prevent blood sugar crashes. He will continue to monitor his blood sugar levels with fingersticks. A Dexcom or Dereck device will be ordered, subject to insurance coverage, to ease monitoring. He is advised to discuss the possibility of a sugar insulin pump with his endocrinologist.    7. Body mass index of 20.  He reports weight loss and difficulty gaining weight despite dietary changes. He is advised to continue eating small, frequent meals and to include carbohydrates to stabilize blood sugar levels.    8. Erectile dysfunction.  He reports issues with erection and ejaculation, possibly related to his medications. If symptoms persist after adjusting fluoxetine, a referral to urology will be considered.    9. Medication Management.  He has stopped taking aspirin as advised by Dr. Hayward. He continues to take Xanax at night to aid sleep but avoids it during the day due to fatigue.    Follow-up  A follow-up visit is scheduled in 6  months.    Patient Instructions     Health Maintenance Due   Topic Date Due    ZOSTER VACCINE (1 of 2) Never done    COVID-19 Vaccine (3 - Pfizer risk series) 10/15/2021    Pneumococcal Vaccine 0-64 (3 of 3 - PCV) 01/28/2022    INFLUENZA VACCINE  08/01/2024           Patient or patient representative verbalized consent for the use of Ambient Listening during the visit with  Justa Castano MD for chart documentation. 9/12/2024  18:56 EDT

## 2024-09-17 ENCOUNTER — LAB (OUTPATIENT)
Dept: FAMILY MEDICINE CLINIC | Facility: CLINIC | Age: 54
End: 2024-09-17
Payer: COMMERCIAL

## 2024-09-17 ENCOUNTER — OFFICE VISIT (OUTPATIENT)
Dept: PSYCHIATRY | Facility: CLINIC | Age: 54
End: 2024-09-17
Payer: COMMERCIAL

## 2024-09-17 DIAGNOSIS — F33.1 MAJOR DEPRESSIVE DISORDER, RECURRENT EPISODE, MODERATE: Primary | Chronic | ICD-10-CM

## 2024-09-17 DIAGNOSIS — F41.1 GENERALIZED ANXIETY DISORDER: Chronic | ICD-10-CM

## 2024-09-17 LAB
ALBUMIN SERPL-MCNC: 4.1 G/DL (ref 3.5–5.2)
ALBUMIN/GLOB SERPL: 1.7 G/DL
ALP SERPL-CCNC: 77 U/L (ref 39–117)
ALT SERPL W P-5'-P-CCNC: 21 U/L (ref 1–41)
ANION GAP SERPL CALCULATED.3IONS-SCNC: 10 MMOL/L (ref 5–15)
AST SERPL-CCNC: 19 U/L (ref 1–40)
BASOPHILS # BLD AUTO: 0.03 10*3/MM3 (ref 0–0.2)
BASOPHILS NFR BLD AUTO: 0.8 % (ref 0–1.5)
BILIRUB SERPL-MCNC: 0.3 MG/DL (ref 0–1.2)
BUN SERPL-MCNC: 17 MG/DL (ref 6–20)
BUN/CREAT SERPL: 19.8 (ref 7–25)
CALCIUM SPEC-SCNC: 9.8 MG/DL (ref 8.6–10.5)
CHLORIDE SERPL-SCNC: 105 MMOL/L (ref 98–107)
CHOLEST SERPL-MCNC: 186 MG/DL (ref 0–200)
CO2 SERPL-SCNC: 27 MMOL/L (ref 22–29)
CREAT SERPL-MCNC: 0.86 MG/DL (ref 0.76–1.27)
DEPRECATED RDW RBC AUTO: 46.4 FL (ref 37–54)
EGFRCR SERPLBLD CKD-EPI 2021: 104.2 ML/MIN/1.73
EOSINOPHIL # BLD AUTO: 0.22 10*3/MM3 (ref 0–0.4)
EOSINOPHIL NFR BLD AUTO: 6.1 % (ref 0.3–6.2)
ERYTHROCYTE [DISTWIDTH] IN BLOOD BY AUTOMATED COUNT: 12.9 % (ref 12.3–15.4)
GLOBULIN UR ELPH-MCNC: 2.4 GM/DL
GLUCOSE SERPL-MCNC: 79 MG/DL (ref 65–99)
HCT VFR BLD AUTO: 43.1 % (ref 37.5–51)
HDLC SERPL-MCNC: 62 MG/DL (ref 40–60)
HGB BLD-MCNC: 13.8 G/DL (ref 13–17.7)
IMM GRANULOCYTES # BLD AUTO: 0.01 10*3/MM3 (ref 0–0.05)
IMM GRANULOCYTES NFR BLD AUTO: 0.3 % (ref 0–0.5)
LDLC SERPL CALC-MCNC: 111 MG/DL (ref 0–100)
LDLC/HDLC SERPL: 1.77 {RATIO}
LYMPHOCYTES # BLD AUTO: 0.85 10*3/MM3 (ref 0.7–3.1)
LYMPHOCYTES NFR BLD AUTO: 23.5 % (ref 19.6–45.3)
MAGNESIUM SERPL-MCNC: 2.1 MG/DL (ref 1.6–2.6)
MCH RBC QN AUTO: 31.7 PG (ref 26.6–33)
MCHC RBC AUTO-ENTMCNC: 32 G/DL (ref 31.5–35.7)
MCV RBC AUTO: 98.9 FL (ref 79–97)
MONOCYTES # BLD AUTO: 0.42 10*3/MM3 (ref 0.1–0.9)
MONOCYTES NFR BLD AUTO: 11.6 % (ref 5–12)
NEUTROPHILS NFR BLD AUTO: 2.08 10*3/MM3 (ref 1.7–7)
NEUTROPHILS NFR BLD AUTO: 57.7 % (ref 42.7–76)
NRBC BLD AUTO-RTO: 0 /100 WBC (ref 0–0.2)
PLATELET # BLD AUTO: 247 10*3/MM3 (ref 140–450)
PMV BLD AUTO: 9.7 FL (ref 6–12)
POTASSIUM SERPL-SCNC: 4.2 MMOL/L (ref 3.5–5.2)
PROT SERPL-MCNC: 6.5 G/DL (ref 6–8.5)
RBC # BLD AUTO: 4.36 10*6/MM3 (ref 4.14–5.8)
SODIUM SERPL-SCNC: 142 MMOL/L (ref 136–145)
TRIGL SERPL-MCNC: 71 MG/DL (ref 0–150)
TSH SERPL DL<=0.05 MIU/L-ACNC: 1.21 UIU/ML (ref 0.27–4.2)
URATE SERPL-MCNC: 5.5 MG/DL (ref 3.4–7)
VIT B12 BLD-MCNC: 500 PG/ML (ref 211–946)
VLDLC SERPL-MCNC: 13 MG/DL (ref 5–40)
WBC NRBC COR # BLD AUTO: 3.61 10*3/MM3 (ref 3.4–10.8)

## 2024-09-17 PROCEDURE — 80050 GENERAL HEALTH PANEL: CPT | Performed by: PREVENTIVE MEDICINE

## 2024-09-17 PROCEDURE — 84550 ASSAY OF BLOOD/URIC ACID: CPT | Performed by: PREVENTIVE MEDICINE

## 2024-09-17 PROCEDURE — 99214 OFFICE O/P EST MOD 30 MIN: CPT | Performed by: PSYCHIATRY & NEUROLOGY

## 2024-09-17 PROCEDURE — 83735 ASSAY OF MAGNESIUM: CPT | Performed by: PREVENTIVE MEDICINE

## 2024-09-17 PROCEDURE — 96127 BRIEF EMOTIONAL/BEHAV ASSMT: CPT | Performed by: PSYCHIATRY & NEUROLOGY

## 2024-09-17 PROCEDURE — 80061 LIPID PANEL: CPT | Performed by: PREVENTIVE MEDICINE

## 2024-09-17 PROCEDURE — 82607 VITAMIN B-12: CPT | Performed by: PREVENTIVE MEDICINE

## 2024-09-17 RX ORDER — FLUOXETINE 10 MG/1
10 CAPSULE ORAL DAILY
Qty: 30 CAPSULE | Refills: 3 | Status: SHIPPED | OUTPATIENT
Start: 2024-09-17 | End: 2025-09-17

## 2024-09-17 RX ORDER — ALPRAZOLAM 0.25 MG
0.25 TABLET ORAL 2 TIMES DAILY PRN
Qty: 60 TABLET | Refills: 3 | Status: SHIPPED | OUTPATIENT
Start: 2024-09-17

## 2024-09-19 ENCOUNTER — TELEPHONE (OUTPATIENT)
Dept: FAMILY MEDICINE CLINIC | Facility: CLINIC | Age: 54
End: 2024-09-19
Payer: COMMERCIAL

## 2024-09-24 ENCOUNTER — HOSPITAL ENCOUNTER (OUTPATIENT)
Facility: HOSPITAL | Age: 54
Setting detail: HOSPITAL OUTPATIENT SURGERY
Discharge: HOME | End: 2024-09-24
Attending: SURGERY | Admitting: SURGERY
Payer: COMMERCIAL

## 2024-09-24 ENCOUNTER — ANESTHESIA EVENT (OUTPATIENT)
Dept: GASTROENTEROLOGY | Facility: HOSPITAL | Age: 54
End: 2024-09-24
Payer: COMMERCIAL

## 2024-09-24 ENCOUNTER — ANESTHESIA (OUTPATIENT)
Dept: GASTROENTEROLOGY | Facility: HOSPITAL | Age: 54
End: 2024-09-24
Payer: COMMERCIAL

## 2024-09-24 VITALS
SYSTOLIC BLOOD PRESSURE: 109 MMHG | RESPIRATION RATE: 20 BRPM | HEART RATE: 50 BPM | WEIGHT: 154.54 LBS | OXYGEN SATURATION: 99 % | TEMPERATURE: 97.8 F | HEIGHT: 71 IN | DIASTOLIC BLOOD PRESSURE: 66 MMHG | BODY MASS INDEX: 21.64 KG/M2

## 2024-09-24 LAB — GLUCOSE BLDC GLUCOMTR-MCNC: 81 MG/DL (ref 70–105)

## 2024-09-24 PROCEDURE — C1726 CATH, BAL DIL, NON-VASCULAR: HCPCS | Performed by: SURGERY

## 2024-09-24 PROCEDURE — 25010000002 PROPOFOL 200 MG/20ML EMULSION: Performed by: NURSE ANESTHETIST, CERTIFIED REGISTERED

## 2024-09-24 PROCEDURE — 82948 REAGENT STRIP/BLOOD GLUCOSE: CPT

## 2024-09-24 PROCEDURE — 43249 ESOPH EGD DILATION <30 MM: CPT | Performed by: SURGERY

## 2024-09-24 PROCEDURE — 25810000003 SODIUM CHLORIDE 0.9 % SOLUTION: Performed by: NURSE ANESTHETIST, CERTIFIED REGISTERED

## 2024-09-24 PROCEDURE — 25010000002 SUCCINYLCHOLINE PER 20 MG: Performed by: NURSE ANESTHETIST, CERTIFIED REGISTERED

## 2024-09-24 PROCEDURE — 25010000002 FENTANYL CITRATE (PF) 100 MCG/2ML SOLUTION: Performed by: NURSE ANESTHETIST, CERTIFIED REGISTERED

## 2024-09-24 RX ORDER — PROMETHAZINE HYDROCHLORIDE 25 MG/1
25 SUPPOSITORY RECTAL ONCE AS NEEDED
Status: DISCONTINUED | OUTPATIENT
Start: 2024-09-24 | End: 2024-09-24 | Stop reason: HOSPADM

## 2024-09-24 RX ORDER — BLOOD-GLUCOSE SENSOR
1 EACH MISCELLANEOUS
Qty: 3 EACH | Refills: 1 | Status: SHIPPED | OUTPATIENT
Start: 2024-09-24

## 2024-09-24 RX ORDER — LIDOCAINE HYDROCHLORIDE 20 MG/ML
INJECTION, SOLUTION INFILTRATION; PERINEURAL AS NEEDED
Status: DISCONTINUED | OUTPATIENT
Start: 2024-09-24 | End: 2024-09-24 | Stop reason: SURG

## 2024-09-24 RX ORDER — SODIUM CHLORIDE 9 MG/ML
INJECTION, SOLUTION INTRAVENOUS CONTINUOUS PRN
Status: DISCONTINUED | OUTPATIENT
Start: 2024-09-24 | End: 2024-09-24 | Stop reason: SURG

## 2024-09-24 RX ORDER — DROPERIDOL 2.5 MG/ML
0.62 INJECTION, SOLUTION INTRAMUSCULAR; INTRAVENOUS ONCE AS NEEDED
Status: DISCONTINUED | OUTPATIENT
Start: 2024-09-24 | End: 2024-09-24 | Stop reason: HOSPADM

## 2024-09-24 RX ORDER — ACETAMINOPHEN 325 MG/1
650 TABLET ORAL ONCE AS NEEDED
Status: DISCONTINUED | OUTPATIENT
Start: 2024-09-24 | End: 2024-09-24 | Stop reason: HOSPADM

## 2024-09-24 RX ORDER — BLOOD-GLUCOSE SENSOR
EACH MISCELLANEOUS 4 TIMES DAILY
Qty: 6 EACH | Refills: 1 | Status: SHIPPED | OUTPATIENT
Start: 2024-09-24

## 2024-09-24 RX ORDER — SUCCINYLCHOLINE CHLORIDE 20 MG/ML
INJECTION INTRAMUSCULAR; INTRAVENOUS AS NEEDED
Status: DISCONTINUED | OUTPATIENT
Start: 2024-09-24 | End: 2024-09-24 | Stop reason: SURG

## 2024-09-24 RX ORDER — FENTANYL CITRATE 50 UG/ML
INJECTION, SOLUTION INTRAMUSCULAR; INTRAVENOUS AS NEEDED
Status: DISCONTINUED | OUTPATIENT
Start: 2024-09-24 | End: 2024-09-24 | Stop reason: SURG

## 2024-09-24 RX ORDER — PROPOFOL 10 MG/ML
INJECTION, EMULSION INTRAVENOUS AS NEEDED
Status: DISCONTINUED | OUTPATIENT
Start: 2024-09-24 | End: 2024-09-24 | Stop reason: SURG

## 2024-09-24 RX ORDER — PROMETHAZINE HYDROCHLORIDE 25 MG/1
25 TABLET ORAL ONCE AS NEEDED
Status: DISCONTINUED | OUTPATIENT
Start: 2024-09-24 | End: 2024-09-24 | Stop reason: HOSPADM

## 2024-09-24 RX ORDER — FENTANYL CITRATE 50 UG/ML
50 INJECTION, SOLUTION INTRAMUSCULAR; INTRAVENOUS
Status: DISCONTINUED | OUTPATIENT
Start: 2024-09-24 | End: 2024-09-24 | Stop reason: HOSPADM

## 2024-09-24 RX ORDER — OXYCODONE HYDROCHLORIDE 5 MG/1
10 TABLET ORAL ONCE AS NEEDED
Status: DISCONTINUED | OUTPATIENT
Start: 2024-09-24 | End: 2024-09-24 | Stop reason: HOSPADM

## 2024-09-24 RX ADMIN — FENTANYL CITRATE 50 MCG: 50 INJECTION, SOLUTION INTRAMUSCULAR; INTRAVENOUS at 12:43

## 2024-09-24 RX ADMIN — FENTANYL CITRATE 50 MCG: 50 INJECTION, SOLUTION INTRAMUSCULAR; INTRAVENOUS at 12:50

## 2024-09-24 RX ADMIN — LIDOCAINE HYDROCHLORIDE 100 MG: 20 INJECTION, SOLUTION INFILTRATION; PERINEURAL at 12:43

## 2024-09-24 RX ADMIN — SODIUM CHLORIDE: 9 INJECTION, SOLUTION INTRAVENOUS at 12:40

## 2024-09-24 RX ADMIN — SUCCINYLCHOLINE CHLORIDE 120 MG: 20 INJECTION, SOLUTION INTRAMUSCULAR; INTRAVENOUS at 12:43

## 2024-09-24 RX ADMIN — PROPOFOL 150 MG: 10 INJECTION, EMULSION INTRAVENOUS at 12:43

## 2024-09-25 ENCOUNTER — PREP FOR SURGERY (OUTPATIENT)
Dept: OTHER | Facility: HOSPITAL | Age: 54
End: 2024-09-25
Payer: COMMERCIAL

## 2024-09-25 DIAGNOSIS — C15.4 MALIGNANT NEOPLASM OF MIDDLE THIRD OF ESOPHAGUS: Primary | ICD-10-CM

## 2024-10-16 NOTE — TELEPHONE ENCOUNTER
Rx Refill Note  Requested Prescriptions     Pending Prescriptions Disp Refills    Evolocumab (REPATHA) solution prefilled syringe injection 6 mL 3     Sig: Inject 1 mL under the skin into the appropriate area as directed Every 14 (Fourteen) Days.      Last office visit with prescribing clinician: 9/12/2024   Last telemedicine visit with prescribing clinician: Visit date not found   Next office visit with prescribing clinician: 3/14/2025                         Would you like a call back once the refill request has been completed: [] Yes [] No    If the office needs to give you a call back, can they leave a voicemail: [] Yes [] No    Annabel Ocampo MA  10/16/24, 14:36 EDT

## 2024-10-16 NOTE — TELEPHONE ENCOUNTER
Caller: Jourdan Lebron    Relationship: Self    Best call back number:     [phone]        Requested Prescriptions:   Requested Prescriptions     Pending Prescriptions Disp Refills    Evolocumab (REPATHA) solution prefilled syringe injection 6 mL 3     Sig: Inject 1 mL under the skin into the appropriate area as directed Every 14 (Fourteen) Days.        Pharmacy where request should be sent: [facility] - [place] - [address] [facility] [address] AT [address] TO [facility] - [phone]  - [phone] FX     Last office visit with prescribing clinician: 9/12/2024   Last telemedicine visit with prescribing clinician: Visit date not found   Next office visit with prescribing clinician: 3/14/2025     Additional details provided by patient:     Does the patient have less than a 3 day supply:  [] Yes  [] No    Would you like a call back once the refill request has been completed: [] Yes [] No    If the office needs to give you a call back, can they leave a voicemail: [] Yes [] No    Geovany Barahona   10/16/24 14:04 EDT

## 2024-10-18 ENCOUNTER — HOSPITAL ENCOUNTER (OUTPATIENT)
Dept: ONCOLOGY | Facility: HOSPITAL | Age: 54
Discharge: HOME | End: 2024-10-18
Payer: COMMERCIAL

## 2024-10-18 DIAGNOSIS — Z45.2 ENCOUNTER FOR CARE RELATED TO PORT-A-CATH: Primary | ICD-10-CM

## 2024-10-18 PROCEDURE — 96523 IRRIG DRUG DELIVERY DEVICE: CPT

## 2024-10-18 PROCEDURE — 25010000002 HEPARIN LOCK FLUSH PER 10 UNITS: Performed by: INTERNAL MEDICINE

## 2024-10-18 RX ORDER — HEPARIN SODIUM (PORCINE) LOCK FLUSH IV SOLN 100 UNIT/ML 100 UNIT/ML
500 SOLUTION INTRAVENOUS AS NEEDED
Status: DISCONTINUED | OUTPATIENT
Start: 2024-10-18 | End: 2024-10-19 | Stop reason: HOSPADM

## 2024-10-18 RX ORDER — SODIUM CHLORIDE 0.9 % (FLUSH) 0.9 %
10 SYRINGE (ML) INJECTION AS NEEDED
Status: DISCONTINUED | OUTPATIENT
Start: 2024-10-18 | End: 2024-10-19 | Stop reason: HOSPADM

## 2024-10-18 RX ADMIN — Medication 500 UNITS: at 11:42

## 2024-10-18 RX ADMIN — Medication 10 ML: at 11:41

## 2024-11-01 ENCOUNTER — FLU SHOT (OUTPATIENT)
Dept: FAMILY MEDICINE CLINIC | Facility: CLINIC | Age: 54
End: 2024-11-01
Payer: COMMERCIAL

## 2024-11-01 DIAGNOSIS — Z23 NEED FOR INFLUENZA VACCINATION: Primary | ICD-10-CM

## 2024-11-01 PROCEDURE — 90471 IMMUNIZATION ADMIN: CPT | Performed by: PREVENTIVE MEDICINE

## 2024-11-01 PROCEDURE — 90656 IIV3 VACC NO PRSV 0.5 ML IM: CPT | Performed by: PREVENTIVE MEDICINE

## 2024-11-21 ENCOUNTER — HOSPITAL ENCOUNTER (OUTPATIENT)
Dept: PET IMAGING | Facility: HOSPITAL | Age: 54
Discharge: HOME | End: 2024-11-21
Admitting: INTERNAL MEDICINE
Payer: COMMERCIAL

## 2024-11-21 DIAGNOSIS — C15.9 ADENOCARCINOMA OF ESOPHAGUS: ICD-10-CM

## 2024-11-21 PROCEDURE — 74176 CT ABD & PELVIS W/O CONTRAST: CPT

## 2024-11-21 PROCEDURE — 71250 CT THORAX DX C-: CPT

## 2024-12-02 NOTE — H&P (VIEW-ONLY)
"                         HEMATOLOGY ONCOLOGY OUTPATIENT FOLLOW UP       Patient name: Jourdan Lebron  : 1970  MRN: [id number]  Primary Care Physician: Justa Castano MD  Referring Physician: Justa Castano MD  Reason For Consult:     Chief Complaint   Patient presents with    Follow-up     Adenocarcinoma of esophagus         HPI:   History of Present Illness:  Jourdan Lebron is 54 y.o. male who presented to our office on 2021 for consultation regarding elevated hematocrit    On 2021 Mr. Lebron was referred for the investigation of macrocytosis and elevated hematocrit.  He gave a history of a stroke in .  He also described a long history of anxiety and \"panic attacks\".  Finally he had undergone an adrenalectomy, apparently because of an adrenal adenoma.  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  In spite of that he felt poorly, mainly because of fatigue and had several investigations.  For a long time, at least since , he had been noted to have an elevated MCV and MCH.  A clear cause for this had not been identified and generally speaking he was asymptomatic at that time.  In , September and 2021 his blood counts had reported a hematocrit of 51.3, 51.6 and 53.8% respectively.  This was in the setting of a normal high hemoglobin.  He gave a history of prolonged and intense, at times of up to 3 packs of cigarettes per day, cigarette smoking.  Close to the time of the initial visit, he was smoking only cigars but did admit to inhaling the smoke.  He, as well, admitted to dyspnea with some exertion.    2022 Mr. Lebron was back in the office for follow-up.  At the time of his initial evaluation his blood count had been found to be within normal limits.  His folic acid was found to be immediately below the normal range of normalcy.  He started taking folic acid replacement.    7/15/2022: Back in the office for " follow-up after 6 months.  Reported he had had some changes to his medications and he was feeling somewhat better.  In particular, he discussed changes to his antidepressant, that seemed to have made a difference.  He also had stopped smoking.  The physical exam was unchanged.  The laboratory exams revealed normal hemoglobin and hematocrit, but he did persist with mild macrocytosis at 97.8 fL.  A clear explanation for this was not identified.  A decision was made to observe as it was unlikely to represent a serious problem.    10/10/2023: Seen in the office after an absence of more than one year. He reported that for a few months he had been experiencing dysphagia and weight loss. He was initially treated with a proton pump inhibitor, but as there was no response and rather he reported worsening of the symptoms, he underwent upper and lower gastrointestinal endoscopies. In the colon multiple polyps were identified in the cecum, the ascending colon, the transverse colon and the descending colon. In the esophagus a protruding lesion that seemed infiltrative and was fungating and ulcerated was found in the lower third of the esophagus. It extended from 36 to 46 cm from the incisors. Biopsy reported invasive moderate to poorly differentiated adenocarcinoma.     11/3/2023: In the office to review the PET scan. His symptoms have not changed much. He continues to have dysphagia and it is severe enough that he has been able to eat much; he has some success with soft foods but there fare some foods that he can definitely not swallow. He has lost about 10 lbs but none recently. He remains afebrile and has not had any cough. He has not had any pain. On exam no jaundice or pallor. Lungs diminished and heart regular. Abdomen soft and not tender. No edema. Reviewed the images of the PET scan. Reviewed the result of the endoscopic ultrasound and the FNA of the lymph nodes, at least one of which is positive for metastatic disease.  This makes the tumor T2N1 disease. Neoadjuvant chemotherapy and radiation was discussed with him and his spouse and I made referrals to Dr. Sabillon in Radiation Oncology and to Dr. Hurtado in Thoracic surgery. He is to have next generation sequencing, Her2 expression and PD-L1 expression on tumor tissue. Will present in tumor conference.     11/17/2023: Not any different than at the time of the last visit.  No further weight loss.  Able to swallow some foods without any difficulties.  However, other foods are very difficult to swallow, if not impossible.  He has not had any fevers.  He underwent placement of the port without any difficulties.  On exam alert, conversant and in no distress.  Port site clean and healing well.  Lungs diminished bilaterally.  Heart regular.  No edema.  Laboratory exams were reviewed.  Discussed with him concurrent chemotherapy and radiation.  Treatment with carboplatin and paclitaxel was discussed and a treatment plan was placed.  Discussed with Dr. Sabillon.  To begin on the week of November 27, 2023.    12/8/2023: Feeling reasonably well. Has experienced occasional headaches and facial pain. As well feels the lower extremities to be heavy and had some aching pain. Has been able to eat some though his appetite is poor. He may be swallowing better and does not have odynophagia. No abdominal pain or diarrhea and no dysuria. Has not lost weight. On exam no changes. The laboratory exams were reviewed. Discussed with him. To continue with the same therapy.     12/27/2023: Without new symptoms.  Reasonably active.  Not eating as well because of early satiety but no dysphagia.  No chest pains.  As well not coughing or more dyspneic than before.  On exam no changes.  No palpable supraclavicular adenopathy.  Lungs are diminished bilaterally.  Heart regular.  Abdomen soft and nontender.  There is no edema.  Small petechiae are present in the lower extremities.  The laboratory exams were reviewed.  He has  thrombocytopenia today that does not require transfusion but that will keep him from receiving the last dose of the chemotherapy.  To finish radiation tomorrow.  He will have a PET scan and will see me with the results in approximately 4 weeks.  Discussed with him.    1/25/2024: Feeling reasonably well.  Anxious about surgery.  Undergoing preoperative investigations he was found to have a thyroid nodule.  He is eating reasonably well and does not have major dysphagia although sometimes he needs to belch before he can continue to swallow.  No odynophagia.  He has also been free of dyspnea.  Denies abdominal pain and has maintained regular bowel activity.  No dysuria or hematuria.  No peripheral edema.  On exam alert, conversant and in good spirits.  No jaundice and no pallor.  Lungs diminished but clear.  Heart regular.  Abdomen soft.  Port site clean.  Laboratory exams reviewed.  Awaiting authorization from the insurance company to do the PET scan.  Continue to see Dr. Hurtado.  See me in approximately 4 weeks.    2/28/2024: Underwent an [place] type esophagectomy without immediate complications.  There was some concern over dehiscence of the anastomosis and he received a stent.  He was also asked to not consume any food by mouth and was receiving nutrition through a jejunostomy tube.  At the time of this visit feeling progressively better but still poorly.  Very tired and lacking energy to do anything.  Sleeping poorly.  Has started to take clear liquids by mouth.  On exam he appears chronically ill but he does not seem in any distress.  He is pale but not jaundiced.  All surgical incisions are healing well and the lungs are diminished bilaterally but clear.  Heart regular.  Abdomen soft.  No edema.  Laboratory exams were reviewed.  He has macrocytic anemia with a hemoglobin of 11.9 g/dL and thrombocytosis with a platelet count of 562,000/mm³.  He also has monocytosis and basophilia but no leukocytosis at this time.   The final review of pathology confirmed scattered foci of residual moderate to poorly differentiated adenocarcinoma at the gastroesophageal junction with changes consistent with prior neoadjuvant therapy.  There were ASSO stated dissecting pools of mucin with changes again consistent with previous therapy.  Scattered individual tumor cells and small clusters were present near the gastroesophageal junction and extending through the muscularis propria into the adventitial soft tissue/fat and spanning an area of 21 x 18 x 7 mm.  No definitive lymphovascular space invasion was identified.  All margins were negative.  Of 20 lymph nodes analyzed known had metastatic disease.  A discussion was had with him in regards to immunotherapy given in the adjuvant setting.  Explained side effects and potential complications.  I asked him to return in 3 weeks.    3/22/2024: Not feeling back to normal yet.  Still not eating much.  He had an episode of nausea and emesis this morning.  He has been afebrile.  He denies chest pains but does complain of some epigastric discomfort that seems unrelated to the previous discomfort that he had after the surgery.  He has been without dyspnea and does not have any more cough than usual.  As well no abdominal pain.  He continues to tolerate the enteral nutrition without difficulties.  No edema.  No skin rash.  On exam no distress.  Conversant and oriented.  Lungs diminished bilaterally.  Heart regular.  Abdomen with the jejunostomy tube in place.  Opening looks clean.  It is soft and without palpable tumors.  No edema.  Laboratory exams reviewed.  Discussed with him.  Offered him immunotherapy.  He remains somewhat reluctant to consider treatment with immunotherapy.  He has asked for more time to think about it.    4/12/2024: Somewhat better.  Perhaps more energetic.  Able to eat better after he had dilatation of the esophagus.  He has some pain that he localizes to the substernal area and the  mid epigastrium and right upper quadrant.  It is not as intense as before.  He continues to use his jejunostomy without any difficulties.  Defecating regularly.  Urinating without dysuria.  On exam alert, conversant and oriented.  No distress.  No jaundice.  Lungs are diminished bilaterally.  Heart is regular.  Abdomen is soft.  No edema.  Laboratory exams reviewed.  Discussed again the use of immunotherapy.  This time he is interested in pursuing.  Placed the treatment plan.  Discussed side effects and potential complications of the treatment with immunotherapy.  Discussed with him the rationale of treatment.  He is to begin as it is approved by insurance.  He will see me in approximately 5 weeks.    5/22/2024: Feeling somewhat better since the commencement of the steroids. His abdominal pain and nausea have resolved. She has been eating well and has not had any nausea or vomiting. He has been afebrile. No chest pain or increase in dyspnea. On exam alert and conversant. No jaundice and no oral lesions. Respirations not labored. The lungs diminished bilaterally and heart regular. Abdomen soft and not tender. No edema. Reviewed and discussed with him the laboratory exams. Gave him instructions in writing to continue to taper down the prednisone. Hold the immunotherapy given the possibility of enteritis as a result of it.     7/11/2024: Still not feeling very well.  Still tired and weak.  Also developed hypoglycemia with diaphoresis and profound weakness oftentimes after eating, particularly after eating large meals or carbohydrate rich meals.  He has been reasonably active.  He has no pain but has noted a persistent rash that is no longer pruritic.  He has no more dyspnea than before and no chest pains or abdominal pain.  On exam slender, well-built and not act acutely ill.  No jaundice.  The lungs are diminished bilaterally and the heart regular.  Abdomen soft with all surgical incisions well-healed.  No edema.  The  laboratory exams were reviewed.  The recent scan of the abdomen, in May 2024, was reviewed as well.  He is to see me in approximately 6 weeks.  He will have scans prior to the next visit.    8/28/2024: Still fatigued and not seemingly better.  Eating somewhat better and having less difficulties with swallowing but not gaining weight.  Denies pain.  Has been afebrile.  No dyspnea or cough.  No abdominal pain.  Was stung by a wasp a few days before and had some significant edema of the ankle that is now subsided.  On exam alert and conversant.  Oriented and in no distress.  Lungs diminished bilaterally.  Heart regular.  Abdomen soft.  No edema.  Laboratory exams reviewed.  Reviewed the images of the scans.  There is no suggestion of recurrent malignancy in the chest, abdomen or pelvis.  Discussed the results of the scans with him.  He is to see me in 4 months with new scans.    12/4/2024: Feels well.  Has had no new symptoms although he continues to have some vague abdominal pains.  His dysphagia, however, has improved significantly and he is eating much better.  He seems to have gained a small amount of weight.  He has been afebrile.  Denies chest pains or cough and has not had diarrhea.  No dysuria.  Exam alert and conversant.  Oriented and in no distress.  No jaundice or pallor.  Mood palpable lymphadenopathy.  Port site clean.  Lungs diminished bilaterally and heart regular.  Abdomen flat and soft.  No palpable tumors.  No edema.  Reviewed the laboratory exams and discussed with him.  Reviewed the images of the scans and discussed with him.  No suggestion of recurrent disease.  He will continue observation and will see me again in approximately 4 months.    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    Brain fog     COVID 02/2023     Diverticulosis of large intestine without perforation or abscess without bleeding 10/02/2023    Generalized headaches     Hand tingling     History of blood clot in brain 2020    had thrombectomy    History of cancer chemotherapy 12/2023    History of kidney stones     History of radiation therapy 11/2023    Hyperlipidemia     Jejunostomy tube present     Port-A-Cath in place     Thyroid nodule     Trouble swallowing      Past Surgical History:   Procedure Laterality Date    ADRENALECTOMY      COLONOSCOPY      ENDOSCOPY      ENDOSCOPY N/A 02/17/2024    Procedure: ESOPHAGOGASTRODUODENOSCOPY WITH STENTING UNDER FLUROSCOPY GUIDENCE WITH ESOPHOGRAM;  Surgeon: Saurabh Hurtado MD;  Location: [facility] MAIN OR;  Service: Gastroenterology;  Laterality: N/A;    ENDOSCOPY N/A 3/8/2024    Procedure: ESOPHAGOGASTRODUODENOSCOPY WITH STENT REMOVAL;  Surgeon: Saurabh Hurtado MD;  Location: [facility] ENDOSCOPY;  Service: Gastroenterology;  Laterality: N/A;  Esophageal leak    ENDOSCOPY N/A 4/5/2024    Procedure: ESOPHAGOGASTRODUODENOSCOPY WITH BALLOON DILATATION #6, #7, #8, #9, #10, #11, #12  AND GASTROGRAFIN WITH FLOURO;  Surgeon: Saurabh Hurtado MD;  Location: [facility] ENDOSCOPY;  Service: Gastroenterology;  Laterality: N/A;  PRE OP - ESOPHAGEAL CANCER  POST OP - ANASTOMOSIS STRICTURE    ENDOSCOPY N/A 5/7/2024    Procedure: ESOPHAGOGASTRODUODENOSCOPY WITH DILATATION (BALLOON 10MM-12MM, 12MM-15MM, 15MM-18MM,UP TO 17MM) AND INTRAOPERATIVE ESOPHAGRAM;  Surgeon: Saurabh Hurtado MD;  Location: [facility] ENDOSCOPY;  Service: Gastroenterology;  Laterality: N/A;    ENDOSCOPY N/A 6/18/2024    Procedure: ESOPHAGOGASTRODUODENOSCOPY WITH balloon dilatation (10mm-12mm up to 12mm;15mm-18mm up to 18mm) and removal of PEG tube;  Surgeon: Saurabh Hurtado MD;  Location: [facility] ENDOSCOPY;  Service: Gastroenterology;  Laterality: N/A;    ENDOSCOPY N/A 7/30/2024    Procedure: ESOPHAGOGASTRODUODENOSCOPY with balloon dialtion(15mm-18mm balloon up to 18mm);  Surgeon: Genesis  MD Saurabh;  Location: [facility] ENDOSCOPY;  Service: Gastroenterology;  Laterality: N/A;    ENDOSCOPY N/A 9/24/2024    Procedure: ESOPHAGOGASTRODUODENOSCOPY with balloon dialtion of esophagus (15mm-18mm balloon up to 18mm);  Surgeon: Saurabh Hurtado MD;  Location: [facility] ENDOSCOPY;  Service: Gastroenterology;  Laterality: N/A;    ESOPHAGOGASTRECTOMY N/A 02/09/2024    Procedure: BRONCHOSCOPY, EGD, ESOPHAGECTOMY ALTAGRACIA-FABIOLA WITH DAVINCI ROBOT, LAPAROSCOPY, RIGHT THORACOSCOPY CONVERTED TO THORACOTOMY, FEEDING JEJUNOSTOMY TUBE PLACEMENT, INTERCOSTAL NERVE BLOCKS;  Surgeon: Saurabh Hurtado MD;  Location: [facility] MAIN OR;  Service: Robotics - DaVinci;  Laterality: N/A;    ESOPHAGUS SURGERY  10/2023    biopsy    KIDNEY STONE SURGERY      OTHER SURGICAL HISTORY  2020    loop recorder PLACED AND REMOVED    THROMBECTOMY  2020    UPPER ENDOSCOPIC ULTRASOUND W/ FNA N/A 10/27/2023    Procedure: ENDOSCOPIC ULTRASOUND WITH STAGING AND FINE NEEDLE ASPIRATION X2 AREAS;  Surgeon: Joselyn Savage MD;  Location: [facility] ENDOSCOPY;  Service: Gastroenterology;  Laterality: N/A;  POST:    VENOUS ACCESS DEVICE (PORT) INSERTION Right 11/13/2023    Procedure: INSERTION VENOUS ACCESS DEVICE;  Surgeon: Saurabh Hurtado MD;  Location: [facility] MAIN OR;  Service: Thoracic;  Laterality: Right;       Current Outpatient Medications:     acetaminophen (TYLENOL) 325 MG tablet, Take 2 tablets by mouth Every 6 (Six) Hours As Needed for Mild Pain., Disp: , Rfl:     ALPRAZolam (XANAX) 0.25 MG tablet, Take 1 tablet by mouth 2 (Two) Times a Day As Needed for Anxiety. for anxiety, Disp: 60 tablet, Rfl: 3    Continuous Glucose Sensor (FreeStyle Dereck 3 Plus Sensor), Use 4 (Four) Times a Day., Disp: 6 each, Rfl: 1    Continuous Glucose Sensor (FreeStyle Dereck 3 Sensor) misc, Use 1 each Every 14 (Fourteen) Days., Disp: 3 each, Rfl: 1    diazoxide (PROGLYCEM) 50 MG/ML suspension, Take 8 mg/kg/day by mouth Daily With Dinner., Disp: , Rfl:     Evolocumab (REPATHA) solution  prefilled syringe injection, Inject 1 mL under the skin into the appropriate area as directed Every 14 (Fourteen) Days., Disp: 6 mL, Rfl: 3    famotidine (PEPCID) 40 mg/5 mL suspension, Take 5 mL by mouth., Disp: , Rfl:     FLUoxetine (PROzac) 10 MG capsule, Take 1 capsule by mouth Daily., Disp: 30 capsule, Rfl: 3    fluticasone (FLONASE) 50 MCG/ACT nasal spray, Administer 2 sprays into the nostril(s) as directed by provider Daily., Disp: , Rfl:     ondansetron (ZOFRAN) 8 MG tablet, Take 1 tablet by mouth 3 (Three) Times a Day As Needed for Nausea or Vomiting., Disp: 30 tablet, Rfl: 5    [place] 0.6 MG/0.6ML solution auto-injector, As Needed., Disp: , Rfl:   No current facility-administered medications for this visit.    Facility-Administered Medications Ordered in Other Visits:     heparin injection 500 Units, 500 Units, Intravenous, PRN, Oliverio Mueller MD, 500 Units at 12/04/24 1030    sodium chloride 0.9 % flush 10 mL, 10 mL, Intravenous, PRN, Oliverio Mueller MD, 10 mL at 12/04/24 1029    Allergies   Allergen Reactions    Cephalosporins Anaphylaxis     Throat swelling    Dye  [Contrast Dye (Echo Or Unknown Ct/Mr)] Hives    Iodinated Contrast Media Hives    Sulfa Antibiotics Hives    Sulfate Hives    Zetia [Ezetimibe] Other (See Comments) and Myalgia     Made tired    Statins Myalgia     Myalgia and fatique     Family History   Problem Relation Age of Onset    Arthritis Mother     Hypertension Mother     Heart failure Mother 73        Cause of death    Arthritis Father     Hypertension Father     Kidney cancer Father 57        Cause of death. Was a smoker    Heart disease Brother     Esophageal cancer Brother 59        Cause of death. Has a heavy drinker    Malig Hyperthermia Neg Hx      Cancer-related family history includes Esophageal cancer (age of onset: 59) in his brother; Kidney cancer (age of onset: 57) in his father.    Social History     Tobacco Use    Smoking status: Former     Current packs/day:  0.00     Average packs/day: 1 pack/day for 37.0 years (37.0 ttl pk-yrs)     Types: Cigars, Cigarettes     Start date: 1985     Quit date: 2022     Years since quittin.9     Passive exposure: Past    Smokeless tobacco: Never    Tobacco comments:     1 packs= stopped 10 years ago and continued cigars   2 cigars a day; has stopped cigars as of    Vaping Use    Vaping status: Never Used   Substance Use Topics    Alcohol use: Not Currently     Comment: Has now been abstinent since     Drug use: Never     Social History     Social History Narrative    Not on file      ROS:     Review of Systems   Constitutional:  Positive for fatigue. Negative for activity change, appetite change, chills, diaphoresis, fever and unexpected weight change.   HENT:  Negative for congestion, dental problem, drooling, ear discharge, ear pain, facial swelling, hearing loss, mouth sores, nosebleeds, postnasal drip, rhinorrhea, sinus pressure, sinus pain, sneezing, sore throat, tinnitus, trouble swallowing and voice change.    Eyes:  Negative for photophobia, pain, discharge, redness, itching and visual disturbance.   Respiratory:  Negative for apnea, cough, choking, chest tightness, shortness of breath, wheezing and stridor.    Cardiovascular:  Negative for chest pain, palpitations and leg swelling.   Gastrointestinal:  Negative for abdominal distention, abdominal pain, anal bleeding, blood in stool, constipation, diarrhea, nausea, rectal pain and vomiting.   Endocrine: Negative for cold intolerance, heat intolerance, polydipsia and polyuria.   Genitourinary:  Negative for decreased urine volume, difficulty urinating, dysuria, flank pain, frequency, genital sores, hematuria and urgency.   Musculoskeletal:  Negative for arthralgias, back pain, gait problem, joint swelling, myalgias, neck pain and neck stiffness.   Skin:  Negative for color change, pallor and rash.   Neurological:  Negative for dizziness, tremors, seizures,  "syncope, facial asymmetry, speech difficulty, weakness, light-headedness, numbness and headaches.   Hematological:  Negative for adenopathy. Does not bruise/bleed easily.   Psychiatric/Behavioral:  Negative for agitation, behavioral problems, confusion, decreased concentration, hallucinations, self-injury, sleep disturbance and suicidal ideas. The patient is nervous/anxious.      Objective:    Vitals:    12/04/24 1033   BP: 107/76   Pulse: 77   Resp: 14   Temp: 98.2 °F (36.8 °C)   SpO2: 98%   Weight: 70.9 kg (156 lb 6.4 oz)   Height: 180.3 cm (71\")   PainSc: 0-No pain     Body mass index is 21.81 kg/m².  ECOG  (0) Fully active, able to carry on all predisease performance without restriction    Physical Exam:     Physical Exam  Constitutional:       General: He is not in acute distress.     Appearance: Normal appearance. He is not ill-appearing, toxic-appearing or diaphoretic.      Comments: Slender, well-built.  Seems older than the stated age.  No distress.   HENT:      Head: Normocephalic and atraumatic.      Right Ear: External ear normal. There is no impacted cerumen.      Left Ear: External ear normal. There is no impacted cerumen.      Nose: Nose normal. No congestion or rhinorrhea.      Mouth/Throat:      Mouth: Mucous membranes are moist.      Pharynx: Oropharynx is clear. No oropharyngeal exudate or posterior oropharyngeal erythema.      Comments: Oral cavity reveals poor dentition and poor dental hygiene.  No mucosal ulcerations are present.  Eyes:      General: No scleral icterus.        Right eye: No discharge.         Left eye: No discharge.      Conjunctiva/sclera: Conjunctivae normal.      Pupils: Pupils are equal, round, and reactive to light.   Neck:      Vascular: No carotid bruit.   Cardiovascular:      Rate and Rhythm: Normal rate and regular rhythm.      Pulses: Normal pulses.      Heart sounds: Normal heart sounds. No murmur heard.     No friction rub. No gallop.   Pulmonary:      Effort: No " respiratory distress.      Breath sounds: No stridor. No wheezing, rhonchi or rales.      Comments: Breath sounds are diminished bilaterally.  Chest:      Chest wall: No tenderness.      Comments: A right thoracic surgical incision is healing well.  Smaller surgical incisions for drains also healing well.  Abdominal:      General: Abdomen is flat. Bowel sounds are normal. There is no distension.      Palpations: Abdomen is soft. There is no mass.      Tenderness: There is no abdominal tenderness. There is no right CVA tenderness, left CVA tenderness, guarding or rebound.      Comments: Jejunostomy in place.  Opening clean.   Musculoskeletal:         General: No swelling, tenderness, deformity or signs of injury.      Cervical back: No rigidity or tenderness.      Right lower leg: No edema.      Left lower leg: No edema.      Comments: There is clubbing of the fingers in both hands   Lymphadenopathy:      Cervical: No cervical adenopathy.   Skin:     General: Skin is warm and dry.      Coloration: Skin is not jaundiced or pale.      Findings: No bruising, erythema or rash.   Neurological:      General: No focal deficit present.      Mental Status: He is alert and oriented to person, place, and time.      Cranial Nerves: No cranial nerve deficit.      Motor: No weakness.      Coordination: Coordination normal.      Gait: Gait normal.   Psychiatric:         Mood and Affect: Mood normal.         Behavior: Behavior normal.         Thought Content: Thought content normal.         Judgment: Judgment normal.     YOSELIN Mueller MD performed a physical exam on 8/28/2024 as documented above.    Lab Results - Last 18 Months   Lab Units 12/04/24  1027 09/17/24  0757 08/28/24  1049   WBC 10*3/mm3 5.44 3.61 5.23   HEMOGLOBIN g/dL 13.6 13.8 13.8   HEMATOCRIT % 41.9 43.1 42.2   PLATELETS 10*3/mm3 290 247 253   MCV fL 100.2* 98.9* 98.8*     Lab Results - Last 18 Months   Lab Units 09/17/24  0757 08/28/24  1049 08/19/24  0903    SODIUM mmol/L 142 141 141   POTASSIUM mmol/L 4.2 4.4 4.7   CHLORIDE mmol/L 105 104 104   CO2 mmol/L 27.0 26.8 29.5*   BUN mg/dL 17 19 13   CREATININE mg/dL 0.86 0.87 0.91   CALCIUM mg/dL 9.8 9.9 9.9   BILIRUBIN mg/dL 0.3 0.2 0.3   ALK PHOS U/L 77 78 79   ALT (SGPT) U/L 21 23 24   AST (SGOT) U/L 19 22 22   GLUCOSE mg/dL 79 81 86     Lab Results   Component Value Date    GLUCOSE 79 09/17/2024    BUN 17 09/17/2024    CREATININE 0.86 09/17/2024    EGFRIFNONA 67 02/24/2022    BCR 19.8 09/17/2024    K 4.2 09/17/2024    CO2 27.0 09/17/2024    CALCIUM 9.8 09/17/2024    ALBUMIN 4.1 09/17/2024    LABIL2 1.3 09/25/2018    AST 19 09/17/2024    ALT 21 09/17/2024     Lab Results   Component Value Date    FOLATE 4.11 (L) 11/12/2021     Lab Results   Component Value Date    [id number] 500 09/17/2024     Uric Acid   Date Value Ref Range Status   09/17/2024 5.5 3.4 - 7.0 mg/dL Final     Lab Results   Component Value Date    SEDRATE 11 04/16/2021     Lab Results   Component Value Date    PSA 0.163 04/06/2021     Assessment & Plan     Assessment and Plan  Adenocarcinoma of the gastroesophageal junction [id number], microsatellite stable, low mutation burden, Her2 positive and PD-L1 expression negative: Completed neoadjuvant concurrent carboplatin and paclitaxel with radiation and underwent Henagar Shayne type esophagectomy.  Recovered completely and started treatment with nivolumab adjuvantly.  The drug had to be discontinued because of persistent abdominal pain and diarrhea that resolved with steroids.  He chose to receive no additional treatment.  No evidence of recurrent disease clinically or by imaging studies today.  Dumping syndrome?  Symptoms improved.  Independently reviewed the images of the scans and the reports.  Reviewed the laboratory exams.  Discussed results with him.  3.  Tobacco smoker: Remains abstinent.  3.  He will return to see me in approximately 4 months with new scans.    Oliverio Mueller MD on 12/4/2024 at 10:49  AM.

## 2024-12-02 NOTE — PROGRESS NOTES
"                         HEMATOLOGY ONCOLOGY OUTPATIENT FOLLOW UP       Patient name: Jourdan Lebron  : 1970  MRN: [id number]  Primary Care Physician: Justa Castano MD  Referring Physician: Justa Castano MD  Reason For Consult:     Chief Complaint   Patient presents with    Follow-up     Adenocarcinoma of esophagus         HPI:   History of Present Illness:  Jourdan Lebron is 54 y.o. male who presented to our office on 2021 for consultation regarding elevated hematocrit    On 2021 Mr. Lebron was referred for the investigation of macrocytosis and elevated hematocrit.  He gave a history of a stroke in .  He also described a long history of anxiety and \"panic attacks\".  Finally he had undergone an adrenalectomy, apparently because of an adrenal adenoma.  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  In spite of that he felt poorly, mainly because of fatigue and had several investigations.  For a long time, at least since , he had been noted to have an elevated MCV and MCH.  A clear cause for this had not been identified and generally speaking he was asymptomatic at that time.  In , September and 2021 his blood counts had reported a hematocrit of 51.3, 51.6 and 53.8% respectively.  This was in the setting of a normal high hemoglobin.  He gave a history of prolonged and intense, at times of up to 3 packs of cigarettes per day, cigarette smoking.  Close to the time of the initial visit, he was smoking only cigars but did admit to inhaling the smoke.  He, as well, admitted to dyspnea with some exertion.    2022 Mr. Lebron was back in the office for follow-up.  At the time of his initial evaluation his blood count had been found to be within normal limits.  His folic acid was found to be immediately below the normal range of normalcy.  He started taking folic acid replacement.    7/15/2022: Back in the office for " follow-up after 6 months.  Reported he had had some changes to his medications and he was feeling somewhat better.  In particular, he discussed changes to his antidepressant, that seemed to have made a difference.  He also had stopped smoking.  The physical exam was unchanged.  The laboratory exams revealed normal hemoglobin and hematocrit, but he did persist with mild macrocytosis at 97.8 fL.  A clear explanation for this was not identified.  A decision was made to observe as it was unlikely to represent a serious problem.    10/10/2023: Seen in the office after an absence of more than one year. He reported that for a few months he had been experiencing dysphagia and weight loss. He was initially treated with a proton pump inhibitor, but as there was no response and rather he reported worsening of the symptoms, he underwent upper and lower gastrointestinal endoscopies. In the colon multiple polyps were identified in the cecum, the ascending colon, the transverse colon and the descending colon. In the esophagus a protruding lesion that seemed infiltrative and was fungating and ulcerated was found in the lower third of the esophagus. It extended from 36 to 46 cm from the incisors. Biopsy reported invasive moderate to poorly differentiated adenocarcinoma.     11/3/2023: In the office to review the PET scan. His symptoms have not changed much. He continues to have dysphagia and it is severe enough that he has been able to eat much; he has some success with soft foods but there fare some foods that he can definitely not swallow. He has lost about 10 lbs but none recently. He remains afebrile and has not had any cough. He has not had any pain. On exam no jaundice or pallor. Lungs diminished and heart regular. Abdomen soft and not tender. No edema. Reviewed the images of the PET scan. Reviewed the result of the endoscopic ultrasound and the FNA of the lymph nodes, at least one of which is positive for metastatic disease.  This makes the tumor T2N1 disease. Neoadjuvant chemotherapy and radiation was discussed with him and his spouse and I made referrals to Dr. Sabillon in Radiation Oncology and to Dr. Hurtado in Thoracic surgery. He is to have next generation sequencing, Her2 expression and PD-L1 expression on tumor tissue. Will present in tumor conference.     11/17/2023: Not any different than at the time of the last visit.  No further weight loss.  Able to swallow some foods without any difficulties.  However, other foods are very difficult to swallow, if not impossible.  He has not had any fevers.  He underwent placement of the port without any difficulties.  On exam alert, conversant and in no distress.  Port site clean and healing well.  Lungs diminished bilaterally.  Heart regular.  No edema.  Laboratory exams were reviewed.  Discussed with him concurrent chemotherapy and radiation.  Treatment with carboplatin and paclitaxel was discussed and a treatment plan was placed.  Discussed with Dr. Sabillon.  To begin on the week of November 27, 2023.    12/8/2023: Feeling reasonably well. Has experienced occasional headaches and facial pain. As well feels the lower extremities to be heavy and had some aching pain. Has been able to eat some though his appetite is poor. He may be swallowing better and does not have odynophagia. No abdominal pain or diarrhea and no dysuria. Has not lost weight. On exam no changes. The laboratory exams were reviewed. Discussed with him. To continue with the same therapy.     12/27/2023: Without new symptoms.  Reasonably active.  Not eating as well because of early satiety but no dysphagia.  No chest pains.  As well not coughing or more dyspneic than before.  On exam no changes.  No palpable supraclavicular adenopathy.  Lungs are diminished bilaterally.  Heart regular.  Abdomen soft and nontender.  There is no edema.  Small petechiae are present in the lower extremities.  The laboratory exams were reviewed.  He has  thrombocytopenia today that does not require transfusion but that will keep him from receiving the last dose of the chemotherapy.  To finish radiation tomorrow.  He will have a PET scan and will see me with the results in approximately 4 weeks.  Discussed with him.    1/25/2024: Feeling reasonably well.  Anxious about surgery.  Undergoing preoperative investigations he was found to have a thyroid nodule.  He is eating reasonably well and does not have major dysphagia although sometimes he needs to belch before he can continue to swallow.  No odynophagia.  He has also been free of dyspnea.  Denies abdominal pain and has maintained regular bowel activity.  No dysuria or hematuria.  No peripheral edema.  On exam alert, conversant and in good spirits.  No jaundice and no pallor.  Lungs diminished but clear.  Heart regular.  Abdomen soft.  Port site clean.  Laboratory exams reviewed.  Awaiting authorization from the insurance company to do the PET scan.  Continue to see Dr. Hurtado.  See me in approximately 4 weeks.    2/28/2024: Underwent an [place] type esophagectomy without immediate complications.  There was some concern over dehiscence of the anastomosis and he received a stent.  He was also asked to not consume any food by mouth and was receiving nutrition through a jejunostomy tube.  At the time of this visit feeling progressively better but still poorly.  Very tired and lacking energy to do anything.  Sleeping poorly.  Has started to take clear liquids by mouth.  On exam he appears chronically ill but he does not seem in any distress.  He is pale but not jaundiced.  All surgical incisions are healing well and the lungs are diminished bilaterally but clear.  Heart regular.  Abdomen soft.  No edema.  Laboratory exams were reviewed.  He has macrocytic anemia with a hemoglobin of 11.9 g/dL and thrombocytosis with a platelet count of 562,000/mm³.  He also has monocytosis and basophilia but no leukocytosis at this time.   The final review of pathology confirmed scattered foci of residual moderate to poorly differentiated adenocarcinoma at the gastroesophageal junction with changes consistent with prior neoadjuvant therapy.  There were ASSO stated dissecting pools of mucin with changes again consistent with previous therapy.  Scattered individual tumor cells and small clusters were present near the gastroesophageal junction and extending through the muscularis propria into the adventitial soft tissue/fat and spanning an area of 21 x 18 x 7 mm.  No definitive lymphovascular space invasion was identified.  All margins were negative.  Of 20 lymph nodes analyzed known had metastatic disease.  A discussion was had with him in regards to immunotherapy given in the adjuvant setting.  Explained side effects and potential complications.  I asked him to return in 3 weeks.    3/22/2024: Not feeling back to normal yet.  Still not eating much.  He had an episode of nausea and emesis this morning.  He has been afebrile.  He denies chest pains but does complain of some epigastric discomfort that seems unrelated to the previous discomfort that he had after the surgery.  He has been without dyspnea and does not have any more cough than usual.  As well no abdominal pain.  He continues to tolerate the enteral nutrition without difficulties.  No edema.  No skin rash.  On exam no distress.  Conversant and oriented.  Lungs diminished bilaterally.  Heart regular.  Abdomen with the jejunostomy tube in place.  Opening looks clean.  It is soft and without palpable tumors.  No edema.  Laboratory exams reviewed.  Discussed with him.  Offered him immunotherapy.  He remains somewhat reluctant to consider treatment with immunotherapy.  He has asked for more time to think about it.    4/12/2024: Somewhat better.  Perhaps more energetic.  Able to eat better after he had dilatation of the esophagus.  He has some pain that he localizes to the substernal area and the  mid epigastrium and right upper quadrant.  It is not as intense as before.  He continues to use his jejunostomy without any difficulties.  Defecating regularly.  Urinating without dysuria.  On exam alert, conversant and oriented.  No distress.  No jaundice.  Lungs are diminished bilaterally.  Heart is regular.  Abdomen is soft.  No edema.  Laboratory exams reviewed.  Discussed again the use of immunotherapy.  This time he is interested in pursuing.  Placed the treatment plan.  Discussed side effects and potential complications of the treatment with immunotherapy.  Discussed with him the rationale of treatment.  He is to begin as it is approved by insurance.  He will see me in approximately 5 weeks.    5/22/2024: Feeling somewhat better since the commencement of the steroids. His abdominal pain and nausea have resolved. She has been eating well and has not had any nausea or vomiting. He has been afebrile. No chest pain or increase in dyspnea. On exam alert and conversant. No jaundice and no oral lesions. Respirations not labored. The lungs diminished bilaterally and heart regular. Abdomen soft and not tender. No edema. Reviewed and discussed with him the laboratory exams. Gave him instructions in writing to continue to taper down the prednisone. Hold the immunotherapy given the possibility of enteritis as a result of it.     7/11/2024: Still not feeling very well.  Still tired and weak.  Also developed hypoglycemia with diaphoresis and profound weakness oftentimes after eating, particularly after eating large meals or carbohydrate rich meals.  He has been reasonably active.  He has no pain but has noted a persistent rash that is no longer pruritic.  He has no more dyspnea than before and no chest pains or abdominal pain.  On exam slender, well-built and not act acutely ill.  No jaundice.  The lungs are diminished bilaterally and the heart regular.  Abdomen soft with all surgical incisions well-healed.  No edema.  The  laboratory exams were reviewed.  The recent scan of the abdomen, in May 2024, was reviewed as well.  He is to see me in approximately 6 weeks.  He will have scans prior to the next visit.    8/28/2024: Still fatigued and not seemingly better.  Eating somewhat better and having less difficulties with swallowing but not gaining weight.  Denies pain.  Has been afebrile.  No dyspnea or cough.  No abdominal pain.  Was stung by a wasp a few days before and had some significant edema of the ankle that is now subsided.  On exam alert and conversant.  Oriented and in no distress.  Lungs diminished bilaterally.  Heart regular.  Abdomen soft.  No edema.  Laboratory exams reviewed.  Reviewed the images of the scans.  There is no suggestion of recurrent malignancy in the chest, abdomen or pelvis.  Discussed the results of the scans with him.  He is to see me in 4 months with new scans.    12/4/2024: Feels well.  Has had no new symptoms although he continues to have some vague abdominal pains.  His dysphagia, however, has improved significantly and he is eating much better.  He seems to have gained a small amount of weight.  He has been afebrile.  Denies chest pains or cough and has not had diarrhea.  No dysuria.  Exam alert and conversant.  Oriented and in no distress.  No jaundice or pallor.  Mood palpable lymphadenopathy.  Port site clean.  Lungs diminished bilaterally and heart regular.  Abdomen flat and soft.  No palpable tumors.  No edema.  Reviewed the laboratory exams and discussed with him.  Reviewed the images of the scans and discussed with him.  No suggestion of recurrent disease.  He will continue observation and will see me again in approximately 4 months.    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    Brain fog     COVID 02/2023     Diverticulosis of large intestine without perforation or abscess without bleeding 10/02/2023    Generalized headaches     Hand tingling     History of blood clot in brain 2020    had thrombectomy    History of cancer chemotherapy 12/2023    History of kidney stones     History of radiation therapy 11/2023    Hyperlipidemia     Jejunostomy tube present     Port-A-Cath in place     Thyroid nodule     Trouble swallowing      Past Surgical History:   Procedure Laterality Date    ADRENALECTOMY      COLONOSCOPY      ENDOSCOPY      ENDOSCOPY N/A 02/17/2024    Procedure: ESOPHAGOGASTRODUODENOSCOPY WITH STENTING UNDER FLUROSCOPY GUIDENCE WITH ESOPHOGRAM;  Surgeon: Saurabh Hurtado MD;  Location: [facility] MAIN OR;  Service: Gastroenterology;  Laterality: N/A;    ENDOSCOPY N/A 3/8/2024    Procedure: ESOPHAGOGASTRODUODENOSCOPY WITH STENT REMOVAL;  Surgeon: Saurabh Hurtado MD;  Location: [facility] ENDOSCOPY;  Service: Gastroenterology;  Laterality: N/A;  Esophageal leak    ENDOSCOPY N/A 4/5/2024    Procedure: ESOPHAGOGASTRODUODENOSCOPY WITH BALLOON DILATATION #6, #7, #8, #9, #10, #11, #12  AND GASTROGRAFIN WITH FLOURO;  Surgeon: Saurabh Hurtado MD;  Location: [facility] ENDOSCOPY;  Service: Gastroenterology;  Laterality: N/A;  PRE OP - ESOPHAGEAL CANCER  POST OP - ANASTOMOSIS STRICTURE    ENDOSCOPY N/A 5/7/2024    Procedure: ESOPHAGOGASTRODUODENOSCOPY WITH DILATATION (BALLOON 10MM-12MM, 12MM-15MM, 15MM-18MM,UP TO 17MM) AND INTRAOPERATIVE ESOPHAGRAM;  Surgeon: Saurabh Hurtado MD;  Location: [facility] ENDOSCOPY;  Service: Gastroenterology;  Laterality: N/A;    ENDOSCOPY N/A 6/18/2024    Procedure: ESOPHAGOGASTRODUODENOSCOPY WITH balloon dilatation (10mm-12mm up to 12mm;15mm-18mm up to 18mm) and removal of PEG tube;  Surgeon: Saurabh Hurtado MD;  Location: [facility] ENDOSCOPY;  Service: Gastroenterology;  Laterality: N/A;    ENDOSCOPY N/A 7/30/2024    Procedure: ESOPHAGOGASTRODUODENOSCOPY with balloon dialtion(15mm-18mm balloon up to 18mm);  Surgeon: Genesis  MD Saurabh;  Location: [facility] ENDOSCOPY;  Service: Gastroenterology;  Laterality: N/A;    ENDOSCOPY N/A 9/24/2024    Procedure: ESOPHAGOGASTRODUODENOSCOPY with balloon dialtion of esophagus (15mm-18mm balloon up to 18mm);  Surgeon: Saurabh Hurtado MD;  Location: [facility] ENDOSCOPY;  Service: Gastroenterology;  Laterality: N/A;    ESOPHAGOGASTRECTOMY N/A 02/09/2024    Procedure: BRONCHOSCOPY, EGD, ESOPHAGECTOMY ALTAGRACIA-FABIOLA WITH DAVINCI ROBOT, LAPAROSCOPY, RIGHT THORACOSCOPY CONVERTED TO THORACOTOMY, FEEDING JEJUNOSTOMY TUBE PLACEMENT, INTERCOSTAL NERVE BLOCKS;  Surgeon: Saurabh Hurtado MD;  Location: [facility] MAIN OR;  Service: Robotics - DaVinci;  Laterality: N/A;    ESOPHAGUS SURGERY  10/2023    biopsy    KIDNEY STONE SURGERY      OTHER SURGICAL HISTORY  2020    loop recorder PLACED AND REMOVED    THROMBECTOMY  2020    UPPER ENDOSCOPIC ULTRASOUND W/ FNA N/A 10/27/2023    Procedure: ENDOSCOPIC ULTRASOUND WITH STAGING AND FINE NEEDLE ASPIRATION X2 AREAS;  Surgeon: Joselyn Savage MD;  Location: [facility] ENDOSCOPY;  Service: Gastroenterology;  Laterality: N/A;  POST:    VENOUS ACCESS DEVICE (PORT) INSERTION Right 11/13/2023    Procedure: INSERTION VENOUS ACCESS DEVICE;  Surgeon: Saurabh Hurtado MD;  Location: [facility] MAIN OR;  Service: Thoracic;  Laterality: Right;       Current Outpatient Medications:     acetaminophen (TYLENOL) 325 MG tablet, Take 2 tablets by mouth Every 6 (Six) Hours As Needed for Mild Pain., Disp: , Rfl:     ALPRAZolam (XANAX) 0.25 MG tablet, Take 1 tablet by mouth 2 (Two) Times a Day As Needed for Anxiety. for anxiety, Disp: 60 tablet, Rfl: 3    Continuous Glucose Sensor (FreeStyle Dereck 3 Plus Sensor), Use 4 (Four) Times a Day., Disp: 6 each, Rfl: 1    Continuous Glucose Sensor (FreeStyle Dereck 3 Sensor) misc, Use 1 each Every 14 (Fourteen) Days., Disp: 3 each, Rfl: 1    diazoxide (PROGLYCEM) 50 MG/ML suspension, Take 8 mg/kg/day by mouth Daily With Dinner., Disp: , Rfl:     Evolocumab (REPATHA) solution  prefilled syringe injection, Inject 1 mL under the skin into the appropriate area as directed Every 14 (Fourteen) Days., Disp: 6 mL, Rfl: 3    famotidine (PEPCID) 40 mg/5 mL suspension, Take 5 mL by mouth., Disp: , Rfl:     FLUoxetine (PROzac) 10 MG capsule, Take 1 capsule by mouth Daily., Disp: 30 capsule, Rfl: 3    fluticasone (FLONASE) 50 MCG/ACT nasal spray, Administer 2 sprays into the nostril(s) as directed by provider Daily., Disp: , Rfl:     ondansetron (ZOFRAN) 8 MG tablet, Take 1 tablet by mouth 3 (Three) Times a Day As Needed for Nausea or Vomiting., Disp: 30 tablet, Rfl: 5    [place] 0.6 MG/0.6ML solution auto-injector, As Needed., Disp: , Rfl:   No current facility-administered medications for this visit.    Facility-Administered Medications Ordered in Other Visits:     heparin injection 500 Units, 500 Units, Intravenous, PRN, Oliverio Mueller MD, 500 Units at 12/04/24 1030    sodium chloride 0.9 % flush 10 mL, 10 mL, Intravenous, PRN, Oliverio Mueller MD, 10 mL at 12/04/24 1029    Allergies   Allergen Reactions    Cephalosporins Anaphylaxis     Throat swelling    Dye  [Contrast Dye (Echo Or Unknown Ct/Mr)] Hives    Iodinated Contrast Media Hives    Sulfa Antibiotics Hives    Sulfate Hives    Zetia [Ezetimibe] Other (See Comments) and Myalgia     Made tired    Statins Myalgia     Myalgia and fatique     Family History   Problem Relation Age of Onset    Arthritis Mother     Hypertension Mother     Heart failure Mother 73        Cause of death    Arthritis Father     Hypertension Father     Kidney cancer Father 57        Cause of death. Was a smoker    Heart disease Brother     Esophageal cancer Brother 59        Cause of death. Has a heavy drinker    Malig Hyperthermia Neg Hx      Cancer-related family history includes Esophageal cancer (age of onset: 59) in his brother; Kidney cancer (age of onset: 57) in his father.    Social History     Tobacco Use    Smoking status: Former     Current packs/day:  0.00     Average packs/day: 1 pack/day for 37.0 years (37.0 ttl pk-yrs)     Types: Cigars, Cigarettes     Start date: 1985     Quit date: 2022     Years since quittin.9     Passive exposure: Past    Smokeless tobacco: Never    Tobacco comments:     1 packs= stopped 10 years ago and continued cigars   2 cigars a day; has stopped cigars as of    Vaping Use    Vaping status: Never Used   Substance Use Topics    Alcohol use: Not Currently     Comment: Has now been abstinent since     Drug use: Never     Social History     Social History Narrative    Not on file      ROS:     Review of Systems   Constitutional:  Positive for fatigue. Negative for activity change, appetite change, chills, diaphoresis, fever and unexpected weight change.   HENT:  Negative for congestion, dental problem, drooling, ear discharge, ear pain, facial swelling, hearing loss, mouth sores, nosebleeds, postnasal drip, rhinorrhea, sinus pressure, sinus pain, sneezing, sore throat, tinnitus, trouble swallowing and voice change.    Eyes:  Negative for photophobia, pain, discharge, redness, itching and visual disturbance.   Respiratory:  Negative for apnea, cough, choking, chest tightness, shortness of breath, wheezing and stridor.    Cardiovascular:  Negative for chest pain, palpitations and leg swelling.   Gastrointestinal:  Negative for abdominal distention, abdominal pain, anal bleeding, blood in stool, constipation, diarrhea, nausea, rectal pain and vomiting.   Endocrine: Negative for cold intolerance, heat intolerance, polydipsia and polyuria.   Genitourinary:  Negative for decreased urine volume, difficulty urinating, dysuria, flank pain, frequency, genital sores, hematuria and urgency.   Musculoskeletal:  Negative for arthralgias, back pain, gait problem, joint swelling, myalgias, neck pain and neck stiffness.   Skin:  Negative for color change, pallor and rash.   Neurological:  Negative for dizziness, tremors, seizures,  "syncope, facial asymmetry, speech difficulty, weakness, light-headedness, numbness and headaches.   Hematological:  Negative for adenopathy. Does not bruise/bleed easily.   Psychiatric/Behavioral:  Negative for agitation, behavioral problems, confusion, decreased concentration, hallucinations, self-injury, sleep disturbance and suicidal ideas. The patient is nervous/anxious.      Objective:    Vitals:    12/04/24 1033   BP: 107/76   Pulse: 77   Resp: 14   Temp: 98.2 °F (36.8 °C)   SpO2: 98%   Weight: 70.9 kg (156 lb 6.4 oz)   Height: 180.3 cm (71\")   PainSc: 0-No pain     Body mass index is 21.81 kg/m².  ECOG  (0) Fully active, able to carry on all predisease performance without restriction    Physical Exam:     Physical Exam  Constitutional:       General: He is not in acute distress.     Appearance: Normal appearance. He is not ill-appearing, toxic-appearing or diaphoretic.      Comments: Slender, well-built.  Seems older than the stated age.  No distress.   HENT:      Head: Normocephalic and atraumatic.      Right Ear: External ear normal. There is no impacted cerumen.      Left Ear: External ear normal. There is no impacted cerumen.      Nose: Nose normal. No congestion or rhinorrhea.      Mouth/Throat:      Mouth: Mucous membranes are moist.      Pharynx: Oropharynx is clear. No oropharyngeal exudate or posterior oropharyngeal erythema.      Comments: Oral cavity reveals poor dentition and poor dental hygiene.  No mucosal ulcerations are present.  Eyes:      General: No scleral icterus.        Right eye: No discharge.         Left eye: No discharge.      Conjunctiva/sclera: Conjunctivae normal.      Pupils: Pupils are equal, round, and reactive to light.   Neck:      Vascular: No carotid bruit.   Cardiovascular:      Rate and Rhythm: Normal rate and regular rhythm.      Pulses: Normal pulses.      Heart sounds: Normal heart sounds. No murmur heard.     No friction rub. No gallop.   Pulmonary:      Effort: No " respiratory distress.      Breath sounds: No stridor. No wheezing, rhonchi or rales.      Comments: Breath sounds are diminished bilaterally.  Chest:      Chest wall: No tenderness.      Comments: A right thoracic surgical incision is healing well.  Smaller surgical incisions for drains also healing well.  Abdominal:      General: Abdomen is flat. Bowel sounds are normal. There is no distension.      Palpations: Abdomen is soft. There is no mass.      Tenderness: There is no abdominal tenderness. There is no right CVA tenderness, left CVA tenderness, guarding or rebound.      Comments: Jejunostomy in place.  Opening clean.   Musculoskeletal:         General: No swelling, tenderness, deformity or signs of injury.      Cervical back: No rigidity or tenderness.      Right lower leg: No edema.      Left lower leg: No edema.      Comments: There is clubbing of the fingers in both hands   Lymphadenopathy:      Cervical: No cervical adenopathy.   Skin:     General: Skin is warm and dry.      Coloration: Skin is not jaundiced or pale.      Findings: No bruising, erythema or rash.   Neurological:      General: No focal deficit present.      Mental Status: He is alert and oriented to person, place, and time.      Cranial Nerves: No cranial nerve deficit.      Motor: No weakness.      Coordination: Coordination normal.      Gait: Gait normal.   Psychiatric:         Mood and Affect: Mood normal.         Behavior: Behavior normal.         Thought Content: Thought content normal.         Judgment: Judgment normal.     YOSELIN Mueller MD performed a physical exam on 8/28/2024 as documented above.    Lab Results - Last 18 Months   Lab Units 12/04/24  1027 09/17/24  0757 08/28/24  1049   WBC 10*3/mm3 5.44 3.61 5.23   HEMOGLOBIN g/dL 13.6 13.8 13.8   HEMATOCRIT % 41.9 43.1 42.2   PLATELETS 10*3/mm3 290 247 253   MCV fL 100.2* 98.9* 98.8*     Lab Results - Last 18 Months   Lab Units 09/17/24  0757 08/28/24  1049 08/19/24  0903    SODIUM mmol/L 142 141 141   POTASSIUM mmol/L 4.2 4.4 4.7   CHLORIDE mmol/L 105 104 104   CO2 mmol/L 27.0 26.8 29.5*   BUN mg/dL 17 19 13   CREATININE mg/dL 0.86 0.87 0.91   CALCIUM mg/dL 9.8 9.9 9.9   BILIRUBIN mg/dL 0.3 0.2 0.3   ALK PHOS U/L 77 78 79   ALT (SGPT) U/L 21 23 24   AST (SGOT) U/L 19 22 22   GLUCOSE mg/dL 79 81 86     Lab Results   Component Value Date    GLUCOSE 79 09/17/2024    BUN 17 09/17/2024    CREATININE 0.86 09/17/2024    EGFRIFNONA 67 02/24/2022    BCR 19.8 09/17/2024    K 4.2 09/17/2024    CO2 27.0 09/17/2024    CALCIUM 9.8 09/17/2024    ALBUMIN 4.1 09/17/2024    LABIL2 1.3 09/25/2018    AST 19 09/17/2024    ALT 21 09/17/2024     Lab Results   Component Value Date    FOLATE 4.11 (L) 11/12/2021     Lab Results   Component Value Date    WCHNNJNF25 500 09/17/2024     Uric Acid   Date Value Ref Range Status   09/17/2024 5.5 3.4 - 7.0 mg/dL Final     Lab Results   Component Value Date    SEDRATE 11 04/16/2021     Lab Results   Component Value Date    PSA 0.163 04/06/2021     Assessment & Plan     Assessment and Plan  Adenocarcinoma of the gastroesophageal junction T2N1M0, microsatellite stable, low mutation burden, Her2 positive and PD-L1 expression negative: Completed neoadjuvant concurrent carboplatin and paclitaxel with radiation and underwent El[place] Shayne type esophagectomy.  Recovered completely and started treatment with nivolumab adjuvantly.  The drug had to be discontinued because of persistent abdominal pain and diarrhea that resolved with steroids.  He chose to receive no additional treatment.  No evidence of recurrent disease clinically or by imaging studies today.  Dumping syndrome?  Symptoms improved.  Independently reviewed the images of the scans and the reports.  Reviewed the laboratory exams.  Discussed results with him.  3.  Tobacco smoker: Remains abstinent.  3.  He will return to see me in approximately 4 months with new scans.    Oliverio Mueller MD on 12/4/2024 at 10:49  AM.

## 2024-12-04 ENCOUNTER — OFFICE VISIT (OUTPATIENT)
Dept: ONCOLOGY | Facility: CLINIC | Age: 54
End: 2024-12-04
Payer: COMMERCIAL

## 2024-12-04 ENCOUNTER — HOSPITAL ENCOUNTER (OUTPATIENT)
Dept: ONCOLOGY | Facility: HOSPITAL | Age: 54
Discharge: HOME | End: 2024-12-04
Admitting: INTERNAL MEDICINE
Payer: COMMERCIAL

## 2024-12-04 VITALS
DIASTOLIC BLOOD PRESSURE: 76 MMHG | HEIGHT: 71 IN | TEMPERATURE: 98.2 F | HEART RATE: 77 BPM | WEIGHT: 156.4 LBS | RESPIRATION RATE: 14 BRPM | SYSTOLIC BLOOD PRESSURE: 107 MMHG | BODY MASS INDEX: 21.9 KG/M2 | OXYGEN SATURATION: 98 %

## 2024-12-04 DIAGNOSIS — C15.9 ADENOCARCINOMA OF ESOPHAGUS: ICD-10-CM

## 2024-12-04 DIAGNOSIS — C15.9 ADENOCARCINOMA OF ESOPHAGUS: Primary | ICD-10-CM

## 2024-12-04 DIAGNOSIS — C15.5 MALIGNANT NEOPLASM OF LOWER THIRD OF ESOPHAGUS: ICD-10-CM

## 2024-12-04 DIAGNOSIS — Z45.2 ENCOUNTER FOR CARE RELATED TO PORT-A-CATH: Primary | ICD-10-CM

## 2024-12-04 LAB
ALBUMIN SERPL-MCNC: 4 G/DL (ref 3.5–5.2)
ALBUMIN/GLOB SERPL: 1.7 G/DL
ALP SERPL-CCNC: 81 U/L (ref 39–117)
ALT SERPL W P-5'-P-CCNC: 28 U/L (ref 1–41)
ANION GAP SERPL CALCULATED.3IONS-SCNC: 6.9 MMOL/L (ref 5–15)
AST SERPL-CCNC: 23 U/L (ref 1–40)
BASOPHILS # BLD AUTO: 0.02 10*3/MM3 (ref 0–0.2)
BASOPHILS NFR BLD AUTO: 0.4 % (ref 0–1.5)
BILIRUB SERPL-MCNC: 0.2 MG/DL (ref 0–1.2)
BUN SERPL-MCNC: 14 MG/DL (ref 6–20)
BUN/CREAT SERPL: 17.3 (ref 7–25)
CALCIUM SPEC-SCNC: 9.3 MG/DL (ref 8.6–10.5)
CHLORIDE SERPL-SCNC: 105 MMOL/L (ref 98–107)
CO2 SERPL-SCNC: 28.1 MMOL/L (ref 22–29)
CREAT SERPL-MCNC: 0.81 MG/DL (ref 0.76–1.27)
DEPRECATED RDW RBC AUTO: 46.7 FL (ref 37–54)
EGFRCR SERPLBLD CKD-EPI 2021: 106.1 ML/MIN/1.73
EOSINOPHIL # BLD AUTO: 0.28 10*3/MM3 (ref 0–0.4)
EOSINOPHIL NFR BLD AUTO: 5.1 % (ref 0.3–6.2)
ERYTHROCYTE [DISTWIDTH] IN BLOOD BY AUTOMATED COUNT: 13 % (ref 12.3–15.4)
GLOBULIN UR ELPH-MCNC: 2.3 GM/DL
GLUCOSE SERPL-MCNC: 71 MG/DL (ref 65–99)
HCT VFR BLD AUTO: 41.9 % (ref 37.5–51)
HGB BLD-MCNC: 13.6 G/DL (ref 13–17.7)
LYMPHOCYTES # BLD AUTO: 1.12 10*3/MM3 (ref 0.7–3.1)
LYMPHOCYTES NFR BLD AUTO: 20.6 % (ref 19.6–45.3)
MCH RBC QN AUTO: 32.5 PG (ref 26.6–33)
MCHC RBC AUTO-ENTMCNC: 32.5 G/DL (ref 31.5–35.7)
MCV RBC AUTO: 100.2 FL (ref 79–97)
MONOCYTES # BLD AUTO: 0.75 10*3/MM3 (ref 0.1–0.9)
MONOCYTES NFR BLD AUTO: 13.8 % (ref 5–12)
NEUTROPHILS NFR BLD AUTO: 3.27 10*3/MM3 (ref 1.7–7)
NEUTROPHILS NFR BLD AUTO: 60.1 % (ref 42.7–76)
PLATELET # BLD AUTO: 290 10*3/MM3 (ref 140–450)
PMV BLD AUTO: 8.8 FL (ref 6–12)
POTASSIUM SERPL-SCNC: 4.2 MMOL/L (ref 3.5–5.2)
PROT SERPL-MCNC: 6.3 G/DL (ref 6–8.5)
RBC # BLD AUTO: 4.18 10*6/MM3 (ref 4.14–5.8)
SODIUM SERPL-SCNC: 140 MMOL/L (ref 136–145)
WBC NRBC COR # BLD AUTO: 5.44 10*3/MM3 (ref 3.4–10.8)

## 2024-12-04 PROCEDURE — 85025 COMPLETE CBC W/AUTO DIFF WBC: CPT | Performed by: INTERNAL MEDICINE

## 2024-12-04 PROCEDURE — 99214 OFFICE O/P EST MOD 30 MIN: CPT | Performed by: INTERNAL MEDICINE

## 2024-12-04 PROCEDURE — 36591 DRAW BLOOD OFF VENOUS DEVICE: CPT

## 2024-12-04 PROCEDURE — 25010000002 HEPARIN LOCK FLUSH PER 10 UNITS: Performed by: INTERNAL MEDICINE

## 2024-12-04 PROCEDURE — 80053 COMPREHEN METABOLIC PANEL: CPT | Performed by: INTERNAL MEDICINE

## 2024-12-04 RX ORDER — SODIUM CHLORIDE 0.9 % (FLUSH) 0.9 %
10 SYRINGE (ML) INJECTION AS NEEDED
Status: DISCONTINUED | OUTPATIENT
Start: 2024-12-04 | End: 2024-12-05 | Stop reason: HOSPADM

## 2024-12-04 RX ORDER — HEPARIN SODIUM (PORCINE) LOCK FLUSH IV SOLN 100 UNIT/ML 100 UNIT/ML
500 SOLUTION INTRAVENOUS AS NEEDED
Status: DISCONTINUED | OUTPATIENT
Start: 2024-12-04 | End: 2024-12-05 | Stop reason: HOSPADM

## 2024-12-04 RX ADMIN — HEPARIN 500 UNITS: 100 SYRINGE at 10:30

## 2024-12-04 RX ADMIN — Medication 10 ML: at 10:29

## 2024-12-04 NOTE — PROGRESS NOTES
Pt to the clinic for port flush/labs and Dr. Mueller f/u appt.. Port accessed using sterile technique with positive blood return noted. 10cc of blood wasted prior to obtaining lab specimen per orders and Port flushed with 20cc normal saline and 500 units heparin then deaccessed. Pt tolerated well. Pt sent to lobby to wait to be called back for f/u appt.

## 2024-12-19 ENCOUNTER — ANESTHESIA EVENT (OUTPATIENT)
Dept: GASTROENTEROLOGY | Facility: HOSPITAL | Age: 54
End: 2024-12-19
Payer: COMMERCIAL

## 2024-12-19 NOTE — ANESTHESIA PREPROCEDURE EVALUATION
Anesthesia Evaluation     NPO Solid Status: > 8 hours  NPO Liquid Status: > 8 hours           Airway   Mallampati: I  TM distance: >3 FB  Neck ROM: full  No difficulty expected  Dental - normal exam     Pulmonary - normal exam   Cardiovascular - normal exam    (+) hyperlipidemia      Neuro/Psych  (+) CVA, headaches, numbness, psychiatric history  GI/Hepatic/Renal/Endo    (+) renal disease-, thyroid problem thyroid nodules    Musculoskeletal     (+) neck pain  Abdominal  - normal exam    Bowel sounds: normal.   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  Abnormal ECG   History of blood clot in brain had thrombectomy History of cancer chemotherapy   History of radiation therapy  COVID   Generalized headaches  History of kidney stones   Thyroid nodule  Trouble swallowing   Port-A-Cath in place  Jejunostomy tube present     Surgical History  Current as of 12/19/24 1652  KIDNEY STONE SURGERY ADRENALECTOMY  THROMBECTOMY UPPER ENDOSCOPIC ULTRASOUND W/ FNA  VENOUS ACCESS DEVICE (PORT) INSERTION ENDOSCOPY  ESOPHAGUS SURGERY COLONOSCOPY  OTHER SURGICAL HISTORY ESOPHAGOGASTRECTOMY  ENDOSCOPY ENDOSCOPY  ENDOSCOPY ENDOSCOPY  ENDOSCOPY ENDOSCOPY      ROS/Med Hx Other: PRIOR ESOPHAGECTOMY  EGD UNDER GETTA 9/2024 DAVIES 3 GRI  CVA 2020 NO RESIDUAL SX.   ADDISONIAN CRISIS 2022. SHORT TERM RX NOTHING CURRENTLY  TTE 1/2024 MILD AI EF 55              Anesthesia Plan    ASA 3     general   total IV anesthesia  (GETTA?)  intravenous induction     Anesthetic plan, risks, benefits, and alternatives have been provided, discussed and informed consent has been obtained with: patient.  Pre-procedure education provided  Plan discussed with CRNA.    CODE  STATUS:

## 2024-12-20 ENCOUNTER — ANESTHESIA (OUTPATIENT)
Dept: GASTROENTEROLOGY | Facility: HOSPITAL | Age: 54
End: 2024-12-20
Payer: COMMERCIAL

## 2024-12-20 ENCOUNTER — HOSPITAL ENCOUNTER (OUTPATIENT)
Facility: HOSPITAL | Age: 54
Setting detail: HOSPITAL OUTPATIENT SURGERY
Discharge: HOME | End: 2024-12-20
Attending: SURGERY | Admitting: SURGERY
Payer: COMMERCIAL

## 2024-12-20 VITALS
DIASTOLIC BLOOD PRESSURE: 66 MMHG | TEMPERATURE: 98 F | HEART RATE: 63 BPM | OXYGEN SATURATION: 100 % | WEIGHT: 155.2 LBS | BODY MASS INDEX: 21.73 KG/M2 | HEIGHT: 71 IN | SYSTOLIC BLOOD PRESSURE: 96 MMHG | RESPIRATION RATE: 16 BRPM

## 2024-12-20 LAB — GLUCOSE BLDC GLUCOMTR-MCNC: 74 MG/DL (ref 70–105)

## 2024-12-20 PROCEDURE — 25010000002 LIDOCAINE PF 1% 1 % SOLUTION: Performed by: NURSE ANESTHETIST, CERTIFIED REGISTERED

## 2024-12-20 PROCEDURE — 25810000003 SODIUM CHLORIDE 0.9 % SOLUTION: Performed by: SURGERY

## 2024-12-20 PROCEDURE — 25010000002 PROPOFOL 10 MG/ML EMULSION: Performed by: NURSE ANESTHETIST, CERTIFIED REGISTERED

## 2024-12-20 PROCEDURE — 82948 REAGENT STRIP/BLOOD GLUCOSE: CPT

## 2024-12-20 PROCEDURE — C1726 CATH, BAL DIL, NON-VASCULAR: HCPCS | Performed by: SURGERY

## 2024-12-20 RX ORDER — PHENYLEPHRINE HCL IN 0.9% NACL 1 MG/10 ML
SYRINGE (ML) INTRAVENOUS AS NEEDED
Status: DISCONTINUED | OUTPATIENT
Start: 2024-12-20 | End: 2024-12-20 | Stop reason: SURG

## 2024-12-20 RX ORDER — SODIUM CHLORIDE 9 MG/ML
50 INJECTION, SOLUTION INTRAVENOUS CONTINUOUS
Status: DISPENSED | OUTPATIENT
Start: 2024-12-20 | End: 2024-12-20

## 2024-12-20 RX ORDER — LIDOCAINE HYDROCHLORIDE 10 MG/ML
INJECTION, SOLUTION EPIDURAL; INFILTRATION; INTRACAUDAL; PERINEURAL AS NEEDED
Status: DISCONTINUED | OUTPATIENT
Start: 2024-12-20 | End: 2024-12-20 | Stop reason: SURG

## 2024-12-20 RX ADMIN — LIDOCAINE HYDROCHLORIDE 50 MG: 10 INJECTION, SOLUTION EPIDURAL; INFILTRATION; INTRACAUDAL; PERINEURAL at 14:15

## 2024-12-20 RX ADMIN — SODIUM CHLORIDE 50 ML/HR: 9 INJECTION, SOLUTION INTRAVENOUS at 11:19

## 2024-12-20 RX ADMIN — PROPOFOL 150 MCG/KG/MIN: 10 INJECTION, EMULSION INTRAVENOUS at 14:15

## 2024-12-20 RX ADMIN — Medication 50 MCG: at 14:15

## 2024-12-20 NOTE — ANESTHESIA POSTPROCEDURE EVALUATION
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  Pain management: adequate    Airway patency: patent  Anesthetic complications: No anesthetic complications  PONV Status: none  Cardiovascular status: acceptable  Respiratory status: acceptable and spontaneous ventilation  Hydration status: acceptable    Comments: Patient seen and examined postoperatively; vital signs stable; SpO2 greater than or equal to 90%; cardiopulmonary status stable; nausea/vomiting adequately controlled; pain adequately controlled; no apparent anesthesia complications; patient discharged from anesthesia care when discharge criteria were met

## 2024-12-20 NOTE — DISCHARGE INSTRUCTIONS
Dr. Hurtado [phone]    A responsible adult should stay with you and you should rest quietly for the rest of the day.    Do not drink alcohol, drive, operate any heavy machinery or power tools or make any legal/important decisions for the next 24 hours.     Progress your diet as tolerated.  If you begin to experience severe pain, increased shortness of breath, racing heartbeat or a fever above 101 F, seek immediate medical attention.     Follow up with MD as instructed. Call office for results in 3 to 5 days if needed.

## 2024-12-22 ENCOUNTER — PREP FOR SURGERY (OUTPATIENT)
Dept: OTHER | Facility: HOSPITAL | Age: 54
End: 2024-12-22
Payer: COMMERCIAL

## 2024-12-22 DIAGNOSIS — C15.4 MALIGNANT NEOPLASM OF MIDDLE THIRD OF ESOPHAGUS: Primary | ICD-10-CM

## 2024-12-22 NOTE — OP NOTE
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  History of esophagogastric anastomotic leak.  History of sepsis.   History of abdominal wall cellulitis.  Dysphagia.  History of cerebrovascular accident.  History of tobacco abuse.  History of left adrenalectomy.  History of adrenal crisis.  Cervical radiculopathy.  Cervical stenosis of spinal canal.  Near syncope.  Generalized hyperhidrosis.  Renal calculus.  Abnormal electrocardiogram.  Vitamin B12 deficiency.  Seasonal allergic rhinitis.  Erythrocytosis.  Macrocytosis.  Generalized anxiety disorder.  Major depressive disorder.    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  History of esophagogastric anastomotic leak.  History of sepsis.   History of abdominal wall cellulitis.  Dysphagia.  History of cerebrovascular accident.  History of tobacco abuse.  History of left adrenalectomy.  History of adrenal crisis.  Cervical radiculopathy.  Cervical stenosis of spinal canal.  Near syncope.  Generalized hyperhidrosis.  Renal calculus.  Abnormal electrocardiogram.  Vitamin B12 deficiency.  Seasonal allergic rhinitis.  Erythrocytosis.  Macrocytosis.  Generalized anxiety disorder.  Major depressive disorder.    Procedure performed:   Flexible esophagogastroduodenoscopy.  CRE balloon dilation of the esophageal stricture up to 19 mm.    Indications:   Jourdan Lebron is a 54-year-old male who has significant medical problems as mentioned in the medical chart.      He was having intermittent dysphagia for over a year which became progressively worse.  On  10/2/2023, he underwent EGD and colonoscopy by Dr. Ozzy Abdalla at [facility].  He was found to have a 6 cm mass at the lower third of the esophagus (36 to 42 cm) (biopsied), mild diverticulosis of the sigmoid colon, 5 polyps noted in the colon and cecum that were removed.  The pathology of the esophageal mass revealed invasive moderate to poorly differentiated adenocarcinoma. All polypectomy samples were negative for malignancy (fragments of tubular adenoma).  CT scan of the chest, abdomen and pelvis on 10/9/2023 at [facility] showed 4 cm abnormal thickening of the lower thoracic esophagus extending to the GE junction thought to represent patient's known primary lung malignancy.  There were no convincing evidence of metastatic disease elsewhere in the chest, abdomen, pelvis, or skeleton.  There were stable left adrenalectomy changes, right adrenal adenoma unchanged.     He was seen in the office by Dr. Tacos Osuna ([facility]) for new diagnosis of esophageal cancer. He was an established patient of Dr. Harinder Mueller for erythrocytosis and macrocytosis since 11/2021 (resolved).      PET/CT on 10/20/2023 at [facility] showed hypermetabolic (SUV 7.7) activity within the distal esophagus extending to the GE junction compatible with patient's known malignancy.  He then underwent EGD with EUS by Dr. Joselyn Savage on 10/27/2023. Findings included close to 6 cm ulcerated mass extending from 36 to 42 cm consistent with poorly differentiated adenocarcinoma.  Mass penetrated through muscularis propria without involving the adventitia consistent with T2 lesion. Two small round hypodense lymph nodes in close proximity to the mass measuring 5 and 6 mm concerning for malignancy. Other lymph nodes around the GE junction measuring 7 and 8 mm were also concerning for malignancy but immediate cytology was negative for malignancy. Pathology of the gastrohepatic lymph node was positive for metastatic  adenocarcinoma; however, the lymph node at 36 cm was negative for malignancy.     He was referred to thoracic surgery for further evaluation.  At the time of initial consultation, he could eat and swallow quite a bit, but it depends. He avoids a lot of bread because it gets stuck lower down his chest. He went down from 200 pounds to 170 pounds 3 years ago after he had a stroke. He had memory loss and general fatigue after the stroke. He lost his appetite but began to add some weight. He then noticed he gets bloated and was having dyspepsia. He had adrenalectomy performed at the [facility] after an adrenal biopsy was done on an adrenal mass, and he went into adrenal crisis. He took hydrocortisone for 1 year and discontinued it on 09/2022. He developed abdominal discomfort afterwards. He denies having any past chest surgeries or myocardial infarction.  His wife mentioned the cause of the previous stroke was never known despite having a complete work up and a loop recorder inserted which was removed in the spring. He denies any abnormal heart rhythms or pedal edema. The patient quit smoking cigarettes 8 to 9 years ago and started smoking cigars.      He had good functional status.  Based on the clinical presentation, he was considered a candidate for trimodality treatment. I placed Mediport on 11/13/2023.  He received concurrent chemoradiation therapy.  His last dose of chemotherapy and radiation was on 12/28/2023. He tolerated chemoradiation without much difficulty.  Restaging PET/CT scan on 2/2/2024 showed wall thickening involving the distal esophagus extending to the GE junction consistent with patient's known esophageal malignancy.  There was no evidence of distant metastases.  There was significant decreased uptake in the distal esophagus consistent with treatment effect.     Due to his history of stroke, ultrasound bilateral carotid were obtained which showed no significant carotid stenosis.  There  was incidentally noted right thyroid nodule for which follow-up ultrasound of the thyroid was done which showed multiple nodules but not concerning for malignancy.  Transthoracic echo was performed on 1/22/2024 and noted ejection fraction of 51 to 55%.  He was sent to Dr. Hayward for cardiology clearance.  He had 0.8% risk of myocardial infarction or cardiac arrest, intraoperatively or up to 30 days postoperatively.  No further workup was recommended.  He had a history of adrenal crisis and was sent to evaluation by his primary endocrinologist at [facility], Anabela Crenshaw MD.  Complete ACTH stimulation test was performed which was reported normal.     On 2/9/2024, he underwent surgical resection. Flexible esophagogastroduodenoscopy was notable for mucosal changes at the distal esophagus and Olivera's esophagus.  These findings were consistent with treatment effect. Rather than a gastric emptying procedure, I performed a balloon dilatation of the pylorus to 20 mm with a CRE balloon I performed a robot-assisted minimally-invasive [place] Shayne esophagectomy via a robot-assisted laparoscopic and robot assisted right thoracoscopic approach converted to thoracotomy. I constructed a 4 cm gastric tube and performed an esophagogastrostomy approximately 2 cm above the azygos vein using a 28 EEA stapler.  After firing the stapler, 1 complete esophageal anastomotic ring and a partial gastric anastomotic ring was noted. The anastomosis was checked under water with air insufflation and leak was identified which was oversewn. The anastomosis was partially imbricated and no leak was identified under water with air insufflation afterwards. A feeding jejunostomy tube was placed 40 cm distal to ligament of Treitz. Repeat endoscopy was notable for a widely patent anastomosis at 22-24 cm.  The conduit was healthy appearing.  It was mildly tortuous and non redundant.  He remained hemodynamically stable throughout the  case.  He received 4000 cc of crystalloid and made 1000 cc of urine.  He required small doses of phenylephrine throughout the case.  He was extubated at the end of procedure.       The pathology reported scattered foci of residual moderate to poorly differentiated adenocarcinoma occurring at the GE junction with changes consistent with prior therapy.  There were scattered individual tumor cells and small clusters near GE junction and extending through muscularis propria into adventitia spanning an area of 2.1 x 1.8 x 0.7 cm.  There is no lymphovascular space invasion.  The proximal and distal surgical margins were negative for malignancy.  20 lymph nodes were negative for metastatic carcinoma. There was also presence of low and high-grade squamous dysplasia.  He had near complete response to chemo and radiation therapy.     Postprocedure he was transferred to the ICU.  His initial postop care was unremarkable.  He was started on tube feeds and was slowly advanced to goal rate which he tolerated well.  He demonstrated return of bowel function.  The chest tube was removed after amylase from the drain was checked which was not concerning for esophageal leak.  His white blood count were slowly trending up and had mild leukocytosis.  This was concerning for unidentified infection.  He had mild cellulitis around the jejunostomy tube site.  He was started on empiric antibiotics.  Infectious disease was consulted.  CT scan of the chest abdomen pelvis without contrast did not reveal any clear source of infection.  I offered EGD to evaluate for anastomotic leak and conduit.     On 2/17/2024, he underwent EGD.  He had oropharyngeal candidiasis.  There was approximately 20% defect at the anastomotic ring likely contained by the reinforcing stitch placed intraoperatively.  There was fibrinous plaque covering the anastomosis.  Intraoperative esophagram revealed blind pouch anteriorly at the site of anastomotic defect.  The  gastric conduit appeared viable but was slightly redundant.  The conduit orientation was straight.  I placed a 18 mm X 25 mm X 103 mm fully covered Wallflex stent deployed at the anastomosis.  He tolerated the procedure well.     Esophagram performed on 2/22/2024 and was negative for leak, but did demonstrate retrograde flow of contrast along the distal aspect of the stent making him high risk for anastomotic leak with oral feeds.  He was kept nothing by mouth and discharged home on tube feeds.     He slowly improved over time.  His appetite improved.  He was able to tolerate liquid diet and slowly advance to soft diet. He was started on adjuvant immunotherapy.  He developed dysphagia and underwent EGD on 4/5/2024.  The esophagus was stricture up to 8 mm.  Since then he has been serially dilated up to 18 mm.  The last dilation was performed on 9/25/2024.  He had a CT scan of the chest, abdomen and pelvis on 11/21/2024 which reported no evidence of recurrent malignancy or metastatic disease.     I offered EGD to evaluate for cancer surveillance and possible dilation.  The risk and benefit of the procedure were discussed in detail.  He agreed and signed the consent form.    Surgeon: Saurabh Hurtado MD     Anesthesia: Monitored anesthesia care with sedation    ASA Class: 3    Procedure Details   On 12/22/2024, the patient was brought to the endoscopy suite. Jourdan Lebron was positioned in the supine position.  Monitored anesthesia care was provided by anesthesiologist.   A bite-block was placed.  Antibiotics would not indicated for EGD. Prior to beginning the procedure, a time-out was conducted during which all members of the surgical team were present.      I began by performing a flexible esophagogastroduodenoscopy.  The cricopharyngeus was located at 16 cm.  The cervical esophagus appeared normal. The esophagogastric anastomosis was located at 22-24 cm. There was mild narrowing at the anastomosis and I was able to  traverse the stricture. I passed the CRE™ balloon dilator through the working channel of the endoscope. The endoscope was retracted and the balloon dilator was parked at the anastomotic stricture.  The manufacture pressure guidelines were used to achieve desired dilation diameter.  The stenosis was serially dilated up to 19 mm starting from 18 mm.  Sustained pressures were maintained for a minute with each dilation to allow breaking the scar tissue.  The balloon dilator was deflated and retracted.  The area of concern was re-examined.  There was no evidence of deep injury or perforation.  I continued with examination of the conduit.  The gastric conduit appeared viable distal to the anastomosis.  The diaphragmatic pinch was located at 40 cm from the incisor.  The antrum was below the diaphragm.  The pylorus was located at 45 cm.  It was patent and the adult endoscope was advanced without difficulty.  The first and second part of the duodenum appeared normal.    The patient was extubated and was transported to the post-anesthesia care unit in stable condition.    Findings:  Esophagogastric anastomosis located at 24 cm.  Esophageal narrowing up to ~ 16 mm.  It appeared much improved from before.  I was able to pass the narrowing without any resistance.  Serial CRE balloon dilation of the anastomotic stricture from 18 mm up to 19 mm.  Superficial mucosal bleeding. No evidence of deep tissue injury after dilation.  The gastric conduit appeared viable.  The conduit orientation was straight and had mild redundancy.  The diaphragmatic pinch was at 40 cm.  The infradiaphragmatic antrum was normal.  The pylorus located at 45 cm.  It was patent and allowed adult endoscope to pass without difficulty.    Estimated Blood Loss:  None           Specimens: None           Complications: None           Disposition: PACU - hemodynamically stable.           Condition: Stable    Post-procedure recommendations:   Follow up in 6 months  for repeat EGD and possible dilation.      Saurabh Hurtado MD   Thoracic Surgeon  [facility]

## 2025-02-07 ENCOUNTER — HOSPITAL ENCOUNTER (OUTPATIENT)
Dept: ONCOLOGY | Facility: HOSPITAL | Age: 55
Discharge: HOME | End: 2025-02-07
Payer: COMMERCIAL

## 2025-02-07 DIAGNOSIS — Z45.2 ENCOUNTER FOR CARE RELATED TO PORT-A-CATH: Primary | ICD-10-CM

## 2025-02-07 PROCEDURE — 25010000002 HEPARIN LOCK FLUSH PER 10 UNITS: Performed by: INTERNAL MEDICINE

## 2025-02-07 PROCEDURE — 96523 IRRIG DRUG DELIVERY DEVICE: CPT

## 2025-02-07 RX ORDER — SODIUM CHLORIDE 0.9 % (FLUSH) 0.9 %
10 SYRINGE (ML) INJECTION AS NEEDED
Status: DISCONTINUED | OUTPATIENT
Start: 2025-02-07 | End: 2025-02-08 | Stop reason: HOSPADM

## 2025-02-07 RX ORDER — HEPARIN SODIUM (PORCINE) LOCK FLUSH IV SOLN 100 UNIT/ML 100 UNIT/ML
500 SOLUTION INTRAVENOUS AS NEEDED
Status: DISCONTINUED | OUTPATIENT
Start: 2025-02-07 | End: 2025-02-08 | Stop reason: HOSPADM

## 2025-02-07 RX ADMIN — Medication 300 UNITS: at 08:29

## 2025-02-07 RX ADMIN — Medication 10 ML: at 08:29

## 2025-02-07 NOTE — PROGRESS NOTES
Port accessed by Marisol Villalba RN with sterile technique and positive blood return noted.  Port flushed and heparin locked. Patient discharged, declined AVS.

## 2025-02-17 DIAGNOSIS — F41.1 GENERALIZED ANXIETY DISORDER: Chronic | ICD-10-CM

## 2025-02-17 DIAGNOSIS — F33.1 MAJOR DEPRESSIVE DISORDER, RECURRENT EPISODE, MODERATE: Chronic | ICD-10-CM

## 2025-02-17 NOTE — TELEPHONE ENCOUNTER
Rx Refill Note  Requested Prescriptions     Pending Prescriptions Disp Refills    FLUoxetine (PROzac) 10 MG capsule 30 capsule 3     Sig: Take 1 capsule by mouth Daily.      Last office visit with prescribing clinician: 9/17/2024   Last telemedicine visit with prescribing clinician: Visit date not found   Next office visit with prescribing clinician: 3/26/2025   Office Visit with Clair Lyman MD (09/17/2024)                       Would you like a call back once the refill request has been completed: [] Yes [] No    If the office needs to give you a call back, can they leave a voicemail: [] Yes [] No    Lidia Ojeda MA  02/17/25, 14:40 EST

## 2025-02-18 RX ORDER — FLUOXETINE 10 MG/1
10 CAPSULE ORAL DAILY
Qty: 30 CAPSULE | Refills: 1 | Status: SHIPPED | OUTPATIENT
Start: 2025-02-18 | End: 2026-02-18

## 2025-03-14 ENCOUNTER — OFFICE VISIT (OUTPATIENT)
Dept: FAMILY MEDICINE CLINIC | Facility: CLINIC | Age: 55
End: 2025-03-14
Payer: COMMERCIAL

## 2025-03-14 VITALS
WEIGHT: 152 LBS | OXYGEN SATURATION: 94 % | HEART RATE: 71 BPM | HEIGHT: 71 IN | TEMPERATURE: 98.4 F | BODY MASS INDEX: 21.28 KG/M2 | DIASTOLIC BLOOD PRESSURE: 70 MMHG | SYSTOLIC BLOOD PRESSURE: 102 MMHG

## 2025-03-14 DIAGNOSIS — E55.9 VITAMIN D DEFICIENCY: ICD-10-CM

## 2025-03-14 DIAGNOSIS — E89.6 POSTPROCEDURAL ADRENOCORTICAL (-MEDULLARY) HYPOFUNCTION: ICD-10-CM

## 2025-03-14 DIAGNOSIS — Z00.01 ENCOUNTER FOR ANNUAL GENERAL MEDICAL EXAMINATION WITH ABNORMAL FINDINGS IN ADULT: Primary | ICD-10-CM

## 2025-03-14 DIAGNOSIS — N20.0 CALCULUS OF KIDNEY: ICD-10-CM

## 2025-03-14 DIAGNOSIS — R11.0 NAUSEA: ICD-10-CM

## 2025-03-14 DIAGNOSIS — F33.1 MAJOR DEPRESSIVE DISORDER, RECURRENT EPISODE, MODERATE: Chronic | ICD-10-CM

## 2025-03-14 DIAGNOSIS — E16.2 HYPOGLYCEMIA: ICD-10-CM

## 2025-03-14 DIAGNOSIS — E53.8 VITAMIN B12 DEFICIENCY: ICD-10-CM

## 2025-03-14 DIAGNOSIS — C77.2 ADENOCARCINOMA OF ESOPHAGUS METASTATIC TO INTRA-ABDOMINAL LYMPH NODE: ICD-10-CM

## 2025-03-14 DIAGNOSIS — E78.5 HYPERLIPIDEMIA, UNSPECIFIED HYPERLIPIDEMIA TYPE: ICD-10-CM

## 2025-03-14 DIAGNOSIS — E04.2 MULTIPLE THYROID NODULES: ICD-10-CM

## 2025-03-14 DIAGNOSIS — Z86.73 HISTORY OF CEREBROVASCULAR ACCIDENT: ICD-10-CM

## 2025-03-14 DIAGNOSIS — C15.9 ADENOCARCINOMA OF ESOPHAGUS METASTATIC TO INTRA-ABDOMINAL LYMPH NODE: ICD-10-CM

## 2025-03-14 LAB
BILIRUB BLD-MCNC: NEGATIVE MG/DL
CLARITY, POC: CLEAR
COLOR UR: YELLOW
EXPIRATION DATE: ABNORMAL
GLUCOSE UR STRIP-MCNC: NEGATIVE MG/DL
KETONES UR QL: NEGATIVE
LEUKOCYTE EST, POC: NEGATIVE
Lab: ABNORMAL
NITRITE UR-MCNC: NEGATIVE MG/ML
PH UR: 7 [PH] (ref 5–8)
PROT UR STRIP-MCNC: NEGATIVE MG/DL
RBC # UR STRIP: ABNORMAL /UL
SP GR UR: 1.02 (ref 1–1.03)
UROBILINOGEN UR QL: ABNORMAL

## 2025-03-14 NOTE — PATIENT INSTRUCTIONS
Health Maintenance Due   Topic Date Due    ZOSTER VACCINE (1 of 2) Never done    COVID-19 Vaccine (3 - Pfizer risk series) 10/15/2021    Pneumococcal Vaccine 50+ (3 of 3 - PCV) 01/28/2022    ANNUAL PHYSICAL  12/22/2024    12 hour fast for labs

## 2025-03-15 NOTE — PROGRESS NOTES
Subjective   Jourdan Lebron is a 54 y.o. male presents for   Chief Complaint   Patient presents with    Hyperlipidemia    Depression    Influenza     Over the weekend had flu A. Feeling much better now.     Flank Pain     Passed kidney stones about 2 week ago. Still experiencing some lingering pain but is tolerable.        Health Maintenance Due   Topic Date Due    ZOSTER VACCINE (1 of 2) Never done    ANNUAL PHYSICAL  12/22/2024   Patient presents for his annual wellness and to establish care.  He was advised to wear sun screen and a seatbelt    Hyperlipidemia    DepressionSymptoms include depressed mood and nausea.   His past medical history is significant for depression.   Influenza  Symptoms include fatigue and nausea.    Flank Pain       History of Present Illness  The patient is a 54-year-old male who presents today for his annual wellness exam as well as evaluation of kidney stones, history of cerebrovascular accident (CVA), hyperlipidemia, vitamin B12 deficiency, major depressive disorder, post-procedure adrenocortical medullary hypofunction, nausea, adenocarcinoma of the esophagus metastatic to intra-abdominal lymph node, vitamin D deficiency, and low body mass index (BMI) of 21.    He experienced an episode of influenza over the weekend, which was managed at an urgent care facility. His symptoms began on Wednesday, with a persistent fever of 101 degrees that escalated to 102 degrees by Friday. He reported difficulty in mobilizing on Sunday morning due to the severity of his symptoms. Despite the absence of fever on Wednesday, he continued to feel unwell. He received his influenza vaccine this year. He reported experiencing diarrhea following his recent influenza infection, for which he has been consuming Pedialyte and Liquid IV. He is not experiencing any hemoptysis, sputum production, or wheezing. He has abstained from smoking. He reported mild abdominal discomfort, which he attributes to his recent  influenza infection. He reported feeling weak during his recent influenza infection. He noted a significant drop in his blood pressure during his recent influenza infection, with a systolic reading of 78 at the urgent care center. He has been increasing his salt intake to manage this. He reported a loss of appetite and difficulty in consuming food during his recent influenza infection. He experienced an episode of dry heaving last Friday while driving, which he found particularly distressing.    He has completed his chemotherapy and radiation therapy and underwent 2 rounds of immunotherapy post-surgery last year. His most recent PET scan showed no abnormalities. He is scheduled for a CT scan of his pelvis next week at the cancer center. He is under the care of Dr. Woods at [facility] and has a follow-up appointment scheduled. He reported that his nausea is not severe and has sufficient Zofran if needed.    He is not currently on any corticosteroids. His endocrinologist has discontinued all his medications. A few weeks ago, he had an appointment with her, during which she recommended a CT scan of his thyroid due to the discovery of a nodule. However, he has not yet received a call to schedule this appointment. He expressed concern about the nodule, although he is uncertain if it is causing him any issues. He has been experiencing various health problems.    He has passed a couple of kidney stones and has not had a recent kidney infection. He is not experiencing any dysuria.    He has been off Repatha due to concerns about its impact on his blood sugar levels. He has discussed this with Dr. Woods and has requested a note indicating that he responded well to Praluent. However, his insurance company has advised him to switch to Repatha. He has been taking Repatha every couple of weeks and has noticed that his blood sugar levels stabilize approximately an hour after eating something sweet. He experienced an  "episode of confusion at night after taking Repatha, which he managed by consuming beef jerky and a handful of Skittles. The following day, he felt lethargic a few hours after breakfast and noticed that his blood pressure was dropping. He did not check his blood sugar at that time.    He has not experienced any recent stroke symptoms. He is monitoring his cholesterol levels and is not currently taking vitamin B12 supplements.    He is not currently taking vitamin D supplements but occasionally takes a multivitamin.    Supplemental Information  He had a dental visit a month ago during which a cracked tooth was extracted. He had a torn meniscus several years ago, which was surgically repaired by Dr. Geovany Stark.    SOCIAL HISTORY  He does not smoke.    MEDICATIONS  Current: Zofran Repatha (discontinued)    IMMUNIZATIONS  He has received the influenza vaccine this year.    Vitals:    03/14/25 1257 03/14/25 1300   BP: 99/69 102/70   BP Location: Left arm Right arm   Patient Position: Sitting Sitting   Cuff Size: Adult Adult   Pulse: 71    Temp: 98.4 °F (36.9 °C)    TempSrc: Infrared    SpO2: 94%    Weight: 68.9 kg (152 lb)    Height: 180.3 cm (70.98\")      Body mass index is 21.21 kg/m².    Current Outpatient Medications on File Prior to Visit   Medication Sig Dispense Refill    ALPRAZolam (XANAX) 0.25 MG tablet Take 1 tablet by mouth 2 (Two) Times a Day As Needed for Anxiety. for anxiety 60 tablet 3    Continuous Glucose Sensor (FreeStyle Dereck 3 Plus Sensor) Use 4 (Four) Times a Day. 6 each 1    diazoxide (PROGLYCEM) 50 MG/ML suspension Take 8 mg/kg/day by mouth Daily With Dinner.      Evolocumab (REPATHA) solution prefilled syringe injection Inject 1 mL under the skin into the appropriate area as directed Every 14 (Fourteen) Days. 6 mL 3    famotidine (PEPCID) 40 mg/5 mL suspension Take 5 mL by mouth.      FLUoxetine (PROzac) 10 MG capsule Take 1 capsule by mouth Daily. 30 capsule 1    fluticasone (FLONASE) 50 MCG/ACT " nasal spray Administer 2 sprays into the nostril(s) as directed by provider Daily.      ondansetron (ZOFRAN) 8 MG tablet Take 1 tablet by mouth 3 (Three) Times a Day As Needed for Nausea or Vomiting. 30 tablet 5    Zegalogue 0.6 MG/0.6ML solution auto-injector As Needed.       No current facility-administered medications on file prior to visit.       The following portions of the patient's history were reviewed and updated as appropriate: allergies, current medications, past family history, past medical history, past social history, past surgical history, and problem list.    Review of Systems   Constitutional:  Positive for fatigue.   Gastrointestinal:  Positive for diarrhea and nausea.   Genitourinary:  Positive for flank pain.   Psychiatric/Behavioral:  Positive for depressed mood.        Objective   Physical Exam  Vitals reviewed.   Constitutional:       General: He is not in acute distress.     Appearance: Normal appearance. He is well-developed. He is not ill-appearing or toxic-appearing.   HENT:      Head: Normocephalic and atraumatic.      Right Ear: Tympanic membrane, ear canal and external ear normal.      Left Ear: Tympanic membrane, ear canal and external ear normal.      Nose: Nose normal.      Mouth/Throat:      Mouth: Mucous membranes are moist.      Pharynx: No posterior oropharyngeal erythema.   Eyes:      Extraocular Movements: Extraocular movements intact.      Conjunctiva/sclera: Conjunctivae normal.      Pupils: Pupils are equal, round, and reactive to light.   Neck:      Vascular: No carotid bruit.   Cardiovascular:      Rate and Rhythm: Normal rate and regular rhythm.      Heart sounds: Normal heart sounds.   Pulmonary:      Effort: Pulmonary effort is normal.      Breath sounds: Normal breath sounds.   Abdominal:      General: Bowel sounds are normal. There is no distension.      Palpations: Abdomen is soft. There is no mass.      Tenderness: There is no abdominal tenderness.    Musculoskeletal:         General: Normal range of motion.      Cervical back: Neck supple. No tenderness.      Right lower leg: No edema.      Left lower leg: No edema.   Lymphadenopathy:      Cervical: No cervical adenopathy.   Skin:     General: Skin is warm.   Neurological:      General: No focal deficit present.      Mental Status: He is alert and oriented to person, place, and time.   Psychiatric:         Mood and Affect: Mood normal.         Behavior: Behavior normal.       Physical Exam  Throat appears normal.  No thyroid nodules are palpable.  Lung sounds are clear.  Carotids are normal. Heart rhythm is regular.    Vital Signs  Pulse rate is 64. Blood pressure is 99 systolic.    PHQ-9 Total Score:    Results  Laboratory Studies  Trace of blood in urine.    Imaging  PET scan showed no abnormalities. CT scan of thyroid revealed multinodular goiter with 2 nodules, no calcifications.         Assessment & Plan   Diagnoses and all orders for this visit:    1. Encounter for annual general medical examination with abnormal findings in adult (Primary)    2. Calculus of kidney  -     POCT urinalysis dipstick, automated    3. History of cerebrovascular accident    4. Hyperlipidemia, unspecified hyperlipidemia type  -     Comprehensive Metabolic Panel; Future  -     Lipid Panel; Future    5. Vitamin B12 deficiency  -     CBC Auto Differential; Future  -     Vitamin B12; Future    6. Major depressive disorder, recurrent episode, moderate  -     Magnesium; Future    7. Postprocedural adrenocortical (-medullary) hypofunction    8. Nausea    9. 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    10. Vitamin D deficiency  -     Vitamin D,25-Hydroxy; Future    11. Body mass index (BMI) of 21.0-21.9 in adult    12. Multiple thyroid nodules  -     US Thyroid; Future  -     TSH Rfx On Abnormal To Free T4; Future    13. Hypoglycemia  -     Hemoglobin A1c; Future      Assessment & Plan  1. Annual wellness  examination.  His overall health status appears satisfactory. He is currently recovering from a recent influenza infection. His blood pressure readings have been consistently low, with a systolic measurement of 99 today. A trace amount of blood was detected in his urine sample today, but there is no evidence of infection. He is advised to maintain adequate hydration and nutrition during his recovery period. He is also encouraged to engage in regular physical activity to aid in his recovery. A comprehensive panel of laboratory tests has been ordered, including a 12-hour fasting blood test. A urine culture will not be conducted at this time.    2. Adenocarcinoma of the esophagus, metastatic to intra-abdominal lymph node.  He has completed chemotherapy, radiation, and 2 rounds of immunotherapy post-surgery last year. His last PET scan showed no abnormalities. A follow-up CT scan of the pelvis is scheduled for next week at the cancer center.    3. Multinodular goiter.  He has been diagnosed with a multinodular goiter, as per the CT scan conducted on 04/29/2024 at [facility]. A follow-up thyroid ultrasound has been scheduled for April 2025.    4. Kidney stones.  He has a history of kidney stones and recently passed a couple of stones. A trace of blood was found in his urine today, but no infection is noted.    5. Hyperlipidemia.  He has been off Repatha due to concerns about low blood sugar and confusion episodes. He is advised to monitor his blood sugar levels closely and consider discussing with his provider the possibility of switching back to Praluent if symptoms persist.    6. History of CVA.  He reported no new stroke symptoms. He is advised to continue monitoring his blood pressure and blood sugar levels, especially given his recent episodes of low blood pressure and confusion.    7. Vitamin D deficiency.  He is not currently taking vitamin D supplements. He is advised to resume taking vitamin D supplements as  previously prescribed.    PROCEDURE  The patient had a cracked tooth extracted one month ago. He also had a torn meniscus surgically repaired several years ago.    Patient Instructions     Health Maintenance Due   Topic Date Due    ZOSTER VACCINE (1 of 2) Never done    COVID-19 Vaccine (3 - Pfizer risk series) 10/15/2021    Pneumococcal Vaccine 50+ (3 of 3 - PCV) 01/28/2022    ANNUAL PHYSICAL  12/22/2024    12 hour fast for labs       Patient or patient representative verbalized consent for the use of Ambient Listening during the visit with  Justa Castano MD for chart documentation. 3/14/2025  21:09 EDT

## 2025-03-16 ENCOUNTER — RESULTS FOLLOW-UP (OUTPATIENT)
Dept: FAMILY MEDICINE CLINIC | Facility: CLINIC | Age: 55
End: 2025-03-16
Payer: COMMERCIAL

## 2025-03-16 DIAGNOSIS — E78.5 HYPERLIPIDEMIA, UNSPECIFIED HYPERLIPIDEMIA TYPE: Primary | ICD-10-CM

## 2025-03-18 ENCOUNTER — HOSPITAL ENCOUNTER (OUTPATIENT)
Dept: PET IMAGING | Facility: HOSPITAL | Age: 55
Discharge: HOME | End: 2025-03-18
Admitting: INTERNAL MEDICINE
Payer: COMMERCIAL

## 2025-03-18 DIAGNOSIS — F41.1 GENERALIZED ANXIETY DISORDER: Chronic | ICD-10-CM

## 2025-03-18 DIAGNOSIS — F33.1 MAJOR DEPRESSIVE DISORDER, RECURRENT EPISODE, MODERATE: Chronic | ICD-10-CM

## 2025-03-18 DIAGNOSIS — C15.9 ADENOCARCINOMA OF ESOPHAGUS: ICD-10-CM

## 2025-03-18 DIAGNOSIS — C15.5 MALIGNANT NEOPLASM OF LOWER THIRD OF ESOPHAGUS: ICD-10-CM

## 2025-03-18 PROCEDURE — 74176 CT ABD & PELVIS W/O CONTRAST: CPT

## 2025-03-18 PROCEDURE — 71250 CT THORAX DX C-: CPT

## 2025-03-19 RX ORDER — ALPRAZOLAM 0.25 MG
0.25 TABLET ORAL 2 TIMES DAILY PRN
Qty: 60 TABLET | Refills: 0 | Status: SHIPPED | OUTPATIENT
Start: 2025-03-19

## 2025-03-19 NOTE — TELEPHONE ENCOUNTER
Rx Refill Note  Requested Prescriptions     Pending Prescriptions Disp Refills    ALPRAZolam (XANAX) 0.25 MG tablet [Pharmacy Med Name: ALPRAZolam 0.25 MG TABLET] 60 tablet      Sig: TAKE 1 TABLET BY MOUTH 2 TIMES A DAY AS NEEDED FOR ANXIETY      Last office visit with prescribing clinician: 9/17/2024   Last telemedicine visit with prescribing clinician: Visit date not found   Next office visit with prescribing clinician: 3/26/2025   Office Visit with lCair Lyman MD (09/17/2024)      No UDS on file                  Would you like a call back once the refill request has been completed: [] Yes [] No    If the office needs to give you a call back, can they leave a voicemail: [] Yes [] No  Last sold 2-24-25  BEHAVIORAL HEALTH - SCAN - [facility]SPANETTE KHOLI 3/19/2025 (03/19/2025)   Etelvina Brown  03/19/25, 08:31 EDT

## 2025-03-26 ENCOUNTER — OFFICE VISIT (OUTPATIENT)
Dept: PSYCHIATRY | Facility: CLINIC | Age: 55
End: 2025-03-26
Payer: COMMERCIAL

## 2025-03-26 DIAGNOSIS — F41.1 GENERALIZED ANXIETY DISORDER: Chronic | ICD-10-CM

## 2025-03-26 DIAGNOSIS — F33.1 MAJOR DEPRESSIVE DISORDER, RECURRENT EPISODE, MODERATE: Primary | Chronic | ICD-10-CM

## 2025-03-26 RX ORDER — FLUOXETINE 10 MG/1
10 CAPSULE ORAL DAILY
Qty: 90 CAPSULE | Refills: 1 | Status: SHIPPED | OUTPATIENT
Start: 2025-03-26 | End: 2026-03-26

## 2025-03-26 RX ORDER — FLUOXETINE 10 MG/1
10 CAPSULE ORAL DAILY
Qty: 30 CAPSULE | Refills: 0 | Status: SHIPPED | OUTPATIENT
Start: 2025-03-26 | End: 2026-03-26

## 2025-03-26 RX ORDER — FLUOXETINE 10 MG/1
10 CAPSULE ORAL DAILY
Qty: 30 CAPSULE | Refills: 0 | Status: SHIPPED | OUTPATIENT
Start: 2025-03-26 | End: 2025-03-26 | Stop reason: SDUPTHER

## 2025-03-26 NOTE — PROGRESS NOTES
Subjective   Jourdan Lebron is a 54 y.o. male who presents today for follow up    Chief Complaint:  anxiety, depresision       History of Present Illness: the pt started having problems with  anxiety and irritability in 2020   In May 31, 2020 - CVA, left middle cerebral artery , the pt is R handed    he had troubles talking , had muscle weakness   Oct 2020 - went back to work light duty , still had issues completing his tasks   Feb 2021- adrenal gland removed and that caused more issues, more medical w/u and ER visits, weight loss   Now on steroid replacement which is also not completely controlled     The pt was started on zoloft - no major changes, it had to be changed to prozac - tolerated well and started feeling less depressed, he is back to work now , prozac was increased to 40 mg , however, he did not tolerate, reported increased anxiety and emotional flattening      Last visit - prozac 10 mg and xanax was decreased to 0.25 mg BID     Today the pt reported no major changes, still working full time,   Increased irritability when unable to stay focused and to retain information   The pt reported feeling anxious, less depressed,   he was dsd with esophageal cancer, had radiation and chemo  in nov 2023 and had surgery in Feb 2024 , had some complications    Depression is rated as 6/10 , denied feeling hopeless/helpless   Decreased E level, decreased motivations, takes a lot of efforts to start doing something    The pt c/o decreased concentration, decreased memory  Sleep - fragmented  , not restorative ,   Denied AVH/SI/HI     Anxiety - manageable on xanax    Memory - decreased, poor concentration     The following portions of the patient's history were reviewed and updated as appropriate: allergies, current medications, past family history, past medical history, past social history, past surgical history and problem list.    PAST PSYCHIATRIC HISTORY  Axis I  Affective/Bipolar Disorder, Anxiety/Panic  Disorder  Axis II  Denied     PAST OUTPATIENT TREATMENT  Diagnosis treated:  Anxiety, depression   Treatment Type:  Medication Management  Prior Psychiatric Medications:  lexapro - not effective  Buspirone - side effects   prozac - OK  trintellix - increased irritability   viibryd - not effective   Rexulti - side effects   Support Groups:  None   Sequelae Of Mental Disorder:  job disruption, emotional distress      Interval History  No changes     Side Effects  Denied     Past Medical History:  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    Brain fog     COVID 02/2023    Diverticulosis of large intestine without perforation or abscess without bleeding 10/02/2023    Generalized headaches     Hand tingling     History of blood clot in brain 2020    had thrombectomy    History of cancer chemotherapy 12/2023    History of kidney stones     History of radiation therapy 11/2023    Hyperlipidemia     Jejunostomy tube present     Port-A-Cath in place     Thyroid nodule     Trouble swallowing        Social History:  Social History     Socioeconomic History    Marital status:    Tobacco Use    Smoking status: Former     Current packs/day: 0.00     Average packs/day: 1 pack/day for 37.0 years (37.0 ttl pk-yrs)     Types: Cigars, Cigarettes     Start date: 1/1/1985     Quit date: 1/1/2022     Years since quitting: 3.2     Passive exposure: Past    Smokeless tobacco: Never    Tobacco comments:     1 packs= stopped 10 years ago and continued cigars   2 cigars a day; has stopped cigars as of 2022   Vaping Use    Vaping status: Never Used   Substance and Sexual Activity    Alcohol use: Not Currently     Comment: Has now been abstinent since 2020    Drug use: Never    Sexual activity: Defer       Family History:  Family History   Problem Relation Age of  Onset    Arthritis Mother     Hypertension Mother     Heart failure Mother 73        Cause of death    Arthritis Father     Hypertension Father     Kidney cancer Father 57        Cause of death. Was a smoker    Heart disease Brother     Esophageal cancer Brother 59        Cause of death. Has a heavy drinker    Malig Hyperthermia Neg Hx        Past Surgical History:  Past Surgical History:   Procedure Laterality Date    ADRENALECTOMY      COLONOSCOPY      ENDOSCOPY      ENDOSCOPY N/A 02/17/2024    Procedure: ESOPHAGOGASTRODUODENOSCOPY WITH STENTING UNDER FLUROSCOPY GUIDENCE WITH ESOPHOGRAM;  Surgeon: Saurabh Hurtado MD;  Location: [facility] MAIN OR;  Service: Gastroenterology;  Laterality: N/A;    ENDOSCOPY N/A 3/8/2024    Procedure: ESOPHAGOGASTRODUODENOSCOPY WITH STENT REMOVAL;  Surgeon: Saurabh Hurtado MD;  Location: [facility] ENDOSCOPY;  Service: Gastroenterology;  Laterality: N/A;  Esophageal leak    ENDOSCOPY N/A 4/5/2024    Procedure: ESOPHAGOGASTRODUODENOSCOPY WITH BALLOON DILATATION #6, #7, #8, #9, #10, #11, #12  AND GASTROGRAFIN WITH FLOURO;  Surgeon: Saurabh Hurtado MD;  Location: [facility] ENDOSCOPY;  Service: Gastroenterology;  Laterality: N/A;  PRE OP - ESOPHAGEAL CANCER  POST OP - ANASTOMOSIS STRICTURE    ENDOSCOPY N/A 5/7/2024    Procedure: ESOPHAGOGASTRODUODENOSCOPY WITH DILATATION (BALLOON 10MM-12MM, 12MM-15MM, 15MM-18MM,UP TO 17MM) AND INTRAOPERATIVE ESOPHAGRAM;  Surgeon: Saurabh Hurtado MD;  Location: [facility] ENDOSCOPY;  Service: Gastroenterology;  Laterality: N/A;    ENDOSCOPY N/A 6/18/2024    Procedure: ESOPHAGOGASTRODUODENOSCOPY WITH balloon dilatation (10mm-12mm up to 12mm;15mm-18mm up to 18mm) and removal of PEG tube;  Surgeon: Saurabh Hurtado MD;  Location: [facility] ENDOSCOPY;  Service: Gastroenterology;  Laterality: N/A;    ENDOSCOPY N/A 7/30/2024    Procedure: ESOPHAGOGASTRODUODENOSCOPY with balloon dialtion(15mm-18mm balloon up to 18mm);  Surgeon: Saurabh Hurtado MD;  Location: [facility] ENDOSCOPY;  Service:  Gastroenterology;  Laterality: N/A;    ENDOSCOPY N/A 9/24/2024    Procedure: ESOPHAGOGASTRODUODENOSCOPY with balloon dialtion of esophagus (15mm-18mm balloon up to 18mm);  Surgeon: Saurabh Hurtado MD;  Location: [facility] ENDOSCOPY;  Service: Gastroenterology;  Laterality: N/A;    ENDOSCOPY N/A 12/20/2024    Procedure: ESOPHAGOGASTRODUODENOSCOPY WITH DILATION (BALLOON 18 TO 19);  Surgeon: Saurabh Hurtado MD;  Location: [facility] ENDOSCOPY;  Service: Gastroenterology;  Laterality: N/A;  ESOPHOAGEAL STRICTURE    ESOPHAGOGASTRECTOMY N/A 02/09/2024    Procedure: BRONCHOSCOPY, EGD, ESOPHAGECTOMY ALTAGRACIA-FABIOLA WITH DAVINCI ROBOT, LAPAROSCOPY, RIGHT THORACOSCOPY CONVERTED TO THORACOTOMY, FEEDING JEJUNOSTOMY TUBE PLACEMENT, INTERCOSTAL NERVE BLOCKS;  Surgeon: Saurabh Hurtado MD;  Location: [facility] OR;  Service: Robotics - DaVinci;  Laterality: N/A;    ESOPHAGUS SURGERY  10/2023    biopsy    KIDNEY STONE SURGERY      OTHER SURGICAL HISTORY  2020    loop recorder PLACED AND REMOVED    THROMBECTOMY  2020    UPPER ENDOSCOPIC ULTRASOUND W/ FNA N/A 10/27/2023    Procedure: ENDOSCOPIC ULTRASOUND WITH STAGING AND FINE NEEDLE ASPIRATION X2 AREAS;  Surgeon: Joselyn Savage MD;  Location: [facility] ENDOSCOPY;  Service: Gastroenterology;  Laterality: N/A;  POST:    VENOUS ACCESS DEVICE (PORT) INSERTION Right 11/13/2023    Procedure: INSERTION VENOUS ACCESS DEVICE;  Surgeon: Saurabh Hurtado MD;  Location: [facility] MAIN OR;  Service: Thoracic;  Laterality: Right;       Problem List:  Patient Active Problem List   Diagnosis    Abnormal electrocardiogram    Calculus of kidney    History of cerebrovascular accident    Family history of malignant neoplasm    Generalized hyperhidrosis    Hyperlipidemia    Seasonal allergies    History of tobacco abuse    Cervical radiculopathy    Cervical stenosis of spinal canal    Near syncope    Other fatigue    Arthropathy of cervical facet joint    Neck pain    Pain in left arm    Shoulder pain    Seasonal allergic rhinitis     Thoracic back pain    Vitamin B12 deficiency    Spinal stenosis of cervical region    Reduced libido    Hypogonadotropic hypogonadism    Major depressive disorder, recurrent episode, moderate    Generalized anxiety disorder    Macrocytosis    Erythrocytosis    Postprocedural adrenocortical (-medullary) hypofunction    Nausea    Ischemic stroke    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    Encounter for care related to Port-a-Cath    Streptococcus exposure    Rash    Vitamin D deficiency    Malignant neoplasm of lower third of esophagus    Esophageal cancer    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    Body mass index (BMI) of 21.0-21.9 in adult       Allergy:   Allergies   Allergen Reactions    Cephalosporins Anaphylaxis     Throat swelling    Dye  [Contrast Dye (Echo Or Unknown Ct/Mr)] Hives    Iodinated Contrast Media Hives    Sulfa Antibiotics Hives    Sulfate Hives    Zetia [Ezetimibe] Other (See Comments) and Myalgia     Made tired    Statins Myalgia     Myalgia and fatique        Discontinued Medications:  Medications Discontinued During This Encounter   Medication Reason    FLUoxetine (PROzac) 10 MG capsule Reorder    FLUoxetine (PROzac) 10 MG capsule Reorder               Current Medications:   Current Outpatient Medications   Medication Sig Dispense Refill    FLUoxetine (PROzac) 10 MG capsule Take 1 capsule by mouth Daily. 90 capsule 1    ALPRAZolam (XANAX) 0.25 MG tablet TAKE 1 TABLET BY MOUTH 2 TIMES A DAY AS NEEDED FOR ANXIETY 60 tablet 0    Continuous Glucose Sensor (FreeStyle Dereck 3 Plus Sensor) Use 4 (Four) Times a Day. 6 each 1    diazoxide (PROGLYCEM) 50 MG/ML suspension Take 8 mg/kg/day by mouth Daily With Dinner.      Evolocumab (REPATHA) solution prefilled syringe injection Inject 1 mL under the skin into the appropriate area as directed Every 14 (Fourteen) Days. 6 mL 3    famotidine (PEPCID) 40 mg/5 mL suspension Take 5 mL by mouth.      FLUoxetine (PROzac) 10 MG  capsule Take 1 capsule by mouth Daily. 30 capsule 0    fluticasone (FLONASE) 50 MCG/ACT nasal spray Administer 2 sprays into the nostril(s) as directed by provider Daily.      ondansetron (ZOFRAN) 8 MG tablet Take 1 tablet by mouth 3 (Three) Times a Day As Needed for Nausea or Vomiting. 30 tablet 5    Zegalogue 0.6 MG/0.6ML solution auto-injector As Needed.       No current facility-administered medications for this visit.         Psychological ROS: positive for - anxiety, concentration, depression,  irritability and memory difficulties  Negative for AVH/SI/HI, delusions       Physical Exam:   There were no vitals taken for this visit.    Mental Status Exam:   Hygiene:   good  Cooperation:  Cooperative  Eye Contact:  Good  Psychomotor Behavior:  Appropriate  Affect:  Appropriate and Blunted  Mood: depressed, anxious, and fluctates  Hopelessness: Denies  Speech:  Normal  Thought Process:  Goal directed and Linear  Thought Content:  Normal and Mood congruent  Suicidal:  None  Homicidal:  None  Hallucinations:  None  Delusion:  None  Memory:  Deficits  Orientation:  Person, Place, Time and Situation  Reliability:  good  Insight:  Good  Judgement:  Good  Impulse Control:  Fair  Physical/Medical Issues:  Yes          MSE from 9/17/24    reviewed and accepted without changes     PHQ-9 Depression Screening  Little interest or pleasure in doing things? More than half the days   Feeling down, depressed, or hopeless? Several days   PHQ-2 Total Score 3   Trouble falling or staying asleep, or sleeping too much? Several days   Feeling tired or having little energy? More than half the days   Poor appetite or overeating? Several days   Feeling bad about yourself - or that you are a failure or have let yourself or your family down? Several days   Trouble concentrating on things, such as reading the newspaper or watching television? More than half the days   Moving or speaking so slowly that other people could have noticed? Or the  opposite - being so fidgety or restless that you have been moving around a lot more than usual? Not at all     Thoughts that you would be better off dead, or of hurting yourself in some way? Not at all   PHQ-9 Total Score 10   If you checked off any problems, how difficult have these problems made it for you to do your work, take care of things at home, or get along with other people? Very difficult            Over the last two weeks, how often have you been bothered by the following problems?  Feeling nervous, anxious or on edge: More than half the days  Not being able to stop or control worrying: Several days  Worrying too much about different things: Several days  Trouble Relaxing: Several days  Being so restless that it is hard to sit still: Not at all  Becoming easily annoyed or irritable: More than half the days  Feeling afraid as if something awful might happen: Several days  JOSE MARIA 7 Total Score: 8  If you checked any problems, how difficult have these problems made it for you to do your work, take care of things at home, or get along with other people: Somewhat difficult     Current every day smoker 3-10 mintues spent counseling Has reduced Tobbacos use - only cigars     I advised Jourdan of the risks of tobacco use.     Lab Results:   Office Visit on 03/14/2025   Component Date Value Ref Range Status    Color 03/14/2025 Yellow  Yellow, Straw, Dark Yellow, Nely Final    Clarity, UA 03/14/2025 Clear  Clear Final    Specific Gravity  03/14/2025 1.020  1.005 - 1.030 Final    pH, Urine 03/14/2025 7.0  5.0 - 8.0 Final    Leukocytes 03/14/2025 Negative  Negative Final    Nitrite, UA 03/14/2025 Negative  Negative Final    Protein, POC 03/14/2025 Negative  Negative mg/dL Final    Glucose, UA 03/14/2025 Negative  Negative mg/dL Final    Ketones, UA 03/14/2025 Negative  Negative Final    Urobilinogen, UA 03/14/2025 0.2 E.U./dL  Normal, 0.2 E.U./dL Final    Bilirubin 03/14/2025 Negative  Negative Final    Blood, UA  03/14/2025 Trace (A)  Negative Final    Lot Number 03/14/2025 [id number]   Final    Expiration Date 03/14/2025 05/31/2025   Final   Admission on 03/09/2025, Discharged on 03/09/2025   Component Date Value Ref Range Status    COVID19 03/09/2025 Not Detected   Final    Influenza A Antigen BRETT 03/09/2025 Detected (A)   Final    Influenza B Antigen BRETT 03/09/2025 Not Detected   Final    Internal Control 03/09/2025 Passed   Final    Lot Number 03/09/2025 [id number]   Final    Expiration Date 03/09/2025 11/21/25   Final       Assessment & Plan   Problems Addressed this Visit       Major depressive disorder, recurrent episode, moderate - Primary (Chronic)    Relevant Medications    FLUoxetine (PROzac) 10 MG capsule    FLUoxetine (PROzac) 10 MG capsule    Generalized anxiety disorder (Chronic)    Relevant Medications    FLUoxetine (PROzac) 10 MG capsule    FLUoxetine (PROzac) 10 MG capsule     Diagnoses         Codes Comments      Major depressive disorder, recurrent episode, moderate    -  Primary ICD-10-CM: F33.1  ICD-9-CM: 296.32       Generalized anxiety disorder     ICD-10-CM: F41.1  ICD-9-CM: 300.02              Visit Diagnoses:    ICD-10-CM ICD-9-CM   1. Major depressive disorder, recurrent episode, moderate  F33.1 296.32   2. Generalized anxiety disorder  F41.1 300.02                TREATMENT PLAN/GOALS: Continue supportive psychotherapy efforts and medications as indicated. Treatment and medication options discussed during today's visit. Patient ackowledged and verbally consented to continue with current treatment plan and was educated on the importance of compliance with treatment and follow-up appointments.    MEDICATION ISSUES:  INSPECT reviewed as expected - gabapentin and hydrocodone , 2/21/25     xanax #60    1. Major depressive d/o moderate recurrent -  did not respond well to SSRI and Buspar, or trintellix , or viibryd 10-20 mg   Cont prozac  10 mg, higher dose makes more depressed    Memory - recommended to  see neuropsych at [facility] for f/u testing     2. Generalized anxiety d/o -  Cont  Low dose of xanax 0. 25 mg po   BID , no changes necessary, tolerates well , psychotherapy was recommended , not now, the pt has many other appts      3. Long term therapeutic drug monitoring - UDS 8/18/2022 consistent    LABORATORY - SCAN - DRUG SCREEN, [facility], 8/18/2022 (08/18/2022), will repeat today       PHQ scored 10 - moderate   depression   JOSE MARIA 7 scored 8 mild anxiety     Discussed medication options and treatment plan of prescribed medication as well as the risks, benefits, and side effects including potential falls, possible impaired driving and metabolic adversities among others. Patient is agreeable to call the office with any worsening of symptoms or onset of side effects. Patient is agreeable to call 911 or go to the nearest ER should he/she begin having SI/HI. No medication side effects or related complaints today.     MEDS ORDERED DURING VISIT:  New Medications Ordered This Visit   Medications    FLUoxetine (PROzac) 10 MG capsule     Sig: Take 1 capsule by mouth Daily.     Dispense:  90 capsule     Refill:  1    FLUoxetine (PROzac) 10 MG capsule     Sig: Take 1 capsule by mouth Daily.     Dispense:  30 capsule     Refill:  0       Return in about 6 months (around 9/26/2025).         This document has been electronically signed by Clair Lyman MD  March 26, 2025 15:49 EDT    EMR Dragon transcription disclaimer:  Some of this encounter note is an electronic transcription translation of spoken language to printed text. The electronic translation of spoken language may permit erroneous, or at times, nonsensical words or phrases to be inadvertently transcribed; Although I have reviewed the note for such errors some may still exist.

## 2025-03-28 ENCOUNTER — HOSPITAL ENCOUNTER (OUTPATIENT)
Dept: ULTRASOUND IMAGING | Facility: HOSPITAL | Age: 55
Discharge: HOME | End: 2025-03-28
Admitting: PREVENTIVE MEDICINE
Payer: COMMERCIAL

## 2025-03-28 DIAGNOSIS — E04.2 MULTIPLE THYROID NODULES: ICD-10-CM

## 2025-03-28 PROCEDURE — 76536 US EXAM OF HEAD AND NECK: CPT

## 2025-03-29 NOTE — PROGRESS NOTES
Thyroid nodules are stable and small and do not require any further follow-up.  Call if you have any other questions or concerns

## 2025-04-08 NOTE — PROGRESS NOTES
"                         HEMATOLOGY ONCOLOGY OUTPATIENT FOLLOW UP       Patient name: Jourdan Lebron  : 1970  MRN: [id number]  Primary Care Physician: Justa Castano MD  Referring Physician: No ref. provider found  Reason For Consult:     Chief Complaint   Patient presents with    Follow-up     Adenocarcinoma of esophagus     HPI:   History of Present Illness:  Jourdan Lebron is 54 y.o. male who presented to our office on 2021 for consultation regarding elevated hematocrit    On 2021 Mr. Lebron was referred for the investigation of macrocytosis and elevated hematocrit.  He gave a history of a stroke in .  He also described a long history of anxiety and \"panic attacks\".  Finally he had undergone an adrenalectomy, apparently because of an adrenal adenoma.  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  In spite of that he felt poorly, mainly because of fatigue and had several investigations.  For a long time, at least since , he had been noted to have an elevated MCV and MCH.  A clear cause for this had not been identified and generally speaking he was asymptomatic at that time.  In , September and 2021 his blood counts had reported a hematocrit of 51.3, 51.6 and 53.8% respectively.  This was in the setting of a normal high hemoglobin.  He gave a history of prolonged and intense, at times of up to 3 packs of cigarettes per day, cigarette smoking.  Close to the time of the initial visit, he was smoking only cigars but did admit to inhaling the smoke.  He, as well, admitted to dyspnea with some exertion.    2022 Mr. Lebron was back in the office for follow-up.  At the time of his initial evaluation his blood count had been found to be within normal limits.  His folic acid was found to be immediately below the normal range of normalcy.  He started taking folic acid replacement.    7/15/2022: Back in the office for follow-up " after 6 months.  Reported he had had some changes to his medications and he was feeling somewhat better.  In particular, he discussed changes to his antidepressant, that seemed to have made a difference.  He also had stopped smoking.  The physical exam was unchanged.  The laboratory exams revealed normal hemoglobin and hematocrit, but he did persist with mild macrocytosis at 97.8 fL.  A clear explanation for this was not identified.  A decision was made to observe as it was unlikely to represent a serious problem.    10/10/2023: Seen in the office after an absence of more than one year. He reported that for a few months he had been experiencing dysphagia and weight loss. He was initially treated with a proton pump inhibitor, but as there was no response and rather he reported worsening of the symptoms, he underwent upper and lower gastrointestinal endoscopies. In the colon multiple polyps were identified in the cecum, the ascending colon, the transverse colon and the descending colon. In the esophagus a protruding lesion that seemed infiltrative and was fungating and ulcerated was found in the lower third of the esophagus. It extended from 36 to 46 cm from the incisors. Biopsy reported invasive moderate to poorly differentiated adenocarcinoma.     11/3/2023: In the office to review the PET scan. His symptoms have not changed much. He continues to have dysphagia and it is severe enough that he has been able to eat much; he has some success with soft foods but there fare some foods that he can definitely not swallow. He has lost about 10 lbs but none recently. He remains afebrile and has not had any cough. He has not had any pain. On exam no jaundice or pallor. Lungs diminished and heart regular. Abdomen soft and not tender. No edema. Reviewed the images of the PET scan. Reviewed the result of the endoscopic ultrasound and the FNA of the lymph nodes, at least one of which is positive for metastatic disease. This makes  the tumor T2N1 disease. Neoadjuvant chemotherapy and radiation was discussed with him and his spouse and I made referrals to Dr. Sabillon in Radiation Oncology and to Dr. Hurtado in Thoracic surgery. He is to have next generation sequencing, Her2 expression and PD-L1 expression on tumor tissue. Will present in tumor conference.     11/17/2023: Not any different than at the time of the last visit.  No further weight loss.  Able to swallow some foods without any difficulties.  However, other foods are very difficult to swallow, if not impossible.  He has not had any fevers.  He underwent placement of the port without any difficulties.  On exam alert, conversant and in no distress.  Port site clean and healing well.  Lungs diminished bilaterally.  Heart regular.  No edema.  Laboratory exams were reviewed.  Discussed with him concurrent chemotherapy and radiation.  Treatment with carboplatin and paclitaxel was discussed and a treatment plan was placed.  Discussed with Dr. Sabillon.  To begin on the week of November 27, 2023.    12/8/2023: Feeling reasonably well. Has experienced occasional headaches and facial pain. As well feels the lower extremities to be heavy and had some aching pain. Has been able to eat some though his appetite is poor. He may be swallowing better and does not have odynophagia. No abdominal pain or diarrhea and no dysuria. Has not lost weight. On exam no changes. The laboratory exams were reviewed. Discussed with him. To continue with the same therapy.     12/27/2023: Without new symptoms.  Reasonably active.  Not eating as well because of early satiety but no dysphagia.  No chest pains.  As well not coughing or more dyspneic than before.  On exam no changes.  No palpable supraclavicular adenopathy.  Lungs are diminished bilaterally.  Heart regular.  Abdomen soft and nontender.  There is no edema.  Small petechiae are present in the lower extremities.  The laboratory exams were reviewed.  He has  thrombocytopenia today that does not require transfusion but that will keep him from receiving the last dose of the chemotherapy.  To finish radiation tomorrow.  He will have a PET scan and will see me with the results in approximately 4 weeks.  Discussed with him.    1/25/2024: Feeling reasonably well.  Anxious about surgery.  Undergoing preoperative investigations he was found to have a thyroid nodule.  He is eating reasonably well and does not have major dysphagia although sometimes he needs to belch before he can continue to swallow.  No odynophagia.  He has also been free of dyspnea.  Denies abdominal pain and has maintained regular bowel activity.  No dysuria or hematuria.  No peripheral edema.  On exam alert, conversant and in good spirits.  No jaundice and no pallor.  Lungs diminished but clear.  Heart regular.  Abdomen soft.  Port site clean.  Laboratory exams reviewed.  Awaiting authorization from the insurance company to do the PET scan.  Continue to see Dr. Hurtado.  See me in approximately 4 weeks.    2/28/2024: Underwent an [place] type esophagectomy without immediate complications.  There was some concern over dehiscence of the anastomosis and he received a stent.  He was also asked to not consume any food by mouth and was receiving nutrition through a jejunostomy tube.  At the time of this visit feeling progressively better but still poorly.  Very tired and lacking energy to do anything.  Sleeping poorly.  Has started to take clear liquids by mouth.  On exam he appears chronically ill but he does not seem in any distress.  He is pale but not jaundiced.  All surgical incisions are healing well and the lungs are diminished bilaterally but clear.  Heart regular.  Abdomen soft.  No edema.  Laboratory exams were reviewed.  He has macrocytic anemia with a hemoglobin of 11.9 g/dL and thrombocytosis with a platelet count of 562,000/mm³.  He also has monocytosis and basophilia but no leukocytosis at this time.   The final review of pathology confirmed scattered foci of residual moderate to poorly differentiated adenocarcinoma at the gastroesophageal junction with changes consistent with prior neoadjuvant therapy.  There were ASSO stated dissecting pools of mucin with changes again consistent with previous therapy.  Scattered individual tumor cells and small clusters were present near the gastroesophageal junction and extending through the muscularis propria into the adventitial soft tissue/fat and spanning an area of 21 x 18 x 7 mm.  No definitive lymphovascular space invasion was identified.  All margins were negative.  Of 20 lymph nodes analyzed known had metastatic disease.  A discussion was had with him in regards to immunotherapy given in the adjuvant setting.  Explained side effects and potential complications.  I asked him to return in 3 weeks.    3/22/2024: Not feeling back to normal yet.  Still not eating much.  He had an episode of nausea and emesis this morning.  He has been afebrile.  He denies chest pains but does complain of some epigastric discomfort that seems unrelated to the previous discomfort that he had after the surgery.  He has been without dyspnea and does not have any more cough than usual.  As well no abdominal pain.  He continues to tolerate the enteral nutrition without difficulties.  No edema.  No skin rash.  On exam no distress.  Conversant and oriented.  Lungs diminished bilaterally.  Heart regular.  Abdomen with the jejunostomy tube in place.  Opening looks clean.  It is soft and without palpable tumors.  No edema.  Laboratory exams reviewed.  Discussed with him.  Offered him immunotherapy.  He remains somewhat reluctant to consider treatment with immunotherapy.  He has asked for more time to think about it.    4/12/2024: Somewhat better.  Perhaps more energetic.  Able to eat better after he had dilatation of the esophagus.  He has some pain that he localizes to the substernal area and the  mid epigastrium and right upper quadrant.  It is not as intense as before.  He continues to use his jejunostomy without any difficulties.  Defecating regularly.  Urinating without dysuria.  On exam alert, conversant and oriented.  No distress.  No jaundice.  Lungs are diminished bilaterally.  Heart is regular.  Abdomen is soft.  No edema.  Laboratory exams reviewed.  Discussed again the use of immunotherapy.  This time he is interested in pursuing.  Placed the treatment plan.  Discussed side effects and potential complications of the treatment with immunotherapy.  Discussed with him the rationale of treatment.  He is to begin as it is approved by insurance.  He will see me in approximately 5 weeks.    5/22/2024: Feeling somewhat better since the commencement of the steroids. His abdominal pain and nausea have resolved. She has been eating well and has not had any nausea or vomiting. He has been afebrile. No chest pain or increase in dyspnea. On exam alert and conversant. No jaundice and no oral lesions. Respirations not labored. The lungs diminished bilaterally and heart regular. Abdomen soft and not tender. No edema. Reviewed and discussed with him the laboratory exams. Gave him instructions in writing to continue to taper down the prednisone. Hold the immunotherapy given the possibility of enteritis as a result of it.     7/11/2024: Still not feeling very well.  Still tired and weak.  Also developed hypoglycemia with diaphoresis and profound weakness oftentimes after eating, particularly after eating large meals or carbohydrate rich meals.  He has been reasonably active.  He has no pain but has noted a persistent rash that is no longer pruritic.  He has no more dyspnea than before and no chest pains or abdominal pain.  On exam slender, well-built and not act acutely ill.  No jaundice.  The lungs are diminished bilaterally and the heart regular.  Abdomen soft with all surgical incisions well-healed.  No edema.  The  laboratory exams were reviewed.  The recent scan of the abdomen, in May 2024, was reviewed as well.  He is to see me in approximately 6 weeks.  He will have scans prior to the next visit.    8/28/2024: Still fatigued and not seemingly better.  Eating somewhat better and having less difficulties with swallowing but not gaining weight.  Denies pain.  Has been afebrile.  No dyspnea or cough.  No abdominal pain.  Was stung by a wasp a few days before and had some significant edema of the ankle that is now subsided.  On exam alert and conversant.  Oriented and in no distress.  Lungs diminished bilaterally.  Heart regular.  Abdomen soft.  No edema.  Laboratory exams reviewed.  Reviewed the images of the scans.  There is no suggestion of recurrent malignancy in the chest, abdomen or pelvis.  Discussed the results of the scans with him.  He is to see me in 4 months with new scans.    12/4/2024: Feels well.  Has had no new symptoms although he continues to have some vague abdominal pains.  His dysphagia, however, has improved significantly and he is eating much better.  He seems to have gained a small amount of weight.  He has been afebrile.  Denies chest pains or cough and has not had diarrhea.  No dysuria.  Exam alert and conversant.  Oriented and in no distress.  No jaundice or pallor.  Mood palpable lymphadenopathy.  Port site clean.  Lungs diminished bilaterally and heart regular.  Abdomen flat and soft.  No palpable tumors.  No edema.  Reviewed the laboratory exams and discussed with him.  Reviewed the images of the scans and discussed with him.  No suggestion of recurrent disease.  He will continue observation and will see me again in approximately 4 months.    4/11/2025: Feeling reasonably well. Underwent EGD with dilation in December 2024. Reports having flu/upper respiratory infection about 1 month ago, but feels that he is recovering. Still some ongoing fatigue. Remains active and is working. No new limitations.  His dysphagia has improved and reports good appetite. He has remained without chest pain, worsening abdominal pain. No diarrhea or dysuria. On exam, he is alert and conversant. No distress. No pallor nor jaundice noted. No cervical adenopathy or oral lesions. Lungs diminished bilaterally. Heart is regular. Abdomen soft and nontender. No edema noted. Reviewed laboratory exams as well as recent CT imaging. No suggestion of recurrent disease.     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    Brain fog     COVID 02/2023    Diverticulosis of large intestine without perforation or abscess without bleeding 10/02/2023    Generalized headaches     Hand tingling     History of blood clot in brain 2020    had thrombectomy    History of cancer chemotherapy 12/2023    History of kidney stones     History of radiation therapy 11/2023    Hyperlipidemia     Jejunostomy tube present     Port-A-Cath in place     Thyroid nodule     Trouble swallowing      Past Surgical History:   Procedure Laterality Date    ADRENALECTOMY      COLONOSCOPY      ENDOSCOPY      ENDOSCOPY N/A 02/17/2024    Procedure: ESOPHAGOGASTRODUODENOSCOPY WITH STENTING UNDER FLUROSCOPY GUIDENCE WITH ESOPHOGRAM;  Surgeon: Saurabh Hurtado MD;  Location: [facility] MAIN OR;  Service: Gastroenterology;  Laterality: N/A;    ENDOSCOPY N/A 3/8/2024    Procedure: ESOPHAGOGASTRODUODENOSCOPY WITH STENT REMOVAL;  Surgeon: Saurabh Hurtado MD;  Location: [facility] ENDOSCOPY;  Service: Gastroenterology;  Laterality: N/A;  Esophageal leak    ENDOSCOPY N/A 4/5/2024    Procedure: ESOPHAGOGASTRODUODENOSCOPY WITH BALLOON DILATATION #6, #7, #8, #9, #10, #11, #12  AND GASTROGRAFIN WITH FLOURO;  Surgeon: Saurabh Hurtado MD;  Location: [facility] ENDOSCOPY;  Service: Gastroenterology;  Laterality: N/A;  PRE OP - ESOPHAGEAL CANCER  POST  OP - ANASTOMOSIS STRICTURE    ENDOSCOPY N/A 5/7/2024    Procedure: ESOPHAGOGASTRODUODENOSCOPY WITH DILATATION (BALLOON 10MM-12MM, 12MM-15MM, 15MM-18MM,UP TO 17MM) AND INTRAOPERATIVE ESOPHAGRAM;  Surgeon: Saurabh Hurtado MD;  Location: [facility] ENDOSCOPY;  Service: Gastroenterology;  Laterality: N/A;    ENDOSCOPY N/A 6/18/2024    Procedure: ESOPHAGOGASTRODUODENOSCOPY WITH balloon dilatation (10mm-12mm up to 12mm;15mm-18mm up to 18mm) and removal of PEG tube;  Surgeon: Saurabh Hurtado MD;  Location: [facility] ENDOSCOPY;  Service: Gastroenterology;  Laterality: N/A;    ENDOSCOPY N/A 7/30/2024    Procedure: ESOPHAGOGASTRODUODENOSCOPY with balloon dialtion(15mm-18mm balloon up to 18mm);  Surgeon: Saurabh Hurtado MD;  Location: [facility] ENDOSCOPY;  Service: Gastroenterology;  Laterality: N/A;    ENDOSCOPY N/A 9/24/2024    Procedure: ESOPHAGOGASTRODUODENOSCOPY with balloon dialtion of esophagus (15mm-18mm balloon up to 18mm);  Surgeon: Saurabh Hurtado MD;  Location: [facility] ENDOSCOPY;  Service: Gastroenterology;  Laterality: N/A;    ENDOSCOPY N/A 12/20/2024    Procedure: ESOPHAGOGASTRODUODENOSCOPY WITH DILATION (BALLOON 18 TO 19);  Surgeon: Saurabh Hurtado MD;  Location: [facility] ENDOSCOPY;  Service: Gastroenterology;  Laterality: N/A;  ESOPHOAGEAL STRICTURE    ESOPHAGOGASTRECTOMY N/A 02/09/2024    Procedure: BRONCHOSCOPY, EGD, ESOPHAGECTOMY ALTAGRACIA-FABIOLA WITH DAVINCI ROBOT, LAPAROSCOPY, RIGHT THORACOSCOPY CONVERTED TO THORACOTOMY, FEEDING JEJUNOSTOMY TUBE PLACEMENT, INTERCOSTAL NERVE BLOCKS;  Surgeon: Saurabh Hurtado MD;  Location: [facility] OR;  Service: Robotics - DaVinci;  Laterality: N/A;    ESOPHAGUS SURGERY  10/2023    biopsy    KIDNEY STONE SURGERY      OTHER SURGICAL HISTORY  2020    loop recorder PLACED AND REMOVED    THROMBECTOMY  2020    UPPER ENDOSCOPIC ULTRASOUND W/ FNA N/A 10/27/2023    Procedure: ENDOSCOPIC ULTRASOUND WITH STAGING AND FINE NEEDLE ASPIRATION X2 AREAS;  Surgeon: Joselyn Savage MD;  Location: [facility] ENDOSCOPY;  Service:  Gastroenterology;  Laterality: N/A;  POST:    VENOUS ACCESS DEVICE (PORT) INSERTION Right 11/13/2023    Procedure: INSERTION VENOUS ACCESS DEVICE;  Surgeon: Saurabh Hurtado MD;  Location: [facility] MAIN OR;  Service: Thoracic;  Laterality: Right;       Current Outpatient Medications:     ALPRAZolam (XANAX) 0.25 MG tablet, TAKE 1 TABLET BY MOUTH 2 TIMES A DAY AS NEEDED FOR ANXIETY, Disp: 60 tablet, Rfl: 0    Continuous Glucose Sensor (FreeStyle Dereck 3 Plus Sensor), Use 4 (Four) Times a Day., Disp: 6 each, Rfl: 1    diazoxide (PROGLYCEM) 50 MG/ML suspension, Take 8 mg/kg/day by mouth Daily With Dinner., Disp: , Rfl:     Evolocumab (REPATHA) solution prefilled syringe injection, Inject 1 mL under the skin into the appropriate area as directed Every 14 (Fourteen) Days., Disp: 6 mL, Rfl: 3    famotidine (PEPCID) 40 mg/5 mL suspension, Take 5 mL by mouth., Disp: , Rfl:     FLUoxetine (PROzac) 10 MG capsule, Take 1 capsule by mouth Daily., Disp: 90 capsule, Rfl: 1    FLUoxetine (PROzac) 10 MG capsule, Take 1 capsule by mouth Daily., Disp: 30 capsule, Rfl: 0    fluticasone (FLONASE) 50 MCG/ACT nasal spray, Administer 2 sprays into the nostril(s) as directed by provider Daily., Disp: , Rfl:     ondansetron (ZOFRAN) 8 MG tablet, Take 1 tablet by mouth 3 (Three) Times a Day As Needed for Nausea or Vomiting., Disp: 30 tablet, Rfl: 5    [place] 0.6 MG/0.6ML solution auto-injector, As Needed., Disp: , Rfl:   No current facility-administered medications for this visit.    Facility-Administered Medications Ordered in Other Visits:     heparin injection 500 Units, 500 Units, Intravenous, PRNErvin Alfonso, MD, 500 Units at 04/11/25 0831    sodium chloride 0.9 % flush 10 mL, 10 mL, Intravenous, PRN, Oliverio Mueller MD, 10 mL at 04/11/25 0831    Allergies   Allergen Reactions    Cephalosporins Anaphylaxis     Throat swelling    Dye  [Contrast Dye (Echo Or Unknown Ct/Mr)] Hives    Iodinated Contrast Media Hives    Sulfa Antibiotics  Hives    Sulfate Hives    Zetia [Ezetimibe] Other (See Comments) and Myalgia     Made tired    Statins Myalgia     Myalgia and fatique     Family History   Problem Relation Age of Onset    Arthritis Mother     Hypertension Mother     Heart failure Mother 73        Cause of death    Arthritis Father     Hypertension Father     Kidney cancer Father 57        Cause of death. Was a smoker    Heart disease Brother     Esophageal cancer Brother 59        Cause of death. Has a heavy drinker    Malig Hyperthermia Neg Hx      Cancer-related family history includes Esophageal cancer (age of onset: 59) in his brother; Kidney cancer (age of onset: 57) in his father.    Social History     Tobacco Use    Smoking status: Former     Current packs/day: 0.00     Average packs/day: 1 pack/day for 37.0 years (37.0 ttl pk-yrs)     Types: Cigars, Cigarettes     Start date: 1/1/1985     Quit date: 1/1/2022     Years since quitting: 3.2     Passive exposure: Past    Smokeless tobacco: Never    Tobacco comments:     1 packs= stopped 10 years ago and continued cigars   2 cigars a day; has stopped cigars as of 2022   Vaping Use    Vaping status: Never Used   Substance Use Topics    Alcohol use: Not Currently     Comment: Has now been abstinent since 2020    Drug use: Never     Social History     Social History Narrative    Not on file      ROS:     Review of Systems   Constitutional:  Positive for fatigue. Negative for activity change, appetite change, chills, fever and unexpected weight change.   HENT:  Negative for congestion, ear pain, mouth sores, nosebleeds, sore throat and trouble swallowing.    Eyes:  Negative for photophobia and visual disturbance.   Respiratory:  Negative for apnea, cough, choking, chest tightness, shortness of breath, wheezing and stridor.    Cardiovascular:  Negative for chest pain and palpitations.   Gastrointestinal:  Negative for abdominal pain, anal bleeding, blood in stool, constipation, diarrhea, nausea,  "rectal pain and vomiting.   Endocrine: Negative for cold intolerance and heat intolerance.   Genitourinary:  Negative for dysuria and hematuria.   Musculoskeletal:  Negative for back pain, joint swelling and neck stiffness.   Skin:  Negative for color change, pallor, rash and wound.   Neurological:  Negative for dizziness, seizures, syncope, weakness, light-headedness and numbness.   Hematological:  Negative for adenopathy. Does not bruise/bleed easily.        No obvious bleeding   Psychiatric/Behavioral:  Negative for agitation, confusion and hallucinations.      Objective:    Vitals:    04/11/25 0833   BP: 102/65   Pulse: 50   Temp: 98 °F (36.7 °C)   SpO2: 100%   Weight: 70.8 kg (156 lb)   Height: 180.3 cm (70.98\")   PainSc: 0-No pain       Body mass index is 21.77 kg/m².  ECOG  (0) Fully active, able to carry on all predisease performance without restriction    Physical Exam:     Physical Exam  Vitals reviewed.   Constitutional:       General: He is not in acute distress.     Appearance: Normal appearance. He is not ill-appearing, toxic-appearing or diaphoretic.      Comments: Slender, well-built. No distress.      HENT:      Head: Normocephalic and atraumatic.      Right Ear: External ear normal. There is no impacted cerumen.      Left Ear: External ear normal. There is no impacted cerumen.      Nose: Nose normal. No congestion or rhinorrhea.      Mouth/Throat:      Mouth: Mucous membranes are moist.      Pharynx: Oropharynx is clear. No oropharyngeal exudate or posterior oropharyngeal erythema.      Comments: Oral cavity reveals poor dentition and poor dental hygiene.  No mucosal ulcerations are present.  Eyes:      General: No scleral icterus.        Right eye: No discharge.         Left eye: No discharge.      Conjunctiva/sclera: Conjunctivae normal.      Pupils: Pupils are equal, round, and reactive to light.   Neck:      Vascular: No carotid bruit.   Cardiovascular:      Rate and Rhythm: Normal rate and " regular rhythm.      Pulses: Normal pulses.      Heart sounds: Normal heart sounds. No murmur heard.     No friction rub. No gallop.   Pulmonary:      Effort: No respiratory distress.      Breath sounds: No stridor. No wheezing, rhonchi or rales.      Comments: Breath sounds are diminished bilaterally.  Chest:      Chest wall: No tenderness.   Abdominal:      General: Abdomen is flat. Bowel sounds are normal. There is no distension.      Palpations: Abdomen is soft. There is no mass.      Tenderness: There is no abdominal tenderness. There is no right CVA tenderness, left CVA tenderness, guarding or rebound.   Musculoskeletal:         General: No swelling, tenderness, deformity or signs of injury. Normal range of motion.      Cervical back: No rigidity or tenderness.      Right lower leg: No edema.      Left lower leg: No edema.      Comments: There is clubbing of the fingers in both hands   Lymphadenopathy:      Cervical: No cervical adenopathy.   Skin:     General: Skin is warm and dry.      Coloration: Skin is not jaundiced or pale.      Findings: No bruising, erythema, lesion or rash.   Neurological:      General: No focal deficit present.      Mental Status: He is alert and oriented to person, place, and time.      Cranial Nerves: No cranial nerve deficit.      Motor: No weakness.      Coordination: Coordination normal.      Gait: Gait normal.   Psychiatric:         Mood and Affect: Mood normal.         Behavior: Behavior normal.         Thought Content: Thought content normal.         Judgment: Judgment normal.         Lab Results - Last 18 Months   Lab Units 04/11/25  0830 12/04/24  1027 09/17/24  0757   WBC 10*3/mm3 3.76 5.44 3.61   HEMOGLOBIN g/dL 12.3* 13.6 13.8   HEMATOCRIT % 38.3 41.9 43.1   PLATELETS 10*3/mm3 223 290 247   MCV fL 99.7* 100.2* 98.9*     Lab Results - Last 18 Months   Lab Units 12/04/24  1027 09/17/24  0757 08/28/24  1049   SODIUM mmol/L 140 142 141   POTASSIUM mmol/L 4.2 4.2 4.4    CHLORIDE mmol/L 105 105 104   CO2 mmol/L 28.1 27.0 26.8   BUN mg/dL 14 17 19   CREATININE mg/dL 0.81 0.86 0.87   CALCIUM mg/dL 9.3 9.8 9.9   BILIRUBIN mg/dL 0.2 0.3 0.2   ALK PHOS U/L 81 77 78   ALT (SGPT) U/L 28 21 23   AST (SGOT) U/L 23 19 22   GLUCOSE mg/dL 71 79 81     Lab Results   Component Value Date    GLUCOSE 71 12/04/2024    BUN 14 12/04/2024    CREATININE 0.81 12/04/2024    EGFRIFNONA 67 02/24/2022    BCR 17.3 12/04/2024    K 4.2 12/04/2024    CO2 28.1 12/04/2024    CALCIUM 9.3 12/04/2024    ALBUMIN 4.0 12/04/2024    AST 23 12/04/2024    ALT 28 12/04/2024     Lab Results   Component Value Date    FOLATE 4.11 (L) 11/12/2021     Lab Results   Component Value Date    UDPRIWYN57 500 09/17/2024     Uric Acid   Date Value Ref Range Status   09/17/2024 5.5 3.4 - 7.0 mg/dL Final     Lab Results   Component Value Date    SEDRATE 11 04/16/2021     Lab Results   Component Value Date    PSA 0.163 04/06/2021     Assessment & Plan     Assessment and Plan  Adenocarcinoma of the gastroesophageal junction T2N1M0, microsatellite stable, low mutation burden, Her2 positive and PD-L1 expression negative: Completed neoadjuvant concurrent carboplatin and paclitaxel with radiation and underwent [place] type esophagectomy.  Recovered completely and started treatment with nivolumab adjuvantly.  The drug had to be discontinued because of persistent abdominal pain and diarrhea that resolved with steroids.  He chose to receive no additional treatment.  No evidence of recurrent disease clinically or by imaging studies.  Dumping syndrome?  Symptoms improved.  Reviewed recent laboratory exams, office records, imaging reports and discussed with patient and his spouse.  4.  Tobacco smoker: Remains abstinent.  5.   Follow-up with Dr. Mueller in about 4 months with scans, sooner if condition indicates    Electronically signed by Susan Dawson PA-C

## 2025-04-11 ENCOUNTER — OFFICE VISIT (OUTPATIENT)
Dept: ONCOLOGY | Facility: CLINIC | Age: 55
End: 2025-04-11
Payer: COMMERCIAL

## 2025-04-11 ENCOUNTER — HOSPITAL ENCOUNTER (OUTPATIENT)
Dept: ONCOLOGY | Facility: HOSPITAL | Age: 55
Discharge: HOME | End: 2025-04-11
Admitting: INTERNAL MEDICINE
Payer: COMMERCIAL

## 2025-04-11 VITALS
HEIGHT: 71 IN | DIASTOLIC BLOOD PRESSURE: 65 MMHG | TEMPERATURE: 98 F | HEART RATE: 50 BPM | BODY MASS INDEX: 21.84 KG/M2 | OXYGEN SATURATION: 100 % | WEIGHT: 156 LBS | SYSTOLIC BLOOD PRESSURE: 102 MMHG

## 2025-04-11 DIAGNOSIS — Z45.2 ENCOUNTER FOR CARE RELATED TO PORT-A-CATH: Primary | ICD-10-CM

## 2025-04-11 DIAGNOSIS — C15.9 ADENOCARCINOMA OF ESOPHAGUS: Primary | ICD-10-CM

## 2025-04-11 DIAGNOSIS — D64.9 ANEMIA, UNSPECIFIED TYPE: ICD-10-CM

## 2025-04-11 DIAGNOSIS — C15.9 ADENOCARCINOMA OF ESOPHAGUS: ICD-10-CM

## 2025-04-11 DIAGNOSIS — C15.5 MALIGNANT NEOPLASM OF LOWER THIRD OF ESOPHAGUS: ICD-10-CM

## 2025-04-11 DIAGNOSIS — Z95.828 PORT-A-CATH IN PLACE: ICD-10-CM

## 2025-04-11 LAB
ALBUMIN SERPL-MCNC: 3.9 G/DL (ref 3.5–5.2)
ALBUMIN/GLOB SERPL: 1.5 G/DL
ALP SERPL-CCNC: 67 U/L (ref 39–117)
ALT SERPL W P-5'-P-CCNC: 13 U/L (ref 1–41)
ANION GAP SERPL CALCULATED.3IONS-SCNC: 9.3 MMOL/L (ref 5–15)
AST SERPL-CCNC: 20 U/L (ref 1–40)
BASOPHILS # BLD AUTO: 0.03 10*3/MM3 (ref 0–0.2)
BASOPHILS NFR BLD AUTO: 0.8 % (ref 0–1.5)
BILIRUB SERPL-MCNC: 0.2 MG/DL (ref 0–1.2)
BUN SERPL-MCNC: 11 MG/DL (ref 6–20)
BUN/CREAT SERPL: 12.5 (ref 7–25)
CALCIUM SPEC-SCNC: 9.5 MG/DL (ref 8.6–10.5)
CHLORIDE SERPL-SCNC: 103 MMOL/L (ref 98–107)
CO2 SERPL-SCNC: 26.7 MMOL/L (ref 22–29)
CREAT SERPL-MCNC: 0.88 MG/DL (ref 0.76–1.27)
DEPRECATED RDW RBC AUTO: 48.9 FL (ref 37–54)
EGFRCR SERPLBLD CKD-EPI 2021: 103.5 ML/MIN/1.73
EOSINOPHIL # BLD AUTO: 0.18 10*3/MM3 (ref 0–0.4)
EOSINOPHIL NFR BLD AUTO: 4.8 % (ref 0.3–6.2)
ERYTHROCYTE [DISTWIDTH] IN BLOOD BY AUTOMATED COUNT: 13.6 % (ref 12.3–15.4)
GLOBULIN UR ELPH-MCNC: 2.6 GM/DL
GLUCOSE SERPL-MCNC: 78 MG/DL (ref 65–99)
HCT VFR BLD AUTO: 38.3 % (ref 37.5–51)
HGB BLD-MCNC: 12.3 G/DL (ref 13–17.7)
LYMPHOCYTES # BLD AUTO: 1.1 10*3/MM3 (ref 0.7–3.1)
LYMPHOCYTES NFR BLD AUTO: 29.3 % (ref 19.6–45.3)
MCH RBC QN AUTO: 32 PG (ref 26.6–33)
MCHC RBC AUTO-ENTMCNC: 32.1 G/DL (ref 31.5–35.7)
MCV RBC AUTO: 99.7 FL (ref 79–97)
MONOCYTES # BLD AUTO: 0.52 10*3/MM3 (ref 0.1–0.9)
MONOCYTES NFR BLD AUTO: 13.8 % (ref 5–12)
NEUTROPHILS NFR BLD AUTO: 1.93 10*3/MM3 (ref 1.7–7)
NEUTROPHILS NFR BLD AUTO: 51.3 % (ref 42.7–76)
PLATELET # BLD AUTO: 223 10*3/MM3 (ref 140–450)
PMV BLD AUTO: 9 FL (ref 6–12)
POTASSIUM SERPL-SCNC: 4.5 MMOL/L (ref 3.5–5.2)
PROT SERPL-MCNC: 6.5 G/DL (ref 6–8.5)
RBC # BLD AUTO: 3.84 10*6/MM3 (ref 4.14–5.8)
SODIUM SERPL-SCNC: 139 MMOL/L (ref 136–145)
WBC NRBC COR # BLD AUTO: 3.76 10*3/MM3 (ref 3.4–10.8)

## 2025-04-11 PROCEDURE — 36591 DRAW BLOOD OFF VENOUS DEVICE: CPT

## 2025-04-11 PROCEDURE — 99214 OFFICE O/P EST MOD 30 MIN: CPT | Performed by: PHYSICIAN ASSISTANT

## 2025-04-11 PROCEDURE — 85025 COMPLETE CBC W/AUTO DIFF WBC: CPT | Performed by: INTERNAL MEDICINE

## 2025-04-11 PROCEDURE — 80053 COMPREHEN METABOLIC PANEL: CPT | Performed by: INTERNAL MEDICINE

## 2025-04-11 PROCEDURE — 25010000002 HEPARIN LOCK FLUSH PER 10 UNITS: Performed by: INTERNAL MEDICINE

## 2025-04-11 RX ORDER — SODIUM CHLORIDE 0.9 % (FLUSH) 0.9 %
10 SYRINGE (ML) INJECTION AS NEEDED
Status: DISCONTINUED | OUTPATIENT
Start: 2025-04-11 | End: 2025-04-12 | Stop reason: HOSPADM

## 2025-04-11 RX ORDER — HEPARIN SODIUM (PORCINE) LOCK FLUSH IV SOLN 100 UNIT/ML 100 UNIT/ML
500 SOLUTION INTRAVENOUS AS NEEDED
Status: DISCONTINUED | OUTPATIENT
Start: 2025-04-11 | End: 2025-04-12 | Stop reason: HOSPADM

## 2025-04-11 RX ADMIN — HEPARIN 500 UNITS: 100 SYRINGE at 08:31

## 2025-04-11 RX ADMIN — Medication 10 ML: at 08:31

## 2025-04-11 NOTE — PROGRESS NOTES
Pt to port chair for PF and labs prior to appt with KIARA Davis. Port accessed using sterile technique and flushed with good blood return noted. 10cc of blood wasted prior to specimen collection. Blood specimen obtained and sent to lab for processing per protocol.  Port flushed with saline and heparin prior to needle removal. Pt to waiting room.

## 2025-04-14 ENCOUNTER — LAB (OUTPATIENT)
Dept: LAB | Facility: HOSPITAL | Age: 55
End: 2025-04-14
Payer: COMMERCIAL

## 2025-04-14 DIAGNOSIS — E53.8 VITAMIN B12 DEFICIENCY: ICD-10-CM

## 2025-04-14 DIAGNOSIS — E16.2 HYPOGLYCEMIA: ICD-10-CM

## 2025-04-14 DIAGNOSIS — E55.9 VITAMIN D DEFICIENCY: ICD-10-CM

## 2025-04-14 DIAGNOSIS — E78.5 HYPERLIPIDEMIA, UNSPECIFIED HYPERLIPIDEMIA TYPE: ICD-10-CM

## 2025-04-14 DIAGNOSIS — C15.9 ADENOCARCINOMA OF ESOPHAGUS: ICD-10-CM

## 2025-04-14 DIAGNOSIS — D64.9 ANEMIA, UNSPECIFIED TYPE: ICD-10-CM

## 2025-04-14 DIAGNOSIS — F33.1 MAJOR DEPRESSIVE DISORDER, RECURRENT EPISODE, MODERATE: Chronic | ICD-10-CM

## 2025-04-14 DIAGNOSIS — E04.2 MULTIPLE THYROID NODULES: ICD-10-CM

## 2025-04-14 LAB
25(OH)D3 SERPL-MCNC: 18.9 NG/ML (ref 30–100)
ALBUMIN SERPL-MCNC: 3.9 G/DL (ref 3.5–5.2)
ALBUMIN/GLOB SERPL: 1.4 G/DL
ALP SERPL-CCNC: 73 U/L (ref 39–117)
ALT SERPL W P-5'-P-CCNC: 12 U/L (ref 1–41)
ANION GAP SERPL CALCULATED.3IONS-SCNC: 6.1 MMOL/L (ref 5–15)
AST SERPL-CCNC: 16 U/L (ref 1–40)
BASOPHILS # BLD AUTO: 0.03 10*3/MM3 (ref 0–0.2)
BASOPHILS NFR BLD AUTO: 0.7 % (ref 0–1.5)
BILIRUB SERPL-MCNC: 0.2 MG/DL (ref 0–1.2)
BUN SERPL-MCNC: 11 MG/DL (ref 6–20)
BUN/CREAT SERPL: 12.2 (ref 7–25)
CALCIUM SPEC-SCNC: 9.2 MG/DL (ref 8.6–10.5)
CHLORIDE SERPL-SCNC: 103 MMOL/L (ref 98–107)
CHOLEST SERPL-MCNC: 223 MG/DL (ref 0–200)
CO2 SERPL-SCNC: 26.9 MMOL/L (ref 22–29)
CREAT SERPL-MCNC: 0.9 MG/DL (ref 0.76–1.27)
DEPRECATED RDW RBC AUTO: 49.7 FL (ref 37–54)
EGFRCR SERPLBLD CKD-EPI 2021: 102.8 ML/MIN/1.73
EOSINOPHIL # BLD AUTO: 0.19 10*3/MM3 (ref 0–0.4)
EOSINOPHIL NFR BLD AUTO: 4.7 % (ref 0.3–6.2)
ERYTHROCYTE [DISTWIDTH] IN BLOOD BY AUTOMATED COUNT: 13.5 % (ref 12.3–15.4)
FERRITIN SERPL-MCNC: 82.8 NG/ML (ref 30–400)
FOLATE SERPL-MCNC: 3.99 NG/ML (ref 4.78–24.2)
GLOBULIN UR ELPH-MCNC: 2.7 GM/DL
GLUCOSE SERPL-MCNC: 84 MG/DL (ref 65–99)
HBA1C MFR BLD: 5.85 % (ref 4.8–5.6)
HCT VFR BLD AUTO: 40.7 % (ref 37.5–51)
HDLC SERPL-MCNC: 79 MG/DL (ref 40–60)
HGB BLD-MCNC: 13 G/DL (ref 13–17.7)
IMM GRANULOCYTES # BLD AUTO: 0.01 10*3/MM3 (ref 0–0.05)
IMM GRANULOCYTES NFR BLD AUTO: 0.2 % (ref 0–0.5)
IRON 24H UR-MRATE: 82 MCG/DL (ref 59–158)
IRON SATN MFR SERPL: 24 % (ref 20–50)
LDLC SERPL CALC-MCNC: 129 MG/DL (ref 0–100)
LDLC/HDLC SERPL: 1.61 {RATIO}
LYMPHOCYTES # BLD AUTO: 1.1 10*3/MM3 (ref 0.7–3.1)
LYMPHOCYTES NFR BLD AUTO: 27.3 % (ref 19.6–45.3)
MAGNESIUM SERPL-MCNC: 2.2 MG/DL (ref 1.6–2.6)
MCH RBC QN AUTO: 31.7 PG (ref 26.6–33)
MCHC RBC AUTO-ENTMCNC: 31.9 G/DL (ref 31.5–35.7)
MCV RBC AUTO: 99.3 FL (ref 79–97)
MONOCYTES # BLD AUTO: 0.44 10*3/MM3 (ref 0.1–0.9)
MONOCYTES NFR BLD AUTO: 10.9 % (ref 5–12)
NEUTROPHILS NFR BLD AUTO: 2.26 10*3/MM3 (ref 1.7–7)
NEUTROPHILS NFR BLD AUTO: 56.2 % (ref 42.7–76)
NRBC BLD AUTO-RTO: 0 /100 WBC (ref 0–0.2)
PLATELET # BLD AUTO: 275 10*3/MM3 (ref 140–450)
PMV BLD AUTO: 9.2 FL (ref 6–12)
POTASSIUM SERPL-SCNC: 4.1 MMOL/L (ref 3.5–5.2)
PROT SERPL-MCNC: 6.6 G/DL (ref 6–8.5)
RBC # BLD AUTO: 4.1 10*6/MM3 (ref 4.14–5.8)
SODIUM SERPL-SCNC: 136 MMOL/L (ref 136–145)
TIBC SERPL-MCNC: 344 MCG/DL (ref 298–536)
TRANSFERRIN SERPL-MCNC: 231 MG/DL (ref 200–360)
TRIGL SERPL-MCNC: 84 MG/DL (ref 0–150)
TSH SERPL DL<=0.05 MIU/L-ACNC: 1.55 UIU/ML (ref 0.27–4.2)
VIT B12 BLD-MCNC: 466 PG/ML (ref 211–946)
VLDLC SERPL-MCNC: 15 MG/DL (ref 5–40)
WBC NRBC COR # BLD AUTO: 4.03 10*3/MM3 (ref 3.4–10.8)

## 2025-04-14 PROCEDURE — 82746 ASSAY OF FOLIC ACID SERUM: CPT

## 2025-04-14 PROCEDURE — 83036 HEMOGLOBIN GLYCOSYLATED A1C: CPT

## 2025-04-14 PROCEDURE — 80050 GENERAL HEALTH PANEL: CPT

## 2025-04-14 PROCEDURE — 82607 VITAMIN B-12: CPT

## 2025-04-14 PROCEDURE — 83735 ASSAY OF MAGNESIUM: CPT

## 2025-04-14 PROCEDURE — 84466 ASSAY OF TRANSFERRIN: CPT

## 2025-04-14 PROCEDURE — 82306 VITAMIN D 25 HYDROXY: CPT

## 2025-04-14 PROCEDURE — 83540 ASSAY OF IRON: CPT

## 2025-04-14 PROCEDURE — 80061 LIPID PANEL: CPT

## 2025-04-14 PROCEDURE — 82728 ASSAY OF FERRITIN: CPT

## 2025-04-14 PROCEDURE — 36415 COLL VENOUS BLD VENIPUNCTURE: CPT

## 2025-04-14 NOTE — PROGRESS NOTES
Total and bad cholesterol levels are both elevated if you have done all you can with diet and exercise we should ask your hematology oncology doctors if it is okay to consider a statin if you agree.  Please call them and make sure that is okay and then we would be glad to prescribe.  Vitamin D is also slightly low so increase to 1000 units daily over-the-counter call if any other questions or concerns and let us know about the above

## 2025-04-16 RX ORDER — ALIROCUMAB 75 MG/ML
75 INJECTION, SOLUTION SUBCUTANEOUS
Qty: 6 ML | Refills: 0 | Status: SHIPPED | OUTPATIENT
Start: 2025-04-16

## 2025-04-17 ENCOUNTER — PRIOR AUTHORIZATION (OUTPATIENT)
Dept: FAMILY MEDICINE CLINIC | Facility: CLINIC | Age: 55
End: 2025-04-17
Payer: COMMERCIAL

## 2025-04-18 ENCOUNTER — PRIOR AUTHORIZATION (OUTPATIENT)
Dept: FAMILY MEDICINE CLINIC | Facility: CLINIC | Age: 55
End: 2025-04-18
Payer: COMMERCIAL

## 2025-05-27 ENCOUNTER — TELEPHONE (OUTPATIENT)
Dept: SURGERY | Facility: CLINIC | Age: 55
End: 2025-05-27
Payer: COMMERCIAL

## 2025-05-27 NOTE — TELEPHONE ENCOUNTER
THE PATIENT CALLED AND CHANGED HIS DATE FOR THE PROCEDURE TO 7/22/25 ARRIVING AT 11:00 AM.  HE WILL CALL BACK IF THIS DOES NOT WORK.

## 2025-06-05 DIAGNOSIS — F33.1 MAJOR DEPRESSIVE DISORDER, RECURRENT EPISODE, MODERATE: Chronic | ICD-10-CM

## 2025-06-05 DIAGNOSIS — F41.1 GENERALIZED ANXIETY DISORDER: Chronic | ICD-10-CM

## 2025-06-06 ENCOUNTER — HOSPITAL ENCOUNTER (OUTPATIENT)
Dept: ONCOLOGY | Facility: HOSPITAL | Age: 55
Discharge: HOME | End: 2025-06-06
Payer: COMMERCIAL

## 2025-06-06 DIAGNOSIS — Z45.2 ENCOUNTER FOR CARE RELATED TO PORT-A-CATH: Primary | ICD-10-CM

## 2025-06-06 PROCEDURE — 25010000002 HEPARIN LOCK FLUSH PER 10 UNITS: Performed by: INTERNAL MEDICINE

## 2025-06-06 PROCEDURE — 96523 IRRIG DRUG DELIVERY DEVICE: CPT

## 2025-06-06 RX ORDER — ALPRAZOLAM 0.25 MG
0.25 TABLET ORAL 2 TIMES DAILY PRN
Qty: 60 TABLET | Refills: 0 | Status: SHIPPED | OUTPATIENT
Start: 2025-06-06

## 2025-06-06 RX ORDER — SODIUM CHLORIDE 0.9 % (FLUSH) 0.9 %
10 SYRINGE (ML) INJECTION AS NEEDED
Status: DISCONTINUED | OUTPATIENT
Start: 2025-06-06 | End: 2025-06-07 | Stop reason: HOSPADM

## 2025-06-06 RX ORDER — HEPARIN SODIUM (PORCINE) LOCK FLUSH IV SOLN 100 UNIT/ML 100 UNIT/ML
500 SOLUTION INTRAVENOUS AS NEEDED
Status: DISCONTINUED | OUTPATIENT
Start: 2025-06-06 | End: 2025-06-07 | Stop reason: HOSPADM

## 2025-06-06 RX ADMIN — HEPARIN 500 UNITS: 100 SYRINGE at 08:22

## 2025-06-06 RX ADMIN — Medication 10 ML: at 08:22

## 2025-06-06 NOTE — PROGRESS NOTES
Pt to port chair for MPF. Port accessed using sterile technique and flushed with good blood return noted. No labs collected. Port flushed with saline and heparin prior to needle removal. Message sent to scheduling to move 6/13 appt to July. Pt aware of change to be made and discharged.

## 2025-06-06 NOTE — TELEPHONE ENCOUNTER
Rx Refill Note  Requested Prescriptions     Pending Prescriptions Disp Refills    ALPRAZolam (XANAX) 0.25 MG tablet [Pharmacy Med Name: ALPRAZolam 0.25 MG TABLET] 60 tablet      Sig: TAKE 1 TABLET BY MOUTH 2 TIMES A DAY AS NEEDED FOR ANXIETY        Last office visit with prescribing clinician: 3/26/2025     Next office visit with prescribing clinician: Visit date not found     Office Visit with Clair Lyman MD (03/26/2025)     BEHAVIORAL HEALTH - SCAN - Inspect report, Y.Esmer, 6/6/25 (06/06/2025)     Inspect Fill 3/25/25    Mirna Arambula  06/06/25, 09:27 EDT

## 2025-06-27 DIAGNOSIS — E78.5 HYPERLIPIDEMIA, UNSPECIFIED HYPERLIPIDEMIA TYPE: ICD-10-CM

## 2025-06-27 RX ORDER — ALIROCUMAB 75 MG/ML
75 INJECTION, SOLUTION SUBCUTANEOUS
Qty: 6 ML | Refills: 0 | Status: SHIPPED | OUTPATIENT
Start: 2025-06-27 | End: 2025-06-30 | Stop reason: SDUPTHER

## 2025-06-27 NOTE — TELEPHONE ENCOUNTER
Rx Refill Note  Requested Prescriptions     Pending Prescriptions Disp Refills    Alirocumab (Praluent) 75 MG/ML solution auto-injector 6 mL 0     Sig: Inject 1 mL under the skin into the appropriate area as directed Every 14 (Fourteen) Days.      Last office visit with prescribing clinician: 3/14/2025   Last telemedicine visit with prescribing clinician: Visit date not found   Next office visit with prescribing clinician: 9/15/2025                         Would you like a call back once the refill request has been completed: [] Yes [] No    If the office needs to give you a call back, can they leave a voicemail: [] Yes [] No    Ratna King MA  06/27/25, 08:28 EDT

## 2025-06-30 DIAGNOSIS — E78.5 HYPERLIPIDEMIA, UNSPECIFIED HYPERLIPIDEMIA TYPE: ICD-10-CM

## 2025-06-30 RX ORDER — ALIROCUMAB 75 MG/ML
75 INJECTION, SOLUTION SUBCUTANEOUS
Qty: 6 ML | Refills: 0 | Status: SHIPPED | OUTPATIENT
Start: 2025-06-30

## 2025-07-08 RX ORDER — FAMOTIDINE 40 MG/1
40 TABLET, FILM COATED ORAL DAILY
COMMUNITY

## 2025-07-11 ENCOUNTER — HOSPITAL ENCOUNTER (OUTPATIENT)
Dept: ONCOLOGY | Facility: HOSPITAL | Age: 55
Discharge: HOME | End: 2025-07-11
Payer: COMMERCIAL

## 2025-07-11 ENCOUNTER — HOSPITAL ENCOUNTER (OUTPATIENT)
Dept: PET IMAGING | Facility: HOSPITAL | Age: 55
Discharge: HOME | End: 2025-07-11
Admitting: PHYSICIAN ASSISTANT
Payer: COMMERCIAL

## 2025-07-11 DIAGNOSIS — C15.9 ADENOCARCINOMA OF ESOPHAGUS: ICD-10-CM

## 2025-07-11 DIAGNOSIS — Z45.2 ENCOUNTER FOR CARE RELATED TO PORT-A-CATH: Primary | ICD-10-CM

## 2025-07-11 PROCEDURE — 25010000002 HEPARIN LOCK FLUSH PER 10 UNITS: Performed by: NURSE PRACTITIONER

## 2025-07-11 PROCEDURE — 71250 CT THORAX DX C-: CPT

## 2025-07-11 PROCEDURE — 96523 IRRIG DRUG DELIVERY DEVICE: CPT

## 2025-07-11 PROCEDURE — 74176 CT ABD & PELVIS W/O CONTRAST: CPT

## 2025-07-11 RX ORDER — SODIUM CHLORIDE 0.9 % (FLUSH) 0.9 %
10 SYRINGE (ML) INJECTION AS NEEDED
Status: DISCONTINUED | OUTPATIENT
Start: 2025-07-11 | End: 2025-07-12 | Stop reason: HOSPADM

## 2025-07-11 RX ORDER — HEPARIN SODIUM (PORCINE) LOCK FLUSH IV SOLN 100 UNIT/ML 100 UNIT/ML
500 SOLUTION INTRAVENOUS AS NEEDED
Status: DISCONTINUED | OUTPATIENT
Start: 2025-07-11 | End: 2025-07-12 | Stop reason: HOSPADM

## 2025-07-11 RX ADMIN — Medication 10 ML: at 07:45

## 2025-07-11 RX ADMIN — HEPARIN 500 UNITS: 100 SYRINGE at 07:45

## 2025-07-11 NOTE — PROGRESS NOTES
Port accessed and flushed with good blood return noted. No labs collected. Port flushed with saline and heparin prior to needle removal. Pt tolerated well.

## 2025-07-22 ENCOUNTER — ANESTHESIA (OUTPATIENT)
Dept: GASTROENTEROLOGY | Facility: HOSPITAL | Age: 55
End: 2025-07-22
Payer: COMMERCIAL

## 2025-07-22 ENCOUNTER — HOSPITAL ENCOUNTER (OUTPATIENT)
Facility: HOSPITAL | Age: 55
Setting detail: HOSPITAL OUTPATIENT SURGERY
Discharge: HOME | End: 2025-07-22
Attending: SURGERY | Admitting: SURGERY
Payer: COMMERCIAL

## 2025-07-22 ENCOUNTER — ANESTHESIA EVENT (OUTPATIENT)
Dept: GASTROENTEROLOGY | Facility: HOSPITAL | Age: 55
End: 2025-07-22
Payer: COMMERCIAL

## 2025-07-22 VITALS
OXYGEN SATURATION: 100 % | HEIGHT: 71 IN | DIASTOLIC BLOOD PRESSURE: 69 MMHG | WEIGHT: 144 LBS | HEART RATE: 72 BPM | SYSTOLIC BLOOD PRESSURE: 109 MMHG | RESPIRATION RATE: 15 BRPM | TEMPERATURE: 97.8 F | BODY MASS INDEX: 20.16 KG/M2

## 2025-07-22 DIAGNOSIS — C15.9 MALIGNANT NEOPLASM OF ESOPHAGUS, UNSPECIFIED LOCATION: Primary | ICD-10-CM

## 2025-07-22 PROCEDURE — 25010000002 PROPOFOL 200 MG/20ML EMULSION: Performed by: NURSE ANESTHETIST, CERTIFIED REGISTERED

## 2025-07-22 PROCEDURE — S0260 H&P FOR SURGERY: HCPCS | Performed by: SURGERY

## 2025-07-22 PROCEDURE — 25810000003 SODIUM CHLORIDE 0.9 % SOLUTION: Performed by: NURSE ANESTHETIST, CERTIFIED REGISTERED

## 2025-07-22 PROCEDURE — 25010000002 LIDOCAINE PF 2% 2 % SOLUTION: Performed by: NURSE ANESTHETIST, CERTIFIED REGISTERED

## 2025-07-22 PROCEDURE — 25010000002 ONDANSETRON PER 1 MG: Performed by: NURSE ANESTHETIST, CERTIFIED REGISTERED

## 2025-07-22 PROCEDURE — 43249 ESOPH EGD DILATION <30 MM: CPT | Performed by: SURGERY

## 2025-07-22 PROCEDURE — 25010000002 GLYCOPYRROLATE 0.2 MG/ML SOLUTION: Performed by: NURSE ANESTHETIST, CERTIFIED REGISTERED

## 2025-07-22 PROCEDURE — C1726 CATH, BAL DIL, NON-VASCULAR: HCPCS | Performed by: SURGERY

## 2025-07-22 PROCEDURE — 25010000002 SUGAMMADEX 200 MG/2ML SOLUTION: Performed by: NURSE ANESTHETIST, CERTIFIED REGISTERED

## 2025-07-22 PROCEDURE — 25010000002 SUCCINYLCHOLINE PER 20 MG: Performed by: NURSE ANESTHETIST, CERTIFIED REGISTERED

## 2025-07-22 RX ORDER — LIDOCAINE HYDROCHLORIDE 20 MG/ML
INJECTION, SOLUTION EPIDURAL; INFILTRATION; INTRACAUDAL; PERINEURAL AS NEEDED
Status: DISCONTINUED | OUTPATIENT
Start: 2025-07-22 | End: 2025-07-22 | Stop reason: SURG

## 2025-07-22 RX ORDER — ROCURONIUM BROMIDE 10 MG/ML
INJECTION, SOLUTION INTRAVENOUS AS NEEDED
Status: DISCONTINUED | OUTPATIENT
Start: 2025-07-22 | End: 2025-07-22 | Stop reason: SURG

## 2025-07-22 RX ORDER — DIPHENHYDRAMINE HYDROCHLORIDE 50 MG/ML
12.5 INJECTION, SOLUTION INTRAMUSCULAR; INTRAVENOUS
Status: DISCONTINUED | OUTPATIENT
Start: 2025-07-22 | End: 2025-07-22 | Stop reason: HOSPADM

## 2025-07-22 RX ORDER — SODIUM CHLORIDE 9 MG/ML
INJECTION, SOLUTION INTRAVENOUS CONTINUOUS PRN
Status: DISCONTINUED | OUTPATIENT
Start: 2025-07-22 | End: 2025-07-22 | Stop reason: SURG

## 2025-07-22 RX ORDER — MEPERIDINE HYDROCHLORIDE 25 MG/ML
12.5 INJECTION INTRAMUSCULAR; INTRAVENOUS; SUBCUTANEOUS
Status: DISCONTINUED | OUTPATIENT
Start: 2025-07-22 | End: 2025-07-22 | Stop reason: HOSPADM

## 2025-07-22 RX ORDER — IPRATROPIUM BROMIDE AND ALBUTEROL SULFATE 2.5; .5 MG/3ML; MG/3ML
3 SOLUTION RESPIRATORY (INHALATION) ONCE AS NEEDED
Status: DISCONTINUED | OUTPATIENT
Start: 2025-07-22 | End: 2025-07-22 | Stop reason: HOSPADM

## 2025-07-22 RX ORDER — HYDRALAZINE HYDROCHLORIDE 20 MG/ML
5 INJECTION INTRAMUSCULAR; INTRAVENOUS
Status: DISCONTINUED | OUTPATIENT
Start: 2025-07-22 | End: 2025-07-22 | Stop reason: HOSPADM

## 2025-07-22 RX ORDER — GLYCOPYRROLATE 0.2 MG/ML
INJECTION INTRAMUSCULAR; INTRAVENOUS AS NEEDED
Status: DISCONTINUED | OUTPATIENT
Start: 2025-07-22 | End: 2025-07-22 | Stop reason: SURG

## 2025-07-22 RX ORDER — EPHEDRINE SULFATE 5 MG/ML
5 INJECTION INTRAVENOUS ONCE AS NEEDED
Status: DISCONTINUED | OUTPATIENT
Start: 2025-07-22 | End: 2025-07-22 | Stop reason: HOSPADM

## 2025-07-22 RX ORDER — LABETALOL HYDROCHLORIDE 5 MG/ML
5 INJECTION, SOLUTION INTRAVENOUS
Status: DISCONTINUED | OUTPATIENT
Start: 2025-07-22 | End: 2025-07-22 | Stop reason: HOSPADM

## 2025-07-22 RX ORDER — ONDANSETRON 2 MG/ML
INJECTION INTRAMUSCULAR; INTRAVENOUS AS NEEDED
Status: DISCONTINUED | OUTPATIENT
Start: 2025-07-22 | End: 2025-07-22 | Stop reason: SURG

## 2025-07-22 RX ORDER — SUCCINYLCHOLINE CHLORIDE 20 MG/ML
INJECTION INTRAMUSCULAR; INTRAVENOUS AS NEEDED
Status: DISCONTINUED | OUTPATIENT
Start: 2025-07-22 | End: 2025-07-22 | Stop reason: SURG

## 2025-07-22 RX ORDER — PROPOFOL 10 MG/ML
INJECTION, EMULSION INTRAVENOUS AS NEEDED
Status: DISCONTINUED | OUTPATIENT
Start: 2025-07-22 | End: 2025-07-22 | Stop reason: SURG

## 2025-07-22 RX ORDER — ONDANSETRON 2 MG/ML
4 INJECTION INTRAMUSCULAR; INTRAVENOUS ONCE AS NEEDED
Status: DISCONTINUED | OUTPATIENT
Start: 2025-07-22 | End: 2025-07-22 | Stop reason: HOSPADM

## 2025-07-22 RX ADMIN — ONDANSETRON 4 MG: 2 INJECTION, SOLUTION INTRAMUSCULAR; INTRAVENOUS at 13:21

## 2025-07-22 RX ADMIN — SUCCINYLCHOLINE CHLORIDE 100 MG: 20 INJECTION, SOLUTION INTRAMUSCULAR; INTRAVENOUS at 13:11

## 2025-07-22 RX ADMIN — GLYCOPYRROLATE 0.2 MG: 0.2 INJECTION INTRAMUSCULAR; INTRAVENOUS at 13:09

## 2025-07-22 RX ADMIN — SUGAMMADEX 200 MG: 100 INJECTION, SOLUTION INTRAVENOUS at 13:27

## 2025-07-22 RX ADMIN — ROCURONIUM BROMIDE 10 MG: 10 INJECTION, SOLUTION INTRAVENOUS at 13:15

## 2025-07-22 RX ADMIN — LIDOCAINE HYDROCHLORIDE 80 MG: 20 INJECTION, SOLUTION EPIDURAL; INFILTRATION; INTRACAUDAL; PERINEURAL at 13:06

## 2025-07-22 RX ADMIN — PROPOFOL 200 MG: 10 INJECTION, EMULSION INTRAVENOUS at 13:11

## 2025-07-22 RX ADMIN — SODIUM CHLORIDE: 9 INJECTION, SOLUTION INTRAVENOUS at 13:06

## 2025-07-22 NOTE — H&P
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    Subjective   HISTORY OF PRESENTING ILLNESS:   Jourdan Lebron is a 54-year-old male who has significant medical problems as mentioned in the medical chart.      He was having intermittent dysphagia for over a year which became progressively worse.  On 10/2/2023, he underwent EGD and colonoscopy by Dr. Ozzy Abdalla at [facility].  He was found to have a 6 cm mass at the lower third of the esophagus (36 to 42 cm) (biopsied), mild diverticulosis of the sigmoid colon, 5 polyps noted in the colon and cecum that were removed.  The pathology of the esophageal mass revealed invasive moderate to poorly differentiated adenocarcinoma. All polypectomy samples were negative for malignancy (fragments of tubular adenoma).  CT scan of the chest, abdomen and pelvis on 10/9/2023 at [facility] showed 4 cm abnormal thickening of the lower thoracic esophagus extending to the GE junction thought to represent patient's known primary lung malignancy.  There were no convincing evidence of metastatic disease elsewhere in the chest, abdomen, pelvis, or skeleton.  There were stable left adrenalectomy changes, right adrenal adenoma unchanged.     He was seen in the office by Dr. Tacos Osuna ([facility]) for new diagnosis of esophageal cancer. He was an established patient of Dr. Harinder Mueller for erythrocytosis and macrocytosis since 11/2021 (resolved).      PET/CT on 10/20/2023 at [facility] showed hypermetabolic (SUV 7.7) activity within the distal esophagus extending to the GE junction compatible with patient's known malignancy.  He then underwent EGD with EUS by Dr. Joselyn Savage on 10/27/2023. Findings included close to 6 cm ulcerated mass extending from 36 to 42 cm consistent with poorly  differentiated adenocarcinoma.  Mass penetrated through muscularis propria without involving the adventitia consistent with T2 lesion. Two small round hypodense lymph nodes in close proximity to the mass measuring 5 and 6 mm concerning for malignancy. Other lymph nodes around the GE junction measuring 7 and 8 mm were also concerning for malignancy but immediate cytology was negative for malignancy. Pathology of the gastrohepatic lymph node was positive for metastatic adenocarcinoma; however, the lymph node at 36 cm was negative for malignancy.     He was referred to thoracic surgery for further evaluation.  At the time of initial consultation, he could eat and swallow quite a bit, but it depends. He avoids a lot of bread because it gets stuck lower down his chest. He went down from 200 pounds to 170 pounds 3 years ago after he had a stroke. He had memory loss and general fatigue after the stroke. He lost his appetite but began to add some weight. He then noticed he gets bloated and was having dyspepsia. He had adrenalectomy performed at the [facility] after an adrenal biopsy was done on an adrenal mass, and he went into adrenal crisis. He took hydrocortisone for 1 year and discontinued it on 09/2022. He developed abdominal discomfort afterwards. He denies having any past chest surgeries or myocardial infarction.  His wife mentioned the cause of the previous stroke was never known despite having a complete work up and a loop recorder inserted which was removed in the spring. He denies any abnormal heart rhythms or pedal edema. The patient quit smoking cigarettes 8 to 9 years ago and started smoking cigars.      He had good functional status.  Based on the clinical presentation, he was considered a candidate for trimodality treatment. I placed Mediport on 11/13/2023.  He received concurrent chemoradiation therapy.  His last dose of chemotherapy and radiation was on 12/28/2023. He tolerated chemoradiation  without much difficulty.  Restaging PET/CT scan on 2/2/2024 showed wall thickening involving the distal esophagus extending to the GE junction consistent with patient's known esophageal malignancy.  There was no evidence of distant metastases.  There was significant decreased uptake in the distal esophagus consistent with treatment effect.     Due to his history of stroke, ultrasound bilateral carotid were obtained which showed no significant carotid stenosis.  There was incidentally noted right thyroid nodule for which follow-up ultrasound of the thyroid was done which showed multiple nodules but not concerning for malignancy.  Transthoracic echo was performed on 1/22/2024 and noted ejection fraction of 51 to 55%.  He was sent to Dr. Hayward for cardiology clearance.  He had 0.8% risk of myocardial infarction or cardiac arrest, intraoperatively or up to 30 days postoperatively.  No further workup was recommended.  He had a history of adrenal crisis and was sent to evaluation by his primary endocrinologist at [facility], Anabela Crenshaw MD.  Complete ACTH stimulation test was performed which was reported normal.     On 2/9/2024, he underwent surgical resection. Flexible esophagogastroduodenoscopy was notable for mucosal changes at the distal esophagus and Olivera's esophagus.  These findings were consistent with treatment effect. Rather than a gastric emptying procedure, I performed a balloon dilatation of the pylorus to 20 mm with a CRE balloon I performed a robot-assisted minimally-invasive [place] Shayne esophagectomy via a robot-assisted laparoscopic and robot assisted right thoracoscopic approach converted to thoracotomy. I constructed a 4 cm gastric tube and performed an esophagogastrostomy approximately 2 cm above the azygos vein using a 28 EEA stapler.  After firing the stapler, 1 complete esophageal anastomotic ring and a partial gastric anastomotic ring was noted. The anastomosis was  checked under water with air insufflation and leak was identified which was oversewn. The anastomosis was partially imbricated and no leak was identified under water with air insufflation afterwards. A feeding jejunostomy tube was placed 40 cm distal to ligament of Treitz. Repeat endoscopy was notable for a widely patent anastomosis at 22-24 cm.  The conduit was healthy appearing.  It was mildly tortuous and non redundant.  He remained hemodynamically stable throughout the case.  He received 4000 cc of crystalloid and made 1000 cc of urine.  He required small doses of phenylephrine throughout the case.  He was extubated at the end of procedure.       The pathology reported scattered foci of residual moderate to poorly differentiated adenocarcinoma occurring at the GE junction with changes consistent with prior therapy.  There were scattered individual tumor cells and small clusters near GE junction and extending through muscularis propria into adventitia spanning an area of 2.1 x 1.8 x 0.7 cm.  There is no lymphovascular space invasion.  The proximal and distal surgical margins were negative for malignancy.  20 lymph nodes were negative for metastatic carcinoma. There was also presence of low and high-grade squamous dysplasia.  He had near complete response to chemo and radiation therapy.     Postprocedure he was transferred to the ICU.  His initial postop care was unremarkable.  He was started on tube feeds and was slowly advanced to goal rate which he tolerated well.  He demonstrated return of bowel function.  The chest tube was removed after amylase from the drain was checked which was not concerning for esophageal leak.  His white blood count were slowly trending up and had mild leukocytosis.  This was concerning for unidentified infection.  He had mild cellulitis around the jejunostomy tube site.  He was started on empiric antibiotics.  Infectious disease was consulted.  CT scan of the chest abdomen pelvis  without contrast did not reveal any clear source of infection.  I offered EGD to evaluate for anastomotic leak and conduit.     On 2/17/2024, he underwent EGD.  He had oropharyngeal candidiasis.  There was approximately 20% defect at the anastomotic ring likely contained by the reinforcing stitch placed intraoperatively.  There was fibrinous plaque covering the anastomosis.  Intraoperative esophagram revealed blind pouch anteriorly at the site of anastomotic defect.  The gastric conduit appeared viable but was slightly redundant.  The conduit orientation was straight.  I placed a 18 mm X 25 mm X 103 mm fully covered Wallflex stent deployed at the anastomosis.  He tolerated the procedure well.     Esophagram performed on 2/22/2024 and was negative for leak, but did demonstrate retrograde flow of contrast along the distal aspect of the stent making him high risk for anastomotic leak with oral feeds.  He was kept nothing by mouth and discharged home on tube feeds.     He slowly improved over time.  His appetite improved.  He was able to tolerate liquid diet and slowly advance to soft diet. He was started on adjuvant immunotherapy.  He developed dysphagia and underwent EGD on 4/5/2024, 9/20/2024, 12/20/2024.  The esophagus was stricture and serial CRE balloon dilation was performed.    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    Brain fog     COVID 02/2023    Diverticulosis of large intestine without perforation or abscess without bleeding 10/02/2023    Generalized headaches     Hand tingling     History of blood clot in brain 2020    had thrombectomy    History of cancer chemotherapy 12/2023    History of kidney stones     History of radiation therapy 11/2023    Hyperlipidemia     Jejunostomy tube present     Port-A-Cath in place     Thyroid  nodule     Trouble swallowing        Past Surgical History:   Procedure Laterality Date    ADRENALECTOMY      COLONOSCOPY      ENDOSCOPY      ENDOSCOPY N/A 02/17/2024    Procedure: ESOPHAGOGASTRODUODENOSCOPY WITH STENTING UNDER FLUROSCOPY GUIDENCE WITH ESOPHOGRAM;  Surgeon: Saurabh Hurtado MD;  Location: [facility] OR;  Service: Gastroenterology;  Laterality: N/A;    ENDOSCOPY N/A 3/8/2024    Procedure: ESOPHAGOGASTRODUODENOSCOPY WITH STENT REMOVAL;  Surgeon: Saurabh Hurtado MD;  Location: [facility] ENDOSCOPY;  Service: Gastroenterology;  Laterality: N/A;  Esophageal leak    ENDOSCOPY N/A 4/5/2024    Procedure: ESOPHAGOGASTRODUODENOSCOPY WITH BALLOON DILATATION #6, #7, #8, #9, #10, #11, #12  AND GASTROGRAFIN WITH FLOURO;  Surgeon: Saurabh Hurtado MD;  Location: [facility] ENDOSCOPY;  Service: Gastroenterology;  Laterality: N/A;  PRE OP - ESOPHAGEAL CANCER  POST OP - ANASTOMOSIS STRICTURE    ENDOSCOPY N/A 5/7/2024    Procedure: ESOPHAGOGASTRODUODENOSCOPY WITH DILATATION (BALLOON 10MM-12MM, 12MM-15MM, 15MM-18MM,UP TO 17MM) AND INTRAOPERATIVE ESOPHAGRAM;  Surgeon: Saurabh Hurtado MD;  Location: [facility] ENDOSCOPY;  Service: Gastroenterology;  Laterality: N/A;    ENDOSCOPY N/A 6/18/2024    Procedure: ESOPHAGOGASTRODUODENOSCOPY WITH balloon dilatation (10mm-12mm up to 12mm;15mm-18mm up to 18mm) and removal of PEG tube;  Surgeon: Saurabh Hurtado MD;  Location: [facility] ENDOSCOPY;  Service: Gastroenterology;  Laterality: N/A;    ENDOSCOPY N/A 7/30/2024    Procedure: ESOPHAGOGASTRODUODENOSCOPY with balloon dialtion(15mm-18mm balloon up to 18mm);  Surgeon: Saurabh Hurtado MD;  Location: [facility] ENDOSCOPY;  Service: Gastroenterology;  Laterality: N/A;    ENDOSCOPY N/A 9/24/2024    Procedure: ESOPHAGOGASTRODUODENOSCOPY with balloon dialtion of esophagus (15mm-18mm balloon up to 18mm);  Surgeon: Saurabh Hurtado MD;  Location: [facility] ENDOSCOPY;  Service: Gastroenterology;  Laterality: N/A;    ENDOSCOPY N/A 12/20/2024    Procedure: ESOPHAGOGASTRODUODENOSCOPY  WITH DILATION (BALLOON 18 TO 19);  Surgeon: Saurabh Hurtado MD;  Location: [facility] ENDOSCOPY;  Service: Gastroenterology;  Laterality: N/A;  ESOPHOAGEAL STRICTURE    ESOPHAGOGASTRECTOMY N/A 02/09/2024    Procedure: BRONCHOSCOPY, EGD, ESOPHAGECTOMY ALTAGRACIA-FABIOLA WITH DAVINCI ROBOT, LAPAROSCOPY, RIGHT THORACOSCOPY CONVERTED TO THORACOTOMY, FEEDING JEJUNOSTOMY TUBE PLACEMENT, INTERCOSTAL NERVE BLOCKS;  Surgeon: Saurabh Hurtado MD;  Location: [facility] MAIN OR;  Service: Robotics - DaVinci;  Laterality: N/A;    ESOPHAGUS SURGERY  10/2023    biopsy    KIDNEY STONE SURGERY      OTHER SURGICAL HISTORY  2020    loop recorder PLACED AND REMOVED    THROMBECTOMY  2020    UPPER ENDOSCOPIC ULTRASOUND W/ FNA N/A 10/27/2023    Procedure: ENDOSCOPIC ULTRASOUND WITH STAGING AND FINE NEEDLE ASPIRATION X2 AREAS;  Surgeon: Joselyn Savage MD;  Location: [facility] ENDOSCOPY;  Service: Gastroenterology;  Laterality: N/A;  POST:    VENOUS ACCESS DEVICE (PORT) INSERTION Right 11/13/2023    Procedure: INSERTION VENOUS ACCESS DEVICE;  Surgeon: Saurabh Hurtado MD;  Location: [facility] MAIN OR;  Service: Thoracic;  Laterality: Right;       Family History   Problem Relation Age of Onset    Arthritis Mother     Hypertension Mother     Heart failure Mother 73        Cause of death    Arthritis Father     Hypertension Father     Kidney cancer Father 57        Cause of death. Was a smoker    Heart disease Brother     Esophageal cancer Brother 59        Cause of death. Has a heavy drinker    Malig Hyperthermia Neg Hx        Social History     Socioeconomic History    Marital status:    Tobacco Use    Smoking status: Former     Current packs/day: 0.00     Average packs/day: 1 pack/day for 37.0 years (37.0 ttl pk-yrs)     Types: Cigars, Cigarettes     Start date: 1/1/1985     Quit date: 1/1/2022     Years since quitting: 3.5     Passive exposure: Past    Smokeless tobacco: Never    Tobacco comments:     1 packs= stopped 10 years ago and continued cigars   2 cigars a  day; has stopped cigars as of 2022   Vaping Use    Vaping status: Never Used   Substance and Sexual Activity    Alcohol use: Not Currently     Comment: Has now been abstinent since 2020    Drug use: Never    Sexual activity: Defer         Current Facility-Administered Medications:     atropine sulfate (0.1 mg/mL) Intravenous 0.5 mg / 5 mL, 0.5 mg, Intravenous, Once PRN, Jose Fowler CRNA    diphenhydrAMINE (BENADRYL) injection 12.5 mg, 12.5 mg, Intravenous, Q15 Min PRN, Jose Fowler CRNA    ePHEDrine Sulfate (Pressors) 5 MG/ML injection 5 mg, 5 mg, Intravenous, Once PRN, Jose Fowler CRNA    hydrALAZINE (APRESOLINE) injection 5 mg, 5 mg, Intravenous, Q10 Min PRN, Jose Fowler CRNA    ipratropium-albuterol (DUO-NEB) nebulizer solution 3 mL, 3 mL, Nebulization, Once PRN, Jose Fowler CRNA    labetalol (NORMODYNE,TRANDATE) injection 5 mg, 5 mg, Intravenous, Q5 Min PRN, Jose Fowler CRNA    lidocaine (cardiac) (XYLOCAINE) injection 100 mg, 100 mg, Intravenous, Q5 Min PRN, Jose Fowler CRNA    meperidine (DEMEROL) injection 12.5 mg, 12.5 mg, Intravenous, Q5 Min PRN, Jose Fowler CRNA    ondansetron (ZOFRAN) injection 4 mg, 4 mg, Intravenous, Once PRN, Jose Fowler CRNA    Facility-Administered Medications Ordered in Other Encounters:     glycopyrrolate (ROBINUL) injection, , Intravenous, PRN, Jose Fowler, CRNA, 0.2 mg at 07/22/25 1309    lidocaine PF 2% (XYLOCAINE) injection, , Infiltration, PRN, Jose Fowler, CRNA, 80 mg at 07/22/25 1306    ondansetron (ZOFRAN) injection, , Intravenous, PRN, Jose Fowler, CRNA, 4 mg at 07/22/25 1321    Propofol (DIPRIVAN) injection, , Intravenous, PRN, Jose Fowler, CRNA, 200 mg at 07/22/25 1311    rocuronium (ZEMURON) injection, , Intravenous, PRN, Jose Fowler, CRNA, 10 mg at 07/22/25 1315    sodium chloride 0.9 % infusion, , Intravenous, Continuous PRN, Jose Fowler, CRNA, New Bag at 07/22/25 1306     "succinylcholine (ANECTINE) injection, , Intravenous, PRN, Malcolm, Jose P, CRNA, 100 mg at 07/22/25 1311    sugammadex (BRIDION) injection, , Intravenous, PRN, Fowler, Ojse P, CRNA, 200 mg at 07/22/25 1327     Allergies   Allergen Reactions    Cephalosporins Anaphylaxis     Throat swelling    Dye  [Contrast Dye (Echo Or Unknown Ct/Mr)] Hives    Iodinated Contrast Media Hives    Sulfa Antibiotics Hives    Sulfate Hives    Zetia [Ezetimibe] Other (See Comments) and Myalgia     Made tired    Statins Myalgia     Myalgia and fatique             Objective    OBJECTIVE:     VITAL SIGNS:  /66   Pulse 50   Temp 97.8 °F (36.6 °C) (Oral)   Resp 17   Ht 180.3 cm (71\")   Wt 65.3 kg (144 lb)   SpO2 99%   BMI 20.08 kg/m²     PHYSICAL EXAM:  Constitutional:       Appearance: Normal appearance.   HENT:      Head: Normocephalic.      Right Ear: External ear normal.      Left Ear: External ear normal.      Nose: Nose normal.      Mouth/Throat: No obvious deformity     Pharynx: Oropharynx is clear.   Eyes:      Conjunctiva/sclera: Conjunctivae normal.   Cardiovascular:      Rate and Rhythm: Normal rate.      Pulses: Normal pulses.   Pulmonary:      Effort: Pulmonary effort is normal.   Abdominal:      Palpations: Abdomen is soft.   Musculoskeletal:         General: Normal range of motion.      Cervical back: Normal range of motion.   Skin:     General: Skin is warm.   Neurological:      General: No focal deficit present.      Mental Status: He is alert and oriented to person, place, and time.     Vital Signs  Weight is 140 pounds.    LAB RESULTS:  I have reviewed all the available laboratory results in the chart.    RESULTS REVIEW:  I have reviewed the patient's all relevant laboratory and imaging findings.     PET/ CT of skull base to mid-thigh done 10/20/2023.  Abnormal metabolic activity within the distal esophagus extending to the GE junction compatible with patient's known malignancy.    PET scan from 02/02/2024 " was reviewed with the patient.  The tumor has decreased in size. It still shows some activity, which could be some radiation changes.     Ultrasound of the carotid arteries from 01/24/2024 did not see any significant blockage.     Ultrasound of the thyroid from 01/29/2024 revealed a small nodule on the thyroid that was not concerning for cancer.         ASSESSMENT & PLAN:  Jourdan Lebron is a 55 y.o. male with significant medical conditions as mentioned above presented to my clinic.    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  Staging: Stage III-A ([id number])    Anastomotic stricture  I offered EGD to evaluate for anastomotic stricture and dilation.     I discussed the patients findings and my recommendations with the patient. The patient was given adequate time to ask questions and all questions were answered to patient satisfaction.     Saurabh Hurtado MD  Thoracic Surgeon  [facility]        Dictated utilizing Dragon dictation

## 2025-07-22 NOTE — ANESTHESIA PREPROCEDURE EVALUATION
Anesthesia Evaluation     NPO Solid Status: > 8 hours  NPO Liquid Status: > 8 hours           Airway   Mallampati: I  TM distance: >3 FB  Neck ROM: full  No difficulty expected  Dental - normal exam     Pulmonary - normal exam   Cardiovascular - normal exam    (+) hyperlipidemia      Neuro/Psych  (+) CVA, headaches, numbness, psychiatric history  GI/Hepatic/Renal/Endo    (+) renal disease-, thyroid problem thyroid nodules    Musculoskeletal     (+) neck pain  Abdominal  - normal exam    Bowel sounds: normal.   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  Abnormal ECG   History of blood clot in brain had thrombectomy History of cancer chemotherapy   History of radiation therapy  COVID   Generalized headaches  History of kidney stones   Thyroid nodule  Trouble swallowing   Port-A-Cath in place  Jejunostomy tube present     Surgical History  Current as of 12/19/24 1652  KIDNEY STONE SURGERY ADRENALECTOMY  THROMBECTOMY UPPER ENDOSCOPIC ULTRASOUND W/ FNA  VENOUS ACCESS DEVICE (PORT) INSERTION ENDOSCOPY  ESOPHAGUS SURGERY COLONOSCOPY  OTHER SURGICAL HISTORY ESOPHAGOGASTRECTOMY  ENDOSCOPY ENDOSCOPY  ENDOSCOPY ENDOSCOPY  ENDOSCOPY ENDOSCOPY      ROS/Med Hx Other: PRIOR ESOPHAGECTOMY  EGD UNDER GETTA 9/2024 [facility] 3 GRI  CVA 2020 NO RESIDUAL SX.   ADDISONIAN CRISIS 2022. SHORT TERM RX NOTHING CURRENTLY  TTE 1/2024 MILD AI EF 55                  Anesthesia Plan    ASA 3     general   Rapid sequence  (GETA)  intravenous induction     Anesthetic plan, risks, benefits, and alternatives have been provided, discussed and informed consent has been obtained with: patient.  Pre-procedure education provided  Plan discussed with CRNA.      CODE  STATUS:

## 2025-07-22 NOTE — OP NOTE
Operative Note     Date of procedure: 12/20/2024     Patient name: Jourdan Lebron  MRN: [id number]    Pre OP diagnosis:  Gastroesophageal anastomotic stricture s/p CRE balloon dilation.   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  History of esophagogastric anastomotic leak.  History of sepsis.   History of abdominal wall cellulitis.  Dysphagia.  History of cerebrovascular accident.  History of tobacco abuse.  History of left adrenalectomy.  History of adrenal crisis.  Cervical radiculopathy.  Cervical stenosis of spinal canal.  Near syncope.  Generalized hyperhidrosis.  Renal calculus.  Abnormal electrocardiogram.  Vitamin B12 deficiency.  Seasonal allergic rhinitis.  Erythrocytosis.  Macrocytosis.  Generalized anxiety disorder.  Major depressive disorder.    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  History of esophagogastric anastomotic leak.  History of sepsis.   History of abdominal wall cellulitis.  Dysphagia.  History of cerebrovascular accident.  History of tobacco abuse.  History of left adrenalectomy.  History of adrenal crisis.  Cervical radiculopathy.  Cervical stenosis of spinal canal.  Near syncope.  Generalized hyperhidrosis.  Renal calculus.  Abnormal electrocardiogram.  Vitamin B12 deficiency.  Seasonal allergic rhinitis.  Erythrocytosis.  Macrocytosis.  Generalized anxiety disorder.  Major depressive disorder.    Procedure performed:   Flexible esophagogastroduodenoscopy.  CRE balloon dilation of the esophageal stricture up to 19 mm.    Indications:   Jourdan Lebron is a 54-year-old male who has significant medical problems as mentioned in the medical chart.      He was having intermittent dysphagia for over a year which became progressively worse.  On  10/2/2023, he underwent EGD and colonoscopy by Dr. Ozzy Abdalla at [facility].  He was found to have a 6 cm mass at the lower third of the esophagus (36 to 42 cm) (biopsied), mild diverticulosis of the sigmoid colon, 5 polyps noted in the colon and cecum that were removed.  The pathology of the esophageal mass revealed invasive moderate to poorly differentiated adenocarcinoma. All polypectomy samples were negative for malignancy (fragments of tubular adenoma).  CT scan of the chest, abdomen and pelvis on 10/9/2023 at [facility] showed 4 cm abnormal thickening of the lower thoracic esophagus extending to the GE junction thought to represent patient's known primary lung malignancy.  There were no convincing evidence of metastatic disease elsewhere in the chest, abdomen, pelvis, or skeleton.  There were stable left adrenalectomy changes, right adrenal adenoma unchanged.     He was seen in the office by Dr. Tacos Osuna ([facility]) for new diagnosis of esophageal cancer. He was an established patient of Dr. Harinder Mueller for erythrocytosis and macrocytosis since 11/2021 (resolved).      PET/CT on 10/20/2023 at [facility] showed hypermetabolic (SUV 7.7) activity within the distal esophagus extending to the GE junction compatible with patient's known malignancy.  He then underwent EGD with EUS by Dr. Joselyn Savage on 10/27/2023. Findings included close to 6 cm ulcerated mass extending from 36 to 42 cm consistent with poorly differentiated adenocarcinoma.  Mass penetrated through muscularis propria without involving the adventitia consistent with T2 lesion. Two small round hypodense lymph nodes in close proximity to the mass measuring 5 and 6 mm concerning for malignancy. Other lymph nodes around the GE junction measuring 7 and 8 mm were also concerning for malignancy but immediate cytology was negative for malignancy. Pathology of the gastrohepatic lymph node was positive for metastatic  adenocarcinoma; however, the lymph node at 36 cm was negative for malignancy.     He was referred to thoracic surgery for further evaluation.  At the time of initial consultation, he could eat and swallow quite a bit, but it depends. He avoids a lot of bread because it gets stuck lower down his chest. He went down from 200 pounds to 170 pounds 3 years ago after he had a stroke. He had memory loss and general fatigue after the stroke. He lost his appetite but began to add some weight. He then noticed he gets bloated and was having dyspepsia. He had adrenalectomy performed at the [facility] after an adrenal biopsy was done on an adrenal mass, and he went into adrenal crisis. He took hydrocortisone for 1 year and discontinued it on 09/2022. He developed abdominal discomfort afterwards. He denies having any past chest surgeries or myocardial infarction.  His wife mentioned the cause of the previous stroke was never known despite having a complete work up and a loop recorder inserted which was removed in the spring. He denies any abnormal heart rhythms or pedal edema. The patient quit smoking cigarettes 8 to 9 years ago and started smoking cigars.      He had good functional status.  Based on the clinical presentation, he was considered a candidate for trimodality treatment. I placed Mediport on 11/13/2023.  He received concurrent chemoradiation therapy.  His last dose of chemotherapy and radiation was on 12/28/2023. He tolerated chemoradiation without much difficulty.  Restaging PET/CT scan on 2/2/2024 showed wall thickening involving the distal esophagus extending to the GE junction consistent with patient's known esophageal malignancy.  There was no evidence of distant metastases.  There was significant decreased uptake in the distal esophagus consistent with treatment effect.     Due to his history of stroke, ultrasound bilateral carotid were obtained which showed no significant carotid stenosis.  There  was incidentally noted right thyroid nodule for which follow-up ultrasound of the thyroid was done which showed multiple nodules but not concerning for malignancy.  Transthoracic echo was performed on 1/22/2024 and noted ejection fraction of 51 to 55%.  He was sent to Dr. Hayward for cardiology clearance.  He had 0.8% risk of myocardial infarction or cardiac arrest, intraoperatively or up to 30 days postoperatively.  No further workup was recommended.  He had a history of adrenal crisis and was sent to evaluation by his primary endocrinologist at [facility], Anabela Crenshaw MD.  Complete ACTH stimulation test was performed which was reported normal.     On 2/9/2024, he underwent surgical resection. Flexible esophagogastroduodenoscopy was notable for mucosal changes at the distal esophagus and Olivera's esophagus.  These findings were consistent with treatment effect. Rather than a gastric emptying procedure, I performed a balloon dilatation of the pylorus to 20 mm with a CRE balloon I performed a robot-assisted minimally-invasive [place] Shayne esophagectomy via a robot-assisted laparoscopic and robot assisted right thoracoscopic approach converted to thoracotomy. I constructed a 4 cm gastric tube and performed an esophagogastrostomy approximately 2 cm above the azygos vein using a 28 EEA stapler.  After firing the stapler, 1 complete esophageal anastomotic ring and a partial gastric anastomotic ring was noted. The anastomosis was checked under water with air insufflation and leak was identified which was oversewn. The anastomosis was partially imbricated and no leak was identified under water with air insufflation afterwards. A feeding jejunostomy tube was placed 40 cm distal to ligament of Treitz. Repeat endoscopy was notable for a widely patent anastomosis at 22-24 cm.  The conduit was healthy appearing.  It was mildly tortuous and non redundant.  He remained hemodynamically stable throughout the  case.  He received 4000 cc of crystalloid and made 1000 cc of urine.  He required small doses of phenylephrine throughout the case.  He was extubated at the end of procedure.       The pathology reported scattered foci of residual moderate to poorly differentiated adenocarcinoma occurring at the GE junction with changes consistent with prior therapy.  There were scattered individual tumor cells and small clusters near GE junction and extending through muscularis propria into adventitia spanning an area of 2.1 x 1.8 x 0.7 cm.  There is no lymphovascular space invasion.  The proximal and distal surgical margins were negative for malignancy.  20 lymph nodes were negative for metastatic carcinoma. There was also presence of low and high-grade squamous dysplasia.  He had near complete response to chemo and radiation therapy.     Postprocedure he was transferred to the ICU.  His initial postop care was unremarkable.  He was started on tube feeds and was slowly advanced to goal rate which he tolerated well.  He demonstrated return of bowel function.  The chest tube was removed after amylase from the drain was checked which was not concerning for esophageal leak.  His white blood count were slowly trending up and had mild leukocytosis.  This was concerning for unidentified infection.  He had mild cellulitis around the jejunostomy tube site.  He was started on empiric antibiotics.  Infectious disease was consulted.  CT scan of the chest abdomen pelvis without contrast did not reveal any clear source of infection.  I offered EGD to evaluate for anastomotic leak and conduit.     On 2/17/2024, he underwent EGD.  He had oropharyngeal candidiasis.  There was approximately 20% defect at the anastomotic ring likely contained by the reinforcing stitch placed intraoperatively.  There was fibrinous plaque covering the anastomosis.  Intraoperative esophagram revealed blind pouch anteriorly at the site of anastomotic defect.  The  gastric conduit appeared viable but was slightly redundant.  The conduit orientation was straight.  I placed a 18 mm X 25 mm X 103 mm fully covered Wallflex stent deployed at the anastomosis.  He tolerated the procedure well.     Esophagram performed on 2/22/2024 and was negative for leak, but did demonstrate retrograde flow of contrast along the distal aspect of the stent making him high risk for anastomotic leak with oral feeds.  He was kept nothing by mouth and discharged home on tube feeds.     He slowly improved over time.  His appetite improved.  He was able to tolerate liquid diet and slowly advance to soft diet. He was started on adjuvant immunotherapy.  He developed dysphagia and underwent EGD on 4/5/2024, 9/20/2024, 12/20/2024.  The esophagus was stricture and serial CRE balloon dilation was performed.      I offered EGD to evaluate for cancer surveillance and possible dilation.  The risk and benefit of the procedure were discussed in detail.  He agreed and signed the consent form.    Surgeon: Saurabh Hurtaod MD     Anesthesia: Monitored anesthesia care with sedation    ASA Class: 3    Procedure Details   On 7/22/2025, the patient was brought to the endoscopy suite. Jourdan Lebron was positioned in the supine position.  Monitored anesthesia care was provided by anesthesiologist.   A bite-block was placed.  Antibiotics would not indicated for EGD. Prior to beginning the procedure, a time-out was conducted during which all members of the surgical team were present.      I began by performing a flexible esophagogastroduodenoscopy.  The cricopharyngeus was located at 16 cm.  The cervical esophagus appeared normal. The esophagogastric anastomosis was located at 22-24 cm. There was mild narrowing at the anastomosis and I was able to traverse the stricture. I passed the CRE™ balloon dilator through the working channel of the endoscope. The endoscope was retracted and the balloon dilator was parked at the anastomotic  stricture.  The manufacture pressure guidelines were used to achieve desired dilation diameter.  The stenosis was serially dilated up to 19 mm starting from 18 mm.  Sustained pressures were maintained for a minute with each dilation to allow breaking the scar tissue.  The balloon dilator was deflated and retracted.  The area of concern was re-examined.  There was no evidence of deep injury or perforation.  I continued with examination of the conduit.  The gastric conduit appeared viable distal to the anastomosis.  The diaphragmatic pinch was located at 40 cm from the incisor.  The antrum was below the diaphragm.  The pylorus was located at 45 cm.  It was patent and the adult endoscope was advanced without difficulty.  The first and second part of the duodenum appeared normal.    The patient was extubated and was transported to the post-anesthesia care unit in stable condition.    Findings:  Esophagogastric anastomosis located at 24 cm.  Esophageal narrowing up to ~ 17 mm.  It appeared much improved from before.  I was able to pass the narrowing without any resistance.  Serial CRE balloon dilation of the anastomotic stricture from 18 mm up to 19 mm.  Superficial mucosal bleeding. No evidence of deep tissue injury after dilation.  The gastric conduit appeared viable.  The conduit orientation was straight and had mild redundancy.  The diaphragmatic pinch was at 40 cm.  The infradiaphragmatic antrum was normal.  The pylorus located at 45 cm.  It was patent and allowed adult endoscope to pass without difficulty.    Estimated Blood Loss:  None           Specimens: None           Complications: None           Disposition: PACU - hemodynamically stable.           Condition: Stable    Post-procedure recommendations:   Follow up in 3 months with CT scan of chest.     Saurabh Hurtado MD   Thoracic Surgeon  [facility]

## 2025-07-22 NOTE — DISCHARGE INSTRUCTIONS
A responsible adult should stay with you and you should rest quietly for the rest of the day.    Do not drink alcohol, drive, operate any heavy machinery or power tools or make any legal/important decisions for the next 24 hours.     If you begin to experience severe pain, increased shortness of breath, racing heartbeat or a fever above 101 F, seek immediate medical attention.     Follow up with MD as instructed. Call office for results in 3 to 5 days if needed.

## 2025-07-22 NOTE — ANESTHESIA PROCEDURE NOTES
Airway  Reason: elective    Date/Time: 7/22/2025 1:12 PM  Airway not difficult    General Information and Staff    Patient location during procedure: OR  CRNA/CAA: Jose Fowler CRNA    Indications and Patient Condition  Indications for airway management: airway protection    Preoxygenated: yes  MILS maintained throughout    Mask difficulty assessment: 0 - not attempted    Final Airway Details    Final airway type: endotracheal airway      Successful airway: ETT  Cuffed: yes   Successful intubation technique: video laryngoscopy and RSI  Adjuncts used in placement: intubating stylet and cricoid pressure  Endotracheal tube insertion site: oral  Blade: Saldana  Blade size: 3  ETT size (mm): 7.5  Cormack-Lehane Classification: grade I - full view of glottis  Placement verified by: chest auscultation and capnometry   Measured from: lips  Number of attempts at approach: 1  Assessment: lips, teeth, and gum same as pre-op and atraumatic intubation

## 2025-07-29 ENCOUNTER — PATIENT OUTREACH (OUTPATIENT)
Dept: ONCOLOGY | Facility: CLINIC | Age: 55
End: 2025-07-29
Payer: COMMERCIAL

## 2025-07-29 NOTE — SIGNIFICANT NOTE
Patient will continue with surveillance. He can call with any questions or concerns.     Kelsea Carreno  Lung Nurse Navigator   [facility]  [phone]  [email or web address]

## 2025-08-05 DIAGNOSIS — E53.8 FOLATE DEFICIENCY: ICD-10-CM

## 2025-08-06 RX ORDER — FOLIC ACID 1 MG/1
1 TABLET ORAL DAILY
Qty: 90 TABLET | Refills: 0 | Status: SHIPPED | OUTPATIENT
Start: 2025-08-06

## 2025-08-13 ENCOUNTER — HOSPITAL ENCOUNTER (OUTPATIENT)
Dept: ONCOLOGY | Facility: HOSPITAL | Age: 55
Discharge: HOME | End: 2025-08-13
Admitting: INTERNAL MEDICINE
Payer: COMMERCIAL

## 2025-08-13 ENCOUNTER — OFFICE VISIT (OUTPATIENT)
Dept: ONCOLOGY | Facility: CLINIC | Age: 55
End: 2025-08-13
Payer: COMMERCIAL

## 2025-08-13 VITALS
OXYGEN SATURATION: 98 % | SYSTOLIC BLOOD PRESSURE: 109 MMHG | WEIGHT: 149.4 LBS | DIASTOLIC BLOOD PRESSURE: 69 MMHG | RESPIRATION RATE: 14 BRPM | TEMPERATURE: 97.2 F | BODY MASS INDEX: 20.92 KG/M2 | HEIGHT: 71 IN | HEART RATE: 78 BPM

## 2025-08-13 DIAGNOSIS — C15.9 ADENOCARCINOMA OF ESOPHAGUS: ICD-10-CM

## 2025-08-13 DIAGNOSIS — Z45.2 ENCOUNTER FOR CARE RELATED TO PORT-A-CATH: Primary | ICD-10-CM

## 2025-08-13 DIAGNOSIS — R91.1 LUNG NODULE: Primary | ICD-10-CM

## 2025-08-13 LAB
BASOPHILS # BLD AUTO: 0.02 10*3/MM3 (ref 0–0.2)
BASOPHILS NFR BLD AUTO: 0.6 % (ref 0–1.5)
DEPRECATED RDW RBC AUTO: 46 FL (ref 37–54)
EOSINOPHIL # BLD AUTO: 0.14 10*3/MM3 (ref 0–0.4)
EOSINOPHIL NFR BLD AUTO: 4.3 % (ref 0.3–6.2)
ERYTHROCYTE [DISTWIDTH] IN BLOOD BY AUTOMATED COUNT: 12.9 % (ref 12.3–15.4)
HCT VFR BLD AUTO: 37.6 % (ref 37.5–51)
HGB BLD-MCNC: 12.5 G/DL (ref 13–17.7)
IMM GRANULOCYTES # BLD AUTO: 0 10*3/MM3 (ref 0–0.05)
IMM GRANULOCYTES NFR BLD AUTO: 0 % (ref 0–0.5)
LYMPHOCYTES # BLD AUTO: 0.97 10*3/MM3 (ref 0.7–3.1)
LYMPHOCYTES NFR BLD AUTO: 29.9 % (ref 19.6–45.3)
MCH RBC QN AUTO: 32.1 PG (ref 26.6–33)
MCHC RBC AUTO-ENTMCNC: 33.2 G/DL (ref 31.5–35.7)
MCV RBC AUTO: 96.4 FL (ref 79–97)
MONOCYTES # BLD AUTO: 0.35 10*3/MM3 (ref 0.1–0.9)
MONOCYTES NFR BLD AUTO: 10.8 % (ref 5–12)
NEUTROPHILS NFR BLD AUTO: 1.76 10*3/MM3 (ref 1.7–7)
NEUTROPHILS NFR BLD AUTO: 54.4 % (ref 42.7–76)
PLATELET # BLD AUTO: 190 10*3/MM3 (ref 140–450)
PMV BLD AUTO: 9.4 FL (ref 6–12)
RBC # BLD AUTO: 3.9 10*6/MM3 (ref 4.14–5.8)
WBC NRBC COR # BLD AUTO: 3.24 10*3/MM3 (ref 3.4–10.8)

## 2025-08-13 PROCEDURE — 99214 OFFICE O/P EST MOD 30 MIN: CPT | Performed by: INTERNAL MEDICINE

## 2025-08-13 PROCEDURE — 36591 DRAW BLOOD OFF VENOUS DEVICE: CPT

## 2025-08-13 PROCEDURE — 25010000002 HEPARIN LOCK FLUSH PER 10 UNITS: Performed by: INTERNAL MEDICINE

## 2025-08-13 PROCEDURE — 85025 COMPLETE CBC W/AUTO DIFF WBC: CPT | Performed by: INTERNAL MEDICINE

## 2025-08-13 RX ORDER — HEPARIN SODIUM (PORCINE) LOCK FLUSH IV SOLN 100 UNIT/ML 100 UNIT/ML
500 SOLUTION INTRAVENOUS AS NEEDED
Status: DISCONTINUED | OUTPATIENT
Start: 2025-08-13 | End: 2025-08-14 | Stop reason: HOSPADM

## 2025-08-13 RX ORDER — SODIUM CHLORIDE 0.9 % (FLUSH) 0.9 %
10 SYRINGE (ML) INJECTION AS NEEDED
Status: DISCONTINUED | OUTPATIENT
Start: 2025-08-13 | End: 2025-08-14 | Stop reason: HOSPADM

## 2025-08-13 RX ADMIN — HEPARIN 500 UNITS: 100 SYRINGE at 08:24

## 2025-08-13 RX ADMIN — Medication 10 ML: at 08:24

## (undated) DEVICE — WOUND RETRACTOR AND PROTECTOR: Brand: ALEXIS WOUND PROTECTOR-RETRACTOR

## (undated) DEVICE — TUBING, SUCTION, 1/4" X 10', STRAIGHT: Brand: MEDLINE

## (undated) DEVICE — GLV SURG PREMIERPRO ORTHO LTX PF SZ8 BRN

## (undated) DEVICE — SEAL

## (undated) DEVICE — LAPAROSCOPIC SMOKE FILTRATION SYSTEM: Brand: PALL LAPAROSHIELD® PLUS LAPAROSCOPIC SMOKE FILTRATION SYSTEM

## (undated) DEVICE — PK ENDO GI 50

## (undated) DEVICE — OASIS DRAIN, SINGLE, INLINE & ATS COMPATIBLE: Brand: OASIS

## (undated) DEVICE — SOL ANTISTICK CAUTRY ELECTROLUBE LF

## (undated) DEVICE — SUT SILK 2/0 SUTUPAK TIES 24IN SA75H

## (undated) DEVICE — ENDOPATH PNEUMONEEDLE INSUFFLATION NEEDLES WITH LUER LOCK CONNECTORS 120MM: Brand: ENDOPATH

## (undated) DEVICE — SUT SILK 3/0 SUTUPAK TIES 24IN SA74H

## (undated) DEVICE — GOWN,REINF,POLY,ECL,PP SLV,XXL: Brand: MEDLINE

## (undated) DEVICE — SPNG LAP CIGARETTE KITTNER 5MM STRL PK/5

## (undated) DEVICE — CVR HNDL LT SURG ACCSSRY BLU STRL

## (undated) DEVICE — POOLE SUCTION HANDLE: Brand: CARDINAL HEALTH

## (undated) DEVICE — PK MINOR LAPAROTOMY 50

## (undated) DEVICE — UNIVERSAL STAPLER: Brand: ENDO GIA ULTRA

## (undated) DEVICE — CANN O2 ETCO2 FITS ALL CONN CO2 SMPL A/ 7IN DISP LF

## (undated) DEVICE — ESOPHAGEAL BALLOON DILATATION CATHETER: Brand: CRE FIXED WIRE

## (undated) DEVICE — DEV INFL CRE STERIFLATE 60CC DISP

## (undated) DEVICE — KT ORCA ORCAPOD DISP STRL

## (undated) DEVICE — ARM DRAPE

## (undated) DEVICE — DBD-DRAPE,LAP,CHOLE,W/TROUGHS,STERILE: Brand: MEDLINE

## (undated) DEVICE — ADHS SKIN SURG TISS VISC PREMIERPRO EXOFIN HI/VISC FAST/DRY

## (undated) DEVICE — DEV INFL BALN BIG60 W/GAUGE 60ML

## (undated) DEVICE — ADAPT CLN BIOGUARD AIR/H2O DISP

## (undated) DEVICE — GLV SURG BIOGEL LTX PF 8

## (undated) DEVICE — BITEBLOCK ENDO W/STRAP 60F A/ LF DISP

## (undated) DEVICE — ADAPTER,CATHETER/SYRINGE/LUER,STERILE: Brand: MEDLINE

## (undated) DEVICE — GOWN,NON-REINFORCED,SIRUS,SET IN SLV,XXL: Brand: MEDLINE

## (undated) DEVICE — SENSR O2 OXIMAX FNGR A/ 18IN NONSTR

## (undated) DEVICE — CVR PROB 96IN LF STRL

## (undated) DEVICE — SPONGE,DISSECTOR,K,XRAY,9/16"X1/4",STRL: Brand: MEDLINE

## (undated) DEVICE — ST TBG AIRSEAL BIF FLTR W/ACT/CHARCOAL/FLTR

## (undated) DEVICE — PATIENT RETURN ELECTRODE, SINGLE-USE, CONTACT QUALITY MONITORING, ADULT, WITH 9FT CORD, FOR PATIENTS WEIGING OVER 33LBS. (15KG): Brand: MEGADYNE

## (undated) DEVICE — APPL CHLORAPREP HI/LITE 26ML ORNG

## (undated) DEVICE — AIRSEAL 8 MM CANNULA CAP AND OBTURATOR WITH BLADELESS OPTICAL TIP COMPATIBLE WITH INTUITIVE DA VINCI XI AND DA VINCI X 8 MM INSTRUMENT CANNULA, LONG LENGTH: Brand: ARS LONG

## (undated) DEVICE — NDL HYPO PRECISIONGLIDE REG 20G 1 1/2

## (undated) DEVICE — INTENDED FOR TISSUE SEPARATION, AND OTHER PROCEDURES THAT REQUIRE A SHARP SURGICAL BLADE TO PUNCTURE OR CUT.: Brand: BARD-PARKER ® CARBON RIB-BACK BLADES

## (undated) DEVICE — SUT MNCRYL PLS ANTIB UD 4/0 PS2 18IN

## (undated) DEVICE — PCH INST SURG INVISISHIELD 2PCKT

## (undated) DEVICE — DEV INFL ALLIANCE2 SYS

## (undated) DEVICE — SUT SILK 3/0 SH CR8 18IN C013D

## (undated) DEVICE — ELECTRD BLD EZ CLN MOD XLNG 2.75IN

## (undated) DEVICE — Device

## (undated) DEVICE — NDL HYPO PRECISIONGLIDE REG 25G 1 1/2

## (undated) DEVICE — BLCK/BITE BLOX W/DENTL/RIM W/STRAP 54F

## (undated) DEVICE — SYR LUERLOK 20CC BX/50

## (undated) DEVICE — TOTAL TRAY, 16FR 10ML SIL FOLEY, URN: Brand: MEDLINE

## (undated) DEVICE — EXTENSION SET, MALE LUER LOCK ADAPTER WITH RETRACTABLE COLLAR

## (undated) DEVICE — SYNCHROSEAL: Brand: DA VINCI ENERGY

## (undated) DEVICE — KT CLN CLEANOR SCPE

## (undated) DEVICE — ADAPT FEED PEG ENDOVIVE Y/PRT ENFIT SI 20F

## (undated) DEVICE — MARKR SKIN W/RULR 2TP

## (undated) DEVICE — THE DISPOSABLE RAPTOR GRASPING DEVICE IS USED TO GRASP TISSUE AND/OR RETRIEVE FOREIGN BODIES, EXCISED TISSUE AND STENTS DURING ENDOSCOPIC PROCEDURES.: Brand: RAPTOR

## (undated) DEVICE — SUREFORM 45 CURVED-TIP: Brand: SUREFORM

## (undated) DEVICE — SUT SILK 0 TIES 30IN A306H

## (undated) DEVICE — SYR PREFILL SALINE POSIFLUSH 10ML STRL

## (undated) DEVICE — 24 FR STRAIGHT – SOFT PVC CATHETER: Brand: PVC THORACIC CATHETERS

## (undated) DEVICE — COUNT NDL MAG FM/BLCK 40COUNT

## (undated) DEVICE — 3M™ IOBAN™ 2 ANTIMICROBIAL INCISE DRAPE 6651EZ: Brand: IOBAN™ 2

## (undated) DEVICE — COLUMN DRAPE

## (undated) DEVICE — BLADELESS OBTURATOR: Brand: WECK VISTA

## (undated) DEVICE — LN SMPL CO2 SHTRM SD STREAM W/M LUER

## (undated) DEVICE — ELECTRD BLD EZ CLN MOD 6.5IN

## (undated) DEVICE — ANTIBACTERIAL UNDYED BRAIDED (POLYGLACTIN 910), SYNTHETIC ABSORBABLE SUTURE: Brand: COATED VICRYL

## (undated) DEVICE — T-TUBE 19FR SILICONE: Brand: CARDINAL HEALTH

## (undated) DEVICE — GLV SURG BIOGEL LTX PF 6

## (undated) DEVICE — SUT VIC 2/0 SH 27IN

## (undated) DEVICE — KIT,ANTI FOG,W/SPONGE & FLUID,SOFT PACK: Brand: MEDLINE

## (undated) DEVICE — 3M™ IOBAN™ 2 ANTIMICROBIAL INCISE DRAPE 6650EZ: Brand: IOBAN™ 2

## (undated) DEVICE — STRIP,CLOSURE,WOUND,MEDI-STRIP,1/2X4: Brand: MEDLINE

## (undated) DEVICE — SPONGE,LAP,12"X12",XR,ST,5/PK,40PK/CS: Brand: MEDLINE

## (undated) DEVICE — THE STERILE LIGHT HANDLE COVER IS USED WITH STERIS SURGICAL LIGHTING AND VISUALIZATION SYSTEMS.

## (undated) DEVICE — ENDOSCOPIC ULTRASOUND FINE NEEDLE BIOPSY (FNB) DEVICE: Brand: ACQUIRE

## (undated) DEVICE — ESOPHAGEAL/PYLORIC/COLONIC WIREGUIDED BALLOON DILATATION CATHETER: Brand: CRE WIREGUIDED

## (undated) DEVICE — PENCL ES MEGADINE EZ/CLEAN BUTN W/HOLSTR 10FT

## (undated) DEVICE — 2, DISPOSABLE SUCTION/IRRIGATOR WITH DISPOSABLE TIP: Brand: STRYKEFLOW

## (undated) DEVICE — ENDOPATH XCEL BLADELESS TROCARS WITH STABILITY SLEEVES: Brand: ENDOPATH XCEL

## (undated) DEVICE — 3M™ STERI-DRAPE™ INSTRUMENT POUCH 1018L: Brand: STERI-DRAPE™

## (undated) DEVICE — PENCL HND ROCKRSWTCH HOLSTR EZ CLEAN TP CRD 10FT

## (undated) DEVICE — PROVE COVER: Brand: UNBRANDED

## (undated) DEVICE — DECANT BG O JET

## (undated) DEVICE — LOU THORACIC: Brand: MEDLINE INDUSTRIES, INC.

## (undated) DEVICE — STAPLER SHEATH: Brand: ENDOWRIST

## (undated) DEVICE — SYR LUERLOK 5CC

## (undated) DEVICE — SUT SILK 2/0 SH CR8 18IN CR8 C012D

## (undated) DEVICE — C-ARM: Brand: UNBRANDED

## (undated) DEVICE — TBG INSUFFLATION LUER LOCK: Brand: MEDLINE INDUSTRIES, INC.